# Patient Record
Sex: FEMALE | Race: WHITE | Employment: OTHER | URBAN - METROPOLITAN AREA
[De-identification: names, ages, dates, MRNs, and addresses within clinical notes are randomized per-mention and may not be internally consistent; named-entity substitution may affect disease eponyms.]

---

## 2017-03-06 PROBLEM — Z98.1 S/P LUMBAR FUSION: Status: ACTIVE | Noted: 2017-03-06

## 2018-03-20 ENCOUNTER — OFFICE VISIT (OUTPATIENT)
Dept: FAMILY MEDICINE CLINIC | Age: 73
End: 2018-03-20
Payer: MEDICARE

## 2018-03-20 VITALS
TEMPERATURE: 98.6 F | DIASTOLIC BLOOD PRESSURE: 70 MMHG | RESPIRATION RATE: 16 BRPM | BODY MASS INDEX: 26.58 KG/M2 | WEIGHT: 150 LBS | HEART RATE: 80 BPM | HEIGHT: 63 IN | OXYGEN SATURATION: 95 % | SYSTOLIC BLOOD PRESSURE: 122 MMHG

## 2018-03-20 DIAGNOSIS — E55.9 VITAMIN D DEFICIENCY: ICD-10-CM

## 2018-03-20 DIAGNOSIS — Z76.0 MEDICATION REFILL: ICD-10-CM

## 2018-03-20 DIAGNOSIS — K21.9 GASTROESOPHAGEAL REFLUX DISEASE, ESOPHAGITIS PRESENCE NOT SPECIFIED: ICD-10-CM

## 2018-03-20 DIAGNOSIS — Z85.3 HISTORY OF BILATERAL BREAST CANCER: ICD-10-CM

## 2018-03-20 DIAGNOSIS — E03.9 HYPOTHYROIDISM, UNSPECIFIED TYPE: Primary | ICD-10-CM

## 2018-03-20 DIAGNOSIS — E78.5 HYPERLIPIDEMIA, UNSPECIFIED HYPERLIPIDEMIA TYPE: ICD-10-CM

## 2018-03-20 PROCEDURE — 99214 OFFICE O/P EST MOD 30 MIN: CPT | Performed by: FAMILY MEDICINE

## 2018-03-20 PROCEDURE — 3014F SCREEN MAMMO DOC REV: CPT | Performed by: FAMILY MEDICINE

## 2018-03-20 PROCEDURE — G8400 PT W/DXA NO RESULTS DOC: HCPCS | Performed by: FAMILY MEDICINE

## 2018-03-20 PROCEDURE — G8484 FLU IMMUNIZE NO ADMIN: HCPCS | Performed by: FAMILY MEDICINE

## 2018-03-20 PROCEDURE — 1123F ACP DISCUSS/DSCN MKR DOCD: CPT | Performed by: FAMILY MEDICINE

## 2018-03-20 PROCEDURE — G8417 CALC BMI ABV UP PARAM F/U: HCPCS | Performed by: FAMILY MEDICINE

## 2018-03-20 PROCEDURE — 3017F COLORECTAL CA SCREEN DOC REV: CPT | Performed by: FAMILY MEDICINE

## 2018-03-20 PROCEDURE — 1036F TOBACCO NON-USER: CPT | Performed by: FAMILY MEDICINE

## 2018-03-20 PROCEDURE — 1090F PRES/ABSN URINE INCON ASSESS: CPT | Performed by: FAMILY MEDICINE

## 2018-03-20 PROCEDURE — 4040F PNEUMOC VAC/ADMIN/RCVD: CPT | Performed by: FAMILY MEDICINE

## 2018-03-20 PROCEDURE — G8427 DOCREV CUR MEDS BY ELIG CLIN: HCPCS | Performed by: FAMILY MEDICINE

## 2018-03-20 RX ORDER — LEVOTHYROXINE SODIUM 0.07 MG/1
75 TABLET ORAL DAILY
Qty: 90 TABLET | Refills: 1 | Status: SHIPPED | OUTPATIENT
Start: 2018-03-20 | End: 2018-12-13 | Stop reason: SDUPTHER

## 2018-03-20 RX ORDER — RANITIDINE 150 MG/1
150 TABLET ORAL DAILY
Qty: 90 TABLET | Refills: 1 | Status: SHIPPED | OUTPATIENT
Start: 2018-03-20 | End: 2018-12-13 | Stop reason: SDUPTHER

## 2018-03-20 RX ORDER — PANTOPRAZOLE SODIUM 40 MG/1
40 TABLET, DELAYED RELEASE ORAL DAILY
Qty: 90 TABLET | Refills: 1 | Status: SHIPPED | OUTPATIENT
Start: 2018-03-20 | End: 2018-12-13 | Stop reason: SDUPTHER

## 2018-03-20 RX ORDER — ERGOCALCIFEROL 1.25 MG/1
50000 CAPSULE ORAL WEEKLY
Qty: 12 CAPSULE | Refills: 1 | Status: ON HOLD
Start: 2018-03-20 | End: 2021-01-06 | Stop reason: ALTCHOICE

## 2018-03-20 RX ORDER — ROSUVASTATIN CALCIUM 10 MG/1
10 TABLET, COATED ORAL DAILY
Qty: 90 TABLET | Refills: 1 | Status: SHIPPED | OUTPATIENT
Start: 2018-03-20 | End: 2018-12-13 | Stop reason: SDUPTHER

## 2018-03-20 NOTE — PROGRESS NOTES
ideation  ENT ROS: negative for - epistaxis, headaches, hearing change, nasal congestion, nasal discharge, nasal polyps, sinus pain, tinnitus, vertigo or visual changes  Hematological and Lymphatic ROS: negative for - bleeding problems, blood clots, fatigue or swollen lymph nodes  Respiratory ROS: negative for - cough, orthopnea, shortness of breath, sputum changes, tachypnea or wheezing  Cardiovascular ROS: negative for - chest pain, dyspnea on exertion, irregular heartbeat, loss of consciousness, palpitations, paroxysmal nocturnal dyspnea or rapid heart rate  Gastrointestinal ROS: negative for - abdominal pain, blood in stools, change in bowel habits, constipation, diarrhea, gas/bloating, heartburn or nausea/vomiting  Musculoskeletal ROS: negative for - joint pain, joint stiffness, joint swelling or muscle, back pain, bowel or bladder incontinence  Neurological ROS: negative for - behavioral changes, confusion, dizziness, headaches, memory loss, numbness/tingling, seizures or speech problems, weakness  Dermatological ROS: negative for - dry skin, mole changes, nail changes, pruritus, rash or skin lesion changes    Physical Exam  Wt Readings from Last 3 Encounters:   03/20/18 150 lb (68 kg)   03/08/18 143 lb (64.9 kg)   11/07/17 143 lb (64.9 kg)     Temp Readings from Last 3 Encounters:   03/20/18 98.6 °F (37 °C)   03/08/18 98 °F (36.7 °C)   11/07/17 97 °F (36.1 °C)     BP Readings from Last 3 Encounters:   03/20/18 122/70   03/08/18 129/78   10/24/17 125/76     Pulse Readings from Last 3 Encounters:   03/20/18 80   03/08/18 65   10/24/17 67       General appearance: alert, well appearing, and in no distress, oriented to person, place, and time and normal appearing weight. CVS exam: normal rate, regular rhythm, normal S1, S2, no murmurs, rubs, clicks or gallops. Radial pulses 2+ bilateral.  PT/DP pulse 2+ bilat. No C/C/E    Chest: clear to auscultation, no wheezes, rales or rhonchi, symmetric air entry.

## 2018-04-12 DIAGNOSIS — Z85.3 HISTORY OF BILATERAL BREAST CANCER: ICD-10-CM

## 2018-06-04 ENCOUNTER — HOSPITAL ENCOUNTER (OUTPATIENT)
Age: 73
Discharge: HOME | End: 2018-06-04
Payer: MEDICARE

## 2018-06-04 DIAGNOSIS — E03.9 HYPOTHYROIDISM, UNSPECIFIED TYPE: ICD-10-CM

## 2018-06-04 DIAGNOSIS — E78.5 HYPERLIPIDEMIA, UNSPECIFIED HYPERLIPIDEMIA TYPE: ICD-10-CM

## 2018-06-04 DIAGNOSIS — K21.9 GASTROESOPHAGEAL REFLUX DISEASE, ESOPHAGITIS PRESENCE NOT SPECIFIED: ICD-10-CM

## 2018-06-04 LAB
ALBUMIN SERPL-MCNC: 3.8 G/DL (ref 3.5–5.2)
ALP BLD-CCNC: 67 U/L (ref 35–104)
ALT SERPL-CCNC: 19 U/L (ref 0–32)
ANION GAP SERPL CALCULATED.3IONS-SCNC: 10 MMOL/L (ref 7–16)
AST SERPL-CCNC: 25 U/L (ref 0–31)
BILIRUB SERPL-MCNC: 0.4 MG/DL (ref 0–1.2)
BUN BLDV-MCNC: 15 MG/DL (ref 8–23)
CALCIUM SERPL-MCNC: 9.1 MG/DL (ref 8.6–10.2)
CHLORIDE BLD-SCNC: 108 MMOL/L (ref 98–107)
CHOLESTEROL, TOTAL: 166 MG/DL (ref 0–199)
CO2: 27 MMOL/L (ref 22–29)
CREAT SERPL-MCNC: 0.9 MG/DL (ref 0.5–1)
GFR AFRICAN AMERICAN: >60
GFR NON-AFRICAN AMERICAN: >60 ML/MIN/1.73
GLUCOSE BLD-MCNC: 119 MG/DL (ref 74–109)
HCT VFR BLD CALC: 40.4 % (ref 34–48)
HDLC SERPL-MCNC: 43 MG/DL
HEMOGLOBIN: 13.3 G/DL (ref 11.5–15.5)
LDL CHOLESTEROL CALCULATED: 100 MG/DL (ref 0–99)
MCH RBC QN AUTO: 31.1 PG (ref 26–35)
MCHC RBC AUTO-ENTMCNC: 32.9 % (ref 32–34.5)
MCV RBC AUTO: 94.4 FL (ref 80–99.9)
PDW BLD-RTO: 13 FL (ref 11.5–15)
PLATELET # BLD: 187 E9/L (ref 130–450)
PMV BLD AUTO: 9.7 FL (ref 7–12)
POTASSIUM SERPL-SCNC: 5.1 MMOL/L (ref 3.5–5)
RBC # BLD: 4.28 E12/L (ref 3.5–5.5)
SODIUM BLD-SCNC: 145 MMOL/L (ref 132–146)
TOTAL PROTEIN: 6.5 G/DL (ref 6.4–8.3)
TRIGL SERPL-MCNC: 114 MG/DL (ref 0–149)
TSH SERPL DL<=0.05 MIU/L-ACNC: 4.22 UIU/ML (ref 0.27–4.2)
VLDLC SERPL CALC-MCNC: 23 MG/DL
WBC # BLD: 5.5 E9/L (ref 4.5–11.5)

## 2018-06-04 PROCEDURE — 80053 COMPREHEN METABOLIC PANEL: CPT

## 2018-06-04 PROCEDURE — 84443 ASSAY THYROID STIM HORMONE: CPT

## 2018-06-04 PROCEDURE — 80061 LIPID PANEL: CPT

## 2018-06-04 PROCEDURE — 85027 COMPLETE CBC AUTOMATED: CPT

## 2018-06-04 PROCEDURE — 36415 COLL VENOUS BLD VENIPUNCTURE: CPT

## 2018-06-21 ENCOUNTER — OFFICE VISIT (OUTPATIENT)
Dept: FAMILY MEDICINE CLINIC | Age: 73
End: 2018-06-21
Payer: MEDICARE

## 2018-06-21 VITALS
SYSTOLIC BLOOD PRESSURE: 110 MMHG | OXYGEN SATURATION: 96 % | HEART RATE: 80 BPM | HEIGHT: 63 IN | WEIGHT: 149.8 LBS | RESPIRATION RATE: 16 BRPM | TEMPERATURE: 97.7 F | BODY MASS INDEX: 26.54 KG/M2 | DIASTOLIC BLOOD PRESSURE: 60 MMHG

## 2018-06-21 DIAGNOSIS — E78.5 HYPERLIPIDEMIA, UNSPECIFIED HYPERLIPIDEMIA TYPE: ICD-10-CM

## 2018-06-21 DIAGNOSIS — I10 ESSENTIAL HYPERTENSION: ICD-10-CM

## 2018-06-21 DIAGNOSIS — R73.09 ELEVATED GLUCOSE: ICD-10-CM

## 2018-06-21 DIAGNOSIS — E03.9 HYPOTHYROIDISM, UNSPECIFIED TYPE: Primary | ICD-10-CM

## 2018-06-21 DIAGNOSIS — K21.9 GASTROESOPHAGEAL REFLUX DISEASE, ESOPHAGITIS PRESENCE NOT SPECIFIED: ICD-10-CM

## 2018-06-21 LAB — HBA1C MFR BLD: 6.1 %

## 2018-06-21 PROCEDURE — 1090F PRES/ABSN URINE INCON ASSESS: CPT | Performed by: FAMILY MEDICINE

## 2018-06-21 PROCEDURE — 83036 HEMOGLOBIN GLYCOSYLATED A1C: CPT | Performed by: FAMILY MEDICINE

## 2018-06-21 PROCEDURE — 1036F TOBACCO NON-USER: CPT | Performed by: FAMILY MEDICINE

## 2018-06-21 PROCEDURE — 3017F COLORECTAL CA SCREEN DOC REV: CPT | Performed by: FAMILY MEDICINE

## 2018-06-21 PROCEDURE — G8427 DOCREV CUR MEDS BY ELIG CLIN: HCPCS | Performed by: FAMILY MEDICINE

## 2018-06-21 PROCEDURE — G8417 CALC BMI ABV UP PARAM F/U: HCPCS | Performed by: FAMILY MEDICINE

## 2018-06-21 PROCEDURE — 1123F ACP DISCUSS/DSCN MKR DOCD: CPT | Performed by: FAMILY MEDICINE

## 2018-06-21 PROCEDURE — G8400 PT W/DXA NO RESULTS DOC: HCPCS | Performed by: FAMILY MEDICINE

## 2018-06-21 PROCEDURE — 4040F PNEUMOC VAC/ADMIN/RCVD: CPT | Performed by: FAMILY MEDICINE

## 2018-06-21 PROCEDURE — 99214 OFFICE O/P EST MOD 30 MIN: CPT | Performed by: FAMILY MEDICINE

## 2018-06-21 NOTE — PROGRESS NOTES
difficulties, mood swings, sleep disturbances or suicidal ideation  ENT ROS: negative for - epistaxis, headaches, hearing change, nasal congestion, nasal discharge, nasal polyps, sinus pain, tinnitus, vertigo or visual changes  Hematological and Lymphatic ROS: negative for - bleeding problems, blood clots, fatigue or swollen lymph nodes  Respiratory ROS: negative for - cough, orthopnea, shortness of breath, sputum changes, tachypnea or wheezing  Cardiovascular ROS: negative for - chest pain, dyspnea on exertion, irregular heartbeat, loss of consciousness, palpitations, paroxysmal nocturnal dyspnea or rapid heart rate  Gastrointestinal ROS: negative for - abdominal pain, blood in stools, change in bowel habits, constipation, diarrhea, gas/bloating, heartburn or nausea/vomiting  Musculoskeletal ROS: negative for - joint pain, joint stiffness, joint swelling or muscle, back pain, bowel or bladder incontinence  Neurological ROS: negative for - behavioral changes, confusion, dizziness, headaches, memory loss, numbness/tingling, seizures or speech problems, weakness  Dermatological ROS: negative for - dry skin, mole changes, nail changes, pruritus, rash or skin lesion changes    Physical Exam  Temp Readings from Last 3 Encounters:   06/21/18 97.7 °F (36.5 °C)   05/10/18 97 °F (36.1 °C)   03/20/18 98.6 °F (37 °C)     Wt Readings from Last 3 Encounters:   06/21/18 149 lb 12.8 oz (67.9 kg)   06/07/18 142 lb 8 oz (64.6 kg)   05/10/18 155 lb (70.3 kg)     BP Readings from Last 3 Encounters:   06/21/18 110/60   06/07/18 133/84   05/10/18 133/86     Pulse Readings from Last 3 Encounters:   06/21/18 80   06/07/18 72   05/10/18 70       General appearance: alert, well appearing, and in no distress, oriented to person, place, and time and normal appearing weight. CVS exam: normal rate, regular rhythm, normal S1, S2, no murmurs, rubs, clicks or gallops. Radial pulses 2+ bilateral.  PT/DP pulse 2+ bilat.   No C/C/E    Chest: clear to auscultation, no wheezes, rales or rhonchi, symmetric air entry. Abdomen: Soft, non-tender, non-distended, positive BS in all 4 quadrants    Extremities:Dorsalis pedis pulses palpated bilaterally, no clubbing, cyanosis, edema or erythema     SKIN: no lesions, jaundice, petechiae, pallor, cyanosis, ecchymosis    NEURO: gross motor exam normal by observation, gait normal    Mental status - alert, oriented to person, place, and time, normal mood, behavior, speech, dress, motor activity, and thought processes      Assessment/Plan  Eli Conner was seen today for thyroid problem, discuss labs, other and memory loss. Diagnoses and all orders for this visit:    Hypothyroidism, unspecified type  - Slight elevation. Does not wish to increase meds,     Hyperlipidemia, unspecified hyperlipidemia type  - . Continue on Crestor    Gastroesophageal reflux disease, esophagitis presence not specified  -  Stable, continue medications as prescribed. Essential hypertension  -     POCT glycosylated hemoglobin (Hb A1C)    Elevated glucose  -     POCT glycosylated hemoglobin (Hb A1C)    Other orders  -     Handicap Placard MISC; by Does not apply route Duration: Lifetime          Return in about 6 months (around 12/21/2018), or if symptoms worsen or fail to improve. Gertrudis Foster, DO    Call or go to ED immediately if symptoms worsen or persist.    Educational materials and/or home exercises printed for patient's review and were included in patient instructions on his/her After Visit Summary and given to patient at the end of visit. Counseled regarding above diagnosis, including possible risks and complications,  especially if left uncontrolled. Counseled regarding the possible side effects, risks, benefits and alternatives to treatment; patient and/or guardian verbalizes understanding, agrees, feels comfortable with and wishes to proceed with above treatment plan.     Advised patient to call with any new

## 2018-07-26 ENCOUNTER — TELEPHONE (OUTPATIENT)
Dept: FAMILY MEDICINE CLINIC | Age: 73
End: 2018-07-26

## 2018-07-26 DIAGNOSIS — G89.29 CHRONIC LOW BACK PAIN, UNSPECIFIED BACK PAIN LATERALITY, WITH SCIATICA PRESENCE UNSPECIFIED: Primary | ICD-10-CM

## 2018-07-26 DIAGNOSIS — M54.5 CHRONIC LOW BACK PAIN, UNSPECIFIED BACK PAIN LATERALITY, WITH SCIATICA PRESENCE UNSPECIFIED: Primary | ICD-10-CM

## 2018-07-26 NOTE — TELEPHONE ENCOUNTER
Pt called and Dr Анна Salinas referred pt for Phy Therapy. The Therapist , Gail Beverly wanted to talk to you re Dawna Simms. She wanted her to see Dr Dougie Chandler. Is this ok ? If you need to talk to [facility] her ph # [phone]. Mackenzie's ph# [phone].

## 2018-12-13 ENCOUNTER — HOSPITAL ENCOUNTER (OUTPATIENT)
Age: 73
Discharge: HOME | End: 2018-12-15
Payer: MEDICARE

## 2018-12-13 ENCOUNTER — OFFICE VISIT (OUTPATIENT)
Dept: FAMILY MEDICINE CLINIC | Age: 73
End: 2018-12-13
Payer: MEDICARE

## 2018-12-13 VITALS
BODY MASS INDEX: 28.71 KG/M2 | DIASTOLIC BLOOD PRESSURE: 68 MMHG | OXYGEN SATURATION: 97 % | WEIGHT: 156 LBS | RESPIRATION RATE: 20 BRPM | TEMPERATURE: 98.1 F | SYSTOLIC BLOOD PRESSURE: 112 MMHG | HEIGHT: 62 IN | HEART RATE: 74 BPM

## 2018-12-13 DIAGNOSIS — E78.5 HYPERLIPIDEMIA, UNSPECIFIED HYPERLIPIDEMIA TYPE: ICD-10-CM

## 2018-12-13 DIAGNOSIS — E03.9 HYPOTHYROIDISM, UNSPECIFIED TYPE: ICD-10-CM

## 2018-12-13 DIAGNOSIS — R73.09 ELEVATED GLUCOSE: ICD-10-CM

## 2018-12-13 DIAGNOSIS — Z00.00 ROUTINE GENERAL MEDICAL EXAMINATION AT A HEALTH CARE FACILITY: Primary | ICD-10-CM

## 2018-12-13 DIAGNOSIS — Z76.0 MEDICATION REFILL: ICD-10-CM

## 2018-12-13 DIAGNOSIS — K21.9 GASTROESOPHAGEAL REFLUX DISEASE, ESOPHAGITIS PRESENCE NOT SPECIFIED: ICD-10-CM

## 2018-12-13 DIAGNOSIS — F41.9 ANXIETY: ICD-10-CM

## 2018-12-13 LAB
ALBUMIN SERPL-MCNC: 3.9 G/DL (ref 3.5–5.2)
ALP BLD-CCNC: 63 U/L (ref 35–104)
ALT SERPL-CCNC: 12 U/L (ref 0–32)
ANION GAP SERPL CALCULATED.3IONS-SCNC: 12 MMOL/L (ref 7–16)
AST SERPL-CCNC: 22 U/L (ref 0–31)
BILIRUB SERPL-MCNC: 0.5 MG/DL (ref 0–1.2)
BUN BLDV-MCNC: 19 MG/DL (ref 8–23)
CALCIUM SERPL-MCNC: 9.3 MG/DL (ref 8.6–10.2)
CHLORIDE BLD-SCNC: 105 MMOL/L (ref 98–107)
CHOLESTEROL, TOTAL: 160 MG/DL (ref 0–199)
CO2: 25 MMOL/L (ref 22–29)
CREAT SERPL-MCNC: 1 MG/DL (ref 0.5–1)
GFR AFRICAN AMERICAN: >60
GFR NON-AFRICAN AMERICAN: 54 ML/MIN/1.73
GLUCOSE BLD-MCNC: 108 MG/DL (ref 74–99)
HBA1C MFR BLD: 6 %
HCT VFR BLD CALC: 41.3 % (ref 34–48)
HDLC SERPL-MCNC: 37 MG/DL
HEMOGLOBIN: 13.4 G/DL (ref 11.5–15.5)
LDL CHOLESTEROL CALCULATED: 93 MG/DL (ref 0–99)
MCH RBC QN AUTO: 29.5 PG (ref 26–35)
MCHC RBC AUTO-ENTMCNC: 32.4 % (ref 32–34.5)
MCV RBC AUTO: 91 FL (ref 80–99.9)
PDW BLD-RTO: 12.5 FL (ref 11.5–15)
PLATELET # BLD: 233 E9/L (ref 130–450)
PMV BLD AUTO: 10.2 FL (ref 7–12)
POTASSIUM SERPL-SCNC: 4.6 MMOL/L (ref 3.5–5)
RBC # BLD: 4.54 E12/L (ref 3.5–5.5)
SODIUM BLD-SCNC: 142 MMOL/L (ref 132–146)
TOTAL PROTEIN: 6.9 G/DL (ref 6.4–8.3)
TRIGL SERPL-MCNC: 150 MG/DL (ref 0–149)
TSH SERPL DL<=0.05 MIU/L-ACNC: 4.22 UIU/ML (ref 0.27–4.2)
VLDLC SERPL CALC-MCNC: 30 MG/DL
WBC # BLD: 6 E9/L (ref 4.5–11.5)

## 2018-12-13 PROCEDURE — 84443 ASSAY THYROID STIM HORMONE: CPT

## 2018-12-13 PROCEDURE — 80053 COMPREHEN METABOLIC PANEL: CPT

## 2018-12-13 PROCEDURE — 80061 LIPID PANEL: CPT

## 2018-12-13 PROCEDURE — 83036 HEMOGLOBIN GLYCOSYLATED A1C: CPT | Performed by: FAMILY MEDICINE

## 2018-12-13 PROCEDURE — G0438 PPPS, INITIAL VISIT: HCPCS | Performed by: FAMILY MEDICINE

## 2018-12-13 PROCEDURE — 85027 COMPLETE CBC AUTOMATED: CPT

## 2018-12-13 PROCEDURE — 36415 COLL VENOUS BLD VENIPUNCTURE: CPT | Performed by: FAMILY MEDICINE

## 2018-12-13 PROCEDURE — G8484 FLU IMMUNIZE NO ADMIN: HCPCS | Performed by: FAMILY MEDICINE

## 2018-12-13 PROCEDURE — 4040F PNEUMOC VAC/ADMIN/RCVD: CPT | Performed by: FAMILY MEDICINE

## 2018-12-13 RX ORDER — PANTOPRAZOLE SODIUM 40 MG/1
40 TABLET, DELAYED RELEASE ORAL DAILY
Qty: 90 TABLET | Refills: 1 | Status: ON HOLD
Start: 2018-12-13 | End: 2021-01-06 | Stop reason: ALTCHOICE

## 2018-12-13 RX ORDER — RANITIDINE 150 MG/1
150 TABLET ORAL DAILY
Qty: 90 TABLET | Refills: 1 | Status: ON HOLD
Start: 2018-12-13 | End: 2021-01-06 | Stop reason: ALTCHOICE

## 2018-12-13 RX ORDER — TIZANIDINE 4 MG/1
4 TABLET ORAL EVERY 8 HOURS PRN
Qty: 90 TABLET | Refills: 11 | Status: SHIPPED | OUTPATIENT
Start: 2018-12-13 | End: 2019-12-13

## 2018-12-13 RX ORDER — ASPIRIN 81 MG/1
81 TABLET ORAL 2 TIMES DAILY
Qty: 60 TABLET | Refills: 5 | Status: CANCELLED | OUTPATIENT
Start: 2018-12-13

## 2018-12-13 RX ORDER — LEVOTHYROXINE SODIUM 0.07 MG/1
75 TABLET ORAL DAILY
Qty: 90 TABLET | Refills: 1 | Status: ON HOLD
Start: 2018-12-13 | End: 2021-01-06 | Stop reason: ALTCHOICE

## 2018-12-13 RX ORDER — ROSUVASTATIN CALCIUM 10 MG/1
10 TABLET, COATED ORAL DAILY
Qty: 90 TABLET | Refills: 1 | Status: ON HOLD
Start: 2018-12-13 | End: 2021-01-07 | Stop reason: HOSPADM

## 2018-12-13 ASSESSMENT — LIFESTYLE VARIABLES: HOW OFTEN DO YOU HAVE A DRINK CONTAINING ALCOHOL: 0

## 2018-12-13 ASSESSMENT — PATIENT HEALTH QUESTIONNAIRE - PHQ9
SUM OF ALL RESPONSES TO PHQ QUESTIONS 1-9: 2
SUM OF ALL RESPONSES TO PHQ QUESTIONS 1-9: 2

## 2018-12-13 ASSESSMENT — ANXIETY QUESTIONNAIRES: GAD7 TOTAL SCORE: 6

## 2018-12-13 NOTE — PROGRESS NOTES
Medicare Annual Wellness Visit  Name: Loyde Goldberg Date: 2018   MRN: [id number] Sex: Female   Age: 75 y.o. Ethnicity: Non-/Non    : 1945 Race: Epi Guevara is here for [employer]; 6 Month Follow-Up; and Eye Strain (sometimes sight gets so bad can barely see - comes and goes - gets blurry )    Screenings for behavioral, psychosocial and functional/safety risks, and cognitive dysfunction are all negative except as indicated below. These results, as well as other patient data from the [address] form, are documented in Flowsheets linked to this Encounter. No Known Allergies    Prior to Visit Medications    Medication Sig Taking?  Authorizing Provider   tiZANidine (ZANAFLEX) 4 MG tablet Take 1 tablet by mouth every 8 hours as needed (neck tightness) Yes Gertrudis Borges DO   ranitidine (ZANTAC) 150 MG tablet Take 1 tablet by mouth daily Yes Gertrudis Borges DO   pantoprazole (PROTONIX) 40 MG tablet Take 1 tablet by mouth daily Yes Gertrudis Borges DO   levothyroxine (SYNTHROID) 75 MCG tablet Take 1 tablet by mouth Daily Yes Gertrudis Borges DO   rosuvastatin (CRESTOR) 10 MG tablet Take 1 tablet by mouth daily Yes Gertrudis Borges DO   Handicap Placard MISC by Does not apply route Duration: Lifetime Yes Gertrudis Borges DO   aspirin (ECOTRIN LOW STRENGTH) 81 MG EC tablet Take 1 tablet by mouth 2 times daily Yes Gertrudis Borges DO   Elastic Bandages & Supports (LUMBAR BACK BRACE/SUPPORT PAD) MISC 1 each by Does not apply route daily as needed (as needed) Yes Kely Valerio MD   terconazole (TERAZOL 3) 0.8 % vaginal cream Place 1 applicator vaginally nightly  Yes Historical Provider, MD   vitamin D (ERGOCALCIFEROL) 29218 units CAPS capsule Take 1 capsule by mouth once a week for 12 doses  Alcon Borges DO       Past Medical History:   Diagnosis Date    Compression fracture of L1 lumbar vertebra [facility]) 1994

## 2019-06-13 ENCOUNTER — HOSPITAL ENCOUNTER (OUTPATIENT)
Age: 74
Discharge: HOME | End: 2019-06-13
Payer: MEDICARE

## 2019-06-13 LAB
ALBUMIN SERPL-MCNC: 4.4 G/DL (ref 3.5–5.2)
ALP BLD-CCNC: 89 U/L (ref 35–104)
ALT SERPL-CCNC: 11 U/L (ref 0–32)
ANION GAP SERPL CALCULATED.3IONS-SCNC: 9 MMOL/L (ref 7–16)
AST SERPL-CCNC: 21 U/L (ref 0–31)
BASOPHILS ABSOLUTE: 0.03 E9/L (ref 0–0.2)
BASOPHILS RELATIVE PERCENT: 0.4 % (ref 0–2)
BILIRUB SERPL-MCNC: 0.5 MG/DL (ref 0–1.2)
BUN BLDV-MCNC: 19 MG/DL (ref 8–23)
C-REACTIVE PROTEIN: 0.3 MG/DL (ref 0–0.4)
CALCIUM SERPL-MCNC: 9.5 MG/DL (ref 8.6–10.2)
CHLORIDE BLD-SCNC: 105 MMOL/L (ref 98–107)
CHOLESTEROL, TOTAL: 158 MG/DL (ref 0–199)
CO2: 28 MMOL/L (ref 22–29)
CREAT SERPL-MCNC: 1 MG/DL (ref 0.5–1)
EOSINOPHILS ABSOLUTE: 0.27 E9/L (ref 0.05–0.5)
EOSINOPHILS RELATIVE PERCENT: 4 % (ref 0–6)
GFR AFRICAN AMERICAN: >60
GFR NON-AFRICAN AMERICAN: 54 ML/MIN/1.73
GLUCOSE BLD-MCNC: 101 MG/DL (ref 74–99)
HBA1C MFR BLD: 6 % (ref 4–5.6)
HCT VFR BLD CALC: 42.7 % (ref 34–48)
HDLC SERPL-MCNC: 39 MG/DL
HEMOGLOBIN: 13.9 G/DL (ref 11.5–15.5)
IMMATURE GRANULOCYTES #: 0.02 E9/L
IMMATURE GRANULOCYTES %: 0.3 % (ref 0–5)
LDL CHOLESTEROL CALCULATED: 90 MG/DL (ref 0–99)
LYMPHOCYTES ABSOLUTE: 2.73 E9/L (ref 1.5–4)
LYMPHOCYTES RELATIVE PERCENT: 40.4 % (ref 20–42)
MCH RBC QN AUTO: 30 PG (ref 26–35)
MCHC RBC AUTO-ENTMCNC: 32.6 % (ref 32–34.5)
MCV RBC AUTO: 92.2 FL (ref 80–99.9)
MONOCYTES ABSOLUTE: 0.65 E9/L (ref 0.1–0.95)
MONOCYTES RELATIVE PERCENT: 9.6 % (ref 2–12)
NEUTROPHILS ABSOLUTE: 3.05 E9/L (ref 1.8–7.3)
NEUTROPHILS RELATIVE PERCENT: 45.3 % (ref 43–80)
PDW BLD-RTO: 12.1 FL (ref 11.5–15)
PLATELET # BLD: 243 E9/L (ref 130–450)
PMV BLD AUTO: 9.8 FL (ref 7–12)
POTASSIUM SERPL-SCNC: 4.5 MMOL/L (ref 3.5–5)
RBC # BLD: 4.63 E12/L (ref 3.5–5.5)
SEDIMENTATION RATE, ERYTHROCYTE: 22 MM/HR (ref 0–20)
SODIUM BLD-SCNC: 142 MMOL/L (ref 132–146)
T4 FREE: 1.18 NG/DL (ref 0.93–1.7)
TOTAL PROTEIN: 7.2 G/DL (ref 6.4–8.3)
TRIGL SERPL-MCNC: 143 MG/DL (ref 0–149)
TSH SERPL DL<=0.05 MIU/L-ACNC: 4.84 UIU/ML (ref 0.27–4.2)
VITAMIN D 25-HYDROXY: 23 NG/ML (ref 30–100)
VLDLC SERPL CALC-MCNC: 29 MG/DL
WBC # BLD: 6.8 E9/L (ref 4.5–11.5)

## 2019-06-13 PROCEDURE — 84443 ASSAY THYROID STIM HORMONE: CPT

## 2019-06-13 PROCEDURE — 85651 RBC SED RATE NONAUTOMATED: CPT

## 2019-06-13 PROCEDURE — 80061 LIPID PANEL: CPT

## 2019-06-13 PROCEDURE — 85025 COMPLETE CBC W/AUTO DIFF WBC: CPT

## 2019-06-13 PROCEDURE — 86140 C-REACTIVE PROTEIN: CPT

## 2019-06-13 PROCEDURE — 36415 COLL VENOUS BLD VENIPUNCTURE: CPT

## 2019-06-13 PROCEDURE — 80053 COMPREHEN METABOLIC PANEL: CPT

## 2019-06-13 PROCEDURE — 83036 HEMOGLOBIN GLYCOSYLATED A1C: CPT

## 2019-06-13 PROCEDURE — 84439 ASSAY OF FREE THYROXINE: CPT

## 2019-06-13 PROCEDURE — 82306 VITAMIN D 25 HYDROXY: CPT

## 2019-07-25 ENCOUNTER — INITIAL CONSULT (OUTPATIENT)
Dept: NEUROSURGERY | Age: 74
End: 2019-07-25
Payer: MEDICARE

## 2019-07-25 VITALS
DIASTOLIC BLOOD PRESSURE: 73 MMHG | BODY MASS INDEX: 27.23 KG/M2 | SYSTOLIC BLOOD PRESSURE: 133 MMHG | HEART RATE: 68 BPM | HEIGHT: 62 IN | WEIGHT: 148 LBS

## 2019-07-25 DIAGNOSIS — M54.40 ACUTE RIGHT-SIDED LOW BACK PAIN WITH SCIATICA, SCIATICA LATERALITY UNSPECIFIED: Primary | ICD-10-CM

## 2019-07-25 PROCEDURE — 99203 OFFICE O/P NEW LOW 30 MIN: CPT | Performed by: PHYSICIAN ASSISTANT

## 2019-07-25 PROCEDURE — 3017F COLORECTAL CA SCREEN DOC REV: CPT | Performed by: PHYSICIAN ASSISTANT

## 2019-07-25 PROCEDURE — G8427 DOCREV CUR MEDS BY ELIG CLIN: HCPCS | Performed by: PHYSICIAN ASSISTANT

## 2019-07-25 PROCEDURE — 1090F PRES/ABSN URINE INCON ASSESS: CPT | Performed by: PHYSICIAN ASSISTANT

## 2019-07-25 PROCEDURE — 4040F PNEUMOC VAC/ADMIN/RCVD: CPT | Performed by: PHYSICIAN ASSISTANT

## 2019-07-25 PROCEDURE — 1036F TOBACCO NON-USER: CPT | Performed by: PHYSICIAN ASSISTANT

## 2019-07-25 PROCEDURE — 1123F ACP DISCUSS/DSCN MKR DOCD: CPT | Performed by: PHYSICIAN ASSISTANT

## 2019-07-25 PROCEDURE — G8400 PT W/DXA NO RESULTS DOC: HCPCS | Performed by: PHYSICIAN ASSISTANT

## 2019-07-25 PROCEDURE — G8417 CALC BMI ABV UP PARAM F/U: HCPCS | Performed by: PHYSICIAN ASSISTANT

## 2019-07-25 RX ORDER — OXYCODONE HYDROCHLORIDE AND ACETAMINOPHEN 5; 325 MG/1; MG/1
1 TABLET ORAL EVERY 4 HOURS PRN
COMMUNITY
End: 2021-01-22 | Stop reason: ALTCHOICE

## 2019-07-25 ASSESSMENT — ENCOUNTER SYMPTOMS
EYES NEGATIVE: 1
BACK PAIN: 1
GASTROINTESTINAL NEGATIVE: 1
RESPIRATORY NEGATIVE: 1
ALLERGIC/IMMUNOLOGIC NEGATIVE: 1

## 2019-07-25 NOTE — COMMUNICATION BODY
Assessment:  75year old female with chronic low back and bilateral leg pain. She has a history of a T12-L2 fusion for a L1 fracture over 20 years ago and a recent L4 kyphoplasty. Plan: We will order LE EMG, lumbar and thoracic CT/myelogram, and lumbar flex/ext x-rays.

## 2019-08-06 ENCOUNTER — TELEPHONE (OUTPATIENT)
Dept: NEUROSURGERY | Age: 74
End: 2019-08-06

## 2019-08-06 NOTE — TELEPHONE ENCOUNTER
Called patient to inform her that her EMG was scheduled and the patient stated she did not want to have any of the testing done that SEN Dang had ordered. Patient stated she did not know that she was not seeing the doctor and does not wish to be seen in our office. Informed patient that we tell patients when they are seeing a PA and that she needed testing before seeing the doctor.

## 2019-09-17 ENCOUNTER — HOSPITAL ENCOUNTER (OUTPATIENT)
Age: 74
Discharge: HOME | End: 2019-09-19

## 2019-09-17 PROCEDURE — 88311 DECALCIFY TISSUE: CPT

## 2019-09-17 PROCEDURE — 88307 TISSUE EXAM BY PATHOLOGIST: CPT

## 2020-01-13 ENCOUNTER — HOSPITAL ENCOUNTER (OUTPATIENT)
Age: 75
Discharge: HOME | End: 2020-01-13
Payer: MEDICARE

## 2020-01-13 LAB
ALBUMIN SERPL-MCNC: 4.4 G/DL (ref 3.5–5.2)
ALP BLD-CCNC: 82 U/L (ref 35–104)
ALT SERPL-CCNC: 12 U/L (ref 0–32)
ANION GAP SERPL CALCULATED.3IONS-SCNC: 13 MMOL/L (ref 7–16)
AST SERPL-CCNC: 22 U/L (ref 0–31)
BASOPHILS ABSOLUTE: 0.04 E9/L (ref 0–0.2)
BASOPHILS RELATIVE PERCENT: 0.6 % (ref 0–2)
BILIRUB SERPL-MCNC: 0.3 MG/DL (ref 0–1.2)
BUN BLDV-MCNC: 16 MG/DL (ref 8–23)
C-REACTIVE PROTEIN: 0.2 MG/DL (ref 0–0.4)
CALCIUM SERPL-MCNC: 9.5 MG/DL (ref 8.6–10.2)
CHLORIDE BLD-SCNC: 105 MMOL/L (ref 98–107)
CHOLESTEROL, FASTING: 175 MG/DL (ref 0–199)
CO2: 25 MMOL/L (ref 22–29)
CREAT SERPL-MCNC: 0.8 MG/DL (ref 0.5–1)
EOSINOPHILS ABSOLUTE: 0.22 E9/L (ref 0.05–0.5)
EOSINOPHILS RELATIVE PERCENT: 3.2 % (ref 0–6)
GFR AFRICAN AMERICAN: >60
GFR NON-AFRICAN AMERICAN: >60 ML/MIN/1.73
GLUCOSE BLD-MCNC: 112 MG/DL (ref 74–99)
HCT VFR BLD CALC: 42.5 % (ref 34–48)
HDLC SERPL-MCNC: 45 MG/DL
HEMOGLOBIN: 13.5 G/DL (ref 11.5–15.5)
IMMATURE GRANULOCYTES #: 0.02 E9/L
IMMATURE GRANULOCYTES %: 0.3 % (ref 0–5)
LDL CHOLESTEROL CALCULATED: 94 MG/DL (ref 0–99)
LYMPHOCYTES ABSOLUTE: 2.06 E9/L (ref 1.5–4)
LYMPHOCYTES RELATIVE PERCENT: 30 % (ref 20–42)
MCH RBC QN AUTO: 29.2 PG (ref 26–35)
MCHC RBC AUTO-ENTMCNC: 31.8 % (ref 32–34.5)
MCV RBC AUTO: 91.8 FL (ref 80–99.9)
MONOCYTES ABSOLUTE: 0.63 E9/L (ref 0.1–0.95)
MONOCYTES RELATIVE PERCENT: 9.2 % (ref 2–12)
NEUTROPHILS ABSOLUTE: 3.9 E9/L (ref 1.8–7.3)
NEUTROPHILS RELATIVE PERCENT: 56.7 % (ref 43–80)
PDW BLD-RTO: 12.8 FL (ref 11.5–15)
PLATELET # BLD: 229 E9/L (ref 130–450)
PMV BLD AUTO: 9.9 FL (ref 7–12)
POTASSIUM SERPL-SCNC: 3.9 MMOL/L (ref 3.5–5)
RBC # BLD: 4.63 E12/L (ref 3.5–5.5)
SEDIMENTATION RATE, ERYTHROCYTE: 33 MM/HR (ref 0–20)
SODIUM BLD-SCNC: 143 MMOL/L (ref 132–146)
T4 FREE: 1.2 NG/DL (ref 0.93–1.7)
TOTAL CK: 59 U/L (ref 20–180)
TOTAL PROTEIN: 7.6 G/DL (ref 6.4–8.3)
TRIGLYCERIDE, FASTING: 178 MG/DL (ref 0–149)
TROPONIN: <0.01 NG/ML (ref 0–0.03)
TSH SERPL DL<=0.05 MIU/L-ACNC: 4.21 UIU/ML (ref 0.27–4.2)
VLDLC SERPL CALC-MCNC: 36 MG/DL
WBC # BLD: 6.9 E9/L (ref 4.5–11.5)

## 2020-01-13 PROCEDURE — 36415 COLL VENOUS BLD VENIPUNCTURE: CPT

## 2020-01-13 PROCEDURE — 80053 COMPREHEN METABOLIC PANEL: CPT

## 2020-01-13 PROCEDURE — 86140 C-REACTIVE PROTEIN: CPT

## 2020-01-13 PROCEDURE — 84443 ASSAY THYROID STIM HORMONE: CPT

## 2020-01-13 PROCEDURE — 85025 COMPLETE CBC W/AUTO DIFF WBC: CPT

## 2020-01-13 PROCEDURE — 80061 LIPID PANEL: CPT

## 2020-01-13 PROCEDURE — 82550 ASSAY OF CK (CPK): CPT

## 2020-01-13 PROCEDURE — 84484 ASSAY OF TROPONIN QUANT: CPT

## 2020-01-13 PROCEDURE — 85651 RBC SED RATE NONAUTOMATED: CPT

## 2020-01-13 PROCEDURE — 84439 ASSAY OF FREE THYROXINE: CPT

## 2020-04-10 RX ORDER — LEVOTHYROXINE SODIUM 0.07 MG/1
TABLET ORAL
Qty: 90 TABLET | Refills: 1 | OUTPATIENT
Start: 2020-04-10

## 2021-01-06 ENCOUNTER — APPOINTMENT (OUTPATIENT)
Dept: MRI IMAGING | Age: 76
End: 2021-01-06
Payer: MEDICARE

## 2021-01-06 ENCOUNTER — APPOINTMENT (OUTPATIENT)
Dept: CT IMAGING | Age: 76
End: 2021-01-06
Payer: MEDICARE

## 2021-01-06 ENCOUNTER — APPOINTMENT (OUTPATIENT)
Dept: GENERAL RADIOLOGY | Age: 76
End: 2021-01-06
Payer: MEDICARE

## 2021-01-06 ENCOUNTER — HOSPITAL ENCOUNTER (OUTPATIENT)
Age: 76
Setting detail: OBSERVATION
Discharge: HOME | End: 2021-01-07
Attending: EMERGENCY MEDICINE | Admitting: INTERNAL MEDICINE
Payer: MEDICARE

## 2021-01-06 DIAGNOSIS — G45.9 TIA (TRANSIENT ISCHEMIC ATTACK): ICD-10-CM

## 2021-01-06 DIAGNOSIS — I63.9 ACUTE ISCHEMIC STROKE (HCC): Primary | ICD-10-CM

## 2021-01-06 LAB
ALBUMIN SERPL-MCNC: 4.1 G/DL (ref 3.5–5.2)
ALP BLD-CCNC: 71 U/L (ref 35–104)
ALT SERPL-CCNC: 15 U/L (ref 0–32)
ANION GAP SERPL CALCULATED.3IONS-SCNC: 10 MMOL/L (ref 7–16)
APTT: 27.5 SEC (ref 24.5–35.1)
AST SERPL-CCNC: 34 U/L (ref 0–31)
BASOPHILS ABSOLUTE: 0.02 E9/L (ref 0–0.2)
BASOPHILS RELATIVE PERCENT: 0.3 % (ref 0–2)
BILIRUB SERPL-MCNC: 0.4 MG/DL (ref 0–1.2)
BILIRUBIN URINE: NEGATIVE
BLOOD, URINE: NEGATIVE
BUN BLDV-MCNC: 14 MG/DL (ref 8–23)
CALCIUM SERPL-MCNC: 9.2 MG/DL (ref 8.6–10.2)
CHLORIDE BLD-SCNC: 106 MMOL/L (ref 98–107)
CHP ED QC CHECK: YES
CLARITY: CLEAR
CO2: 24 MMOL/L (ref 22–29)
COLOR: YELLOW
CREAT SERPL-MCNC: 0.9 MG/DL (ref 0.5–1)
EKG ATRIAL RATE: 75 BPM
EKG P AXIS: 68 DEGREES
EKG P-R INTERVAL: 196 MS
EKG Q-T INTERVAL: 364 MS
EKG QRS DURATION: 76 MS
EKG QTC CALCULATION (BAZETT): 406 MS
EKG R AXIS: 61 DEGREES
EKG T AXIS: 40 DEGREES
EKG VENTRICULAR RATE: 75 BPM
EOSINOPHILS ABSOLUTE: 0.16 E9/L (ref 0.05–0.5)
EOSINOPHILS RELATIVE PERCENT: 2.2 % (ref 0–6)
GFR AFRICAN AMERICAN: >60
GFR NON-AFRICAN AMERICAN: >60 ML/MIN/1.73
GLUCOSE BLD-MCNC: 109 MG/DL
GLUCOSE BLD-MCNC: 116 MG/DL (ref 74–99)
GLUCOSE URINE: NEGATIVE MG/DL
HCT VFR BLD CALC: 42.1 % (ref 34–48)
HEMOGLOBIN: 13.6 G/DL (ref 11.5–15.5)
IMMATURE GRANULOCYTES #: 0.03 E9/L
IMMATURE GRANULOCYTES %: 0.4 % (ref 0–5)
INR BLD: 1
KETONES, URINE: NEGATIVE MG/DL
LEUKOCYTE ESTERASE, URINE: NEGATIVE
LYMPHOCYTES ABSOLUTE: 1.57 E9/L (ref 1.5–4)
LYMPHOCYTES RELATIVE PERCENT: 21.5 % (ref 20–42)
MCH RBC QN AUTO: 29.4 PG (ref 26–35)
MCHC RBC AUTO-ENTMCNC: 32.3 % (ref 32–34.5)
MCV RBC AUTO: 90.9 FL (ref 80–99.9)
METER GLUCOSE: 109 MG/DL (ref 74–99)
MONOCYTES ABSOLUTE: 0.56 E9/L (ref 0.1–0.95)
MONOCYTES RELATIVE PERCENT: 7.7 % (ref 2–12)
NEUTROPHILS ABSOLUTE: 4.97 E9/L (ref 1.8–7.3)
NEUTROPHILS RELATIVE PERCENT: 67.9 % (ref 43–80)
NITRITE, URINE: NEGATIVE
PDW BLD-RTO: 12.6 FL (ref 11.5–15)
PH UA: 5.5 (ref 5–9)
PLATELET # BLD: 194 E9/L (ref 130–450)
PMV BLD AUTO: 9.8 FL (ref 7–12)
POTASSIUM SERPL-SCNC: 4.9 MMOL/L (ref 3.5–5)
PROTEIN UA: NEGATIVE MG/DL
PROTHROMBIN TIME: 11.3 SEC (ref 9.3–12.4)
RBC # BLD: 4.63 E12/L (ref 3.5–5.5)
SODIUM BLD-SCNC: 140 MMOL/L (ref 132–146)
SPECIFIC GRAVITY UA: <=1.005 (ref 1–1.03)
TOTAL PROTEIN: 6.9 G/DL (ref 6.4–8.3)
UROBILINOGEN, URINE: 0.2 E.U./DL
WBC # BLD: 7.3 E9/L (ref 4.5–11.5)

## 2021-01-06 PROCEDURE — 6370000000 HC RX 637 (ALT 250 FOR IP): Performed by: INTERNAL MEDICINE

## 2021-01-06 PROCEDURE — 2580000003 HC RX 258: Performed by: RADIOLOGY

## 2021-01-06 PROCEDURE — 82962 GLUCOSE BLOOD TEST: CPT

## 2021-01-06 PROCEDURE — 99285 EMERGENCY DEPT VISIT HI MDM: CPT

## 2021-01-06 PROCEDURE — G0378 HOSPITAL OBSERVATION PER HR: HCPCS

## 2021-01-06 PROCEDURE — 70551 MRI BRAIN STEM W/O DYE: CPT

## 2021-01-06 PROCEDURE — 71045 X-RAY EXAM CHEST 1 VIEW: CPT

## 2021-01-06 PROCEDURE — 36415 COLL VENOUS BLD VENIPUNCTURE: CPT

## 2021-01-06 PROCEDURE — 93005 ELECTROCARDIOGRAM TRACING: CPT | Performed by: EMERGENCY MEDICINE

## 2021-01-06 PROCEDURE — 80053 COMPREHEN METABOLIC PANEL: CPT

## 2021-01-06 PROCEDURE — 70496 CT ANGIOGRAPHY HEAD: CPT

## 2021-01-06 PROCEDURE — 70450 CT HEAD/BRAIN W/O DYE: CPT

## 2021-01-06 PROCEDURE — 85730 THROMBOPLASTIN TIME PARTIAL: CPT

## 2021-01-06 PROCEDURE — 70498 CT ANGIOGRAPHY NECK: CPT

## 2021-01-06 PROCEDURE — 81003 URINALYSIS AUTO W/O SCOPE: CPT

## 2021-01-06 PROCEDURE — 0042T CT BRAIN PERFUSION: CPT

## 2021-01-06 PROCEDURE — 85610 PROTHROMBIN TIME: CPT

## 2021-01-06 PROCEDURE — 2580000003 HC RX 258: Performed by: INTERNAL MEDICINE

## 2021-01-06 PROCEDURE — 85025 COMPLETE CBC W/AUTO DIFF WBC: CPT

## 2021-01-06 PROCEDURE — 6360000004 HC RX CONTRAST MEDICATION: Performed by: RADIOLOGY

## 2021-01-06 RX ORDER — PROMETHAZINE HYDROCHLORIDE 25 MG/1
12.5 TABLET ORAL EVERY 6 HOURS PRN
Status: DISCONTINUED | OUTPATIENT
Start: 2021-01-06 | End: 2021-01-07 | Stop reason: HOSPADM

## 2021-01-06 RX ORDER — FAMOTIDINE 20 MG/1
20 TABLET, FILM COATED ORAL DAILY
Status: DISCONTINUED | OUTPATIENT
Start: 2021-01-06 | End: 2021-01-07 | Stop reason: HOSPADM

## 2021-01-06 RX ORDER — ONDANSETRON 2 MG/ML
4 INJECTION INTRAMUSCULAR; INTRAVENOUS EVERY 6 HOURS PRN
Status: DISCONTINUED | OUTPATIENT
Start: 2021-01-06 | End: 2021-01-07 | Stop reason: HOSPADM

## 2021-01-06 RX ORDER — SODIUM CHLORIDE 0.9 % (FLUSH) 0.9 %
10 SYRINGE (ML) INJECTION EVERY 12 HOURS SCHEDULED
Status: DISCONTINUED | OUTPATIENT
Start: 2021-01-06 | End: 2021-01-07 | Stop reason: HOSPADM

## 2021-01-06 RX ORDER — SODIUM CHLORIDE 0.9 % (FLUSH) 0.9 %
10 SYRINGE (ML) INJECTION PRN
Status: DISCONTINUED | OUTPATIENT
Start: 2021-01-06 | End: 2021-01-07 | Stop reason: HOSPADM

## 2021-01-06 RX ORDER — ROSUVASTATIN CALCIUM 10 MG/1
10 TABLET, COATED ORAL NIGHTLY
Status: DISCONTINUED | OUTPATIENT
Start: 2021-01-06 | End: 2021-01-07

## 2021-01-06 RX ORDER — POLYETHYLENE GLYCOL 3350 17 G/17G
17 POWDER, FOR SOLUTION ORAL DAILY PRN
Status: DISCONTINUED | OUTPATIENT
Start: 2021-01-06 | End: 2021-01-07 | Stop reason: HOSPADM

## 2021-01-06 RX ORDER — ASPIRIN 81 MG/1
81 TABLET, CHEWABLE ORAL DAILY
Status: DISCONTINUED | OUTPATIENT
Start: 2021-01-06 | End: 2021-01-07

## 2021-01-06 RX ORDER — ASPIRIN 81 MG/1
TABLET, CHEWABLE ORAL
Status: DISPENSED
Start: 2021-01-06 | End: 2021-01-07

## 2021-01-06 RX ORDER — OXYCODONE HYDROCHLORIDE AND ACETAMINOPHEN 5; 325 MG/1; MG/1
1 TABLET ORAL EVERY 4 HOURS PRN
Status: DISCONTINUED | OUTPATIENT
Start: 2021-01-06 | End: 2021-01-07 | Stop reason: HOSPADM

## 2021-01-06 RX ORDER — PANTOPRAZOLE SODIUM 40 MG/1
40 TABLET, DELAYED RELEASE ORAL DAILY
Status: DISCONTINUED | OUTPATIENT
Start: 2021-01-06 | End: 2021-01-07 | Stop reason: HOSPADM

## 2021-01-06 RX ORDER — LEVOTHYROXINE SODIUM 0.07 MG/1
75 TABLET ORAL DAILY
Status: DISCONTINUED | OUTPATIENT
Start: 2021-01-06 | End: 2021-01-07 | Stop reason: HOSPADM

## 2021-01-06 RX ORDER — ASPIRIN 325 MG
325 TABLET ORAL ONCE
Status: DISCONTINUED | OUTPATIENT
Start: 2021-01-06 | End: 2021-01-07

## 2021-01-06 RX ADMIN — LEVOTHYROXINE SODIUM 75 MCG: 0.07 TABLET ORAL at 19:40

## 2021-01-06 RX ADMIN — IOPAMIDOL 100 ML: 755 INJECTION, SOLUTION INTRAVENOUS at 11:25

## 2021-01-06 RX ADMIN — ASPIRIN 81 MG: 81 TABLET, CHEWABLE ORAL at 14:17

## 2021-01-06 RX ADMIN — ROSUVASTATIN 10 MG: 10 TABLET, FILM COATED ORAL at 20:46

## 2021-01-06 RX ADMIN — SODIUM CHLORIDE, PRESERVATIVE FREE 10 ML: 5 INJECTION INTRAVENOUS at 20:48

## 2021-01-06 RX ADMIN — OXYCODONE AND ACETAMINOPHEN 1 TABLET: 5; 325 TABLET ORAL at 23:36

## 2021-01-06 RX ADMIN — PROMETHAZINE HYDROCHLORIDE 12.5 MG: 25 TABLET ORAL at 23:36

## 2021-01-06 RX ADMIN — SODIUM CHLORIDE, PRESERVATIVE FREE 10 ML: 5 INJECTION INTRAVENOUS at 11:25

## 2021-01-06 ASSESSMENT — PAIN SCALES - GENERAL
PAINLEVEL_OUTOF10: 0
PAINLEVEL_OUTOF10: 0

## 2021-01-06 ASSESSMENT — ENCOUNTER SYMPTOMS
CONSTIPATION: 0
EYE PAIN: 0
CHEST TIGHTNESS: 0
WHEEZING: 0
SORE THROAT: 0
DIARRHEA: 0
PHOTOPHOBIA: 0
BACK PAIN: 0
NAUSEA: 0
APNEA: 0
ABDOMINAL PAIN: 0
COUGH: 0
SHORTNESS OF BREATH: 0
TROUBLE SWALLOWING: 0
RHINORRHEA: 0
VOMITING: 0

## 2021-01-06 ASSESSMENT — PAIN DESCRIPTION - PAIN TYPE: TYPE: CHRONIC PAIN

## 2021-01-06 ASSESSMENT — PAIN DESCRIPTION - ORIENTATION: ORIENTATION: MID

## 2021-01-06 NOTE — ED NOTES
Report received for [facility]. Pt woke up today with lightheadness and confusion. Pt went to her appointment when a MD noticed she had left sided facial droop. Pt has symmetrical face at this time. Pt states her lighthadness has improved. Pt bam complaints at this time. Pt ambulated with steady gait to restoom. WCTM. NSR on the moniter. Pt repors being on bood thinners. MD and pharmacy aware.       Brenna Jerry RN  01/06/21 [id number]

## 2021-01-06 NOTE — ED PROVIDER NOTES
[address]      Pt Name: Maude Valdovinos  MRN: [id number]  [place] 1945  Date of evaluation: 1/6/2021      CHIEF COMPLAINT       Chief Complaint   Patient presents with    Cerebrovascular Accident     LKW 1am. Left sided facial droop, slurred speech, and C/O dizziness and weakness this morning that has gotten better. HPI  Maude Valdovinos is a 67 y.o. female with history of GERD, history of TIA, who presents to the emergency department from home with complaints of left-sided facial droop and slurred speech. The patient states that around 1 AM last night she went to bed and was \"normal\". She woke up this morning and felt like she was more dizzy than normal.  She describes lightheadedness as well as feeling \"unsteady on my feet\". She went to her PCPs office where they noticed that she had mild left-sided facial droop and also some difficulty speaking, concerning for slurred speech. They then called 911 and had her brought to the ER for further evaluation. The patient describes her symptoms as constant, gradually improving since this morning, mild to moderate. Nothing makes her symptoms worse. She has never had anything like this before, per the patient. She denies any headache, vision changes, weakness, numbness or tingling. Except as noted above the remainder of the review of systems was reviewed and negative. Review of Systems   Constitutional: Negative for chills, diaphoresis, fatigue and fever. HENT: Negative for rhinorrhea, sore throat and trouble swallowing. Eyes: Negative for photophobia and pain. Respiratory: Negative for apnea, cough, chest tightness, shortness of breath and wheezing. Cardiovascular: Negative for chest pain, palpitations and leg swelling. Gastrointestinal: Negative for abdominal pain, constipation, diarrhea, nausea and vomiting. Endocrine: Negative for polyuria. Genitourinary: Negative for difficulty urinating and dysuria. Musculoskeletal: Negative for back pain, neck pain and neck stiffness. Neurological: Positive for dizziness, facial asymmetry and speech difficulty. Negative for weakness, light-headedness and headaches. Psychiatric/Behavioral: Negative for confusion. The patient is not nervous/anxious. Physical Exam  Vitals signs and nursing note reviewed. Constitutional:       General: She is not in acute distress. Appearance: She is well-developed. Comments: Awake and alert. Sitting in the gurney in no obvious distress. HENT:      Head: Normocephalic and atraumatic. Right Ear: External ear normal.      Left Ear: External ear normal.      Mouth/Throat:      Mouth: Mucous membranes are moist.   Eyes:      General: No scleral icterus. Pupils: Pupils are equal, round, and reactive to light. Neck:      Musculoskeletal: Normal range of motion and neck supple. Cardiovascular:      Rate and Rhythm: Normal rate and regular rhythm. Heart sounds: No murmur. Comments: 2+ radial and dorsal pedis pulses bilaterally  Pulmonary:      Effort: Pulmonary effort is normal. No respiratory distress. Breath sounds: Normal breath sounds. No wheezing. Abdominal:      Palpations: Abdomen is soft. Tenderness: There is no abdominal tenderness. There is no guarding or rebound. Musculoskeletal: Normal range of motion. General: No tenderness or deformity. Right lower leg: No edema. Left lower leg: No edema. Skin:     General: Skin is warm and dry. Capillary Refill: Capillary refill takes less than 2 seconds. Neurological:      Mental Status: She is alert and oriented to person, place, and time. Sensory: No sensory deficit. Motor: No weakness or abnormal muscle tone. Comments: Very mild left-sided facial droop. Mild dysarthria. Normal strength and sensation to upper and lower extremities bilaterally. Normal finger-to-nose testing bilaterally. No pronator drift. No dysarthria or dysphagia. Cranial nerves otherwise intact. Normal gait. Psychiatric:         Mood and Affect: Mood normal.         Behavior: Behavior normal.          Procedures     MDM   This is a 24-year-old female with a history of previous TIA, hyperlipidemia, who presents with signs concerning for possible CVA, initial NIH of 2 with mild facial droop and slurring speech. Patient awake and alert, hemodynamically stable. CTA head and neck, brain perfusion scan and CT without contrast of the head showed no acute intercranial normality. She is outside the window for TPA. No perfusion mismatch on brain perfusion study, no signs of occlusion or interventional radiology intervention. Aspirin administered. Metabolic panel showed normal electrolytes, normal creatinine 0.9. CBC showed no leukocytosis, patient not anemic. Patient had no progression of symptoms while in the ED. Concerning for possible stroke, patient admitted to hospitalist service under Dr. Adelfo Coffman for further evaluation.              --------------------------------------------- PAST HISTORY ---------------------------------------------  Past Medical History:  has a past medical history of Compression fracture of L1 lumbar vertebra ([address] Compression fracture of L4 lumbar vertebra, Fall, History of bilateral breast cancer, Osteoporosis, Thyroid disease, and Type II or unspecified type diabetes mellitus without mention of complication, not stated as uncontrolled. Past Surgical History:  has a past surgical history that includes back surgery (1989); Tubal ligation; Mastectomy (Bilateral); Breast surgery (Bilateral); Tonsillectomy and adenoidectomy; and Fixation Kyphoplasty (03/10/2014). Social History:  reports that she has never smoked. She has never used smokeless tobacco. She reports current alcohol use. She reports that she does not use drugs. Family History: family history includes Cancer in her father; Diabetes in her mother; Heart Disease in her father and mother; Other in her mother. The patients home medications have been reviewed. Allergies: Patient has no known allergies.     -------------------------------------------------- RESULTS -------------------------------------------------    LABS:  Results for orders placed or performed during the hospital encounter of 01/06/21   CBC Auto Differential   Result Value Ref Range    WBC 7.3 4.5 - 11.5 E9/L    RBC 4.63 3.50 - 5.50 E12/L    Hemoglobin 13.6 11.5 - 15.5 g/dL    Hematocrit 42.1 34.0 - 48.0 %    MCV 90.9 80.0 - 99.9 fL    MCH 29.4 26.0 - 35.0 pg    MCHC 32.3 32.0 - 34.5 %    RDW 12.6 11.5 - 15.0 fL    Platelets 220 506 - 260 E9/L    MPV 9.8 7.0 - 12.0 fL    Neutrophils % 67.9 43.0 - 80.0 %    Immature Granulocytes % 0.4 0.0 - 5.0 %    Lymphocytes % 21.5 20.0 - 42.0 %    Monocytes % 7.7 2.0 - 12.0 %    Eosinophils % 2.2 0.0 - 6.0 %    Basophils % 0.3 0.0 - 2.0 %    Neutrophils Absolute 4.97 1.80 - 7.30 E9/L    Immature Granulocytes # 0.03 E9/L    Lymphocytes Absolute 1.57 1.50 - 4.00 E9/L    Monocytes Absolute 0.56 0.10 - 0.95 E9/L    Eosinophils Absolute 0.16 0.05 - 0.50 E9/L    Basophils Absolute 0.02 0.00 - 0.20 E9/L   Comprehensive Metabolic Panel   Result Value Ref Range    Sodium 140 132 - 146 mmol/L    Potassium 4.9 3.5 - 5.0 mmol/L    Chloride 106 98 - 107 mmol/L    CO2 24 22 - 29 mmol/L    Anion Gap 10 7 - 16 mmol/L    Glucose 116 (H) 74 - 99 mg/dL    BUN 14 8 - 23 mg/dL    CREATININE 0.9 0.5 - 1.0 mg/dL    GFR Non-African American >60 >=60 mL/min/1.73    GFR African American >60     Calcium 9.2 8.6 - 10.2 mg/dL    Total Protein 6.9 6.4 - 8.3 g/dL    Alb 4.1 3.5 - 5.2 g/dL    Total Bilirubin 0.4 0.0 - 1.2 mg/dL 5. Hiatal hernia. CT BRAIN PERFUSION   Final Result   1. No perfusion mismatch. 2. Unremarkable CTA of the head. 3. Fibromuscular dysplasia in the vertebral arteries bilaterally. No   evidence of a flow-limiting stenosis or dissection. 4. Asymmetric right-sided paranasal sinus disease, correlate with signs of   acute sinusitis. 5. Hiatal hernia. CT HEAD WO CONTRAST   Final Result   Addendum 1 of 1   ADDENDUM:   Critical findings conveyed by Dr. Karlene Gusman to Dr. Baltazar via telephone at    12:08   p.m. Final      MRI BRAIN WO CONTRAST    (Results Pending)       EKG: This EKG is signed and interpreted by me. Rate: 75bpm  Rhythm: sinus  Interpretation: Baseline artifact, no ST segment elevation or depression  Comparison: No significant changes compared to previous 5 years ago      ------------------------- NURSING NOTES AND VITALS REVIEWED ---------------------------  Date / Time Roomed:  1/6/2021 11:13 AM  ED Bed Assignment:  06/06    The nursing notes within the ED encounter and vital signs as below have been reviewed. Patient Vitals for the past 24 hrs:   BP Temp Temp src Pulse Resp SpO2 Height Weight   01/06/21 1530 (!) 157/96 98.1 °F (36.7 °C) Oral 68 20 96 %     01/06/21 1430 (!) 168/87   86 21 95 %     01/06/21 1300 (!) 151/77   73 23 96 %     01/06/21 1117       5' 2\" (1.575 m) 155 lb (70.3 kg)   01/06/21 1115 (!) 167/70 98 °F (36.7 °C) Oral 77 18 98 %         Oxygen Saturation Interpretation: Normal    ------------------------------------------ PROGRESS NOTES ------------------------------------------    Counseling:  I have spoken with the patient and discussed todays results, in addition to providing specific details for the plan of care and counseling regarding the diagnosis and prognosis. Their questions are answered at this time and they are agreeable with the plan of admission. --------------------------------- ADDITIONAL PROVIDER NOTES ---------------------------------  Consultations:  . Spoke with Dr. Zaria Hogue. Discussed case. They will admit the patient. This patient's ED course included: a personal history and physicial examination, re-evaluation prior to disposition, multiple bedside re-evaluations, IV medications, cardiac monitoring and continuous pulse oximetry    This patient has remained hemodynamically stable during their ED course. Diagnosis:  1. Acute ischemic stroke [facility])        Disposition:  Patient's disposition: Admit to telemetry  Patient's condition is stable.          Amelie Simpson DO  Resident  01/06/21 [id number]

## 2021-01-06 NOTE — ED PROVIDER NOTES
ATTENDING PROVIDER ATTESTATION:     Seymour Barba presented to the emergency department for evaluation of Cerebrovascular Accident (LKW 1am. Left sided facial droop, slurred speech, and C/O dizziness and weakness this morning that has gotten better. )   and was initially evaluated by the Medical Resident. See Original ED Note for H&P and ED course above. I have reviewed and discussed the case, including pertinent history (medical, surgical, family and social) and exam findings with the Medical Resident assigned to Seymour Barba. I have personally performed and/or participated in the history, exam, medical decision making, and procedures and agree with all pertinent clinical information and any additional changes or corrections are noted below in my assessment and plan. I have discussed this patient in detail with the resident, and provided the instruction and education,       I have reviewed my findings and recommendations with the assigned Medical Resident, Seymour Barba and members of family present at the time of disposition. I have performed a history and physical examination of this patient and directly supervised the resident caring for this patient      History of Present Illness:    Presents to the ED for stoke like symptoms, beginning 1am today. The complaint has been constant, moderate in severity, and worsened by nothing. Last known normal 1 AM today. Thus when she went to bed. She woke with left facial droop and slurred speech as well as ataxia. She had some slight improvement prior to arrival.  She denies any prior stroke history. She denies any other complaints.   Leeanne alert initiated on arrival.        Review of Systems:   A complete review of systems was performed and pertinent positives and negatives are stated within HPI, all other systems reviewed and are negative.    --------------------------------------------- PAST HISTORY --------------------------------------------- Past Medical History:  has a past medical history of Compression fracture of L1 lumbar vertebra (HCC), Compression fracture of L4 lumbar vertebra, Fall, History of bilateral breast cancer, Osteoporosis, Thyroid disease, and Type II or unspecified type diabetes mellitus without mention of complication, not stated as uncontrolled. Past Surgical History:  has a past surgical history that includes back surgery (1989); Tubal ligation; Mastectomy (Bilateral); Breast surgery (Bilateral); Tonsillectomy and adenoidectomy; and Fixation Kyphoplasty (03/10/2014). Social History:  reports that she has never smoked. She has never used smokeless tobacco. She reports current alcohol use. She reports that she does not use drugs. Family History: family history includes Cancer in her father; Diabetes in her mother; Heart Disease in her father and mother; Other in her mother. Unless otherwise noted, family history is non contributory    The patients home medications have been reviewed. Allergies: Patient has no known allergies. EKG Interpretation  Interpreted by emergency department physician, Dr. Baltazar     1/6/21  Time: 3758    Rhythm: normal sinus   Rate: normal  Axis: normal  Conduction: normal  ST Segments: no acute change  T Waves: no acute change    Clinical Impression: Sinus rhythm, no acute ischemic changes    Comparison to Old EKG  No old EKG        Physical Exam:  Constitutional/General: Alert and oriented x3  Head: Normocephalic and atraumatic  Eyes: PERRL, EOMI, sclera non icteric  Mouth: Oropharynx clear, handling secretions  Neck: Supple, full ROM, no stridor, no meningeal signs  Respiratory: Lungs clear to auscultation bilaterally, no wheezes, rales, or rhonchi. Not in respiratory distress  Cardiovascular:  Regular rate. Regular rhythm. No murmurs, no aortic murmurs, no gallops, no rubs. 2+ distal pulses. Equal extremity pulses. GI:  Abdomen Soft, Non tender, Non distended. No rebound, guarding, or rigidity. No pulsatile masses. Musculoskeletal: Moves all extremities x 4. Warm and well perfused,  no clubbing, no cyanosis, no edema. Palpable peripheral pulses  Integument: skin warm and dry. No rashes. Neurologic: GCS 15, left lower facial droop, mild dysarthria. NIH 2  Psychiatric: Normal Affect      I directly supervised any procedures performed by the resident and was present for the procedure including all critical portions of the procedure      The cardiac monitor revealed sinus rhythm with a heart rate in the 60s as interpreted by me. The cardiac monitor was ordered secondary to the patient's stroke like symptoms and to monitor the patient for dysrhythmia. CPT [id number]      I, Dr. Joelle Bonds, am the primary provider of record    My Medical Decision Making:         Acute ischemic stroke  Not a TPA candidate  No LVO  Medicine consulted for admission    CRITICAL CARE:  Please note that the withdrawal or failure to initiate urgent interventions for this patient would likely result in a life threatening deterioration or permanent disability. Accordingly this patient received 30 minutes of critical care time, including coordination of care, and direct bedside care and excluding separately billable procedures.         1. Acute ischemic stroke [facility])           Rafael Soni MD  01/06/21 8413

## 2021-01-06 NOTE — ED NOTES
Pt has no acute changes at this time. VSS on the moniter. WCTM. Pt in NAD at this time.       Evelyn Moore RN  01/06/21 0742

## 2021-01-07 VITALS
SYSTOLIC BLOOD PRESSURE: 121 MMHG | TEMPERATURE: 98.9 F | DIASTOLIC BLOOD PRESSURE: 58 MMHG | HEIGHT: 62 IN | HEART RATE: 86 BPM | BODY MASS INDEX: 28.52 KG/M2 | OXYGEN SATURATION: 91 % | WEIGHT: 155 LBS | RESPIRATION RATE: 16 BRPM

## 2021-01-07 PROBLEM — R29.90 STROKE-LIKE EPISODE: Status: ACTIVE | Noted: 2021-01-06

## 2021-01-07 PROBLEM — R42 DIZZINESS: Status: ACTIVE | Noted: 2021-01-06

## 2021-01-07 LAB
ANION GAP SERPL CALCULATED.3IONS-SCNC: 7 MMOL/L (ref 7–16)
BUN BLDV-MCNC: 17 MG/DL (ref 8–23)
CALCIUM SERPL-MCNC: 9.1 MG/DL (ref 8.6–10.2)
CHLORIDE BLD-SCNC: 107 MMOL/L (ref 98–107)
CHOLESTEROL, TOTAL: 140 MG/DL (ref 0–199)
CO2: 28 MMOL/L (ref 22–29)
CREAT SERPL-MCNC: 1.1 MG/DL (ref 0.5–1)
GFR AFRICAN AMERICAN: 58
GFR NON-AFRICAN AMERICAN: 48 ML/MIN/1.73
GLUCOSE BLD-MCNC: 104 MG/DL (ref 74–99)
HBA1C MFR BLD: 6.1 % (ref 4–5.6)
HCT VFR BLD CALC: 37.7 % (ref 34–48)
HDLC SERPL-MCNC: 45 MG/DL
HEMOGLOBIN: 12.4 G/DL (ref 11.5–15.5)
LDL CHOLESTEROL CALCULATED: 70 MG/DL (ref 0–99)
MCH RBC QN AUTO: 30.1 PG (ref 26–35)
MCHC RBC AUTO-ENTMCNC: 32.9 % (ref 32–34.5)
MCV RBC AUTO: 91.5 FL (ref 80–99.9)
PDW BLD-RTO: 12.7 FL (ref 11.5–15)
PLATELET # BLD: 187 E9/L (ref 130–450)
PMV BLD AUTO: 9.7 FL (ref 7–12)
POTASSIUM REFLEX MAGNESIUM: 4.2 MMOL/L (ref 3.5–5)
RBC # BLD: 4.12 E12/L (ref 3.5–5.5)
SODIUM BLD-SCNC: 142 MMOL/L (ref 132–146)
TRIGL SERPL-MCNC: 125 MG/DL (ref 0–149)
VLDLC SERPL CALC-MCNC: 25 MG/DL
WBC # BLD: 6.5 E9/L (ref 4.5–11.5)

## 2021-01-07 PROCEDURE — 6370000000 HC RX 637 (ALT 250 FOR IP): Performed by: INTERNAL MEDICINE

## 2021-01-07 PROCEDURE — 97530 THERAPEUTIC ACTIVITIES: CPT

## 2021-01-07 PROCEDURE — 2580000003 HC RX 258: Performed by: INTERNAL MEDICINE

## 2021-01-07 PROCEDURE — 85027 COMPLETE CBC AUTOMATED: CPT

## 2021-01-07 PROCEDURE — 36415 COLL VENOUS BLD VENIPUNCTURE: CPT

## 2021-01-07 PROCEDURE — 80048 BASIC METABOLIC PNL TOTAL CA: CPT

## 2021-01-07 PROCEDURE — 97535 SELF CARE MNGMENT TRAINING: CPT

## 2021-01-07 PROCEDURE — 92523 SPEECH SOUND LANG COMPREHEN: CPT

## 2021-01-07 PROCEDURE — 99219 PR INITIAL OBSERVATION CARE/DAY 50 MINUTES: CPT | Performed by: NURSE PRACTITIONER

## 2021-01-07 PROCEDURE — 97165 OT EVAL LOW COMPLEX 30 MIN: CPT

## 2021-01-07 PROCEDURE — 80061 LIPID PANEL: CPT

## 2021-01-07 PROCEDURE — 97161 PT EVAL LOW COMPLEX 20 MIN: CPT

## 2021-01-07 PROCEDURE — 83036 HEMOGLOBIN GLYCOSYLATED A1C: CPT

## 2021-01-07 PROCEDURE — G0378 HOSPITAL OBSERVATION PER HR: HCPCS

## 2021-01-07 RX ORDER — ROSUVASTATIN CALCIUM 20 MG/1
20 TABLET, COATED ORAL NIGHTLY
Qty: 30 TABLET | Refills: 3 | Status: SHIPPED | OUTPATIENT
Start: 2021-01-07

## 2021-01-07 RX ORDER — ASPIRIN 81 MG/1
324 TABLET, CHEWABLE ORAL DAILY
Status: DISCONTINUED | OUTPATIENT
Start: 2021-01-08 | End: 2021-01-07 | Stop reason: HOSPADM

## 2021-01-07 RX ORDER — ASPIRIN 325 MG
325 TABLET, DELAYED RELEASE (ENTERIC COATED) ORAL DAILY
Qty: 30 TABLET | Refills: 3 | Status: SHIPPED | OUTPATIENT
Start: 2021-01-07 | End: 2022-01-07

## 2021-01-07 RX ORDER — CLOPIDOGREL BISULFATE 75 MG/1
75 TABLET ORAL DAILY
Status: DISCONTINUED | OUTPATIENT
Start: 2021-01-07 | End: 2021-01-07

## 2021-01-07 RX ORDER — ROSUVASTATIN CALCIUM 10 MG/1
20 TABLET, COATED ORAL NIGHTLY
Status: DISCONTINUED | OUTPATIENT
Start: 2021-01-07 | End: 2021-01-07 | Stop reason: HOSPADM

## 2021-01-07 RX ADMIN — SODIUM CHLORIDE, PRESERVATIVE FREE 10 ML: 5 INJECTION INTRAVENOUS at 09:01

## 2021-01-07 RX ADMIN — ASPIRIN 81 MG: 81 TABLET, CHEWABLE ORAL at 08:59

## 2021-01-07 RX ADMIN — CLOPIDOGREL 75 MG: 75 TABLET, FILM COATED ORAL at 08:59

## 2021-01-07 ASSESSMENT — PAIN DESCRIPTION - ORIENTATION: ORIENTATION: MID

## 2021-01-07 ASSESSMENT — PAIN DESCRIPTION - PROGRESSION: CLINICAL_PROGRESSION: NOT CHANGED

## 2021-01-07 ASSESSMENT — PAIN SCALES - GENERAL: PAINLEVEL_OUTOF10: 0

## 2021-01-07 ASSESSMENT — PAIN - FUNCTIONAL ASSESSMENT: PAIN_FUNCTIONAL_ASSESSMENT: PREVENTS OR INTERFERES SOME ACTIVE ACTIVITIES AND ADLS

## 2021-01-07 ASSESSMENT — PAIN DESCRIPTION - FREQUENCY: FREQUENCY: INTERMITTENT

## 2021-01-07 NOTE — CONSULTS
Rafael Qureshi is a 67 y.o. right handed woman    Neurology was consulted for stroke    Vital signs are stable     Past Medical History:     Past Medical History:   Diagnosis Date    Compression fracture of L1 lumbar vertebra [facility]) november 1994    Compression fracture of L4 lumbar vertebra     secondary to fall in December 2013    Fall 1989 & 12/2013    History of bilateral breast cancer 1980's    breast    Osteoporosis     Thyroid disease     went off of medication    Type II or unspecified type diabetes mellitus without mention of complication, not stated as uncontrolled     resolved with weight loss of 20 lbs       Past Surgical History:     Past Surgical History:   Procedure Laterality Date    BACK SURGERY  1989    PLIF T12 to L2 with pedicle screws and rods    BREAST SURGERY Bilateral     breast implants (after mastectomy)    FIXATION KYPHOPLASTY  03/10/2014    with bone biopsy    MASTECTOMY Bilateral     TONSILLECTOMY AND ADENOIDECTOMY      TUBAL LIGATION         Allergies:     Patient has no known allergies. Medications:     Prior to Admission medications    Medication Sig Start Date End Date Taking? Authorizing Provider   oxyCODONE-acetaminophen (PERCOCET) 5-325 MG per tablet Take 1 tablet by mouth every 4 hours as needed for Pain.    Yes Historical Provider, MD   rosuvastatin (CRESTOR) 10 MG tablet Take 1 tablet by mouth daily 12/13/18  Yes Gertrudis Borges,    aspirin (ECOTRIN LOW STRENGTH) 81 MG EC tablet Take 1 tablet by mouth 2 times daily  Patient taking differently: Take 81 mg by mouth daily  5/25/17  Yes Gertrudis Borges DO   terconazole (TERAZOL 3) 0.8 % vaginal cream Place 1 applicator vaginally nightly  6/8/16  Yes Historical Provider, MD   Handicap Placard MISC by Does not apply route Duration: Lifetime 6/21/18   Mercedes Borges DO Elastic Bandages & Supports (LUMBAR BACK BRACE/SUPPORT PAD) MISC 1 each by Does not apply route daily as needed (as needed) 7/28/16   Priyanka Vang MD       Social History:     Denies ETOH, tobacco, or illicit drugs. Patient is . Patient has 2 adult children.     Review of Systems:     (+) Lightheaded  (+) Headache  No chest pain or palpitations  No SOB  No vertigo or loss of consciousness  No falls, tripping or stumbling  No incontinence of bowels or bladder  No itching or bruising appreciated  No numbness, tingling or focal arm/leg weakness    ROS is otherwise negative     Family History:     Family History   Problem Relation Age of Onset    Heart Disease Mother     Diabetes Mother     Other Mother         aneurysm    Heart Disease Father     Cancer Father         thyroid         History of Present Illness: Patient presented to ED on 1/6 for left-sided facial droop and slurred speech. Patient went to bed on 1/5 around 1 AM completely normal.  Patient woke up on 1/6 and felt she was more dizzy than normal described as lightheaded and felt unsteady on her feet. She says when standing up she feels her feet are not touching the ground as if she is floating. That morning patient felt as if she was drifting to the left side when walking. She went to her PCPs office where they noticed that she had mild left-sided facial droop and some difficulty speaking. Patient says when she got dressed for that appointment she had to put on lipstick and did not notice a facial droop. They called 911 and she was brought into ED. Since onset symptoms have been constant but gradually improving now mild to moderate. Patient has not had symptoms like this before. On arrival, /70 and blood glucose 116. NIH was 2 for mild facial droop and slurred speech. CTA head and neck, CT brain perfusion and CT head showed no acute intracranial abnormality. Patient was outside of TPA window and there was no perfusion mismatch on brain perfusion study and no LVO on CTAs. Since admission MRI brain was negative for acute pathology. Patient denies speech or swallowing difficulty, focal weakness, and numbness or tingling of her extremities. At present time patient feels better but still feels lightheaded when standing and frontal headache of 5/10. Patient says she takes aspirin 81 mg at home daily. Objective:     /69   Pulse 69   Temp 97.8 °F (36.6 °C) (Temporal)   Resp 18   Ht 5' 2\" (1.575 m)   Wt 155 lb (70.3 kg)   SpO2 96%   BMI 28.35 kg/m²     General appearance: alert, appears stated age, cooperative and no distress  Head: normocephalic, without obvious abnormality, atraumatic  Eyes: conjunctivae/corneas clear.  .  Neck:  supple, symmetrical, trachea midline  Lungs: Unlabored breaths  Heart: regular rate and rhythm Extremities: normal, atraumatic, no cyanosis or edema  Pulses: 2+ and symmetric  Skin: color, texture, turgor normal---no rashes or lesions    Mental Status: alert, oriented x4, thought content appropriate, follows commands well    Appropriate attention/concentration  Intact fundus of knowledge  Memories intact    Speech: no dysarthria  Language: no aphasias    Cranial Nerves:  I: smell    II: visual acuity     II: visual fields Full to confrontation   II: pupils L pupil 4mm, R pupil 5mm both reactive   III,VII: ptosis None   III,IV,VI: extraocular muscles  EOMI without nystagmus    V: mastication Normal   V: facial light touch sensation  Normal   V,VII: corneal reflex     VII: facial muscle function - upper  Normal   VII: facial muscle function - lower Normal   VIII: hearing Normal   IX: soft palate elevation  Normal   IX,X: gag reflex    XI: trapezius strength  5/5   XI: sternocleidomastoid strength 5/5   XI: neck extension strength  5/5   XII: tongue strength  Normal     Motor:  5/5 throughout  Normal bulk and tone   No drift  No abnormal movements    Sensory:  LT and PP normal     Coordination:   FN, FFM and DIEGO normal  HS normal    Gait:  Not tested    DTR:   Right Brachioradialis reflex 1+  Left Brachioradialis reflex 1+  Right Biceps reflex 1+  Left Biceps reflex 1+  Right Triceps reflex 1+  Left Triceps reflex 1+  Right Quadriceps reflex 1+  Left Quadriceps reflex 1+  Right Achilles reflex 1+  Left Achilles reflex 1+    No Babinskis  No Beyer's     No other pathological reflexes    Laboratory/Radiology:     CBC:   Lab Results   Component Value Date    WBC 6.5 01/07/2021    RBC 4.12 01/07/2021    HGB 12.4 01/07/2021    HCT 37.7 01/07/2021    MCV 91.5 01/07/2021    MCH 30.1 01/07/2021    MCHC 32.9 01/07/2021    RDW 12.7 01/07/2021     01/07/2021    MPV 9.7 01/07/2021     CMP:    Lab Results   Component Value Date     01/07/2021    K 4.2 01/07/2021     01/07/2021    CO2 28 01/07/2021 BUN 17 01/07/2021    CREATININE 1.1 01/07/2021    GFRAA 58 01/07/2021    LABGLOM 48 01/07/2021    GLUCOSE 104 01/07/2021    PROT 6.9 01/06/2021    LABALBU 4.1 01/06/2021    LABALBU 4.3 01/24/2011    CALCIUM 9.1 01/07/2021    BILITOT 0.4 01/06/2021    ALKPHOS 71 01/06/2021    AST 34 01/06/2021    ALT 15 01/06/2021     U/A:    Lab Results   Component Value Date    COLORU Yellow 01/06/2021    PROTEINU Negative 01/06/2021    PHUR 5.5 01/06/2021    CLARITYU Clear 01/06/2021    SPECGRAV <=1.005 01/06/2021    LEUKOCYTESUR Negative 01/06/2021    UROBILINOGEN 0.2 01/06/2021    BILIRUBINUR Negative 01/06/2021    BLOODU Negative 01/06/2021    GLUCOSEU Negative 01/06/2021     HgBA1c:    Lab Results   Component Value Date    LABA1C 6.1 01/07/2021     FLP:    Lab Results   Component Value Date    TRIG 125 01/07/2021    HDL 45 01/07/2021    LDLCALC 70 01/07/2021    LABVLDL 25 01/07/2021     MRI BRAIN WO CONTRAST   Final Result   1. No acute intracranial abnormality. 2. No mass effect, edema or hemorrhage. 3. Prominent inflammatory changes in the right paranasal sinuses. XR CHEST PORTABLE   Final Result   1. No definite acute cardiopulmonary disease. 2.  Large hiatal hernia. Previous spinal surgery. CTA HEAD W CONTRAST   Final Result   1. No perfusion mismatch. 2. Unremarkable CTA of the head. 3. Fibromuscular dysplasia in the vertebral arteries bilaterally. No   evidence of a flow-limiting stenosis or dissection. 4. Asymmetric right-sided paranasal sinus disease, correlate with signs of   acute sinusitis. 5. Hiatal hernia. CTA NECK W CONTRAST   Final Result   1. No perfusion mismatch. 2. Unremarkable CTA of the head. 3. Fibromuscular dysplasia in the vertebral arteries bilaterally. No   evidence of a flow-limiting stenosis or dissection. 4. Asymmetric right-sided paranasal sinus disease, correlate with signs of   acute sinusitis. 5. Hiatal hernia.       CT BRAIN PERFUSION   Final Result 1. No perfusion mismatch. 2. Unremarkable CTA of the head. 3. Fibromuscular dysplasia in the vertebral arteries bilaterally. No   evidence of a flow-limiting stenosis or dissection. 4. Asymmetric right-sided paranasal sinus disease, correlate with signs of   acute sinusitis. 5. Hiatal hernia. CT HEAD WO CONTRAST   Final Result   Addendum 1 of 1   ADDENDUM:   Critical findings conveyed by Dr. Janis Peoples to Dr. Lan  via telephone at    12:08   p.m. Final          I independently reviewed the labs and imaging studies today    Assessment:     Patient presented to ED from her PCPs office after she had left facial droop and slurred speech. CT head, CTA head and neck and CT Perfusion unrevealing. MRI brain showed no acute pathology   At present time patient has no facial droop, focal weakness or speech difficulties   Patient complains of being lightheaded in addition to a frontal headache 5/10   TIA remains top of differentials. Patient denies having a headache yesterday on onset of symptoms. Plan:     Simple analgesics for headache  Complete echo not needed from a neuro standpoint at this time  A1c 6.1, LDL 70  Continue aspirin and statin   Will increase statin to 20mg and aspirin to 324mg   Continue telemetry  Continue PT/OT  Stroke education  SCDs    Okay to discharge from neuro standpoint once medically cleared. Patient can follow-up in neuro clinic in 1 to 2 weeks. Neuro will sign off.     KAR Singh   12:09 PM  1/7/2021

## 2021-01-07 NOTE — H&P
[id number] [facility]  History and Physical      CHIEF COMPLAINT:    Chief Complaint   Patient presents with    Cerebrovascular Accident     LKW 1am. Left sided facial droop, slurred speech, and C/O dizziness and weakness this morning that has gotten better. Patient of Paragcassandra DO Ryne presents with:  Dizziness    History of Present Illness:   Patient states that yesterday she went to a doctor's appointment for an unrelated issue due to her back, but somebody in the doctor's office thought she had a left-sided facial droop and sent her to the hospital. She herself did not notice any focal neurologic symptoms at all. She denies any weakness, numbness, speech deficits, or facial droop. She did recall feeling slightly dizzy, \"like I was walking on air\". She did not feel like she was going to fall. There are no alleviating or exacerbating factors. Ultimately it resolved within a few hours without intervention. Currently she feels 100% fine. She denies any recent reasons for dehydration such as medication changes, diarrhea, or vomiting. No changes in her pain medication regimen.          Past Medical History:   Diagnosis Date    Compression fracture of L1 lumbar vertebra [facility]) november 1994    Compression fracture of L4 lumbar vertebra     secondary to fall in December 2013    Fall 1989 & 12/2013    History of bilateral breast cancer 1980's    breast    Osteoporosis     Thyroid disease     went off of medication    Type II or unspecified type diabetes mellitus without mention of complication, not stated as uncontrolled     resolved with weight loss of 20 lbs         Past Surgical History:   Procedure Laterality Date    BACK SURGERY  1989    PLIF T12 to L2 with pedicle screws and rods    BREAST SURGERY Bilateral     breast implants (after mastectomy)    FIXATION KYPHOPLASTY  03/10/2014    with bone biopsy    MASTECTOMY Bilateral     TONSILLECTOMY AND ADENOIDECTOMY  TUBAL LIGATION         Medications Prior to Admission:    Medications Prior to Admission: oxyCODONE-acetaminophen (PERCOCET) 5-325 MG per tablet, Take 1 tablet by mouth every 4 hours as needed for Pain. rosuvastatin (CRESTOR) 10 MG tablet, Take 1 tablet by mouth daily  aspirin (ECOTRIN LOW STRENGTH) 81 MG EC tablet, Take 1 tablet by mouth 2 times daily (Patient taking differently: Take 81 mg by mouth daily )  terconazole (TERAZOL 3) 0.8 % vaginal cream, Place 1 applicator vaginally nightly   Handicap Placard MISC, by Does not apply route Duration: Lifetime  Elastic Bandages & Supports (LUMBAR BACK BRACE/SUPPORT PAD) MISC, 1 each by Does not apply route daily as needed (as needed)    Note that the patient's home medications were reviewed and the above list is accurate to the best of my knowledge at the time of the exam.    Allergies:    Patient has no known allergies. Social History:    reports that she has never smoked. She has never used smokeless tobacco. She reports current alcohol use. She reports that she does not use drugs. Family History:   family history includes Cancer in her father; Diabetes in her mother; Heart Disease in her father and mother; Other in her mother. REVIEW OF SYSTEMS:  As above in the HPI, otherwise negative    PHYSICAL EXAM:    Vitals:  /62   Pulse 81   Temp 97.8 °F (36.6 °C) (Temporal)   Resp 16   Ht 5' 2\" (1.575 m)   Wt 155 lb (70.3 kg)   SpO2 95%   BMI 28.35 kg/m²       General appearance: NAD, conversant  Eyes: Sclerae anicteric, PERRLA  HEENT: AT/NC, MMM  Neck: FROM, supple, no thyromegaly  Lymph: No cervical / supraclavicular lymphadenopathy  Lungs: Clear to auscultation, WOB normal  CV: RRR, no MRGs, no lower extremity edema  Abdomen: Soft, non-tender; no masses or HSM, +BS  Extremities: FROM without synovitis. No clubbing or cyanosis of the hands.   Skin: no rash, induration, lesions, or ulcers Psych: Calm and cooperative. Normal judgement and insight. Normal mood and affect. Neuro: Alert and interactive, face symmetric, speech fluent. CN 2-12 intact. Gait narrow based and steady. Strength/sensation intact in UE and LE's    LABS:  All labs reviewed. Of note:  CBC with Differential:    Lab Results   Component Value Date    WBC 6.5 01/07/2021    RBC 4.12 01/07/2021    HGB 12.4 01/07/2021    HCT 37.7 01/07/2021     01/07/2021    MCV 91.5 01/07/2021    MCH 30.1 01/07/2021    MCHC 32.9 01/07/2021    RDW 12.7 01/07/2021    LYMPHOPCT 21.5 01/06/2021    MONOPCT 7.7 01/06/2021    BASOPCT 0.3 01/06/2021    MONOSABS 0.56 01/06/2021    LYMPHSABS 1.57 01/06/2021    EOSABS 0.16 01/06/2021    BASOSABS 0.02 01/06/2021     CMP:    Lab Results   Component Value Date     01/07/2021    K 4.2 01/07/2021     01/07/2021    CO2 28 01/07/2021    BUN 17 01/07/2021    CREATININE 1.1 01/07/2021    GFRAA 58 01/07/2021    LABGLOM 48 01/07/2021    GLUCOSE 104 01/07/2021    PROT 6.9 01/06/2021    LABALBU 4.1 01/06/2021    LABALBU 4.3 01/24/2011    CALCIUM 9.1 01/07/2021    BILITOT 0.4 01/06/2021    ALKPHOS 71 01/06/2021    AST 34 01/06/2021    ALT 15 01/06/2021       Imaging:  I've personally reviewed the patient's MRI brain  1. No acute intracranial abnormality. 2. No mass effect, edema or hemorrhage. 3. Prominent inflammatory changes in the right paranasal sinuses. EKG:  I've personally reviewed the patient's EKG:  NSR no acute ischemic changes     Telemetry:  I've personally reviewed the patient's telemetry:  NSR no arrhythmias     ASSESSMENT/PLAN:  Principal Problem:    Dizziness  Active Problems:    Essential hypertension  Resolved Problems:    * No resolved hospital problems.  * The sign-out I got from ED was stroke-like symptoms, but the patient does not complain of any. Her only complaint is nonspecific dizziness, \"like my feet are floating\". Apparently at a physicians office she was noted to have left sided facial droop. Questionable TIA. Neuro consult pending. Continue aspirin and statin. Do not feel strongly about DAPT given vague story. May benefit from outpatient rhythm monitoring. Check orthostatics    MRI is fine.   Neuro exam totally normal.    Continue aspirin, statin    Code status: Full  Requires obs level of care  Courtney Espinosa    6:54 AM  1/7/2021  Cell: [phone]

## 2021-01-07 NOTE — DISCHARGE SUMMARY
Psych: Calm and cooperative. Normal judgement and insight. Normal mood and affect. Neuro: Alert and interactive, face symmetric, speech fluent. CN 2-12 intact. Gait narrow based and steady. Strength/sensation intact in UE and LE's    Condition:  Stable    Disposition: home    Patient Instructions:   Current Discharge Medication List      CONTINUE these medications which have CHANGED    Details   rosuvastatin (CRESTOR) 20 MG tablet Take 1 tablet by mouth nightly  Qty: 30 tablet, Refills: 3      aspirin 325 MG EC tablet Take 1 tablet by mouth daily  Qty: 30 tablet, Refills: 3         CONTINUE these medications which have NOT CHANGED    Details   oxyCODONE-acetaminophen (PERCOCET) 5-325 MG per tablet Take 1 tablet by mouth every 4 hours as needed for Pain. terconazole (TERAZOL 3) 0.8 % vaginal cream Place 1 applicator vaginally nightly       Handicap Placard MISC by Does not apply route Duration: Lifetime  Qty: 1 each, Refills: 0      Elastic Bandages & Supports (LUMBAR BACK BRACE/SUPPORT PAD) MISC 1 each by Does not apply route daily as needed (as needed)  Qty: 1 each, Refills: 0    Comments: 1 Lumbar back brace         STOP taking these medications       ranitidine (ZANTAC) 150 MG tablet Comments:   Reason for Stopping:         pantoprazole (PROTONIX) 40 MG tablet Comments:   Reason for Stopping:         levothyroxine (SYNTHROID) 75 MCG tablet Comments:   Reason for Stopping:             Activity: activity as tolerated  Diet: regular diet    Follow-up with PCP in 1 week.     Note that over 30 minutes was spent in preparing discharge papers, discussing discharge with patient, medication review, etc.    Signed:  Gagandeep Minaya    1/7/2021  4:39 PM

## 2021-01-07 NOTE — PROGRESS NOTES
OCCUPATIONAL THERAPY INITIAL EVALUATION      Date:2021  Patient Name: Amparo Jackson  MRN: [id number]  : 1945  Room: [address] [place], [place] [address]    Evaluating OT: MICHELLE Hutchins, OTR/L   License #[id number]    Referring physician: Marisela May MD    AM-[facility] Daily Activity Raw Score: 1924    Modified Gissell Scale   Score     Description  0             No symptoms  1             No significant disability despite symptoms  2             Slight disability; able to look after own affairs  3             Moderate disability; able to ambulate without assist/ requires assist with ADLs  4             Moderate/Severe disability;requires assist to ambulate/assist with ADLs  5             Severe disability;bedridden/incontinent   6               Score:   2    Recommended Adaptive Equipment: none     Diagnosis: Acute CVA     Surgery: None     Pertinent Medical History: History of bilateral breast cancer, DM, COmoression Fx of L1 and L4 vertebra, falls, Osteoporosis    Precautions:  Falls, soft LSO for comfort, pt. States that she has intermitten blurry vision (pt. Stated that her vision was not blurry during the therapy session)    Note: Patient has chronic back pain and has been wearing a soft LSO for comfort for several when sitting up and when performing functional mobility tasks. Pt. Declined wanting to wear the soft LSO brace for the therapy session this date. Home Living: Pt lives alone in a 1 story home with 1 RAYRAY and 0 handrails. Bedroom and bathroom are on the main level. Full flight of stairs leading to laundry in basement. Bathroom setup: Tub shower combo with st. commode   Equipment owned: none     Prior Level of Function:   Independent with ADLs ,  Independent with IADLs; using no AD for mobility. Driving: Yes                            Occupation: Pt. Did not state     Pain Level: 0/10 pain pre and post treatment     Cognition: A&O: 4/4; Follows 2- step directions.     Memory:  Alisha Katz Sequencing: Good-   Problem solving:  Good    Judgement/safety: Good- (stated that she felt unsteady and held onto items in environment when walking to bathroom)           Functional Assessment:    Initial Eval Status  Date: 1/7/21 Treatment Status  Date: STGs = LTGs  Time frame: 5- 7 days    Feeding Independent     Grooming SBA   To wash hands and comb hair at sink   Independent   When standing at sink    UB Dressing SBA   With simulated tasks seated at EOB  Independent   LB Dressing SBA   To lorri/doff bilateral socks seated at EOB   Independent      Bathing SBA   With simulated tasks seated at EOB   Independent   Toileting SBA  Pt. Completed perineal hygiene and clothing management   Min verbal cues to slow down due to feeling unsteady  Independent    Bed Mobility  Supine to sit: Independent    Sit to supine: NT     Rolling: NT  Supine to sit:   Sit to supine: Independent    Rolling: Independent   Functional Transfers Sit > Stand from EOB and commode: SBA    Stand > Sit from EOB and  commode: SBA    SPT: SBA  Independent for all functional transfers. Functional Mobility  SBA functional distance in room and bathroom. Independent  for all functional distances. Balance Sitting:       Static: Supervision     Dynamic: SBA    Standing: SBA     Pt. Hartman unsteady and at times held onto items in environment when walking. Min Verbal cues for proper posture and to slow down   Sitting:       Static: Independent    Dynamic: Independent    Standing: Independent   Activity Tolerance Good with light activity     Good with moderate activity when completing all ADL tasks. Visual/  Perceptual Glasses? Yes     Vision: Pt. Stated that she has intermediently blurry vision at times. Did not c/o of blurry vision during session. No c/o of double vision. Smooth eye movements noted throughout session. Safety Good-   Min Verbal cues for safety when walking to bathroom   Good when completing all ADL tasks. Hand Dominance: Right      Strength ROM Additional Info:    RUE  4+/5  WFL        : Good     FM Coordination: Good when grossly tested    GM Coordination: Good when competing finger to nose test   LUE 4+/5  WFL    : Good     FM Coordination: Good when grossly tested    GM Coordination: Good when competing finger to nose test           Hearing: WFL  Sensation:  No c/o numbness or tingling  Tone:  WFL  Edema: None noted                               Comments:  RN cleared the patient to work with occupational therapy. Upon arrival, patient was supine in bed with HOB slightly elevated and agreeable to evaluation. . At end of session, patient was seated in bedside chair, call light and phone within reach, all lines and tubes intact. Pt required cues and education as noted above for safe facilitation and completion of tasks. Prior to and at the end of session, environmental modifications/line management completed for patients safety and efficiency of treatment session. OT treatment: Skilled occupational therapy services provided included:     · Bed Mobility: Facilitated bed mobility with cues for proper body mechanics and sequencing to prepare for ADL completion. Verbal cues for safety when transitioning from supine in sit in bed. Patient demonstrated good understanding of this technique after education. · Functional Transfers: Facilitated sit to stand and stand to sit transfers from EOB and commode with cues for body alignment, hand placement, and safety. Pt. Demonstrated good understanding of these techniques after education. · Functional Mobility: Facilitated functional distances in room and bathroom with verbal cues to slow down due to pt. Feeling unsteady when completing functional mobility task. Pt. Demonstrated fair understanding of this technique after education.  Educated on needing to ensure that she felt stable when standing up prior to completing functional mobility tasks · ADL Completion: Self-care toileting, grooming and LB dressing tasks using verbal cues for safety techniques. Educated on sitting down if feeling unsteady when completing ADL tasks when in her home. Patient demonstrated good understanding of this technique after education   · Postural Balance: Standing dynmaic balance training to place sheet on bedside chair prior to sitting down In order to improve postural changes during ADLs. Verbal cues for safety. Patient demonstrated fair understanding of this technique. · Skilled monitoring of O2 sats, HR, and pt response throughout treatment. Patient would benefit from continued skilled OT to increase functional independence and quality of life.      Eval Complexity: Low     Assessment of current deficits  Functional mobility []  ADLs [] Strength [x]  Cognition []  Functional transfers  [] IADLs [x] Safety Awareness [x]  Endurance []  Fine Motor Coordination [] Balance [x] Vision/perception [x] Sensation []   Gross Motor Coordination [] ROM [] Delirium []                  Motor Control []    Plan of Care: 1-3 days/week for 1-2 weeks PRN skilled OT services to include:    Visual-perceptual training to improve environmental scanning, visual attention/focus, and oculomotor skills for increased safety/independence with functional transfers/mobility and ADLs    Training on energy conservation strategies/techniques to improve independence/tolerance for self-care routine    Functional transfer/mobility training/DME recommendations for increased independence, safety, and fall prevention    Patient/Family education to increase follow through with safety techniques and functional independence    Recommendation of environmental modifications for increased safety with functional transfers/mobility and ADLs    Therapeutic exercise to improve motor endurance, ROM, and functional strength for ADLs/functional transfers Therapeutic activities to facilitate/challenge dynamic balance, stand tolerance, fine motor dexterity/in-hand manipulation for increased independence with ADLs    Rehab Potential: Good for established goals    Patient / Family Goal: Return home      Patient and/or family were instructed diagnosis, prognosis/goals and plan of care. yes . Demonstrated good understanding of the need to participate in occupational therapy sessions. Time In: 7:50             Time Out: 8:15        Total Treatment Time: 13 minutes       Min Units   OT Eval Low 23972 X    OT Eval Medium 39932     OT Eval High 07138     OT Re-Eval [id number]     Therapeutic Ex 54046     Therapeutic Activities 81332 5 0   ADL/Self Care 75986 8 1   Orthotic Management 20592     Neuro Re-Ed 75579     Non-Billable Time            Evaluation time includes thorough review of current medical information, gathering information on past medical & social history & PLOF, completion of standardized testing, informal observation of tasks, consultation with other medical professions/disciplines, assessment of data & development of POC/goals.      Alina Galindo, [facility], OTR/L #[id number]

## 2021-01-07 NOTE — PROGRESS NOTES
SPEECH/LANGUAGE PATHOLOGY  SPEECH/LANGUAGE/COGNITIVE EVALUATION      PATIENT NAME:  Olivia Mart      :  1945          TODAY'S DATE:  2021 ROOM:  [address] [place] [address]      SPEECH PATHOLOGY DIAGNOSIS:   Within Functional Limits    THERAPY RECOMMENDATIONS:   Speech Pathology intervention is not warranted at this time. MOTOR SPEECH          Oral Peripheral Examination   Adequate lingual/labial strength       Parameters of Speech Production  Respiration:  Within Function Limits  Articulation:  Within Function Limits  Resonance:  Within Function Limits  Quality:   Within Function Limits  Pitch: Within Function Limits  Intensity: Within Function Limits  Fluency:  Intact  Prosody Intact      RECEPTIVE LANGUAGE       Comprehension of Yes/No Questions: Within Function Limits    Process  Simple Verbal Commands: Within Function Limits  Process Intermediate Verbal Commands: Within Function Limits  Process Complex Verbal Commands: Within Function Limits    Comprehension of Conversation: Within Function Limits      EXPRESSIVE LANGUAGE       Serials: Functional    Imitation:  Words   Functional  Sentences Functional    Naming:  (Modality used: Verbal )   Confrontation Naming Functional  Functional Description Functional  Response Naming: Functional    Conversation:     Grammatical form:  Intact       COGNITION     Attention/Orientation  Attention: Sustained attention  Oriented to:  Person, Place, Date, Reason  for hospitalization    Memory   Biographical: information.   Recalled  Address,  Birthdate, Age,   Family   Delayed recall: 3/3 words    Organization/Problem Solving/Reasoning   Verbal Sequencing:  Functional      Verbal Problem solving:  Functional        CLINICAL OBSERVATIONS NOTED DURING THE EVALUATION    Within Functional Limits                    CPT code:    50561  eval speech sound lang comprehension [x]The admitting diagnosis and active problem list, as listed below have been reviewed prior to initiation of this evaluation.      ADMITTING DIAGNOSIS: Acute CVA (cerebrovascular accident) ([facility] [address] [I63.9]     ACTIVE PROBLEM LIST:   Patient Active Problem List   Diagnosis    Compression fracture of L4 lumbar vertebra    Other postprocedural status(V45.89)  Kyphoplasty L4    Low back pain    Acne rosacea    Drug reaction    Hypothyroidism    Neck muscle spasm    Back pain    Headache    Vertebrobasilar TIAs    Hypercholesterolemia    Lumbar spinal stenosis L4-5    Orthostatic hypotension    H/o compression fracture of lumbar vertebra, mulitple    Compression fracture of L5 lumbar vertebra    S/P kyphoplasty L4    Fibromyalgia    Chronic back pain    Mass of breast    Contact dermatitis    Degeneration of intervertebral disc of lumbar region    Dermoid cyst of mouth    Vision disorder    Essential hypertension    Gastroesophageal reflux disease    Hyperlipidemia    Neuropathy    Breast pain    Sprain of knee    Temporary cerebral vascular dysfunction    Pain of upper abdomen    TIA (transient ischemic attack)    Limb weakness    S/P lumbar fusion (history of PLIF)    Cancer (HCC)    History of bilateral breast cancer    Dizziness

## 2021-01-07 NOTE — CARE COORDINATION
SW had brief conversation with patient in room at bedside. She lives home alone in a 1 story home. Her laundry is in basement and she does go downstairs frequently. She is independent at home and uses no DME. PCP is Dr Shiela Carter. No history of HHC or ANAMIKA. We talked about transition of care, plan is home no needs.

## 2021-01-07 NOTE — PLAN OF CARE
Problem: HEMODYNAMIC STATUS  Goal: Patient has stable vital signs and fluid balance  1/7/2021 0032 by Rayford Nageotte, RN  Outcome: Met This Shift    Problem: ACTIVITY INTOLERANCE/IMPAIRED MOBILITY  Goal: Mobility/activity is maintained at optimum level for patient  1/7/2021 0032 by Rayford Nageotte, RN  Outcome: Met This Shift

## 2021-01-07 NOTE — PROGRESS NOTES
Physical Therapy    Physical Therapy Initial Assessment     Name: Abby Fu  : 1945  MRN: [id number]    Referring Provider:  Cirilo Donovan MD    Date of Service: 2021    Evaluating PT:  Caron Cullen, PT, DPT    Room #:  0036/6284-B  Diagnosis:  Acute CVA  PMHx/PSHx:  Thyroid disease, L1 compression fracture, OP, DM, L4 compression fracture, Bilateral breast cancer with mastectomy , Kyphoplasty   Procedure/Surgery:  NA  Precautions:  Falls, Soft LSO for comfort  Equipment Needs: May benefit from [address]:    Pt lives alone in a 1 story home with 1 stairs to enter and no rail(s). Bed is on 1st floor and bath is on 1st floor. Basement laundry with flight and 1 rail(s). Pt ambulated with no AD PTA, but reported using furniture for balance. Pt reported independent with ADLs. Pt is actively driving. OBJECTIVE:   Initial Evaluation  Date: 2021 Treatment  NA   AM-PAC 6 Clicks 64/81    Was pt agreeable to Eval/treatment? Yes     Does pt have pain? No c/o pain. Bed Mobility  Rolling: Independent  Supine to sit: Independent  Sit to supine: Independent  Scooting: Independent    Transfers Sit to stand: Independent  Stand to sit:  Independent  Stand pivot: Independent    Ambulation    200 feet x 2 with no AD with Supervision    Stair negotiation: ascended and descended  4 steps with 1 rail with Supervision    ROM BUE:  WFL  BLE:  WFL    Strength BUE:  4/5  BLE:  4/5    Balance Sitting EOB:  Independent  Dynamic Standing:  Independent      Pt is A & O x 4  Sensation:  Pt denied numbness and tingling  Edema:  None noted    Therapeutic Exercises:  NT    Patient education  Pt educated on purpose of PT assessment, importance of mobility, safety with mobility, transfers, gait, stairs    Patient response to education:   Pt verbalized understanding Pt demonstrated skill Pt requires further education in this area   Yes  Yes  No      ASSESSMENT: Comments:  Patient cleared by RN and agreeable to treatment. Patient found seated in the bedside chair. Patient reported no N/T and speech has returned to prior level. No overt facial droop noted. Patient demonstrated ability to stand and lorri soft LSO prior to mobility. Patient reported wearing brace for comfort. Patient denied dizziness with positional changes. Patient demonstrated slower gait speed with equal stride length and reached for to environment to steady self occasionally. Patient able to ascend/descend stairs in reciprocal manner slowly, but steady. Documented supervision with gait and stairs for assessment purposes only. Patient assisted self back to supine with call light and tray table in reach. HOB elevated to comfort. Discussed use of AD for safety/balance during gait with patient. Treatment:  Patient practiced and was instructed in the following treatment:    ? Bed mobility: Verbal/tactile cues for sequencing BUEs/BLEs for safe technique with rolling/supine<>sit task. Independent  ? Transfer training: Verbal/tactile cues to facilitate proper hand placement, technique and safety during sit to stand task. Independent  ? Gait training: Verbal and tactile cues to facilitate upright posture and safety as well as provided with physical assistance to complete task. Discussed use of AD. ? Therapeutic exercises: As noted above. Pt's/ family goals   1. To go home. Patient and or family understand(s) diagnosis, prognosis, and plan of care. Yes     PLAN OF CARE:    Patient demonstrated ability to perform all functional tasks independently. Patient with no acute PT needs. Thank you for the opportunity to assist in the care of this patient.       Time in  0848  Time out  0905    Total Treatment Time  10 minutes Evaluation Time includes thorough review of current medical information, gathering information on past medical history/social history and prior level of function, completion of standardized testing/informal observation of tasks, assessment of data and education on plan of care and goals.     CPT codes:  [x] Low Complexity PT evaluation 37696  [] Moderate Complexity PT evaluation 15159  [] High Complexity PT evaluation 34383  [] PT Re-evaluation 22005  [] Gait training 49101 - minutes  [] Manual therapy 17497 - minutes  [x] Therapeutic activities 34929 10 minutes  [] Therapeutic exercises 96546 - minutes  [] Neuromuscular reeducation 15071 - minutes     Yolande Nevarez, PT, DPT  License [id number]

## 2021-01-14 ENCOUNTER — TELEPHONE (OUTPATIENT)
Dept: NEUROLOGY | Age: 76
End: 2021-01-14

## 2021-01-22 ENCOUNTER — TELEPHONE (OUTPATIENT)
Dept: NEUROLOGY | Age: 76
End: 2021-01-22

## 2021-01-22 ENCOUNTER — VIRTUAL VISIT (OUTPATIENT)
Dept: NEUROLOGY | Age: 76
End: 2021-01-22
Payer: MEDICARE

## 2021-01-22 DIAGNOSIS — E78.5 DYSLIPIDEMIA, GOAL LDL BELOW 70: ICD-10-CM

## 2021-01-22 DIAGNOSIS — G45.9 TIA (TRANSIENT ISCHEMIC ATTACK): Primary | ICD-10-CM

## 2021-01-22 PROCEDURE — 99442 PR PHYS/QHP TELEPHONE EVALUATION 11-20 MIN: CPT | Performed by: NURSE PRACTITIONER

## 2021-01-22 RX ORDER — PANTOPRAZOLE SODIUM 40 MG/1
40 TABLET, DELAYED RELEASE ORAL DAILY
COMMUNITY

## 2021-01-22 RX ORDER — TIZANIDINE 2 MG/1
2 TABLET ORAL EVERY 6 HOURS PRN
COMMUNITY

## 2021-01-22 NOTE — TELEPHONE ENCOUNTER
Spoke with the @[facility] department regarding order that was ordered on discharge from the hospital. Scheduled @[facility] for Feb 1 at [facility]. At 1pm. No powder or lotion on chest. Spoke with patient who a has a conflicting appointment. @d echo department phone number given to patients\"    Also spoke with sleep center regarding Arna Ship monitor that was ordered on discharge. They will call patient to schedule an appointment. Pt notified.

## 2021-01-22 NOTE — PROGRESS NOTES
Alicia Abraham is a 67 y.o. female evaluated via telephone on 1/22/2021. Consent:  She and/or health care decision maker is aware that that she may receive a bill for this telephone service, depending on her insurance coverage, and has provided verbal consent to proceed: Yes    Documentation:  I communicated with the patient and/or health care decision maker about recent hospitalization for suspected TIA. Details of this discussion including any medical advice provided: Review of their recent hospitalization including presenting symptoms, diagnostic tests and lab results, and stroke risk factor modification. She has significant past medical history of GERD, breast cancer, diabetes mellitus, and thyroid disease who presents following recent hospitalization for TIA. She presented to the hospital on 1/6/2021 with sudden onset of dizziness when she woke up to use the restroom. She noted she felt dizzy when sitting on the side of the bed which she reports a chronic history of however as the dizziness persisted she felt she needed to be evaluated. She was scheduled for a visit with her PCP that day who noted facial droop. Upon arrival to the ED, /70, NIHSS 2 for mild facial droop and slurred speech. She was on a daily baby aspirin prior to arrival. Initial neuroimaging was unremarkable. She was admitted for further evaluation where MRI brain showed no acute pathology. She denies any new or recurring focal neurologic deficits. She has since followed with her primary care provider. She denies previous stroke or cardiac history. She reports a history of borderline diabetes a couple years ago.      Assessment:  Transient ischemic attack     Plan:  Continue full strength aspirin    Risk factor modification  Dyslipidemia, goal LDL less than 70: continue statin therapy    Follow up with PCP as planned  Follow up with neurology as needed    I affirm this is a Patient Initiated Episode with an Established Patient who has not had a related appointment within my department in the past 7 days or scheduled within the next 24 hours.     Total Time: minutes: 11-20 minutes    Note: not billable if this call serves to triage the patient into an appointment for the relevant concern      Lucy Gary

## 2021-01-22 NOTE — PROGRESS NOTES
Barb Sadler was read the following message We want to confirm that, for purposes of billing, this is a virtual visit with your provider for which we will submit a claim for reimbursement with your insurance company. You will be responsible for any copays, coinsurance amounts or other amounts not covered by your insurance company. If you do not accept this, unfortunately we will not be able to schedule or proceed with a virtual visit with the provider. Do you accept? Mackenzie responded Yes .

## 2021-02-04 ENCOUNTER — HOSPITAL ENCOUNTER (OUTPATIENT)
Dept: NON INVASIVE DIAGNOSTICS | Age: 76
Discharge: HOME | End: 2021-02-04
Payer: MEDICARE

## 2021-02-04 ENCOUNTER — HOSPITAL ENCOUNTER (OUTPATIENT)
Age: 76
Discharge: HOME | End: 2021-02-04
Payer: MEDICARE

## 2021-02-04 DIAGNOSIS — G45.9 TIA (TRANSIENT ISCHEMIC ATTACK): ICD-10-CM

## 2021-02-04 LAB
ALBUMIN SERPL-MCNC: 4 G/DL (ref 3.5–5.2)
ALP BLD-CCNC: 80 U/L (ref 35–104)
ALT SERPL-CCNC: 14 U/L (ref 0–32)
ANION GAP SERPL CALCULATED.3IONS-SCNC: 11 MMOL/L (ref 7–16)
AST SERPL-CCNC: 23 U/L (ref 0–31)
BASOPHILS ABSOLUTE: 0.02 E9/L (ref 0–0.2)
BASOPHILS RELATIVE PERCENT: 0.3 % (ref 0–2)
BILIRUB SERPL-MCNC: 0.4 MG/DL (ref 0–1.2)
BUN BLDV-MCNC: 16 MG/DL (ref 8–23)
C-REACTIVE PROTEIN: 0.3 MG/DL (ref 0–0.4)
CALCIUM SERPL-MCNC: 9.1 MG/DL (ref 8.6–10.2)
CHLORIDE BLD-SCNC: 107 MMOL/L (ref 98–107)
CHOLESTEROL, TOTAL: 144 MG/DL (ref 0–199)
CO2: 24 MMOL/L (ref 22–29)
CREAT SERPL-MCNC: 0.9 MG/DL (ref 0.5–1)
EOSINOPHILS ABSOLUTE: 0.2 E9/L (ref 0.05–0.5)
EOSINOPHILS RELATIVE PERCENT: 3.1 % (ref 0–6)
GFR AFRICAN AMERICAN: >60
GFR NON-AFRICAN AMERICAN: >60 ML/MIN/1.73
GLUCOSE BLD-MCNC: 107 MG/DL (ref 74–99)
HCT VFR BLD CALC: 41.1 % (ref 34–48)
HDLC SERPL-MCNC: 43 MG/DL
HEMOGLOBIN: 13.6 G/DL (ref 11.5–15.5)
IMMATURE GRANULOCYTES #: 0.01 E9/L
IMMATURE GRANULOCYTES %: 0.2 % (ref 0–5)
LDL CHOLESTEROL CALCULATED: 76 MG/DL (ref 0–99)
LV EF: 63 %
LVEF MODALITY: NORMAL
LYMPHOCYTES ABSOLUTE: 2.12 E9/L (ref 1.5–4)
LYMPHOCYTES RELATIVE PERCENT: 33.2 % (ref 20–42)
MCH RBC QN AUTO: 30 PG (ref 26–35)
MCHC RBC AUTO-ENTMCNC: 33.1 % (ref 32–34.5)
MCV RBC AUTO: 90.5 FL (ref 80–99.9)
MONOCYTES ABSOLUTE: 0.65 E9/L (ref 0.1–0.95)
MONOCYTES RELATIVE PERCENT: 10.2 % (ref 2–12)
NEUTROPHILS ABSOLUTE: 3.39 E9/L (ref 1.8–7.3)
NEUTROPHILS RELATIVE PERCENT: 53 % (ref 43–80)
PDW BLD-RTO: 12.4 FL (ref 11.5–15)
PLATELET # BLD: 214 E9/L (ref 130–450)
PMV BLD AUTO: 9.7 FL (ref 7–12)
POTASSIUM SERPL-SCNC: 4.4 MMOL/L (ref 3.5–5)
RBC # BLD: 4.54 E12/L (ref 3.5–5.5)
SEDIMENTATION RATE, ERYTHROCYTE: 25 MM/HR (ref 0–20)
SODIUM BLD-SCNC: 142 MMOL/L (ref 132–146)
T4 FREE: 0.97 NG/DL (ref 0.93–1.7)
TOTAL PROTEIN: 7.1 G/DL (ref 6.4–8.3)
TRIGL SERPL-MCNC: 124 MG/DL (ref 0–149)
TSH SERPL DL<=0.05 MIU/L-ACNC: 7.51 UIU/ML (ref 0.27–4.2)
VITAMIN D 25-HYDROXY: 22 NG/ML (ref 30–100)
VLDLC SERPL CALC-MCNC: 25 MG/DL
WBC # BLD: 6.4 E9/L (ref 4.5–11.5)

## 2021-02-04 PROCEDURE — 85651 RBC SED RATE NONAUTOMATED: CPT

## 2021-02-04 PROCEDURE — 80053 COMPREHEN METABOLIC PANEL: CPT

## 2021-02-04 PROCEDURE — 84439 ASSAY OF FREE THYROXINE: CPT

## 2021-02-04 PROCEDURE — 80061 LIPID PANEL: CPT

## 2021-02-04 PROCEDURE — 86140 C-REACTIVE PROTEIN: CPT

## 2021-02-04 PROCEDURE — 84443 ASSAY THYROID STIM HORMONE: CPT

## 2021-02-04 PROCEDURE — 36415 COLL VENOUS BLD VENIPUNCTURE: CPT

## 2021-02-04 PROCEDURE — 93306 TTE W/DOPPLER COMPLETE: CPT

## 2021-02-04 PROCEDURE — 82306 VITAMIN D 25 HYDROXY: CPT

## 2021-02-04 PROCEDURE — 85025 COMPLETE CBC W/AUTO DIFF WBC: CPT

## 2021-02-08 ENCOUNTER — HOSPITAL ENCOUNTER (OUTPATIENT)
Dept: SLEEP CENTER | Age: 76
Discharge: HOME | End: 2021-02-08
Payer: MEDICARE

## 2021-02-08 DIAGNOSIS — G45.9 TIA (TRANSIENT ISCHEMIC ATTACK): ICD-10-CM

## 2021-02-08 PROCEDURE — 93226 XTRNL ECG REC<48 HR SCAN A/R: CPT

## 2021-02-08 PROCEDURE — 93225 XTRNL ECG REC<48 HRS REC: CPT

## 2021-03-24 ENCOUNTER — HOSPITAL ENCOUNTER (OUTPATIENT)
Age: 76
Discharge: HOME | End: 2021-03-24
Payer: MEDICARE

## 2021-03-24 LAB
T4 FREE: 1.7 NG/DL (ref 0.93–1.7)
TSH SERPL DL<=0.05 MIU/L-ACNC: 0.44 UIU/ML (ref 0.27–4.2)

## 2021-03-24 PROCEDURE — 36415 COLL VENOUS BLD VENIPUNCTURE: CPT

## 2021-03-24 PROCEDURE — 84439 ASSAY OF FREE THYROXINE: CPT

## 2021-03-24 PROCEDURE — 84443 ASSAY THYROID STIM HORMONE: CPT

## 2021-03-30 ENCOUNTER — OFFICE VISIT (OUTPATIENT)
Dept: NEUROLOGY | Age: 76
End: 2021-03-30
Payer: MEDICARE

## 2021-03-30 VITALS
BODY MASS INDEX: 28.52 KG/M2 | SYSTOLIC BLOOD PRESSURE: 150 MMHG | DIASTOLIC BLOOD PRESSURE: 83 MMHG | WEIGHT: 155 LBS | HEIGHT: 62 IN | TEMPERATURE: 98.4 F

## 2021-03-30 DIAGNOSIS — G89.4 PAIN SYNDROME, CHRONIC: Chronic | ICD-10-CM

## 2021-03-30 DIAGNOSIS — Z86.73 HX-TIA (TRANSIENT ISCHEMIC ATTACK): Primary | ICD-10-CM

## 2021-03-30 DIAGNOSIS — F45.0 ANXIETY WITH SOMATIZATION: Chronic | ICD-10-CM

## 2021-03-30 DIAGNOSIS — F41.9 ANXIETY WITH SOMATIZATION: Chronic | ICD-10-CM

## 2021-03-30 PROCEDURE — G8417 CALC BMI ABV UP PARAM F/U: HCPCS | Performed by: PSYCHIATRY & NEUROLOGY

## 2021-03-30 PROCEDURE — 1090F PRES/ABSN URINE INCON ASSESS: CPT | Performed by: PSYCHIATRY & NEUROLOGY

## 2021-03-30 PROCEDURE — G8400 PT W/DXA NO RESULTS DOC: HCPCS | Performed by: PSYCHIATRY & NEUROLOGY

## 2021-03-30 PROCEDURE — 99203 OFFICE O/P NEW LOW 30 MIN: CPT | Performed by: PSYCHIATRY & NEUROLOGY

## 2021-03-30 PROCEDURE — G8427 DOCREV CUR MEDS BY ELIG CLIN: HCPCS | Performed by: PSYCHIATRY & NEUROLOGY

## 2021-03-30 PROCEDURE — 1123F ACP DISCUSS/DSCN MKR DOCD: CPT | Performed by: PSYCHIATRY & NEUROLOGY

## 2021-03-30 PROCEDURE — G8484 FLU IMMUNIZE NO ADMIN: HCPCS | Performed by: PSYCHIATRY & NEUROLOGY

## 2021-03-30 PROCEDURE — 4040F PNEUMOC VAC/ADMIN/RCVD: CPT | Performed by: PSYCHIATRY & NEUROLOGY

## 2021-03-30 PROCEDURE — 1036F TOBACCO NON-USER: CPT | Performed by: PSYCHIATRY & NEUROLOGY

## 2021-03-30 SDOH — HEALTH STABILITY: MENTAL HEALTH: HOW OFTEN DO YOU HAVE A DRINK CONTAINING ALCOHOL?: NOT ASKED

## 2021-03-30 ASSESSMENT — ENCOUNTER SYMPTOMS
ALLERGIC/IMMUNOLOGIC NEGATIVE: 1
GASTROINTESTINAL NEGATIVE: 1
RESPIRATORY NEGATIVE: 1
EYES NEGATIVE: 1
BACK PAIN: 1

## 2021-03-30 NOTE — PROGRESS NOTES
Neurology Consult Note:    Patient: Dixie Oliva  : 1945  Date: 21  Referring provider: Alexei Zapien DO    Referral to Neurology: hx TIA's, hx recent left facial droop, slurred speech. Dear Alexei Zapien DO:    Thank you for your referral of Dixie Oliva the Neurology clinic, an anxious 65-year-old woman with history of TIAs. Most recently in January of this year, she presented apparently with a left facial droop, slurred speech and dizziness and lightheadedness, diagnosed with a TIA. She was recommended to take higher dose aspirin 325 mg daily, higher dose statin and medical management of her chronic medical conditions and vascular risk factors for secondary stroke risk reduction. Diagnostic evaluation with head CT, CTA of the head/neck and CT brain perfusion showed no evidence of acute focal ischemic stroke and neither did an MR brain scan completed on . She believes she still has left facial weakness though none is evident today and she has a symmetric smile. She has multiple chronic pain on somatoform type complaints. She has chronic degenerative low back pain syndrome and lumbar degenerative disc disease. She has followed with Dr. Melissa Kaplan, Neurosurgery, in the past.    Lab Data: reviewed from 2021. Imaging Data: MR brain scan without contrast, 2021, reviewed: no acute focal intracranial abnormality, mass, mass-effect or hemorrhage. 2021, CT brain perfusion, CTA of head/neck, head CT scan without contrast:  1. No perfusion mismatch. 2. Unremarkable CTA of the head. 3. Fibromuscular dysplasia in the vertebral arteries bilaterally.  No   evidence of a flow-limiting stenosis or dissection. 4. Asymmetric right-sided paranasal sinus disease, correlate with signs of   acute sinusitis. 5. Hiatal hernia     Progress note of CLAY Stewart, Stroke clinic; Reviewed, 2021: Medical/vascular risk factors of borderline diabetes, dyslipidemia, hypertension, continue antiplatelet therapy. Hospital discharge summary reviewed of Dr. Pam Dawn, 1/7/2021: History of left-sided facial droop, slurring of speech, dizziness, clinical improvement consistent with TIA. Aspirin 325 mg daily recommended an increased dose of statin. 2/12,  report: This ambulatory electrocardiographic monitoring session without the continuous variety, initiated on February 8, 2021, and continuing for approximately 48 hours.  The atrial rhythm was sinus, with physiologic rate profile.  There were episodes of type I second-degree AV block which occurred during typical sleep hours and did not incur significant ventricular bradycardia.  Intraventricular conduction was physiologic. Travis Jennifer was atrial ectopy, comprised of premature beats, some of which were blocked (low burden).  There was ventricular ectopy, comprised of premature beats (low burden).  There were no important tachycardia events.      2D Echo report, 2/4/2021: Summary   Normal left ventricular chamber size. Normal left ventricular systolic function. Visually estimated LVEF is 60-65 %. No wall motion abnormalities. Normal right ventricle structure and function. Mitral annular calcification is present. Aortic valve sclerosis.        Current Outpatient Medications   Medication Sig Dispense Refill    Cholecalciferol (VITAMIN D3) 125 MCG (5000 UT) TABS Take by mouth      LEVOTHYROXINE SODIUM PO Take 75 mcg by mouth      tiZANidine (ZANAFLEX) 2 MG tablet Take 2 mg by mouth every 6 hours as needed      rosuvastatin (CRESTOR) 20 MG tablet Take 1 tablet by mouth nightly 30 tablet 3    aspirin 325 MG EC tablet Take 1 tablet by mouth daily 30 tablet 3    Handicap Placard MISC by Does not apply route Duration: Lifetime 1 each 0    pantoprazole (PROTONIX) 40 MG tablet Take 40 mg by mouth daily      terconazole (TERAZOL 3) 0.8 % vaginal cream Place 1 applicator vaginally nightly        No current Negative. Respiratory: Negative. Cardiovascular: Negative. Gastrointestinal: Negative. Endocrine:        History glucose impairment   Genitourinary: Negative. Musculoskeletal: Positive for arthralgias, back pain and neck pain. Skin: Negative. Allergic/Immunologic: Negative. Neurological: Positive for dizziness and weakness. History left facial droop, slurring speech improved, consistent with TIA   Hematological: Negative. Psychiatric/Behavioral: The patient is nervous/anxious. All other systems reviewed and are negative. Neurologic Exam:  BP (!) 150/83 (Site: Right Upper Arm, Position: Sitting, Cuff Size: Medium Adult)   Temp 98.4 °F (36.9 °C)   Ht 5' 2\" (1.575 m)   Wt 155 lb (70.3 kg)   BMI 28.35 kg/m²   General appearance: Alert, cooperative, anxious, dysthymic, well-nourished, well-groomed, seated in the exam room, no acute distress. HEENT: Normocephalic/atraumatic. Neck: Supple, no bruits or adventitious sounds heard.   Cardiac: RRR  Respiratory: grossly clear  Extremities: No edema, erythema or cyanosis  Skin: No apparent lesions or rashes  Musculoskeletal: No fasciculations or tremors, no foot drop, negative bilateral straight leg raising test.  Mental Status: Alert, oriented x3  Speech/Language: Clear, grossly fluent  Attention span/Concentration: Mildly reduced with easy distractibility, tangential.  Affect/Mood: Anxious, dysthymic  Insight/Judgement: Delilah Mendieta of Knowledge/Current events: Unable to accurately assess, poor historian, tangential.  CN II-XII:     Pupils: Equal, reactive to light, 2.0 mm     EOM's: Full without nystagmus  Visual Fields: Full to confrontation  Fundi: Miosis to light  CN V: normal V1-V3  CN VII: No facial droop, symmetric smile  CN VIII: Hearing grossly intact  CN IX-XII: no palatal asymmetry, tongue midline  SCM/Trapezii: 5/5 power  Motor: 5/5 power in the upper and lower extremities without tremor or drift, effort related, and normal motor tone without cogwheeling or spasticity. Intact fine motor function of both hands, symmetric. DTR's: 1+ and symmetric in the upper extremities, 1+ patellar, 1+ right ankle jerk, trace to 1+ left ankle jerk, no ankle clonus, plantar responses are flexor. Sensory: Intact subjectively to light touch and sharp stick testing, no right/left confusion. Coordination/Gait: No gross limb dysmetria on finger-to-nose testing, no truncal or cerebellar gait ataxia, two-step turns. Assessment/Plan:  1. History of recent TIA symptoms described as left facial droop, dizziness and lightheadedness, slurring of speech with negative acute diagnostic stroke work-up. She was previously advised higher dose aspirin 325 mg daily and high-dose statin therapy and medical management of her chronic medical conditions and significant vascular risk factors. 2.  Patient information was provided on TIA and stroke prevention. 3.  For concerns about her chronic spine condition and back and neck pain, I advised her to follow back up with her spinal Neurosurgery specialist as indicated. Sincerely,      Kylah Tyson MD    This note was created using speech recognition transcription software. Despite proofreading, there may be several typographical errors present that may affect the meaning of the content. Please call with any questions. Note: A total time of 40 mins. was spent on the date of service in preparation for this visit, which included face-to-face patient care and completing clinical documentation, and including counseling and coordination of care based on clinical impression, neurologic diagnosis, review of pertinent imaging studies, test results, implementation and discussion of treatment plan, risk factor reduction and patient and/or family education.

## 2021-07-05 ENCOUNTER — HOSPITAL ENCOUNTER (OUTPATIENT)
Age: 76
Discharge: HOME | End: 2021-07-05
Payer: MEDICARE

## 2021-07-05 LAB
ALBUMIN SERPL-MCNC: 3.8 G/DL (ref 3.5–5.2)
ALP BLD-CCNC: 72 U/L (ref 35–104)
ALT SERPL-CCNC: 13 U/L (ref 0–32)
ANION GAP SERPL CALCULATED.3IONS-SCNC: 8 MMOL/L (ref 7–16)
AST SERPL-CCNC: 26 U/L (ref 0–31)
BILIRUB SERPL-MCNC: 0.4 MG/DL (ref 0–1.2)
BUN BLDV-MCNC: 17 MG/DL (ref 6–23)
CALCIUM SERPL-MCNC: 9.1 MG/DL (ref 8.6–10.2)
CHLORIDE BLD-SCNC: 104 MMOL/L (ref 98–107)
CO2: 27 MMOL/L (ref 22–29)
CREAT SERPL-MCNC: 1 MG/DL (ref 0.5–1)
GFR AFRICAN AMERICAN: >60
GFR NON-AFRICAN AMERICAN: 54 ML/MIN/1.73
GLUCOSE BLD-MCNC: 113 MG/DL (ref 74–99)
POTASSIUM SERPL-SCNC: 4.4 MMOL/L (ref 3.5–5)
SODIUM BLD-SCNC: 139 MMOL/L (ref 132–146)
T4 FREE: 1.67 NG/DL (ref 0.93–1.7)
TOTAL PROTEIN: 6.8 G/DL (ref 6.4–8.3)
TSH SERPL DL<=0.05 MIU/L-ACNC: 0.08 UIU/ML (ref 0.27–4.2)

## 2021-07-05 PROCEDURE — 80053 COMPREHEN METABOLIC PANEL: CPT

## 2021-07-05 PROCEDURE — 84439 ASSAY OF FREE THYROXINE: CPT

## 2021-07-05 PROCEDURE — 84443 ASSAY THYROID STIM HORMONE: CPT

## 2021-07-05 PROCEDURE — 36415 COLL VENOUS BLD VENIPUNCTURE: CPT

## 2021-10-12 DIAGNOSIS — C50.919 MALIGNANT NEOPLASM OF FEMALE BREAST, UNSPECIFIED ESTROGEN RECEPTOR STATUS, UNSPECIFIED LATERALITY, UNSPECIFIED SITE OF BREAST (HCC): Primary | ICD-10-CM

## 2021-10-12 DIAGNOSIS — T85.43XA BREAST IMPLANT RUPTURE, INITIAL ENCOUNTER: ICD-10-CM

## 2021-10-15 ENCOUNTER — HOSPITAL ENCOUNTER (OUTPATIENT)
Age: 76
Discharge: HOME | End: 2021-10-15
Payer: MEDICARE

## 2021-10-15 DIAGNOSIS — T85.43XA BREAST IMPLANT RUPTURE, INITIAL ENCOUNTER: ICD-10-CM

## 2021-10-15 DIAGNOSIS — C50.919 MALIGNANT NEOPLASM OF FEMALE BREAST, UNSPECIFIED ESTROGEN RECEPTOR STATUS, UNSPECIFIED LATERALITY, UNSPECIFIED SITE OF BREAST (HCC): ICD-10-CM

## 2021-10-15 LAB
BUN BLDV-MCNC: 18 MG/DL (ref 6–23)
CREAT SERPL-MCNC: 1 MG/DL (ref 0.5–1)
GFR AFRICAN AMERICAN: >60
GFR NON-AFRICAN AMERICAN: 54 ML/MIN/1.73

## 2021-10-15 PROCEDURE — 36415 COLL VENOUS BLD VENIPUNCTURE: CPT

## 2021-10-15 PROCEDURE — 82565 ASSAY OF CREATININE: CPT

## 2021-10-15 PROCEDURE — 84520 ASSAY OF UREA NITROGEN: CPT

## 2021-10-26 ENCOUNTER — HOSPITAL ENCOUNTER (OUTPATIENT)
Dept: MRI IMAGING | Age: 76
Discharge: HOME | End: 2021-10-28
Payer: MEDICARE

## 2021-10-26 DIAGNOSIS — C50.912 BILATERAL MALIGNANT NEOPLASM OF BREAST IN FEMALE, UNSPECIFIED ESTROGEN RECEPTOR STATUS, UNSPECIFIED SITE OF BREAST (HCC): ICD-10-CM

## 2021-10-26 DIAGNOSIS — C50.911 BILATERAL MALIGNANT NEOPLASM OF BREAST IN FEMALE, UNSPECIFIED ESTROGEN RECEPTOR STATUS, UNSPECIFIED SITE OF BREAST (HCC): ICD-10-CM

## 2021-10-26 PROCEDURE — A9585 GADOBUTROL INJECTION: HCPCS | Performed by: RADIOLOGY

## 2021-10-26 PROCEDURE — 6360000004 HC RX CONTRAST MEDICATION: Performed by: RADIOLOGY

## 2021-10-26 PROCEDURE — 77049 MRI BREAST C-+ W/CAD BI: CPT

## 2021-10-26 RX ADMIN — GADOBUTROL 7 ML: 604.72 INJECTION INTRAVENOUS at 13:42

## 2022-01-01 ENCOUNTER — APPOINTMENT (OUTPATIENT)
Dept: CT IMAGING | Age: 77
DRG: 853 | End: 2022-01-01
Payer: MEDICARE

## 2022-01-01 ENCOUNTER — APPOINTMENT (OUTPATIENT)
Dept: GENERAL RADIOLOGY | Age: 77
DRG: 853 | End: 2022-01-01
Payer: MEDICARE

## 2022-01-01 ENCOUNTER — APPOINTMENT (OUTPATIENT)
Dept: INTERVENTIONAL RADIOLOGY/VASCULAR | Age: 77
DRG: 853 | End: 2022-01-01
Payer: MEDICARE

## 2022-01-01 ENCOUNTER — APPOINTMENT (OUTPATIENT)
Dept: ULTRASOUND IMAGING | Age: 77
DRG: 853 | End: 2022-01-01
Payer: MEDICARE

## 2022-01-01 ENCOUNTER — HOSPITAL ENCOUNTER (INPATIENT)
Age: 77
LOS: 20 days | DRG: 853 | End: 2023-01-03
Attending: EMERGENCY MEDICINE | Admitting: INTERNAL MEDICINE
Payer: MEDICARE

## 2022-01-01 DIAGNOSIS — J90 PLEURAL EFFUSION: ICD-10-CM

## 2022-01-01 DIAGNOSIS — R09.02 HYPOXIA: ICD-10-CM

## 2022-01-01 DIAGNOSIS — J18.9 PNEUMONIA OF LEFT LOWER LOBE DUE TO INFECTIOUS ORGANISM: Primary | ICD-10-CM

## 2022-01-01 DIAGNOSIS — J95.863: ICD-10-CM

## 2022-01-01 LAB
(1,3)-BETA-D-GLUCAN (FUNGITELL) INTERPRETATION: POSITIVE
(1,3)-BETA-D-GLUCAN (FUNGITELL) INTERPRETATION: POSITIVE
(1,3)-BETA-D-GLUCAN (FUNGITELL): 164 PG/ML
(1,3)-BETA-D-GLUCAN (FUNGITELL): 81 PG/ML
AADO2: 100.1 MMHG
AADO2: 108.8 MMHG
AADO2: 136.6 MMHG
AADO2: 193.4 MMHG
AADO2: 198.5 MMHG
AADO2: 210.8 MMHG
AADO2: 227.1 MMHG
AADO2: 297 MMHG
AADO2: 408.3 MMHG
AADO2: 443.4 MMHG
AADO2: 459.5 MMHG
AADO2: 459.6 MMHG
AADO2: 465.7 MMHG
AADO2: 468.6 MMHG
AADO2: 469.9 MMHG
AADO2: 477.8 MMHG
AADO2: 487.8 MMHG
AADO2: 523.4 MMHG
AADO2: 553.1 MMHG
AADO2: 557 MMHG
ABO/RH: NORMAL
ABO/RH: NORMAL
ACANTHOCYTES: ABNORMAL
ADENOVIRUS BY PCR: NOT DETECTED
ALBUMIN SERPL-MCNC: 1.9 G/DL (ref 3.5–5.2)
ALBUMIN SERPL-MCNC: 2 G/DL (ref 3.5–5.2)
ALBUMIN SERPL-MCNC: 2.1 G/DL (ref 3.5–5.2)
ALBUMIN SERPL-MCNC: 2.2 G/DL (ref 3.5–5.2)
ALBUMIN SERPL-MCNC: 2.3 G/DL (ref 3.5–5.2)
ALBUMIN SERPL-MCNC: 2.4 G/DL (ref 3.5–5.2)
ALBUMIN SERPL-MCNC: 2.5 G/DL (ref 3.5–5.2)
ALBUMIN SERPL-MCNC: 2.6 G/DL (ref 3.5–5.2)
ALBUMIN SERPL-MCNC: 2.7 G/DL (ref 3.5–5.2)
ALBUMIN SERPL-MCNC: 2.8 G/DL (ref 3.5–5.2)
ALBUMIN SERPL-MCNC: 2.8 G/DL (ref 3.5–5.2)
ALBUMIN SERPL-MCNC: 3.1 G/DL (ref 3.5–5.2)
ALBUMIN SERPL-MCNC: 3.1 G/DL (ref 3.5–5.2)
ALBUMIN SERPL-MCNC: 3.2 G/DL (ref 3.5–5.2)
ALBUMIN SERPL-MCNC: 3.3 G/DL (ref 3.5–5.2)
ALP BLD-CCNC: 101 U/L (ref 35–104)
ALP BLD-CCNC: 128 U/L (ref 35–104)
ALP BLD-CCNC: 131 U/L (ref 35–104)
ALP BLD-CCNC: 131 U/L (ref 35–104)
ALP BLD-CCNC: 132 U/L (ref 35–104)
ALP BLD-CCNC: 133 U/L (ref 35–104)
ALP BLD-CCNC: 135 U/L (ref 35–104)
ALP BLD-CCNC: 136 U/L (ref 35–104)
ALP BLD-CCNC: 137 U/L (ref 35–104)
ALP BLD-CCNC: 139 U/L (ref 35–104)
ALP BLD-CCNC: 139 U/L (ref 35–104)
ALP BLD-CCNC: 140 U/L (ref 35–104)
ALP BLD-CCNC: 140 U/L (ref 35–104)
ALP BLD-CCNC: 141 U/L (ref 35–104)
ALP BLD-CCNC: 144 U/L (ref 35–104)
ALP BLD-CCNC: 144 U/L (ref 35–104)
ALP BLD-CCNC: 147 U/L (ref 35–104)
ALP BLD-CCNC: 148 U/L (ref 35–104)
ALP BLD-CCNC: 150 U/L (ref 35–104)
ALP BLD-CCNC: 151 U/L (ref 35–104)
ALP BLD-CCNC: 154 U/L (ref 35–104)
ALP BLD-CCNC: 155 U/L (ref 35–104)
ALP BLD-CCNC: 155 U/L (ref 35–104)
ALP BLD-CCNC: 157 U/L (ref 35–104)
ALP BLD-CCNC: 157 U/L (ref 35–104)
ALP BLD-CCNC: 158 U/L (ref 35–104)
ALP BLD-CCNC: 158 U/L (ref 35–104)
ALP BLD-CCNC: 159 U/L (ref 35–104)
ALP BLD-CCNC: 171 U/L (ref 35–104)
ALP BLD-CCNC: 171 U/L (ref 35–104)
ALP BLD-CCNC: 179 U/L (ref 35–104)
ALP BLD-CCNC: 184 U/L (ref 35–104)
ALP BLD-CCNC: 186 U/L (ref 35–104)
ALP BLD-CCNC: 199 U/L (ref 35–104)
ALP BLD-CCNC: 209 U/L (ref 35–104)
ALP BLD-CCNC: 231 U/L (ref 35–104)
ALP BLD-CCNC: 236 U/L (ref 35–104)
ALP BLD-CCNC: 243 U/L (ref 35–104)
ALP BLD-CCNC: 245 U/L (ref 35–104)
ALP BLD-CCNC: 55 U/L (ref 35–104)
ALP BLD-CCNC: 58 U/L (ref 35–104)
ALP BLD-CCNC: 59 U/L (ref 35–104)
ALP BLD-CCNC: 87 U/L (ref 35–104)
ALT SERPL-CCNC: 110 U/L (ref 0–32)
ALT SERPL-CCNC: 115 U/L (ref 0–32)
ALT SERPL-CCNC: 126 U/L (ref 0–32)
ALT SERPL-CCNC: 13 U/L (ref 0–32)
ALT SERPL-CCNC: 14 U/L (ref 0–32)
ALT SERPL-CCNC: 147 U/L (ref 0–32)
ALT SERPL-CCNC: 15 U/L (ref 0–32)
ALT SERPL-CCNC: 15 U/L (ref 0–32)
ALT SERPL-CCNC: 16 U/L (ref 0–32)
ALT SERPL-CCNC: 17 U/L (ref 0–32)
ALT SERPL-CCNC: 18 U/L (ref 0–32)
ALT SERPL-CCNC: 20 U/L (ref 0–32)
ALT SERPL-CCNC: 32 U/L (ref 0–32)
ALT SERPL-CCNC: 35 U/L (ref 0–32)
ALT SERPL-CCNC: 35 U/L (ref 0–32)
ALT SERPL-CCNC: 38 U/L (ref 0–32)
ALT SERPL-CCNC: 40 U/L (ref 0–32)
ALT SERPL-CCNC: 44 U/L (ref 0–32)
ALT SERPL-CCNC: 44 U/L (ref 0–32)
ALT SERPL-CCNC: 46 U/L (ref 0–32)
ALT SERPL-CCNC: 52 U/L (ref 0–32)
ALT SERPL-CCNC: 53 U/L (ref 0–32)
ALT SERPL-CCNC: 60 U/L (ref 0–32)
ALT SERPL-CCNC: 60 U/L (ref 0–32)
ALT SERPL-CCNC: 71 U/L (ref 0–32)
ALT SERPL-CCNC: 71 U/L (ref 0–32)
ALT SERPL-CCNC: 82 U/L (ref 0–32)
ALT SERPL-CCNC: 84 U/L (ref 0–32)
ALT SERPL-CCNC: 85 U/L (ref 0–32)
ALT SERPL-CCNC: 87 U/L (ref 0–32)
AMMONIA: 21 UMOL/L (ref 11–51)
ANAEROBIC CULTURE: NORMAL
ANION GAP SERPL CALCULATED.3IONS-SCNC: 10 MMOL/L (ref 7–16)
ANION GAP SERPL CALCULATED.3IONS-SCNC: 11 MMOL/L (ref 7–16)
ANION GAP SERPL CALCULATED.3IONS-SCNC: 12 MMOL/L (ref 7–16)
ANION GAP SERPL CALCULATED.3IONS-SCNC: 12 MMOL/L (ref 7–16)
ANION GAP SERPL CALCULATED.3IONS-SCNC: 13 MMOL/L (ref 7–16)
ANION GAP SERPL CALCULATED.3IONS-SCNC: 14 MMOL/L (ref 7–16)
ANION GAP SERPL CALCULATED.3IONS-SCNC: 14 MMOL/L (ref 7–16)
ANION GAP SERPL CALCULATED.3IONS-SCNC: 15 MMOL/L (ref 7–16)
ANION GAP SERPL CALCULATED.3IONS-SCNC: 16 MMOL/L (ref 7–16)
ANION GAP SERPL CALCULATED.3IONS-SCNC: 17 MMOL/L (ref 7–16)
ANION GAP SERPL CALCULATED.3IONS-SCNC: 17 MMOL/L (ref 7–16)
ANION GAP SERPL CALCULATED.3IONS-SCNC: 18 MMOL/L (ref 7–16)
ANION GAP SERPL CALCULATED.3IONS-SCNC: 21 MMOL/L (ref 7–16)
ANION GAP SERPL CALCULATED.3IONS-SCNC: 21 MMOL/L (ref 7–16)
ANION GAP SERPL CALCULATED.3IONS-SCNC: 24 MMOL/L (ref 7–16)
ANION GAP SERPL CALCULATED.3IONS-SCNC: 6 MMOL/L (ref 7–16)
ANION GAP SERPL CALCULATED.3IONS-SCNC: 7 MMOL/L (ref 7–16)
ANION GAP SERPL CALCULATED.3IONS-SCNC: 7 MMOL/L (ref 7–16)
ANION GAP SERPL CALCULATED.3IONS-SCNC: 8 MMOL/L (ref 7–16)
ANION GAP SERPL CALCULATED.3IONS-SCNC: 9 MMOL/L (ref 7–16)
ANION GAP: 10 MMOL/L (ref 7–16)
ANION GAP: 9 MMOL/L (ref 7–16)
ANISOCYTOSIS: ABNORMAL
ANTIBODY SCREEN: NORMAL
ANTIBODY SCREEN: NORMAL
APPEARANCE FLUID: NORMAL
APTT: 34.1 SEC (ref 24.5–35.1)
APTT: 38.7 SEC (ref 24.5–35.1)
APTT: 43.5 SEC (ref 24.5–35.1)
AST SERPL-CCNC: 102 U/L (ref 0–31)
AST SERPL-CCNC: 104 U/L (ref 0–31)
AST SERPL-CCNC: 112 U/L (ref 0–31)
AST SERPL-CCNC: 129 U/L (ref 0–31)
AST SERPL-CCNC: 135 U/L (ref 0–31)
AST SERPL-CCNC: 212 U/L (ref 0–31)
AST SERPL-CCNC: 224 U/L (ref 0–31)
AST SERPL-CCNC: 27 U/L (ref 0–31)
AST SERPL-CCNC: 29 U/L (ref 0–31)
AST SERPL-CCNC: 31 U/L (ref 0–31)
AST SERPL-CCNC: 32 U/L (ref 0–31)
AST SERPL-CCNC: 34 U/L (ref 0–31)
AST SERPL-CCNC: 34 U/L (ref 0–31)
AST SERPL-CCNC: 35 U/L (ref 0–31)
AST SERPL-CCNC: 35 U/L (ref 0–31)
AST SERPL-CCNC: 40 U/L (ref 0–31)
AST SERPL-CCNC: 41 U/L (ref 0–31)
AST SERPL-CCNC: 43 U/L (ref 0–31)
AST SERPL-CCNC: 45 U/L (ref 0–31)
AST SERPL-CCNC: 45 U/L (ref 0–31)
AST SERPL-CCNC: 48 U/L (ref 0–31)
AST SERPL-CCNC: 49 U/L (ref 0–31)
AST SERPL-CCNC: 51 U/L (ref 0–31)
AST SERPL-CCNC: 52 U/L (ref 0–31)
AST SERPL-CCNC: 52 U/L (ref 0–31)
AST SERPL-CCNC: 54 U/L (ref 0–31)
AST SERPL-CCNC: 55 U/L (ref 0–31)
AST SERPL-CCNC: 56 U/L (ref 0–31)
AST SERPL-CCNC: 56 U/L (ref 0–31)
AST SERPL-CCNC: 57 U/L (ref 0–31)
AST SERPL-CCNC: 57 U/L (ref 0–31)
AST SERPL-CCNC: 58 U/L (ref 0–31)
AST SERPL-CCNC: 58 U/L (ref 0–31)
AST SERPL-CCNC: 61 U/L (ref 0–31)
AST SERPL-CCNC: 62 U/L (ref 0–31)
AST SERPL-CCNC: 74 U/L (ref 0–31)
AST SERPL-CCNC: 75 U/L (ref 0–31)
AST SERPL-CCNC: 78 U/L (ref 0–31)
AST SERPL-CCNC: 81 U/L (ref 0–31)
AST SERPL-CCNC: 92 U/L (ref 0–31)
B.E.: -0.1 MMOL/L (ref -3–3)
B.E.: -0.3 MMOL/L (ref -3–3)
B.E.: -0.3 MMOL/L (ref -3–3)
B.E.: -0.4 MMOL/L (ref -3–3)
B.E.: -1.1 MMOL/L (ref -3–3)
B.E.: -2.1 MMOL/L (ref -3–3)
B.E.: -2.3 MMOL/L (ref -3–3)
B.E.: -2.6 MMOL/L (ref -3–3)
B.E.: -3 MMOL/L (ref -3–3)
B.E.: -4.1 MMOL/L (ref -3–3)
B.E.: -5 MMOL/L (ref -3–3)
B.E.: -5.6 MMOL/L (ref -3–3)
B.E.: -5.8 MMOL/L (ref -3–3)
B.E.: -5.8 MMOL/L (ref -3–3)
B.E.: -6.3 MMOL/L (ref -3–3)
B.E.: -6.3 MMOL/L (ref -3–3)
B.E.: -6.7 MMOL/L (ref -3–3)
B.E.: -7.2 MMOL/L (ref -3–3)
B.E.: -7.3 MMOL/L (ref -3–3)
B.E.: -7.5 MMOL/L (ref -3–3)
B.E.: -7.6 MMOL/L (ref -3–3)
B.E.: 0.7 MMOL/L (ref -3–3)
B.E.: 1 MMOL/L (ref -3–3)
B.E.: 2.6 MMOL/L (ref -3–3)
BACTERIA: ABNORMAL /HPF
BASOPHILIC STIPPLING: ABNORMAL
BASOPHILIC STIPPLING: ABNORMAL
BASOPHILS ABSOLUTE: 0 E9/L (ref 0–0.2)
BASOPHILS ABSOLUTE: 0.02 E9/L (ref 0–0.2)
BASOPHILS ABSOLUTE: 0.03 E9/L (ref 0–0.2)
BASOPHILS ABSOLUTE: 0.04 E9/L (ref 0–0.2)
BASOPHILS ABSOLUTE: 0.04 E9/L (ref 0–0.2)
BASOPHILS ABSOLUTE: 0.06 E9/L (ref 0–0.2)
BASOPHILS ABSOLUTE: 0.09 E9/L (ref 0–0.2)
BASOPHILS ABSOLUTE: 0.18 E9/L (ref 0–0.2)
BASOPHILS RELATIVE PERCENT: 0.1 % (ref 0–2)
BASOPHILS RELATIVE PERCENT: 0.2 % (ref 0–2)
BASOPHILS RELATIVE PERCENT: 0.3 % (ref 0–2)
BASOPHILS RELATIVE PERCENT: 0.4 % (ref 0–2)
BASOPHILS RELATIVE PERCENT: 0.6 % (ref 0–2)
BASOPHILS RELATIVE PERCENT: 0.9 % (ref 0–2)
BETA-HYDROXYBUTYRATE: 3.35 MMOL/L (ref 0.02–0.27)
BILIRUB SERPL-MCNC: 0.4 MG/DL (ref 0–1.2)
BILIRUB SERPL-MCNC: 0.5 MG/DL (ref 0–1.2)
BILIRUB SERPL-MCNC: 0.6 MG/DL (ref 0–1.2)
BILIRUB SERPL-MCNC: 1 MG/DL (ref 0–1.2)
BILIRUB SERPL-MCNC: 1.3 MG/DL (ref 0–1.2)
BILIRUB SERPL-MCNC: 1.3 MG/DL (ref 0–1.2)
BILIRUB SERPL-MCNC: 1.5 MG/DL (ref 0–1.2)
BILIRUB SERPL-MCNC: 1.6 MG/DL (ref 0–1.2)
BILIRUB SERPL-MCNC: 1.7 MG/DL (ref 0–1.2)
BILIRUB SERPL-MCNC: 1.7 MG/DL (ref 0–1.2)
BILIRUB SERPL-MCNC: 1.8 MG/DL (ref 0–1.2)
BILIRUB SERPL-MCNC: 1.9 MG/DL (ref 0–1.2)
BILIRUB SERPL-MCNC: 2 MG/DL (ref 0–1.2)
BILIRUB SERPL-MCNC: 2.1 MG/DL (ref 0–1.2)
BILIRUB SERPL-MCNC: 2.2 MG/DL (ref 0–1.2)
BILIRUB SERPL-MCNC: 2.2 MG/DL (ref 0–1.2)
BILIRUB SERPL-MCNC: 2.3 MG/DL (ref 0–1.2)
BILIRUB SERPL-MCNC: 2.3 MG/DL (ref 0–1.2)
BILIRUB SERPL-MCNC: 2.4 MG/DL (ref 0–1.2)
BILIRUB SERPL-MCNC: 2.5 MG/DL (ref 0–1.2)
BILIRUB SERPL-MCNC: 2.6 MG/DL (ref 0–1.2)
BILIRUB SERPL-MCNC: 2.8 MG/DL (ref 0–1.2)
BILIRUB SERPL-MCNC: 3.1 MG/DL (ref 0–1.2)
BILIRUB SERPL-MCNC: 3.8 MG/DL (ref 0–1.2)
BILIRUB SERPL-MCNC: 4.2 MG/DL (ref 0–1.2)
BILIRUB SERPL-MCNC: 4.5 MG/DL (ref 0–1.2)
BILIRUB SERPL-MCNC: 4.6 MG/DL (ref 0–1.2)
BILIRUB SERPL-MCNC: 4.9 MG/DL (ref 0–1.2)
BILIRUB SERPL-MCNC: 5 MG/DL (ref 0–1.2)
BILIRUBIN URINE: ABNORMAL
BLOOD BANK DISPENSE STATUS: NORMAL
BLOOD BANK PRODUCT CODE: NORMAL
BLOOD CULTURE, ROUTINE: NORMAL
BLOOD, URINE: ABNORMAL
BODY FLUID CULTURE, STERILE: ABNORMAL
BODY FLUID CULTURE, STERILE: ABNORMAL
BODY FLUID CULTURE, STERILE: NORMAL
BORDETELLA PARAPERTUSSIS BY PCR: NOT DETECTED
BORDETELLA PERTUSSIS BY PCR: NOT DETECTED
BPU ID: NORMAL
BUN BLDV-MCNC: 22 MG/DL (ref 6–23)
BUN BLDV-MCNC: 23 MG/DL (ref 6–23)
BUN BLDV-MCNC: 23 MG/DL (ref 6–23)
BUN BLDV-MCNC: 24 MG/DL (ref 6–23)
BUN BLDV-MCNC: 26 MG/DL (ref 6–23)
BUN BLDV-MCNC: 27 MG/DL (ref 6–23)
BUN BLDV-MCNC: 28 MG/DL (ref 6–23)
BUN BLDV-MCNC: 30 MG/DL (ref 6–23)
BUN BLDV-MCNC: 31 MG/DL (ref 6–23)
BUN BLDV-MCNC: 31 MG/DL (ref 6–23)
BUN BLDV-MCNC: 32 MG/DL (ref 6–23)
BUN BLDV-MCNC: 33 MG/DL (ref 6–23)
BUN BLDV-MCNC: 34 MG/DL (ref 6–23)
BUN BLDV-MCNC: 34 MG/DL (ref 6–23)
BUN BLDV-MCNC: 35 MG/DL (ref 6–23)
BUN BLDV-MCNC: 36 MG/DL (ref 6–23)
BUN BLDV-MCNC: 37 MG/DL (ref 6–23)
BUN BLDV-MCNC: 38 MG/DL (ref 6–23)
BUN BLDV-MCNC: 39 MG/DL (ref 6–23)
BUN BLDV-MCNC: 41 MG/DL (ref 6–23)
BUN BLDV-MCNC: 42 MG/DL (ref 6–23)
BUN BLDV-MCNC: 42 MG/DL (ref 6–23)
BUN BLDV-MCNC: 43 MG/DL (ref 6–23)
BUN BLDV-MCNC: 44 MG/DL (ref 6–23)
BUN BLDV-MCNC: 45 MG/DL (ref 6–23)
BUN BLDV-MCNC: 45 MG/DL (ref 6–23)
BUN BLDV-MCNC: 46 MG/DL (ref 6–23)
BUN BLDV-MCNC: 46 MG/DL (ref 6–23)
BUN BLDV-MCNC: 48 MG/DL (ref 6–23)
BUN BLDV-MCNC: 48 MG/DL (ref 6–23)
BUN BLDV-MCNC: 55 MG/DL (ref 6–23)
BUN BLDV-MCNC: 56 MG/DL (ref 6–23)
BUN BLDV-MCNC: 59 MG/DL (ref 6–23)
BUN BLDV-MCNC: 62 MG/DL (ref 6–23)
BUN BLDV-MCNC: 64 MG/DL (ref 6–23)
BURR CELLS: ABNORMAL
CALCIUM IONIZED: 1.08 MMOL/L (ref 1.15–1.33)
CALCIUM IONIZED: 1.09 MMOL/L (ref 1.15–1.33)
CALCIUM IONIZED: 1.11 MMOL/L (ref 1.15–1.33)
CALCIUM IONIZED: 1.12 MMOL/L (ref 1.15–1.33)
CALCIUM IONIZED: 1.13 MMOL/L (ref 1.15–1.33)
CALCIUM IONIZED: 1.14 MMOL/L (ref 1.15–1.33)
CALCIUM IONIZED: 1.16 MMOL/L (ref 1.15–1.33)
CALCIUM IONIZED: 1.17 MMOL/L (ref 1.15–1.33)
CALCIUM IONIZED: 1.18 MMOL/L (ref 1.15–1.33)
CALCIUM IONIZED: 1.18 MMOL/L (ref 1.15–1.33)
CALCIUM IONIZED: 1.19 MMOL/L (ref 1.15–1.33)
CALCIUM IONIZED: 1.2 MMOL/L (ref 1.15–1.33)
CALCIUM IONIZED: 1.21 MMOL/L (ref 1.15–1.33)
CALCIUM IONIZED: 1.22 MMOL/L (ref 1.15–1.33)
CALCIUM IONIZED: 1.23 MMOL/L (ref 1.15–1.33)
CALCIUM IONIZED: 1.23 MMOL/L (ref 1.15–1.33)
CALCIUM IONIZED: 1.24 MMOL/L (ref 1.15–1.33)
CALCIUM IONIZED: 1.24 MMOL/L (ref 1.15–1.33)
CALCIUM IONIZED: 1.25 MMOL/L (ref 1.15–1.33)
CALCIUM IONIZED: 1.25 MMOL/L (ref 1.15–1.33)
CALCIUM IONIZED: 1.27 MMOL/L (ref 1.15–1.33)
CALCIUM IONIZED: 1.28 MMOL/L (ref 1.15–1.33)
CALCIUM IONIZED: 1.29 MMOL/L (ref 1.15–1.33)
CALCIUM IONIZED: 1.3 MMOL/L (ref 1.15–1.33)
CALCIUM IONIZED: 1.32 MMOL/L (ref 1.15–1.33)
CALCIUM IONIZED: 1.32 MMOL/L (ref 1.15–1.33)
CALCIUM IONIZED: 1.33 MMOL/L (ref 1.15–1.33)
CALCIUM SERPL-MCNC: 7.4 MG/DL (ref 8.6–10.2)
CALCIUM SERPL-MCNC: 7.6 MG/DL (ref 8.6–10.2)
CALCIUM SERPL-MCNC: 7.7 MG/DL (ref 8.6–10.2)
CALCIUM SERPL-MCNC: 7.8 MG/DL (ref 8.6–10.2)
CALCIUM SERPL-MCNC: 7.9 MG/DL (ref 8.6–10.2)
CALCIUM SERPL-MCNC: 8 MG/DL (ref 8.6–10.2)
CALCIUM SERPL-MCNC: 8.1 MG/DL (ref 8.6–10.2)
CALCIUM SERPL-MCNC: 8.2 MG/DL (ref 8.6–10.2)
CALCIUM SERPL-MCNC: 8.3 MG/DL (ref 8.6–10.2)
CALCIUM SERPL-MCNC: 8.4 MG/DL (ref 8.6–10.2)
CALCIUM SERPL-MCNC: 8.5 MG/DL (ref 8.6–10.2)
CALCIUM SERPL-MCNC: 8.6 MG/DL (ref 8.6–10.2)
CALCIUM SERPL-MCNC: 8.7 MG/DL (ref 8.6–10.2)
CALCIUM SERPL-MCNC: 8.8 MG/DL (ref 8.6–10.2)
CALCIUM SERPL-MCNC: 8.8 MG/DL (ref 8.6–10.2)
CALCIUM SERPL-MCNC: 8.9 MG/DL (ref 8.6–10.2)
CALCIUM SERPL-MCNC: 8.9 MG/DL (ref 8.6–10.2)
CALCIUM SERPL-MCNC: 9.1 MG/DL (ref 8.6–10.2)
CARDIOPULMONARY BYPASS: NO
CARDIOPULMONARY BYPASS: NO
CELL COUNT FLUID TYPE: NORMAL
CHLAMYDOPHILIA PNEUMONIAE BY PCR: NOT DETECTED
CHLORIDE BLD-SCNC: 101 MMOL/L (ref 98–107)
CHLORIDE BLD-SCNC: 102 MMOL/L (ref 98–107)
CHLORIDE BLD-SCNC: 103 MMOL/L (ref 98–107)
CHLORIDE BLD-SCNC: 104 MMOL/L (ref 98–107)
CHLORIDE BLD-SCNC: 105 MMOL/L (ref 98–107)
CHLORIDE BLD-SCNC: 106 MMOL/L (ref 98–107)
CHLORIDE BLD-SCNC: 107 MMOL/L (ref 98–107)
CHLORIDE BLD-SCNC: 108 MMOL/L (ref 98–107)
CHLORIDE BLD-SCNC: 97 MMOL/L (ref 98–107)
CHLORIDE BLD-SCNC: 97 MMOL/L (ref 98–107)
CHLORIDE BLD-SCNC: 99 MMOL/L (ref 98–107)
CHLORIDE URINE RANDOM: 23 MMOL/L
CHOLESTEROL, TOTAL: 45 MG/DL (ref 0–199)
CLARITY: ABNORMAL
CO2: 18 MMOL/L (ref 22–29)
CO2: 18 MMOL/L (ref 22–29)
CO2: 19 MMOL/L (ref 22–29)
CO2: 20 MMOL/L (ref 22–29)
CO2: 21 MMOL/L (ref 22–29)
CO2: 22 MMOL/L (ref 22–29)
CO2: 23 MMOL/L (ref 22–29)
CO2: 24 MMOL/L (ref 22–29)
CO2: 25 MMOL/L (ref 22–29)
CO2: 26 MMOL/L (ref 22–29)
CO2: 26 MMOL/L (ref 22–29)
CO2: 27 MMOL/L (ref 22–29)
COARSE CASTS, UA: ABNORMAL /LPF (ref 0–2)
COHB: 0 % (ref 0–1.5)
COHB: 0.1 % (ref 0–1.5)
COHB: 0.2 % (ref 0–1.5)
COHB: 0.3 % (ref 0–1.5)
COHB: 0.4 % (ref 0–1.5)
COHB: 0.5 % (ref 0–1.5)
COHB: 0.6 % (ref 0–1.5)
COHB: 0.7 % (ref 0–1.5)
COHB: 1.1 % (ref 0–1.5)
COHB: 1.4 % (ref 0–1.5)
COLOR FLUID: YELLOW
COLOR: YELLOW
CORONAVIRUS 229E BY PCR: NOT DETECTED
CORONAVIRUS HKU1 BY PCR: NOT DETECTED
CORONAVIRUS NL63 BY PCR: NOT DETECTED
CORONAVIRUS OC43 BY PCR: NOT DETECTED
CORTISOL TOTAL: 11.32 MCG/DL (ref 2.68–18.4)
CREAT SERPL-MCNC: 0.9 MG/DL (ref 0.5–1)
CREAT SERPL-MCNC: 1 MG/DL (ref 0.5–1)
CREAT SERPL-MCNC: 1.1 MG/DL (ref 0.5–1)
CREAT SERPL-MCNC: 1.2 MG/DL (ref 0.5–1)
CREAT SERPL-MCNC: 1.3 MG/DL (ref 0.5–1)
CREAT SERPL-MCNC: 1.4 MG/DL (ref 0.5–1)
CREAT SERPL-MCNC: 1.5 MG/DL (ref 0.5–1)
CREAT SERPL-MCNC: 1.6 MG/DL (ref 0.5–1)
CREAT SERPL-MCNC: 1.8 MG/DL (ref 0.5–1)
CREAT SERPL-MCNC: 1.9 MG/DL (ref 0.5–1)
CREAT SERPL-MCNC: 1.9 MG/DL (ref 0.5–1)
CREAT SERPL-MCNC: 2.1 MG/DL (ref 0.5–1)
CREAT SERPL-MCNC: 2.2 MG/DL (ref 0.5–1)
CREAT SERPL-MCNC: 2.3 MG/DL (ref 0.5–1)
CREAT SERPL-MCNC: 2.3 MG/DL (ref 0.5–1)
CREAT SERPL-MCNC: 2.5 MG/DL (ref 0.5–1)
CREAT SERPL-MCNC: 2.7 MG/DL (ref 0.5–1)
CREAT SERPL-MCNC: 2.9 MG/DL (ref 0.5–1)
CREAT SERPL-MCNC: 3 MG/DL (ref 0.5–1)
CREAT SERPL-MCNC: 3.1 MG/DL (ref 0.5–1)
CREAT SERPL-MCNC: 3.1 MG/DL (ref 0.5–1)
CREAT SERPL-MCNC: 3.2 MG/DL (ref 0.5–1)
CREAT SERPL-MCNC: 3.2 MG/DL (ref 0.5–1)
CREAT SERPL-MCNC: 3.5 MG/DL (ref 0.5–1)
CREAT SERPL-MCNC: 3.6 MG/DL (ref 0.5–1)
CREATININE URINE: 68 MG/DL (ref 29–226)
CREATININE URINE: 68 MG/DL (ref 29–226)
CRITICAL: ABNORMAL
CULTURE, BLOOD 2: NORMAL
CULTURE, RESPIRATORY: ABNORMAL
DATE ANALYZED: ABNORMAL
DATE OF COLLECTION: ABNORMAL
DELIVERY SYSTEMS: ABNORMAL
DELIVERY SYSTEMS: ABNORMAL
DESCRIPTION BLOOD BANK: NORMAL
DEVICE: ABNORMAL
EKG ATRIAL RATE: 116 BPM
EKG ATRIAL RATE: 119 BPM
EKG ATRIAL RATE: 141 BPM
EKG P AXIS: 52 DEGREES
EKG P-R INTERVAL: 130 MS
EKG Q-T INTERVAL: 324 MS
EKG Q-T INTERVAL: 378 MS
EKG Q-T INTERVAL: 408 MS
EKG QRS DURATION: 150 MS
EKG QRS DURATION: 66 MS
EKG QRS DURATION: 70 MS
EKG QTC CALCULATION (BAZETT): 450 MS
EKG QTC CALCULATION (BAZETT): 564 MS
EKG QTC CALCULATION (BAZETT): 584 MS
EKG R AXIS: 43 DEGREES
EKG R AXIS: 45 DEGREES
EKG R AXIS: 73 DEGREES
EKG T AXIS: 32 DEGREES
EKG T AXIS: 64 DEGREES
EKG T AXIS: 67 DEGREES
EKG VENTRICULAR RATE: 116 BPM
EKG VENTRICULAR RATE: 123 BPM
EKG VENTRICULAR RATE: 134 BPM
EOSINOPHILS ABSOLUTE: 0 E9/L (ref 0.05–0.5)
EOSINOPHILS ABSOLUTE: 0.01 E9/L (ref 0.05–0.5)
EOSINOPHILS ABSOLUTE: 0.02 E9/L (ref 0.05–0.5)
EOSINOPHILS ABSOLUTE: 0.05 E9/L (ref 0.05–0.5)
EOSINOPHILS ABSOLUTE: 0.2 E9/L (ref 0.05–0.5)
EOSINOPHILS ABSOLUTE: 0.32 E9/L (ref 0.05–0.5)
EOSINOPHILS ABSOLUTE: 0.32 E9/L (ref 0.05–0.5)
EOSINOPHILS ABSOLUTE: 0.41 E9/L (ref 0.05–0.5)
EOSINOPHILS RELATIVE PERCENT: 0 % (ref 0–6)
EOSINOPHILS RELATIVE PERCENT: 0.1 % (ref 0–6)
EOSINOPHILS RELATIVE PERCENT: 0.2 % (ref 0–6)
EOSINOPHILS RELATIVE PERCENT: 0.3 % (ref 0–6)
EOSINOPHILS RELATIVE PERCENT: 0.5 % (ref 0–6)
EOSINOPHILS RELATIVE PERCENT: 0.7 % (ref 0–6)
EOSINOPHILS RELATIVE PERCENT: 0.8 % (ref 0–6)
EOSINOPHILS RELATIVE PERCENT: 1.7 % (ref 0–6)
EOSINOPHILS RELATIVE PERCENT: 1.8 % (ref 0–6)
EOSINOPHILS RELATIVE PERCENT: 2 % (ref 0–6)
EOSINOPHILS RELATIVE PERCENT: 2.6 % (ref 0–6)
EPITHELIAL CELLS, UA: ABNORMAL /HPF
FIBRINOGEN: 328 MG/DL (ref 200–400)
FIO2: 100 %
FIO2: 40
FIO2: 40 %
FIO2: 50 %
FIO2: 60 %
FIO2: 70 %
FIO2: 85 %
FIO2: 90 %
FLUID TYPE: NORMAL
GFR SERPL CREATININE-BSD FRML MDRD: 12 ML/MIN/1.73
GFR SERPL CREATININE-BSD FRML MDRD: 13 ML/MIN/1.73
GFR SERPL CREATININE-BSD FRML MDRD: 14 ML/MIN/1.73
GFR SERPL CREATININE-BSD FRML MDRD: 14 ML/MIN/1.73
GFR SERPL CREATININE-BSD FRML MDRD: 15 ML/MIN/1.73
GFR SERPL CREATININE-BSD FRML MDRD: 16 ML/MIN/1.73
GFR SERPL CREATININE-BSD FRML MDRD: 17 ML/MIN/1.73
GFR SERPL CREATININE-BSD FRML MDRD: 19 ML/MIN/1.73
GFR SERPL CREATININE-BSD FRML MDRD: 21 ML/MIN/1.73
GFR SERPL CREATININE-BSD FRML MDRD: 21 ML/MIN/1.73
GFR SERPL CREATININE-BSD FRML MDRD: 22 ML/MIN/1.73
GFR SERPL CREATININE-BSD FRML MDRD: 24 ML/MIN/1.73
GFR SERPL CREATININE-BSD FRML MDRD: 27 ML/MIN/1.73
GFR SERPL CREATININE-BSD FRML MDRD: 27 ML/MIN/1.73
GFR SERPL CREATININE-BSD FRML MDRD: 28 ML/MIN/1.73
GFR SERPL CREATININE-BSD FRML MDRD: 33 ML/MIN/1.73
GFR SERPL CREATININE-BSD FRML MDRD: 35 ML/MIN/1.73
GFR SERPL CREATININE-BSD FRML MDRD: 38 ML/MIN/1.73
GFR SERPL CREATININE-BSD FRML MDRD: 42 ML/MIN/1.73
GFR SERPL CREATININE-BSD FRML MDRD: 46 ML/MIN/1.73
GFR SERPL CREATININE-BSD FRML MDRD: 51 ML/MIN/1.73
GFR SERPL CREATININE-BSD FRML MDRD: 58 ML/MIN/1.73
GFR SERPL CREATININE-BSD FRML MDRD: >60 ML/MIN/1.73
GFR, ESTIMATED: 15 ML/MIN/1.73
GFR, ESTIMATED: 15 ML/MIN/1.73
GLUCOSE BLD-MCNC: 102 MG/DL (ref 74–99)
GLUCOSE BLD-MCNC: 106 MG/DL (ref 74–99)
GLUCOSE BLD-MCNC: 116 MG/DL (ref 74–99)
GLUCOSE BLD-MCNC: 116 MG/DL (ref 74–99)
GLUCOSE BLD-MCNC: 133 MG/DL (ref 74–99)
GLUCOSE BLD-MCNC: 139 MG/DL (ref 74–99)
GLUCOSE BLD-MCNC: 141 MG/DL (ref 74–99)
GLUCOSE BLD-MCNC: 155 MG/DL (ref 74–99)
GLUCOSE BLD-MCNC: 158 MG/DL (ref 74–99)
GLUCOSE BLD-MCNC: 159 MG/DL (ref 74–99)
GLUCOSE BLD-MCNC: 168 MG/DL (ref 74–99)
GLUCOSE BLD-MCNC: 169 MG/DL (ref 74–99)
GLUCOSE BLD-MCNC: 174 MG/DL (ref 74–99)
GLUCOSE BLD-MCNC: 176 MG/DL (ref 74–99)
GLUCOSE BLD-MCNC: 182 MG/DL (ref 74–99)
GLUCOSE BLD-MCNC: 188 MG/DL (ref 74–99)
GLUCOSE BLD-MCNC: 188 MG/DL (ref 74–99)
GLUCOSE BLD-MCNC: 189 MG/DL (ref 74–99)
GLUCOSE BLD-MCNC: 189 MG/DL (ref 74–99)
GLUCOSE BLD-MCNC: 194 MG/DL (ref 74–99)
GLUCOSE BLD-MCNC: 195 MG/DL (ref 74–99)
GLUCOSE BLD-MCNC: 196 MG/DL (ref 74–99)
GLUCOSE BLD-MCNC: 199 MG/DL (ref 74–99)
GLUCOSE BLD-MCNC: 201 MG/DL (ref 74–99)
GLUCOSE BLD-MCNC: 203 MG/DL (ref 74–99)
GLUCOSE BLD-MCNC: 206 MG/DL (ref 74–99)
GLUCOSE BLD-MCNC: 206 MG/DL (ref 74–99)
GLUCOSE BLD-MCNC: 209 MG/DL (ref 74–99)
GLUCOSE BLD-MCNC: 209 MG/DL (ref 74–99)
GLUCOSE BLD-MCNC: 210 MG/DL (ref 74–99)
GLUCOSE BLD-MCNC: 212 MG/DL (ref 74–99)
GLUCOSE BLD-MCNC: 215 MG/DL (ref 74–99)
GLUCOSE BLD-MCNC: 216 MG/DL (ref 74–99)
GLUCOSE BLD-MCNC: 218 MG/DL (ref 74–99)
GLUCOSE BLD-MCNC: 220 MG/DL (ref 74–99)
GLUCOSE BLD-MCNC: 221 MG/DL (ref 74–99)
GLUCOSE BLD-MCNC: 221 MG/DL (ref 74–99)
GLUCOSE BLD-MCNC: 224 MG/DL (ref 74–99)
GLUCOSE BLD-MCNC: 226 MG/DL (ref 74–99)
GLUCOSE BLD-MCNC: 227 MG/DL (ref 74–99)
GLUCOSE BLD-MCNC: 232 MG/DL (ref 74–99)
GLUCOSE BLD-MCNC: 234 MG/DL (ref 74–99)
GLUCOSE BLD-MCNC: 234 MG/DL (ref 74–99)
GLUCOSE BLD-MCNC: 236 MG/DL (ref 74–99)
GLUCOSE BLD-MCNC: 237 MG/DL (ref 74–99)
GLUCOSE BLD-MCNC: 237 MG/DL (ref 74–99)
GLUCOSE BLD-MCNC: 238 MG/DL (ref 74–99)
GLUCOSE BLD-MCNC: 239 MG/DL (ref 74–99)
GLUCOSE BLD-MCNC: 241 MG/DL (ref 74–99)
GLUCOSE BLD-MCNC: 243 MG/DL (ref 74–99)
GLUCOSE BLD-MCNC: 250 MG/DL (ref 74–99)
GLUCOSE BLD-MCNC: 258 MG/DL (ref 74–99)
GLUCOSE BLD-MCNC: 264 MG/DL (ref 74–99)
GLUCOSE BLD-MCNC: 266 MG/DL (ref 74–99)
GLUCOSE BLD-MCNC: 268 MG/DL (ref 74–99)
GLUCOSE BLD-MCNC: 299 MG/DL (ref 74–99)
GLUCOSE URINE: NEGATIVE MG/DL
GLUCOSE, FLUID: 129 MG/DL
GRAM STAIN ORDERABLE: NORMAL
GRAM STAIN ORDERABLE: NORMAL
GRAM STAIN RESULT: ABNORMAL
GRAM STAIN RESULT: NORMAL
HAPTOGLOBIN: 240 MG/DL (ref 30–200)
HCO3: 18.8 MMOL/L (ref 22–26)
HCO3: 19 MMOL/L (ref 22–26)
HCO3: 19.3 MMOL/L (ref 22–26)
HCO3: 19.6 MMOL/L (ref 22–26)
HCO3: 19.7 MMOL/L (ref 22–26)
HCO3: 19.9 MMOL/L (ref 22–26)
HCO3: 20 MMOL/L (ref 22–26)
HCO3: 20.4 MMOL/L (ref 22–26)
HCO3: 20.6 MMOL/L (ref 22–26)
HCO3: 21.2 MMOL/L (ref 22–26)
HCO3: 21.4 MMOL/L (ref 22–26)
HCO3: 21.5 MMOL/L (ref 22–26)
HCO3: 21.7 MMOL/L (ref 22–26)
HCO3: 21.8 MMOL/L (ref 22–26)
HCO3: 21.9 MMOL/L (ref 22–26)
HCO3: 22 MMOL/L (ref 22–26)
HCO3: 22.1 MMOL/L (ref 22–26)
HCO3: 22.3 MMOL/L (ref 22–26)
HCO3: 23 MMOL/L (ref 22–26)
HCO3: 23.3 MMOL/L (ref 22–26)
HCO3: 24.1 MMOL/L (ref 22–26)
HCO3: 24.3 MMOL/L (ref 22–26)
HCO3: 25.4 MMOL/L (ref 22–26)
HCO3: 26.1 MMOL/L (ref 22–26)
HCO3: 26.3 MMOL/L (ref 22–26)
HCO3: 27.7 MMOL/L (ref 22–26)
HCT VFR BLD CALC: 18.7 % (ref 34–48)
HCT VFR BLD CALC: 20.8 % (ref 34–48)
HCT VFR BLD CALC: 21 % (ref 34–48)
HCT VFR BLD CALC: 21.4 % (ref 34–48)
HCT VFR BLD CALC: 21.5 % (ref 34–48)
HCT VFR BLD CALC: 22.5 % (ref 34–48)
HCT VFR BLD CALC: 23.7 % (ref 34–48)
HCT VFR BLD CALC: 24.1 % (ref 34–48)
HCT VFR BLD CALC: 24.4 % (ref 34–48)
HCT VFR BLD CALC: 24.9 % (ref 34–48)
HCT VFR BLD CALC: 25.2 % (ref 34–48)
HCT VFR BLD CALC: 25.2 % (ref 34–48)
HCT VFR BLD CALC: 25.3 % (ref 34–48)
HCT VFR BLD CALC: 25.4 % (ref 34–48)
HCT VFR BLD CALC: 25.8 % (ref 34–48)
HCT VFR BLD CALC: 27.1 % (ref 34–48)
HCT VFR BLD CALC: 27.9 % (ref 34–48)
HCT VFR BLD CALC: 28 % (ref 34–48)
HCT VFR BLD CALC: 28.8 % (ref 34–48)
HCT VFR BLD CALC: 29.3 % (ref 34–48)
HCT VFR BLD CALC: 29.3 % (ref 34–48)
HCT VFR BLD CALC: 29.6 % (ref 34–48)
HCT VFR BLD CALC: 29.7 % (ref 34–48)
HCT VFR BLD CALC: 29.8 % (ref 34–48)
HCT VFR BLD CALC: 30.5 % (ref 34–48)
HCT VFR BLD CALC: 30.7 % (ref 34–48)
HCT VFR BLD CALC: 30.7 % (ref 34–48)
HCT VFR BLD CALC: 30.8 % (ref 34–48)
HCT VFR BLD CALC: 30.8 % (ref 34–48)
HCT VFR BLD CALC: 31 % (ref 34–48)
HCT VFR BLD CALC: 31.5 % (ref 34–48)
HCT VFR BLD CALC: 34.1 % (ref 34–48)
HDLC SERPL-MCNC: 12 MG/DL
HEMATOCRIT: 29 % (ref 34–48)
HEMATOCRIT: 30 % (ref 34–48)
HEMOGLOBIN: 10 G/DL (ref 11.5–15.5)
HEMOGLOBIN: 10.1 G/DL (ref 11.5–15.5)
HEMOGLOBIN: 10.4 G/DL (ref 11.5–15.5)
HEMOGLOBIN: 10.6 G/DL (ref 11.5–15.5)
HEMOGLOBIN: 10.9 G/DL (ref 11.5–15.5)
HEMOGLOBIN: 6.1 G/DL (ref 11.5–15.5)
HEMOGLOBIN: 7 G/DL (ref 11.5–15.5)
HEMOGLOBIN: 7 G/DL (ref 11.5–15.5)
HEMOGLOBIN: 7.1 G/DL (ref 11.5–15.5)
HEMOGLOBIN: 7.3 G/DL (ref 11.5–15.5)
HEMOGLOBIN: 7.4 G/DL (ref 11.5–15.5)
HEMOGLOBIN: 7.8 G/DL (ref 11.5–15.5)
HEMOGLOBIN: 8 G/DL (ref 11.5–15.5)
HEMOGLOBIN: 8.2 G/DL (ref 11.5–15.5)
HEMOGLOBIN: 8.3 G/DL (ref 11.5–15.5)
HEMOGLOBIN: 8.3 G/DL (ref 11.5–15.5)
HEMOGLOBIN: 8.5 G/DL (ref 11.5–15.5)
HEMOGLOBIN: 8.6 G/DL (ref 11.5–15.5)
HEMOGLOBIN: 8.7 G/DL (ref 11.5–15.5)
HEMOGLOBIN: 8.9 G/DL (ref 11.5–15.5)
HEMOGLOBIN: 9.3 G/DL (ref 11.5–15.5)
HEMOGLOBIN: 9.4 G/DL (ref 11.5–15.5)
HEMOGLOBIN: 9.4 G/DL (ref 11.5–15.5)
HEMOGLOBIN: 9.6 G/DL (ref 11.5–15.5)
HEMOGLOBIN: 9.8 G/DL (ref 11.5–15.5)
HEMOGLOBIN: 9.9 G/DL (ref 11.5–15.5)
HHB: 0.9 % (ref 0–5)
HHB: 1.2 % (ref 0–5)
HHB: 1.2 % (ref 0–5)
HHB: 1.3 % (ref 0–5)
HHB: 1.4 % (ref 0–5)
HHB: 1.6 % (ref 0–5)
HHB: 1.8 % (ref 0–5)
HHB: 1.8 % (ref 0–5)
HHB: 2 % (ref 0–5)
HHB: 2.1 % (ref 0–5)
HHB: 2.4 % (ref 0–5)
HHB: 3.7 % (ref 0–5)
HHB: 3.9 % (ref 0–5)
HHB: 4.5 % (ref 0–5)
HHB: 4.7 % (ref 0–5)
HHB: 4.9 % (ref 0–5)
HHB: 5.2 % (ref 0–5)
HHB: 5.9 % (ref 0–5)
HHB: 6.6 % (ref 0–5)
HHB: 7.2 % (ref 0–5)
HHB: 7.4 % (ref 0–5)
HHB: 8.3 % (ref 0–5)
HUMAN METAPNEUMOVIRUS BY PCR: NOT DETECTED
HUMAN RHINOVIRUS/ENTEROVIRUS BY PCR: NOT DETECTED
HYPOCHROMIA: ABNORMAL
IMMATURE GRANULOCYTES #: 0.02 E9/L
IMMATURE GRANULOCYTES #: 0.05 E9/L
IMMATURE GRANULOCYTES #: 0.1 E9/L
IMMATURE GRANULOCYTES #: 0.14 E9/L
IMMATURE GRANULOCYTES #: 0.19 E9/L
IMMATURE GRANULOCYTES #: 0.19 E9/L
IMMATURE GRANULOCYTES #: 0.37 E9/L
IMMATURE GRANULOCYTES #: 0.39 E9/L
IMMATURE GRANULOCYTES #: 0.45 E9/L
IMMATURE GRANULOCYTES #: 0.96 E9/L
IMMATURE GRANULOCYTES %: 0.3 % (ref 0–5)
IMMATURE GRANULOCYTES %: 0.5 % (ref 0–5)
IMMATURE GRANULOCYTES %: 0.7 % (ref 0–5)
IMMATURE GRANULOCYTES %: 0.9 % (ref 0–5)
IMMATURE GRANULOCYTES %: 1.2 % (ref 0–5)
IMMATURE GRANULOCYTES %: 1.6 % (ref 0–5)
IMMATURE GRANULOCYTES %: 2.3 % (ref 0–5)
IMMATURE GRANULOCYTES %: 2.5 % (ref 0–5)
IMMATURE GRANULOCYTES %: 2.6 % (ref 0–5)
IMMATURE GRANULOCYTES %: 3.8 % (ref 0–5)
INFLUENZA A BY PCR: NOT DETECTED
INFLUENZA A: NOT DETECTED
INFLUENZA B BY PCR: NOT DETECTED
INFLUENZA B: NOT DETECTED
INR BLD: 1
INR BLD: 1
INR BLD: 1.2
INR BLD: 1.3
INR BLD: 1.3
KETONES, URINE: 15 MG/DL
L. PNEUMOPHILA SEROGP 1 UR AG: NORMAL
LAB: ABNORMAL
LACTATE DEHYDROGENASE: 249 U/L (ref 135–214)
LACTIC ACID: 1.1 MMOL/L (ref 0.5–2.2)
LACTIC ACID: 1.1 MMOL/L (ref 0.5–2.2)
LACTIC ACID: 1.5 MMOL/L (ref 0.5–2.2)
LACTIC ACID: 1.6 MMOL/L (ref 0.5–2.2)
LACTIC ACID: 1.6 MMOL/L (ref 0.5–2.2)
LACTIC ACID: 1.7 MMOL/L (ref 0.5–2.2)
LACTIC ACID: 1.8 MMOL/L (ref 0.5–2.2)
LACTIC ACID: 1.8 MMOL/L (ref 0.5–2.2)
LACTIC ACID: 1.9 MMOL/L (ref 0.5–2.2)
LACTIC ACID: 2.1 MMOL/L (ref 0.5–2.2)
LACTIC ACID: 2.3 MMOL/L (ref 0.5–2.2)
LACTIC ACID: 2.4 MMOL/L (ref 0.5–2.2)
LACTIC ACID: 2.7 MMOL/L (ref 0.5–2.2)
LACTIC ACID: 2.7 MMOL/L (ref 0.5–2.2)
LD, FLUID: 404 U/L
LDL CHOLESTEROL CALCULATED: 6 MG/DL (ref 0–99)
LEUKOCYTE ESTERASE, URINE: NEGATIVE
LYMPHOCYTES ABSOLUTE: 0 E9/L (ref 1.5–4)
LYMPHOCYTES ABSOLUTE: 0.15 E9/L (ref 1.5–4)
LYMPHOCYTES ABSOLUTE: 0.18 E9/L (ref 1.5–4)
LYMPHOCYTES ABSOLUTE: 0.31 E9/L (ref 1.5–4)
LYMPHOCYTES ABSOLUTE: 0.45 E9/L (ref 1.5–4)
LYMPHOCYTES ABSOLUTE: 0.48 E9/L (ref 1.5–4)
LYMPHOCYTES ABSOLUTE: 0.51 E9/L (ref 1.5–4)
LYMPHOCYTES ABSOLUTE: 0.54 E9/L (ref 1.5–4)
LYMPHOCYTES ABSOLUTE: 0.54 E9/L (ref 1.5–4)
LYMPHOCYTES ABSOLUTE: 0.64 E9/L (ref 1.5–4)
LYMPHOCYTES ABSOLUTE: 0.69 E9/L (ref 1.5–4)
LYMPHOCYTES ABSOLUTE: 0.71 E9/L (ref 1.5–4)
LYMPHOCYTES ABSOLUTE: 0.72 E9/L (ref 1.5–4)
LYMPHOCYTES ABSOLUTE: 0.8 E9/L (ref 1.5–4)
LYMPHOCYTES ABSOLUTE: 0.82 E9/L (ref 1.5–4)
LYMPHOCYTES ABSOLUTE: 0.84 E9/L (ref 1.5–4)
LYMPHOCYTES ABSOLUTE: 0.94 E9/L (ref 1.5–4)
LYMPHOCYTES ABSOLUTE: 1.03 E9/L (ref 1.5–4)
LYMPHOCYTES ABSOLUTE: 1.71 E9/L (ref 1.5–4)
LYMPHOCYTES RELATIVE PERCENT: 0.9 % (ref 20–42)
LYMPHOCYTES RELATIVE PERCENT: 0.9 % (ref 20–42)
LYMPHOCYTES RELATIVE PERCENT: 2 % (ref 20–42)
LYMPHOCYTES RELATIVE PERCENT: 2.6 % (ref 20–42)
LYMPHOCYTES RELATIVE PERCENT: 2.7 % (ref 20–42)
LYMPHOCYTES RELATIVE PERCENT: 2.8 % (ref 20–42)
LYMPHOCYTES RELATIVE PERCENT: 3.3 % (ref 20–42)
LYMPHOCYTES RELATIVE PERCENT: 3.5 % (ref 20–42)
LYMPHOCYTES RELATIVE PERCENT: 3.5 % (ref 20–42)
LYMPHOCYTES RELATIVE PERCENT: 4.7 % (ref 20–42)
LYMPHOCYTES RELATIVE PERCENT: 4.9 % (ref 20–42)
LYMPHOCYTES RELATIVE PERCENT: 5.2 % (ref 20–42)
LYMPHOCYTES RELATIVE PERCENT: 5.2 % (ref 20–42)
LYMPHOCYTES RELATIVE PERCENT: 5.8 % (ref 20–42)
LYMPHOCYTES RELATIVE PERCENT: 6.7 % (ref 20–42)
LYMPHOCYTES RELATIVE PERCENT: 6.9 % (ref 20–42)
LYMPHOCYTES RELATIVE PERCENT: 7.8 % (ref 20–42)
LYMPHOCYTES RELATIVE PERCENT: 8.6 % (ref 20–42)
LYMPHOCYTES RELATIVE PERCENT: 8.7 % (ref 20–42)
Lab: ABNORMAL
MAGNESIUM: 1.8 MG/DL (ref 1.6–2.6)
MAGNESIUM: 1.9 MG/DL (ref 1.6–2.6)
MAGNESIUM: 2 MG/DL (ref 1.6–2.6)
MAGNESIUM: 2.1 MG/DL (ref 1.6–2.6)
MAGNESIUM: 2.2 MG/DL (ref 1.6–2.6)
MAGNESIUM: 2.3 MG/DL (ref 1.6–2.6)
MAGNESIUM: 2.4 MG/DL (ref 1.6–2.6)
MCH RBC QN AUTO: 28.7 PG (ref 26–35)
MCH RBC QN AUTO: 28.8 PG (ref 26–35)
MCH RBC QN AUTO: 29.2 PG (ref 26–35)
MCH RBC QN AUTO: 29.4 PG (ref 26–35)
MCH RBC QN AUTO: 29.5 PG (ref 26–35)
MCH RBC QN AUTO: 29.6 PG (ref 26–35)
MCH RBC QN AUTO: 29.6 PG (ref 26–35)
MCH RBC QN AUTO: 29.7 PG (ref 26–35)
MCH RBC QN AUTO: 29.9 PG (ref 26–35)
MCH RBC QN AUTO: 30 PG (ref 26–35)
MCH RBC QN AUTO: 30 PG (ref 26–35)
MCH RBC QN AUTO: 30.1 PG (ref 26–35)
MCH RBC QN AUTO: 30.5 PG (ref 26–35)
MCH RBC QN AUTO: 30.7 PG (ref 26–35)
MCHC RBC AUTO-ENTMCNC: 32 % (ref 32–34.5)
MCHC RBC AUTO-ENTMCNC: 32.3 % (ref 32–34.5)
MCHC RBC AUTO-ENTMCNC: 32.3 % (ref 32–34.5)
MCHC RBC AUTO-ENTMCNC: 32.4 % (ref 32–34.5)
MCHC RBC AUTO-ENTMCNC: 32.7 % (ref 32–34.5)
MCHC RBC AUTO-ENTMCNC: 32.9 % (ref 32–34.5)
MCHC RBC AUTO-ENTMCNC: 32.9 % (ref 32–34.5)
MCHC RBC AUTO-ENTMCNC: 33.3 % (ref 32–34.5)
MCHC RBC AUTO-ENTMCNC: 33.3 % (ref 32–34.5)
MCHC RBC AUTO-ENTMCNC: 33.6 % (ref 32–34.5)
MCHC RBC AUTO-ENTMCNC: 33.7 % (ref 32–34.5)
MCHC RBC AUTO-ENTMCNC: 34.3 % (ref 32–34.5)
MCHC RBC AUTO-ENTMCNC: 34.4 % (ref 32–34.5)
MCHC RBC AUTO-ENTMCNC: 35.1 % (ref 32–34.5)
MCHC RBC AUTO-ENTMCNC: 35.3 % (ref 32–34.5)
MCV RBC AUTO: 84.8 FL (ref 80–99.9)
MCV RBC AUTO: 85.2 FL (ref 80–99.9)
MCV RBC AUTO: 86 FL (ref 80–99.9)
MCV RBC AUTO: 87.2 FL (ref 80–99.9)
MCV RBC AUTO: 87.7 FL (ref 80–99.9)
MCV RBC AUTO: 87.7 FL (ref 80–99.9)
MCV RBC AUTO: 87.8 FL (ref 80–99.9)
MCV RBC AUTO: 87.9 FL (ref 80–99.9)
MCV RBC AUTO: 88.1 FL (ref 80–99.9)
MCV RBC AUTO: 88.2 FL (ref 80–99.9)
MCV RBC AUTO: 88.3 FL (ref 80–99.9)
MCV RBC AUTO: 89 FL (ref 80–99.9)
MCV RBC AUTO: 89.1 FL (ref 80–99.9)
MCV RBC AUTO: 89.2 FL (ref 80–99.9)
MCV RBC AUTO: 89.5 FL (ref 80–99.9)
MCV RBC AUTO: 91.1 FL (ref 80–99.9)
MCV RBC AUTO: 91.4 FL (ref 80–99.9)
MCV RBC AUTO: 91.5 FL (ref 80–99.9)
MCV RBC AUTO: 92.6 FL (ref 80–99.9)
MCV RBC AUTO: 92.9 FL (ref 80–99.9)
METER GLUCOSE: 100 MG/DL (ref 74–99)
METER GLUCOSE: 110 MG/DL (ref 74–99)
METER GLUCOSE: 123 MG/DL (ref 74–99)
METER GLUCOSE: 125 MG/DL (ref 74–99)
METER GLUCOSE: 130 MG/DL (ref 74–99)
METER GLUCOSE: 137 MG/DL (ref 74–99)
METER GLUCOSE: 138 MG/DL (ref 74–99)
METER GLUCOSE: 145 MG/DL (ref 74–99)
METER GLUCOSE: 146 MG/DL (ref 74–99)
METER GLUCOSE: 146 MG/DL (ref 74–99)
METER GLUCOSE: 149 MG/DL (ref 74–99)
METER GLUCOSE: 156 MG/DL (ref 74–99)
METER GLUCOSE: 159 MG/DL (ref 74–99)
METER GLUCOSE: 161 MG/DL (ref 74–99)
METER GLUCOSE: 163 MG/DL (ref 74–99)
METER GLUCOSE: 166 MG/DL (ref 74–99)
METER GLUCOSE: 168 MG/DL (ref 74–99)
METER GLUCOSE: 170 MG/DL (ref 74–99)
METER GLUCOSE: 173 MG/DL (ref 74–99)
METER GLUCOSE: 177 MG/DL (ref 74–99)
METER GLUCOSE: 179 MG/DL (ref 74–99)
METER GLUCOSE: 180 MG/DL (ref 74–99)
METER GLUCOSE: 180 MG/DL (ref 74–99)
METER GLUCOSE: 181 MG/DL (ref 74–99)
METER GLUCOSE: 182 MG/DL (ref 74–99)
METER GLUCOSE: 183 MG/DL (ref 74–99)
METER GLUCOSE: 184 MG/DL (ref 74–99)
METER GLUCOSE: 185 MG/DL (ref 74–99)
METER GLUCOSE: 187 MG/DL (ref 74–99)
METER GLUCOSE: 187 MG/DL (ref 74–99)
METER GLUCOSE: 188 MG/DL (ref 74–99)
METER GLUCOSE: 189 MG/DL (ref 74–99)
METER GLUCOSE: 193 MG/DL (ref 74–99)
METER GLUCOSE: 195 MG/DL (ref 74–99)
METER GLUCOSE: 197 MG/DL (ref 74–99)
METER GLUCOSE: 197 MG/DL (ref 74–99)
METER GLUCOSE: 198 MG/DL (ref 74–99)
METER GLUCOSE: 200 MG/DL (ref 74–99)
METER GLUCOSE: 200 MG/DL (ref 74–99)
METER GLUCOSE: 203 MG/DL (ref 74–99)
METER GLUCOSE: 204 MG/DL (ref 74–99)
METER GLUCOSE: 206 MG/DL (ref 74–99)
METER GLUCOSE: 207 MG/DL (ref 74–99)
METER GLUCOSE: 207 MG/DL (ref 74–99)
METER GLUCOSE: 211 MG/DL (ref 74–99)
METER GLUCOSE: 216 MG/DL (ref 74–99)
METER GLUCOSE: 221 MG/DL (ref 74–99)
METER GLUCOSE: 226 MG/DL (ref 74–99)
METER GLUCOSE: 228 MG/DL (ref 74–99)
METER GLUCOSE: 228 MG/DL (ref 74–99)
METER GLUCOSE: 230 MG/DL (ref 74–99)
METER GLUCOSE: 232 MG/DL (ref 74–99)
METER GLUCOSE: 233 MG/DL (ref 74–99)
METER GLUCOSE: 233 MG/DL (ref 74–99)
METER GLUCOSE: 234 MG/DL (ref 74–99)
METER GLUCOSE: 238 MG/DL (ref 74–99)
METER GLUCOSE: 239 MG/DL (ref 74–99)
METER GLUCOSE: 242 MG/DL (ref 74–99)
METER GLUCOSE: 244 MG/DL (ref 74–99)
METER GLUCOSE: 244 MG/DL (ref 74–99)
METER GLUCOSE: 245 MG/DL (ref 74–99)
METER GLUCOSE: 248 MG/DL (ref 74–99)
METER GLUCOSE: 254 MG/DL (ref 74–99)
METER GLUCOSE: 258 MG/DL (ref 74–99)
METER GLUCOSE: 263 MG/DL (ref 74–99)
METER GLUCOSE: 264 MG/DL (ref 74–99)
METER GLUCOSE: 93 MG/DL (ref 74–99)
METHB: 0.1 % (ref 0–1.5)
METHB: 0.1 % (ref 0–1.5)
METHB: 0.2 % (ref 0–1.5)
METHB: 0.2 % (ref 0–1.5)
METHB: 0.3 % (ref 0–1.5)
METHB: 0.4 % (ref 0–1.5)
METHB: 0.5 % (ref 0–1.5)
METHB: 0.7 % (ref 0–1.5)
MICROALBUMIN UR-MCNC: 127.3 MG/L
MICROALBUMIN/CREAT UR-RTO: 187.2 (ref 0–30)
MODE: ABNORMAL
MODE: AC
MONOCYTE, FLUID: 56 %
MONOCYTES ABSOLUTE: 0.14 E9/L (ref 0.1–0.95)
MONOCYTES ABSOLUTE: 0.3 E9/L (ref 0.1–0.95)
MONOCYTES ABSOLUTE: 0.36 E9/L (ref 0.1–0.95)
MONOCYTES ABSOLUTE: 0.36 E9/L (ref 0.1–0.95)
MONOCYTES ABSOLUTE: 0.43 E9/L (ref 0.1–0.95)
MONOCYTES ABSOLUTE: 0.48 E9/L (ref 0.1–0.95)
MONOCYTES ABSOLUTE: 0.6 E9/L (ref 0.1–0.95)
MONOCYTES ABSOLUTE: 0.63 E9/L (ref 0.1–0.95)
MONOCYTES ABSOLUTE: 0.65 E9/L (ref 0.1–0.95)
MONOCYTES ABSOLUTE: 0.68 E9/L (ref 0.1–0.95)
MONOCYTES ABSOLUTE: 0.72 E9/L (ref 0.1–0.95)
MONOCYTES ABSOLUTE: 0.72 E9/L (ref 0.1–0.95)
MONOCYTES ABSOLUTE: 0.77 E9/L (ref 0.1–0.95)
MONOCYTES ABSOLUTE: 0.8 E9/L (ref 0.1–0.95)
MONOCYTES ABSOLUTE: 0.85 E9/L (ref 0.1–0.95)
MONOCYTES ABSOLUTE: 0.87 E9/L (ref 0.1–0.95)
MONOCYTES ABSOLUTE: 1.04 E9/L (ref 0.1–0.95)
MONOCYTES ABSOLUTE: 1.16 E9/L (ref 0.1–0.95)
MONOCYTES ABSOLUTE: 1.49 E9/L (ref 0.1–0.95)
MONOCYTES RELATIVE PERCENT: 0.9 % (ref 2–12)
MONOCYTES RELATIVE PERCENT: 1.7 % (ref 2–12)
MONOCYTES RELATIVE PERCENT: 14.8 % (ref 2–12)
MONOCYTES RELATIVE PERCENT: 2.2 % (ref 2–12)
MONOCYTES RELATIVE PERCENT: 2.6 % (ref 2–12)
MONOCYTES RELATIVE PERCENT: 2.6 % (ref 2–12)
MONOCYTES RELATIVE PERCENT: 21.4 % (ref 2–12)
MONOCYTES RELATIVE PERCENT: 3 % (ref 2–12)
MONOCYTES RELATIVE PERCENT: 3.5 % (ref 2–12)
MONOCYTES RELATIVE PERCENT: 4 % (ref 2–12)
MONOCYTES RELATIVE PERCENT: 4.4 % (ref 2–12)
MONOCYTES RELATIVE PERCENT: 4.5 % (ref 2–12)
MONOCYTES RELATIVE PERCENT: 4.5 % (ref 2–12)
MONOCYTES RELATIVE PERCENT: 5.5 % (ref 2–12)
MONOCYTES RELATIVE PERCENT: 6.6 % (ref 2–12)
MONOCYTES RELATIVE PERCENT: 7 % (ref 2–12)
MONOCYTES RELATIVE PERCENT: 9.1 % (ref 2–12)
MRSA CULTURE ONLY: NORMAL
MYCOPLASMA PNEUMONIAE BY PCR: NOT DETECTED
MYELOCYTE PERCENT: 0.9 % (ref 0–0)
MYELOCYTE PERCENT: 0.9 % (ref 0–0)
NEUTROPHIL, FLUID: 44 %
NEUTROPHILS ABSOLUTE: 10.04 E9/L (ref 1.8–7.3)
NEUTROPHILS ABSOLUTE: 12.88 E9/L (ref 1.8–7.3)
NEUTROPHILS ABSOLUTE: 12.9 E9/L (ref 1.8–7.3)
NEUTROPHILS ABSOLUTE: 13.35 E9/L (ref 1.8–7.3)
NEUTROPHILS ABSOLUTE: 13.91 E9/L (ref 1.8–7.3)
NEUTROPHILS ABSOLUTE: 14.09 E9/L (ref 1.8–7.3)
NEUTROPHILS ABSOLUTE: 14.3 E9/L (ref 1.8–7.3)
NEUTROPHILS ABSOLUTE: 14.47 E9/L (ref 1.8–7.3)
NEUTROPHILS ABSOLUTE: 14.55 E9/L (ref 1.8–7.3)
NEUTROPHILS ABSOLUTE: 16.07 E9/L (ref 1.8–7.3)
NEUTROPHILS ABSOLUTE: 16.83 E9/L (ref 1.8–7.3)
NEUTROPHILS ABSOLUTE: 17.36 E9/L (ref 1.8–7.3)
NEUTROPHILS ABSOLUTE: 17.65 E9/L (ref 1.8–7.3)
NEUTROPHILS ABSOLUTE: 18 E9/L (ref 1.8–7.3)
NEUTROPHILS ABSOLUTE: 18.51 E9/L (ref 1.8–7.3)
NEUTROPHILS ABSOLUTE: 21.86 E9/L (ref 1.8–7.3)
NEUTROPHILS ABSOLUTE: 4.85 E9/L (ref 1.8–7.3)
NEUTROPHILS ABSOLUTE: 5.93 E9/L (ref 1.8–7.3)
NEUTROPHILS ABSOLUTE: 7.78 E9/L (ref 1.8–7.3)
NEUTROPHILS RELATIVE PERCENT: 69.8 % (ref 43–80)
NEUTROPHILS RELATIVE PERCENT: 76.5 % (ref 43–80)
NEUTROPHILS RELATIVE PERCENT: 81.1 % (ref 43–80)
NEUTROPHILS RELATIVE PERCENT: 83 % (ref 43–80)
NEUTROPHILS RELATIVE PERCENT: 86.1 % (ref 43–80)
NEUTROPHILS RELATIVE PERCENT: 86.3 % (ref 43–80)
NEUTROPHILS RELATIVE PERCENT: 86.9 % (ref 43–80)
NEUTROPHILS RELATIVE PERCENT: 89.4 % (ref 43–80)
NEUTROPHILS RELATIVE PERCENT: 90 % (ref 43–80)
NEUTROPHILS RELATIVE PERCENT: 90.3 % (ref 43–80)
NEUTROPHILS RELATIVE PERCENT: 91.3 % (ref 43–80)
NEUTROPHILS RELATIVE PERCENT: 91.5 % (ref 43–80)
NEUTROPHILS RELATIVE PERCENT: 92 % (ref 43–80)
NEUTROPHILS RELATIVE PERCENT: 93 % (ref 43–80)
NEUTROPHILS RELATIVE PERCENT: 93.9 % (ref 43–80)
NEUTROPHILS RELATIVE PERCENT: 93.9 % (ref 43–80)
NEUTROPHILS RELATIVE PERCENT: 96.5 % (ref 43–80)
NEUTROPHILS RELATIVE PERCENT: 96.5 % (ref 43–80)
NEUTROPHILS RELATIVE PERCENT: 97.4 % (ref 43–80)
NITRITE, URINE: NEGATIVE
NUCLEATED CELLS FLUID: 1158 /UL
NUCLEATED RED BLOOD CELLS: 0.9 /100 WBC
NUCLEATED RED BLOOD CELLS: 0.9 /100 WBC
O2 CONTENT: 15.3 ML/DL
O2 SATURATION: 84.8 % (ref 92–98.5)
O2 SATURATION: 91.6 % (ref 92–98.5)
O2 SATURATION: 92.2 % (ref 92–98.5)
O2 SATURATION: 92.5 % (ref 92–98.5)
O2 SATURATION: 92.7 % (ref 92–98.5)
O2 SATURATION: 93.4 % (ref 92–98.5)
O2 SATURATION: 94 % (ref 92–98.5)
O2 SATURATION: 94.7 % (ref 92–98.5)
O2 SATURATION: 95.1 % (ref 92–98.5)
O2 SATURATION: 95.3 % (ref 92–98.5)
O2 SATURATION: 95.5 % (ref 92–98.5)
O2 SATURATION: 96.1 % (ref 92–98.5)
O2 SATURATION: 96.3 % (ref 92–98.5)
O2 SATURATION: 97.6 % (ref 92–98.5)
O2 SATURATION: 97.9 % (ref 92–98.5)
O2 SATURATION: 98 % (ref 92–98.5)
O2 SATURATION: 98.2 % (ref 92–98.5)
O2 SATURATION: 98.2 % (ref 92–98.5)
O2 SATURATION: 98.4 % (ref 92–98.5)
O2 SATURATION: 98.6 % (ref 92–98.5)
O2 SATURATION: 98.6 % (ref 92–98.5)
O2 SATURATION: 98.7 % (ref 92–98.5)
O2 SATURATION: 98.7 % (ref 92–98.5)
O2 SATURATION: 98.8 % (ref 92–98.5)
O2 SATURATION: 98.8 % (ref 92–98.5)
O2 SATURATION: 99.1 % (ref 92–98.5)
O2HB: 91.1 % (ref 94–97)
O2HB: 91.6 % (ref 94–97)
O2HB: 92 % (ref 94–97)
O2HB: 92.8 % (ref 94–97)
O2HB: 93.2 % (ref 94–97)
O2HB: 93.7 % (ref 94–97)
O2HB: 94.5 % (ref 94–97)
O2HB: 94.5 % (ref 94–97)
O2HB: 94.9 % (ref 94–97)
O2HB: 95.4 % (ref 94–97)
O2HB: 95.8 % (ref 94–97)
O2HB: 95.8 % (ref 94–97)
O2HB: 97 % (ref 94–97)
O2HB: 97.4 % (ref 94–97)
O2HB: 97.5 % (ref 94–97)
O2HB: 97.6 % (ref 94–97)
O2HB: 97.6 % (ref 94–97)
O2HB: 97.7 % (ref 94–97)
O2HB: 97.8 % (ref 94–97)
O2HB: 97.9 % (ref 94–97)
O2HB: 97.9 % (ref 94–97)
O2HB: 98.5 % (ref 94–97)
OPERATOR ID: 1214
OPERATOR ID: 1632
OPERATOR ID: 1632
OPERATOR ID: 1768
OPERATOR ID: 1926
OPERATOR ID: 2067
OPERATOR ID: 2245
OPERATOR ID: 2593
OPERATOR ID: 2593
OPERATOR ID: 359
OPERATOR ID: 366
OPERATOR ID: 7221
OPERATOR ID: 7490
OPERATOR ID: 8217
OPERATOR ID: 914
OPERATOR ID: ABNORMAL
ORGANISM: ABNORMAL
OSMOLALITY URINE: 342 MOSM/KG (ref 300–900)
OVALOCYTES: ABNORMAL
PARAINFLUENZA VIRUS 1 BY PCR: NOT DETECTED
PARAINFLUENZA VIRUS 2 BY PCR: NOT DETECTED
PARAINFLUENZA VIRUS 3 BY PCR: NOT DETECTED
PARAINFLUENZA VIRUS 4 BY PCR: NOT DETECTED
PATIENT TEMP: 37
PATIENT TEMP: 37 C
PCO2 (TEMP CORRECTED): 44.6 MMHG (ref 35–45)
PCO2 37: 32.9 MMHG (ref 35–45)
PCO2 37: 51 MMHG (ref 35–45)
PCO2 37: 52.3 MMHG (ref 35–45)
PCO2: 30.9 MMHG (ref 35–45)
PCO2: 32.8 MMHG (ref 35–45)
PCO2: 33 MMHG (ref 35–45)
PCO2: 34.1 MMHG (ref 35–45)
PCO2: 34.8 MMHG (ref 35–45)
PCO2: 35 MMHG (ref 35–45)
PCO2: 36.3 MMHG (ref 35–45)
PCO2: 39 MMHG (ref 35–45)
PCO2: 39.6 MMHG (ref 35–45)
PCO2: 39.7 MMHG (ref 35–45)
PCO2: 39.7 MMHG (ref 35–45)
PCO2: 41.1 MMHG (ref 35–45)
PCO2: 42 MMHG (ref 35–45)
PCO2: 43.9 MMHG (ref 35–45)
PCO2: 45 MMHG (ref 35–45)
PCO2: 45.1 MMHG (ref 35–45)
PCO2: 45.7 MMHG (ref 35–45)
PCO2: 45.8 MMHG (ref 35–45)
PCO2: 46.4 MMHG (ref 35–45)
PCO2: 46.8 MMHG (ref 35–45)
PCO2: 47.3 MMHG (ref 35–45)
PCO2: 48.9 MMHG (ref 35–45)
PDW BLD-RTO: 13.2 FL (ref 11.5–15)
PDW BLD-RTO: 13.9 FL (ref 11.5–15)
PDW BLD-RTO: 13.9 FL (ref 11.5–15)
PDW BLD-RTO: 14 FL (ref 11.5–15)
PDW BLD-RTO: 14 FL (ref 11.5–15)
PDW BLD-RTO: 14.4 FL (ref 11.5–15)
PDW BLD-RTO: 14.8 FL (ref 11.5–15)
PDW BLD-RTO: 14.9 FL (ref 11.5–15)
PDW BLD-RTO: 15.5 FL (ref 11.5–15)
PDW BLD-RTO: 15.6 FL (ref 11.5–15)
PDW BLD-RTO: 15.8 FL (ref 11.5–15)
PDW BLD-RTO: 15.8 FL (ref 11.5–15)
PDW BLD-RTO: 15.9 FL (ref 11.5–15)
PDW BLD-RTO: 15.9 FL (ref 11.5–15)
PDW BLD-RTO: 16.1 FL (ref 11.5–15)
PDW BLD-RTO: 16.3 FL (ref 11.5–15)
PDW BLD-RTO: 16.6 FL (ref 11.5–15)
PDW BLD-RTO: 18.1 FL (ref 11.5–15)
PEEP/CPAP: 10 CMH2O
PEEP/CPAP: 10 CMH2O
PEEP/CPAP: 16 CMH2O
PEEP/CPAP: 5 CMH2O
PEEP/CPAP: 8 CMH2O
PFO2: 0.8 MMHG/%
PFO2: 0.81 MMHG/%
PFO2: 0.82 MMHG/%
PFO2: 0.83 MMHG/%
PFO2: 0.89 MMHG/%
PFO2: 0.9 MMHG/%
PFO2: 0.92 MMHG/%
PFO2: 0.97 MMHG/%
PFO2: 1.18 MMHG/%
PFO2: 1.53 MMHG/%
PFO2: 1.72 MMHG/%
PFO2: 1.9 MMHG/%
PFO2: 2 MMHG/%
PFO2: 2.03 MMHG/%
PFO2: 2.17 MMHG/%
PFO2: 2.2 MMHG/%
PFO2: 2.37 MMHG/%
PFO2: 2.58 MMHG/%
PFO2: 3.18 MMHG/%
PFO2: 3.43 MMHG/%
PH (TEMPERATURE CORRECTED): 7.4 (ref 7.35–7.45)
PH 37: 7.23 (ref 7.35–7.45)
PH 37: 7.24 (ref 7.35–7.45)
PH 37: 7.42 (ref 7.35–7.45)
PH BLOOD GAS: 7.24 (ref 7.35–7.45)
PH BLOOD GAS: 7.25 (ref 7.35–7.45)
PH BLOOD GAS: 7.27 (ref 7.35–7.45)
PH BLOOD GAS: 7.29 (ref 7.35–7.45)
PH BLOOD GAS: 7.29 (ref 7.35–7.45)
PH BLOOD GAS: 7.3 (ref 7.35–7.45)
PH BLOOD GAS: 7.35 (ref 7.35–7.45)
PH BLOOD GAS: 7.37 (ref 7.35–7.45)
PH BLOOD GAS: 7.38 (ref 7.35–7.45)
PH BLOOD GAS: 7.38 (ref 7.35–7.45)
PH BLOOD GAS: 7.39 (ref 7.35–7.45)
PH BLOOD GAS: 7.4 (ref 7.35–7.45)
PH BLOOD GAS: 7.4 (ref 7.35–7.45)
PH BLOOD GAS: 7.41 (ref 7.35–7.45)
PH BLOOD GAS: 7.42 (ref 7.35–7.45)
PH BLOOD GAS: 7.45 (ref 7.35–7.45)
PH BLOOD GAS: 7.45 (ref 7.35–7.45)
PH BLOOD GAS: 7.46 (ref 7.35–7.45)
PH UA: 6 (ref 5–9)
PHOSPHORUS: 1.4 MG/DL (ref 2.5–4.5)
PHOSPHORUS: 1.7 MG/DL (ref 2.5–4.5)
PHOSPHORUS: 1.7 MG/DL (ref 2.5–4.5)
PHOSPHORUS: 1.8 MG/DL (ref 2.5–4.5)
PHOSPHORUS: 1.9 MG/DL (ref 2.5–4.5)
PHOSPHORUS: 2 MG/DL (ref 2.5–4.5)
PHOSPHORUS: 2.1 MG/DL (ref 2.5–4.5)
PHOSPHORUS: 2.1 MG/DL (ref 2.5–4.5)
PHOSPHORUS: 2.2 MG/DL (ref 2.5–4.5)
PHOSPHORUS: 2.2 MG/DL (ref 2.5–4.5)
PHOSPHORUS: 2.3 MG/DL (ref 2.5–4.5)
PHOSPHORUS: 2.4 MG/DL (ref 2.5–4.5)
PHOSPHORUS: 2.5 MG/DL (ref 2.5–4.5)
PHOSPHORUS: 2.6 MG/DL (ref 2.5–4.5)
PHOSPHORUS: 2.7 MG/DL (ref 2.5–4.5)
PHOSPHORUS: 2.7 MG/DL (ref 2.5–4.5)
PHOSPHORUS: 2.8 MG/DL (ref 2.5–4.5)
PHOSPHORUS: 2.9 MG/DL (ref 2.5–4.5)
PHOSPHORUS: 2.9 MG/DL (ref 2.5–4.5)
PHOSPHORUS: 3 MG/DL (ref 2.5–4.5)
PHOSPHORUS: 3.1 MG/DL (ref 2.5–4.5)
PHOSPHORUS: 3.2 MG/DL (ref 2.5–4.5)
PHOSPHORUS: 3.2 MG/DL (ref 2.5–4.5)
PHOSPHORUS: 3.5 MG/DL (ref 2.5–4.5)
PHOSPHORUS: 3.5 MG/DL (ref 2.5–4.5)
PHOSPHORUS: 3.6 MG/DL (ref 2.5–4.5)
PHOSPHORUS: 3.7 MG/DL (ref 2.5–4.5)
PHOSPHORUS: 4.1 MG/DL (ref 2.5–4.5)
PHOSPHORUS: 4.9 MG/DL (ref 2.5–4.5)
PHOSPHORUS: 4.9 MG/DL (ref 2.5–4.5)
PLATELET # BLD: 108 E9/L (ref 130–450)
PLATELET # BLD: 108 E9/L (ref 130–450)
PLATELET # BLD: 111 E9/L (ref 130–450)
PLATELET # BLD: 131 E9/L (ref 130–450)
PLATELET # BLD: 152 E9/L (ref 130–450)
PLATELET # BLD: 154 E9/L (ref 130–450)
PLATELET # BLD: 191 E9/L (ref 130–450)
PLATELET # BLD: 217 E9/L (ref 130–450)
PLATELET # BLD: 234 E9/L (ref 130–450)
PLATELET # BLD: 239 E9/L (ref 130–450)
PLATELET # BLD: 243 E9/L (ref 130–450)
PLATELET # BLD: 247 E9/L (ref 130–450)
PLATELET # BLD: 285 E9/L (ref 130–450)
PLATELET # BLD: 305 E9/L (ref 130–450)
PLATELET # BLD: 316 E9/L (ref 130–450)
PLATELET # BLD: 325 E9/L (ref 130–450)
PLATELET # BLD: 66 E9/L (ref 130–450)
PLATELET # BLD: 67 E9/L (ref 130–450)
PLATELET # BLD: 81 E9/L (ref 130–450)
PLATELET # BLD: 89 E9/L (ref 130–450)
PLATELET CONFIRMATION: NORMAL
PMV BLD AUTO: 10.1 FL (ref 7–12)
PMV BLD AUTO: 10.2 FL (ref 7–12)
PMV BLD AUTO: 10.8 FL (ref 7–12)
PMV BLD AUTO: 11.2 FL (ref 7–12)
PMV BLD AUTO: 11.3 FL (ref 7–12)
PMV BLD AUTO: 11.6 FL (ref 7–12)
PMV BLD AUTO: 11.9 FL (ref 7–12)
PMV BLD AUTO: 12.9 FL (ref 7–12)
PMV BLD AUTO: 13.4 FL (ref 7–12)
PMV BLD AUTO: 14 FL (ref 7–12)
PMV BLD AUTO: 14 FL (ref 7–12)
PMV BLD AUTO: 14.2 FL (ref 7–12)
PMV BLD AUTO: 9.7 FL (ref 7–12)
PMV BLD AUTO: 9.8 FL (ref 7–12)
PMV BLD AUTO: 9.8 FL (ref 7–12)
PMV BLD AUTO: 9.9 FL (ref 7–12)
PMV BLD AUTO: 9.9 FL (ref 7–12)
PMV BLD AUTO: ABNORMAL FL (ref 7–12)
PO2 (TEMP CORRECTED): 118.7 MMHG (ref 60–80)
PO2 37: 117.3 MMHG (ref 60–80)
PO2 37: 59.4 MMHG (ref 60–80)
PO2 37: 76.4 MMHG (ref 60–80)
PO2: 103.1 MMHG (ref 75–100)
PO2: 108.4 MMHG (ref 75–100)
PO2: 109.8 MMHG (ref 75–100)
PO2: 118.2 MMHG (ref 75–100)
PO2: 127.2 MMHG (ref 75–100)
PO2: 137.4 MMHG (ref 75–100)
PO2: 141.9 MMHG (ref 75–100)
PO2: 142.1 MMHG (ref 75–100)
PO2: 153.4 MMHG (ref 75–100)
PO2: 170.9 MMHG (ref 75–100)
PO2: 199.5 MMHG (ref 75–100)
PO2: 68.1 MMHG (ref 75–100)
PO2: 68.2 MMHG (ref 75–100)
PO2: 68.8 MMHG (ref 75–100)
PO2: 70.7 MMHG (ref 75–100)
PO2: 76.2 MMHG (ref 75–100)
PO2: 77.9 MMHG (ref 75–100)
PO2: 81.7 MMHG (ref 75–100)
PO2: 82.1 MMHG (ref 75–100)
PO2: 82.6 MMHG (ref 75–100)
PO2: 86 MMHG (ref 75–100)
PO2: 89.1 MMHG (ref 75–100)
POC BUN: 44 MG/DL (ref 8–23)
POC BUN: 44 MG/DL (ref 8–23)
POC CHLORIDE: 109 MMOL/L (ref 100–108)
POC CHLORIDE: 109 MMOL/L (ref 100–108)
POC CO2: 21.3 MMOL/L (ref 22–29)
POC CO2: 21.6 MMOL/L (ref 22–29)
POC CREATININE: 3 MG/DL (ref 0.5–1)
POC CREATININE: 3.1 MG/DL (ref 0.5–1)
POC IONIZED CALCIUM: 1.1 (ref 1.1–1.3)
POC IONIZED CALCIUM: 1.2 (ref 1.1–1.3)
POC LACTIC ACID: 1.5 (ref 0.5–2.2)
POC LACTIC ACID: 1.5 (ref 0.5–2.2)
POC SODIUM: 140 MMOL/L (ref 132–146)
POC SODIUM: 140 MMOL/L (ref 132–146)
POC SOURCE: ABNORMAL
POIKILOCYTES: ABNORMAL
POLYCHROMASIA: ABNORMAL
POSITIVE END EXP PRESS: 8 CMH2O
POTASSIUM REFLEX MAGNESIUM: 3.5 MMOL/L (ref 3.5–5)
POTASSIUM REFLEX MAGNESIUM: 3.5 MMOL/L (ref 3.5–5)
POTASSIUM REFLEX MAGNESIUM: 3.8 MMOL/L (ref 3.5–5)
POTASSIUM REFLEX MAGNESIUM: 4.3 MMOL/L (ref 3.5–5)
POTASSIUM REFLEX MAGNESIUM: 4.7 MMOL/L (ref 3.5–5)
POTASSIUM SERPL-SCNC: 3.6 MMOL/L (ref 3.5–5)
POTASSIUM SERPL-SCNC: 3.6 MMOL/L (ref 3.5–5)
POTASSIUM SERPL-SCNC: 3.7 MMOL/L (ref 3.5–5)
POTASSIUM SERPL-SCNC: 3.8 MMOL/L (ref 3.5–5)
POTASSIUM SERPL-SCNC: 3.9 MMOL/L (ref 3.5–5)
POTASSIUM SERPL-SCNC: 4 MMOL/L (ref 3.5–5)
POTASSIUM SERPL-SCNC: 4.1 MMOL/L (ref 3.5–5)
POTASSIUM SERPL-SCNC: 4.2 MMOL/L (ref 3.5–5)
POTASSIUM SERPL-SCNC: 4.3 MMOL/L (ref 3.5–5)
POTASSIUM SERPL-SCNC: 4.4 MMOL/L (ref 3.5–5)
POTASSIUM SERPL-SCNC: 4.4 MMOL/L (ref 3.5–5.5)
POTASSIUM SERPL-SCNC: 4.6 MMOL/L (ref 3.5–5)
POTASSIUM SERPL-SCNC: 4.7 MMOL/L (ref 3.5–5)
POTASSIUM SERPL-SCNC: 4.7 MMOL/L (ref 3.5–5)
POTASSIUM SERPL-SCNC: 4.7 MMOL/L (ref 3.5–5.5)
POTASSIUM SERPL-SCNC: 4.9 MMOL/L (ref 3.5–5)
POTASSIUM SERPL-SCNC: 4.9 MMOL/L (ref 3.5–5)
POTASSIUM SERPL-SCNC: 5.1 MMOL/L (ref 3.5–5)
POTASSIUM, UR: 34.3 MMOL/L
PRO-BNP: 1373 PG/ML (ref 0–450)
PRO-BNP: 815 PG/ML (ref 0–450)
PRO-BNP: 879 PG/ML (ref 0–450)
PROCALCITONIN: 1.57 NG/ML (ref 0–0.08)
PROCALCITONIN: 5.49 NG/ML (ref 0–0.08)
PROCALCITONIN: 5.7 NG/ML (ref 0–0.08)
PROTEIN FLUID: 2.6 G/DL
PROTEIN PROTEIN: 74 MG/DL (ref 0–12)
PROTEIN UA: 100 MG/DL
PROTEIN/CREAT RATIO: 1.1
PROTEIN/CREAT RATIO: 1.1 (ref 0–0.2)
PROTHROMBIN TIME: 11.1 SEC (ref 9.3–12.4)
PROTHROMBIN TIME: 11.2 SEC (ref 9.3–12.4)
PROTHROMBIN TIME: 12.9 SEC (ref 9.3–12.4)
PROTHROMBIN TIME: 13.6 SEC (ref 9.3–12.4)
PROTHROMBIN TIME: 14.7 SEC (ref 9.3–12.4)
RBC # BLD: 2.36 E12/L (ref 3.5–5.5)
RBC # BLD: 2.36 E12/L (ref 3.5–5.5)
RBC # BLD: 2.46 E12/L (ref 3.5–5.5)
RBC # BLD: 2.5 E12/L (ref 3.5–5.5)
RBC # BLD: 2.56 E12/L (ref 3.5–5.5)
RBC # BLD: 2.67 E12/L (ref 3.5–5.5)
RBC # BLD: 2.84 E12/L (ref 3.5–5.5)
RBC # BLD: 2.88 E12/L (ref 3.5–5.5)
RBC # BLD: 2.89 E12/L (ref 3.5–5.5)
RBC # BLD: 2.94 E12/L (ref 3.5–5.5)
RBC # BLD: 2.97 E12/L (ref 3.5–5.5)
RBC # BLD: 3.09 E12/L (ref 3.5–5.5)
RBC # BLD: 3.17 E12/L (ref 3.5–5.5)
RBC # BLD: 3.26 E12/L (ref 3.5–5.5)
RBC # BLD: 3.38 E12/L (ref 3.5–5.5)
RBC # BLD: 3.39 E12/L (ref 3.5–5.5)
RBC # BLD: 3.43 E12/L (ref 3.5–5.5)
RBC # BLD: 3.48 E12/L (ref 3.5–5.5)
RBC # BLD: 3.53 E12/L (ref 3.5–5.5)
RBC # BLD: 3.67 E12/L (ref 3.5–5.5)
RBC FLUID: NORMAL /UL
RBC UA: ABNORMAL /HPF (ref 0–2)
RESPIRATORY RATE: 16 B/MIN
RESPIRATORY SYNCYTIAL VIRUS BY PCR: NOT DETECTED
RI(T): 0.73
RI(T): 0.86
RI(T): 1.32
RI(T): 1.6
RI(T): 1.76
RI(T): 1.83
RI(T): 2.09
RI(T): 2.22
RI(T): 2.39
RI(T): 2.45
RI(T): 3.18
RI(T): 4.43
RI(T): 5.56
RI(T): 5.98
RI(T): 6.03
RI(T): 6.21
RI(T): 6.76
RI(T): 6.78
RI(T): 6.81
RI(T): 6.89
RR MECHANICAL: 16 B/MIN
RR MECHANICAL: 18 B/MIN
RR MECHANICAL: 20 B/MIN
RR MECHANICAL: 24 B/MIN
RR MECHANICAL: 30 B/MIN
SARS-COV-2 RNA, RT PCR: NOT DETECTED
SARS-COV-2, PCR: NOT DETECTED
SCHISTOCYTES: ABNORMAL
SCHISTOCYTES: ABNORMAL
SMEAR, RESPIRATORY: ABNORMAL
SMEAR, RESPIRATORY: ABNORMAL
SMUDGE CELLS: ABNORMAL
SMUDGE CELLS: ABNORMAL
SODIUM BLD-SCNC: 132 MMOL/L (ref 132–146)
SODIUM BLD-SCNC: 133 MMOL/L (ref 132–146)
SODIUM BLD-SCNC: 134 MMOL/L (ref 132–146)
SODIUM BLD-SCNC: 134 MMOL/L (ref 132–146)
SODIUM BLD-SCNC: 135 MMOL/L (ref 132–146)
SODIUM BLD-SCNC: 136 MMOL/L (ref 132–146)
SODIUM BLD-SCNC: 137 MMOL/L (ref 132–146)
SODIUM BLD-SCNC: 138 MMOL/L (ref 132–146)
SODIUM BLD-SCNC: 139 MMOL/L (ref 132–146)
SODIUM BLD-SCNC: 140 MMOL/L (ref 132–146)
SODIUM BLD-SCNC: 141 MMOL/L (ref 132–146)
SODIUM BLD-SCNC: 143 MMOL/L (ref 132–146)
SODIUM BLD-SCNC: 143 MMOL/L (ref 132–146)
SODIUM BLD-SCNC: 146 MMOL/L (ref 132–146)
SODIUM URINE: 42 MMOL/L
SOURCE, BLOOD GAS: ABNORMAL
SPECIFIC GRAVITY UA: 1.02 (ref 1–1.03)
STREP PNEUMONIAE ANTIGEN, URINE: NORMAL
TARGET CELLS: ABNORMAL
THB: 10 G/DL (ref 11.5–16.5)
THB: 10.4 G/DL (ref 11.5–16.5)
THB: 10.8 G/DL (ref 11.5–16.5)
THB: 10.9 G/DL (ref 11.5–16.5)
THB: 11 G/DL (ref 11.5–16.5)
THB: 11 G/DL (ref 11.5–16.5)
THB: 11.1 G/DL (ref 11.5–16.5)
THB: 11.2 G/DL (ref 11.5–16.5)
THB: 11.2 G/DL (ref 11.5–16.5)
THB: 11.4 G/DL (ref 11.5–16.5)
THB: 11.4 G/DL (ref 11.5–16.5)
THB: 11.8 G/DL (ref 11.5–16.5)
THB: 8.2 G/DL (ref 11.5–16.5)
THB: 8.3 G/DL (ref 11.5–16.5)
THB: 9.3 G/DL (ref 11.5–16.5)
THB: 9.4 G/DL (ref 11.5–16.5)
THB: 9.4 G/DL (ref 11.5–16.5)
THB: 9.5 G/DL (ref 11.5–16.5)
THB: 9.6 G/DL (ref 11.5–16.5)
THB: 9.8 G/DL (ref 11.5–16.5)
THB: 9.8 G/DL (ref 11.5–16.5)
THB: 9.9 G/DL (ref 11.5–16.5)
TIDAL VOLUME: 300 ML
TIME ANALYZED: 1051
TIME ANALYZED: 1121
TIME ANALYZED: 1127
TIME ANALYZED: 1306
TIME ANALYZED: 1528
TIME ANALYZED: 1900
TIME ANALYZED: 2054
TIME ANALYZED: 2304
TIME ANALYZED: 422
TIME ANALYZED: 431
TIME ANALYZED: 436
TIME ANALYZED: 446
TIME ANALYZED: 500
TIME ANALYZED: 500
TIME ANALYZED: 550
TIME ANALYZED: 557
TIME ANALYZED: 609
TIME ANALYZED: 616
TIME ANALYZED: 624
TIME ANALYZED: 813
TIME ANALYZED: 928
TIME ANALYZED: 939
TOTAL PROTEIN: 4.4 G/DL (ref 6.4–8.3)
TOTAL PROTEIN: 4.8 G/DL (ref 6.4–8.3)
TOTAL PROTEIN: 4.9 G/DL (ref 6.4–8.3)
TOTAL PROTEIN: 5 G/DL (ref 6.4–8.3)
TOTAL PROTEIN: 5.1 G/DL (ref 6.4–8.3)
TOTAL PROTEIN: 5.2 G/DL (ref 6.4–8.3)
TOTAL PROTEIN: 5.3 G/DL (ref 6.4–8.3)
TOTAL PROTEIN: 5.4 G/DL (ref 6.4–8.3)
TOTAL PROTEIN: 5.5 G/DL (ref 6.4–8.3)
TOTAL PROTEIN: 5.6 G/DL (ref 6.4–8.3)
TOTAL PROTEIN: 5.6 G/DL (ref 6.4–8.3)
TOTAL PROTEIN: 5.7 G/DL (ref 6.4–8.3)
TOTAL PROTEIN: 5.8 G/DL (ref 6.4–8.3)
TOTAL PROTEIN: 5.9 G/DL (ref 6.4–8.3)
TOTAL PROTEIN: 5.9 G/DL (ref 6.4–8.3)
TOTAL PROTEIN: 6.2 G/DL (ref 6.4–8.3)
TOTAL PROTEIN: 6.3 G/DL (ref 6.4–8.3)
TOTAL PROTEIN: 6.4 G/DL (ref 6.4–8.3)
TOXIC GRANULATION: ABNORMAL
TRIGL SERPL-MCNC: 110 MG/DL (ref 0–149)
TRIGL SERPL-MCNC: 136 MG/DL (ref 0–149)
TRIGL SERPL-MCNC: 143 MG/DL (ref 0–149)
TRIGLYCERIDES FLUID: 36 MG/DL
TROPONIN, HIGH SENSITIVITY: 50 NG/L (ref 0–9)
TROPONIN, HIGH SENSITIVITY: 51 NG/L (ref 0–9)
TROPONIN, HIGH SENSITIVITY: 54 NG/L (ref 0–9)
UREA NITROGEN, UR: 352 MG/DL (ref 800–1666)
UROBILINOGEN, URINE: 0.2 E.U./DL
VANCOMYCIN RANDOM: 12.2 MCG/ML (ref 5–40)
VANCOMYCIN RANDOM: 20.1 MCG/ML (ref 5–40)
VANCOMYCIN RANDOM: 9.3 MCG/ML (ref 5–40)
VITAMIN D 25-HYDROXY: 11 NG/ML (ref 30–100)
VLDLC SERPL CALC-MCNC: 27 MG/DL
VT MECHANICAL: 250 ML
VT MECHANICAL: 300 ML
VT MECHANICAL: 350 ML
VT MECHANICAL: 400 ML
WBC # BLD: 12.1 E9/L (ref 4.5–11.5)
WBC # BLD: 14.2 E9/L (ref 4.5–11.5)
WBC # BLD: 14.4 E9/L (ref 4.5–11.5)
WBC # BLD: 14.8 E9/L (ref 4.5–11.5)
WBC # BLD: 15 E9/L (ref 4.5–11.5)
WBC # BLD: 15.4 E9/L (ref 4.5–11.5)
WBC # BLD: 15.8 E9/L (ref 4.5–11.5)
WBC # BLD: 15.9 E9/L (ref 4.5–11.5)
WBC # BLD: 16.1 E9/L (ref 4.5–11.5)
WBC # BLD: 17 E9/L (ref 4.5–11.5)
WBC # BLD: 17.1 E9/L (ref 4.5–11.5)
WBC # BLD: 17.9 E9/L (ref 4.5–11.5)
WBC # BLD: 18.1 E9/L (ref 4.5–11.5)
WBC # BLD: 18.2 E9/L (ref 4.5–11.5)
WBC # BLD: 19.9 E9/L (ref 4.5–11.5)
WBC # BLD: 20 E9/L (ref 4.5–11.5)
WBC # BLD: 25.4 E9/L (ref 4.5–11.5)
WBC # BLD: 7 E9/L (ref 4.5–11.5)
WBC # BLD: 7.7 E9/L (ref 4.5–11.5)
WBC # BLD: 9.6 E9/L (ref 4.5–11.5)
WBC UA: ABNORMAL /HPF (ref 0–5)

## 2022-01-01 PROCEDURE — 6370000000 HC RX 637 (ALT 250 FOR IP): Performed by: NURSE PRACTITIONER

## 2022-01-01 PROCEDURE — 2580000003 HC RX 258: Performed by: NURSE PRACTITIONER

## 2022-01-01 PROCEDURE — 90945 DIALYSIS ONE EVALUATION: CPT

## 2022-01-01 PROCEDURE — 2580000003 HC RX 258: Performed by: INTERNAL MEDICINE

## 2022-01-01 PROCEDURE — 84100 ASSAY OF PHOSPHORUS: CPT

## 2022-01-01 PROCEDURE — 2500000003 HC RX 250 WO HCPCS: Performed by: INTERNAL MEDICINE

## 2022-01-01 PROCEDURE — 85025 COMPLETE CBC W/AUTO DIFF WBC: CPT

## 2022-01-01 PROCEDURE — 82962 GLUCOSE BLOOD TEST: CPT

## 2022-01-01 PROCEDURE — 05HM33Z INSERTION OF INFUSION DEVICE INTO RIGHT INTERNAL JUGULAR VEIN, PERCUTANEOUS APPROACH: ICD-10-PCS | Performed by: INTERNAL MEDICINE

## 2022-01-01 PROCEDURE — 2500000003 HC RX 250 WO HCPCS: Performed by: STUDENT IN AN ORGANIZED HEALTH CARE EDUCATION/TRAINING PROGRAM

## 2022-01-01 PROCEDURE — 93005 ELECTROCARDIOGRAM TRACING: CPT

## 2022-01-01 PROCEDURE — C9113 INJ PANTOPRAZOLE SODIUM, VIA: HCPCS | Performed by: SURGERY

## 2022-01-01 PROCEDURE — 36415 COLL VENOUS BLD VENIPUNCTURE: CPT

## 2022-01-01 PROCEDURE — 83880 ASSAY OF NATRIURETIC PEPTIDE: CPT

## 2022-01-01 PROCEDURE — 74220 X-RAY XM ESOPHAGUS 1CNTRST: CPT

## 2022-01-01 PROCEDURE — 83986 ASSAY PH BODY FLUID NOS: CPT

## 2022-01-01 PROCEDURE — 2500000003 HC RX 250 WO HCPCS: Performed by: NURSE PRACTITIONER

## 2022-01-01 PROCEDURE — 94640 AIRWAY INHALATION TREATMENT: CPT

## 2022-01-01 PROCEDURE — 2000000000 HC ICU R&B

## 2022-01-01 PROCEDURE — 3E0G76Z INTRODUCTION OF NUTRITIONAL SUBSTANCE INTO UPPER GI, VIA NATURAL OR ARTIFICIAL OPENING: ICD-10-PCS | Performed by: SURGERY

## 2022-01-01 PROCEDURE — 87449 NOS EACH ORGANISM AG IA: CPT

## 2022-01-01 PROCEDURE — 6360000002 HC RX W HCPCS

## 2022-01-01 PROCEDURE — 31500 INSERT EMERGENCY AIRWAY: CPT

## 2022-01-01 PROCEDURE — 36592 COLLECT BLOOD FROM PICC: CPT

## 2022-01-01 PROCEDURE — 83735 ASSAY OF MAGNESIUM: CPT

## 2022-01-01 PROCEDURE — 71250 CT THORAX DX C-: CPT

## 2022-01-01 PROCEDURE — 6370000000 HC RX 637 (ALT 250 FOR IP): Performed by: INTERNAL MEDICINE

## 2022-01-01 PROCEDURE — 6360000002 HC RX W HCPCS: Performed by: NURSE PRACTITIONER

## 2022-01-01 PROCEDURE — 83605 ASSAY OF LACTIC ACID: CPT

## 2022-01-01 PROCEDURE — 71045 X-RAY EXAM CHEST 1 VIEW: CPT

## 2022-01-01 PROCEDURE — 6360000002 HC RX W HCPCS: Performed by: STUDENT IN AN ORGANIZED HEALTH CARE EDUCATION/TRAINING PROGRAM

## 2022-01-01 PROCEDURE — 94003 VENT MGMT INPAT SUBQ DAY: CPT

## 2022-01-01 PROCEDURE — 6360000002 HC RX W HCPCS: Performed by: INTERNAL MEDICINE

## 2022-01-01 PROCEDURE — 84145 PROCALCITONIN (PCT): CPT

## 2022-01-01 PROCEDURE — 36556 INSERT NON-TUNNEL CV CATH: CPT | Performed by: INTERNAL MEDICINE

## 2022-01-01 PROCEDURE — 2580000003 HC RX 258: Performed by: SURGERY

## 2022-01-01 PROCEDURE — 71275 CT ANGIOGRAPHY CHEST: CPT

## 2022-01-01 PROCEDURE — A4216 STERILE WATER/SALINE, 10 ML: HCPCS | Performed by: SURGERY

## 2022-01-01 PROCEDURE — 99291 CRITICAL CARE FIRST HOUR: CPT | Performed by: INTERNAL MEDICINE

## 2022-01-01 PROCEDURE — 82805 BLOOD GASES W/O2 SATURATION: CPT

## 2022-01-01 PROCEDURE — 86850 RBC ANTIBODY SCREEN: CPT

## 2022-01-01 PROCEDURE — 80053 COMPREHEN METABOLIC PANEL: CPT

## 2022-01-01 PROCEDURE — 6360000002 HC RX W HCPCS: Performed by: CHIROPRACTOR

## 2022-01-01 PROCEDURE — 87186 SC STD MICRODIL/AGAR DIL: CPT

## 2022-01-01 PROCEDURE — 82330 ASSAY OF CALCIUM: CPT

## 2022-01-01 PROCEDURE — 2580000003 HC RX 258

## 2022-01-01 PROCEDURE — 2500000003 HC RX 250 WO HCPCS: Performed by: SURGERY

## 2022-01-01 PROCEDURE — 74176 CT ABD & PELVIS W/O CONTRAST: CPT

## 2022-01-01 PROCEDURE — 6360000002 HC RX W HCPCS: Performed by: SURGERY

## 2022-01-01 PROCEDURE — 2580000003 HC RX 258: Performed by: LICENSED PRACTICAL NURSE

## 2022-01-01 PROCEDURE — 74018 RADEX ABDOMEN 1 VIEW: CPT

## 2022-01-01 PROCEDURE — 85014 HEMATOCRIT: CPT

## 2022-01-01 PROCEDURE — 6360000004 HC RX CONTRAST MEDICATION

## 2022-01-01 PROCEDURE — P9016 RBC LEUKOCYTES REDUCED: HCPCS

## 2022-01-01 PROCEDURE — 86901 BLOOD TYPING SEROLOGIC RH(D): CPT

## 2022-01-01 PROCEDURE — 3600000004 HC SURGERY LEVEL 4 BASE: Performed by: SURGERY

## 2022-01-01 PROCEDURE — 0DH63UZ INSERTION OF FEEDING DEVICE INTO STOMACH, PERCUTANEOUS APPROACH: ICD-10-PCS | Performed by: SURGERY

## 2022-01-01 PROCEDURE — 6370000000 HC RX 637 (ALT 250 FOR IP)

## 2022-01-01 PROCEDURE — 03HY32Z INSERTION OF MONITORING DEVICE INTO UPPER ARTERY, PERCUTANEOUS APPROACH: ICD-10-PCS | Performed by: INTERNAL MEDICINE

## 2022-01-01 PROCEDURE — 81001 URINALYSIS AUTO W/SCOPE: CPT

## 2022-01-01 PROCEDURE — 2500000003 HC RX 250 WO HCPCS: Performed by: RADIOLOGY

## 2022-01-01 PROCEDURE — 80048 BASIC METABOLIC PNL TOTAL CA: CPT

## 2022-01-01 PROCEDURE — 85730 THROMBOPLASTIN TIME PARTIAL: CPT

## 2022-01-01 PROCEDURE — 2720000010 HC SURG SUPPLY STERILE: Performed by: SURGERY

## 2022-01-01 PROCEDURE — 70450 CT HEAD/BRAIN W/O DYE: CPT

## 2022-01-01 PROCEDURE — 0W9C0ZZ DRAINAGE OF MEDIASTINUM, OPEN APPROACH: ICD-10-PCS | Performed by: SURGERY

## 2022-01-01 PROCEDURE — 43762 RPLC GTUBE NO REVJ TRC: CPT

## 2022-01-01 PROCEDURE — 93005 ELECTROCARDIOGRAM TRACING: CPT | Performed by: INTERNAL MEDICINE

## 2022-01-01 PROCEDURE — 37799 UNLISTED PX VASCULAR SURGERY: CPT

## 2022-01-01 PROCEDURE — 89051 BODY FLUID CELL COUNT: CPT

## 2022-01-01 PROCEDURE — 82010 KETONE BODYS QUAN: CPT

## 2022-01-01 PROCEDURE — 85018 HEMOGLOBIN: CPT

## 2022-01-01 PROCEDURE — 36625 INSERTION CATHETER ARTERY: CPT | Performed by: NURSE PRACTITIONER

## 2022-01-01 PROCEDURE — 87075 CULTR BACTERIA EXCEPT BLOOD: CPT

## 2022-01-01 PROCEDURE — 99285 EMERGENCY DEPT VISIT HI MDM: CPT

## 2022-01-01 PROCEDURE — 6370000000 HC RX 637 (ALT 250 FOR IP): Performed by: CHIROPRACTOR

## 2022-01-01 PROCEDURE — 3600007503: Performed by: SURGERY

## 2022-01-01 PROCEDURE — 85610 PROTHROMBIN TIME: CPT

## 2022-01-01 PROCEDURE — 0W9900Z DRAINAGE OF RIGHT PLEURAL CAVITY WITH DRAINAGE DEVICE, OPEN APPROACH: ICD-10-PCS | Performed by: SURGERY

## 2022-01-01 PROCEDURE — 76604 US EXAM CHEST: CPT

## 2022-01-01 PROCEDURE — 36556 INSERT NON-TUNNEL CV CATH: CPT

## 2022-01-01 PROCEDURE — 87070 CULTURE OTHR SPECIMN AEROBIC: CPT

## 2022-01-01 PROCEDURE — 82570 ASSAY OF URINE CREATININE: CPT

## 2022-01-01 PROCEDURE — 2580000003 HC RX 258: Performed by: STUDENT IN AN ORGANIZED HEALTH CARE EDUCATION/TRAINING PROGRAM

## 2022-01-01 PROCEDURE — 2060000000 HC ICU INTERMEDIATE R&B

## 2022-01-01 PROCEDURE — 99222 1ST HOSP IP/OBS MODERATE 55: CPT | Performed by: SURGERY

## 2022-01-01 PROCEDURE — 87206 SMEAR FLUORESCENT/ACID STAI: CPT

## 2022-01-01 PROCEDURE — C1729 CATH, DRAINAGE: HCPCS | Performed by: SURGERY

## 2022-01-01 PROCEDURE — 86923 COMPATIBILITY TEST ELECTRIC: CPT

## 2022-01-01 PROCEDURE — 85027 COMPLETE CBC AUTOMATED: CPT

## 2022-01-01 PROCEDURE — 2700000000 HC OXYGEN THERAPY PER DAY

## 2022-01-01 PROCEDURE — 32140 REMOVAL OF LUNG LESION(S): CPT | Performed by: SURGERY

## 2022-01-01 PROCEDURE — 43246 EGD PLACE GASTROSTOMY TUBE: CPT | Performed by: SURGERY

## 2022-01-01 PROCEDURE — 82533 TOTAL CORTISOL: CPT

## 2022-01-01 PROCEDURE — 74177 CT ABD & PELVIS W/CONTRAST: CPT

## 2022-01-01 PROCEDURE — 84540 ASSAY OF URINE/UREA-N: CPT

## 2022-01-01 PROCEDURE — P9046 ALBUMIN (HUMAN), 25%, 20 ML: HCPCS | Performed by: INTERNAL MEDICINE

## 2022-01-01 PROCEDURE — 44372 SMALL BOWEL ENDOSCOPY: CPT | Performed by: SURGERY

## 2022-01-01 PROCEDURE — 80202 ASSAY OF VANCOMYCIN: CPT

## 2022-01-01 PROCEDURE — 0W9B30Z DRAINAGE OF LEFT PLEURAL CAVITY WITH DRAINAGE DEVICE, PERCUTANEOUS APPROACH: ICD-10-PCS | Performed by: SURGERY

## 2022-01-01 PROCEDURE — 0BPQX0Z REMOVAL OF DRAINAGE DEVICE FROM PLEURA, EXTERNAL APPROACH: ICD-10-PCS | Performed by: SURGERY

## 2022-01-01 PROCEDURE — 88112 CYTOPATH CELL ENHANCE TECH: CPT

## 2022-01-01 PROCEDURE — P9046 ALBUMIN (HUMAN), 25%, 20 ML: HCPCS | Performed by: NURSE PRACTITIONER

## 2022-01-01 PROCEDURE — 3600007513: Performed by: SURGERY

## 2022-01-01 PROCEDURE — 36620 INSERTION CATHETER ARTERY: CPT | Performed by: INTERNAL MEDICINE

## 2022-01-01 PROCEDURE — 3700000000 HC ANESTHESIA ATTENDED CARE: Performed by: SURGERY

## 2022-01-01 PROCEDURE — 83935 ASSAY OF URINE OSMOLALITY: CPT

## 2022-01-01 PROCEDURE — 05HN33Z INSERTION OF INFUSION DEVICE INTO LEFT INTERNAL JUGULAR VEIN, PERCUTANEOUS APPROACH: ICD-10-PCS | Performed by: INTERNAL MEDICINE

## 2022-01-01 PROCEDURE — 84478 ASSAY OF TRIGLYCERIDES: CPT

## 2022-01-01 PROCEDURE — 84300 ASSAY OF URINE SODIUM: CPT

## 2022-01-01 PROCEDURE — 2580000003 HC RX 258: Performed by: CHIROPRACTOR

## 2022-01-01 PROCEDURE — 6360000004 HC RX CONTRAST MEDICATION: Performed by: NURSE PRACTITIONER

## 2022-01-01 PROCEDURE — 87205 SMEAR GRAM STAIN: CPT

## 2022-01-01 PROCEDURE — 96365 THER/PROPH/DIAG IV INF INIT: CPT

## 2022-01-01 PROCEDURE — 0BJ08ZZ INSPECTION OF TRACHEOBRONCHIAL TREE, VIA NATURAL OR ARTIFICIAL OPENING ENDOSCOPIC: ICD-10-PCS | Performed by: SURGERY

## 2022-01-01 PROCEDURE — 2500000003 HC RX 250 WO HCPCS

## 2022-01-01 PROCEDURE — 0B9N0ZZ DRAINAGE OF RIGHT PLEURA, OPEN APPROACH: ICD-10-PCS | Performed by: SURGERY

## 2022-01-01 PROCEDURE — 2709999900 HC NON-CHARGEABLE SUPPLY: Performed by: SURGERY

## 2022-01-01 PROCEDURE — 93005 ELECTROCARDIOGRAM TRACING: CPT | Performed by: CHIROPRACTOR

## 2022-01-01 PROCEDURE — 3E0436Z INTRODUCTION OF NUTRITIONAL SUBSTANCE INTO CENTRAL VEIN, PERCUTANEOUS APPROACH: ICD-10-PCS | Performed by: INTERNAL MEDICINE

## 2022-01-01 PROCEDURE — 82044 UR ALBUMIN SEMIQUANTITATIVE: CPT

## 2022-01-01 PROCEDURE — 88305 TISSUE EXAM BY PATHOLOGIST: CPT

## 2022-01-01 PROCEDURE — 82140 ASSAY OF AMMONIA: CPT

## 2022-01-01 PROCEDURE — 83010 ASSAY OF HAPTOGLOBIN QUANT: CPT

## 2022-01-01 PROCEDURE — 0W9930Z DRAINAGE OF RIGHT PLEURAL CAVITY WITH DRAINAGE DEVICE, PERCUTANEOUS APPROACH: ICD-10-PCS | Performed by: RADIOLOGY

## 2022-01-01 PROCEDURE — 3209999900 FLUORO FOR SURGICAL PROCEDURES

## 2022-01-01 PROCEDURE — 83615 LACTATE (LD) (LDH) ENZYME: CPT

## 2022-01-01 PROCEDURE — 2709999900 IR GUIDED PERC PLEURAL DRAIN W CATH INSERT

## 2022-01-01 PROCEDURE — 0D738DZ DILATION OF LOWER ESOPHAGUS WITH INTRALUMINAL DEVICE, VIA NATURAL OR ARTIFICIAL OPENING ENDOSCOPIC: ICD-10-PCS | Performed by: SURGERY

## 2022-01-01 PROCEDURE — 3600000014 HC SURGERY LEVEL 4 ADDTL 15MIN: Performed by: SURGERY

## 2022-01-01 PROCEDURE — 94645 CONT INHLJ TX EACH ADDL HOUR: CPT

## 2022-01-01 PROCEDURE — 02HV33Z INSERTION OF INFUSION DEVICE INTO SUPERIOR VENA CAVA, PERCUTANEOUS APPROACH: ICD-10-PCS | Performed by: INTERNAL MEDICINE

## 2022-01-01 PROCEDURE — 86900 BLOOD TYPING SEROLOGIC ABO: CPT

## 2022-01-01 PROCEDURE — 87102 FUNGUS ISOLATION CULTURE: CPT

## 2022-01-01 PROCEDURE — 82306 VITAMIN D 25 HYDROXY: CPT

## 2022-01-01 PROCEDURE — 80061 LIPID PANEL: CPT

## 2022-01-01 PROCEDURE — 93306 TTE W/DOPPLER COMPLETE: CPT

## 2022-01-01 PROCEDURE — 06HY33Z INSERTION OF INFUSION DEVICE INTO LOWER VEIN, PERCUTANEOUS APPROACH: ICD-10-PCS | Performed by: INTERNAL MEDICINE

## 2022-01-01 PROCEDURE — 94644 CONT INHLJ TX 1ST HOUR: CPT

## 2022-01-01 PROCEDURE — 99024 POSTOP FOLLOW-UP VISIT: CPT | Performed by: SURGERY

## 2022-01-01 PROCEDURE — 84484 ASSAY OF TROPONIN QUANT: CPT

## 2022-01-01 PROCEDURE — 36620 INSERTION CATHETER ARTERY: CPT

## 2022-01-01 PROCEDURE — 6360000004 HC RX CONTRAST MEDICATION: Performed by: STUDENT IN AN ORGANIZED HEALTH CARE EDUCATION/TRAINING PROGRAM

## 2022-01-01 PROCEDURE — 87081 CULTURE SCREEN ONLY: CPT

## 2022-01-01 PROCEDURE — 87040 BLOOD CULTURE FOR BACTERIA: CPT

## 2022-01-01 PROCEDURE — 87636 SARSCOV2 & INF A&B AMP PRB: CPT

## 2022-01-01 PROCEDURE — 82803 BLOOD GASES ANY COMBINATION: CPT

## 2022-01-01 PROCEDURE — 82436 ASSAY OF URINE CHLORIDE: CPT

## 2022-01-01 PROCEDURE — C1874 STENT, COATED/COV W/DEL SYS: HCPCS | Performed by: SURGERY

## 2022-01-01 PROCEDURE — 84157 ASSAY OF PROTEIN OTHER: CPT

## 2022-01-01 PROCEDURE — 99232 SBSQ HOSP IP/OBS MODERATE 35: CPT | Performed by: SURGERY

## 2022-01-01 PROCEDURE — 84156 ASSAY OF PROTEIN URINE: CPT

## 2022-01-01 PROCEDURE — 6360000004 HC RX CONTRAST MEDICATION: Performed by: RADIOLOGY

## 2022-01-01 PROCEDURE — 96375 TX/PRO/DX INJ NEW DRUG ADDON: CPT

## 2022-01-01 PROCEDURE — 94669 MECHANICAL CHEST WALL OSCILL: CPT

## 2022-01-01 PROCEDURE — 36430 TRANSFUSION BLD/BLD COMPNT: CPT

## 2022-01-01 PROCEDURE — C9113 INJ PANTOPRAZOLE SODIUM, VIA: HCPCS | Performed by: NURSE PRACTITIONER

## 2022-01-01 PROCEDURE — 87015 SPECIMEN INFECT AGNT CONCNTJ: CPT

## 2022-01-01 PROCEDURE — 94002 VENT MGMT INPAT INIT DAY: CPT

## 2022-01-01 PROCEDURE — 0BH18EZ INSERTION OF ENDOTRACHEAL AIRWAY INTO TRACHEA, VIA NATURAL OR ARTIFICIAL OPENING ENDOSCOPIC: ICD-10-PCS | Performed by: SURGERY

## 2022-01-01 PROCEDURE — 87116 MYCOBACTERIA CULTURE: CPT

## 2022-01-01 PROCEDURE — 02H633Z INSERTION OF INFUSION DEVICE INTO RIGHT ATRIUM, PERCUTANEOUS APPROACH: ICD-10-PCS | Performed by: INTERNAL MEDICINE

## 2022-01-01 PROCEDURE — 7100000001 HC PACU RECOVERY - ADDTL 15 MIN

## 2022-01-01 PROCEDURE — 93010 ELECTROCARDIOGRAM REPORT: CPT | Performed by: INTERNAL MEDICINE

## 2022-01-01 PROCEDURE — 7100000000 HC PACU RECOVERY - FIRST 15 MIN

## 2022-01-01 PROCEDURE — 32551 INSERTION OF CHEST TUBE: CPT | Performed by: SURGERY

## 2022-01-01 PROCEDURE — 87077 CULTURE AEROBIC IDENTIFY: CPT

## 2022-01-01 PROCEDURE — 5A1955Z RESPIRATORY VENTILATION, GREATER THAN 96 CONSECUTIVE HOURS: ICD-10-PCS | Performed by: SURGERY

## 2022-01-01 PROCEDURE — 5A1D90Z PERFORMANCE OF URINARY FILTRATION, CONTINUOUS, GREATER THAN 18 HOURS PER DAY: ICD-10-PCS | Performed by: INTERNAL MEDICINE

## 2022-01-01 PROCEDURE — 0B9N40Z DRAINAGE OF RIGHT PLEURA WITH DRAINAGE DEVICE, PERCUTANEOUS ENDOSCOPIC APPROACH: ICD-10-PCS | Performed by: SURGERY

## 2022-01-01 PROCEDURE — 3700000001 HC ADD 15 MINUTES (ANESTHESIA): Performed by: SURGERY

## 2022-01-01 PROCEDURE — S5553 INSULIN LONG ACTING 5 U: HCPCS | Performed by: NURSE PRACTITIONER

## 2022-01-01 PROCEDURE — 87106 FUNGI IDENTIFICATION YEAST: CPT

## 2022-01-01 PROCEDURE — 2500000003 HC RX 250 WO HCPCS: Performed by: CHIROPRACTOR

## 2022-01-01 PROCEDURE — 43266 EGD ENDOSCOPIC STENT PLACE: CPT | Performed by: SURGERY

## 2022-01-01 PROCEDURE — 85384 FIBRINOGEN ACTIVITY: CPT

## 2022-01-01 PROCEDURE — 84133 ASSAY OF URINE POTASSIUM: CPT

## 2022-01-01 PROCEDURE — 0202U NFCT DS 22 TRGT SARS-COV-2: CPT

## 2022-01-01 PROCEDURE — 32557 INSERT CATH PLEURA W/ IMAGE: CPT

## 2022-01-01 PROCEDURE — 82947 ASSAY GLUCOSE BLOOD QUANT: CPT

## 2022-01-01 DEVICE — 3-PORT THROUGH-THE-PEG J-TUBE KIT
Type: IMPLANTABLE DEVICE | Site: ABDOMEN | Status: FUNCTIONAL
Brand: ENDOVIVE JEJUNAL FEEDING TUBE

## 2022-01-01 DEVICE — STENT SYSTEM
Type: IMPLANTABLE DEVICE | Site: ESOPHAGUS | Status: FUNCTIONAL
Brand: WALLFLEX™ ESOPHAGEAL

## 2022-01-01 RX ORDER — WATER 1000 ML/1000ML
INJECTION, SOLUTION INTRAVENOUS
Status: COMPLETED
Start: 2022-01-01 | End: 2022-01-01

## 2022-01-01 RX ORDER — SODIUM CHLORIDE 0.9 % (FLUSH) 0.9 %
10 SYRINGE (ML) INJECTION PRN
Status: DISCONTINUED | OUTPATIENT
Start: 2022-01-01 | End: 2022-01-01

## 2022-01-01 RX ORDER — HEPARIN SODIUM (PORCINE) LOCK FLUSH IV SOLN 100 UNIT/ML 100 UNIT/ML
3 SOLUTION INTRAVENOUS EVERY 12 HOURS SCHEDULED
Status: DISCONTINUED | OUTPATIENT
Start: 2022-01-01 | End: 2022-01-01

## 2022-01-01 RX ORDER — MIDAZOLAM HYDROCHLORIDE 2 MG/2ML
2 INJECTION, SOLUTION INTRAMUSCULAR; INTRAVENOUS ONCE
Status: COMPLETED | OUTPATIENT
Start: 2022-01-01 | End: 2022-01-01

## 2022-01-01 RX ORDER — MIDAZOLAM HYDROCHLORIDE 1 MG/ML
1-10 INJECTION, SOLUTION INTRAVENOUS CONTINUOUS
Status: DISCONTINUED | OUTPATIENT
Start: 2022-01-01 | End: 2022-01-01

## 2022-01-01 RX ORDER — OXYCODONE HYDROCHLORIDE 10 MG/1
10 TABLET ORAL EVERY 4 HOURS PRN
Status: DISCONTINUED | OUTPATIENT
Start: 2022-01-01 | End: 2022-01-01

## 2022-01-01 RX ORDER — WATER 1000 ML/1000ML
INJECTION, SOLUTION INTRAVENOUS
Status: DISPENSED
Start: 2022-01-01 | End: 2022-01-01

## 2022-01-01 RX ORDER — ETOMIDATE 2 MG/ML
INJECTION INTRAVENOUS
Status: COMPLETED
Start: 2022-01-01 | End: 2022-01-01

## 2022-01-01 RX ORDER — MAGNESIUM SULFATE 1 G/100ML
1000 INJECTION INTRAVENOUS PRN
Status: DISCONTINUED | OUTPATIENT
Start: 2022-01-01 | End: 2023-01-01

## 2022-01-01 RX ORDER — HEPARIN SODIUM 10000 [USP'U]/ML
5000 INJECTION, SOLUTION INTRAVENOUS; SUBCUTANEOUS EVERY 8 HOURS
Status: DISCONTINUED | OUTPATIENT
Start: 2022-01-01 | End: 2023-01-01

## 2022-01-01 RX ORDER — 0.9 % SODIUM CHLORIDE 0.9 %
1000 INTRAVENOUS SOLUTION INTRAVENOUS
Status: ACTIVE | OUTPATIENT
Start: 2022-01-01 | End: 2022-01-01

## 2022-01-01 RX ORDER — ONDANSETRON 4 MG/1
4 TABLET, ORALLY DISINTEGRATING ORAL EVERY 8 HOURS PRN
Status: DISCONTINUED | OUTPATIENT
Start: 2022-01-01 | End: 2023-01-01

## 2022-01-01 RX ORDER — ACETYLCYSTEINE 100 MG/ML
4 SOLUTION ORAL; RESPIRATORY (INHALATION)
Status: DISCONTINUED | OUTPATIENT
Start: 2022-01-01 | End: 2022-01-01

## 2022-01-01 RX ORDER — DEXTROSE, SODIUM CHLORIDE, SODIUM LACTATE, POTASSIUM CHLORIDE, AND CALCIUM CHLORIDE 5; .6; .31; .03; .02 G/100ML; G/100ML; G/100ML; G/100ML; G/100ML
INJECTION, SOLUTION INTRAVENOUS CONTINUOUS
Status: DISCONTINUED | OUTPATIENT
Start: 2022-01-01 | End: 2022-01-01

## 2022-01-01 RX ORDER — ACETYLCYSTEINE 100 MG/ML
4 SOLUTION ORAL; RESPIRATORY (INHALATION) EVERY 4 HOURS
Status: DISCONTINUED | OUTPATIENT
Start: 2022-01-01 | End: 2022-01-01

## 2022-01-01 RX ORDER — POTASSIUM CHLORIDE 7.45 MG/ML
10 INJECTION INTRAVENOUS
Status: COMPLETED | OUTPATIENT
Start: 2022-01-01 | End: 2022-01-01

## 2022-01-01 RX ORDER — SODIUM CHLORIDE, SODIUM LACTATE, POTASSIUM CHLORIDE, CALCIUM CHLORIDE 600; 310; 30; 20 MG/100ML; MG/100ML; MG/100ML; MG/100ML
INJECTION, SOLUTION INTRAVENOUS CONTINUOUS
Status: DISCONTINUED | OUTPATIENT
Start: 2022-01-01 | End: 2022-01-01

## 2022-01-01 RX ORDER — MAGNESIUM SULFATE IN WATER 40 MG/ML
2000 INJECTION, SOLUTION INTRAVENOUS ONCE
Status: COMPLETED | OUTPATIENT
Start: 2022-01-01 | End: 2022-01-01

## 2022-01-01 RX ORDER — WATER 1000 ML/1000ML
INJECTION, SOLUTION INTRAVENOUS
Status: DISCONTINUED
Start: 2022-01-01 | End: 2022-01-01

## 2022-01-01 RX ORDER — SODIUM CHLORIDE 0.9 % (FLUSH) 0.9 %
5-40 SYRINGE (ML) INJECTION EVERY 12 HOURS SCHEDULED
Status: DISCONTINUED | OUTPATIENT
Start: 2022-01-01 | End: 2023-01-01

## 2022-01-01 RX ORDER — LIDOCAINE HYDROCHLORIDE AND EPINEPHRINE 10; 10 MG/ML; UG/ML
INJECTION, SOLUTION INFILTRATION; PERINEURAL
Status: COMPLETED | OUTPATIENT
Start: 2022-01-01 | End: 2022-01-01

## 2022-01-01 RX ORDER — SODIUM CHLORIDE 9 MG/ML
INJECTION, SOLUTION INTRAVENOUS PRN
Status: DISCONTINUED | OUTPATIENT
Start: 2022-01-01 | End: 2022-01-01

## 2022-01-01 RX ORDER — LIDOCAINE HYDROCHLORIDE 10 MG/ML
5 INJECTION, SOLUTION EPIDURAL; INFILTRATION; INTRACAUDAL; PERINEURAL ONCE
Status: DISCONTINUED | OUTPATIENT
Start: 2022-01-01 | End: 2022-01-01

## 2022-01-01 RX ORDER — METOPROLOL TARTRATE 5 MG/5ML
5 INJECTION INTRAVENOUS EVERY 6 HOURS
Status: DISCONTINUED | OUTPATIENT
Start: 2022-01-01 | End: 2023-01-01

## 2022-01-01 RX ORDER — POTASSIUM CHLORIDE 29.8 MG/ML
20 INJECTION INTRAVENOUS ONCE
Status: COMPLETED | OUTPATIENT
Start: 2022-01-01 | End: 2022-01-01

## 2022-01-01 RX ORDER — MAGNESIUM SULFATE 1 G/100ML
1000 INJECTION INTRAVENOUS ONCE
Status: COMPLETED | OUTPATIENT
Start: 2022-01-01 | End: 2022-01-01

## 2022-01-01 RX ORDER — LEVOTHYROXINE SODIUM 0.03 MG/1
50 TABLET ORAL DAILY
Status: DISCONTINUED | OUTPATIENT
Start: 2022-01-01 | End: 2023-01-01

## 2022-01-01 RX ORDER — HEPARIN SODIUM (PORCINE) LOCK FLUSH IV SOLN 100 UNIT/ML 100 UNIT/ML
3 SOLUTION INTRAVENOUS PRN
Status: DISCONTINUED | OUTPATIENT
Start: 2022-01-01 | End: 2022-01-01

## 2022-01-01 RX ORDER — DOCUSATE SODIUM 50 MG/5ML
100 LIQUID ORAL 2 TIMES DAILY
Status: DISCONTINUED | OUTPATIENT
Start: 2022-01-01 | End: 2022-01-01

## 2022-01-01 RX ORDER — FENTANYL CITRATE 50 UG/ML
25 INJECTION, SOLUTION INTRAMUSCULAR; INTRAVENOUS ONCE
Status: COMPLETED | OUTPATIENT
Start: 2022-01-01 | End: 2022-01-01

## 2022-01-01 RX ORDER — MINERAL OIL AND WHITE PETROLATUM 150; 830 MG/G; MG/G
OINTMENT OPHTHALMIC EVERY 4 HOURS
Status: DISCONTINUED | OUTPATIENT
Start: 2022-01-01 | End: 2023-01-01

## 2022-01-01 RX ORDER — BUDESONIDE 0.5 MG/2ML
0.5 INHALANT ORAL 2 TIMES DAILY
Status: DISCONTINUED | OUTPATIENT
Start: 2022-01-01 | End: 2023-01-01

## 2022-01-01 RX ORDER — POTASSIUM CHLORIDE 29.8 MG/ML
20 INJECTION INTRAVENOUS PRN
Status: DISCONTINUED | OUTPATIENT
Start: 2022-01-01 | End: 2023-01-01

## 2022-01-01 RX ORDER — INSULIN GLARGINE-YFGN 100 [IU]/ML
5 INJECTION, SOLUTION SUBCUTANEOUS DAILY
Status: DISCONTINUED | OUTPATIENT
Start: 2022-01-01 | End: 2023-01-01

## 2022-01-01 RX ORDER — METHYLENE BLUE 10 MG/ML
1 INJECTION INTRAVENOUS ONCE
Status: COMPLETED | OUTPATIENT
Start: 2022-01-01 | End: 2022-01-01

## 2022-01-01 RX ORDER — ALBUTEROL SULFATE 2.5 MG/3ML
2.5 SOLUTION RESPIRATORY (INHALATION)
Status: DISCONTINUED | OUTPATIENT
Start: 2022-01-01 | End: 2023-01-01

## 2022-01-01 RX ORDER — ARFORMOTEROL TARTRATE 15 UG/2ML
15 SOLUTION RESPIRATORY (INHALATION) 2 TIMES DAILY
Status: DISCONTINUED | OUTPATIENT
Start: 2022-01-01 | End: 2023-01-01

## 2022-01-01 RX ORDER — PROPOFOL 10 MG/ML
INJECTION, EMULSION INTRAVENOUS
Status: DISPENSED
Start: 2022-01-01 | End: 2022-01-01

## 2022-01-01 RX ORDER — ROSUVASTATIN CALCIUM 20 MG/1
20 TABLET, COATED ORAL NIGHTLY
Status: DISCONTINUED | OUTPATIENT
Start: 2022-01-01 | End: 2022-01-01

## 2022-01-01 RX ORDER — SODIUM CHLORIDE 9 MG/ML
INJECTION, SOLUTION INTRAVENOUS PRN
Status: DISCONTINUED | OUTPATIENT
Start: 2022-01-01 | End: 2023-01-01

## 2022-01-01 RX ORDER — SODIUM CHLORIDE, SODIUM LACTATE, POTASSIUM CHLORIDE, AND CALCIUM CHLORIDE .6; .31; .03; .02 G/100ML; G/100ML; G/100ML; G/100ML
1000 INJECTION, SOLUTION INTRAVENOUS ONCE
Status: COMPLETED | OUTPATIENT
Start: 2022-01-01 | End: 2022-01-01

## 2022-01-01 RX ORDER — POTASSIUM CHLORIDE 20 MEQ/1
40 TABLET, EXTENDED RELEASE ORAL ONCE
Status: DISCONTINUED | OUTPATIENT
Start: 2022-01-01 | End: 2022-01-01

## 2022-01-01 RX ORDER — FENTANYL CITRATE 50 UG/ML
25 INJECTION, SOLUTION INTRAMUSCULAR; INTRAVENOUS ONCE
Status: DISCONTINUED | OUTPATIENT
Start: 2022-01-01 | End: 2022-01-01

## 2022-01-01 RX ORDER — METOPROLOL TARTRATE 5 MG/5ML
5 INJECTION INTRAVENOUS ONCE
Status: COMPLETED | OUTPATIENT
Start: 2022-01-01 | End: 2022-01-01

## 2022-01-01 RX ORDER — 0.9 % SODIUM CHLORIDE 0.9 %
1000 INTRAVENOUS SOLUTION INTRAVENOUS ONCE
Status: COMPLETED | OUTPATIENT
Start: 2022-01-01 | End: 2022-01-01

## 2022-01-01 RX ORDER — DEXTROSE MONOHYDRATE 100 MG/ML
INJECTION, SOLUTION INTRAVENOUS CONTINUOUS PRN
Status: DISCONTINUED | OUTPATIENT
Start: 2022-01-01 | End: 2023-01-01

## 2022-01-01 RX ORDER — SODIUM CHLORIDE 0.9 % (FLUSH) 0.9 %
5-40 SYRINGE (ML) INJECTION PRN
Status: DISCONTINUED | OUTPATIENT
Start: 2022-01-01 | End: 2023-01-01

## 2022-01-01 RX ORDER — INSULIN LISPRO 100 [IU]/ML
0-8 INJECTION, SOLUTION INTRAVENOUS; SUBCUTANEOUS EVERY 4 HOURS
Status: DISCONTINUED | OUTPATIENT
Start: 2022-01-01 | End: 2022-01-01

## 2022-01-01 RX ORDER — OXYCODONE HYDROCHLORIDE 5 MG/1
5 TABLET ORAL EVERY 4 HOURS PRN
Status: DISCONTINUED | OUTPATIENT
Start: 2022-01-01 | End: 2022-01-01

## 2022-01-01 RX ORDER — ENOXAPARIN SODIUM 100 MG/ML
30 INJECTION SUBCUTANEOUS DAILY
Status: DISCONTINUED | OUTPATIENT
Start: 2022-01-01 | End: 2022-01-01

## 2022-01-01 RX ORDER — ONDANSETRON 2 MG/ML
4 INJECTION INTRAMUSCULAR; INTRAVENOUS EVERY 6 HOURS PRN
Status: DISCONTINUED | OUTPATIENT
Start: 2022-01-01 | End: 2023-01-01

## 2022-01-01 RX ORDER — ACETAMINOPHEN 325 MG/1
650 TABLET ORAL EVERY 6 HOURS PRN
Status: DISCONTINUED | OUTPATIENT
Start: 2022-01-01 | End: 2022-01-01

## 2022-01-01 RX ORDER — FENTANYL CITRATE 50 UG/ML
25 INJECTION, SOLUTION INTRAMUSCULAR; INTRAVENOUS EVERY 4 HOURS PRN
Status: DISCONTINUED | OUTPATIENT
Start: 2022-01-01 | End: 2022-01-01

## 2022-01-01 RX ORDER — ROSUVASTATIN CALCIUM 10 MG/1
10 TABLET, COATED ORAL NIGHTLY
Status: DISCONTINUED | OUTPATIENT
Start: 2022-01-01 | End: 2023-01-01

## 2022-01-01 RX ORDER — ACETAMINOPHEN 650 MG/1
650 SUPPOSITORY RECTAL EVERY 6 HOURS PRN
Status: DISCONTINUED | OUTPATIENT
Start: 2022-01-01 | End: 2023-01-01

## 2022-01-01 RX ORDER — SODIUM CHLORIDE, SODIUM LACTATE, POTASSIUM CHLORIDE, AND CALCIUM CHLORIDE .6; .31; .03; .02 G/100ML; G/100ML; G/100ML; G/100ML
250 INJECTION, SOLUTION INTRAVENOUS ONCE
Status: COMPLETED | OUTPATIENT
Start: 2022-01-01 | End: 2022-01-01

## 2022-01-01 RX ORDER — VITAMIN B COMPLEX
1000 TABLET ORAL DAILY
Status: DISCONTINUED | OUTPATIENT
Start: 2022-01-01 | End: 2023-01-01

## 2022-01-01 RX ORDER — ENOXAPARIN SODIUM 100 MG/ML
40 INJECTION SUBCUTANEOUS DAILY
Status: DISCONTINUED | OUTPATIENT
Start: 2022-01-01 | End: 2022-01-01 | Stop reason: DRUGHIGH

## 2022-01-01 RX ORDER — SUCCINYLCHOLINE CHLORIDE 20 MG/ML
INJECTION INTRAMUSCULAR; INTRAVENOUS
Status: COMPLETED
Start: 2022-01-01 | End: 2022-01-01

## 2022-01-01 RX ORDER — SODIUM CHLORIDE 0.9 % (FLUSH) 0.9 %
5-40 SYRINGE (ML) INJECTION EVERY 12 HOURS SCHEDULED
Status: DISCONTINUED | OUTPATIENT
Start: 2022-01-01 | End: 2022-01-01

## 2022-01-01 RX ORDER — PANTOPRAZOLE SODIUM 40 MG/1
40 TABLET, DELAYED RELEASE ORAL DAILY
Status: DISCONTINUED | OUTPATIENT
Start: 2022-01-01 | End: 2022-01-01

## 2022-01-01 RX ORDER — INSULIN LISPRO 100 [IU]/ML
0-16 INJECTION, SOLUTION INTRAVENOUS; SUBCUTANEOUS EVERY 4 HOURS
Status: DISCONTINUED | OUTPATIENT
Start: 2022-01-01 | End: 2023-01-01

## 2022-01-01 RX ORDER — SODIUM CHLORIDE FOR INHALATION 3 %
4 VIAL, NEBULIZER (ML) INHALATION EVERY 6 HOURS
Status: DISCONTINUED | OUTPATIENT
Start: 2022-01-01 | End: 2022-01-01

## 2022-01-01 RX ORDER — IPRATROPIUM BROMIDE AND ALBUTEROL SULFATE 2.5; .5 MG/3ML; MG/3ML
1 SOLUTION RESPIRATORY (INHALATION) EVERY 4 HOURS PRN
Status: DISCONTINUED | OUTPATIENT
Start: 2022-01-01 | End: 2023-01-01

## 2022-01-01 RX ORDER — PROPOFOL 10 MG/ML
5-50 INJECTION, EMULSION INTRAVENOUS CONTINUOUS
Status: DISCONTINUED | OUTPATIENT
Start: 2022-01-01 | End: 2022-01-01

## 2022-01-01 RX ORDER — SODIUM CHLORIDE, SODIUM LACTATE, POTASSIUM CHLORIDE, AND CALCIUM CHLORIDE .6; .31; .03; .02 G/100ML; G/100ML; G/100ML; G/100ML
1000 INJECTION, SOLUTION INTRAVENOUS ONCE
Status: DISCONTINUED | OUTPATIENT
Start: 2022-01-01 | End: 2022-01-01

## 2022-01-01 RX ORDER — MIDAZOLAM HYDROCHLORIDE 1 MG/ML
INJECTION INTRAMUSCULAR; INTRAVENOUS
Status: COMPLETED
Start: 2022-01-01 | End: 2022-01-01

## 2022-01-01 RX ORDER — ALBUTEROL SULFATE 2.5 MG/3ML
2.5 SOLUTION RESPIRATORY (INHALATION) 4 TIMES DAILY
Status: DISCONTINUED | OUTPATIENT
Start: 2022-01-01 | End: 2022-01-01

## 2022-01-01 RX ORDER — SODIUM CHLORIDE 9 MG/ML
INJECTION, SOLUTION INTRAVENOUS CONTINUOUS
Status: DISCONTINUED | OUTPATIENT
Start: 2022-01-01 | End: 2023-01-01

## 2022-01-01 RX ORDER — MIDODRINE HYDROCHLORIDE 10 MG/1
10 TABLET ORAL
Status: DISCONTINUED | OUTPATIENT
Start: 2022-01-01 | End: 2022-01-01

## 2022-01-01 RX ORDER — IPRATROPIUM BROMIDE AND ALBUTEROL SULFATE 2.5; .5 MG/3ML; MG/3ML
1 SOLUTION RESPIRATORY (INHALATION) EVERY 6 HOURS
Status: DISCONTINUED | OUTPATIENT
Start: 2022-01-01 | End: 2022-01-01

## 2022-01-01 RX ORDER — VECURONIUM BROMIDE 1 MG/ML
INJECTION, POWDER, LYOPHILIZED, FOR SOLUTION INTRAVENOUS
Status: COMPLETED
Start: 2022-01-01 | End: 2022-01-01

## 2022-01-01 RX ORDER — ACETYLCYSTEINE 100 MG/ML
4 SOLUTION ORAL; RESPIRATORY (INHALATION) 2 TIMES DAILY
Status: DISCONTINUED | OUTPATIENT
Start: 2022-01-01 | End: 2022-01-01

## 2022-01-01 RX ORDER — INSULIN LISPRO 100 [IU]/ML
0-4 INJECTION, SOLUTION INTRAVENOUS; SUBCUTANEOUS EVERY 4 HOURS
Status: DISCONTINUED | OUTPATIENT
Start: 2022-01-01 | End: 2022-01-01

## 2022-01-01 RX ORDER — FENTANYL CITRATE 50 UG/ML
25 INJECTION, SOLUTION INTRAMUSCULAR; INTRAVENOUS
Status: DISCONTINUED | OUTPATIENT
Start: 2022-01-01 | End: 2022-01-01

## 2022-01-01 RX ORDER — SODIUM CHLORIDE 9 MG/ML
INJECTION, SOLUTION INTRAVENOUS CONTINUOUS
Status: DISCONTINUED | OUTPATIENT
Start: 2022-01-01 | End: 2022-01-01

## 2022-01-01 RX ORDER — METOCLOPRAMIDE HYDROCHLORIDE 5 MG/ML
10 INJECTION INTRAMUSCULAR; INTRAVENOUS EVERY 6 HOURS
Status: DISCONTINUED | OUTPATIENT
Start: 2022-01-01 | End: 2022-01-01

## 2022-01-01 RX ORDER — AZITHROMYCIN 250 MG/1
500 TABLET, FILM COATED ORAL EVERY 24 HOURS
Status: DISCONTINUED | OUTPATIENT
Start: 2022-01-01 | End: 2022-01-01

## 2022-01-01 RX ORDER — POTASSIUM CHLORIDE 7.45 MG/ML
10 INJECTION INTRAVENOUS ONCE
Status: COMPLETED | OUTPATIENT
Start: 2022-01-01 | End: 2022-01-01

## 2022-01-01 RX ADMIN — INSULIN LISPRO 4 UNITS: 100 INJECTION, SOLUTION INTRAVENOUS; SUBCUTANEOUS at 04:37

## 2022-01-01 RX ADMIN — ALBUTEROL SULFATE 2.5 MG: 2.5 SOLUTION RESPIRATORY (INHALATION) at 19:41

## 2022-01-01 RX ADMIN — ALBUTEROL SULFATE 2.5 MG: 2.5 SOLUTION RESPIRATORY (INHALATION) at 09:03

## 2022-01-01 RX ADMIN — ROSUVASTATIN 10 MG: 10 TABLET, FILM COATED ORAL at 21:34

## 2022-01-01 RX ADMIN — ROSUVASTATIN 10 MG: 10 TABLET, FILM COATED ORAL at 19:53

## 2022-01-01 RX ADMIN — MINERAL OIL, WHITE PETROLATUM: .03; .94 OINTMENT OPHTHALMIC at 10:07

## 2022-01-01 RX ADMIN — ALBUTEROL SULFATE 2.5 MG: 2.5 SOLUTION RESPIRATORY (INHALATION) at 08:49

## 2022-01-01 RX ADMIN — MIDAZOLAM HYDROCHLORIDE 2 MG: 2 INJECTION, SOLUTION INTRAMUSCULAR; INTRAVENOUS at 02:26

## 2022-01-01 RX ADMIN — BUDESONIDE 500 MCG: 0.5 SUSPENSION RESPIRATORY (INHALATION) at 08:34

## 2022-01-01 RX ADMIN — Medication: at 01:20

## 2022-01-01 RX ADMIN — INSULIN LISPRO 4 UNITS: 100 INJECTION, SOLUTION INTRAVENOUS; SUBCUTANEOUS at 18:20

## 2022-01-01 RX ADMIN — SODIUM CHLORIDE, PRESERVATIVE FREE 10 ML: 5 INJECTION INTRAVENOUS at 19:54

## 2022-01-01 RX ADMIN — SODIUM CHLORIDE, PRESERVATIVE FREE 40 MG: 5 INJECTION INTRAVENOUS at 20:48

## 2022-01-01 RX ADMIN — FENTANYL CITRATE 25 MCG: 50 INJECTION, SOLUTION INTRAMUSCULAR; INTRAVENOUS at 02:39

## 2022-01-01 RX ADMIN — INSULIN LISPRO 4 UNITS: 100 INJECTION, SOLUTION INTRAVENOUS; SUBCUTANEOUS at 18:02

## 2022-01-01 RX ADMIN — VITAMIN D, TAB 1000IU (100/BT) 1000 UNITS: 25 TAB at 08:32

## 2022-01-01 RX ADMIN — HEPARIN SODIUM 5000 UNITS: 10000 INJECTION INTRAVENOUS; SUBCUTANEOUS at 00:23

## 2022-01-01 RX ADMIN — MINERAL OIL, WHITE PETROLATUM: .03; .94 OINTMENT OPHTHALMIC at 10:12

## 2022-01-01 RX ADMIN — MINERAL OIL, WHITE PETROLATUM: .03; .94 OINTMENT OPHTHALMIC at 18:54

## 2022-01-01 RX ADMIN — ACETYLCYSTEINE 400 MG: 100 INHALANT RESPIRATORY (INHALATION) at 15:14

## 2022-01-01 RX ADMIN — Medication: at 04:15

## 2022-01-01 RX ADMIN — INSULIN LISPRO 4 UNITS: 100 INJECTION, SOLUTION INTRAVENOUS; SUBCUTANEOUS at 14:41

## 2022-01-01 RX ADMIN — ALBUTEROL SULFATE 2.5 MG: 2.5 SOLUTION RESPIRATORY (INHALATION) at 20:32

## 2022-01-01 RX ADMIN — METOCLOPRAMIDE HYDROCHLORIDE 10 MG: 5 INJECTION INTRAMUSCULAR; INTRAVENOUS at 21:22

## 2022-01-01 RX ADMIN — SODIUM CHLORIDE 1000 ML: 9 INJECTION, SOLUTION INTRAVENOUS at 13:07

## 2022-01-01 RX ADMIN — ACETYLCYSTEINE 400 MG: 100 INHALANT RESPIRATORY (INHALATION) at 08:55

## 2022-01-01 RX ADMIN — DEXTROSE MONOHYDRATE 200 MG: 50 INJECTION, SOLUTION INTRAVENOUS at 16:12

## 2022-01-01 RX ADMIN — Medication 50 MCG/HR: at 20:51

## 2022-01-01 RX ADMIN — MINERAL OIL, WHITE PETROLATUM: .03; .94 OINTMENT OPHTHALMIC at 01:58

## 2022-01-01 RX ADMIN — SODIUM CHLORIDE, PRESERVATIVE FREE 40 MG: 5 INJECTION INTRAVENOUS at 19:20

## 2022-01-01 RX ADMIN — ALBUTEROL SULFATE 2.5 MG: 2.5 SOLUTION RESPIRATORY (INHALATION) at 19:42

## 2022-01-01 RX ADMIN — ACETYLCYSTEINE 400 MG: 100 INHALANT RESPIRATORY (INHALATION) at 19:46

## 2022-01-01 RX ADMIN — ALBUTEROL SULFATE 2.5 MG: 2.5 SOLUTION RESPIRATORY (INHALATION) at 20:12

## 2022-01-01 RX ADMIN — SODIUM CHLORIDE: 9 INJECTION, SOLUTION INTRAVENOUS at 17:37

## 2022-01-01 RX ADMIN — ARFORMOTEROL TARTRATE 15 MCG: 15 SOLUTION RESPIRATORY (INHALATION) at 21:45

## 2022-01-01 RX ADMIN — INSULIN LISPRO 2 UNITS: 100 INJECTION, SOLUTION INTRAVENOUS; SUBCUTANEOUS at 00:29

## 2022-01-01 RX ADMIN — FENTANYL CITRATE 25 MCG: 50 INJECTION, SOLUTION INTRAMUSCULAR; INTRAVENOUS at 05:06

## 2022-01-01 RX ADMIN — CALCIUM GLUCONATE: 98 INJECTION, SOLUTION INTRAVENOUS at 20:47

## 2022-01-01 RX ADMIN — ALBUTEROL SULFATE 2.5 MG: 2.5 SOLUTION RESPIRATORY (INHALATION) at 14:35

## 2022-01-01 RX ADMIN — SODIUM CHLORIDE, PRESERVATIVE FREE 10 ML: 5 INJECTION INTRAVENOUS at 08:59

## 2022-01-01 RX ADMIN — ARFORMOTEROL TARTRATE 15 MCG: 15 SOLUTION RESPIRATORY (INHALATION) at 19:26

## 2022-01-01 RX ADMIN — POTASSIUM CHLORIDE 20 MEQ: 29.8 INJECTION, SOLUTION INTRAVENOUS at 09:59

## 2022-01-01 RX ADMIN — ERTAPENEM SODIUM 1000 MG: 1 INJECTION, POWDER, LYOPHILIZED, FOR SOLUTION INTRAMUSCULAR; INTRAVENOUS at 15:07

## 2022-01-01 RX ADMIN — ARFORMOTEROL TARTRATE 15 MCG: 15 SOLUTION RESPIRATORY (INHALATION) at 20:46

## 2022-01-01 RX ADMIN — Medication: at 00:48

## 2022-01-01 RX ADMIN — MINERAL OIL, WHITE PETROLATUM: .03; .94 OINTMENT OPHTHALMIC at 01:34

## 2022-01-01 RX ADMIN — SODIUM CHLORIDE, PRESERVATIVE FREE 40 MG: 5 INJECTION INTRAVENOUS at 06:37

## 2022-01-01 RX ADMIN — MINERAL OIL, WHITE PETROLATUM: .03; .94 OINTMENT OPHTHALMIC at 18:03

## 2022-01-01 RX ADMIN — METOPROLOL TARTRATE 5 MG: 5 INJECTION, SOLUTION INTRAVENOUS at 08:46

## 2022-01-01 RX ADMIN — SODIUM CHLORIDE: 9 INJECTION, SOLUTION INTRAVENOUS at 23:00

## 2022-01-01 RX ADMIN — ROSUVASTATIN 10 MG: 10 TABLET, FILM COATED ORAL at 20:52

## 2022-01-01 RX ADMIN — MINERAL OIL, WHITE PETROLATUM: .03; .94 OINTMENT OPHTHALMIC at 14:07

## 2022-01-01 RX ADMIN — SODIUM CHLORIDE, PRESERVATIVE FREE 40 MG: 5 INJECTION INTRAVENOUS at 06:15

## 2022-01-01 RX ADMIN — Medication 2 MG/HR: at 08:07

## 2022-01-01 RX ADMIN — SODIUM PHOSPHATE, MONOBASIC, MONOHYDRATE AND SODIUM PHOSPHATE, DIBASIC, ANHYDROUS 18 MMOL: 142; 276 INJECTION, SOLUTION INTRAVENOUS at 08:24

## 2022-01-01 RX ADMIN — ALBUTEROL SULFATE 2.5 MG: 2.5 SOLUTION RESPIRATORY (INHALATION) at 16:10

## 2022-01-01 RX ADMIN — MINERAL OIL, WHITE PETROLATUM: .03; .94 OINTMENT OPHTHALMIC at 22:14

## 2022-01-01 RX ADMIN — SODIUM CHLORIDE, PRESERVATIVE FREE 10 ML: 5 INJECTION INTRAVENOUS at 08:24

## 2022-01-01 RX ADMIN — SODIUM CHLORIDE, PRESERVATIVE FREE 40 MG: 5 INJECTION INTRAVENOUS at 20:07

## 2022-01-01 RX ADMIN — ALBUTEROL SULFATE 2.5 MG: 2.5 SOLUTION RESPIRATORY (INHALATION) at 08:34

## 2022-01-01 RX ADMIN — HEPARIN SODIUM 5000 UNITS: 10000 INJECTION INTRAVENOUS; SUBCUTANEOUS at 18:15

## 2022-01-01 RX ADMIN — ERTAPENEM SODIUM 1000 MG: 1 INJECTION, POWDER, LYOPHILIZED, FOR SOLUTION INTRAMUSCULAR; INTRAVENOUS at 15:32

## 2022-01-01 RX ADMIN — SODIUM CHLORIDE, PRESERVATIVE FREE 40 MG: 5 INJECTION INTRAVENOUS at 05:55

## 2022-01-01 RX ADMIN — MINERAL OIL, WHITE PETROLATUM: .03; .94 OINTMENT OPHTHALMIC at 08:59

## 2022-01-01 RX ADMIN — LEVOTHYROXINE SODIUM 50 MCG: 0.03 TABLET ORAL at 06:24

## 2022-01-01 RX ADMIN — ERTAPENEM SODIUM 1000 MG: 1 INJECTION, POWDER, LYOPHILIZED, FOR SOLUTION INTRAMUSCULAR; INTRAVENOUS at 15:26

## 2022-01-01 RX ADMIN — EPOPROSTENOL 50 NG/KG/MIN: 1.5 INJECTION, POWDER, LYOPHILIZED, FOR SOLUTION INTRAVENOUS at 15:06

## 2022-01-01 RX ADMIN — SODIUM CHLORIDE, PRESERVATIVE FREE 10 ML: 5 INJECTION INTRAVENOUS at 10:10

## 2022-01-01 RX ADMIN — ALBUTEROL SULFATE 2.5 MG: 2.5 SOLUTION RESPIRATORY (INHALATION) at 00:00

## 2022-01-01 RX ADMIN — Medication 100 MCG/HR: at 09:46

## 2022-01-01 RX ADMIN — ARFORMOTEROL TARTRATE 15 MCG: 15 SOLUTION RESPIRATORY (INHALATION) at 20:11

## 2022-01-01 RX ADMIN — SODIUM PHOSPHATE, MONOBASIC, MONOHYDRATE AND SODIUM PHOSPHATE, DIBASIC, ANHYDROUS 6 MMOL: 142; 276 INJECTION, SOLUTION INTRAVENOUS at 20:56

## 2022-01-01 RX ADMIN — MINERAL OIL, WHITE PETROLATUM: .03; .94 OINTMENT OPHTHALMIC at 01:14

## 2022-01-01 RX ADMIN — METOCLOPRAMIDE HYDROCHLORIDE 10 MG: 5 INJECTION INTRAMUSCULAR; INTRAVENOUS at 09:47

## 2022-01-01 RX ADMIN — CALCIUM CHLORIDE INJECTION 8000 MG: 100 INJECTION, SOLUTION INTRAVENOUS at 18:16

## 2022-01-01 RX ADMIN — METOPROLOL TARTRATE 5 MG: 5 INJECTION, SOLUTION INTRAVENOUS at 02:35

## 2022-01-01 RX ADMIN — BUDESONIDE 500 MCG: 0.5 SUSPENSION RESPIRATORY (INHALATION) at 19:46

## 2022-01-01 RX ADMIN — SODIUM CHLORIDE: 9 INJECTION, SOLUTION INTRAVENOUS at 02:27

## 2022-01-01 RX ADMIN — EPOPROSTENOL 50 NG/KG/MIN: 1.5 INJECTION, POWDER, LYOPHILIZED, FOR SOLUTION INTRAVENOUS at 02:03

## 2022-01-01 RX ADMIN — INSULIN GLARGINE-YFGN 5 UNITS: 100 INJECTION, SOLUTION SUBCUTANEOUS at 09:48

## 2022-01-01 RX ADMIN — INSULIN LISPRO 1 UNITS: 100 INJECTION, SOLUTION INTRAVENOUS; SUBCUTANEOUS at 04:25

## 2022-01-01 RX ADMIN — DEXTROSE MONOHYDRATE 100 MG: 50 INJECTION, SOLUTION INTRAVENOUS at 17:02

## 2022-01-01 RX ADMIN — METOCLOPRAMIDE HYDROCHLORIDE 10 MG: 5 INJECTION INTRAMUSCULAR; INTRAVENOUS at 02:10

## 2022-01-01 RX ADMIN — CALCIUM CHLORIDE INJECTION 8000 MG: 100 INJECTION, SOLUTION INTRAVENOUS at 12:59

## 2022-01-01 RX ADMIN — ALBUTEROL SULFATE 2.5 MG: 2.5 SOLUTION RESPIRATORY (INHALATION) at 08:12

## 2022-01-01 RX ADMIN — CALCIUM CHLORIDE INJECTION 8000 MG: 100 INJECTION, SOLUTION INTRAVENOUS at 08:55

## 2022-01-01 RX ADMIN — CALCIUM CHLORIDE INJECTION 8000 MG: 100 INJECTION, SOLUTION INTRAVENOUS at 15:43

## 2022-01-01 RX ADMIN — ALBUTEROL SULFATE 2.5 MG: 2.5 SOLUTION RESPIRATORY (INHALATION) at 16:20

## 2022-01-01 RX ADMIN — BUDESONIDE 500 MCG: 0.5 SUSPENSION RESPIRATORY (INHALATION) at 08:21

## 2022-01-01 RX ADMIN — ACETYLCYSTEINE 400 MG: 100 INHALANT RESPIRATORY (INHALATION) at 19:42

## 2022-01-01 RX ADMIN — DOXYCYCLINE 100 MG: 100 INJECTION, POWDER, LYOPHILIZED, FOR SOLUTION INTRAVENOUS at 11:18

## 2022-01-01 RX ADMIN — SODIUM CHLORIDE, PRESERVATIVE FREE 40 MG: 5 INJECTION INTRAVENOUS at 18:09

## 2022-01-01 RX ADMIN — BUDESONIDE 500 MCG: 0.5 SUSPENSION RESPIRATORY (INHALATION) at 09:03

## 2022-01-01 RX ADMIN — ERTAPENEM SODIUM 500 MG: 1 INJECTION, POWDER, LYOPHILIZED, FOR SOLUTION INTRAMUSCULAR; INTRAVENOUS at 19:48

## 2022-01-01 RX ADMIN — Medication 2 MG/HR: at 17:31

## 2022-01-01 RX ADMIN — ROSUVASTATIN 10 MG: 10 TABLET, FILM COATED ORAL at 20:59

## 2022-01-01 RX ADMIN — METOCLOPRAMIDE HYDROCHLORIDE 10 MG: 5 INJECTION INTRAMUSCULAR; INTRAVENOUS at 09:03

## 2022-01-01 RX ADMIN — DEXTROSE MONOHYDRATE 100 MG: 50 INJECTION, SOLUTION INTRAVENOUS at 16:29

## 2022-01-01 RX ADMIN — SODIUM CHLORIDE, PRESERVATIVE FREE 40 MG: 5 INJECTION INTRAVENOUS at 09:57

## 2022-01-01 RX ADMIN — MINERAL OIL, WHITE PETROLATUM: .03; .94 OINTMENT OPHTHALMIC at 21:09

## 2022-01-01 RX ADMIN — METOCLOPRAMIDE HYDROCHLORIDE 10 MG: 5 INJECTION INTRAMUSCULAR; INTRAVENOUS at 07:39

## 2022-01-01 RX ADMIN — CEFTRIAXONE 1000 MG: 1 INJECTION, POWDER, FOR SOLUTION INTRAMUSCULAR; INTRAVENOUS at 11:17

## 2022-01-01 RX ADMIN — SODIUM CHLORIDE, PRESERVATIVE FREE 40 MG: 5 INJECTION INTRAVENOUS at 05:51

## 2022-01-01 RX ADMIN — INSULIN LISPRO 4 UNITS: 100 INJECTION, SOLUTION INTRAVENOUS; SUBCUTANEOUS at 23:49

## 2022-01-01 RX ADMIN — INSULIN LISPRO 2 UNITS: 100 INJECTION, SOLUTION INTRAVENOUS; SUBCUTANEOUS at 22:13

## 2022-01-01 RX ADMIN — MINERAL OIL, WHITE PETROLATUM: .03; .94 OINTMENT OPHTHALMIC at 05:38

## 2022-01-01 RX ADMIN — BUDESONIDE 500 MCG: 0.5 SUSPENSION RESPIRATORY (INHALATION) at 20:05

## 2022-01-01 RX ADMIN — SENNOSIDES 5 ML: 8.8 SYRUP ORAL at 21:59

## 2022-01-01 RX ADMIN — HYDROMORPHONE HYDROCHLORIDE 0.5 MG: 1 INJECTION, SOLUTION INTRAMUSCULAR; INTRAVENOUS; SUBCUTANEOUS at 22:13

## 2022-01-01 RX ADMIN — VITAMIN D, TAB 1000IU (100/BT) 1000 UNITS: 25 TAB at 18:26

## 2022-01-01 RX ADMIN — ARFORMOTEROL TARTRATE 15 MCG: 15 SOLUTION RESPIRATORY (INHALATION) at 08:55

## 2022-01-01 RX ADMIN — ERTAPENEM SODIUM 1000 MG: 1 INJECTION, POWDER, LYOPHILIZED, FOR SOLUTION INTRAMUSCULAR; INTRAVENOUS at 16:09

## 2022-01-01 RX ADMIN — MINERAL OIL, WHITE PETROLATUM: .03; .94 OINTMENT OPHTHALMIC at 09:14

## 2022-01-01 RX ADMIN — METOPROLOL TARTRATE 5 MG: 5 INJECTION, SOLUTION INTRAVENOUS at 20:14

## 2022-01-01 RX ADMIN — LEVOTHYROXINE SODIUM 50 MCG: 0.03 TABLET ORAL at 08:03

## 2022-01-01 RX ADMIN — MINERAL OIL, WHITE PETROLATUM: .03; .94 OINTMENT OPHTHALMIC at 21:10

## 2022-01-01 RX ADMIN — Medication 2 MCG/MIN: at 18:31

## 2022-01-01 RX ADMIN — MINERAL OIL, WHITE PETROLATUM: .03; .94 OINTMENT OPHTHALMIC at 06:03

## 2022-01-01 RX ADMIN — ACETYLCYSTEINE 400 MG: 100 INHALANT RESPIRATORY (INHALATION) at 03:06

## 2022-01-01 RX ADMIN — ARFORMOTEROL TARTRATE 15 MCG: 15 SOLUTION RESPIRATORY (INHALATION) at 19:42

## 2022-01-01 RX ADMIN — HEPARIN SODIUM 5000 UNITS: 10000 INJECTION INTRAVENOUS; SUBCUTANEOUS at 08:03

## 2022-01-01 RX ADMIN — SODIUM CHLORIDE, PRESERVATIVE FREE 40 MG: 5 INJECTION INTRAVENOUS at 18:12

## 2022-01-01 RX ADMIN — INSULIN LISPRO 2 UNITS: 100 INJECTION, SOLUTION INTRAVENOUS; SUBCUTANEOUS at 17:28

## 2022-01-01 RX ADMIN — SODIUM CHLORIDE, PRESERVATIVE FREE 10 ML: 5 INJECTION INTRAVENOUS at 09:00

## 2022-01-01 RX ADMIN — METOCLOPRAMIDE HYDROCHLORIDE 10 MG: 5 INJECTION INTRAMUSCULAR; INTRAVENOUS at 08:54

## 2022-01-01 RX ADMIN — SODIUM CHLORIDE, PRESERVATIVE FREE 10 ML: 5 INJECTION INTRAVENOUS at 20:47

## 2022-01-01 RX ADMIN — INSULIN LISPRO 2 UNITS: 100 INJECTION, SOLUTION INTRAVENOUS; SUBCUTANEOUS at 06:12

## 2022-01-01 RX ADMIN — ONDANSETRON 4 MG: 2 INJECTION INTRAMUSCULAR; INTRAVENOUS at 06:34

## 2022-01-01 RX ADMIN — ACETYLCYSTEINE 400 MG: 100 INHALANT RESPIRATORY (INHALATION) at 16:16

## 2022-01-01 RX ADMIN — CALCIUM GLUCONATE: 98 INJECTION, SOLUTION INTRAVENOUS at 18:07

## 2022-01-01 RX ADMIN — Medication: at 02:01

## 2022-01-01 RX ADMIN — ALBUTEROL SULFATE 2.5 MG: 2.5 SOLUTION RESPIRATORY (INHALATION) at 20:29

## 2022-01-01 RX ADMIN — Medication: at 06:59

## 2022-01-01 RX ADMIN — HEPARIN SODIUM 5000 UNITS: 10000 INJECTION INTRAVENOUS; SUBCUTANEOUS at 16:06

## 2022-01-01 RX ADMIN — OXYCODONE 10 MG: 5 TABLET ORAL at 16:26

## 2022-01-01 RX ADMIN — ALBUTEROL SULFATE 2.5 MG: 2.5 SOLUTION RESPIRATORY (INHALATION) at 05:10

## 2022-01-01 RX ADMIN — ARFORMOTEROL TARTRATE 15 MCG: 15 SOLUTION RESPIRATORY (INHALATION) at 20:37

## 2022-01-01 RX ADMIN — SODIUM CHLORIDE, PRESERVATIVE FREE 40 MG: 5 INJECTION INTRAVENOUS at 05:53

## 2022-01-01 RX ADMIN — INSULIN LISPRO 2 UNITS: 100 INJECTION, SOLUTION INTRAVENOUS; SUBCUTANEOUS at 18:17

## 2022-01-01 RX ADMIN — ALBUTEROL SULFATE 2.5 MG: 2.5 SOLUTION RESPIRATORY (INHALATION) at 20:11

## 2022-01-01 RX ADMIN — CALCIUM CHLORIDE INJECTION 8000 MG: 100 INJECTION, SOLUTION INTRAVENOUS at 08:20

## 2022-01-01 RX ADMIN — HEPARIN SODIUM 5000 UNITS: 10000 INJECTION INTRAVENOUS; SUBCUTANEOUS at 09:04

## 2022-01-01 RX ADMIN — ALBUTEROL SULFATE 2.5 MG: 2.5 SOLUTION RESPIRATORY (INHALATION) at 16:02

## 2022-01-01 RX ADMIN — ALBUTEROL SULFATE 2.5 MG: 2.5 SOLUTION RESPIRATORY (INHALATION) at 16:16

## 2022-01-01 RX ADMIN — ARFORMOTEROL TARTRATE 15 MCG: 15 SOLUTION RESPIRATORY (INHALATION) at 20:54

## 2022-01-01 RX ADMIN — ENOXAPARIN SODIUM 30 MG: 100 INJECTION SUBCUTANEOUS at 09:03

## 2022-01-01 RX ADMIN — SODIUM CHLORIDE, PRESERVATIVE FREE 10 ML: 5 INJECTION INTRAVENOUS at 08:32

## 2022-01-01 RX ADMIN — HYDROCORTISONE SODIUM SUCCINATE 100 MG: 100 INJECTION, POWDER, FOR SOLUTION INTRAMUSCULAR; INTRAVENOUS at 14:07

## 2022-01-01 RX ADMIN — ROSUVASTATIN 10 MG: 10 TABLET, FILM COATED ORAL at 20:33

## 2022-01-01 RX ADMIN — MINERAL OIL, WHITE PETROLATUM: .03; .94 OINTMENT OPHTHALMIC at 02:15

## 2022-01-01 RX ADMIN — ARFORMOTEROL TARTRATE 15 MCG: 15 SOLUTION RESPIRATORY (INHALATION) at 20:32

## 2022-01-01 RX ADMIN — SODIUM CHLORIDE, PRESERVATIVE FREE 10 ML: 5 INJECTION INTRAVENOUS at 07:52

## 2022-01-01 RX ADMIN — LEVOTHYROXINE SODIUM 50 MCG: 0.03 TABLET ORAL at 05:26

## 2022-01-01 RX ADMIN — ALBUTEROL SULFATE 2.5 MG: 2.5 SOLUTION RESPIRATORY (INHALATION) at 15:49

## 2022-01-01 RX ADMIN — SODIUM PHOSPHATE, MONOBASIC, MONOHYDRATE AND SODIUM PHOSPHATE, DIBASIC, ANHYDROUS 6 MMOL: 142; 276 INJECTION, SOLUTION INTRAVENOUS at 12:29

## 2022-01-01 RX ADMIN — METOCLOPRAMIDE HYDROCHLORIDE 10 MG: 5 INJECTION INTRAMUSCULAR; INTRAVENOUS at 08:03

## 2022-01-01 RX ADMIN — Medication 100 MCG/HR: at 00:50

## 2022-01-01 RX ADMIN — INSULIN LISPRO 4 UNITS: 100 INJECTION, SOLUTION INTRAVENOUS; SUBCUTANEOUS at 20:23

## 2022-01-01 RX ADMIN — HYDROCORTISONE SODIUM SUCCINATE 100 MG: 100 INJECTION, POWDER, FOR SOLUTION INTRAMUSCULAR; INTRAVENOUS at 20:52

## 2022-01-01 RX ADMIN — BUDESONIDE 500 MCG: 0.5 SUSPENSION RESPIRATORY (INHALATION) at 08:27

## 2022-01-01 RX ADMIN — DEXTROSE MONOHYDRATE 100 MG: 50 INJECTION, SOLUTION INTRAVENOUS at 16:03

## 2022-01-01 RX ADMIN — ARFORMOTEROL TARTRATE 15 MCG: 15 SOLUTION RESPIRATORY (INHALATION) at 20:21

## 2022-01-01 RX ADMIN — VANCOMYCIN HYDROCHLORIDE 500 MG: 500 INJECTION, POWDER, LYOPHILIZED, FOR SOLUTION INTRAVENOUS at 13:08

## 2022-01-01 RX ADMIN — ACETYLCYSTEINE 400 MG: 100 INHALANT RESPIRATORY (INHALATION) at 12:58

## 2022-01-01 RX ADMIN — ALBUTEROL SULFATE 2.5 MG: 2.5 SOLUTION RESPIRATORY (INHALATION) at 16:37

## 2022-01-01 RX ADMIN — CALCIUM GLUCONATE 1000 MG: 98 INJECTION, SOLUTION INTRAVENOUS at 07:54

## 2022-01-01 RX ADMIN — INSULIN LISPRO 2 UNITS: 100 INJECTION, SOLUTION INTRAVENOUS; SUBCUTANEOUS at 09:04

## 2022-01-01 RX ADMIN — ALBUTEROL SULFATE 2.5 MG: 2.5 SOLUTION RESPIRATORY (INHALATION) at 09:13

## 2022-01-01 RX ADMIN — INSULIN LISPRO 2 UNITS: 100 INJECTION, SOLUTION INTRAVENOUS; SUBCUTANEOUS at 14:59

## 2022-01-01 RX ADMIN — HYDROCORTISONE SODIUM SUCCINATE 100 MG: 100 INJECTION, POWDER, FOR SOLUTION INTRAMUSCULAR; INTRAVENOUS at 15:16

## 2022-01-01 RX ADMIN — HYDROCORTISONE SODIUM SUCCINATE 100 MG: 100 INJECTION, POWDER, FOR SOLUTION INTRAMUSCULAR; INTRAVENOUS at 21:34

## 2022-01-01 RX ADMIN — METOCLOPRAMIDE HYDROCHLORIDE 10 MG: 5 INJECTION INTRAMUSCULAR; INTRAVENOUS at 16:16

## 2022-01-01 RX ADMIN — HEPARIN SODIUM 5000 UNITS: 10000 INJECTION INTRAVENOUS; SUBCUTANEOUS at 07:53

## 2022-01-01 RX ADMIN — MINERAL OIL, WHITE PETROLATUM: .03; .94 OINTMENT OPHTHALMIC at 06:01

## 2022-01-01 RX ADMIN — CALCIUM GLUCONATE 1000 MG: 94 INJECTION, SOLUTION INTRAVENOUS at 10:28

## 2022-01-01 RX ADMIN — INSULIN LISPRO 4 UNITS: 100 INJECTION, SOLUTION INTRAVENOUS; SUBCUTANEOUS at 00:40

## 2022-01-01 RX ADMIN — BUDESONIDE 500 MCG: 0.5 SUSPENSION RESPIRATORY (INHALATION) at 20:13

## 2022-01-01 RX ADMIN — METOCLOPRAMIDE HYDROCHLORIDE 10 MG: 5 INJECTION INTRAMUSCULAR; INTRAVENOUS at 02:17

## 2022-01-01 RX ADMIN — Medication 100 MCG/HR: at 11:56

## 2022-01-01 RX ADMIN — ARFORMOTEROL TARTRATE 15 MCG: 15 SOLUTION RESPIRATORY (INHALATION) at 09:13

## 2022-01-01 RX ADMIN — ARFORMOTEROL TARTRATE 15 MCG: 15 SOLUTION RESPIRATORY (INHALATION) at 08:30

## 2022-01-01 RX ADMIN — ROSUVASTATIN 10 MG: 10 TABLET, FILM COATED ORAL at 20:38

## 2022-01-01 RX ADMIN — ARFORMOTEROL TARTRATE 15 MCG: 15 SOLUTION RESPIRATORY (INHALATION) at 20:05

## 2022-01-01 RX ADMIN — FENTANYL CITRATE 25 MCG: 50 INJECTION, SOLUTION INTRAMUSCULAR; INTRAVENOUS at 18:25

## 2022-01-01 RX ADMIN — ACETYLCYSTEINE 400 MG: 100 INHALANT RESPIRATORY (INHALATION) at 12:14

## 2022-01-01 RX ADMIN — ACETYLCYSTEINE 400 MG: 100 INHALANT RESPIRATORY (INHALATION) at 08:28

## 2022-01-01 RX ADMIN — SODIUM CHLORIDE, PRESERVATIVE FREE 10 ML: 5 INJECTION INTRAVENOUS at 09:53

## 2022-01-01 RX ADMIN — CALCIUM GLUCONATE: 98 INJECTION, SOLUTION INTRAVENOUS at 18:08

## 2022-01-01 RX ADMIN — HEPARIN SODIUM 5000 UNITS: 10000 INJECTION INTRAVENOUS; SUBCUTANEOUS at 00:29

## 2022-01-01 RX ADMIN — SODIUM CHLORIDE: 9 INJECTION, SOLUTION INTRAVENOUS at 05:02

## 2022-01-01 RX ADMIN — HEPARIN SODIUM 5000 UNITS: 10000 INJECTION INTRAVENOUS; SUBCUTANEOUS at 23:37

## 2022-01-01 RX ADMIN — MINERAL OIL, WHITE PETROLATUM: .03; .94 OINTMENT OPHTHALMIC at 14:06

## 2022-01-01 RX ADMIN — PROPOFOL 25 MCG/KG/MIN: 10 INJECTION, EMULSION INTRAVENOUS at 00:09

## 2022-01-01 RX ADMIN — SODIUM CHLORIDE, POTASSIUM CHLORIDE, SODIUM LACTATE AND CALCIUM CHLORIDE 1000 ML: 600; 310; 30; 20 INJECTION, SOLUTION INTRAVENOUS at 06:29

## 2022-01-01 RX ADMIN — Medication: at 06:55

## 2022-01-01 RX ADMIN — METOCLOPRAMIDE HYDROCHLORIDE 10 MG: 5 INJECTION INTRAMUSCULAR; INTRAVENOUS at 14:07

## 2022-01-01 RX ADMIN — Medication 100 MCG/HR: at 00:20

## 2022-01-01 RX ADMIN — PROPOFOL 25 MCG/KG/MIN: 10 INJECTION, EMULSION INTRAVENOUS at 21:24

## 2022-01-01 RX ADMIN — IOPAMIDOL 50 ML: 612 INJECTION, SOLUTION INTRAVENOUS at 12:20

## 2022-01-01 RX ADMIN — ERTAPENEM SODIUM 500 MG: 1 INJECTION, POWDER, LYOPHILIZED, FOR SOLUTION INTRAMUSCULAR; INTRAVENOUS at 16:13

## 2022-01-01 RX ADMIN — HEPARIN SODIUM 5000 UNITS: 10000 INJECTION INTRAVENOUS; SUBCUTANEOUS at 18:53

## 2022-01-01 RX ADMIN — FENTANYL CITRATE 25 MCG: 50 INJECTION, SOLUTION INTRAMUSCULAR; INTRAVENOUS at 01:48

## 2022-01-01 RX ADMIN — ALBUTEROL SULFATE 2.5 MG: 2.5 SOLUTION RESPIRATORY (INHALATION) at 03:12

## 2022-01-01 RX ADMIN — MINERAL OIL, WHITE PETROLATUM: .03; .94 OINTMENT OPHTHALMIC at 21:45

## 2022-01-01 RX ADMIN — MINERAL OIL, WHITE PETROLATUM: .03; .94 OINTMENT OPHTHALMIC at 22:00

## 2022-01-01 RX ADMIN — PROPOFOL 25 MCG/KG/MIN: 10 INJECTION, EMULSION INTRAVENOUS at 06:27

## 2022-01-01 RX ADMIN — EPOPROSTENOL 50 NG/KG/MIN: 1.5 INJECTION, POWDER, LYOPHILIZED, FOR SOLUTION INTRAVENOUS at 15:54

## 2022-01-01 RX ADMIN — ACETYLCYSTEINE 400 MG: 100 INHALANT RESPIRATORY (INHALATION) at 05:10

## 2022-01-01 RX ADMIN — SUCCINYLCHOLINE CHLORIDE 100 MG: 20 INJECTION, SOLUTION INTRAMUSCULAR; INTRAVENOUS at 08:51

## 2022-01-01 RX ADMIN — SODIUM CHLORIDE: 9 INJECTION, SOLUTION INTRAVENOUS at 00:30

## 2022-01-01 RX ADMIN — FENTANYL CITRATE 25 MCG: 50 INJECTION, SOLUTION INTRAMUSCULAR; INTRAVENOUS at 18:42

## 2022-01-01 RX ADMIN — ALBUTEROL SULFATE 2.5 MG: 2.5 SOLUTION RESPIRATORY (INHALATION) at 11:11

## 2022-01-01 RX ADMIN — SODIUM CHLORIDE, PRESERVATIVE FREE 40 MG: 5 INJECTION INTRAVENOUS at 19:40

## 2022-01-01 RX ADMIN — Medication 2 MG/HR: at 15:08

## 2022-01-01 RX ADMIN — MINERAL OIL, WHITE PETROLATUM: .03; .94 OINTMENT OPHTHALMIC at 18:09

## 2022-01-01 RX ADMIN — HEPARIN SODIUM 5000 UNITS: 10000 INJECTION INTRAVENOUS; SUBCUTANEOUS at 00:50

## 2022-01-01 RX ADMIN — MINERAL OIL, WHITE PETROLATUM: .03; .94 OINTMENT OPHTHALMIC at 06:32

## 2022-01-01 RX ADMIN — INSULIN LISPRO 4 UNITS: 100 INJECTION, SOLUTION INTRAVENOUS; SUBCUTANEOUS at 04:39

## 2022-01-01 RX ADMIN — METOCLOPRAMIDE HYDROCHLORIDE 10 MG: 5 INJECTION INTRAMUSCULAR; INTRAVENOUS at 02:30

## 2022-01-01 RX ADMIN — LEVOTHYROXINE SODIUM 50 MCG: 0.03 TABLET ORAL at 06:30

## 2022-01-01 RX ADMIN — SODIUM CHLORIDE, PRESERVATIVE FREE 40 MG: 5 INJECTION INTRAVENOUS at 20:38

## 2022-01-01 RX ADMIN — INSULIN LISPRO 4 UNITS: 100 INJECTION, SOLUTION INTRAVENOUS; SUBCUTANEOUS at 12:52

## 2022-01-01 RX ADMIN — BUDESONIDE 500 MCG: 0.5 SUSPENSION RESPIRATORY (INHALATION) at 20:37

## 2022-01-01 RX ADMIN — ACETYLCYSTEINE 400 MG: 100 INHALANT RESPIRATORY (INHALATION) at 20:05

## 2022-01-01 RX ADMIN — SODIUM CHLORIDE, PRESERVATIVE FREE 10 ML: 5 INJECTION INTRAVENOUS at 09:05

## 2022-01-01 RX ADMIN — SODIUM PHOSPHATE, MONOBASIC, MONOHYDRATE AND SODIUM PHOSPHATE, DIBASIC, ANHYDROUS 12 MMOL: 142; 276 INJECTION, SOLUTION INTRAVENOUS at 14:47

## 2022-01-01 RX ADMIN — ALBUTEROL SULFATE 2.5 MG: 2.5 SOLUTION RESPIRATORY (INHALATION) at 23:20

## 2022-01-01 RX ADMIN — ARFORMOTEROL TARTRATE 15 MCG: 15 SOLUTION RESPIRATORY (INHALATION) at 08:50

## 2022-01-01 RX ADMIN — MINERAL OIL, WHITE PETROLATUM: .03; .94 OINTMENT OPHTHALMIC at 04:40

## 2022-01-01 RX ADMIN — ERTAPENEM SODIUM 1000 MG: 1 INJECTION, POWDER, LYOPHILIZED, FOR SOLUTION INTRAMUSCULAR; INTRAVENOUS at 15:05

## 2022-01-01 RX ADMIN — MINERAL OIL, WHITE PETROLATUM: .03; .94 OINTMENT OPHTHALMIC at 01:24

## 2022-01-01 RX ADMIN — ALBUMIN HUMAN 25 G: 0.25 SOLUTION INTRAVENOUS at 11:51

## 2022-01-01 RX ADMIN — INSULIN LISPRO 2 UNITS: 100 INJECTION, SOLUTION INTRAVENOUS; SUBCUTANEOUS at 14:07

## 2022-01-01 RX ADMIN — ALBUTEROL SULFATE 2.5 MG: 2.5 SOLUTION RESPIRATORY (INHALATION) at 12:14

## 2022-01-01 RX ADMIN — ALBUTEROL SULFATE 2.5 MG: 2.5 SOLUTION RESPIRATORY (INHALATION) at 08:27

## 2022-01-01 RX ADMIN — INSULIN LISPRO 4 UNITS: 100 INJECTION, SOLUTION INTRAVENOUS; SUBCUTANEOUS at 02:17

## 2022-01-01 RX ADMIN — CALCIUM GLUCONATE: 98 INJECTION, SOLUTION INTRAVENOUS at 17:56

## 2022-01-01 RX ADMIN — MINERAL OIL, WHITE PETROLATUM: .03; .94 OINTMENT OPHTHALMIC at 09:51

## 2022-01-01 RX ADMIN — WATER 10 ML: 1 INJECTION INTRAMUSCULAR; INTRAVENOUS; SUBCUTANEOUS at 10:23

## 2022-01-01 RX ADMIN — SODIUM CHLORIDE, PRESERVATIVE FREE 40 MG: 5 INJECTION INTRAVENOUS at 06:30

## 2022-01-01 RX ADMIN — INSULIN LISPRO 2 UNITS: 100 INJECTION, SOLUTION INTRAVENOUS; SUBCUTANEOUS at 05:57

## 2022-01-01 RX ADMIN — ALBUTEROL SULFATE 2.5 MG: 2.5 SOLUTION RESPIRATORY (INHALATION) at 19:48

## 2022-01-01 RX ADMIN — MINERAL OIL, WHITE PETROLATUM: .03; .94 OINTMENT OPHTHALMIC at 10:44

## 2022-01-01 RX ADMIN — MINERAL OIL, WHITE PETROLATUM: .03; .94 OINTMENT OPHTHALMIC at 14:47

## 2022-01-01 RX ADMIN — Medication 50 MCG/HR: at 08:46

## 2022-01-01 RX ADMIN — LEVOTHYROXINE SODIUM 50 MCG: 0.03 TABLET ORAL at 05:57

## 2022-01-01 RX ADMIN — INSULIN LISPRO 4 UNITS: 100 INJECTION, SOLUTION INTRAVENOUS; SUBCUTANEOUS at 20:07

## 2022-01-01 RX ADMIN — SODIUM PHOSPHATE, MONOBASIC, MONOHYDRATE AND SODIUM PHOSPHATE, DIBASIC, ANHYDROUS 6 MMOL: 142; 276 INJECTION, SOLUTION INTRAVENOUS at 20:42

## 2022-01-01 RX ADMIN — SODIUM CHLORIDE, PRESERVATIVE FREE 10 ML: 5 INJECTION INTRAVENOUS at 20:13

## 2022-01-01 RX ADMIN — LEVOTHYROXINE SODIUM 50 MCG: 0.03 TABLET ORAL at 05:53

## 2022-01-01 RX ADMIN — MIDODRINE HYDROCHLORIDE 10 MG: 10 TABLET ORAL at 14:55

## 2022-01-01 RX ADMIN — BUDESONIDE 500 MCG: 0.5 SUSPENSION RESPIRATORY (INHALATION) at 20:54

## 2022-01-01 RX ADMIN — METOCLOPRAMIDE HYDROCHLORIDE 10 MG: 5 INJECTION INTRAMUSCULAR; INTRAVENOUS at 09:57

## 2022-01-01 RX ADMIN — METOCLOPRAMIDE HYDROCHLORIDE 10 MG: 5 INJECTION INTRAMUSCULAR; INTRAVENOUS at 15:11

## 2022-01-01 RX ADMIN — Medication: at 14:00

## 2022-01-01 RX ADMIN — ARFORMOTEROL TARTRATE 15 MCG: 15 SOLUTION RESPIRATORY (INHALATION) at 19:46

## 2022-01-01 RX ADMIN — ACETYLCYSTEINE 400 MG: 100 INHALANT RESPIRATORY (INHALATION) at 11:46

## 2022-01-01 RX ADMIN — SODIUM CHLORIDE, PRESERVATIVE FREE 10 ML: 5 INJECTION INTRAVENOUS at 20:24

## 2022-01-01 RX ADMIN — SODIUM CHLORIDE, PRESERVATIVE FREE 40 MG: 5 INJECTION INTRAVENOUS at 18:41

## 2022-01-01 RX ADMIN — SODIUM CHLORIDE, PRESERVATIVE FREE 40 MG: 5 INJECTION INTRAVENOUS at 18:46

## 2022-01-01 RX ADMIN — SODIUM ACETATE: 164 INJECTION, SOLUTION, CONCENTRATE INTRAVENOUS at 17:21

## 2022-01-01 RX ADMIN — INSULIN LISPRO 4 UNITS: 100 INJECTION, SOLUTION INTRAVENOUS; SUBCUTANEOUS at 13:16

## 2022-01-01 RX ADMIN — AMPICILLIN SODIUM AND SULBACTAM SODIUM 3000 MG: 2; 1 INJECTION, POWDER, FOR SOLUTION INTRAMUSCULAR; INTRAVENOUS at 12:07

## 2022-01-01 RX ADMIN — INSULIN LISPRO 2 UNITS: 100 INJECTION, SOLUTION INTRAVENOUS; SUBCUTANEOUS at 08:54

## 2022-01-01 RX ADMIN — Medication: at 08:13

## 2022-01-01 RX ADMIN — MAGNESIUM SULFATE HEPTAHYDRATE 1000 MG: 1 INJECTION, SOLUTION INTRAVENOUS at 09:47

## 2022-01-01 RX ADMIN — POTASSIUM PHOSPHATE, MONOBASIC AND POTASSIUM PHOSPHATE, DIBASIC 20 MMOL: 224; 236 INJECTION, SOLUTION, CONCENTRATE INTRAVENOUS at 11:20

## 2022-01-01 RX ADMIN — BUDESONIDE 500 MCG: 0.5 SUSPENSION RESPIRATORY (INHALATION) at 20:14

## 2022-01-01 RX ADMIN — METOCLOPRAMIDE HYDROCHLORIDE 10 MG: 5 INJECTION INTRAMUSCULAR; INTRAVENOUS at 19:52

## 2022-01-01 RX ADMIN — INSULIN LISPRO 2 UNITS: 100 INJECTION, SOLUTION INTRAVENOUS; SUBCUTANEOUS at 02:14

## 2022-01-01 RX ADMIN — NOREPINEPHRINE BITARTRATE 30 MCG/MIN: 1 INJECTION, SOLUTION, CONCENTRATE INTRAVENOUS at 15:00

## 2022-01-01 RX ADMIN — MINERAL OIL, WHITE PETROLATUM: .03; .94 OINTMENT OPHTHALMIC at 21:12

## 2022-01-01 RX ADMIN — SODIUM CHLORIDE, PRESERVATIVE FREE 40 MG: 5 INJECTION INTRAVENOUS at 08:20

## 2022-01-01 RX ADMIN — METOCLOPRAMIDE HYDROCHLORIDE 10 MG: 5 INJECTION INTRAMUSCULAR; INTRAVENOUS at 14:59

## 2022-01-01 RX ADMIN — INSULIN LISPRO 2 UNITS: 100 INJECTION, SOLUTION INTRAVENOUS; SUBCUTANEOUS at 03:22

## 2022-01-01 RX ADMIN — HEPARIN SODIUM 5000 UNITS: 10000 INJECTION INTRAVENOUS; SUBCUTANEOUS at 00:00

## 2022-01-01 RX ADMIN — LIDOCAINE HYDROCHLORIDE AND EPINEPHRINE 12 ML: 10; 10 INJECTION, SOLUTION INFILTRATION; PERINEURAL at 14:20

## 2022-01-01 RX ADMIN — NOREPINEPHRINE BITARTRATE 2 MCG/MIN: 1 INJECTION, SOLUTION, CONCENTRATE INTRAVENOUS at 09:47

## 2022-01-01 RX ADMIN — MINERAL OIL, WHITE PETROLATUM: .03; .94 OINTMENT OPHTHALMIC at 15:00

## 2022-01-01 RX ADMIN — CISATRACURIUM BESYLATE 2 MCG/KG/MIN: 10 INJECTION, SOLUTION INTRAVENOUS at 10:08

## 2022-01-01 RX ADMIN — PIPERACILLIN AND TAZOBACTAM 4500 MG: 4; .5 INJECTION, POWDER, FOR SOLUTION INTRAVENOUS at 09:49

## 2022-01-01 RX ADMIN — VITAMIN D, TAB 1000IU (100/BT) 1000 UNITS: 25 TAB at 09:00

## 2022-01-01 RX ADMIN — Medication: at 10:30

## 2022-01-01 RX ADMIN — METOCLOPRAMIDE HYDROCHLORIDE 10 MG: 5 INJECTION INTRAMUSCULAR; INTRAVENOUS at 19:40

## 2022-01-01 RX ADMIN — Medication: at 01:59

## 2022-01-01 RX ADMIN — MINERAL OIL, WHITE PETROLATUM: .03; .94 OINTMENT OPHTHALMIC at 17:29

## 2022-01-01 RX ADMIN — MIDAZOLAM 2 MG: 1 INJECTION, SOLUTION INTRAMUSCULAR; INTRAVENOUS at 02:26

## 2022-01-01 RX ADMIN — INSULIN LISPRO 1 UNITS: 100 INJECTION, SOLUTION INTRAVENOUS; SUBCUTANEOUS at 08:26

## 2022-01-01 RX ADMIN — SODIUM CHLORIDE, PRESERVATIVE FREE 10 ML: 5 INJECTION INTRAVENOUS at 21:00

## 2022-01-01 RX ADMIN — BUDESONIDE 500 MCG: 0.5 SUSPENSION RESPIRATORY (INHALATION) at 20:20

## 2022-01-01 RX ADMIN — PANTOPRAZOLE SODIUM 40 MG: 40 TABLET, DELAYED RELEASE ORAL at 09:03

## 2022-01-01 RX ADMIN — Medication: at 14:35

## 2022-01-01 RX ADMIN — INSULIN LISPRO 2 UNITS: 100 INJECTION, SOLUTION INTRAVENOUS; SUBCUTANEOUS at 21:12

## 2022-01-01 RX ADMIN — Medication 100 MCG/HR: at 02:59

## 2022-01-01 RX ADMIN — BUDESONIDE 500 MCG: 0.5 SUSPENSION RESPIRATORY (INHALATION) at 20:29

## 2022-01-01 RX ADMIN — Medication: at 10:00

## 2022-01-01 RX ADMIN — Medication 100 MCG/HR: at 22:33

## 2022-01-01 RX ADMIN — ARFORMOTEROL TARTRATE 15 MCG: 15 SOLUTION RESPIRATORY (INHALATION) at 13:13

## 2022-01-01 RX ADMIN — Medication 28 MCG/MIN: at 08:21

## 2022-01-01 RX ADMIN — METOCLOPRAMIDE HYDROCHLORIDE 10 MG: 5 INJECTION INTRAMUSCULAR; INTRAVENOUS at 01:58

## 2022-01-01 RX ADMIN — INSULIN LISPRO 2 UNITS: 100 INJECTION, SOLUTION INTRAVENOUS; SUBCUTANEOUS at 14:05

## 2022-01-01 RX ADMIN — ALBUMIN HUMAN 25 G: 0.25 SOLUTION INTRAVENOUS at 20:15

## 2022-01-01 RX ADMIN — METOCLOPRAMIDE HYDROCHLORIDE 10 MG: 5 INJECTION INTRAMUSCULAR; INTRAVENOUS at 14:26

## 2022-01-01 RX ADMIN — METOCLOPRAMIDE HYDROCHLORIDE 10 MG: 5 INJECTION INTRAMUSCULAR; INTRAVENOUS at 14:17

## 2022-01-01 RX ADMIN — ARFORMOTEROL TARTRATE 15 MCG: 15 SOLUTION RESPIRATORY (INHALATION) at 08:34

## 2022-01-01 RX ADMIN — Medication: at 04:00

## 2022-01-01 RX ADMIN — ACETYLCYSTEINE 400 MG: 100 INHALANT RESPIRATORY (INHALATION) at 13:15

## 2022-01-01 RX ADMIN — INSULIN LISPRO 2 UNITS: 100 INJECTION, SOLUTION INTRAVENOUS; SUBCUTANEOUS at 21:23

## 2022-01-01 RX ADMIN — INSULIN LISPRO 4 UNITS: 100 INJECTION, SOLUTION INTRAVENOUS; SUBCUTANEOUS at 07:53

## 2022-01-01 RX ADMIN — INSULIN LISPRO 4 UNITS: 100 INJECTION, SOLUTION INTRAVENOUS; SUBCUTANEOUS at 05:48

## 2022-01-01 RX ADMIN — AMPICILLIN SODIUM AND SULBACTAM SODIUM 3000 MG: 2; 1 INJECTION, POWDER, FOR SOLUTION INTRAMUSCULAR; INTRAVENOUS at 10:00

## 2022-01-01 RX ADMIN — PERFLUTREN 2 ML: 6.52 INJECTION, SUSPENSION INTRAVENOUS at 10:46

## 2022-01-01 RX ADMIN — Medication: at 16:30

## 2022-01-01 RX ADMIN — METOCLOPRAMIDE HYDROCHLORIDE 10 MG: 5 INJECTION INTRAMUSCULAR; INTRAVENOUS at 09:05

## 2022-01-01 RX ADMIN — EPOPROSTENOL 50 NG/KG/MIN: 1.5 INJECTION, POWDER, LYOPHILIZED, FOR SOLUTION INTRAVENOUS at 07:50

## 2022-01-01 RX ADMIN — SODIUM CHLORIDE, PRESERVATIVE FREE 10 ML: 5 INJECTION INTRAVENOUS at 21:21

## 2022-01-01 RX ADMIN — MINERAL OIL, WHITE PETROLATUM: .03; .94 OINTMENT OPHTHALMIC at 05:49

## 2022-01-01 RX ADMIN — MINERAL OIL, WHITE PETROLATUM: .03; .94 OINTMENT OPHTHALMIC at 14:55

## 2022-01-01 RX ADMIN — SODIUM PHOSPHATE, MONOBASIC, MONOHYDRATE AND SODIUM PHOSPHATE, DIBASIC, ANHYDROUS 6 MMOL: 142; 276 INJECTION, SOLUTION INTRAVENOUS at 17:58

## 2022-01-01 RX ADMIN — VITAMIN D, TAB 1000IU (100/BT) 1000 UNITS: 25 TAB at 09:06

## 2022-01-01 RX ADMIN — SODIUM CHLORIDE, SODIUM LACTATE, POTASSIUM CHLORIDE, CALCIUM CHLORIDE AND DEXTROSE MONOHYDRATE: 5; 600; 310; 30; 20 INJECTION, SOLUTION INTRAVENOUS at 16:19

## 2022-01-01 RX ADMIN — SODIUM ACETATE: 164 INJECTION, SOLUTION, CONCENTRATE INTRAVENOUS at 17:34

## 2022-01-01 RX ADMIN — INSULIN LISPRO 2 UNITS: 100 INJECTION, SOLUTION INTRAVENOUS; SUBCUTANEOUS at 09:12

## 2022-01-01 RX ADMIN — VASOPRESSIN 0.03 UNITS/MIN: 20 INJECTION INTRAVENOUS at 15:09

## 2022-01-01 RX ADMIN — AMPICILLIN SODIUM AND SULBACTAM SODIUM 3000 MG: 2; 1 INJECTION, POWDER, FOR SOLUTION INTRAMUSCULAR; INTRAVENOUS at 23:09

## 2022-01-01 RX ADMIN — MINERAL OIL, WHITE PETROLATUM: .03; .94 OINTMENT OPHTHALMIC at 11:12

## 2022-01-01 RX ADMIN — Medication: at 17:49

## 2022-01-01 RX ADMIN — SODIUM CHLORIDE, PRESERVATIVE FREE 10 ML: 5 INJECTION INTRAVENOUS at 20:39

## 2022-01-01 RX ADMIN — MIDODRINE HYDROCHLORIDE 10 MG: 10 TABLET ORAL at 18:15

## 2022-01-01 RX ADMIN — Medication 20 MCG/MIN: at 19:54

## 2022-01-01 RX ADMIN — BUDESONIDE 500 MCG: 0.5 SUSPENSION RESPIRATORY (INHALATION) at 20:45

## 2022-01-01 RX ADMIN — SODIUM CHLORIDE, PRESERVATIVE FREE 40 MG: 5 INJECTION INTRAVENOUS at 05:54

## 2022-01-01 RX ADMIN — ALBUTEROL SULFATE 2.5 MG: 2.5 SOLUTION RESPIRATORY (INHALATION) at 08:55

## 2022-01-01 RX ADMIN — ARFORMOTEROL TARTRATE 15 MCG: 15 SOLUTION RESPIRATORY (INHALATION) at 08:28

## 2022-01-01 RX ADMIN — VANCOMYCIN HYDROCHLORIDE 1750 MG: 10 INJECTION, POWDER, LYOPHILIZED, FOR SOLUTION INTRAVENOUS at 16:13

## 2022-01-01 RX ADMIN — BUDESONIDE 500 MCG: 0.5 SUSPENSION RESPIRATORY (INHALATION) at 20:32

## 2022-01-01 RX ADMIN — HEPARIN SODIUM 5000 UNITS: 10000 INJECTION INTRAVENOUS; SUBCUTANEOUS at 18:12

## 2022-01-01 RX ADMIN — IOPAMIDOL 75 ML: 755 INJECTION, SOLUTION INTRAVENOUS at 12:20

## 2022-01-01 RX ADMIN — Medication 100 MCG/HR: at 01:16

## 2022-01-01 RX ADMIN — Medication: at 20:30

## 2022-01-01 RX ADMIN — IPRATROPIUM BROMIDE AND ALBUTEROL SULFATE 1 AMPULE: .5; 2.5 SOLUTION RESPIRATORY (INHALATION) at 08:29

## 2022-01-01 RX ADMIN — ROSUVASTATIN 10 MG: 10 TABLET, FILM COATED ORAL at 20:24

## 2022-01-01 RX ADMIN — ARFORMOTEROL TARTRATE 15 MCG: 15 SOLUTION RESPIRATORY (INHALATION) at 08:26

## 2022-01-01 RX ADMIN — MINERAL OIL, WHITE PETROLATUM: .03; .94 OINTMENT OPHTHALMIC at 04:55

## 2022-01-01 RX ADMIN — ONDANSETRON 4 MG: 4 TABLET, ORALLY DISINTEGRATING ORAL at 21:55

## 2022-01-01 RX ADMIN — ALBUTEROL SULFATE 2.5 MG: 2.5 SOLUTION RESPIRATORY (INHALATION) at 13:52

## 2022-01-01 RX ADMIN — MINERAL OIL, WHITE PETROLATUM: .03; .94 OINTMENT OPHTHALMIC at 21:24

## 2022-01-01 RX ADMIN — CALCIUM CHLORIDE INJECTION 8000 MG: 100 INJECTION, SOLUTION INTRAVENOUS at 00:02

## 2022-01-01 RX ADMIN — OXYCODONE 10 MG: 5 TABLET ORAL at 20:33

## 2022-01-01 RX ADMIN — ACETYLCYSTEINE 400 MG: 100 INHALANT RESPIRATORY (INHALATION) at 12:10

## 2022-01-01 RX ADMIN — HYDROCORTISONE SODIUM SUCCINATE 100 MG: 100 INJECTION, POWDER, FOR SOLUTION INTRAMUSCULAR; INTRAVENOUS at 05:57

## 2022-01-01 RX ADMIN — Medication: at 02:06

## 2022-01-01 RX ADMIN — Medication: at 21:05

## 2022-01-01 RX ADMIN — Medication 10 MEQ: at 16:13

## 2022-01-01 RX ADMIN — MINERAL OIL, WHITE PETROLATUM: .03; .94 OINTMENT OPHTHALMIC at 18:00

## 2022-01-01 RX ADMIN — ALBUTEROL SULFATE 2.5 MG: 2.5 SOLUTION RESPIRATORY (INHALATION) at 03:06

## 2022-01-01 RX ADMIN — BUDESONIDE 500 MCG: 0.5 SUSPENSION RESPIRATORY (INHALATION) at 19:41

## 2022-01-01 RX ADMIN — Medication: at 00:07

## 2022-01-01 RX ADMIN — EPOPROSTENOL 50 NG/KG/MIN: 1.5 INJECTION, POWDER, LYOPHILIZED, FOR SOLUTION INTRAVENOUS at 06:40

## 2022-01-01 RX ADMIN — HEPARIN SODIUM 5000 UNITS: 10000 INJECTION INTRAVENOUS; SUBCUTANEOUS at 08:57

## 2022-01-01 RX ADMIN — INSULIN LISPRO 2 UNITS: 100 INJECTION, SOLUTION INTRAVENOUS; SUBCUTANEOUS at 06:18

## 2022-01-01 RX ADMIN — ALBUTEROL SULFATE 2.5 MG: 2.5 SOLUTION RESPIRATORY (INHALATION) at 15:45

## 2022-01-01 RX ADMIN — ALBUTEROL SULFATE 2.5 MG: 2.5 SOLUTION RESPIRATORY (INHALATION) at 08:14

## 2022-01-01 RX ADMIN — ALBUTEROL SULFATE 2.5 MG: 2.5 SOLUTION RESPIRATORY (INHALATION) at 12:10

## 2022-01-01 RX ADMIN — Medication: at 13:08

## 2022-01-01 RX ADMIN — NOREPINEPHRINE BITARTRATE 5.01 MCG/MIN: 1 INJECTION, SOLUTION, CONCENTRATE INTRAVENOUS at 15:06

## 2022-01-01 RX ADMIN — SODIUM PHOSPHATE, MONOBASIC, MONOHYDRATE AND SODIUM PHOSPHATE, DIBASIC, ANHYDROUS 12 MMOL: 142; 276 INJECTION, SOLUTION INTRAVENOUS at 02:53

## 2022-01-01 RX ADMIN — CALCIUM GLUCONATE 1000 MG: 98 INJECTION, SOLUTION INTRAVENOUS at 01:25

## 2022-01-01 RX ADMIN — ALBUTEROL SULFATE 2.5 MG: 2.5 SOLUTION RESPIRATORY (INHALATION) at 12:48

## 2022-01-01 RX ADMIN — BUDESONIDE 500 MCG: 0.5 SUSPENSION RESPIRATORY (INHALATION) at 08:12

## 2022-01-01 RX ADMIN — ACETYLCYSTEINE 400 MG: 100 INHALANT RESPIRATORY (INHALATION) at 23:20

## 2022-01-01 RX ADMIN — HEPARIN SODIUM 5000 UNITS: 10000 INJECTION INTRAVENOUS; SUBCUTANEOUS at 16:35

## 2022-01-01 RX ADMIN — INSULIN LISPRO 1 UNITS: 100 INJECTION, SOLUTION INTRAVENOUS; SUBCUTANEOUS at 10:06

## 2022-01-01 RX ADMIN — CISATRACURIUM BESYLATE 2 MCG/KG/MIN: 10 INJECTION, SOLUTION INTRAVENOUS at 07:21

## 2022-01-01 RX ADMIN — PROPOFOL 25 MCG/KG/MIN: 10 INJECTION, EMULSION INTRAVENOUS at 17:42

## 2022-01-01 RX ADMIN — BUDESONIDE 500 MCG: 0.5 SUSPENSION RESPIRATORY (INHALATION) at 08:30

## 2022-01-01 RX ADMIN — ALBUTEROL SULFATE 2.5 MG: 2.5 SOLUTION RESPIRATORY (INHALATION) at 20:36

## 2022-01-01 RX ADMIN — VITAMIN D, TAB 1000IU (100/BT) 1000 UNITS: 25 TAB at 08:59

## 2022-01-01 RX ADMIN — Medication: at 15:40

## 2022-01-01 RX ADMIN — SODIUM CHLORIDE, PRESERVATIVE FREE 10 ML: 5 INJECTION INTRAVENOUS at 08:09

## 2022-01-01 RX ADMIN — MINERAL OIL, WHITE PETROLATUM: .03; .94 OINTMENT OPHTHALMIC at 02:17

## 2022-01-01 RX ADMIN — HEPARIN SODIUM 5000 UNITS: 10000 INJECTION INTRAVENOUS; SUBCUTANEOUS at 23:09

## 2022-01-01 RX ADMIN — LEVOTHYROXINE SODIUM 50 MCG: 0.03 TABLET ORAL at 06:34

## 2022-01-01 RX ADMIN — SODIUM CHLORIDE, PRESERVATIVE FREE 40 MG: 5 INJECTION INTRAVENOUS at 18:14

## 2022-01-01 RX ADMIN — Medication 100 MCG/HR: at 14:26

## 2022-01-01 RX ADMIN — CALCIUM CHLORIDE INJECTION 8000 MG: 100 INJECTION, SOLUTION INTRAVENOUS at 04:42

## 2022-01-01 RX ADMIN — DOCUSATE SODIUM 100 MG: 50 LIQUID ORAL at 21:21

## 2022-01-01 RX ADMIN — Medication: at 13:55

## 2022-01-01 RX ADMIN — DOCUSATE SODIUM 100 MG: 50 LIQUID ORAL at 08:58

## 2022-01-01 RX ADMIN — LEVOTHYROXINE SODIUM 50 MCG: 0.03 TABLET ORAL at 06:15

## 2022-01-01 RX ADMIN — ERTAPENEM SODIUM 1000 MG: 1 INJECTION, POWDER, LYOPHILIZED, FOR SOLUTION INTRAMUSCULAR; INTRAVENOUS at 15:10

## 2022-01-01 RX ADMIN — CALCIUM GLUCONATE: 98 INJECTION, SOLUTION INTRAVENOUS at 18:56

## 2022-01-01 RX ADMIN — BUDESONIDE 500 MCG: 0.5 SUSPENSION RESPIRATORY (INHALATION) at 19:26

## 2022-01-01 RX ADMIN — ACETAMINOPHEN 650 MG: 325 TABLET ORAL at 18:36

## 2022-01-01 RX ADMIN — METOPROLOL TARTRATE 5 MG: 5 INJECTION, SOLUTION INTRAVENOUS at 08:20

## 2022-01-01 RX ADMIN — ETOMIDATE 20 MG: 2 INJECTION, SOLUTION INTRAVENOUS at 08:51

## 2022-01-01 RX ADMIN — SODIUM CHLORIDE, POTASSIUM CHLORIDE, SODIUM LACTATE AND CALCIUM CHLORIDE 1000 ML: 600; 310; 30; 20 INJECTION, SOLUTION INTRAVENOUS at 08:58

## 2022-01-01 RX ADMIN — Medication: at 00:03

## 2022-01-01 RX ADMIN — HYDROCORTISONE SODIUM SUCCINATE 100 MG: 100 INJECTION, POWDER, FOR SOLUTION INTRAMUSCULAR; INTRAVENOUS at 14:41

## 2022-01-01 RX ADMIN — BUDESONIDE 500 MCG: 0.5 SUSPENSION RESPIRATORY (INHALATION) at 08:14

## 2022-01-01 RX ADMIN — FENTANYL CITRATE 25 MCG: 50 INJECTION, SOLUTION INTRAMUSCULAR; INTRAVENOUS at 00:52

## 2022-01-01 RX ADMIN — AMPICILLIN SODIUM AND SULBACTAM SODIUM 3000 MG: 2; 1 INJECTION, POWDER, FOR SOLUTION INTRAMUSCULAR; INTRAVENOUS at 22:16

## 2022-01-01 RX ADMIN — ARFORMOTEROL TARTRATE 15 MCG: 15 SOLUTION RESPIRATORY (INHALATION) at 08:12

## 2022-01-01 RX ADMIN — HYDROCORTISONE SODIUM SUCCINATE 100 MG: 100 INJECTION, POWDER, FOR SOLUTION INTRAMUSCULAR; INTRAVENOUS at 21:23

## 2022-01-01 RX ADMIN — HYDROCORTISONE SODIUM SUCCINATE 100 MG: 100 INJECTION, POWDER, FOR SOLUTION INTRAMUSCULAR; INTRAVENOUS at 04:56

## 2022-01-01 RX ADMIN — ARFORMOTEROL TARTRATE 15 MCG: 15 SOLUTION RESPIRATORY (INHALATION) at 08:14

## 2022-01-01 RX ADMIN — METOCLOPRAMIDE HYDROCHLORIDE 10 MG: 5 INJECTION INTRAMUSCULAR; INTRAVENOUS at 19:42

## 2022-01-01 RX ADMIN — MINERAL OIL, WHITE PETROLATUM: .03; .94 OINTMENT OPHTHALMIC at 02:03

## 2022-01-01 RX ADMIN — LEVOTHYROXINE SODIUM 50 MCG: 0.03 TABLET ORAL at 05:55

## 2022-01-01 RX ADMIN — HEPARIN SODIUM 5000 UNITS: 10000 INJECTION INTRAVENOUS; SUBCUTANEOUS at 08:02

## 2022-01-01 RX ADMIN — SODIUM CHLORIDE: 9 INJECTION, SOLUTION INTRAVENOUS at 20:09

## 2022-01-01 RX ADMIN — ALBUMIN HUMAN 25 G: 0.25 SOLUTION INTRAVENOUS at 04:06

## 2022-01-01 RX ADMIN — HYDROCORTISONE SODIUM SUCCINATE 100 MG: 100 INJECTION, POWDER, FOR SOLUTION INTRAMUSCULAR; INTRAVENOUS at 06:24

## 2022-01-01 RX ADMIN — ALBUTEROL SULFATE 2.5 MG: 2.5 SOLUTION RESPIRATORY (INHALATION) at 16:08

## 2022-01-01 RX ADMIN — METOCLOPRAMIDE HYDROCHLORIDE 10 MG: 5 INJECTION INTRAMUSCULAR; INTRAVENOUS at 20:24

## 2022-01-01 RX ADMIN — BUDESONIDE 500 MCG: 0.5 SUSPENSION RESPIRATORY (INHALATION) at 20:10

## 2022-01-01 RX ADMIN — METOCLOPRAMIDE HYDROCHLORIDE 10 MG: 5 INJECTION INTRAMUSCULAR; INTRAVENOUS at 18:06

## 2022-01-01 RX ADMIN — ACETYLCYSTEINE 400 MG: 100 INHALANT RESPIRATORY (INHALATION) at 00:01

## 2022-01-01 RX ADMIN — ALBUTEROL SULFATE 2.5 MG: 2.5 SOLUTION RESPIRATORY (INHALATION) at 19:26

## 2022-01-01 RX ADMIN — MINERAL OIL, WHITE PETROLATUM: .03; .94 OINTMENT OPHTHALMIC at 18:17

## 2022-01-01 RX ADMIN — SODIUM CHLORIDE: 9 INJECTION, SOLUTION INTRAVENOUS at 15:40

## 2022-01-01 RX ADMIN — MINERAL OIL, WHITE PETROLATUM: .03; .94 OINTMENT OPHTHALMIC at 10:55

## 2022-01-01 RX ADMIN — HEPARIN SODIUM 5000 UNITS: 10000 INJECTION INTRAVENOUS; SUBCUTANEOUS at 07:39

## 2022-01-01 RX ADMIN — NOREPINEPHRINE BITARTRATE 18 MCG/MIN: 1 INJECTION, SOLUTION, CONCENTRATE INTRAVENOUS at 06:28

## 2022-01-01 RX ADMIN — PROPOFOL 20 MCG/KG/MIN: 10 INJECTION, EMULSION INTRAVENOUS at 08:47

## 2022-01-01 RX ADMIN — INSULIN LISPRO 4 UNITS: 100 INJECTION, SOLUTION INTRAVENOUS; SUBCUTANEOUS at 13:23

## 2022-01-01 RX ADMIN — Medication: at 23:45

## 2022-01-01 RX ADMIN — PIPERACILLIN AND TAZOBACTAM 4500 MG: 4; .5 INJECTION, POWDER, FOR SOLUTION INTRAVENOUS at 19:52

## 2022-01-01 RX ADMIN — HEPARIN SODIUM 5000 UNITS: 10000 INJECTION INTRAVENOUS; SUBCUTANEOUS at 16:10

## 2022-01-01 RX ADMIN — METOCLOPRAMIDE HYDROCHLORIDE 10 MG: 5 INJECTION INTRAMUSCULAR; INTRAVENOUS at 21:23

## 2022-01-01 RX ADMIN — MIDODRINE HYDROCHLORIDE 10 MG: 10 TABLET ORAL at 12:13

## 2022-01-01 RX ADMIN — SODIUM ACETATE: 164 INJECTION, SOLUTION, CONCENTRATE INTRAVENOUS at 18:15

## 2022-01-01 RX ADMIN — ARFORMOTEROL TARTRATE 15 MCG: 15 SOLUTION RESPIRATORY (INHALATION) at 08:21

## 2022-01-01 RX ADMIN — ALBUTEROL SULFATE 2.5 MG: 2.5 SOLUTION RESPIRATORY (INHALATION) at 20:05

## 2022-01-01 RX ADMIN — ALBUMIN HUMAN 25 G: 0.25 SOLUTION INTRAVENOUS at 08:16

## 2022-01-01 RX ADMIN — ERTAPENEM SODIUM 500 MG: 1 INJECTION, POWDER, LYOPHILIZED, FOR SOLUTION INTRAMUSCULAR; INTRAVENOUS at 15:04

## 2022-01-01 RX ADMIN — DEXTROSE MONOHYDRATE 100 MG: 50 INJECTION, SOLUTION INTRAVENOUS at 16:06

## 2022-01-01 RX ADMIN — MINERAL OIL, WHITE PETROLATUM: .03; .94 OINTMENT OPHTHALMIC at 14:05

## 2022-01-01 RX ADMIN — ONDANSETRON 4 MG: 2 INJECTION INTRAMUSCULAR; INTRAVENOUS at 12:45

## 2022-01-01 RX ADMIN — DEXTROSE MONOHYDRATE 100 MG: 50 INJECTION, SOLUTION INTRAVENOUS at 16:08

## 2022-01-01 RX ADMIN — HYDROMORPHONE HYDROCHLORIDE 0.5 MG: 1 INJECTION, SOLUTION INTRAMUSCULAR; INTRAVENOUS; SUBCUTANEOUS at 21:59

## 2022-01-01 RX ADMIN — INSULIN LISPRO 8 UNITS: 100 INJECTION, SOLUTION INTRAVENOUS; SUBCUTANEOUS at 20:16

## 2022-01-01 RX ADMIN — SODIUM PHOSPHATE, MONOBASIC, MONOHYDRATE AND SODIUM PHOSPHATE, DIBASIC, ANHYDROUS 12 MMOL: 142; 276 INJECTION, SOLUTION INTRAVENOUS at 10:05

## 2022-01-01 RX ADMIN — INSULIN LISPRO 2 UNITS: 100 INJECTION, SOLUTION INTRAVENOUS; SUBCUTANEOUS at 22:21

## 2022-01-01 RX ADMIN — INSULIN LISPRO 4 UNITS: 100 INJECTION, SOLUTION INTRAVENOUS; SUBCUTANEOUS at 00:02

## 2022-01-01 RX ADMIN — SODIUM CHLORIDE, PRESERVATIVE FREE 40 MG: 5 INJECTION INTRAVENOUS at 06:24

## 2022-01-01 RX ADMIN — SODIUM CHLORIDE, PRESERVATIVE FREE 40 MG: 5 INJECTION INTRAVENOUS at 09:47

## 2022-01-01 RX ADMIN — OXYCODONE 10 MG: 5 TABLET ORAL at 02:31

## 2022-01-01 RX ADMIN — CALCIUM CHLORIDE INJECTION 8000 MG: 100 INJECTION, SOLUTION INTRAVENOUS at 09:49

## 2022-01-01 RX ADMIN — METOCLOPRAMIDE HYDROCHLORIDE 10 MG: 5 INJECTION INTRAMUSCULAR; INTRAVENOUS at 08:21

## 2022-01-01 RX ADMIN — DOCUSATE SODIUM 100 MG: 50 LIQUID ORAL at 21:34

## 2022-01-01 RX ADMIN — ALBUTEROL SULFATE 2.5 MG: 2.5 SOLUTION RESPIRATORY (INHALATION) at 20:21

## 2022-01-01 RX ADMIN — METHYLENE BLUE 10 MG: 10 INJECTION INTRAVENOUS at 10:24

## 2022-01-01 RX ADMIN — HEPARIN SODIUM 5000 UNITS: 10000 INJECTION INTRAVENOUS; SUBCUTANEOUS at 15:53

## 2022-01-01 RX ADMIN — Medication: at 12:00

## 2022-01-01 RX ADMIN — SODIUM CHLORIDE, POTASSIUM CHLORIDE, SODIUM LACTATE AND CALCIUM CHLORIDE: 600; 310; 30; 20 INJECTION, SOLUTION INTRAVENOUS at 16:32

## 2022-01-01 RX ADMIN — MAGNESIUM SULFATE HEPTAHYDRATE 1000 MG: 1 INJECTION, SOLUTION INTRAVENOUS at 06:32

## 2022-01-01 RX ADMIN — MINERAL OIL, WHITE PETROLATUM: .03; .94 OINTMENT OPHTHALMIC at 18:15

## 2022-01-01 RX ADMIN — MIDAZOLAM 2 MG: 1 INJECTION INTRAMUSCULAR; INTRAVENOUS at 17:16

## 2022-01-01 RX ADMIN — Medication: at 04:36

## 2022-01-01 RX ADMIN — SODIUM PHOSPHATE, MONOBASIC, MONOHYDRATE AND SODIUM PHOSPHATE, DIBASIC, ANHYDROUS 6 MMOL: 142; 276 INJECTION, SOLUTION INTRAVENOUS at 01:14

## 2022-01-01 RX ADMIN — METOCLOPRAMIDE HYDROCHLORIDE 10 MG: 5 INJECTION INTRAMUSCULAR; INTRAVENOUS at 02:59

## 2022-01-01 RX ADMIN — SODIUM CHLORIDE: 9 INJECTION, SOLUTION INTRAVENOUS at 08:11

## 2022-01-01 RX ADMIN — METOCLOPRAMIDE HYDROCHLORIDE 10 MG: 5 INJECTION INTRAMUSCULAR; INTRAVENOUS at 16:12

## 2022-01-01 RX ADMIN — Medication 4 ML: at 08:29

## 2022-01-01 RX ADMIN — METOPROLOL TARTRATE 5 MG: 5 INJECTION, SOLUTION INTRAVENOUS at 16:16

## 2022-01-01 RX ADMIN — PIPERACILLIN AND TAZOBACTAM 4500 MG: 4; .5 INJECTION, POWDER, FOR SOLUTION INTRAVENOUS at 08:26

## 2022-01-01 RX ADMIN — DEXTROSE MONOHYDRATE 100 MG: 50 INJECTION, SOLUTION INTRAVENOUS at 16:07

## 2022-01-01 RX ADMIN — SODIUM CHLORIDE, POTASSIUM CHLORIDE, SODIUM LACTATE AND CALCIUM CHLORIDE 250 ML: 600; 310; 30; 20 INJECTION, SOLUTION INTRAVENOUS at 23:35

## 2022-01-01 RX ADMIN — EPOPROSTENOL 50 NG/KG/MIN: 1.5 INJECTION, POWDER, LYOPHILIZED, FOR SOLUTION INTRAVENOUS at 21:07

## 2022-01-01 RX ADMIN — SODIUM CHLORIDE, PRESERVATIVE FREE 40 MG: 5 INJECTION INTRAVENOUS at 19:42

## 2022-01-01 RX ADMIN — FENTANYL CITRATE 25 MCG: 50 INJECTION, SOLUTION INTRAMUSCULAR; INTRAVENOUS at 18:07

## 2022-01-01 RX ADMIN — MINERAL OIL, WHITE PETROLATUM: .03; .94 OINTMENT OPHTHALMIC at 18:14

## 2022-01-01 RX ADMIN — SODIUM PHOSPHATE, MONOBASIC, MONOHYDRATE AND SODIUM PHOSPHATE, DIBASIC, ANHYDROUS 6 MMOL: 142; 276 INJECTION, SOLUTION INTRAVENOUS at 07:54

## 2022-01-01 RX ADMIN — ALBUTEROL SULFATE 2.5 MG: 2.5 SOLUTION RESPIRATORY (INHALATION) at 13:30

## 2022-01-01 RX ADMIN — SODIUM CHLORIDE, PRESERVATIVE FREE 40 MG: 5 INJECTION INTRAVENOUS at 12:43

## 2022-01-01 RX ADMIN — ALBUTEROL SULFATE 2.5 MG: 2.5 SOLUTION RESPIRATORY (INHALATION) at 12:57

## 2022-01-01 RX ADMIN — SODIUM CHLORIDE, PRESERVATIVE FREE 10 ML: 5 INJECTION INTRAVENOUS at 20:12

## 2022-01-01 RX ADMIN — METOCLOPRAMIDE HYDROCHLORIDE 10 MG: 5 INJECTION INTRAMUSCULAR; INTRAVENOUS at 02:15

## 2022-01-01 RX ADMIN — Medication 50 MCG/HR: at 13:48

## 2022-01-01 RX ADMIN — MINERAL OIL, WHITE PETROLATUM: .03; .94 OINTMENT OPHTHALMIC at 05:54

## 2022-01-01 RX ADMIN — Medication: at 00:30

## 2022-01-01 RX ADMIN — INSULIN LISPRO 8 UNITS: 100 INJECTION, SOLUTION INTRAVENOUS; SUBCUTANEOUS at 20:30

## 2022-01-01 RX ADMIN — INSULIN LISPRO 4 UNITS: 100 INJECTION, SOLUTION INTRAVENOUS; SUBCUTANEOUS at 17:35

## 2022-01-01 RX ADMIN — SODIUM CHLORIDE, SODIUM LACTATE, POTASSIUM CHLORIDE, CALCIUM CHLORIDE AND DEXTROSE MONOHYDRATE: 5; 600; 310; 30; 20 INJECTION, SOLUTION INTRAVENOUS at 08:17

## 2022-01-01 RX ADMIN — MINERAL OIL, WHITE PETROLATUM: .03; .94 OINTMENT OPHTHALMIC at 00:49

## 2022-01-01 RX ADMIN — ALBUTEROL SULFATE 2.5 MG: 2.5 SOLUTION RESPIRATORY (INHALATION) at 12:58

## 2022-01-01 RX ADMIN — CALCIUM GLUCONATE: 98 INJECTION, SOLUTION INTRAVENOUS at 18:41

## 2022-01-01 RX ADMIN — BUDESONIDE 500 MCG: 0.5 SUSPENSION RESPIRATORY (INHALATION) at 08:25

## 2022-01-01 RX ADMIN — PIPERACILLIN AND TAZOBACTAM 4500 MG: 4; .5 INJECTION, POWDER, FOR SOLUTION INTRAVENOUS at 16:15

## 2022-01-01 RX ADMIN — INSULIN LISPRO 4 UNITS: 100 INJECTION, SOLUTION INTRAVENOUS; SUBCUTANEOUS at 08:57

## 2022-01-01 RX ADMIN — BUDESONIDE 500 MCG: 0.5 SUSPENSION RESPIRATORY (INHALATION) at 13:12

## 2022-01-01 RX ADMIN — ALBUTEROL SULFATE 2.5 MG: 2.5 SOLUTION RESPIRATORY (INHALATION) at 20:54

## 2022-01-01 RX ADMIN — FENTANYL CITRATE 25 MCG: 50 INJECTION, SOLUTION INTRAMUSCULAR; INTRAVENOUS at 03:55

## 2022-01-01 RX ADMIN — METOCLOPRAMIDE HYDROCHLORIDE 10 MG: 5 INJECTION INTRAMUSCULAR; INTRAVENOUS at 20:52

## 2022-01-01 RX ADMIN — HYDROCORTISONE SODIUM SUCCINATE 100 MG: 100 INJECTION, POWDER, FOR SOLUTION INTRAMUSCULAR; INTRAVENOUS at 06:31

## 2022-01-01 RX ADMIN — ACETYLCYSTEINE 400 MG: 100 INHALANT RESPIRATORY (INHALATION) at 03:12

## 2022-01-01 RX ADMIN — CALCIUM CHLORIDE INJECTION 8000 MG: 100 INJECTION, SOLUTION INTRAVENOUS at 10:11

## 2022-01-01 RX ADMIN — SODIUM PHOSPHATE, MONOBASIC, MONOHYDRATE AND SODIUM PHOSPHATE, DIBASIC, ANHYDROUS 6 MMOL: 142; 276 INJECTION, SOLUTION INTRAVENOUS at 02:26

## 2022-01-01 RX ADMIN — BUDESONIDE 500 MCG: 0.5 SUSPENSION RESPIRATORY (INHALATION) at 08:28

## 2022-01-01 RX ADMIN — ALBUMIN HUMAN 25 G: 0.25 SOLUTION INTRAVENOUS at 19:47

## 2022-01-01 RX ADMIN — MINERAL OIL, WHITE PETROLATUM: .03; .94 OINTMENT OPHTHALMIC at 22:16

## 2022-01-01 RX ADMIN — SODIUM PHOSPHATE, MONOBASIC, MONOHYDRATE AND SODIUM PHOSPHATE, DIBASIC, ANHYDROUS 12 MMOL: 142; 276 INJECTION, SOLUTION INTRAVENOUS at 20:09

## 2022-01-01 RX ADMIN — ARFORMOTEROL TARTRATE 15 MCG: 15 SOLUTION RESPIRATORY (INHALATION) at 08:25

## 2022-01-01 RX ADMIN — ONDANSETRON 4 MG: 2 INJECTION INTRAMUSCULAR; INTRAVENOUS at 01:38

## 2022-01-01 RX ADMIN — Medication: at 09:30

## 2022-01-01 RX ADMIN — METOCLOPRAMIDE HYDROCHLORIDE 10 MG: 5 INJECTION INTRAMUSCULAR; INTRAVENOUS at 12:43

## 2022-01-01 RX ADMIN — HYDROCORTISONE SODIUM SUCCINATE 100 MG: 100 INJECTION, POWDER, FOR SOLUTION INTRAMUSCULAR; INTRAVENOUS at 14:24

## 2022-01-01 RX ADMIN — ALBUTEROL SULFATE 2.5 MG: 2.5 SOLUTION RESPIRATORY (INHALATION) at 12:28

## 2022-01-01 RX ADMIN — ALBUTEROL SULFATE 2.5 MG: 2.5 SOLUTION RESPIRATORY (INHALATION) at 20:14

## 2022-01-01 RX ADMIN — HYDROCORTISONE SODIUM SUCCINATE 100 MG: 100 INJECTION, POWDER, FOR SOLUTION INTRAMUSCULAR; INTRAVENOUS at 20:24

## 2022-01-01 RX ADMIN — SODIUM CHLORIDE, PRESERVATIVE FREE 10 ML: 5 INJECTION INTRAVENOUS at 21:07

## 2022-01-01 RX ADMIN — Medication 10 MEQ: at 18:15

## 2022-01-01 RX ADMIN — MINERAL OIL, WHITE PETROLATUM: .03; .94 OINTMENT OPHTHALMIC at 06:16

## 2022-01-01 RX ADMIN — VECURONIUM BROMIDE 10 MG: 1 INJECTION, POWDER, LYOPHILIZED, FOR SOLUTION INTRAVENOUS at 10:23

## 2022-01-01 RX ADMIN — CALCIUM GLUCONATE: 98 INJECTION, SOLUTION INTRAVENOUS at 18:25

## 2022-01-01 RX ADMIN — Medication: at 23:14

## 2022-01-01 RX ADMIN — EPOPROSTENOL 50 NG/KG/MIN: 1.5 INJECTION, POWDER, LYOPHILIZED, FOR SOLUTION INTRAVENOUS at 00:03

## 2022-01-01 RX ADMIN — DEXTROSE MONOHYDRATE 100 MG: 50 INJECTION, SOLUTION INTRAVENOUS at 16:15

## 2022-01-01 RX ADMIN — CALCIUM GLUCONATE 1000 MG: 98 INJECTION, SOLUTION INTRAVENOUS at 21:18

## 2022-01-01 RX ADMIN — INSULIN LISPRO 8 UNITS: 100 INJECTION, SOLUTION INTRAVENOUS; SUBCUTANEOUS at 13:30

## 2022-01-01 RX ADMIN — POTASSIUM CHLORIDE 10 MEQ: 7.46 INJECTION, SOLUTION INTRAVENOUS at 20:15

## 2022-01-01 RX ADMIN — METOCLOPRAMIDE HYDROCHLORIDE 10 MG: 5 INJECTION INTRAMUSCULAR; INTRAVENOUS at 14:41

## 2022-01-01 RX ADMIN — ACETYLCYSTEINE 400 MG: 100 INHALANT RESPIRATORY (INHALATION) at 14:05

## 2022-01-01 RX ADMIN — DEXTROSE MONOHYDRATE 100 MG: 50 INJECTION, SOLUTION INTRAVENOUS at 17:03

## 2022-01-01 RX ADMIN — ALBUTEROL SULFATE 2.5 MG: 2.5 SOLUTION RESPIRATORY (INHALATION) at 13:14

## 2022-01-01 RX ADMIN — Medication 5 MCG/MIN: at 01:07

## 2022-01-01 RX ADMIN — BUDESONIDE 500 MCG: 0.5 SUSPENSION RESPIRATORY (INHALATION) at 21:45

## 2022-01-01 RX ADMIN — ALBUTEROL SULFATE 2.5 MG: 2.5 SOLUTION RESPIRATORY (INHALATION) at 08:30

## 2022-01-01 RX ADMIN — ALBUTEROL SULFATE 2.5 MG: 2.5 SOLUTION RESPIRATORY (INHALATION) at 23:35

## 2022-01-01 RX ADMIN — VASOPRESSIN 0.03 UNITS/MIN: 20 INJECTION INTRAVENOUS at 15:01

## 2022-01-01 RX ADMIN — Medication: at 06:05

## 2022-01-01 RX ADMIN — ALBUMIN HUMAN 25 G: 0.25 SOLUTION INTRAVENOUS at 04:05

## 2022-01-01 RX ADMIN — SODIUM CHLORIDE, PRESERVATIVE FREE 10 ML: 5 INJECTION INTRAVENOUS at 21:22

## 2022-01-01 RX ADMIN — ENOXAPARIN SODIUM 30 MG: 100 INJECTION SUBCUTANEOUS at 12:42

## 2022-01-01 RX ADMIN — SODIUM CHLORIDE, PRESERVATIVE FREE 10 ML: 5 INJECTION INTRAVENOUS at 20:33

## 2022-01-01 RX ADMIN — BUDESONIDE 500 MCG: 0.5 SUSPENSION RESPIRATORY (INHALATION) at 08:55

## 2022-01-01 RX ADMIN — METOCLOPRAMIDE HYDROCHLORIDE 10 MG: 5 INJECTION INTRAMUSCULAR; INTRAVENOUS at 14:06

## 2022-01-01 RX ADMIN — SODIUM CHLORIDE, PRESERVATIVE FREE 10 ML: 5 INJECTION INTRAVENOUS at 21:35

## 2022-01-01 RX ADMIN — MINERAL OIL, WHITE PETROLATUM: .03; .94 OINTMENT OPHTHALMIC at 06:50

## 2022-01-01 RX ADMIN — ALBUTEROL SULFATE 2.5 MG: 2.5 SOLUTION RESPIRATORY (INHALATION) at 16:40

## 2022-01-01 RX ADMIN — MINERAL OIL, WHITE PETROLATUM: .03; .94 OINTMENT OPHTHALMIC at 14:59

## 2022-01-01 RX ADMIN — PROPOFOL 35 MCG/KG/MIN: 10 INJECTION, EMULSION INTRAVENOUS at 14:47

## 2022-01-01 RX ADMIN — Medication: at 20:50

## 2022-01-01 RX ADMIN — ALBUTEROL SULFATE 2.5 MG: 2.5 SOLUTION RESPIRATORY (INHALATION) at 08:25

## 2022-01-01 RX ADMIN — SODIUM CHLORIDE, PRESERVATIVE FREE 10 ML: 5 INJECTION INTRAVENOUS at 21:23

## 2022-01-01 RX ADMIN — SODIUM PHOSPHATE, MONOBASIC, MONOHYDRATE AND SODIUM PHOSPHATE, DIBASIC, ANHYDROUS 6 MMOL: 142; 276 INJECTION, SOLUTION INTRAVENOUS at 21:42

## 2022-01-01 RX ADMIN — IPRATROPIUM BROMIDE AND ALBUTEROL SULFATE 1 AMPULE: 2.5; .5 SOLUTION RESPIRATORY (INHALATION) at 02:16

## 2022-01-01 RX ADMIN — DIATRIZOATE MEGLUMINE AND DIATRIZOATE SODIUM 30 ML: 660; 100 LIQUID ORAL; RECTAL at 10:44

## 2022-01-01 RX ADMIN — ACETYLCYSTEINE 400 MG: 100 INHALANT RESPIRATORY (INHALATION) at 23:36

## 2022-01-01 RX ADMIN — Medication 5 MCG/MIN: at 08:44

## 2022-01-01 RX ADMIN — SODIUM CHLORIDE, PRESERVATIVE FREE 10 ML: 5 INJECTION INTRAVENOUS at 19:55

## 2022-01-01 RX ADMIN — Medication 5 MCG/MIN: at 19:00

## 2022-01-01 RX ADMIN — SODIUM ACETATE: 164 INJECTION, SOLUTION, CONCENTRATE INTRAVENOUS at 18:34

## 2022-01-01 RX ADMIN — Medication: at 10:52

## 2022-01-01 RX ADMIN — Medication: at 08:00

## 2022-01-01 RX ADMIN — EPOPROSTENOL 50 NG/KG/MIN: 1.5 INJECTION, POWDER, LYOPHILIZED, FOR SOLUTION INTRAVENOUS at 11:44

## 2022-01-01 RX ADMIN — ACETYLCYSTEINE 400 MG: 100 INHALANT RESPIRATORY (INHALATION) at 08:34

## 2022-01-01 RX ADMIN — SODIUM CHLORIDE, PRESERVATIVE FREE 40 MG: 5 INJECTION INTRAVENOUS at 05:57

## 2022-01-01 RX ADMIN — METOCLOPRAMIDE HYDROCHLORIDE 10 MG: 5 INJECTION INTRAMUSCULAR; INTRAVENOUS at 08:20

## 2022-01-01 RX ADMIN — SODIUM CHLORIDE, POTASSIUM CHLORIDE, SODIUM LACTATE AND CALCIUM CHLORIDE 1000 ML: 600; 310; 30; 20 INJECTION, SOLUTION INTRAVENOUS at 00:23

## 2022-01-01 RX ADMIN — CALCIUM CHLORIDE INJECTION 8000 MG: 100 INJECTION, SOLUTION INTRAVENOUS at 05:57

## 2022-01-01 RX ADMIN — MIDODRINE HYDROCHLORIDE 10 MG: 10 TABLET ORAL at 18:12

## 2022-01-01 RX ADMIN — CALCIUM CHLORIDE INJECTION 8000 MG: 100 INJECTION, SOLUTION INTRAVENOUS at 14:49

## 2022-01-01 RX ADMIN — ACETYLCYSTEINE 400 MG: 100 INHALANT RESPIRATORY (INHALATION) at 20:32

## 2022-01-01 RX ADMIN — ALBUTEROL SULFATE 2.5 MG: 2.5 SOLUTION RESPIRATORY (INHALATION) at 12:46

## 2022-01-01 RX ADMIN — MINERAL OIL, WHITE PETROLATUM: .03; .94 OINTMENT OPHTHALMIC at 08:33

## 2022-01-01 RX ADMIN — DOCUSATE SODIUM 100 MG: 50 LIQUID ORAL at 08:21

## 2022-01-01 RX ADMIN — HYDROCORTISONE SODIUM SUCCINATE 100 MG: 100 INJECTION, POWDER, FOR SOLUTION INTRAMUSCULAR; INTRAVENOUS at 11:51

## 2022-01-01 RX ADMIN — METOCLOPRAMIDE HYDROCHLORIDE 10 MG: 5 INJECTION INTRAMUSCULAR; INTRAVENOUS at 08:58

## 2022-01-01 RX ADMIN — ERTAPENEM SODIUM 1000 MG: 1 INJECTION, POWDER, LYOPHILIZED, FOR SOLUTION INTRAMUSCULAR; INTRAVENOUS at 16:10

## 2022-01-01 RX ADMIN — METOCLOPRAMIDE HYDROCHLORIDE 10 MG: 5 INJECTION INTRAMUSCULAR; INTRAVENOUS at 20:38

## 2022-01-01 RX ADMIN — MIDODRINE HYDROCHLORIDE 10 MG: 10 TABLET ORAL at 08:58

## 2022-01-01 RX ADMIN — ONDANSETRON 4 MG: 2 INJECTION INTRAMUSCULAR; INTRAVENOUS at 20:59

## 2022-01-01 RX ADMIN — ACETYLCYSTEINE 400 MG: 100 INHALANT RESPIRATORY (INHALATION) at 15:49

## 2022-01-01 RX ADMIN — HEPARIN SODIUM 5000 UNITS: 10000 INJECTION INTRAVENOUS; SUBCUTANEOUS at 08:21

## 2022-01-01 RX ADMIN — SODIUM CHLORIDE, PRESERVATIVE FREE 40 MG: 5 INJECTION INTRAVENOUS at 19:41

## 2022-01-01 RX ADMIN — ARFORMOTEROL TARTRATE 15 MCG: 15 SOLUTION RESPIRATORY (INHALATION) at 20:29

## 2022-01-01 RX ADMIN — SODIUM PHOSPHATE, MONOBASIC, MONOHYDRATE AND SODIUM PHOSPHATE, DIBASIC, ANHYDROUS 12 MMOL: 142; 276 INJECTION, SOLUTION INTRAVENOUS at 02:06

## 2022-01-01 RX ADMIN — MINERAL OIL, WHITE PETROLATUM: .03; .94 OINTMENT OPHTHALMIC at 14:25

## 2022-01-01 RX ADMIN — Medication: at 23:00

## 2022-01-01 RX ADMIN — ALBUTEROL SULFATE 2.5 MG: 2.5 SOLUTION RESPIRATORY (INHALATION) at 08:21

## 2022-01-01 RX ADMIN — SODIUM CHLORIDE, PRESERVATIVE FREE 40 MG: 5 INJECTION INTRAVENOUS at 06:03

## 2022-01-01 RX ADMIN — BUDESONIDE 500 MCG: 0.5 SUSPENSION RESPIRATORY (INHALATION) at 19:43

## 2022-01-01 RX ADMIN — POTASSIUM CHLORIDE 20 MEQ: 29.8 INJECTION, SOLUTION INTRAVENOUS at 21:16

## 2022-01-01 RX ADMIN — ALBUTEROL SULFATE 2.5 MG: 2.5 SOLUTION RESPIRATORY (INHALATION) at 21:45

## 2022-01-01 RX ADMIN — SODIUM CHLORIDE, PRESERVATIVE FREE 40 MG: 5 INJECTION INTRAVENOUS at 08:03

## 2022-01-01 RX ADMIN — ACETYLCYSTEINE 400 MG: 100 INHALANT RESPIRATORY (INHALATION) at 20:10

## 2022-01-01 RX ADMIN — METOCLOPRAMIDE HYDROCHLORIDE 10 MG: 5 INJECTION INTRAMUSCULAR; INTRAVENOUS at 02:48

## 2022-01-01 RX ADMIN — SODIUM PHOSPHATE, MONOBASIC, MONOHYDRATE AND SODIUM PHOSPHATE, DIBASIC, ANHYDROUS 6 MMOL: 142; 276 INJECTION, SOLUTION INTRAVENOUS at 08:59

## 2022-01-01 RX ADMIN — POTASSIUM CHLORIDE: 2 INJECTION, SOLUTION, CONCENTRATE INTRAVENOUS at 18:18

## 2022-01-01 RX ADMIN — ARFORMOTEROL TARTRATE 15 MCG: 15 SOLUTION RESPIRATORY (INHALATION) at 09:03

## 2022-01-01 RX ADMIN — SODIUM PHOSPHATE, MONOBASIC, MONOHYDRATE AND SODIUM PHOSPHATE, DIBASIC, ANHYDROUS 6 MMOL: 142; 276 INJECTION, SOLUTION INTRAVENOUS at 02:18

## 2022-01-01 RX ADMIN — FENTANYL CITRATE 25 MCG: 50 INJECTION, SOLUTION INTRAMUSCULAR; INTRAVENOUS at 22:27

## 2022-01-01 RX ADMIN — SODIUM CHLORIDE, POTASSIUM CHLORIDE, SODIUM LACTATE AND CALCIUM CHLORIDE 1000 ML: 600; 310; 30; 20 INJECTION, SOLUTION INTRAVENOUS at 10:06

## 2022-01-01 RX ADMIN — PIPERACILLIN AND TAZOBACTAM 4500 MG: 4; .5 INJECTION, POWDER, FOR SOLUTION INTRAVENOUS at 20:16

## 2022-01-01 RX ADMIN — SODIUM CHLORIDE, PRESERVATIVE FREE 40 MG: 5 INJECTION INTRAVENOUS at 19:07

## 2022-01-01 RX ADMIN — SODIUM CHLORIDE 1000 ML: 9 INJECTION, SOLUTION INTRAVENOUS at 08:25

## 2022-01-01 RX ADMIN — MINERAL OIL, WHITE PETROLATUM: .03; .94 OINTMENT OPHTHALMIC at 18:02

## 2022-01-01 RX ADMIN — MINERAL OIL, WHITE PETROLATUM: .03; .94 OINTMENT OPHTHALMIC at 02:25

## 2022-01-01 RX ADMIN — HEPARIN SODIUM 5000 UNITS: 10000 INJECTION INTRAVENOUS; SUBCUTANEOUS at 23:59

## 2022-01-01 RX ADMIN — HEPARIN SODIUM 5000 UNITS: 10000 INJECTION INTRAVENOUS; SUBCUTANEOUS at 00:07

## 2022-01-01 RX ADMIN — CALCIUM GLUCONATE: 98 INJECTION, SOLUTION INTRAVENOUS at 18:00

## 2022-01-01 RX ADMIN — BUDESONIDE 500 MCG: 0.5 SUSPENSION RESPIRATORY (INHALATION) at 08:50

## 2022-01-01 RX ADMIN — BUDESONIDE 500 MCG: 0.5 SUSPENSION RESPIRATORY (INHALATION) at 09:14

## 2022-01-01 RX ADMIN — POTASSIUM PHOSPHATE, MONOBASIC AND POTASSIUM PHOSPHATE, DIBASIC 30 MMOL: 224; 236 INJECTION, SOLUTION, CONCENTRATE INTRAVENOUS at 09:53

## 2022-01-01 RX ADMIN — METOCLOPRAMIDE HYDROCHLORIDE 10 MG: 5 INJECTION INTRAMUSCULAR; INTRAVENOUS at 00:53

## 2022-01-01 RX ADMIN — METOCLOPRAMIDE HYDROCHLORIDE 10 MG: 5 INJECTION INTRAMUSCULAR; INTRAVENOUS at 03:05

## 2022-01-01 RX ADMIN — ALBUTEROL SULFATE 2.5 MG: 2.5 SOLUTION RESPIRATORY (INHALATION) at 15:14

## 2022-01-01 RX ADMIN — ALBUTEROL SULFATE 2.5 MG: 2.5 SOLUTION RESPIRATORY (INHALATION) at 20:46

## 2022-01-01 RX ADMIN — MAGNESIUM SULFATE HEPTAHYDRATE 2000 MG: 40 INJECTION, SOLUTION INTRAVENOUS at 13:11

## 2022-01-01 RX ADMIN — LEVOTHYROXINE SODIUM 50 MCG: 0.03 TABLET ORAL at 09:57

## 2022-01-01 RX ADMIN — METOCLOPRAMIDE HYDROCHLORIDE 10 MG: 5 INJECTION INTRAMUSCULAR; INTRAVENOUS at 01:26

## 2022-01-01 RX ADMIN — ARFORMOTEROL TARTRATE 15 MCG: 15 SOLUTION RESPIRATORY (INHALATION) at 20:12

## 2022-01-01 RX ADMIN — METOCLOPRAMIDE HYDROCHLORIDE 10 MG: 5 INJECTION INTRAMUSCULAR; INTRAVENOUS at 21:34

## 2022-01-01 RX ADMIN — HEPARIN SODIUM 5000 UNITS: 10000 INJECTION INTRAVENOUS; SUBCUTANEOUS at 08:32

## 2022-01-01 RX ADMIN — INSULIN LISPRO 2 UNITS: 100 INJECTION, SOLUTION INTRAVENOUS; SUBCUTANEOUS at 18:12

## 2022-01-01 RX ADMIN — ALBUTEROL SULFATE 2.5 MG: 2.5 SOLUTION RESPIRATORY (INHALATION) at 11:47

## 2022-01-01 RX ADMIN — MINERAL OIL, WHITE PETROLATUM: .03; .94 OINTMENT OPHTHALMIC at 09:48

## 2022-01-01 RX ADMIN — ENOXAPARIN SODIUM 30 MG: 100 INJECTION SUBCUTANEOUS at 08:21

## 2022-01-01 RX ADMIN — ARFORMOTEROL TARTRATE 15 MCG: 15 SOLUTION RESPIRATORY (INHALATION) at 20:14

## 2022-01-01 RX ADMIN — HEPARIN SODIUM 5000 UNITS: 10000 INJECTION INTRAVENOUS; SUBCUTANEOUS at 14:59

## 2022-01-01 RX ADMIN — PANTOPRAZOLE SODIUM 40 MG: 40 TABLET, DELAYED RELEASE ORAL at 09:00

## 2022-01-01 RX ADMIN — ACETYLCYSTEINE 400 MG: 100 INHALANT RESPIRATORY (INHALATION) at 08:21

## 2022-01-01 RX ADMIN — SODIUM BICARBONATE 50 MEQ: 84 INJECTION, SOLUTION INTRAVENOUS at 10:20

## 2022-01-01 RX ADMIN — Medication: at 22:15

## 2022-01-01 RX ADMIN — HEPARIN SODIUM 5000 UNITS: 10000 INJECTION INTRAVENOUS; SUBCUTANEOUS at 08:54

## 2022-01-01 RX ADMIN — DEXTROSE MONOHYDRATE 100 MG: 50 INJECTION, SOLUTION INTRAVENOUS at 15:54

## 2022-01-01 RX ADMIN — ONDANSETRON 4 MG: 2 INJECTION INTRAMUSCULAR; INTRAVENOUS at 04:05

## 2022-01-01 RX ADMIN — ALBUMIN HUMAN 25 G: 0.25 SOLUTION INTRAVENOUS at 10:40

## 2022-01-01 RX ADMIN — INSULIN LISPRO 2 UNITS: 100 INJECTION, SOLUTION INTRAVENOUS; SUBCUTANEOUS at 02:10

## 2022-01-01 RX ADMIN — HEPARIN SODIUM 5000 UNITS: 10000 INJECTION INTRAVENOUS; SUBCUTANEOUS at 09:03

## 2022-01-01 RX ADMIN — SODIUM CHLORIDE, PRESERVATIVE FREE 40 MG: 5 INJECTION INTRAVENOUS at 18:25

## 2022-01-01 RX ADMIN — ERTAPENEM SODIUM 1000 MG: 1 INJECTION, POWDER, LYOPHILIZED, FOR SOLUTION INTRAMUSCULAR; INTRAVENOUS at 16:02

## 2022-01-01 RX ADMIN — MINERAL OIL, WHITE PETROLATUM: .03; .94 OINTMENT OPHTHALMIC at 01:06

## 2022-01-01 RX ADMIN — ERTAPENEM SODIUM 500 MG: 1 INJECTION, POWDER, LYOPHILIZED, FOR SOLUTION INTRAMUSCULAR; INTRAVENOUS at 16:58

## 2022-01-01 RX ADMIN — Medication: at 04:54

## 2022-01-01 RX ADMIN — Medication: at 19:44

## 2022-01-01 RX ADMIN — ACETYLCYSTEINE 400 MG: 100 INHALANT RESPIRATORY (INHALATION) at 20:12

## 2022-01-01 RX ADMIN — NOREPINEPHRINE BITARTRATE 10 MCG/MIN: 1 INJECTION, SOLUTION, CONCENTRATE INTRAVENOUS at 22:31

## 2022-01-01 RX ADMIN — Medication 100 MCG/HR: at 10:58

## 2022-01-01 RX ADMIN — SODIUM CHLORIDE, POTASSIUM CHLORIDE, SODIUM LACTATE AND CALCIUM CHLORIDE: 600; 310; 30; 20 INJECTION, SOLUTION INTRAVENOUS at 23:22

## 2022-01-01 RX ADMIN — SODIUM CHLORIDE, PRESERVATIVE FREE 10 ML: 5 INJECTION INTRAVENOUS at 20:53

## 2022-01-01 RX ADMIN — SODIUM PHOSPHATE, MONOBASIC, MONOHYDRATE AND SODIUM PHOSPHATE, DIBASIC, ANHYDROUS 6 MMOL: 142; 276 INJECTION, SOLUTION INTRAVENOUS at 16:07

## 2022-01-01 RX ADMIN — ALBUTEROL SULFATE 2.5 MG: 2.5 SOLUTION RESPIRATORY (INHALATION) at 12:16

## 2022-01-01 RX ADMIN — INSULIN LISPRO 4 UNITS: 100 INJECTION, SOLUTION INTRAVENOUS; SUBCUTANEOUS at 08:03

## 2022-01-01 RX ADMIN — SODIUM CHLORIDE, PRESERVATIVE FREE 10 ML: 5 INJECTION INTRAVENOUS at 07:58

## 2022-01-01 RX ADMIN — HEPARIN SODIUM 5000 UNITS: 10000 INJECTION INTRAVENOUS; SUBCUTANEOUS at 18:21

## 2022-01-01 RX ADMIN — MINERAL OIL, WHITE PETROLATUM: .03; .94 OINTMENT OPHTHALMIC at 09:05

## 2022-01-01 RX ADMIN — HEPARIN SODIUM 5000 UNITS: 10000 INJECTION INTRAVENOUS; SUBCUTANEOUS at 23:56

## 2022-01-01 RX ADMIN — Medication 100 MCG/HR: at 19:33

## 2022-01-01 RX ADMIN — ALBUTEROL SULFATE 2.5 MG: 2.5 SOLUTION RESPIRATORY (INHALATION) at 17:28

## 2022-01-01 RX ADMIN — CALCIUM GLUCONATE: 98 INJECTION, SOLUTION INTRAVENOUS at 17:46

## 2022-01-01 RX ADMIN — INSULIN LISPRO 2 UNITS: 100 INJECTION, SOLUTION INTRAVENOUS; SUBCUTANEOUS at 18:09

## 2022-01-01 ASSESSMENT — ENCOUNTER SYMPTOMS
SHORTNESS OF BREATH: 1
SHORTNESS OF BREATH: 1
EYES NEGATIVE: 1
CONSTIPATION: 0
ABDOMINAL PAIN: 1
VOMITING: 1
CHEST TIGHTNESS: 0
DIARRHEA: 0
VOMITING: 0
COUGH: 1
WHEEZING: 0
COUGH: 1
NAUSEA: 0
SINUS PAIN: 0
ABDOMINAL PAIN: 0
COLOR CHANGE: 0
EYE PAIN: 0
BLOOD IN STOOL: 0
NAUSEA: 0

## 2022-01-01 ASSESSMENT — PAIN SCALES - GENERAL
PAINLEVEL_OUTOF10: 0
PAINLEVEL_OUTOF10: 9
PAINLEVEL_OUTOF10: 0
PAINLEVEL_OUTOF10: 10
PAINLEVEL_OUTOF10: 10
PAINLEVEL_OUTOF10: 0
PAINLEVEL_OUTOF10: 9
PAINLEVEL_OUTOF10: 0
PAINLEVEL_OUTOF10: 10
PAINLEVEL_OUTOF10: 0
PAINLEVEL_OUTOF10: 9
PAINLEVEL_OUTOF10: 0
PAINLEVEL_OUTOF10: 10
PAINLEVEL_OUTOF10: 0
PAINLEVEL_OUTOF10: 5
PAINLEVEL_OUTOF10: 0
PAINLEVEL_OUTOF10: 8
PAINLEVEL_OUTOF10: 0
PAINLEVEL_OUTOF10: 8
PAINLEVEL_OUTOF10: 0
PAINLEVEL_OUTOF10: 3
PAINLEVEL_OUTOF10: 0
PAINLEVEL_OUTOF10: 8
PAINLEVEL_OUTOF10: 0
PAINLEVEL_OUTOF10: 4
PAINLEVEL_OUTOF10: 0
PAINLEVEL_OUTOF10: 0
PAINLEVEL_OUTOF10: 10
PAINLEVEL_OUTOF10: 0
PAINLEVEL_OUTOF10: 4
PAINLEVEL_OUTOF10: 0
PAINLEVEL_OUTOF10: 9
PAINLEVEL_OUTOF10: 0
PAINLEVEL_OUTOF10: 9
PAINLEVEL_OUTOF10: 0

## 2022-01-01 ASSESSMENT — PULMONARY FUNCTION TESTS
PIF_VALUE: 38
PIF_VALUE: 34
PIF_VALUE: 25
PIF_VALUE: 29
PIF_VALUE: 28
PIF_VALUE: 42
PIF_VALUE: 39
PIF_VALUE: 36
PIF_VALUE: 37
PIF_VALUE: 17
PIF_VALUE: 31
PIF_VALUE: 37
PIF_VALUE: 29
PIF_VALUE: 29
PIF_VALUE: 37
PIF_VALUE: 28
PIF_VALUE: 36
PIF_VALUE: 20
PIF_VALUE: 24
PIF_VALUE: 28
PIF_VALUE: 33
PIF_VALUE: 25
PIF_VALUE: 24
PIF_VALUE: 35
PIF_VALUE: 27
PIF_VALUE: 33
PIF_VALUE: 37
PIF_VALUE: 26
PIF_VALUE: 31
PIF_VALUE: 29
PIF_VALUE: 27
PIF_VALUE: 28
PIF_VALUE: 24
PIF_VALUE: 38
PIF_VALUE: 31
PIF_VALUE: 29
PIF_VALUE: 35
PIF_VALUE: 35
PIF_VALUE: 31
PIF_VALUE: 18
PIF_VALUE: 28
PIF_VALUE: 45
PIF_VALUE: 19
PIF_VALUE: 23
PIF_VALUE: 34
PIF_VALUE: 26
PIF_VALUE: 33
PIF_VALUE: 32
PIF_VALUE: 33
PIF_VALUE: 27
PIF_VALUE: 28
PIF_VALUE: 42
PIF_VALUE: 39
PIF_VALUE: 33
PIF_VALUE: 28
PIF_VALUE: 41
PIF_VALUE: 52
PIF_VALUE: 30
PIF_VALUE: 35
PIF_VALUE: 36
PIF_VALUE: 28
PIF_VALUE: 37
PIF_VALUE: 36
PIF_VALUE: 23
PIF_VALUE: 37
PIF_VALUE: 23
PIF_VALUE: 22
PIF_VALUE: 27
PIF_VALUE: 27
PIF_VALUE: 34
PIF_VALUE: 32
PIF_VALUE: 26
PIF_VALUE: 37
PIF_VALUE: 22
PIF_VALUE: 22
PIF_VALUE: 43
PIF_VALUE: 26
PIF_VALUE: 34
PIF_VALUE: 25
PIF_VALUE: 38
PIF_VALUE: 19
PIF_VALUE: 17
PIF_VALUE: 27
PIF_VALUE: 28
PIF_VALUE: 30
PIF_VALUE: 29
PIF_VALUE: 40
PIF_VALUE: 26
PIF_VALUE: 27
PIF_VALUE: 31
PIF_VALUE: 27
PIF_VALUE: 24
PIF_VALUE: 40
PIF_VALUE: 26
PIF_VALUE: 32
PIF_VALUE: 28
PIF_VALUE: 29
PIF_VALUE: 25
PIF_VALUE: 24
PIF_VALUE: 28
PIF_VALUE: 27
PIF_VALUE: 28
PIF_VALUE: 31
PIF_VALUE: 41
PIF_VALUE: 35
PIF_VALUE: 35
PIF_VALUE: 25
PIF_VALUE: 19
PIF_VALUE: 38
PIF_VALUE: 26
PIF_VALUE: 27
PIF_VALUE: 25
PIF_VALUE: 30
PIF_VALUE: 40
PIF_VALUE: 26
PIF_VALUE: 18
PIF_VALUE: 28
PIF_VALUE: 38
PIF_VALUE: 21
PIF_VALUE: 29
PIF_VALUE: 31
PIF_VALUE: 28
PIF_VALUE: 26
PIF_VALUE: 34
PIF_VALUE: 32
PIF_VALUE: 19
PIF_VALUE: 36
PIF_VALUE: 34
PIF_VALUE: 37
PIF_VALUE: 25
PIF_VALUE: 35
PIF_VALUE: 35
PIF_VALUE: 36
PIF_VALUE: 33
PIF_VALUE: 39
PIF_VALUE: 29
PIF_VALUE: 29
PIF_VALUE: 44
PIF_VALUE: 34
PIF_VALUE: 35
PIF_VALUE: 35
PIF_VALUE: 29
PIF_VALUE: 27
PIF_VALUE: 27
PIF_VALUE: 26
PIF_VALUE: 26
PIF_VALUE: 28
PIF_VALUE: 29
PIF_VALUE: 37
PIF_VALUE: 34
PIF_VALUE: 19
PIF_VALUE: 38
PIF_VALUE: 34
PIF_VALUE: 25
PIF_VALUE: 32
PIF_VALUE: 27
PIF_VALUE: 26
PIF_VALUE: 31
PIF_VALUE: 37
PIF_VALUE: 40
PIF_VALUE: 37
PIF_VALUE: 27
PIF_VALUE: 53
PIF_VALUE: 30
PIF_VALUE: 24
PIF_VALUE: 27
PIF_VALUE: 22
PIF_VALUE: 32
PIF_VALUE: 25
PIF_VALUE: 35
PIF_VALUE: 42
PIF_VALUE: 21
PIF_VALUE: 37
PIF_VALUE: 41
PIF_VALUE: 27
PIF_VALUE: 28
PIF_VALUE: 29
PIF_VALUE: 32
PIF_VALUE: 28
PIF_VALUE: 27
PIF_VALUE: 36
PIF_VALUE: 30
PIF_VALUE: 27
PIF_VALUE: 36
PIF_VALUE: 38
PIF_VALUE: 23
PIF_VALUE: 31
PIF_VALUE: 24
PIF_VALUE: 40
PIF_VALUE: 28
PIF_VALUE: 28
PIF_VALUE: 35
PIF_VALUE: 50
PIF_VALUE: 39
PIF_VALUE: 28
PIF_VALUE: 25
PIF_VALUE: 28
PIF_VALUE: 43
PIF_VALUE: 22
PIF_VALUE: 31
PIF_VALUE: 35
PIF_VALUE: 32
PIF_VALUE: 28
PIF_VALUE: 27
PIF_VALUE: 35
PIF_VALUE: 36
PIF_VALUE: 28
PIF_VALUE: 25
PIF_VALUE: 32
PIF_VALUE: 35
PIF_VALUE: 33
PIF_VALUE: 29
PIF_VALUE: 25
PIF_VALUE: 28
PIF_VALUE: 25
PIF_VALUE: 24
PIF_VALUE: 29
PIF_VALUE: 25
PIF_VALUE: 28
PIF_VALUE: 38
PIF_VALUE: 18
PIF_VALUE: 27
PIF_VALUE: 24
PIF_VALUE: 31
PIF_VALUE: 38
PIF_VALUE: 25
PIF_VALUE: 25
PIF_VALUE: 27
PIF_VALUE: 34
PIF_VALUE: 41
PIF_VALUE: 23
PIF_VALUE: 40
PIF_VALUE: 30
PIF_VALUE: 20
PIF_VALUE: 26
PIF_VALUE: 24
PIF_VALUE: 39
PIF_VALUE: 30
PIF_VALUE: 32
PIF_VALUE: 40
PIF_VALUE: 25
PIF_VALUE: 30
PIF_VALUE: 26
PIF_VALUE: 21
PIF_VALUE: 35
PIF_VALUE: 23
PIF_VALUE: 27
PIF_VALUE: 41
PIF_VALUE: 30
PIF_VALUE: 26
PIF_VALUE: 27
PIF_VALUE: 25
PIF_VALUE: 27
PIF_VALUE: 22
PIF_VALUE: 26
PIF_VALUE: 36
PIF_VALUE: 27
PIF_VALUE: 32
PIF_VALUE: 28
PIF_VALUE: 34
PIF_VALUE: 31
PIF_VALUE: 22
PIF_VALUE: 26
PIF_VALUE: 29
PIF_VALUE: 32
PIF_VALUE: 27
PIF_VALUE: 36
PIF_VALUE: 23
PIF_VALUE: 36
PIF_VALUE: 28
PIF_VALUE: 27
PIF_VALUE: 23
PIF_VALUE: 37
PIF_VALUE: 27
PIF_VALUE: 29
PIF_VALUE: 29
PIF_VALUE: 35
PIF_VALUE: 25
PIF_VALUE: 35
PIF_VALUE: 29
PIF_VALUE: 35
PIF_VALUE: 26
PIF_VALUE: 30
PIF_VALUE: 32
PIF_VALUE: 25
PIF_VALUE: 31
PIF_VALUE: 36
PIF_VALUE: 27
PIF_VALUE: 40
PIF_VALUE: 27
PIF_VALUE: 27
PIF_VALUE: 31
PIF_VALUE: 44
PIF_VALUE: 34
PIF_VALUE: 29
PIF_VALUE: 27
PIF_VALUE: 35
PIF_VALUE: 23
PIF_VALUE: 35
PIF_VALUE: 31
PIF_VALUE: 29
PIF_VALUE: 36
PIF_VALUE: 42
PIF_VALUE: 39
PIF_VALUE: 29
PIF_VALUE: 40
PIF_VALUE: 27
PIF_VALUE: 30
PIF_VALUE: 36
PIF_VALUE: 39
PIF_VALUE: 34
PIF_VALUE: 47
PIF_VALUE: 35
PIF_VALUE: 27
PIF_VALUE: 41
PIF_VALUE: 25
PIF_VALUE: 37
PIF_VALUE: 28
PIF_VALUE: 21
PIF_VALUE: 36
PIF_VALUE: 27
PIF_VALUE: 13
PIF_VALUE: 20
PIF_VALUE: 36
PIF_VALUE: 25
PIF_VALUE: 38
PIF_VALUE: 23
PIF_VALUE: 41
PIF_VALUE: 35
PIF_VALUE: 46
PIF_VALUE: 40
PIF_VALUE: 25
PIF_VALUE: 37
PIF_VALUE: 36
PIF_VALUE: 48
PIF_VALUE: 37
PIF_VALUE: 29
PIF_VALUE: 24
PIF_VALUE: 24
PIF_VALUE: 26
PIF_VALUE: 40
PIF_VALUE: 38
PIF_VALUE: 29
PIF_VALUE: 38
PIF_VALUE: 33
PIF_VALUE: 24
PIF_VALUE: 27
PIF_VALUE: 21
PIF_VALUE: 25
PIF_VALUE: 25
PIF_VALUE: 35
PIF_VALUE: 22
PIF_VALUE: 35
PIF_VALUE: 27
PIF_VALUE: 36
PIF_VALUE: 34
PIF_VALUE: 36
PIF_VALUE: 36
PIF_VALUE: 28
PIF_VALUE: 26
PIF_VALUE: 29

## 2022-01-01 ASSESSMENT — PAIN DESCRIPTION - DESCRIPTORS
DESCRIPTORS: ACHING;JABBING;STABBING
DESCRIPTORS: SHARP;DISCOMFORT;STABBING
DESCRIPTORS: ACHING;SHOOTING;SHARP
DESCRIPTORS: ACHING;SHARP;SHOOTING;STABBING

## 2022-01-01 ASSESSMENT — PAIN DESCRIPTION - ORIENTATION
ORIENTATION: RIGHT
ORIENTATION: RIGHT;POSTERIOR

## 2022-01-01 ASSESSMENT — PAIN DESCRIPTION - LOCATION
LOCATION: BACK
LOCATION: CHEST;BACK
LOCATION: CHEST
LOCATION: GENERALIZED
LOCATION: CHEST
LOCATION: CHEST
LOCATION: GENERALIZED
LOCATION: NECK
LOCATION: GENERALIZED
LOCATION: HEAD
LOCATION: BACK

## 2022-01-01 ASSESSMENT — PAIN - FUNCTIONAL ASSESSMENT: PAIN_FUNCTIONAL_ASSESSMENT: NONE - DENIES PAIN

## 2022-03-14 ENCOUNTER — HOSPITAL ENCOUNTER (EMERGENCY)
Age: 77
Discharge: HOME | End: 2022-03-14
Payer: MEDICARE

## 2022-03-14 VITALS
BODY MASS INDEX: 28.52 KG/M2 | RESPIRATION RATE: 16 BRPM | OXYGEN SATURATION: 95 % | HEART RATE: 100 BPM | WEIGHT: 155 LBS | DIASTOLIC BLOOD PRESSURE: 85 MMHG | SYSTOLIC BLOOD PRESSURE: 160 MMHG | TEMPERATURE: 97 F | HEIGHT: 62 IN

## 2022-03-14 DIAGNOSIS — T78.40XA ALLERGIC REACTION TO DRUG, INITIAL ENCOUNTER: Primary | ICD-10-CM

## 2022-03-14 PROCEDURE — 99283 EMERGENCY DEPT VISIT LOW MDM: CPT

## 2022-03-14 PROCEDURE — 6370000000 HC RX 637 (ALT 250 FOR IP): Performed by: PHYSICIAN ASSISTANT

## 2022-03-14 RX ORDER — PREDNISONE 20 MG/1
60 TABLET ORAL ONCE
Status: COMPLETED | OUTPATIENT
Start: 2022-03-14 | End: 2022-03-14

## 2022-03-14 RX ORDER — PREDNISONE 10 MG/1
40 TABLET ORAL DAILY
Qty: 20 TABLET | Refills: 0 | Status: SHIPPED | OUTPATIENT
Start: 2022-03-14 | End: 2022-03-19

## 2022-03-14 RX ORDER — TRAMADOL HYDROCHLORIDE 50 MG/1
50 TABLET ORAL EVERY 6 HOURS PRN
Qty: 20 TABLET | Refills: 0 | Status: SHIPPED | OUTPATIENT
Start: 2022-03-14 | End: 2022-03-19

## 2022-03-14 RX ADMIN — PREDNISONE 60 MG: 20 TABLET ORAL at 15:28

## 2022-03-14 NOTE — ED PROVIDER NOTES
[address] [place] [address]  Department of Emergency Medicine   ED  Encounter Note  Admit Date/RoomTime: 3/14/2022  3:09 PM  ED Room: 34/34    NAME: Jairo Avilez  : 1945  MRN: [id number]     Chief Complaint:  Allergic Reaction (started meds clindamycin and ketorolac for dental infection and had a rash on friday.)    History of Present Illness        Jairo Avilez is a 67 y.o. old female who presents to the emergency department by private vehicle, for sudden onset of itchy sherry hto torso and scalp after starting clindamycin and oral toradol for an oral infection prescribed by her dentist 3 day(s) prior to arrival.  Her symptoms started the following day of beginning meds. She reportedly has an allergy to ibuprofen which was not listed on her medical record however she states that she informed her dentist of all her drug sensitivities before getting prescriptions filled. Since onset the symptoms have been persistent and moderate in severity. Her symptoms are associated with nothing additional and relieved by nothing. She denies any difficulty breathing, difficulty swallowing, wheezing, throat tightness, hoarseness, lightheadedness, dizziness, facial swelling, lip swelling, tongue swelling, fever, chills, chest pain or abd pain. ROS   Pertinent positives and negatives are stated within HPI, all other systems reviewed and are negative. Past Medical History:  has a past medical history of Anxiety with somatization, Compression fracture of L1 lumbar vertebra (HCC), Compression fracture of L4 lumbar vertebra, Fall, History of bilateral breast cancer, Hx-TIA (transient ischemic attack), Osteoporosis, Pain syndrome, chronic, Thyroid disease, and Type II or unspecified type diabetes mellitus without mention of complication, not stated as uncontrolled. Surgical History:  has a past surgical history that includes back surgery (); Tubal ligation; Mastectomy (Bilateral);  Breast surgery (Bilateral); Tonsillectomy and adenoidectomy; and Fixation Kyphoplasty (03/10/2014). Social History:  reports that she has never smoked. She has never used smokeless tobacco. She reports previous alcohol use. She reports that she does not use drugs. Family History: family history includes Cancer in her father; Diabetes in her mother; Heart Disease in her father and mother; Other in her mother. Allergies: Benadryl [diphenhydramine] and Ibuprofen    Physical Exam   Oxygen Saturation Interpretation: Normal.        ED Triage Vitals [03/14/22 1427]   BP Temp Temp src Pulse Resp SpO2 Height Weight   (!) 160/85 97 °F (36.1 °C) -- 100 16 95 % 5' 2\" (1.575 m) 155 lb (70.3 kg)         General Appearance/Constitutional:  Alert, development consistent with age. HEENT:  NC/NT. PERRLA. Airway patent. Swelling. Tongue normal and midline. Neck:  Normal ROM. Supple. Respiratory:  Clear to auscultation and breath sounds equal.  CV:  Regular rate and rhythm, normal heart sounds, without pathological murmurs, ectopy, gallops, or rubs. GI:  Abdomen Soft, nontender, good bowel sounds. No firm or pulsatile mass. Back:  No costovertebral tenderness. Extremities: No tenderness or edema noted. Integument:  Normal turgor. Warm, dry. Rash throughout the upper torso and upper arms most consistent with a drug allergy. Lymphatics: No lymphangitis or adenopathy noted. Neurological:  Oriented. Motor functions intact. Lab / Imaging Results     (All laboratory and radiology results have been personally reviewed by myself)  Labs:  No results found for this visit on 03/14/22. Imaging: All Radiology results interpreted by Radiologist unless otherwise noted.   No orders to display       ED Course / Medical Decision Making     Medications   predniSONE (DELTASONE) tablet 60 mg (60 mg Oral Given 3/14/22 1528)        MDM:   She was prescribed Clindamycin and Toradol tablets on Friday by her dentist for dental infection. She began having symptoms on Saturday. Reportedly patient told her dentist of her drug allergies which does include Benadryl and ibuprofen. In the absence of any documented or allergies discussed with the patient, I am more inclined to believe this is a drug reaction associated with toradol rather than clindamycin. I Will recommend that she continue her use of clindamycin and I will change her pain medication and have her discontinue the use of ketorolac. Her allergies list was updated prior to discharge. Plan of Care/Counseling:  Elyse Adler reviewed today's visit with the patient in addition to providing specific details for the plan of care and counseling regarding the diagnosis and prognosis. Questions are answered at this time and are agreeable with the plan. Assessment      1. Allergic reaction to drug, initial encounter      Plan   Discharged home  Patient condition is good    New Medications     Discharge Medication List as of 3/14/2022  3:30 PM      START taking these medications    Details   predniSONE (DELTASONE) 10 MG tablet Take 4 tablets by mouth daily for 5 days, Disp-20 tablet, R-0Normal      traMADol (ULTRAM) 50 MG tablet Take 1 tablet by mouth every 6 hours as needed for Pain for up to 5 days. , Disp-20 tablet, R-0Normal           Electronically signed by SEN Adler   DD: 3/14/22  **This report was transcribed using voice recognition software. Every effort was made to ensure accuracy; however, inadvertent computerized transcription errors may be present.   END OF ED PROVIDER NOTE       Elyse Salinas  03/15/22 [id number]

## 2022-03-30 ENCOUNTER — HOSPITAL ENCOUNTER (OUTPATIENT)
Age: 77
Discharge: HOME | End: 2022-03-30
Payer: MEDICARE

## 2022-03-30 LAB
ALBUMIN SERPL-MCNC: 3.9 G/DL (ref 3.5–5.2)
ALP BLD-CCNC: 69 U/L (ref 35–104)
ALT SERPL-CCNC: 28 U/L (ref 0–32)
ANION GAP SERPL CALCULATED.3IONS-SCNC: 11 MMOL/L (ref 7–16)
AST SERPL-CCNC: 31 U/L (ref 0–31)
BASOPHILS ABSOLUTE: 0.04 E9/L (ref 0–0.2)
BASOPHILS RELATIVE PERCENT: 0.5 % (ref 0–2)
BILIRUB SERPL-MCNC: 0.4 MG/DL (ref 0–1.2)
BUN BLDV-MCNC: 23 MG/DL (ref 6–23)
C-REACTIVE PROTEIN: 0.3 MG/DL (ref 0–0.4)
CALCIUM SERPL-MCNC: 9.3 MG/DL (ref 8.6–10.2)
CHLORIDE BLD-SCNC: 105 MMOL/L (ref 98–107)
CHOLESTEROL, FASTING: 137 MG/DL (ref 0–199)
CO2: 24 MMOL/L (ref 22–29)
CREAT SERPL-MCNC: 1 MG/DL (ref 0.5–1)
EOSINOPHILS ABSOLUTE: 0.46 E9/L (ref 0.05–0.5)
EOSINOPHILS RELATIVE PERCENT: 5.5 % (ref 0–6)
GFR AFRICAN AMERICAN: >60
GFR NON-AFRICAN AMERICAN: 54 ML/MIN/1.73
GLUCOSE BLD-MCNC: 123 MG/DL (ref 74–99)
HCT VFR BLD CALC: 42.6 % (ref 34–48)
HDLC SERPL-MCNC: 39 MG/DL
HEMOGLOBIN: 13.9 G/DL (ref 11.5–15.5)
IMMATURE GRANULOCYTES #: 0.03 E9/L
IMMATURE GRANULOCYTES %: 0.4 % (ref 0–5)
LDL CHOLESTEROL CALCULATED: 56 MG/DL (ref 0–99)
LYMPHOCYTES ABSOLUTE: 1.92 E9/L (ref 1.5–4)
LYMPHOCYTES RELATIVE PERCENT: 23 % (ref 20–42)
MCH RBC QN AUTO: 30 PG (ref 26–35)
MCHC RBC AUTO-ENTMCNC: 32.6 % (ref 32–34.5)
MCV RBC AUTO: 92 FL (ref 80–99.9)
MONOCYTES ABSOLUTE: 0.81 E9/L (ref 0.1–0.95)
MONOCYTES RELATIVE PERCENT: 9.7 % (ref 2–12)
NEUTROPHILS ABSOLUTE: 5.1 E9/L (ref 1.8–7.3)
NEUTROPHILS RELATIVE PERCENT: 60.9 % (ref 43–80)
PDW BLD-RTO: 12.8 FL (ref 11.5–15)
PLATELET # BLD: 184 E9/L (ref 130–450)
PMV BLD AUTO: 10.1 FL (ref 7–12)
POTASSIUM SERPL-SCNC: 4.1 MMOL/L (ref 3.5–5)
RBC # BLD: 4.63 E12/L (ref 3.5–5.5)
SEDIMENTATION RATE, ERYTHROCYTE: 16 MM/HR (ref 0–20)
SODIUM BLD-SCNC: 140 MMOL/L (ref 132–146)
T4 FREE: 1.95 NG/DL (ref 0.93–1.7)
TOTAL PROTEIN: 6.7 G/DL (ref 6.4–8.3)
TRIGLYCERIDE, FASTING: 212 MG/DL (ref 0–149)
TSH SERPL DL<=0.05 MIU/L-ACNC: 0.1 UIU/ML (ref 0.27–4.2)
VLDLC SERPL CALC-MCNC: 42 MG/DL
WBC # BLD: 8.4 E9/L (ref 4.5–11.5)

## 2022-03-30 PROCEDURE — 86140 C-REACTIVE PROTEIN: CPT

## 2022-03-30 PROCEDURE — 85025 COMPLETE CBC W/AUTO DIFF WBC: CPT

## 2022-03-30 PROCEDURE — 36415 COLL VENOUS BLD VENIPUNCTURE: CPT

## 2022-03-30 PROCEDURE — 84443 ASSAY THYROID STIM HORMONE: CPT

## 2022-03-30 PROCEDURE — 84439 ASSAY OF FREE THYROXINE: CPT

## 2022-03-30 PROCEDURE — 80053 COMPREHEN METABOLIC PANEL: CPT

## 2022-03-30 PROCEDURE — 80061 LIPID PANEL: CPT

## 2022-03-30 PROCEDURE — 85651 RBC SED RATE NONAUTOMATED: CPT

## 2022-05-11 ENCOUNTER — HOSPITAL ENCOUNTER (OUTPATIENT)
Age: 77
Discharge: HOME | End: 2022-05-11
Payer: MEDICARE

## 2022-05-11 LAB
T4 FREE: 1.07 NG/DL (ref 0.93–1.7)
TSH SERPL DL<=0.05 MIU/L-ACNC: 10.03 UIU/ML (ref 0.27–4.2)

## 2022-05-11 PROCEDURE — 36415 COLL VENOUS BLD VENIPUNCTURE: CPT

## 2022-05-11 PROCEDURE — 84443 ASSAY THYROID STIM HORMONE: CPT

## 2022-05-11 PROCEDURE — 84439 ASSAY OF FREE THYROXINE: CPT

## 2022-06-22 ENCOUNTER — HOSPITAL ENCOUNTER (OUTPATIENT)
Age: 77
Discharge: HOME | End: 2022-06-22
Payer: MEDICARE

## 2022-06-22 LAB
ANION GAP SERPL CALCULATED.3IONS-SCNC: 11 MMOL/L (ref 7–16)
BUN BLDV-MCNC: 17 MG/DL (ref 6–23)
CALCIUM SERPL-MCNC: 9.4 MG/DL (ref 8.6–10.2)
CHLORIDE BLD-SCNC: 105 MMOL/L (ref 98–107)
CO2: 25 MMOL/L (ref 22–29)
CREAT SERPL-MCNC: 1 MG/DL (ref 0.5–1)
GFR AFRICAN AMERICAN: >60
GFR NON-AFRICAN AMERICAN: 54 ML/MIN/1.73
GLUCOSE BLD-MCNC: 111 MG/DL (ref 74–99)
POTASSIUM SERPL-SCNC: 4.3 MMOL/L (ref 3.5–5)
SODIUM BLD-SCNC: 141 MMOL/L (ref 132–146)
T4 FREE: 1.17 NG/DL (ref 0.93–1.7)
TSH SERPL DL<=0.05 MIU/L-ACNC: 6.19 UIU/ML (ref 0.27–4.2)

## 2022-06-22 PROCEDURE — 80048 BASIC METABOLIC PNL TOTAL CA: CPT

## 2022-06-22 PROCEDURE — 36415 COLL VENOUS BLD VENIPUNCTURE: CPT

## 2022-06-22 PROCEDURE — 84443 ASSAY THYROID STIM HORMONE: CPT

## 2022-06-22 PROCEDURE — 84439 ASSAY OF FREE THYROXINE: CPT

## 2022-07-22 ENCOUNTER — HOSPITAL ENCOUNTER (EMERGENCY)
Age: 77
Discharge: HOME | End: 2022-07-22
Attending: STUDENT IN AN ORGANIZED HEALTH CARE EDUCATION/TRAINING PROGRAM
Payer: MEDICARE

## 2022-07-22 ENCOUNTER — APPOINTMENT (OUTPATIENT)
Dept: GENERAL RADIOLOGY | Age: 77
End: 2022-07-22
Payer: MEDICARE

## 2022-07-22 ENCOUNTER — APPOINTMENT (OUTPATIENT)
Dept: CT IMAGING | Age: 77
End: 2022-07-22
Payer: MEDICARE

## 2022-07-22 VITALS
HEIGHT: 63 IN | RESPIRATION RATE: 16 BRPM | TEMPERATURE: 97.7 F | HEART RATE: 97 BPM | SYSTOLIC BLOOD PRESSURE: 161 MMHG | WEIGHT: 155 LBS | DIASTOLIC BLOOD PRESSURE: 97 MMHG | OXYGEN SATURATION: 96 % | BODY MASS INDEX: 27.46 KG/M2

## 2022-07-22 DIAGNOSIS — R10.84 GENERALIZED ABDOMINAL PAIN: Primary | ICD-10-CM

## 2022-07-22 DIAGNOSIS — M54.50 BILATERAL LOW BACK PAIN WITHOUT SCIATICA, UNSPECIFIED CHRONICITY: ICD-10-CM

## 2022-07-22 LAB
ALBUMIN SERPL-MCNC: 4 G/DL (ref 3.5–5.2)
ALP BLD-CCNC: 78 U/L (ref 35–104)
ALT SERPL-CCNC: 14 U/L (ref 0–32)
ANION GAP SERPL CALCULATED.3IONS-SCNC: 11 MMOL/L (ref 7–16)
AST SERPL-CCNC: 26 U/L (ref 0–31)
BACTERIA: NORMAL /HPF
BASOPHILS ABSOLUTE: 0.02 E9/L (ref 0–0.2)
BASOPHILS RELATIVE PERCENT: 0.3 % (ref 0–2)
BILIRUB SERPL-MCNC: 0.5 MG/DL (ref 0–1.2)
BILIRUBIN URINE: NEGATIVE
BLOOD, URINE: NEGATIVE
BUN BLDV-MCNC: 18 MG/DL (ref 6–23)
CALCIUM SERPL-MCNC: 9.5 MG/DL (ref 8.6–10.2)
CHLORIDE BLD-SCNC: 105 MMOL/L (ref 98–107)
CLARITY: CLEAR
CO2: 24 MMOL/L (ref 22–29)
COLOR: YELLOW
CREAT SERPL-MCNC: 0.9 MG/DL (ref 0.5–1)
EOSINOPHILS ABSOLUTE: 0.2 E9/L (ref 0.05–0.5)
EOSINOPHILS RELATIVE PERCENT: 2.8 % (ref 0–6)
EPITHELIAL CELLS, UA: NORMAL /HPF
GFR AFRICAN AMERICAN: >60
GFR NON-AFRICAN AMERICAN: >60 ML/MIN/1.73
GLUCOSE BLD-MCNC: 103 MG/DL (ref 74–99)
GLUCOSE URINE: NEGATIVE MG/DL
HCT VFR BLD CALC: 42 % (ref 34–48)
HEMOGLOBIN: 13.6 G/DL (ref 11.5–15.5)
IMMATURE GRANULOCYTES #: 0.03 E9/L
IMMATURE GRANULOCYTES %: 0.4 % (ref 0–5)
KETONES, URINE: NEGATIVE MG/DL
LACTIC ACID: 1.1 MMOL/L (ref 0.5–2.2)
LEUKOCYTE ESTERASE, URINE: NEGATIVE
LIPASE: 18 U/L (ref 13–60)
LYMPHOCYTES ABSOLUTE: 1.74 E9/L (ref 1.5–4)
LYMPHOCYTES RELATIVE PERCENT: 24 % (ref 20–42)
MCH RBC QN AUTO: 29.8 PG (ref 26–35)
MCHC RBC AUTO-ENTMCNC: 32.4 % (ref 32–34.5)
MCV RBC AUTO: 92.1 FL (ref 80–99.9)
MONOCYTES ABSOLUTE: 0.68 E9/L (ref 0.1–0.95)
MONOCYTES RELATIVE PERCENT: 9.4 % (ref 2–12)
NEUTROPHILS ABSOLUTE: 4.59 E9/L (ref 1.8–7.3)
NEUTROPHILS RELATIVE PERCENT: 63.1 % (ref 43–80)
NITRITE, URINE: NEGATIVE
PDW BLD-RTO: 12.8 FL (ref 11.5–15)
PH UA: 6 (ref 5–9)
PLATELET # BLD: 220 E9/L (ref 130–450)
PMV BLD AUTO: 10.4 FL (ref 7–12)
POTASSIUM SERPL-SCNC: 4.4 MMOL/L (ref 3.5–5)
PRO-BNP: 258 PG/ML (ref 0–450)
PROTEIN UA: NEGATIVE MG/DL
RBC # BLD: 4.56 E12/L (ref 3.5–5.5)
RBC UA: NORMAL /HPF (ref 0–2)
SODIUM BLD-SCNC: 140 MMOL/L (ref 132–146)
SPECIFIC GRAVITY UA: 1.02 (ref 1–1.03)
TOTAL PROTEIN: 6.9 G/DL (ref 6.4–8.3)
TROPONIN, HIGH SENSITIVITY: 10 NG/L (ref 0–9)
TROPONIN, HIGH SENSITIVITY: 11 NG/L (ref 0–9)
UROBILINOGEN, URINE: 0.2 E.U./DL
WBC # BLD: 7.3 E9/L (ref 4.5–11.5)
WBC UA: NORMAL /HPF (ref 0–5)

## 2022-07-22 PROCEDURE — 93005 ELECTROCARDIOGRAM TRACING: CPT | Performed by: STUDENT IN AN ORGANIZED HEALTH CARE EDUCATION/TRAINING PROGRAM

## 2022-07-22 PROCEDURE — 71045 X-RAY EXAM CHEST 1 VIEW: CPT

## 2022-07-22 PROCEDURE — 85025 COMPLETE CBC W/AUTO DIFF WBC: CPT

## 2022-07-22 PROCEDURE — 6360000004 HC RX CONTRAST MEDICATION: Performed by: RADIOLOGY

## 2022-07-22 PROCEDURE — 83605 ASSAY OF LACTIC ACID: CPT

## 2022-07-22 PROCEDURE — 80053 COMPREHEN METABOLIC PANEL: CPT

## 2022-07-22 PROCEDURE — 83880 ASSAY OF NATRIURETIC PEPTIDE: CPT

## 2022-07-22 PROCEDURE — 84484 ASSAY OF TROPONIN QUANT: CPT

## 2022-07-22 PROCEDURE — 81001 URINALYSIS AUTO W/SCOPE: CPT

## 2022-07-22 PROCEDURE — 71275 CT ANGIOGRAPHY CHEST: CPT

## 2022-07-22 PROCEDURE — 74177 CT ABD & PELVIS W/CONTRAST: CPT

## 2022-07-22 PROCEDURE — 99285 EMERGENCY DEPT VISIT HI MDM: CPT

## 2022-07-22 PROCEDURE — 87088 URINE BACTERIA CULTURE: CPT

## 2022-07-22 PROCEDURE — 83690 ASSAY OF LIPASE: CPT

## 2022-07-22 RX ADMIN — IOPAMIDOL 75 ML: 755 INJECTION, SOLUTION INTRAVENOUS at 15:55

## 2022-07-22 ASSESSMENT — ENCOUNTER SYMPTOMS
BACK PAIN: 0
EYE PAIN: 0
SINUS PRESSURE: 0
VOMITING: 0
COUGH: 0
WHEEZING: 0
NAUSEA: 1
ABDOMINAL DISTENTION: 0
EYE DISCHARGE: 0
EYE REDNESS: 0
SORE THROAT: 0
DIARRHEA: 0
ABDOMINAL PAIN: 1
SHORTNESS OF BREATH: 1

## 2022-07-22 ASSESSMENT — PAIN - FUNCTIONAL ASSESSMENT: PAIN_FUNCTIONAL_ASSESSMENT: 0-10

## 2022-07-22 ASSESSMENT — PAIN SCALES - GENERAL: PAINLEVEL_OUTOF10: 10

## 2022-07-22 ASSESSMENT — PAIN DESCRIPTION - LOCATION: LOCATION: BACK

## 2022-07-22 ASSESSMENT — PAIN DESCRIPTION - ORIENTATION: ORIENTATION: LOWER

## 2022-07-22 NOTE — ED NOTES
Department of Emergency Medicine  FIRST PROVIDER TRIAGE NOTE             Independent MLP           7/22/22  11:58 AM EDT    Date of Encounter: 7/22/22   MRN: [id number]      HPI: Cris Valles is a 67 y.o. female who presents to the ED for Abdominal Pain and Flank Pain (States 3 weeks she possibly passed kidney stones. Now she is having Flank pain, unable to urinate and abdominal pain.)     Patient is a 50-year-old that states that 3 weeks ago she passed some kidney stones with some blood. Patient states she is now having pain across her entire lower back. Patient says she is having an inability to urinate when she does urinate is only [place] small dribble. \"    ROS: Negative for cp, sob, cough, vomiting, diarrhea, or urinary complaints. PE: Gen Appearance/Constitutional: alert  HEENT: NC/NT. PERRLA,  Airway patent. Neck: supple     Initial Plan of Care: All treatment areas with department are currently occupied. Plan to order/Initiate the following while awaiting opening in ED: labs, imaging studies, and ultrasound.   Initiate Treatment-Testing, Proceed toTreatment Area When Bed Available for ED Attending/MLP to Continue Care    Electronically signed by Luca Riggins PA-C   DD: 7/22/22       Luca Riggins PA-C  07/22/22 [id number]

## 2022-07-22 NOTE — ED PROVIDER NOTES
Department of Emergency Medicine   ED  Provider Note  Admit Date/RoomTime: 7/22/2022  1:31 PM  ED Room: 19/19          History of Present Illness:  7/22/22, Time: 2:01 PM EDT  Chief Complaint   Patient presents with    Abdominal Pain    Flank Pain     States 3 weeks she possibly passed kidney stones. Now she is having Flank pain, unable to urinate and abdominal pain. Ivan Robbins is a 67 y.o. female presenting to the ED for abdominal pain, beginning 3 weeks ago. The complaint has been persistent, severe in severity, and worsened by nothing. Patient states that she was cleaning her self last week when she noticed 3 BBs and blood. She states that she thought this was from her hemorrhoids. She states that she did have kidney stones 3 weeks ago as well. In addition to her abdominal pain she states that she has having back pain that goes across her entire back. It does not radiate anywhere. Patient states that she has not taken any medication to make it better or worse. She does endorse some chest pain, shortness of breath, nausea, and difficulty urinating. Review of Systems   Constitutional:  Negative for chills and fever. HENT:  Negative for ear pain, sinus pressure and sore throat. Eyes:  Negative for pain, discharge and redness. Respiratory:  Positive for shortness of breath. Negative for cough and wheezing. Cardiovascular:  Positive for chest pain. Gastrointestinal:  Positive for abdominal pain and nausea. Negative for abdominal distention, diarrhea and vomiting. Genitourinary:  Negative for dysuria and frequency. Musculoskeletal:  Negative for arthralgias and back pain. Skin:  Negative for rash and wound. Neurological:  Negative for weakness and headaches. Hematological:  Negative for adenopathy.    All other systems reviewed and are negative.       --------------------------------------------- PAST HISTORY ---------------------------------------------  Past Medical History:  has a past medical history of Anxiety with somatization, Compression fracture of L1 lumbar vertebra (HCC), Compression fracture of L4 lumbar vertebra, Fall, History of bilateral breast cancer, Hx-TIA (transient ischemic attack), Osteoporosis, Pain syndrome, chronic, Thyroid disease, and Type II or unspecified type diabetes mellitus without mention of complication, not stated as uncontrolled. Past Surgical History:  has a past surgical history that includes back surgery (1989); Tubal ligation; Mastectomy (Bilateral); Breast surgery (Bilateral); Tonsillectomy and adenoidectomy; and Fixation Kyphoplasty (03/10/2014). Social History:  reports that she has never smoked. She has never used smokeless tobacco. She reports that she does not currently use alcohol. She reports that she does not use drugs. Family History: family history includes Cancer in her father; Diabetes in her mother; Heart Disease in her father and mother; Other in her mother. . Unless otherwise noted, family history is non contributory    The patients home medications have been reviewed. Allergies: Benadryl [diphenhydramine] and Ibuprofen        ---------------------------------------------------PHYSICAL EXAM--------------------------------------    Physical Exam  Vitals and nursing note reviewed. Constitutional:       Appearance: She is well-developed. HENT:      Head: Normocephalic and atraumatic. Eyes:      Conjunctiva/sclera: Conjunctivae normal.   Cardiovascular:      Rate and Rhythm: Normal rate and regular rhythm. Heart sounds: Normal heart sounds. No murmur heard. Pulmonary:      Effort: Pulmonary effort is normal. No respiratory distress. Breath sounds: Normal breath sounds. No wheezing or rales. Abdominal:      General: Abdomen is flat. Bowel sounds are normal. There is no distension. Palpations: Abdomen is soft. Tenderness: There is generalized abdominal tenderness.  There is no right CVA tenderness, left CVA tenderness, guarding or rebound. Genitourinary:     Rectum: Normal. Guaiac result negative. Comments: Normal rectal tone, no obvious hemorrhoids, no ally blood present, sensation intact  Musculoskeletal:      Cervical back: Normal range of motion and neck supple. Skin:     General: Skin is warm and dry. Neurological:      Mental Status: She is alert and oriented to person, place, and time. Cranial Nerves: No cranial nerve deficit. Coordination: Coordination normal.          -------------------------------------------------- RESULTS -------------------------------------------------  I have personally reviewed all laboratory and imaging results for this patient. Results are listed below.      LABS: (Lab results interpreted by me)  Results for orders placed or performed during the hospital encounter of 07/22/22   CBC with Auto Differential   Result Value Ref Range    WBC 7.3 4.5 - 11.5 E9/L    RBC 4.56 3.50 - 5.50 E12/L    Hemoglobin 13.6 11.5 - 15.5 g/dL    Hematocrit 42.0 34.0 - 48.0 %    MCV 92.1 80.0 - 99.9 fL    MCH 29.8 26.0 - 35.0 pg    MCHC 32.4 32.0 - 34.5 %    RDW 12.8 11.5 - 15.0 fL    Platelets 530 034 - 338 E9/L    MPV 10.4 7.0 - 12.0 fL    Neutrophils % 63.1 43.0 - 80.0 %    Immature Granulocytes % 0.4 0.0 - 5.0 %    Lymphocytes % 24.0 20.0 - 42.0 %    Monocytes % 9.4 2.0 - 12.0 %    Eosinophils % 2.8 0.0 - 6.0 %    Basophils % 0.3 0.0 - 2.0 %    Neutrophils Absolute 4.59 1.80 - 7.30 E9/L    Immature Granulocytes # 0.03 E9/L    Lymphocytes Absolute 1.74 1.50 - 4.00 E9/L    Monocytes Absolute 0.68 0.10 - 0.95 E9/L    Eosinophils Absolute 0.20 0.05 - 0.50 E9/L    Basophils Absolute 0.02 0.00 - 0.20 E9/L   Comprehensive Metabolic Panel   Result Value Ref Range    Sodium 140 132 - 146 mmol/L    Potassium 4.4 3.5 - 5.0 mmol/L    Chloride 105 98 - 107 mmol/L    CO2 24 22 - 29 mmol/L    Anion Gap 11 7 - 16 mmol/L    Glucose 103 (H) 74 - 99 mg/dL    BUN 18 6 - 23 mg/dL Creatinine 0.9 0.5 - 1.0 mg/dL    GFR Non-African American >60 >=60 mL/min/1.73    GFR African American >60     Calcium 9.5 8.6 - 10.2 mg/dL    Total Protein 6.9 6.4 - 8.3 g/dL    Albumin 4.0 3.5 - 5.2 g/dL    Total Bilirubin 0.5 0.0 - 1.2 mg/dL    Alkaline Phosphatase 78 35 - 104 U/L    ALT 14 0 - 32 U/L    AST 26 0 - 31 U/L   Urinalysis with Microscopic   Result Value Ref Range    Color, UA Yellow Straw/Yellow    Clarity, UA Clear Clear    Glucose, Ur Negative Negative mg/dL    Bilirubin Urine Negative Negative    Ketones, Urine Negative Negative mg/dL    Specific Gravity, UA 1.025 1.005 - 1.030    Blood, Urine Negative Negative    pH, UA 6.0 5.0 - 9.0    Protein, UA Negative Negative mg/dL    Urobilinogen, Urine 0.2 <2.0 E.U./dL    Nitrite, Urine Negative Negative    Leukocyte Esterase, Urine Negative Negative    WBC, UA NONE 0 - 5 /HPF    RBC, UA NONE 0 - 2 /HPF    Epithelial Cells, UA RARE /HPF    Bacteria, UA NONE SEEN None Seen /HPF   Lactic Acid   Result Value Ref Range    Lactic Acid 1.1 0.5 - 2.2 mmol/L   Lipase   Result Value Ref Range    Lipase 18 13 - 60 U/L   Brain Natriuretic Peptide   Result Value Ref Range    Pro- 0 - 450 pg/mL   Troponin   Result Value Ref Range    Troponin, High Sensitivity 10 (H) 0 - 9 ng/L   Troponin   Result Value Ref Range    Troponin, High Sensitivity 11 (H) 0 - 9 ng/L   EKG 12 Lead   Result Value Ref Range    Ventricular Rate 71 BPM    Atrial Rate 71 BPM    P-R Interval 170 ms    QRS Duration 72 ms    Q-T Interval 390 ms    QTc Calculation (Bazett) 423 ms    P Axis 61 degrees    R Axis 42 degrees    T Axis 47 degrees   ,       RADIOLOGY:  Interpreted by Radiologist unless otherwise specified  CT ABDOMEN PELVIS W IV CONTRAST Additional Contrast? None   Final Result   1. No acute abnormality is seen in the abdomen or the pelvis. 2. Massive hiatal hernia which contains the entire stomach and a significant   portion of the mid transverse colon.    3. Severe distal colonic diverticulosis without diverticulitis. 4. Cholelithiasis. CTA PULMONARY W CONTRAST   Final Result   1. No pulmonary embolism. 2. Massive hiatal hernia which contains the entire stomach and a significant   portion of the mid transverse colon. 3. Mild compressive atelectasis in the right lower lobe. The lungs otherwise   clear. 4. Demineralized bones, with multiple age indeterminate and chronic   compression fracture deformities in the visualized spine. RECOMMENDATIONS:   Unavailable         XR CHEST PORTABLE   Final Result   No active process. Suspected prior esophagectomy. See above.                          ------------------------- NURSING NOTES AND VITALS REVIEWED ---------------------------   The nursing notes within the ED encounter and vital signs as below have been reviewed by myself  BP (!) 161/97   Pulse 97   Temp 97.7 °F (36.5 °C) (Oral)   Resp 16   Ht 5' 3\" (1.6 m)   Wt 155 lb (70.3 kg)   SpO2 96%   BMI 27.46 kg/m²     Oxygen Saturation Interpretation: Normal    The patients available past medical records and past encounters were reviewed. ------------------------------ ED COURSE/MEDICAL DECISION MAKING----------------------  Medications   iopamidol (ISOVUE-370) 76 % injection 75 mL (75 mLs IntraVENous Given 7/22/22 1555)            Medical Decision Making:     Patient presents with abdominal pain. Lab work was obtained which was unremarkable. Troponins without significant delta. Imaging was obtained of the abdomen and chest which did not show any PE however incidentally showed cholelithiasis and a hiatal hernia. Patient's vitals are stable. During her stay patient then began to complain of inability to ambulate. She also states that she is unable to urinate. Perineal sensation was intact on exam.  Rectal tone was appropriate. Patient was observed ambulating independently. Strength and sensation otherwise does appear to be intact. No fevers.   No step-off deformities or direct midline tenderness. Low concern for cauda equina. Informed patient of the results and plan she was agreeable. Patient did request a new neurosurgeon. Patient will be provided referral.  Patient be discharged home with instructions to follow-up with her PCP for further evaluation care. ED Course as of 07/22/22 2051 Fri Jul 22, 2022   [id number] Patient now states that she forgot to tell the physicians she is unable to ambulate. Nursing staff however reports that patient is ambulating independently in room. [BB]      ED Course User Index  [BB] Eric Miguel DO        Re-Evaluations:  Patient was reevaluted and continued to be stable. This patient's ED course included: a personal history and physicial examination, multiple bedside re-evaluations, and cardiac monitoring    This patient has remained hemodynamically stable during their ED course. Consultations:  None      Critical Care: None       Counseling: The emergency provider has spoken with the patient and discussed todays results, in addition to providing specific details for the plan of care and counseling regarding the diagnosis and prognosis. Questions are answered at this time and they are agreeable with the plan.       --------------------------------- IMPRESSION AND DISPOSITION ---------------------------------    IMPRESSION  1. Generalized abdominal pain    2. Bilateral low back pain without sciatica, unspecified chronicity        DISPOSITION  Disposition: Discharge to home  Patient condition is stable    Patient was seen and evaluated by both myself and Coral Arora DO. NOTE: This report was transcribed using voice recognition software.  Every effort was made to ensure accuracy; however, inadvertent computerized transcription errors may be present           Eric Miguel DO  Resident  07/22/22 2051

## 2022-07-23 LAB
EKG ATRIAL RATE: 71 BPM
EKG P AXIS: 61 DEGREES
EKG P-R INTERVAL: 170 MS
EKG Q-T INTERVAL: 390 MS
EKG QRS DURATION: 72 MS
EKG QTC CALCULATION (BAZETT): 423 MS
EKG R AXIS: 42 DEGREES
EKG T AXIS: 47 DEGREES
EKG VENTRICULAR RATE: 71 BPM

## 2022-07-25 LAB — URINE CULTURE, ROUTINE: NORMAL

## 2022-10-11 ENCOUNTER — HOSPITAL ENCOUNTER (OUTPATIENT)
Age: 77
Discharge: HOME | End: 2022-10-13

## 2022-10-11 PROCEDURE — 88305 TISSUE EXAM BY PATHOLOGIST: CPT

## 2022-12-10 ENCOUNTER — HOSPITAL ENCOUNTER (OUTPATIENT)
Age: 77
Discharge: HOME | End: 2022-12-12

## 2022-12-10 LAB
ANION GAP SERPL CALCULATED.3IONS-SCNC: 10 MMOL/L (ref 7–16)
BASOPHILS ABSOLUTE: 0.02 E9/L (ref 0–0.2)
BASOPHILS RELATIVE PERCENT: 0.2 % (ref 0–2)
BUN BLDV-MCNC: 21 MG/DL (ref 6–23)
CALCIUM SERPL-MCNC: 8.6 MG/DL (ref 8.6–10.2)
CHLORIDE BLD-SCNC: 103 MMOL/L (ref 98–107)
CO2: 24 MMOL/L (ref 22–29)
CREAT SERPL-MCNC: 0.9 MG/DL (ref 0.5–1)
EOSINOPHILS ABSOLUTE: 0 E9/L (ref 0.05–0.5)
EOSINOPHILS RELATIVE PERCENT: 0 % (ref 0–6)
GFR SERPL CREATININE-BSD FRML MDRD: >60 ML/MIN/1.73
GLUCOSE BLD-MCNC: 175 MG/DL (ref 74–99)
HCT VFR BLD CALC: 37.5 % (ref 34–48)
HEMOGLOBIN: 12 G/DL (ref 11.5–15.5)
IMMATURE GRANULOCYTES #: 0.03 E9/L
IMMATURE GRANULOCYTES %: 0.3 % (ref 0–5)
LYMPHOCYTES ABSOLUTE: 0.69 E9/L (ref 1.5–4)
LYMPHOCYTES RELATIVE PERCENT: 6.4 % (ref 20–42)
MCH RBC QN AUTO: 29.3 PG (ref 26–35)
MCHC RBC AUTO-ENTMCNC: 32 % (ref 32–34.5)
MCV RBC AUTO: 91.5 FL (ref 80–99.9)
MONOCYTES ABSOLUTE: 0.79 E9/L (ref 0.1–0.95)
MONOCYTES RELATIVE PERCENT: 7.3 % (ref 2–12)
NEUTROPHILS ABSOLUTE: 9.27 E9/L (ref 1.8–7.3)
NEUTROPHILS RELATIVE PERCENT: 85.8 % (ref 43–80)
PDW BLD-RTO: 13 FL (ref 11.5–15)
PLATELET # BLD: 193 E9/L (ref 130–450)
PMV BLD AUTO: 10.3 FL (ref 7–12)
POTASSIUM SERPL-SCNC: 4.8 MMOL/L (ref 3.5–5)
RBC # BLD: 4.1 E12/L (ref 3.5–5.5)
SODIUM BLD-SCNC: 137 MMOL/L (ref 132–146)
WBC # BLD: 10.8 E9/L (ref 4.5–11.5)

## 2022-12-10 PROCEDURE — 80048 BASIC METABOLIC PNL TOTAL CA: CPT

## 2022-12-10 PROCEDURE — 85025 COMPLETE CBC W/AUTO DIFF WBC: CPT

## 2022-12-11 ENCOUNTER — HOSPITAL ENCOUNTER (OUTPATIENT)
Age: 77
Discharge: HOME | End: 2022-12-13

## 2022-12-11 LAB
ANION GAP SERPL CALCULATED.3IONS-SCNC: 9 MMOL/L (ref 7–16)
BASOPHILS ABSOLUTE: 0.02 E9/L (ref 0–0.2)
BASOPHILS RELATIVE PERCENT: 0.2 % (ref 0–2)
BUN BLDV-MCNC: 22 MG/DL (ref 6–23)
CALCIUM SERPL-MCNC: 8.8 MG/DL (ref 8.6–10.2)
CHLORIDE BLD-SCNC: 106 MMOL/L (ref 98–107)
CO2: 26 MMOL/L (ref 22–29)
CREAT SERPL-MCNC: 1.1 MG/DL (ref 0.5–1)
EOSINOPHILS ABSOLUTE: 0.13 E9/L (ref 0.05–0.5)
EOSINOPHILS RELATIVE PERCENT: 1.4 % (ref 0–6)
GFR SERPL CREATININE-BSD FRML MDRD: 51 ML/MIN/1.73
GLUCOSE BLD-MCNC: 88 MG/DL (ref 74–99)
HCT VFR BLD CALC: 33.2 % (ref 34–48)
HEMOGLOBIN: 10.7 G/DL (ref 11.5–15.5)
IMMATURE GRANULOCYTES #: 0.02 E9/L
IMMATURE GRANULOCYTES %: 0.2 % (ref 0–5)
LYMPHOCYTES ABSOLUTE: 1.44 E9/L (ref 1.5–4)
LYMPHOCYTES RELATIVE PERCENT: 15.8 % (ref 20–42)
MCH RBC QN AUTO: 29.8 PG (ref 26–35)
MCHC RBC AUTO-ENTMCNC: 32.2 % (ref 32–34.5)
MCV RBC AUTO: 92.5 FL (ref 80–99.9)
MONOCYTES ABSOLUTE: 0.73 E9/L (ref 0.1–0.95)
MONOCYTES RELATIVE PERCENT: 8 % (ref 2–12)
NEUTROPHILS ABSOLUTE: 6.76 E9/L (ref 1.8–7.3)
NEUTROPHILS RELATIVE PERCENT: 74.4 % (ref 43–80)
PDW BLD-RTO: 13.5 FL (ref 11.5–15)
PLATELET # BLD: 170 E9/L (ref 130–450)
PMV BLD AUTO: 10.4 FL (ref 7–12)
POTASSIUM SERPL-SCNC: 4.8 MMOL/L (ref 3.5–5)
RBC # BLD: 3.59 E12/L (ref 3.5–5.5)
SODIUM BLD-SCNC: 141 MMOL/L (ref 132–146)
WBC # BLD: 9.1 E9/L (ref 4.5–11.5)

## 2022-12-11 PROCEDURE — 80048 BASIC METABOLIC PNL TOTAL CA: CPT

## 2022-12-11 PROCEDURE — 85025 COMPLETE CBC W/AUTO DIFF WBC: CPT

## 2022-12-12 ENCOUNTER — HOSPITAL ENCOUNTER (OUTPATIENT)
Age: 77
Discharge: HOME | End: 2022-12-14

## 2022-12-12 LAB
ANION GAP SERPL CALCULATED.3IONS-SCNC: 12 MMOL/L (ref 7–16)
BUN BLDV-MCNC: 21 MG/DL (ref 6–23)
CALCIUM SERPL-MCNC: 9.1 MG/DL (ref 8.6–10.2)
CHLORIDE BLD-SCNC: 102 MMOL/L (ref 98–107)
CO2: 26 MMOL/L (ref 22–29)
CREAT SERPL-MCNC: 1 MG/DL (ref 0.5–1)
GFR SERPL CREATININE-BSD FRML MDRD: 58 ML/MIN/1.73
GLUCOSE BLD-MCNC: 130 MG/DL (ref 74–99)
HCT VFR BLD CALC: 38.3 % (ref 34–48)
HEMOGLOBIN: 12.1 G/DL (ref 11.5–15.5)
MCH RBC QN AUTO: 29.4 PG (ref 26–35)
MCHC RBC AUTO-ENTMCNC: 31.6 % (ref 32–34.5)
MCV RBC AUTO: 93.2 FL (ref 80–99.9)
PDW BLD-RTO: 13.2 FL (ref 11.5–15)
PLATELET # BLD: 206 E9/L (ref 130–450)
PMV BLD AUTO: 9.9 FL (ref 7–12)
POTASSIUM SERPL-SCNC: 4.2 MMOL/L (ref 3.5–5)
RBC # BLD: 4.11 E12/L (ref 3.5–5.5)
SODIUM BLD-SCNC: 140 MMOL/L (ref 132–146)
WBC # BLD: 9.4 E9/L (ref 4.5–11.5)

## 2022-12-12 PROCEDURE — 80048 BASIC METABOLIC PNL TOTAL CA: CPT

## 2022-12-12 PROCEDURE — 85027 COMPLETE CBC AUTOMATED: CPT

## 2022-12-13 ENCOUNTER — HOSPITAL ENCOUNTER (OUTPATIENT)
Age: 77
Discharge: HOME | End: 2022-12-15

## 2022-12-13 LAB
ANION GAP SERPL CALCULATED.3IONS-SCNC: 11 MMOL/L (ref 7–16)
BUN BLDV-MCNC: 21 MG/DL (ref 6–23)
CALCIUM SERPL-MCNC: 8.6 MG/DL (ref 8.6–10.2)
CHLORIDE BLD-SCNC: 101 MMOL/L (ref 98–107)
CO2: 25 MMOL/L (ref 22–29)
CREAT SERPL-MCNC: 1 MG/DL (ref 0.5–1)
GFR SERPL CREATININE-BSD FRML MDRD: 58 ML/MIN/1.73
GLUCOSE BLD-MCNC: 99 MG/DL (ref 74–99)
HCT VFR BLD CALC: 34.6 % (ref 34–48)
HEMOGLOBIN: 11.1 G/DL (ref 11.5–15.5)
MCH RBC QN AUTO: 29.6 PG (ref 26–35)
MCHC RBC AUTO-ENTMCNC: 32.1 % (ref 32–34.5)
MCV RBC AUTO: 92.3 FL (ref 80–99.9)
PDW BLD-RTO: 13.2 FL (ref 11.5–15)
PLATELET # BLD: 206 E9/L (ref 130–450)
PMV BLD AUTO: 10 FL (ref 7–12)
POTASSIUM SERPL-SCNC: 4.7 MMOL/L (ref 3.5–5)
PRO-BNP: 1379 PG/ML (ref 0–450)
RBC # BLD: 3.75 E12/L (ref 3.5–5.5)
SODIUM BLD-SCNC: 137 MMOL/L (ref 132–146)
WBC # BLD: 7.5 E9/L (ref 4.5–11.5)

## 2022-12-13 PROCEDURE — 85027 COMPLETE CBC AUTOMATED: CPT

## 2022-12-13 PROCEDURE — 80048 BASIC METABOLIC PNL TOTAL CA: CPT

## 2022-12-13 PROCEDURE — 83880 ASSAY OF NATRIURETIC PEPTIDE: CPT

## 2022-12-14 ENCOUNTER — HOSPITAL ENCOUNTER (OUTPATIENT)
Age: 77
Discharge: HOME | End: 2022-12-16

## 2022-12-14 PROBLEM — J18.9 POSTOPERATIVE PNEUMONIA: Status: ACTIVE | Noted: 2022-01-01

## 2022-12-14 PROBLEM — J95.89 POSTOPERATIVE PNEUMONIA: Status: ACTIVE | Noted: 2022-12-14

## 2022-12-14 LAB
ABO/RH: NORMAL
ANION GAP SERPL CALCULATED.3IONS-SCNC: 21 MMOL/L (ref 7–16)
ANTIBODY SCREEN: NORMAL
BUN BLDV-MCNC: 35 MG/DL (ref 6–23)
C-REACTIVE PROTEIN: 45.9 MG/DL (ref 0–0.4)
CALCIUM SERPL-MCNC: 8.8 MG/DL (ref 8.6–10.2)
CHLORIDE BLD-SCNC: 95 MMOL/L (ref 98–107)
CO2: 20 MMOL/L (ref 22–29)
CREAT SERPL-MCNC: 1.7 MG/DL (ref 0.5–1)
GFR SERPL CREATININE-BSD FRML MDRD: 30 ML/MIN/1.73
GLUCOSE BLD-MCNC: 121 MG/DL (ref 74–99)
HCT VFR BLD CALC: 35.6 % (ref 34–48)
HEMOGLOBIN: 11.6 G/DL (ref 11.5–15.5)
MCH RBC QN AUTO: 29.8 PG (ref 26–35)
MCHC RBC AUTO-ENTMCNC: 32.6 % (ref 32–34.5)
MCV RBC AUTO: 91.5 FL (ref 80–99.9)
PDW BLD-RTO: 13.2 FL (ref 11.5–15)
PLATELET # BLD: 279 E9/L (ref 130–450)
PMV BLD AUTO: 10 FL (ref 7–12)
POTASSIUM SERPL-SCNC: 4.1 MMOL/L (ref 3.5–5)
PRO-BNP: 715 PG/ML (ref 0–450)
RBC # BLD: 3.89 E12/L (ref 3.5–5.5)
SODIUM BLD-SCNC: 136 MMOL/L (ref 132–146)
WBC # BLD: 6.2 E9/L (ref 4.5–11.5)

## 2022-12-14 NOTE — ED PROVIDER NOTES
Patient is a 53-year-old female presented to the ED for hypoxia, history obtained from EMS patient. She has a past medical history of hypothyroidism, hiatal hernia, and TIA. She is coming from [facility] surgical [place] after she underwent hiatal hernia repair on Friday. She reports that she was feeling a little sick days prior to that and describes it as having cough and phlegm. Since Sunday she has been hypoxic requiring oxygen. She is also complaining of cough and sputum. She states that she had chills as well but denies hemoptysis, chest pain, palpitation, diarrhea, constipation, dysuria, leg pain and swelling, headache, weakness, tingling, numbness. Reports that the surgery has been uneventful as she has minor abdominal pain at the surgery site. He does not wear oxygen at home and denies any previous hypoxic episodes. Review of Systems   Constitutional:  Positive for fatigue. Negative for appetite change, fever and unexpected weight change. HENT:  Negative for hearing loss and sinus pain. Eyes:  Negative for pain and visual disturbance. Respiratory:  Positive for cough and shortness of breath. Negative for chest tightness and wheezing. Cardiovascular:  Negative for chest pain, palpitations and leg swelling. Gastrointestinal:  Positive for abdominal pain and vomiting. Negative for blood in stool, constipation, diarrhea and nausea. Genitourinary:  Negative for dysuria, flank pain, frequency and hematuria. Skin:  Negative for rash. Allergic/Immunologic: Negative for environmental allergies and food allergies. Neurological:  Negative for dizziness, weakness, numbness and headaches. Physical Exam  Constitutional:       General: She is not in acute distress. HENT:      Head: Normocephalic. Right Ear: External ear normal.      Left Ear: External ear normal.      Nose: No congestion or rhinorrhea.       Mouth/Throat:      Mouth: Mucous membranes are moist.   Eyes: General:         Right eye: No discharge. Left eye: No discharge. Conjunctiva/sclera: Conjunctivae normal.   Cardiovascular:      Rate and Rhythm: Normal rate and regular rhythm. Heart sounds: No murmur heard. Pulmonary:      Effort: Pulmonary effort is normal. Tachypnea present. No respiratory distress. Breath sounds: Examination of the right-upper field reveals rhonchi. Examination of the left-upper field reveals rhonchi. Examination of the right-middle field reveals rhonchi. Examination of the left-middle field reveals rhonchi. Examination of the right-lower field reveals rhonchi. Examination of the left-lower field reveals rhonchi. Rhonchi present. No wheezing or rales. Chest:      Chest wall: No deformity, tenderness or crepitus. Abdominal:      General: Bowel sounds are normal. There is no distension. Palpations: Abdomen is soft. There is no mass. Tenderness: There is no abdominal tenderness. There is no guarding. Musculoskeletal:      Cervical back: Neck supple. No tenderness. Right lower leg: No edema. Left lower leg: No edema. Lymphadenopathy:      Cervical: No cervical adenopathy. Skin:     General: Skin is warm. Coloration: Skin is not pale. Findings: No erythema or rash. Neurological:      General: No focal deficit present. Mental Status: She is alert and oriented to person, place, and time. Psychiatric:         Mood and Affect: Mood normal.         Behavior: Behavior normal.        Procedures     MDM  Number of Diagnoses or Management Options  Hypoxia  Pneumonia of left lower lobe due to infectious organism  Diagnosis management comments: Patient is a 69-year-old female coming from [facility] to the ED because of hypoxia. She was on 6 L oxygen on arrival satting around 94%. She does not have a white count or fever but procalcitonin is elevated 5.7. CTA  was negative for PE but shows consolidation bilaterally.   She received 1 dose Rocephin and Doxy while in the ED. She was also found to be in PAWAN creatinine and GFR 19.  1 L bolus given to the pt. Troponin and proBNP were elevated (50s and 800s respectively). Pt admitted to the telemetry floor. --------------------------------------------- PAST HISTORY ---------------------------------------------  Past Medical History:  has a past medical history of Anxiety with somatization, Compression fracture of L1 lumbar vertebra (HCC), Compression fracture of L4 lumbar vertebra, Fall, History of bilateral breast cancer, Hx-TIA (transient ischemic attack), Osteoporosis, Pain syndrome, chronic, Thyroid disease, and Type II or unspecified type diabetes mellitus without mention of complication, not stated as uncontrolled. Past Surgical History:  has a past surgical history that includes back surgery (1989); Tubal ligation; Mastectomy (Bilateral); Breast surgery (Bilateral); Tonsillectomy and adenoidectomy; and Fixation Kyphoplasty (03/10/2014). Social History:  reports that she has never smoked. She has never used smokeless tobacco. She reports that she does not currently use alcohol. She reports that she does not use drugs. Family History: family history includes Cancer in her father; Diabetes in her mother; Heart Disease in her father and mother; Other in her mother. The patients home medications have been reviewed.     Allergies: Benadryl [diphenhydramine] and Ibuprofen    -------------------------------------------------- RESULTS -------------------------------------------------    Lab  Results for orders placed or performed during the hospital encounter of 12/14/22   COVID-19 & Influenza Combo    Specimen: Nasopharyngeal Swab   Result Value Ref Range    SARS-CoV-2 RNA, RT PCR NOT DETECTED NOT DETECTED    INFLUENZA A NOT DETECTED NOT DETECTED    INFLUENZA B NOT DETECTED NOT DETECTED   CBC with Auto Differential   Result Value Ref Range    WBC 7.0 4.5 - 11.5 E9/L RBC 3.67 3.50 - 5.50 E12/L    Hemoglobin 10.9 (L) 11.5 - 15.5 g/dL    Hematocrit 34.1 34.0 - 48.0 %    MCV 92.9 80.0 - 99.9 fL    MCH 29.7 26.0 - 35.0 pg    MCHC 32.0 32.0 - 34.5 %    RDW 13.2 11.5 - 15.0 fL    Platelets 114 597 - 105 E9/L    MPV 9.8 7.0 - 12.0 fL    Neutrophils % 69.8 43.0 - 80.0 %    Immature Granulocytes % 0.3 0.0 - 5.0 %    Lymphocytes % 7.8 (L) 20.0 - 42.0 %    Monocytes % 21.4 (H) 2.0 - 12.0 %    Eosinophils % 0.1 0.0 - 6.0 %    Basophils % 0.6 0.0 - 2.0 %    Neutrophils Absolute 4.85 1.80 - 7.30 E9/L    Immature Granulocytes # 0.02 E9/L    Lymphocytes Absolute 0.54 (L) 1.50 - 4.00 E9/L    Monocytes Absolute 1.49 (H) 0.10 - 0.95 E9/L    Eosinophils Absolute 0.01 (L) 0.05 - 0.50 E9/L    Basophils Absolute 0.04 0.00 - 0.20 E9/L    Polychromasia 1+     Poikilocytes 2+     [place] Cells 2+     Ovalocytes 1+    CMP   Result Value Ref Range    Sodium 139 132 - 146 mmol/L    Potassium 4.4 3.5 - 5.0 mmol/L    Chloride 99 98 - 107 mmol/L    CO2 19 (L) 22 - 29 mmol/L    Anion Gap 21 (H) 7 - 16 mmol/L    Glucose 116 (H) 74 - 99 mg/dL    BUN 43 (H) 6 - 23 mg/dL    Creatinine 2.5 (H) 0.5 - 1.0 mg/dL    Est, Glom Filt Rate 19 >=60 mL/min/1.73    Calcium 8.8 8.6 - 10.2 mg/dL    Total Protein 5.9 (L) 6.4 - 8.3 g/dL    Albumin 2.7 (L) 3.5 - 5.2 g/dL    Total Bilirubin 0.5 0.0 - 1.2 mg/dL    Alkaline Phosphatase 58 35 - 104 U/L    ALT 13 0 - 32 U/L    AST 34 (H) 0 - 31 U/L   Lactic Acid   Result Value Ref Range    Lactic Acid 1.1 0.5 - 2.2 mmol/L   Troponin   Result Value Ref Range    Troponin, High Sensitivity 50 (H) 0 - 9 ng/L   Brain Natriuretic Peptide   Result Value Ref Range    Pro- (H) 0 - 450 pg/mL   Procalcitonin   Result Value Ref Range    Procalcitonin 5.70 (H) 0.00 - 0.08 ng/mL   Magnesium   Result Value Ref Range    Magnesium 2.0 1.6 - 2.6 mg/dL   Blood Gas, Arterial   Result Value Ref Range    Date Analyzed [id number]     Time Analyzed 1051     Source: Blood Arterial     pH, Blood Gas 7.354 7.350 - 7.450    PCO2 34.8 (L) 35.0 - 45.0 mmHg    PO2 68.1 (L) 75.0 - 100.0 mmHg    HCO3 19.0 (L) 22.0 - 26.0 mmol/L    B.E. -5.8 (L) -3.0 - 3.0 mmol/L    O2 Sat 92.7 92.0 - 98.5 %    O2Hb 92.0 (L) 94.0 - 97.0 %    COHb 0.5 0.0 - 1.5 %    MetHb 0.3 0.0 - 1.5 %    O2 Content 15.3 mL/dL    HHb 7.2 (H) 0.0 - 5.0 %    tHb (est) 11.8 11.5 - 16.5 g/dL    Mode NC-  6  L     Date Of Collection      Time Collected      Pt Temp 37.0 C     ID [id number]     Lab [id number]     Critical(s) Notified . No Critical Values    Troponin   Result Value Ref Range    Troponin, High Sensitivity 54 (H) 0 - 9 ng/L   EKG 12 Lead   Result Value Ref Range    Ventricular Rate 116 BPM    Atrial Rate 116 BPM    P-R Interval 130 ms    QRS Duration 66 ms    Q-T Interval 324 ms    QTc Calculation (Bazett) 450 ms    P Axis 52 degrees    R Axis 73 degrees    T Axis 64 degrees       Radiology  CT ABDOMEN PELVIS W IV CONTRAST Additional Contrast? Oral    Result Date: 12/14/2022  EXAMINATION: CTA OF THE CHEST; CT OF THE ABDOMEN AND PELVIS WITH CONTRAST 12/14/2022 12:21 pm TECHNIQUE: CTA of the chest was performed after the administration of intravenous contrast.  Multiplanar reformatted images are provided for review. MIP images are provided for review. Automated exposure control, iterative reconstruction, and/or weight based adjustment of the mA/kV was utilized to reduce the radiation dose to as low as reasonably achievable.; CT of the abdomen and pelvis was performed with the administration of intravenous contrast. Multiplanar reformatted images are provided for review. Automated exposure control, iterative reconstruction, and/or weight based adjustment of the mA/kV was utilized to reduce the radiation dose to as low as reasonably achievable.  COMPARISON: 07/22/2022 HISTORY: ORDERING SYSTEM PROVIDED HISTORY: SOB, hypoxia, cough TECHNOLOGIST PROVIDED HISTORY: Reason for exam:->SOB, hypoxia, cough Decision Support Exception - unselect if not a suspected or confirmed emergency medical condition->Emergency Medical Condition (MA) What reading provider will be dictating this exam?->CRC; FINDINGS: CHEST CT SCAN: Unremarkable opacification of the pulmonary vessels, no evidence of pulmonary embolism is seen. Scattered calcifications visualized in the aortic arch, no evidence for aneurysmal dilatation or arterial dissection is seen. The heart is prominent in size, no evidence of pericardial effusion is seen. Atherosclerotic calcifications visualized in the coronary vessels. Subtle hilar and mediastinal lymphadenopathy is seen. Extensive thickening visualized in the wall of the esophagus, dilated esophagus is seen axial series 302, image 68 with wall measuring approximately 0.7 cm. Narrowing visualized at the gastroesophageal junction visualized on axial series 302, image 130 Soft tissue density visualized in the posterior medial aspect of the right lower lung field with deviation of the mediastinum to the left, elevation of the right hemidiaphragm is seen, suboptimal opacification is visualized cannot ascertain if this represents pleural fluid, bowel or a soft tissue density however on the prior study obtained on 07/22/2022 there was no soft tissue mass at this location. Mild bronchial wall thickening is visualized. Bilateral lower lobe consolidation and pleural effusions visualized. No evidence of endobronchial or endoluminal lesions are seen. Bilateral breast implants. No evidence of chest wall mass Degenerative bone changes are seen. ABDOMEN AND PELVIS CT SCAN: The liver demonstrates increased attenuation with mild nodularity of its surface. , a 1.2 cm simple cyst is visualized in the right lobe of the liver, no evidence of masses no evidence of intrahepatic biliary dilatation is seen.  The gallbladder is distended, some small layering stones visualized within the neck of the gallbladder measuring approximately 0.6 cm,, no evidence of gallbladder wall thickening or pericholecystic  stranding. The common bile duct is unremarkable. The pancreas and spleen demonstrate no evidence of masses. The pancreas is atrophic, the spleen is small in size. The adrenal glands demonstrate unremarkable contours with no evidence of masses. The kidneys demonstrate no evidence of stones no evidence of renal masses. No evidence of hydronephrosis or hydroureter is seen. GI/Bowel: Narrowing of the gastroesophageal junction visualized on coronal series 605, image 64. The stomach is slightly prominent, thickening of the wall of the distal stomach/pyloric outlet is seen on coronal series 605, image 52, no evidence of masses. No significant distention of the small and large bowel loops is visualized. The appendix is visualized and is unremarkable. Abundance of stool is visualized in the large bowel. Scattered diverticular disease visualized but no evidence of acute diverticulitis. Pelvis: The urinary bladder is not optimally distended. The prostate gland is unremarkable. No evidence of free fluid in the pelvis. No evidence of pelvic mass is seen. Peritoneum/Retroperitoneum: No evidence of free fluid or air within the peritoneal cavity. Bones/Soft Tissues Abdominal wall soft tissues are unremarkable. Internal fixation of the thoracolumbar junction is seen. Vertebral augmentation of the T10, T11, L3 and L4 vertebral bodies is seen. Degenerative changes of the lumbar spine visualized. No evidence of pulmonary embolism. Bilateral lower lobe pulmonary consolidations and pleural effusions seen. Deviation of the mediastinum to the left is visualized. Mild soft tissue prominence visualized surrounding the esophagus with narrowing at the gastroesophageal junction but appears to be due to external compression. Cannot rule left soft tissue mass at this location.  Wall thickening visualized in the gastric outlet with mild prominence of the stomach seen, this could be artifactual. Distended gallbladder is seen. Degenerative bone changes, postoperative changes and multilevel vertebral augmentation seen. No evidence of acute abdominal/pelvic pathology. XR CHEST PORTABLE    Result Date: 12/14/2022  EXAMINATION: ONE XRAY VIEW OF THE CHEST 12/14/2022 10:39 am COMPARISON: CT of the chest of 07/22/2022 HISTORY: ORDERING SYSTEM PROVIDED HISTORY: sob TECHNOLOGIST PROVIDED HISTORY: Reason for exam:->sob What reading provider will be dictating this exam?->CRC FINDINGS: The heart is enlarged. There is a chronic diaphragmatic hernia/large hiatal hernia. The hernia accounts for blunting of the right costophrenic angle. There is consolidation seen in the left lung base and there is blunting the left costophrenic angle. The upper lobes are clear. 1. Consolidation seen within the left lung base with blunting the left costophrenic angle which could represent pneumonia or atelectasis 2. A trace left pleural effusion cannot be excluded 3. Large right diaphragmatic hernia/hiatal hernia which was noted on the patient's previous CT of the chest of 07/22/2022. CTA PULMONARY W CONTRAST    Result Date: 12/14/2022  EXAMINATION: CTA OF THE CHEST; CT OF THE ABDOMEN AND PELVIS WITH CONTRAST 12/14/2022 12:21 pm TECHNIQUE: CTA of the chest was performed after the administration of intravenous contrast.  Multiplanar reformatted images are provided for review. MIP images are provided for review. Automated exposure control, iterative reconstruction, and/or weight based adjustment of the mA/kV was utilized to reduce the radiation dose to as low as reasonably achievable.; CT of the abdomen and pelvis was performed with the administration of intravenous contrast. Multiplanar reformatted images are provided for review. Automated exposure control, iterative reconstruction, and/or weight based adjustment of the mA/kV was utilized to reduce the radiation dose to as low as reasonably achievable.  COMPARISON: 07/22/2022 HISTORY: [address]olan PROVIDED HISTORY: SOB, hypoxia, cough TECHNOLOGIST PROVIDED HISTORY: Reason for exam:->SOB, hypoxia, cough Decision Support Exception - unselect if not a suspected or confirmed emergency medical condition->Emergency Medical Condition (MA) What reading provider will be dictating this exam?->CRC; FINDINGS: CHEST CT SCAN: Unremarkable opacification of the pulmonary vessels, no evidence of pulmonary embolism is seen. Scattered calcifications visualized in the aortic arch, no evidence for aneurysmal dilatation or arterial dissection is seen. The heart is prominent in size, no evidence of pericardial effusion is seen. Atherosclerotic calcifications visualized in the coronary vessels. Subtle hilar and mediastinal lymphadenopathy is seen. Extensive thickening visualized in the wall of the esophagus, dilated esophagus is seen axial series 302, image 68 with wall measuring approximately 0.7 cm. Narrowing visualized at the gastroesophageal junction visualized on axial series 302, image 130 Soft tissue density visualized in the posterior medial aspect of the right lower lung field with deviation of the mediastinum to the left, elevation of the right hemidiaphragm is seen, suboptimal opacification is visualized cannot ascertain if this represents pleural fluid, bowel or a soft tissue density however on the prior study obtained on 07/22/2022 there was no soft tissue mass at this location. Mild bronchial wall thickening is visualized. Bilateral lower lobe consolidation and pleural effusions visualized. No evidence of endobronchial or endoluminal lesions are seen. Bilateral breast implants. No evidence of chest wall mass Degenerative bone changes are seen. ABDOMEN AND PELVIS CT SCAN: The liver demonstrates increased attenuation with mild nodularity of its surface. , a 1.2 cm simple cyst is visualized in the right lobe of the liver, no evidence of masses no evidence of intrahepatic biliary dilatation is seen.  The gallbladder is distended, some small layering stones visualized within the neck of the gallbladder measuring approximately 0.6 cm,, no evidence of gallbladder wall thickening or pericholecystic  stranding. The common bile duct is unremarkable. The pancreas and spleen demonstrate no evidence of masses. The pancreas is atrophic, the spleen is small in size. The adrenal glands demonstrate unremarkable contours with no evidence of masses. The kidneys demonstrate no evidence of stones no evidence of renal masses. No evidence of hydronephrosis or hydroureter is seen. GI/Bowel: Narrowing of the gastroesophageal junction visualized on coronal series 605, image 64. The stomach is slightly prominent, thickening of the wall of the distal stomach/pyloric outlet is seen on coronal series 605, image 52, no evidence of masses. No significant distention of the small and large bowel loops is visualized. The appendix is visualized and is unremarkable. Abundance of stool is visualized in the large bowel. Scattered diverticular disease visualized but no evidence of acute diverticulitis. Pelvis: The urinary bladder is not optimally distended. The prostate gland is unremarkable. No evidence of free fluid in the pelvis. No evidence of pelvic mass is seen. Peritoneum/Retroperitoneum: No evidence of free fluid or air within the peritoneal cavity. Bones/Soft Tissues Abdominal wall soft tissues are unremarkable. Internal fixation of the thoracolumbar junction is seen. Vertebral augmentation of the T10, T11, L3 and L4 vertebral bodies is seen. Degenerative changes of the lumbar spine visualized. No evidence of pulmonary embolism. Bilateral lower lobe pulmonary consolidations and pleural effusions seen. Deviation of the mediastinum to the left is visualized. Mild soft tissue prominence visualized surrounding the esophagus with narrowing at the gastroesophageal junction but appears to be due to external compression.   Cannot rule left soft tissue mass at this location. Wall thickening visualized in the gastric outlet with mild prominence of the stomach seen, this could be artifactual. Distended gallbladder is seen. Degenerative bone changes, postoperative changes and multilevel vertebral augmentation seen. No evidence of acute abdominal/pelvic pathology. EKG: This EKG is signed and interpreted by me. Rate: 116  Rhythm: Sinus  Interpretation: no acute changes        ------------------------- NURSING NOTES AND VITALS REVIEWED ---------------------------  Date / Time Roomed:  12/14/2022  9:35 AM  ED Bed Assignment:  06/06    The nursing notes within the ED encounter and vital signs as below have been reviewed. Patient Vitals for the past 24 hrs:   BP Temp Pulse Resp SpO2   12/14/22 1415 109/65 -- (!) 123 24 95 %   12/14/22 1330 (!) 92/58 -- (!) 125 23 95 %   12/14/22 1245 125/69 -- (!) 111 23 93 %   12/14/22 1200 122/85 -- (!) 109 (!) 37 93 %   12/14/22 1130 99/68 -- (!) 125 26 93 %   12/14/22 1100 134/73 -- (!) 121 28 92 %   12/14/22 1030 110/68 -- (!) 125 (!) 36 93 %   12/14/22 0945 -- -- (!) 120 22 --   12/14/22 0939 111/65 98.6 °F (37 °C) (!) 120 18 94 %       ------------------------------------------ PROGRESS NOTES ------------------------------------------  Re-evaluation(s):  Time: 1300. Patients symptoms show no change  Repeat physical examination is not changed        Clinical Impression  1. Pneumonia of left lower lobe due to infectious organism    2. Hypoxia          Disposition  Patient's disposition: Admit to telemetry  Patient's condition is stable.       ED Course as of 12/14/22 1330   Wed Dec 14, 2022   1210 Troponin, High Sensitivity(!): 50 []   1220 Anion Gap(!): 21 [AH]   1325 Re eval, stable on nc [AT]      ED Course User Index  [AH] Sarai Flynn MD  [AT] MD Sarai Sutherland MD  Resident  12/14/22 0186

## 2022-12-14 NOTE — CONSULTS
GENERAL SURGERY  CONSULT NOTE  12/14/2022    IVELISSE  Nasreen Young is a 65 y.o. female who presents for evaluation of acute respiratory failure. Patient has a past medical history of TIA, breast cancer and hiatal hernia status post repair postop day 5. Patient presents to the hospital with approximately 2 days of worsening shortness of breath and rapid heart rate. Patient underwent hiatal repair 12/9. Prior to her surgery she had multiple days of malaise and cough. Patient denies any recent changes in stool, constipation or diarrhea. She denies any abdominal pain. Patient arrived to the hospital sinus tachycardia and was normotensive. Labs are pertinent for PAWAN creatinine 2.5 baseline normal 0.9. BUN 43. Lactic acid was within normal limits 1.1. CTA negative for for pulmonary embolism  CT chest/abdomen with p.o. and IV contrast negative for extraluminal contrast    Patient was treated with 1 L bolus, Rocephin and doxycycline.     Past Medical History:   Diagnosis Date    Anxiety with somatization 3/30/2021    Compression fracture of L1 lumbar vertebra [facility]) november 1994    Compression fracture of L4 lumbar vertebra     secondary to fall in December 2013    Fall 1989 & 12/2013    History of bilateral breast cancer 1980's    breast    Hx-TIA (transient ischemic attack) 3/30/2021    Osteoporosis     Pain syndrome, chronic 3/30/2021    Thyroid disease     went off of medication    Type II or unspecified type diabetes mellitus without mention of complication, not stated as uncontrolled     resolved with weight loss of 20 lbs       Past Surgical History:   Procedure Laterality Date    BACK SURGERY  1989    PLIF T12 to L2 with pedicle screws and rods    BREAST SURGERY Bilateral     breast implants (after mastectomy)    FIXATION KYPHOPLASTY  03/10/2014    with bone biopsy    MASTECTOMY Bilateral     TONSILLECTOMY AND ADENOIDECTOMY      TUBAL LIGATION         Medications Prior to Admission    Prior to Admission medications    Medication Sig Start Date End Date Taking?  Authorizing Provider   Cholecalciferol (VITAMIN D3) 125 MCG (5000 UT) TABS Take by mouth    Historical Provider, MD   LEVOTHYROXINE SODIUM PO Take 75 mcg by mouth    Historical Provider, MD   pantoprazole (PROTONIX) 40 MG tablet Take 40 mg by mouth daily    Historical Provider, MD   tiZANidine (ZANAFLEX) 2 MG tablet Take 2 mg by mouth every 6 hours as needed    Historical Provider, MD   rosuvastatin (CRESTOR) 20 MG tablet Take 1 tablet by mouth nightly 1/7/21   Lia Thompson MD   aspirin 325 MG EC tablet Take 1 tablet by mouth daily 1/7/21 1/7/22  Lia Thompson MD   Handicap Placard MISC by Does not apply route Duration: Lifetime 6/21/18   Roselyn Ruffing Jony, DO   terconazole (TERAZOL 3) 0.8 % vaginal cream Place 1 applicator vaginally nightly  6/8/16   Historical Provider, MD       Allergies   Allergen Reactions    Benadryl [Diphenhydramine] Itching    Ibuprofen Other (See Comments)     Doesn't know       Family History   Problem Relation Age of Onset    Heart Disease Mother     Diabetes Mother     Other Mother         aneurysm    Heart Disease Father     Cancer Father         thyroid        Social History     Tobacco Use    Smoking status: Never    Smokeless tobacco: Never   Substance Use Topics    Alcohol use: Not Currently     Comment: 1 glass a month    Drug use: No         Review of Systems: Review of Systems - History obtained from chart review and the patient  General ROS: positive for  - fatigue and malaise  negative for - weight loss  Allergy and Immunology ROS: negative for - hives  Hematological and Lymphatic ROS: negative for - jaundice or pallor  Endocrine ROS: negative for - polydipsia/polyuria  Respiratory ROS: positive for - cough and shortness of breath  negative for - wheezing  Gastrointestinal ROS: positive for - nausea  negative for - hematemesis or melena  Genito-Urinary ROS: no dysuria, trouble voiding, or hematuria  Musculoskeletal ROS: negative    PHYSICAL EXAM:    Vitals:    12/14/22 1330   BP: (!) 92/58   Pulse: (!) 125   Resp: 23   Temp:    SpO2: 95%       GENERAL: Acutely ill, lethargic  HEAD:  Normocephalic. Atraumatic. EYES:   No scleral icterus. LUNGS: Use of accessory muscles on 7 L  CARDIOVASCULAR: Sinus tachycardia, normotensive. ABDOMEN:  Soft, non-distended, minimally-tender near incisions. No guarding, rigidity, rebound. Scopic incisions clean dry and intact, skin glue in place, no significant erythema. EXTREMITIES:   MAEx4. Atraumatic. No LE edema. SKIN:  Warm and dry    ASSESSMENT/PLAN:  65 y.o. female with recent history of malaise and hiatal hernia repair presents with respiratory failure  - no esophageal disruption present  - no evidence of PE    Plan will be  - nephrology consult for PAWAN  - no indication for surgical interventions   - will continue to monitor abdominal exam  - will defer medical management of ARF to primary     discussed with Dr. Andie Huffman.     Jeb Weber DO  Surgery Resident PGY-1  12/14/2022  2:27 PM

## 2022-12-14 NOTE — ED NOTES
Radiology Procedure Waiver   Name: Cris Valles  : 1945  MRN: [id number]    Date:  22    Time: 10:28 AM EST    Benefits of immediately proceeding with Radiology exam(s) without pre-testing outweigh the risks or are not indicated as specified below and therefore the following is/are being waived:    [] Pregnancy test   [] Patients LMP on-time and regular.   [] Patient had Tubal Ligation or has other Contraception Device. [] Patient  is Menopausal or Premenarcheal.    [] Patient had Full or Partial Hysterectomy. [] Protocol for Iodine allergy    [] MRI Questionnaire     [] BUN/Creatinine   [x] Patient age w/no hx of renal dysfunction. [] Patient on Dialysis. [x] Recent Normal Labs.   Electronically signed by Angelica Khanna MD on 22 at 10:28 AM ROGERS Khanna MD  [phone]

## 2022-12-15 NOTE — ED NOTES
Nurse to nurse report called to [id number]. All questions answered. Pt prepared for transport upstairs at this time with all belongings cart side.       Diogenes Rasmussen RN  12/15/22 [id number]

## 2022-12-15 NOTE — H&P
Hospital Medicine History & Physical      PCP: Marcus Rolle DO    Date of Admission: 12/14/2022    Date of Service: . DEC 15, 2022    Chief Complaint:  PNEUMONIA      History Of Present Illness:     65 y.o. female presented with PNEUMONIA. STATES SHE HAD A HH REPAIR AT [facility] ON THE 8TH. SHE STATES SHE WAS NOT FEELING BAD, BUT WAS TOLD SHE NEEDED TO COME TO THE HOSPITAL BC SHE WAS SICK. SHE WAS FOUND TO BE SEPTIC, WITH A HAG, SHE WAS TACHY AND TACHYPNEIC . Past Medical History:          Diagnosis Date    Anxiety with somatization 3/30/2021    Compression fracture of L1 lumbar vertebra [facility]) november 1994    Compression fracture of L4 lumbar vertebra     secondary to fall in December 2013    Fall 1989 & 12/2013    History of bilateral breast cancer 1980's    breast    Hx-TIA (transient ischemic attack) 3/30/2021    Osteoporosis     Pain syndrome, chronic 3/30/2021    Thyroid disease     went off of medication    Type II or unspecified type diabetes mellitus without mention of complication, not stated as uncontrolled     resolved with weight loss of 20 lbs       Past Surgical History:          Procedure Laterality Date    BACK SURGERY  1989    PLIF T12 to L2 with pedicle screws and rods    BREAST SURGERY Bilateral     breast implants (after mastectomy)    FIXATION KYPHOPLASTY  03/10/2014    with bone biopsy    MASTECTOMY Bilateral     TONSILLECTOMY AND ADENOIDECTOMY      TUBAL LIGATION         Medications Prior to Admission:      Prior to Admission medications    Medication Sig Start Date End Date Taking?  Authorizing Provider   Cholecalciferol (VITAMIN D3) 125 MCG (5000 UT) TABS Take by mouth    Historical Provider, MD   levothyroxine (SYNTHROID) 50 MCG tablet Take 50 mcg by mouth daily    Historical Provider, MD   rosuvastatin (CRESTOR) 20 MG tablet Take 1 tablet by mouth nightly 1/7/21   Rosalina Back MD   aspirin 325 MG EC tablet Take 1 tablet by mouth daily 1/7/21 1/7/22  Rosalina Back MD   Handicap Placard MISC by Does not apply route Duration: Lifetime 6/21/18   Lavell Borges,        Allergies:  Benadryl [diphenhydramine] and Ibuprofen    Social History:     TOBACCO:   reports that she has never smoked. She has never used smokeless tobacco.  ETOH:   reports that she does not currently use alcohol. Family History:       Reviewed in detail and negative for DM, CAD, Cancer, CVA. Positive as follows:        Problem Relation Age of Onset    Heart Disease Mother     Diabetes Mother     Other Mother         aneurysm    Heart Disease Father     Cancer Father         thyroid        REVIEW OF SYSTEMS:   Pertinent positives as noted in the HPI. All other systems reviewed and negative. PHYSICAL EXAM:    /65   Pulse (!) 120   Temp 97.4 °F (36.3 °C) (Temporal)   Resp 28   Ht 5' 2\" (1.575 m)   Wt 150 lb (68 kg)   SpO2 95%   BMI 27.44 kg/m²     General appearance:  No apparent distress, appears stated age. HEENT:  Normal cephalic, atraumatic without obvious deformity. Pupils equal, round, and reactive to light. Extra ocular muscles intact. Conjunctivae/corneas clear. Neck: Supple, with full range of motion. No jugular venous distention. Trachea midline. Respiratory:  Normal respiratory effort. DIMINISHED BILATERALLY  Cardiovascular:  RRR  Abdomen: Soft, non-tender, non-distended with normal bowel sounds. Musculoskeletal:  No clubbing, cyanosis or edema bilaterally.     Skin: smooth and dry   Neurologic:   Cranial nerves: II-XII intact,   Psychiatric:  Alert and oriented x 3        Labs:     Recent Labs     12/14/22  0300 12/14/22  1050 12/15/22  0412   WBC 6.2 7.0 7.7   HGB 11.6 10.9* 10.6*   HCT 35.6 34.1 31.5*    243 305     Recent Labs     12/14/22  1050 12/15/22  0412 12/15/22  1741    139 138   K 4.4 4.3 3.8   CL 99 97* 97*   CO2 19* 18* 20*   BUN 43* 56* 64*   CREATININE 2.5* 3.5* 3.6*   CALCIUM 8.8 8.3* 8.4*     Recent Labs     12/14/22  1050   AST 34*   ALT 13   BILITOT 0.5   ALKPHOS 58     No results for input(s): INR in the last 72 hours. No results for input(s): Guadalupe Parisian in the last 72 hours. Urinalysis:      Lab Results   Component Value Date/Time    NITRU Negative 12/15/2022 04:55 PM    WBCUA NONE 07/22/2022 04:16 PM    BACTERIA NONE SEEN 07/22/2022 04:16 PM    RBCUA NONE 07/22/2022 04:16 PM    BLOODU MODERATE 12/15/2022 04:55 PM    SPECGRAV 1.020 12/15/2022 04:55 PM    GLUCOSEU Negative 12/15/2022 04:55 PM       Radiology:           FL ESOPHAGRAM   Final Result   1. No evidence of esophageal perforation or leak. 2. Decreased peristalsis. No evidence of esophageal mass or obstructing   lesion. 3. Small sliding hiatus hernia. No evidence of GE reflux. The gastric   foreign disc does appear constricted as it passes through the esophageal   hiatus. US CHEST INCLUDING MEDIASTINUM   Final Result   Probable complex loculated right pleural effusion. Simple left pleural effusion. CTA PULMONARY W CONTRAST   Final Result   No evidence of pulmonary embolism. Bilateral lower lobe pulmonary consolidations and pleural effusions seen. Deviation of the mediastinum to the left is visualized. Mild soft tissue prominence visualized surrounding the esophagus with   narrowing at the gastroesophageal junction but appears to be due to external   compression. Cannot rule left soft tissue mass at this location. Wall thickening visualized in the gastric outlet with mild prominence of the   stomach seen, this could be artifactual.      Distended gallbladder is seen. Degenerative bone changes, postoperative changes and multilevel vertebral   augmentation seen. No evidence of acute abdominal/pelvic pathology.          CT ABDOMEN PELVIS W IV CONTRAST Additional Contrast? Oral   Final Result   No evidence of pulmonary embolism. Bilateral lower lobe pulmonary consolidations and pleural effusions seen. Deviation of the mediastinum to the left is visualized. Mild soft tissue prominence visualized surrounding the esophagus with   narrowing at the gastroesophageal junction but appears to be due to external   compression. Cannot rule left soft tissue mass at this location. Wall thickening visualized in the gastric outlet with mild prominence of the   stomach seen, this could be artifactual.      Distended gallbladder is seen. Degenerative bone changes, postoperative changes and multilevel vertebral   augmentation seen. No evidence of acute abdominal/pelvic pathology. XR CHEST PORTABLE   Final Result   1. Consolidation seen within the left lung base with blunting the left   costophrenic angle which could represent pneumonia or atelectasis   2. A trace left pleural effusion cannot be excluded   3. Large right diaphragmatic hernia/hiatal hernia which was noted on the   patient's previous CT of the chest of 07/22/2022. IR INTERVENTIONAL RADIOLOGY PROCEDURE REQUEST    (Results Pending)       ASSESSMENT:    Active Hospital Problems    Diagnosis Date Noted    Postoperative pneumonia [J95.89, J18.9] 12/14/2022     Priority: Medium   S/P HH  PAWAN BASELINE CREATINE 1.0)  H.O BREAST CANCER   HLD   PLEURAL EFFUSIONS BILATERALLY    PLAN:  ROCEPHIN   ZITHROMAX   FLUIDS  MONITOR BMP      DVT Prophylaxis: LOVENOX  Diet: ADULT DIET; Full Liquid  Code Status: Full Code    PT/OT Eval Status: ORDERED    Dispo -  HOME    Electronically signed by Rosalinda Ponce DO on 12/15/2022 at 6:52 PM FACOI       Thank you iVck Rodriguez DO for the opportunity to be involved in this patient's care.  If you have any questions or concerns please feel free to contact me at [phone]

## 2022-12-15 NOTE — PROGRESS NOTES
Occupational Therapy    Date:12/15/2022  Patient Name: Martha Hunter  MRN: [id number]  : 1945  Room: [address] [place], [place] [address]     OT orders received and chart reviewed. OT eval on hold at this time per RN, plan for IR tomorrow (22). OT will follow and re-attempt eval as appropriate, pending POC, at a later time/date.     Vesta Macedo, [address] OTR/L #[id number]

## 2022-12-15 NOTE — PROGRESS NOTES
RN contacted Dr. Cristiano Butts concerning Lovenox given this morning. RN contacted Ultrasound to arrange for 1030 thoro at bedside. RN ordered [employer] from central supply.

## 2022-12-15 NOTE — PROGRESS NOTES
Dr. Kenneth George MD,    Your patient is on a medication that requires a renal dose adjustment. Renal Function Assessment:    Date Body Weight IBW  Adjusted BW SCr  CrCl Dialysis status   12/14/2022 147 lb (66.7 kg)  Ideal body weight: 52.4 kg (115 lb 8.3 oz)  Adjusted ideal body weight: 58.1 kg (128 lb 1.8 oz) Serum creatinine: 2.5 mg/dL (H) 12/14/22 1050  Estimated creatinine clearance: 17 mL/min (A) N/a       Pharmacy has renally dose-adjusted the following medication(s):    Date Original Order Renally Adjusted Order   12/14/2022 Lovenox 40mg daily Lovenox 30mg daily       These changes were made per protocol according to the [facility] Renal Function-Based Dose Adjustments Policy    *Please note this dose may need readjusted if your patient's renal function significantly improves. Please contact pharmacy with any questions regarding these changes.     Chapis Joshi, [address] [place] [address]  12/14/2022  9:11 PM

## 2022-12-15 NOTE — CONSULTS
Department of Internal Medicine  Nephrology Nurse Practitioner Consult Note      Reason for Consult: PAWAN  Requesting Physician:  Dr. Blackman Hard: Sent from [address] for acute respiratory failure    History Obtained From:  patient, electronic medical record    HISTORY OF PRESENT ILLNESS: Briefly, Garrison Villanueva is a 69-year-old female, past medical history significant for hypothyroidism, hiatal hernia and TIA, who presented to the ED on 12/14/2022 from [facility], patient recently underwent a hiatal hernia repair on 12/9/22, developed acute respiratory distress and was transferred to [address] [place] [address] on 12/14/2022 for further management and treatment. Initial ED work-up revealed creatinine level of 1.7, patient then underwent two seperate IV contrast studies CTA and creatinine subsequently increased to 2.5, on 12/15/2022 creatinine level increased to 3.5 mg/dL, reason for this consultation. [facility] reveals that baseline creatinine appears to be between 0.9 to 1.0 mg/dL, creatinine was 1.0 on 12/13/2022, patient was started on lisinopril during hospitalization, was given multiple doses IV furosemide with poor oral intake and vomiting.     Past Medical History:        Diagnosis Date    Anxiety with somatization 3/30/2021    Compression fracture of L1 lumbar vertebra [facility]) november 1994    Compression fracture of L4 lumbar vertebra     secondary to fall in December 2013    Fall 1989 & 12/2013    History of bilateral breast cancer 1980's    breast    Hx-TIA (transient ischemic attack) 3/30/2021    Osteoporosis     Pain syndrome, chronic 3/30/2021    Thyroid disease     went off of medication    Type II or unspecified type diabetes mellitus without mention of complication, not stated as uncontrolled     resolved with weight loss of 20 lbs     Past Surgical History:        Procedure Laterality Date    BACK SURGERY  1989    PLIF T12 to L2 with pedicle screws and rods    BREAST SURGERY Bilateral     breast implants (after mastectomy)    FIXATION KYPHOPLASTY  03/10/2014    with bone biopsy    MASTECTOMY Bilateral     TONSILLECTOMY AND ADENOIDECTOMY      TUBAL LIGATION       Current Medications:    Current Facility-Administered Medications: rosuvastatin (CRESTOR) tablet 10 mg, 10 mg, Oral, Nightly  albuterol (PROVENTIL) nebulizer solution 2.5 mg, 2.5 mg, Nebulization, 4x daily  lidocaine 1 % injection 10 mL, 10 mL, Other, Once  0.9 % sodium chloride infusion, , IntraVENous, Continuous  pantoprazole (PROTONIX) tablet 40 mg, 40 mg, Oral, Daily  sodium chloride flush 0.9 % injection 5-40 mL, 5-40 mL, IntraVENous, 2 times per day  sodium chloride flush 0.9 % injection 10 mL, 10 mL, IntraVENous, PRN  0.9 % sodium chloride infusion, , IntraVENous, PRN  ondansetron (ZOFRAN-ODT) disintegrating tablet 4 mg, 4 mg, Oral, Q8H PRN **OR** ondansetron (ZOFRAN) injection 4 mg, 4 mg, IntraVENous, Q6H PRN  magnesium hydroxide (MILK OF MAGNESIA) 400 MG/5ML suspension 30 mL, 30 mL, Oral, Daily PRN  acetaminophen (TYLENOL) tablet 650 mg, 650 mg, Oral, Q6H PRN **OR** acetaminophen (TYLENOL) suppository 650 mg, 650 mg, Rectal, Q6H PRN  ipratropium-albuterol (DUONEB) nebulizer solution 1 ampule, 1 ampule, Inhalation, Q4H PRN  cefTRIAXone (ROCEPHIN) 1,000 mg in sterile water 10 mL IV syringe, 1,000 mg, IntraVENous, Q24H **AND** azithromycin (ZITHROMAX) tablet 500 mg, 500 mg, Oral, Q24H  levothyroxine (SYNTHROID) tablet 50 mcg, 50 mcg, Oral, Daily  enoxaparin Sodium (LOVENOX) injection 30 mg, 30 mg, SubCUTAneous, Daily  Allergies:  Benadryl [diphenhydramine] and Ibuprofen    Social History:    TOBACCO:   reports that she has never smoked. She has never used smokeless tobacco.  ETOH:   reports that she does not currently use alcohol. DRUGS:   reports no history of drug use.     Family History:       Problem Relation Age of Onset    Heart Disease Mother     Diabetes Mother     Other Mother         aneurysm    Heart Disease Father     Cancer Father         thyroid      REVIEW OF SYSTEMS:    CONSTITUTIONAL:  negative  EYES:  negative  HEENT:  negative  RESPIRATORY:  negative  CARDIOVASCULAR:  negative  GASTROINTESTINAL:  positive for vomiting  GENITOURINARY:  negative  INTEGUMENT/BREAST:  negative  MUSCULOSKELETAL:  negative  NEUROLOGICAL:  negative  PHYSICAL EXAM:      Vitals:    VITALS:  /80   Pulse (!) 115   Temp 97 °F (36.1 °C) (Temporal)   Resp 28   Ht 5' 2\" (1.575 m)   Wt 150 lb (68 kg)   SpO2 95%   BMI 27.44 kg/m²   24HR INTAKE/OUTPUT:  No intake or output data in the 24 hours ending 12/15/22 0942    Constitutional: Pale, chronically ill-appearing, in NAD  HEENT: Normocephalic, atraumatic, PERRLA  Respiratory: Diminished right lung base  Cardiovascular/Edema: RRR, normal S1, normal S2, no edema  Gastrointestinal: Soft, not distended, nontender  Neurologic: Nonfocal  Skin: Pale, dry, no lesions, no rash      DATA:    CBC with Differential:    Lab Results   Component Value Date/Time    WBC 7.7 12/15/2022 04:12 AM    RBC 3.53 12/15/2022 04:12 AM    HGB 10.6 12/15/2022 04:12 AM    HCT 31.5 12/15/2022 04:12 AM     12/15/2022 04:12 AM    MCV 89.2 12/15/2022 04:12 AM    MCH 30.0 12/15/2022 04:12 AM    MCHC 33.7 12/15/2022 04:12 AM    RDW 13.9 12/15/2022 04:12 AM    LYMPHOPCT 8.7 12/15/2022 04:12 AM    MONOPCT 14.8 12/15/2022 04:12 AM    BASOPCT 0.3 12/15/2022 04:12 AM    MONOSABS 1.16 12/15/2022 04:12 AM    LYMPHSABS 0.69 12/15/2022 04:12 AM    EOSABS 0.00 12/15/2022 04:12 AM    BASOSABS 0.00 12/15/2022 04:12 AM     CMP:    Lab Results   Component Value Date/Time     12/15/2022 04:12 AM    K 4.3 12/15/2022 04:12 AM    CL 97 12/15/2022 04:12 AM    CO2 18 12/15/2022 04:12 AM    BUN 56 12/15/2022 04:12 AM    CREATININE 3.5 12/15/2022 04:12 AM    GFRAA >60 07/22/2022 01:42 PM    LABGLOM 13 12/15/2022 04:12 AM    GLUCOSE 102 12/15/2022 04:12 AM    PROT 5.9 12/14/2022 10:50 AM    LABALBU 2.7 12/14/2022 10:50 AM    LABALBU 4.3 01/24/2011 01:15 PM    CALCIUM 8.3 12/15/2022 04:12 AM    BILITOT 0.5 12/14/2022 10:50 AM    ALKPHOS 58 12/14/2022 10:50 AM    AST 34 12/14/2022 10:50 AM    ALT 13 12/14/2022 10:50 AM     Magnesium:    Lab Results   Component Value Date/Time    MG 2.0 12/14/2022 10:50 AM     Phosphorus:  No results found for: PHOS  Radiology Review:      CT Chest and Abd/Pelvis 12/14/22      No evidence of pulmonary embolism. Bilateral lower lobe pulmonary consolidations and pleural effusions seen. Deviation of the mediastinum to the left is visualized. Mild soft tissue prominence visualized surrounding the esophagus with   narrowing at the gastroesophageal junction but appears to be due to external   compression. Cannot rule left soft tissue mass at this location. Wall thickening visualized in the gastric outlet with mild prominence of the   stomach seen, this could be artifactual.     Distended gallbladder is seen. Degenerative bone changes, postoperative changes and multilevel vertebral   augmentation seen. No evidence of acute abdominal/pelvic pathology. US Chest 12/15/22       Probable complex loculated right pleural effusion. Simple left pleural effusion. IMPRESSION/RECOMMENDATIONS:      Briefly, Fei Wilkes is a 69-year-old female, past medical history significant for hypothyroidism, hiatal hernia and TIA, who presented to the ED on 12/14/2022 from [facility], patient recently underwent a hiatal hernia repair on 12/9/22, developed acute respiratory distress and was transferred to [facility] on 12/14/2022 for further management and treatment. Initial ED work-up revealed creatinine level of 1.7, patient then underwent two seperate IV contrast studies CTA and creatinine subsequently increased to 2.5, on 12/15/2022 creatinine level increased to 3.5 mg/dL, reason for this consultation.   [facility] of [facility] reveals that baseline creatinine appears to be between 0.9 to 1.0 mg/dL, creatinine was 1.0 on 12/13/2022, patient was started on lisinopril during hospitalization, was given multiple doses IV furosemide with poor oral intake and vomiting.     PAWAN, stage II, hemodynamically mediated prerenal PAWAN 2/2 intravascular volume depletion due to poor oral intake, loop diuretics and vomiting in the setting of ACE inhibition versus ATN (ischemic +/- contrast induced PAWAN)    HAGMA, anion gap 24, probably 2/2 uremia and starvation ketoacidosis, obtain UA, beta hydroxybutyrate level     Loculated right pleural effusion, for IR thoracentesis tomorrow  S/p hiatal hernia repair 12/9/2022  Hypothyroidism, on levothyroxine      Plan:    Begin lactated Ringer's at 125 cc/h  Peterson catheter  Strict intake and output  Obtain urine indices  Obtain UACR, UPCR  Obtain lactic acid  Obtain venous pH  Obtain proBNP  Obtain beta hydroxybutyrate level    Electronically signed by KAR Riddle CNP on 12/15/2022 at 2:33 PM

## 2022-12-15 NOTE — PROGRESS NOTES
Physical Therapy    Date: 12/15/2022       Patient Name: Norma Lee  : 1945      MRN: [id number]    Physical therapy order received and chart reviewed. PT evaluation held per RN, plan for IR tomorrow (22). Will follow up as appropriate.      Gregorio Campbell PT, DPT  [id number]

## 2022-12-15 NOTE — PROGRESS NOTES
GENERAL SURGERY  DAILY PROGRESS NOTE  12/15/2022  Chief Complaint   Patient presents with    Shortness of Breath     Sent from [facility] [place] patient went for hernia repair patient on 6L NC on arrival       Subjective:  States her shortness of breath is improving. Little bit of nausea. Objective:  /80   Pulse (!) 115   Temp 97 °F (36.1 °C) (Temporal)   Resp 28   Ht 5' 2\" (1.575 m)   Wt 150 lb (68 kg)   SpO2 95%   BMI 27.44 kg/m²     GENERAL:  Laying in bed, awake, alert, cooperative, no apparent distress  HEAD: Normocephalic, atraumatic  EYES: No sclera icterus, pupils equal  LUNGS:  No increased work of breathing on 6 L NC  CARDIOVASCULAR:  tachycardic  ABDOMEN:  Soft, non-tender, non-distended. Incisions C/D/I  EXTREMITIES: No edema or swelling  SKIN: Warm and dry    Assessment/Plan:  65 y.o. female with respiratory insufficiency s/p hiatal hernia repair 12/9    - Patient was on regular diet, changed to FLD. She is to stay on FLD for 2 weeks post operatively.    - Pain/nausea control prn  - No evidence of esophageal leak or bowel injury.  - No plan for surgical intervention    Plan discussed with Dr. Maruice Vale      Electronically signed by Darnell Silva MD on 12/15/2022 at 10:44 AM

## 2022-12-15 NOTE — PROCEDURES
Called the floor in regards to an IR procedure for a right pigtail insertion. Patient had a dose of lovenox 30 mg 12/15/2022 @ [address]. Advised Mone PRASAD about 24 hour hold on lovenox, I advised her to let IR know if patient has a change in status. We will place patient on the schedule for 12/16/2022.

## 2022-12-15 NOTE — ED NOTES
Pt had episode of emesis. Zofran will be administered and night meds after nausea has passed.       Yasmin Santoyo RN  12/14/22 [id number]

## 2022-12-15 NOTE — CONSULTS
Al Ren M.D. Cristina Moreno M.D. Jack Tobias M.D. Matilda Watts D.O. Patient:  Talat Clifford 65 y.o. female MRN: [id number]     Date of Service: 12/15/2022      PULMONARY CONSULTATION    Reason for Consultation: pneumonia  Referring Physician: Anup Pennington DO    Chief Complaint   Patient presents with    Shortness of Breath     [place] [facility] [place] patient went for hernia repair patient on 6L NC on arrival         Code Status: Full Code        SUBJECTIVE:    HPI:  This is a 76-year-old female with history of recent hiatal hernia repair at [facility] 12/9, breast CA s/p mastectomy,  hypothyroidism, TIA, anxiety, lumbar fusion T12 - L2 that presents with worsening dyspnea. Patient with history of the very large hiatal hernia. She has been having some cough and congestion and then bothered some from this. She underwent a hiatal hernia surgery repair 12/9. Since then she she reports not doing well. Having increasing dyspnea. Was sent in from [facility]. Had been sent on O2 and is currently requiring 6 L nasal cannula. Having productive cough, chest congestion, fevers and feeling unwell. Patient also tachycardic and hypoxic on arrival.  Patient with emesis. Decreased oral intake. Also found to be in acute kidney injury. Denies any previous lung problems. CTA was done showing bilateral pleural effusions and atelectasis/consolidation.       Past Medical History:   Diagnosis Date    Anxiety with somatization 3/30/2021    Compression fracture of L1 lumbar vertebra [facility]) november 1994    Compression fracture of L4 lumbar vertebra     secondary to fall in December 2013    Fall 1989 & 12/2013    History of bilateral breast cancer 1980's    breast    Hx-TIA (transient ischemic attack) 3/30/2021    Osteoporosis     Pain syndrome, chronic 3/30/2021    Thyroid disease     went off of medication    Type II or unspecified type diabetes mellitus without mention of complication, not stated as uncontrolled     resolved with weight loss of 20 lbs     Past Surgical History:   Procedure Laterality Date    BACK SURGERY  1989    PLIF T12 to L2 with pedicle screws and rods    BREAST SURGERY Bilateral     breast implants (after mastectomy)    FIXATION KYPHOPLASTY  03/10/2014    with bone biopsy    MASTECTOMY Bilateral     TONSILLECTOMY AND ADENOIDECTOMY      TUBAL LIGATION       Family History   Problem Relation Age of Onset    Heart Disease Mother     Diabetes Mother     Other Mother         aneurysm    Heart Disease Father     Cancer Father         thyroid          Social History:   Social History     Socioeconomic History    Marital status: Single     Spouse name: Not on file    Number of children: Not on file    Years of education: Not on file    Highest education level: Not on file   Occupational History    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    Tobacco Use    Smoking status: Never    Smokeless tobacco: Never   Substance and Sexual Activity    Alcohol use: Not Currently     Comment: 1 glass a month    Drug use: No    Sexual activity: Not Currently   Other Topics Concern    Not on file   Social History Narrative    Not on file     Social Determinants of Health     Financial Resource Strain: Not on file   Food Insecurity: Not on file   Transportation Needs: Not on file   Physical Activity: Not on file   Stress: Not on file   Social Connections: Not on file   Intimate Partner Violence: Not on file   Housing Stability: Not on file       Vaccines:    Immunization History   Administered Date(s) Administered    Pneumococcal Conjugate 13-valent (Lorenda Hang) 06/01/2016        Home Meds: Medications Prior to Admission: Cholecalciferol (VITAMIN D3) 125 MCG (5000 UT) TABS, Take by mouth  levothyroxine (SYNTHROID) 50 MCG tablet, Take 50 mcg by mouth daily  rosuvastatin (CRESTOR) 20 MG tablet, Take 1 tablet by mouth nightly  aspirin 325 MG EC tablet, Take 1 tablet by mouth daily  Handicap Placard MISC, by Does not apply route Duration: Lifetime    CURRENT MEDS :  Scheduled Meds:   rosuvastatin  10 mg Oral Nightly    albuterol  2.5 mg Nebulization 4x daily    pantoprazole  40 mg Oral Daily    sodium chloride flush  5-40 mL IntraVENous 2 times per day    cefTRIAXone (ROCEPHIN) IV  1,000 mg IntraVENous Q24H    And    azithromycin  500 mg Oral Q24H    levothyroxine  50 mcg Oral Daily    enoxaparin  30 mg SubCUTAneous Daily       Continuous Infusions:   sodium chloride      sodium chloride         Allergies   Allergen Reactions    Benadryl [Diphenhydramine] Itching    Ibuprofen Other (See Comments)     Doesn't know       REVIEW OF SYSTEMS:  REVIEW OF SYMPTOMS:  Review of Systems   Constitutional:  Positive for fatigue and fever. Negative for chills. HENT: Negative. Eyes: Negative. Respiratory:  Positive for cough and shortness of breath. Cardiovascular:  Negative for chest pain and leg swelling. Gastrointestinal:  Negative for abdominal pain, nausea and vomiting. Endocrine: Negative for cold intolerance and heat intolerance. Genitourinary:  Negative for difficulty urinating and dysuria. Musculoskeletal: Negative. Skin:  Negative for color change and rash. Allergic/Immunologic: Negative for environmental allergies and immunocompromised state. Neurological:  Negative for dizziness and weakness. Psychiatric/Behavioral:  Negative for agitation and confusion. OBJECTIVE:   /80   Pulse (!) 115   Temp 97 °F (36.1 °C) (Temporal)   Resp 28   Ht 5' 2\" (1.575 m)   Wt 150 lb (68 kg)   SpO2 95%   BMI 27.44 kg/m²   SpO2 Readings from Last 1 Encounters:   12/15/22 95%        I/O:  No intake or output data in the 24 hours ending 12/15/22 0906                   PHYSICAL EXAM:  Physical Exam  Constitutional:       Appearance: She is ill-appearing and toxic-appearing. HENT:      Head: Normocephalic and atraumatic.    Eyes:      Conjunctiva/sclera: Conjunctivae normal.   Neck:      Trachea: No tracheal deviation. Cardiovascular:      Rate and Rhythm: Normal rate and regular rhythm. Heart sounds: Normal heart sounds. Pulmonary:      Breath sounds: Rhonchi present. Abdominal:      General: Bowel sounds are normal.      Palpations: Abdomen is soft. Tenderness: There is no abdominal tenderness. Musculoskeletal:         General: No swelling. Cervical back: Neck supple. Lymphadenopathy:      Cervical: No cervical adenopathy. Skin:     General: Skin is warm and dry. Findings: No rash. Neurological:      General: No focal deficit present. Mental Status: She is alert. Psychiatric:         Behavior: Behavior normal.       Pulmonary Function Testing personally reviewed and interpreted. PERTINENT LAB RESULTS: Labs reviewed. DIAGNOSTICS: Pertinent imaging reviewed. CTA 12/14:    No evidence of pulmonary embolism. Bilateral lower lobe pulmonary consolidations and pleural effusions seen. Deviation of the mediastinum to the left is visualized. Mild soft tissue prominence visualized surrounding the esophagus with   narrowing at the gastroesophageal junction but appears to be due to external   compression. Cannot rule left soft tissue mass at this location. Wall thickening visualized in the gastric outlet with mild prominence of the   stomach seen, this could be artifactual.       Distended gallbladder is seen. Degenerative bone changes, postoperative changes and multilevel vertebral   augmentation seen. No evidence of acute abdominal/pelvic pathology. ASSESSMENT:     Acute hypoxic respiratory failure with bilateral pleural effusions, consolidation/atelectasis s/p hiatal hernia repair 12/9  Basilar consolidations. Suspect pneumonia, possible aspiration pneumonia or esophageal perforation. need to rule out parapneumonic process.   Bilateral pleural effusions right greater than left  Massive hiatal hernia s/p repair 12/9 at [facility]  PAWAN  Hx of breast s/p mastectomy  Hx lumbar compression fracture with history of PLIF T12-L2  Anxiety      PLAN:     Continue supplemental oxygen to maintain SPO2 greater than 92%  Will need to evaluate pleural effusion with thoracentesis sending fluid for analysis. Plan for thora today  Change antibiotic coverage to Unasyn, check respiratory cultures  Pending fluid analysis may need esophagram to eval for perforation  Pulmonary recruitment maneuvers  Aspiration precautions  Nephro recommendations for PAWAN  GI/DVT - PPI/Lovenox 30 mg        Thank you for allowing me to participate in the care of Jereitibrea 6. Please feel free to call with questions. Electronically signed by Allie Newman DO on 12/15/2022 at 9:06 AM    ADDENDUM:  [employer] to do bedside US thoracentesis. Right pleural fluid appears complex and loculated. Recommend chest tube/pigtail drain to be placed, IR consulted.     Allie Newman DO

## 2022-12-15 NOTE — CARE COORDINATION
SOCIAL WORK/CASEMANAGEMENT [facility] [address]):  pt was transferred from [facility] where she had hernia repair. PT and OT to [facility]. Pt lives alone and was independent pta and drives. She has 2 grown children that are local and work. She has a ranch home with 1 garage door step to enter the home. No [facility] pta. Pt has a cane that she uses at times, shower chair, and higher toilet. Plan per pt is home. Her daughter has been helping out prior to her surgery.  SANJUANA Ellison  12/15/2022

## 2022-12-16 NOTE — PROGRESS NOTES
Physical Therapy    Date: 2022       Patient Name: Nikole Centeno  : 1945      MRN: [id number]    Physical therapy order received and chart reviewed. PT evaluation held at this time pending IR thoracentesis (22). Will follow up as appropriate.      Cory Stanford PT, DPT  [id number]

## 2022-12-16 NOTE — PROGRESS NOTES
Department of Internal Medicine  Nephrology Nurse Practitioner Progress Note      Events reviewed. SUBJECTIVE: We are following Dwight Jay for PAWAN. Patient reports no complaints.     PHYSICAL EXAM:      Vitals:    VITALS:  /69   Pulse (!) 121   Temp 96.9 °F (36.1 °C) (Temporal)   Resp 22   Ht 5' 2\" (1.575 m)   Wt 150 lb (68 kg)   SpO2 98%   BMI 27.44 kg/m²   24HR INTAKE/OUTPUT:    Intake/Output Summary (Last 24 hours) at 12/16/2022 0902  Last data filed at 12/16/2022 0654  Gross per 24 hour   Intake --   Output 1250 ml   Net -1250 ml       Constitutional: Pale, chronically ill-appearing, in NAD  HEENT: Normocephalic, atraumatic, PERRLA  Respiratory: Diminished right lung base  Cardiovascular/Edema: RRR, normal S1, normal S2, no edema  Gastrointestinal: Soft, not distended, nontender  Neurologic: Nonfocal  Skin: Pale, dry, no lesions, no rash    Scheduled Meds:   rosuvastatin  10 mg Oral Nightly    lidocaine  10 mL Other Once    ampicillin-sulbactam  3,000 mg IntraVENous Q12H    pantoprazole  40 mg Oral Daily    sodium chloride flush  5-40 mL IntraVENous 2 times per day    levothyroxine  50 mcg Oral Daily    [Held by provider] enoxaparin  30 mg SubCUTAneous Daily     Continuous Infusions:   lactated ringers 125 mL/hr at 12/15/22 1632    sodium chloride       PRN Meds:.sodium chloride flush, sodium chloride, ondansetron **OR** ondansetron, magnesium hydroxide, acetaminophen **OR** acetaminophen, ipratropium-albuterol    DATA:    CBC with Differential:    Lab Results   Component Value Date/Time    WBC 9.6 12/16/2022 06:06 AM    RBC 3.09 12/16/2022 06:06 AM    HGB 9.3 12/16/2022 06:06 AM    HCT 27.1 12/16/2022 06:06 AM     12/16/2022 06:06 AM    MCV 87.7 12/16/2022 06:06 AM    MCH 30.1 12/16/2022 06:06 AM    MCHC 34.3 12/16/2022 06:06 AM    RDW 14.0 12/16/2022 06:06 AM    LYMPHOPCT 8.6 12/16/2022 06:06 AM    MONOPCT 9.1 12/16/2022 06:06 AM    BASOPCT 0.2 12/16/2022 06:06 AM    MONOSABS 0.87 12/16/2022 06:06 AM    LYMPHSABS 0.82 12/16/2022 06:06 AM    EOSABS 0.05 12/16/2022 06:06 AM    BASOSABS 0.02 12/16/2022 06:06 AM     CMP:    Lab Results   Component Value Date/Time     12/16/2022 06:06 AM    K 3.5 12/16/2022 06:06 AM     12/16/2022 06:06 AM    CO2 22 12/16/2022 06:06 AM    BUN 62 12/16/2022 06:06 AM    CREATININE 3.2 12/16/2022 06:06 AM    GFRAA >60 07/22/2022 01:42 PM    LABGLOM 14 12/16/2022 06:06 AM    GLUCOSE 106 12/16/2022 06:06 AM    PROT 5.9 12/14/2022 10:50 AM    LABALBU 2.7 12/14/2022 10:50 AM    LABALBU 4.3 01/24/2011 01:15 PM    CALCIUM 7.9 12/16/2022 06:06 AM    BILITOT 0.5 12/14/2022 10:50 AM    ALKPHOS 58 12/14/2022 10:50 AM    AST 34 12/14/2022 10:50 AM    ALT 13 12/14/2022 10:50 AM     Magnesium:    Lab Results   Component Value Date/Time    MG 2.2 12/16/2022 06:06 AM     Phosphorus:    Lab Results   Component Value Date/Time    PHOS 4.1 12/16/2022 06:06 AM     Radiology Review:      CT Chest and Abd/Pelvis 12/14/22      No evidence of pulmonary embolism. Bilateral lower lobe pulmonary consolidations and pleural effusions seen. Deviation of the mediastinum to the left is visualized. Mild soft tissue prominence visualized surrounding the esophagus with   narrowing at the gastroesophageal junction but appears to be due to external   compression. Cannot rule left soft tissue mass at this location. Wall thickening visualized in the gastric outlet with mild prominence of the   stomach seen, this could be artifactual.     Distended gallbladder is seen. Degenerative bone changes, postoperative changes and multilevel vertebral   augmentation seen. No evidence of acute abdominal/pelvic pathology. US Chest 12/15/22       Probable complex loculated right pleural effusion. Simple left pleural effusion.       BRIEF SUMMARY OF INITIAL CONSULT:    Briefly, Lizeth Tello is a 59-year-old female, past medical history significant for hypothyroidism, hiatal hernia and TIA, who presented to the ED on 12/14/2022 from [facility], patient recently underwent a hiatal hernia repair on 12/9/22, developed acute respiratory distress and was transferred to [facility] on 12/14/2022 for further management and treatment. Initial ED work-up revealed creatinine level of 1.7, patient then underwent two seperate IV contrast studies CTA and creatinine subsequently increased to 2.5, on 12/15/2022 creatinine level increased to 3.5 mg/dL, reason for this consultation. Review of records from [facility] reveals that baseline creatinine appears to be between 0.9 to 1.0 mg/dL, creatinine was 1.0 on 12/13/2022, patient was started on lisinopril during hospitalization, was given multiple doses IV furosemide with poor oral intake and vomiting. IMPRESSION/RECOMMENDATIONS:     PAWAN, stage II, nonoliguric, ATN (ischemic +/- contrast induced , intravascular volume depletion due to poor oral intake, loop diuretics and vomiting in the setting of ACE inhibition versus,) creatinine level slowly improving with IVF administration, 3.2 mg/dL today. 1250 UO documented in the last 24 hours. FEUrea 28.3%, FENa 1.1%, both of limited utility in the setting of IV contrast administration and loop diuretics.     HAGMA, probably 2/2 uremia and starvation ketoacidosis, improving, beta hydroxybutyrate 3.35 mmol/L, anion gap 17 today, continue IVF  Loculated right pleural effusion, for IR thoracentesis today  S/p hiatal hernia repair 12/9/2022  Hypothyroidism, on levothyroxine  Persistent vomiting, PRN ondansetron      Plan:    Change IVF to D5 + Lactated Ringer's at 125 cc/h  Continue Peterson catheter  Strict intake and output      Electronically signed by KAR Campbell - CNP on 12/16/2022 at 9:02 AM

## 2022-12-16 NOTE — PROGRESS NOTES
Gastric Occult positive, Dr. Duong Valencia notified. Orders to contact General surgery with results.

## 2022-12-16 NOTE — PROGRESS NOTES
GENERAL SURGERY  DAILY PROGRESS NOTE  12/16/2022    CHIEF COMPLAINT:  Chief Complaint   Patient presents with    Shortness of Breath     Sent from [facility] for SOB patient went for hernia repair patient on 6L NC on arrival       SUBJECTIVE:  Resting comfortably in bed. Head of bed and in her lap with thick white sputum. Patient underwent right-sided thoracentesis today, were 100 cc of serous fluid was drained. The treatment bedside with 300 cc of serosanguineous fluid. Patient denies any abdominal pain. Chief complaint nevus back pain at the chest tube site. She states her breathing seems easier after procedure. OBJECTIVE:  /65   Pulse (!) 118   Temp 97.8 °F (36.6 °C) (Temporal)   Resp 24   Ht 5' 2\" (1.575 m)   Wt 150 lb (68 kg)   SpO2 97%   BMI 27.44 kg/m²     GENERAL: Ill-appearing, alert, answers questions appropriately  LUNGS: Equal chest rise on 6 L nasal cannula  CARDIOVASCULAR: Sinus tachycardia, normotensive  ABDOMEN:  Soft, non-distended, non-tender. No guarding, rigidity, rebound. Incisions appear clean dry and intact.     ASSESSMENT/PLAN:  65 y.o. female with respiratory insufficiency status post hiatal hernia repair 12/9    Plan:  -Patient is to remain on full liquid diet until 12/23  -Esophagram negative for esophageal perforation  -Recommend as needed antiemetics and multimodal pain control  -no surgical intervention indicated      Tung Mendoza DO  Surgery Resident PGY-1  12/16/2022  4:32 PM

## 2022-12-16 NOTE — CARE COORDINATION
SOCIAL WORK/CASEMANAGEMENT TRANSITION OF CARE PLANNINGLiliya Mcfarlane, [email or web address]):  INR pending for a right chest tube insertion today. PT and OT to eval at some point and pt is on 6l o2 without home o2. Will be  here over weekend.  SANJUANA Borges  12/16/2022

## 2022-12-16 NOTE — PROGRESS NOTES
Hospitalist Progress Note      PCP: Demetri Vasquez DO    Date of Admission: 12/14/2022        Hospital Course:  65 y.o. female presented with PNEUMONIA. STATES SHE HAD A HH REPAIR AT [facility] ON THE 8TH. SHE STATES SHE WAS NOT FEELING BAD, BUT WAS TOLD SHE NEEDED TO COME TO THE HOSPITAL BC SHE WAS SICK. SHE WAS FOUND TO BE SEPTIC, WITH A HAG, SHE WAS TACHY AND TACHYPNEIC . COFFEE GROUND EMESIS THIS MORNING, GASTROCULT  POS . SURGERY NOTIFIED         Subjective:  HAVING NV AND ABD PAIN           Medications:  Reviewed    Infusion Medications    dextrose 5% in lactated ringers      lactated ringers 125 mL/hr at 12/15/22 1632    sodium chloride       Scheduled Medications    potassium chloride  40 mEq Oral Once    ampicillin-sulbactam  3,000 mg IntraVENous Q24H    rosuvastatin  10 mg Oral Nightly    lidocaine  10 mL Other Once    pantoprazole  40 mg Oral Daily    sodium chloride flush  5-40 mL IntraVENous 2 times per day    levothyroxine  50 mcg Oral Daily    [Held by provider] enoxaparin  30 mg SubCUTAneous Daily     PRN Meds: sodium chloride flush, sodium chloride, ondansetron **OR** ondansetron, magnesium hydroxide, acetaminophen **OR** acetaminophen, ipratropium-albuterol      Intake/Output Summary (Last 24 hours) at 12/16/2022 1438  Last data filed at 12/16/2022 1201  Gross per 24 hour   Intake --   Output 1650 ml   Net -1650 ml       Exam:    /67   Pulse (!) 118   Temp 97.8 °F (36.6 °C) (Temporal)   Resp 20   Ht 5' 2\" (1.575 m)   Wt 150 lb (68 kg)   SpO2 96%   BMI 27.44 kg/m²       General appearance:  No apparent distress, appears stated age. HEENT:  Normal cephalic, atraumatic without obvious deformity. Pupils equal, round, and reactive to light. Extra ocular muscles intact. Conjunctivae/corneas clear. Neck: Supple, with full range of motion. No jugular venous distention. Trachea midline. Respiratory:  Normal respiratory effort.  DIMINISHED BILATERALLY  Cardiovascular: RRR  Abdomen: Soft, non-tender, non-distended with normal bowel sounds. Musculoskeletal:  No clubbing, cyanosis or edema bilaterally. Skin: smooth and dry   Neurologic:   Cranial nerves: II-XII intact,   Psychiatric:  Alert and oriented x 3                 Labs:   Recent Labs     12/14/22  1050 12/15/22  0412 12/16/22  0606 12/16/22  1148   WBC 7.0 7.7 9.6  --    HGB 10.9* 10.6* 9.3* 9.4*   HCT 34.1 31.5* 27.1* 27.9*    305 285  --      Recent Labs     12/15/22  0412 12/15/22  1741 12/16/22  0606    138 140   K 4.3 3.8 3.5   CL 97* 97* 101   CO2 18* 20* 22   BUN 56* 64* 62*   CREATININE 3.5* 3.6* 3.2*   CALCIUM 8.3* 8.4* 7.9*   PHOS  --   --  4.1     Recent Labs     12/14/22  1050   AST 34*   ALT 13   BILITOT 0.5   ALKPHOS 58     Recent Labs     12/16/22  1101   INR 1.2     No results for input(s): CKTOTAL, TROPONINI in the last 72 hours. Recent Labs     12/14/22  1050   AST 34*   ALT 13   BILITOT 0.5   ALKPHOS 58     Recent Labs     12/14/22  1050 12/15/22  1105   LACTA 1.1 1.1     No results found for: Nahomi Form  No results found for: AMMONIA    Assessment:    Active Hospital Problems    Diagnosis Date Noted    Postoperative pneumonia [J95.89, J18.9] 12/14/2022     Priority: Medium   GASTROCULT POS  S/P HH REPAIR  PAWAN BASELINE CREATINE 1.0)  H.O BREAST CANCER   HLD   PLEURAL EFFUSIONS BILATERALLY     PLAN:  ROCEPHIN   ZITHROMAX   FLUIDS  MONITOR BMP   FOR THORACENTESIS      DVT Prophylaxis:SCD  Diet: ADULT DIET;  Full Liquid  Code Status: Full Code     PT/OT Eval Status: ORDERED     Dispo -  HOME      Electronically signed by Ady Mazariegos DO on 12/16/2022 at 2:38 PM Vanessa Chatterjee

## 2022-12-16 NOTE — PROGRESS NOTES
This patient is on medication that requires renal, weight, and/or indication dose adjustment. Date Body Weight IBW  Adjusted BW SCr  CrCl Dialysis status   12/16/2022 150 lb (68 kg) Ideal body weight: 50.1 kg (110 lb 7.2 oz)  Adjusted ideal body weight: 57.3 kg (126 lb 4.3 oz) Serum creatinine: 3.2 mg/dL (H) 12/16/22 0606  Estimated creatinine clearance: 13 mL/min (A) N/a       Pharmacy has dose-adjusted the following medication(s):    Date Previous Order Adjusted Order   12/16/2022 Unasyn 3000 mg IV q12h Unasyn 3000 mg IV q24h       These changes were made per protocol according to the [facility] Clinical Guidance for Pharmacists. *Please note this dose may need readjusted if patient's condition changes. Please contact pharmacy with any questions regarding these changes.     [address], BREWER NATACHA [facility]  12/16/2022  10:24 AM

## 2022-12-16 NOTE — BRIEF OP NOTE
Brief Postoperative Note      Patient: Benjamin Kerr  YOB: 1945  MRN: [id number]    Date of Procedure: 12/16/2022    Pre-Op Diagnosis: *right sided thoracentesis*    Post-Op Diagnosis: Same       Pleural effusion    CHRISTINA Richard    Assistant:  * No surgical staff found *    Anesthesia: * No anesthesia type entered *    Estimated Blood Loss (mL): Minimal    Complications: None    Specimens:   * No specimens in log *    Implants:  * No implants in log *      Drains:   Urinary Catheter 12/15/22 Peterson (Active)   Catheter Indications Urinary retention (acute or chronic), continuous bladder irrigation or bladder outlet obstruction 12/15/22 2000   Site Assessment No urethral drainage 12/15/22 2000   Urine Color Yellow 12/15/22 2234   Urine Appearance Clear 12/15/22 2234   Output (mL) 350 mL 12/16/22 1201       Findings: 105 ccs of serous fluid was removed    Electronically signed by Stuart Ledezma MD on 12/16/2022 at 2:48 PM

## 2022-12-16 NOTE — PROGRESS NOTES
Occupational Therapy    Date:2022  Patient Name: Marian Dawn  MRN: [id number]  : 1945  Room: [address] [place] [address]     OT orders received and chart reviewed. OT eval on hold at this time pending IR thoracentesis today 22. OT will follow and re-attempt eval as appropriate, pending POC, at a later time/date.     [facility], [address] [facility] Mohamed Ali Annabi OTR/L #[id number]

## 2022-12-17 PROBLEM — J69.0 ASPIRATION PNEUMONIA OF BOTH LOWER LOBES DUE TO GASTRIC SECRETIONS (HCC): Status: ACTIVE | Noted: 2022-12-17

## 2022-12-17 PROBLEM — R09.02 HYPOXIA: Status: ACTIVE | Noted: 2022-12-17

## 2022-12-17 PROBLEM — J18.9 PNEUMONIA OF LEFT LOWER LOBE DUE TO INFECTIOUS ORGANISM: Status: ACTIVE | Noted: 2022-01-01

## 2022-12-17 NOTE — PROGRESS NOTES
GENERAL SURGERY  DAILY PROGRESS NOTE  12/17/2022    CHIEF COMPLAINT:  Chief Complaint   Patient presents with    Shortness of Breath     Sent from [facility] for SOB patient went for hernia repair patient on 6L NC on arrival       SUBJECTIVE:  RRT this morning for hypoxia, transferred to SICU. Had emesis overnight. OBJECTIVE:  BP 99/72   Pulse (!) 123   Temp 99.5 °F (37.5 °C) (Axillary)   Resp 28   Ht 5' 2\" (1.575 m)   Wt 150 lb (68 kg)   SpO2 93%   BMI 27.44 kg/m²     GENERAL: Ill-appearing, alert, answers questions appropriately  LUNGS: Equal chest rise on 15 L NRB  CARDIOVASCULAR: Sinus tachycardia, normotensive  ABDOMEN:  Soft, non-distended, non-tender. No guarding, rigidity, rebound. Incisions appear clean dry and intact. ASSESSMENT/PLAN:  65 y.o. female with respiratory insufficiency status post hiatal hernia repair 12/9    Plan:  -NPO, OK for ice chips  -Esophagram negative for esophageal perforation  -Recommend as needed antiemetics and multimodal pain control  -no surgical intervention indicated    - Start TPN, will get PICC. Discussed this with ICU team  - No need for NG or OG at this time. If this changes please page [facility] for placement of NG or OG as it may be difficult due to recent hiatal hernia repair.      Joycelyn Tomas MD  Surgery Resident PGY-4  12/17/2022  8:22 AM

## 2022-12-17 NOTE — PROGRESS NOTES
Department of Internal Medicine  Nephrology Progress Note      Events reviewed. Patient developed hypotension with AF RVR and was transferred to MICU earlier today. SUBJECTIVE: We are following Mehnaz Lee for PAWAN. Patient reports no complaints.       PHYSICAL EXAM:      Vitals:    VITALS:  BP 99/72   Pulse (!) 123   Temp 99.5 °F (37.5 °C) (Axillary)   Resp 28   Ht 5' 2\" (1.575 m)   Wt 150 lb (68 kg)   SpO2 93%   BMI 27.44 kg/m²   24HR INTAKE/OUTPUT:    Intake/Output Summary (Last 24 hours) at 12/17/2022 0851  Last data filed at 12/17/2022 0532  Gross per 24 hour   Intake --   Output 1500 ml   Net -1500 ml         Constitutional: Pale, chronically ill-appearing, in NAD  HEENT: Normocephalic, atraumatic, PERRLA  Respiratory: Diminished right lung base  Cardiovascular/Edema: RRR, normal S1, normal S2, no edema  Gastrointestinal: Soft, not distended, nontender  Neurologic: Nonfocal  Skin: Pale, dry, no lesions, no rash    Scheduled Meds:   lidocaine PF  5 mL IntraDERmal Once    sodium chloride flush  5-40 mL IntraVENous 2 times per day    heparin flush  3 mL IntraVENous 2 times per day    metoclopramide  10 mg IntraVENous Q6H    piperacillin-tazobactam  4,500 mg IntraVENous Q12H    ipratropium-albuterol  1 ampule Inhalation Q6H    sodium chloride (Inhalant)  4 mL Nebulization Q6H    pantoprazole (PROTONIX) 40 mg injection  40 mg IntraVENous Daily    vancomycin  1,750 mg IntraVENous Once    vancomycin (VANCOCIN) intermittent dosing (placeholder)   Other RX Placeholder    potassium chloride  40 mEq Oral Once    rosuvastatin  10 mg Oral Nightly    lidocaine  10 mL Other Once    sodium chloride flush  5-40 mL IntraVENous 2 times per day    levothyroxine  50 mcg Oral Daily    enoxaparin  30 mg SubCUTAneous Daily     Continuous Infusions:   sodium chloride      PN-Adult  3 IN 1 Central Line (Standard)      lactated ringers Stopped (12/16/22 1300)    sodium chloride       PRN Meds:.sodium chloride flush, sodium chloride, heparin flush, oxyCODONE **OR** oxyCODONE, sodium chloride flush, sodium chloride, ondansetron **OR** ondansetron, magnesium hydroxide, acetaminophen **OR** acetaminophen, ipratropium-albuterol    DATA:    CBC with Differential:    Lab Results   Component Value Date/Time    WBC 15.4 12/17/2022 07:41 AM    RBC 3.17 12/17/2022 07:41 AM    HGB 9.4 12/17/2022 07:41 AM    HCT 28.0 12/17/2022 07:41 AM     12/17/2022 07:41 AM    MCV 88.3 12/17/2022 07:41 AM    MCH 29.7 12/17/2022 07:41 AM    MCHC 33.6 12/17/2022 07:41 AM    RDW 14.0 12/17/2022 07:41 AM    LYMPHOPCT 2.0 12/17/2022 07:41 AM    MONOPCT 5.5 12/17/2022 07:41 AM    BASOPCT 0.1 12/17/2022 07:41 AM    MONOSABS 0.85 12/17/2022 07:41 AM    LYMPHSABS 0.31 12/17/2022 07:41 AM    EOSABS 0.00 12/17/2022 07:41 AM    BASOSABS 0.02 12/17/2022 07:41 AM     CMP:    Lab Results   Component Value Date/Time     12/17/2022 05:53 AM    K 3.5 12/17/2022 05:53 AM     12/17/2022 05:53 AM    CO2 27 12/17/2022 05:53 AM    BUN 48 12/17/2022 05:53 AM    CREATININE 2.5 12/17/2022 05:53 AM    GFRAA >60 07/22/2022 01:42 PM    LABGLOM 19 12/17/2022 05:53 AM    GLUCOSE 232 12/17/2022 05:53 AM    PROT 5.9 12/14/2022 10:50 AM    LABALBU 2.7 12/14/2022 10:50 AM    LABALBU 4.3 01/24/2011 01:15 PM    CALCIUM 8.4 12/17/2022 05:53 AM    BILITOT 0.5 12/14/2022 10:50 AM    ALKPHOS 58 12/14/2022 10:50 AM    AST 34 12/14/2022 10:50 AM    ALT 13 12/14/2022 10:50 AM     Magnesium:    Lab Results   Component Value Date/Time    MG 2.1 12/17/2022 05:53 AM     Phosphorus:    Lab Results   Component Value Date/Time    PHOS 4.1 12/16/2022 06:06 AM     Radiology Review:      CT Chest and Abd/Pelvis 12/14/22      No evidence of pulmonary embolism.     Bilateral lower lobe pulmonary consolidations and pleural effusions seen.   Deviation of the mediastinum to the left is visualized.     Mild soft tissue prominence visualized surrounding the esophagus with   narrowing at the  gastroesophageal junction but appears to be due to external   compression. Cannot rule left soft tissue mass at this location. Wall thickening visualized in the gastric outlet with mild prominence of the   stomach seen, this could be artifactual.     Distended gallbladder is seen. Degenerative bone changes, postoperative changes and multilevel vertebral   augmentation seen. No evidence of acute abdominal/pelvic pathology. US Chest 12/15/22       Probable complex loculated right pleural effusion. Simple left pleural effusion. BRIEF SUMMARY OF INITIAL CONSULT:    Briefly, Jacob Delarosa is a 57-year-old female, past medical history significant for hypothyroidism, hiatal hernia and TIA, who presented to the ED on 12/14/2022 from [facility], patient recently underwent a hiatal hernia repair on 12/9/22, developed acute respiratory distress and was transferred to [facility] on 12/14/2022 for further management and treatment. Initial ED work-up revealed creatinine level of 1.7, patient then underwent two seperate IV contrast studies CTA and creatinine subsequently increased to 2.5, on 12/15/2022 creatinine level increased to 3.5 mg/dL, reason for this consultation. Review of records from [facility] reveals that baseline creatinine appears to be between 0.9 to 1.0 mg/dL, creatinine was 1.0 on 12/13/2022, patient was started on lisinopril during hospitalization, was given multiple doses IV furosemide with poor oral intake and vomiting. Problems resolved:  HAGMA, 2/2 uremia and starvation ketoacidosis    IMPRESSION/RECOMMENDATIONS:     PAWAN, stage II, nonoliguric, ATN (ischemic +/- contrast induced , intravascular volume depletion due to poor oral intake, loop diuretics and vomiting in the setting of ACE inhibition versus,) FEUrea 28.3%, FENa 1.1% . Creatinine level slowly improving with IVF administration, 2.5 mg/dL today.      Elevated proBNP, 1373, to monitor    ------------------------------------------------------  New onset AF with RVR  Loculated right pleural effusion, s/p IR thoracentesis   Possibly sepsis, elevated procalcitonin, on piperacillin-tazobactam and vancomycin  S/p hiatal hernia repair 12/9/2022  Hypothyroidism, on levothyroxine  Persistent vomiting, PRN ondansetron, esophagram negative for perforation  Normocytic anemia, multifactorial  Nutrition, to start TPN      Plan:    Continue IVF to D5 Lactated Ringer's at 125 cc/h  Continue to monitor renal function      Electronically signed by Mirna Patricio MD on 12/17/2022 at 8:51 AM

## 2022-12-17 NOTE — PROGRESS NOTES
Hospitalist Progress Note      PCP: Demetri Vasquez DO    Date of Admission: 12/14/2022        Hospital Course:  65 y.o. female presented with PNEUMONIA. STATES SHE HAD A HH REPAIR AT [facility] ON THE 8TH. SHE STATES SHE WAS NOT FEELING BAD, BUT WAS TOLD SHE NEEDED TO COME TO THE HOSPITAL BC SHE WAS SICK. SHE WAS FOUND TO BE SEPTIC, WITH A HAG, SHE WAS TACHY AND TACHYPNEIC . COFFEE GROUND EMESIS THIS MORNING, GASTROCULT  POS . SURGERY NOTIFIED ** HAD A THROACENTESIS ON YESTERDAY, 105 CC REMOVED.     ** RRT THIS MORNING DUE TO RESPIRATORY DISTRESS      Subjective:  GETTING STERILE PROCEDURE           Medications:  Reviewed    Infusion Medications    sodium chloride      PN-Adult  3 IN 1 Central Line (Custom)      lactated ringers Stopped (12/16/22 1300)    sodium chloride       Scheduled Medications    lidocaine PF  5 mL IntraDERmal Once    sodium chloride flush  5-40 mL IntraVENous 2 times per day    heparin flush  3 mL IntraVENous 2 times per day    metoclopramide  10 mg IntraVENous Q6H    piperacillin-tazobactam  4,500 mg IntraVENous Q12H    pantoprazole (PROTONIX) 40 mg injection  40 mg IntraVENous Daily    vancomycin  1,750 mg IntraVENous Once    vancomycin (VANCOCIN) intermittent dosing (placeholder)   Other RX Placeholder    Arformoterol Tartrate  15 mcg Nebulization BID    budesonide  0.5 mg Nebulization BID    acetylcysteine  4 mL Inhalation BID    albuterol  2.5 mg Nebulization Q4H WA    potassium chloride  10 mEq IntraVENous Q1H    rosuvastatin  10 mg Oral Nightly    lidocaine  10 mL Other Once    sodium chloride flush  5-40 mL IntraVENous 2 times per day    levothyroxine  50 mcg Oral Daily    enoxaparin  30 mg SubCUTAneous Daily     PRN Meds: sodium chloride flush, sodium chloride, heparin flush, oxyCODONE **OR** oxyCODONE, sodium chloride flush, sodium chloride, ondansetron **OR** ondansetron, magnesium hydroxide, acetaminophen **OR** acetaminophen, ipratropium-albuterol      Intake/Output Summary (Last 24 hours) at 12/17/2022 1523  Last data filed at 12/17/2022 1200  Gross per 24 hour   Intake --   Output 1150 ml   Net -1150 ml       Exam:    /62   Pulse (!) 108   Temp 99.5 °F (37.5 °C) (Axillary)   Resp 27   Ht 5' 2\" (1.575 m)   Wt 150 lb (68 kg)   SpO2 97%   BMI 27.44 kg/m²         GETTING AN IJ IV ACCESS      General appearance:  No apparent distress, appears stated age. HEENT:  Normal cephalic,   Neck: Supple, with full range of motion. No jugular venous distention. Trachea midline. Neurologic:   Cranial nerves: II-XII intact,   Psychiatric:  Alert and oriented x 3             Labs:   Recent Labs     12/16/22  0606 12/16/22  1148 12/17/22  0553 12/17/22  0741   WBC 9.6  --  14.8* 15.4*   HGB 9.3* 9.4* 9.6* 9.4*   HCT 27.1* 27.9* 29.7* 28.0*     --  325 316     Recent Labs     12/16/22  0606 12/16/22  1636 12/17/22  0553    143 146   K 3.5 3.8 3.5    103 106   CO2 22 22 27   BUN 62* 59* 48*   CREATININE 3.2* 2.9* 2.5*   CALCIUM 7.9* 8.5* 8.4*   PHOS 4.1  --   --      No results for input(s): AST, ALT, BILIDIR, BILITOT, ALKPHOS in the last 72 hours. Recent Labs     12/16/22  1101   INR 1.2     No results for input(s): [place] in the last 72 hours. No results for input(s): AST, ALT, ALB, BILIDIR, BILITOT, ALKPHOS in the last 72 hours.   Recent Labs     12/15/22  1105 12/17/22  0741   LACTA 1.1 1.8     No results found for: Karl Lime  No results found for: AMMONIA    Assessment:    Active Hospital Problems    Diagnosis Date Noted    Postoperative pneumonia [J95.89, J18.9] 12/14/2022     Priority: Medium   S/P PIG TAIL CATHETER   S/P HH  PAWAN BASELINE CREATINE 1.0)  H.O BREAST CANCER   HLD   PLEURAL EFFUSIONS BILATERALLY   ACUTE RESP FAILURE   Plan:  CRESTOR   SYNTHROID   ZOSYN   VANC    DVT Prophylaxis: SCD  Diet: Diet NPO Exceptions are: Ice Chips  PN-Adult  3 IN 1 Central Line (Custom)  Code Status: Full Code    PT/OT Eval Status:   WHEN STABLE    Dispo -  [facility] VS HOME      Electronically signed by Alley Cullen DO on 12/17/2022 at 3:23 PM [facility]

## 2022-12-17 NOTE — PROGRESS NOTES
This patient is on medication that requires renal, weight, and/or indication dose adjustment. Date Body Weight IBW  Adjusted BW SCr  CrCl Dialysis status   12/17/2022 150 lb (68 kg) Ideal body weight: 50.1 kg (110 lb 7.2 oz)  Adjusted ideal body weight: 57.3 kg (126 lb 4.3 oz) Serum creatinine: 2.5 mg/dL (H) 12/17/22 0553  Estimated creatinine clearance: 17 mL/min (A) N/a       Pharmacy has dose-adjusted the following medication(s):    Date Previous Order Adjusted Order   12/17/2022 Zosyn 3375 mg Q8h Zosyn 4500 mg Q12h       These changes were made per protocol according to the [facility] Clinical Guidance for Pharmacists. *Please note this dose may need readjusted if patient's condition changes. Please contact pharmacy with any questions regarding these changes. Vira Mcdowell, PharmD, [facility], BCPS 12/17/2022 8:37 AM

## 2022-12-17 NOTE — PROCEDURES
Central Line Insertion     Procedure: right internal jugular vein Triple Lumen Catheter placement. Indications: vascular access    Consent: The patient was counseled regarding the procedure, its indications, risks, potential complications and alternatives, and any questions were answered. Consent was obtained to proceed. Number of sticks: 1    Number of Kits used: 1    Procedure: Time Out: Immediately prior to the procedure a \"timeout\" was called to verify the correct patient and procedure. The patient was place in the trendelenburg position and the skin over the right internal jugular vein was prepped with betadine and draped in a sterile fashion and draped in a sterile fashion. Local anesthesia was obtained by infiltration using 2% Lidocaine without epinephrine. With Ultrasound guidance a large bore needle was used to identify the vein, dark non pulsatile blood returned. The guide wire was then inserted through the needle with minimal resistance. 2 mm nick was made in the skin beside the guidewire. Then a dilator was inserted and removed. A triple lumen catheter was then inserted into the vessel over the guide wire using the Seldinger technique to the 15 cm jose. All ports showed good, free flowing blood return and were flushed with saline solution. The catheter was then securely fastened to the skin with sutures and with an adhesive dressing and covered with a bio patch and sterile dressing. A post procedure X-ray was ordered and is still pending at this time. Complications: None   The patient tolerated the procedure well. Estimated blood loss: 3 ml.     KAR Tavarez CNP   12/17/2022  2:55 PM

## 2022-12-17 NOTE — PROGRESS NOTES
Comprehensive Nutrition Assessment    Type and Reason for Visit:  Initial, Consult (Poor intake/TPN)    Nutrition Recommendations/Plan:   Continue NPO. Recommend to modify TPN to better meet estimated needs. TPN recs provided. Will continue to monitor. Recommend:     TPN standard 3 in 1 (1400ml TV @ 58.3ml/hr)   Will provide: 70g AA,  210g dex, 45.5g lipid. 1450 total kcals. Note:regimen will meet 100% of estimated calorie and protein needs; monitor electrolytes and replace PRN. Malnutrition Assessment:  Malnutrition Status: At risk for malnutrition (Comment) (12/17/22 1041)    Context:  Acute Illness     Findings of the 6 clinical characteristics of malnutrition:  Energy Intake:  75% or less of estimated energy requirements for 7 or more days  Weight Loss:  Unable to assess (2/2 lack of measured wt hx on file to assess)     Body Fat Loss:  Unable to assess     Muscle Mass Loss:  Unable to assess    Fluid Accumulation:  Unable to assess     Strength:  Not Performed    Nutrition Assessment:    Pt. admit w/ Acute hypoxic respiratory failure with b/l pleural effusions-s/p thoracentesis (12/17). Noted possible sepsis and PAWAN. Noted new onset AF with RVR and s/p RRT (12/17/22) for respiratory distress- transfered to MICU. Hx of TIA, Breast CA, hiatal hernia-s/p recent repair on (12/9/22). Noted poor intake pta and persistent emesis. Pt. is NPO w/ TPN to be initiated 2/2 EN not medically feasible d/t recent hiatal hernia repair. Will provide TPN recs and monitor.     Nutrition Related Findings:    -I/O(2.8L), A&Ox4, elevated BUN/Cr, +BS, +nausea, RUCHI edema, CT x1 Wound Type: None       Current Nutrition Intake & Therapies:    Average Meal Intake: NPO  Average Supplements Intake: NPO  PN-Adult  3-in-1 Central Line (Standard)  Diet NPO Exceptions are: Ice Chips  Current Parenteral Nutrition Orders:  Type and Formula: 3-in-1 Standard   Lipids: None  Duration: Continuous  Rate/Volume: @41.3 ml/hr  Current PN Order Provides: ordered not started  Goal PN Orders Provides: 1000ml TV, 1035 kcal, 50g AA, 150g dex, 32.5g lipid    Anthropometric Measures:  Height: 5' 2\" (157.5 cm)  Ideal Body Weight (IBW): 110 lbs (50 kg)    Admission Body Weight: 150 lb (68 kg) (12/15 no method)  Current Body Weight: 150 lb (68 kg) (12/15 no method), 136.4 % IBW. Current BMI (kg/m2): 27.4  Usual Body Weight:  (RUCHI d/t lack of measured wt hx on file to assess)     Weight Adjustment For: No Adjustment                 BMI Categories: Overweight (BMI 25.0-29. 9)    Estimated Daily Nutrient Needs:  Energy Requirements Based On: Formula  Weight Used for Energy Requirements: Current  Energy (kcal/day): 1400-1500kcal (MSJ x1. 3SF)  Weight Used for Protein Requirements: Ideal  Protein (g/day): 65-75g (1.3-1.5g/kgIBW)  Method Used for Fluid Requirements: Other (Comment)  Fluid (ml/day): per critical care    Nutrition Diagnosis:   Inadequate oral intake related to impaired respiratory function as evidenced by poor intake prior to admission, NPO or clear liquid status due to medical condition, nutrition support - enteral nutrition    Nutrition Interventions:   Food and/or Nutrient Delivery: Continue NPO, Modify Parenteral Nutrition (Recommend: TPN standard 3 in 1 (1400ml TV @ 58.3ml/hr) will provide: 70g AA,  210g dex, 45.5g lipid. 1450 total kcals.)  Nutrition Education/Counseling: No recommendation at this time  Coordination of Nutrition Care: Continue to monitor while inpatient       Goals:     Goals: Initiate nutrition support, by next RD assessment       Nutrition Monitoring and Evaluation:   Behavioral-Environmental Outcomes: None Identified  Food/Nutrient Intake Outcomes: Parenteral Nutrition Intake/Tolerance  Physical Signs/Symptoms Outcomes: Biochemical Data, Nausea or Vomiting, GI Status, Fluid Status or Edema, Hemodynamic Status, Nutrition Focused Physical Findings, Skin, Weight    Discharge Planning:     Too soon to determine     Princess Cruz RD  Contact: ext [phone]

## 2022-12-17 NOTE — PROGRESS NOTES
Pharmacy Consultation Note  (Antibiotic Dosing and Monitoring)    Initial consult date: 12/17/2022  Consulting physician/provider: Dr. Buster Farfan   Drug: Vancomycin  Indication: Sepsis of unknown etiology     Age/  Gender Height Weight IBW  Allergy Information   77 y.o./female 5' 2\" (157.5 cm) 147 lb (66.7 kg)     Ideal body weight: 50.1 kg (110 lb 7.2 oz)  Adjusted ideal body weight: 57.3 kg (126 lb 4.3 oz)   Benadryl [diphenhydramine] and Ibuprofen      Renal Function:  Recent Labs     12/16/22  0606 12/16/22  1636 12/17/22  0553   BUN 62* 59* 48*   CREATININE 3.2* 2.9* 2.5*       Intake/Output Summary (Last 24 hours) at 12/17/2022 0827  Last data filed at 12/17/2022 0532  Gross per 24 hour   Intake --   Output 1550 ml   Net -1550 ml       Vancomycin Monitoring:  Trough:  No results for input(s): VANCOTROUGH in the last 72 hours. Random:  No results for input(s): VANCORANDOM in the last 72 hours. No results for input(s): Fide Ivanoff in the last 72 hours. Historical Cultures:  No results found for: ORG  No results for input(s): BC in the last 72 hours. Vancomycin Administration Times:    Assessment:  Patient is a 65 y.o. female who has been initiated on vancomycin and Zosyn for sepsis of unknown etiology. Estimated Creatinine Clearance: 17 mL/min (A) (based on SCr of 2.5 mg/dL (H)). To dose vancomycin, pharmacy will be utilizing dosing based off of levels because of patient's renal impairment/insufficiency. 12/17: Scr: 2.5 mg/dL (baseline: 0.9-1 mg/dL). Leukocytosis = 15.4. Respiratory panel, respiratory culture, and 2/2 blood cultures sent to lab (not processed yet). Plan: Will give a loading dose of vancomycin 1,750 mg IV X 1. Will order random vancomycin level with 12/18 AM labs. Will continue to monitor renal function. Pharmacy to follow.      Thank you for the consult,     Cosme Rodríguez, PharmD, [address]  PGY1 Pharmacy Resident     12/17/2022 8:29 AM

## 2022-12-17 NOTE — PROGRESS NOTES
Omi Lawson M.D.,[facility]  Checo Nevarez D.O., F.A.C.O.I., Aparna Andersen M.D. Jaja Cobb M.D. Queta Glynn D.O. Daily Pulmonary Progress Note    Patient:  Mendel Lees 65 y.o. female MRN: [id number]     Date of Service: 12/17/2022      Synopsis     We are following patient for pneumonia    \"CC\" shortness of breath    Code status: Full      Subjective      Patient was seen and examined in the intensive care unit after being an RRT this morning for hypoxia. The patient had a right thoracentesis performed in interventional radiology yesterday where 105 mL of serous fluid was reported to be removed. A pigtail catheter was inserted at that time. Patient returned to the [place] [address] floor in stable condition. Early this morning she was having respiratory distress where the staff called an RRT and the patient was then transferred to the ICU. She is alert sitting up in bed holding the Pep flutter valve. She appears to be too weak to perform this maneuver. Bedside nurses present attempting an IV site. Patient is currently 96% on 15 L high flow nasal cannula. She has the right sided pigtail chest tube currently to [facility].  There is serosanguineous fluid in the collection device however it is unknown exactly how much output there is, it seems as though the collection device was tipped over being that there is fluid in all of the chambers. The nurse reports TPN is to start tonight. She was extremely tachycardic in the 130s earlier today, IV Lopressor was given. Review of Systems:  Constitutional: Denies fever, weight loss, night sweats. Positive for weakness and fatigue  Skin: Denies pigmentation, dark lesions, and rashes   HEENT: Denies hearing loss, tinnitus, ear drainage, epistaxis, sore throat, and hoarseness. Cardiovascular: Denies palpitations, chest pain, and chest pressure.   Respiratory: Dyspnea at rest  Gastrointestinal: Denies nausea, vomiting, poor appetite, diarrhea, heartburn or reflux  Genitourinary: Denies dysuria, frequency, urgency or hematuria  Musculoskeletal: Denies myalgias, muscle weakness, and bone pain  Neurological: Denies dizziness, vertigo, headache, and focal weakness  Psychological: Denies anxiety and depression  Endocrine: Denies heat intolerance and cold intolerance  Hematopoietic/Lymphatic: Denies bleeding problems and blood transfusions    24-hour events:  Right-sided thoracentesis in IR yesterday with insertion of pigtail chest tube  RRT this morning for respiratory distress-transfer to ICU    Objective   Vitals: BP 99/72   Pulse (!) 123   Temp 99.5 °F (37.5 °C) (Axillary)   Resp 28   Ht 5' 2\" (1.575 m)   Wt 150 lb (68 kg)   SpO2 93%   BMI 27.44 kg/m²     I/O:    Intake/Output Summary (Last 24 hours) at 12/17/2022 0957  Last data filed at 12/17/2022 0532  Gross per 24 hour   Intake --   Output 1500 ml   Net -1500 ml                        CURRENT MEDS :  Scheduled Meds:   lidocaine PF  5 mL IntraDERmal Once    sodium chloride flush  5-40 mL IntraVENous 2 times per day    heparin flush  3 mL IntraVENous 2 times per day    metoclopramide  10 mg IntraVENous Q6H    piperacillin-tazobactam  4,500 mg IntraVENous Q12H    ipratropium-albuterol  1 ampule Inhalation Q6H    sodium chloride (Inhalant)  4 mL Nebulization Q6H    pantoprazole (PROTONIX) 40 mg injection  40 mg IntraVENous Daily    vancomycin  1,750 mg IntraVENous Once    vancomycin (VANCOCIN) intermittent dosing (placeholder)   Other RX Placeholder    potassium chloride  40 mEq Oral Once    rosuvastatin  10 mg Oral Nightly    lidocaine  10 mL Other Once    sodium chloride flush  5-40 mL IntraVENous 2 times per day    levothyroxine  50 mcg Oral Daily    enoxaparin  30 mg SubCUTAneous Daily       Physical Exam:  General Appearance: The patient is awake and alert. Currently on 15 L high flow nasal cannula. She appears very weak, frail and ill looking.   HEENT: Normocephalic atraumatic without obvious abnormality   Neck: Lips, mucosa, and tongue normal.  Supple, symmetrical, trachea midline, no adenopathy;thyroid:  no enlargement/tenderness/nodules or JVD. Lung: Coarse rhonchi bilaterally, increased work of breathing. Heart: RRR, normal S1, S2. No MRG  Abdomen: Soft, NT, ND. BS present x 4 quadrants. No bruit or organomegaly. Extremities: Pedal pulses 2+ symmetric b/l. Extremities normal, no cyanosis, clubbing, or edema. Musculokeletal: No joint swelling, no muscle tenderness. ROM normal in all joints of extremities. Neurologic: Mental status: Alert and Oriented X3 . Pertinent/ New Labs and Imaging Studies     Imaging Personally Reviewed:  Chest x-ray 12/17/2022:    FINDINGS:   There is a large size Bochdalek type of diaphragmatic hernia across the   midline which causes compression atelectasis in the medial inferior aspect of   both lungs. Right-sided pigtail chest catheter has tip in the right base. If pleural effusion is present in the right base will be discrete or minimal.   No right-sided pneumothorax seen. There are increased density in mid lower aspect of the left lung in addition   to the diaphragmatic hernia which relates most likely with a moderate   left-sided pleural effusion which is has increase since recent examinations. There is no left-sided pneumothorax. Patient had previous fusion of the thoracolumbar spine with levels of   vertebroplasty in the lower thoracic spine. Impression   1. No changes position of the right-sided chest tube. No right-sided   pneumothorax. The most of the right-sided pleural effusion has been drained,   if present to be discrete or small. 2.  Moderate left-sided pleural effusion. If quantification of left-sided   pleural effusion will be needed for clinical management bedside ultrasound or   left lateral decubitus views of the chest can be helpful.        3.  No pneumothorax on the right or on the left.       4.  Large size diaphragmatic across the midline.         CTA 12/14:     No evidence of pulmonary embolism.       Bilateral lower lobe pulmonary consolidations and pleural effusions seen.   Deviation of the mediastinum to the left is visualized.       Mild soft tissue prominence visualized surrounding the esophagus with   narrowing at the gastroesophageal junction but appears to be due to external   compression.  Cannot rule left soft tissue mass at this location.       Wall thickening visualized in the gastric outlet with mild prominence of the   stomach seen, this could be artifactual.       Distended gallbladder is seen.       Degenerative bone changes, postoperative changes and multilevel vertebral   augmentation seen.       No evidence of acute abdominal/pelvic pathology.            ECHO 2/4/2021:  Summary   Normal left ventricular chamber size.   Normal left ventricular systolic function.   Visually estimated LVEF is 60-65 %.   No wall motion abnormalities.   Normal right ventricle structure and function.   Mitral annular calcification is present.   Aortic valve sclerosis.      Labs:  Lab Results   Component Value Date/Time    WBC 15.4 12/17/2022 07:41 AM    HGB 9.4 12/17/2022 07:41 AM    HCT 28.0 12/17/2022 07:41 AM    MCV 88.3 12/17/2022 07:41 AM    MCH 29.7 12/17/2022 07:41 AM    MCHC 33.6 12/17/2022 07:41 AM    RDW 14.0 12/17/2022 07:41 AM     12/17/2022 07:41 AM    MPV 9.7 12/17/2022 07:41 AM     Lab Results   Component Value Date/Time     12/17/2022 05:53 AM    K 3.5 12/17/2022 05:53 AM     12/17/2022 05:53 AM    CO2 27 12/17/2022 05:53 AM    BUN 48 12/17/2022 05:53 AM    CREATININE 2.5 12/17/2022 05:53 AM    LABALBU 2.7 12/14/2022 10:50 AM    LABALBU 4.3 01/24/2011 01:15 PM    CALCIUM 8.4 12/17/2022 05:53 AM    GFRAA >60 07/22/2022 01:42 PM    LABGLOM 19 12/17/2022 05:53 AM     Lab Results   Component Value Date/Time    PROTIME 12.9 12/16/2022 11:01 AM    INR 1.2 12/16/2022  11:01 AM     Recent Labs     12/17/22  0741   PROBNP 1,373*     Recent Labs     12/17/22  0741   PROCAL 1.57*     This SmartLink has not been configured with any valid records. Micro:  No results for input(s): CULTRESP in the last 72 hours. Recent Labs     12/16/22  1430   LABGRAM Refer to ordered Gram stain for results     No results for input(s): LEGUR in the last 72 hours. No results for input(s): STREPNEUMAGU in the last 72 hours. No results for input(s): LP1UAG in the last 72 hours. Assessment:    Acute hypoxic respiratory failure with bilateral pleural effusions, consolidation/atelectasis s/p hiatal hernia repair 12/9  Basilar consolidations. Suspect pneumonia, possible aspiration pneumonia. need to rule out parapneumonic process. Bilateral pleural effusions right greater than left-s/p right thoracentesis  with pigtail chest tube insertion 12/17/22  S/p RRT 12/17/22 for respiratory distress and increasing oxygen demands  Massive hiatal hernia s/p repair 12/9 at [facility]  PAWAN  Hx of breast s/p mastectomy  Hx lumbar compression fracture with history of PLIF T12-L2  Anxiety      Plan:   Continue supplemental oxygen. Wean to maintain SPO2 greater than 92%  Follow labs of pleural fluid-appears to be exudative, cytology pending  Esophagram negative for perforation  Add scheduled bronchodilators Brovana and Pulmicort in addition to the DuoNebs  Add mucomyst with Metanebs  TPN to be initiated this evening-management per surgery  Chest tube to waterseal-the amount being drained seems to not reflect what imaging portrays-further intervention may be required  IV fluids-LR at 125-management per nephrology  Rest per ICU team      This plan of care was reviewed in collaboration with Dr. Herbert Campos  Electronically signed by KAR Jacques - CNP on 12/17/2022 at 9:57 AM    I personally saw, examined, and cared for the patient. Labs, medications, radiographs reviewed.  I agree with history exam and plans detailed in NP note with the following additions:    Overnight worsening hypoxia requiring transfer to the ICU  She has rhonchi on exam  Unable to expectorate  L effusion and hiatal hernia seen on chest XR  Agree with repeat chest CT  R pigtail is in place  Aggressive BPH - metaneb, mucomyst, albuterol  Discussed with respiratory  Broad antibiotics    Mickey Balbuena MD

## 2022-12-17 NOTE — CONSULTS
Critical Care Consult Note    Patient - Jazmin Montemayor   MRN -  [id number]   Acct # - [de-identified]   - 1945      Date of Admission -  2022  9:35 AM  Date of evaluation -  2022  4404/4404-A   Hospital Day - 3          ADMIT/CONSULT DETAILS     Reason for Admit/Consult   Hypoxia- RRT       Consulting [facility]  Primary Care Physician - DO IVELISSE Del Toro   The patient is a 65 y.o. female with significant past medical history of hypothyroidism, breast cancer, TIA, hiatal hernia underwent hiatal hernia repair on 22 and developed acute respiratory distress and was transfer to [facility] on 22 for further management. Patient was complain of SOB and rapid heart rate. She had multiple days of malaise and cough. Found to be in PAWAN , nephrology was following. CT Chest / A/P was negative for PE. Bilateral lower lobe pulmonary consolidation pleural effusion seen, mild soft tissue prominence visualized during the esophagus with narrowing at the esophageal junction but appears to be due to external compression. Wall thickening visualized in the gastric outlet with mild prominence of the stomach seen. Distended gallbladder is seen. Patient was followed by pulmonology and general surgery. Patient went to IR for pigtail chest tube placement on 2022 with removal of 350 cc fluid with chest tube placement, now on waterseal.  Patient was having coffee-ground emesis which were positive for occult. Patient was in afib rvr and hypoxic, therefore RRT was called. Patient was admit to MICU and critical care was consulted. Patient was seen and examined in MICU 4, awake, alert and oriented x3, following commands. Patient was on high flow nasal cannula at 15 L. Chest x-ray showed worsening congestion, aggressive pulmonary toileting, and will obtain CT chest without contrast.  Patient may need a bronchoscopy.   Patient was pancultured and started on empirical antibiotics. Goals of care were discussed, patient is a full code. She agreed to be intubated. Past Medical History         Diagnosis Date    Anxiety with somatization 3/30/2021    Compression fracture of L1 lumbar vertebra [facility]) november 1994    Compression fracture of L4 lumbar vertebra     secondary to fall in December 2013    Fall 1989 & 12/2013    History of bilateral breast cancer 1980's    breast    Hx-TIA (transient ischemic attack) 3/30/2021    Osteoporosis     Pain syndrome, chronic 3/30/2021    Thyroid disease     went off of medication    Type II or unspecified type diabetes mellitus without mention of complication, not stated as uncontrolled     resolved with weight loss of 20 lbs        Past Surgical History           Procedure Laterality Date    BACK SURGERY  1989    PLIF T12 to L2 with pedicle screws and rods    BREAST SURGERY Bilateral     breast implants (after mastectomy)    FIXATION KYPHOPLASTY  03/10/2014    with bone biopsy    IR PERC CATH PLEURAL DRAIN W/IMAG  12/16/2022    IR PERC CATH PLEURAL DRAIN W/IMAG 12/16/2022 L Keyona Lua MD [facility]    MASTECTOMY Bilateral     TONSILLECTOMY AND ADENOIDECTOMY      TUBAL LIGATION           Influenza:   Indicated for current flu vaccination season Oct. to Feb.  Pneumococcal Polysaccharide:  Indicated for current flu vaccination season Oct. to Feb.    Current Medications   Current Medications    lidocaine PF  5 mL IntraDERmal Once    sodium chloride flush  5-40 mL IntraVENous 2 times per day    heparin flush  3 mL IntraVENous 2 times per day    metoclopramide  10 mg IntraVENous Q6H    piperacillin-tazobactam  4,500 mg IntraVENous Q12H    ipratropium-albuterol  1 ampule Inhalation Q6H    sodium chloride (Inhalant)  4 mL Nebulization Q6H    pantoprazole (PROTONIX) 40 mg injection  40 mg IntraVENous Daily    vancomycin  1,750 mg IntraVENous Once    vancomycin (VANCOCIN) intermittent dosing (placeholder)   Other RX Placeholder    potassium chloride  40 mEq Oral Once    rosuvastatin  10 mg Oral Nightly    lidocaine  10 mL Other Once    sodium chloride flush  5-40 mL IntraVENous 2 times per day    levothyroxine  50 mcg Oral Daily    enoxaparin  30 mg SubCUTAneous Daily     sodium chloride flush, sodium chloride, heparin flush, oxyCODONE **OR** oxyCODONE, sodium chloride flush, sodium chloride, ondansetron **OR** ondansetron, magnesium hydroxide, acetaminophen **OR** acetaminophen, ipratropium-albuterol  IV Drips/Infusions   sodium chloride      PN-Adult  3 IN 1 Central Line (Standard)      lactated ringers Stopped (12/16/22 1300)    sodium chloride       Home Medications  Medications Prior to Admission: Cholecalciferol (VITAMIN D3) 125 MCG (5000 UT) TABS, Take by mouth  levothyroxine (SYNTHROID) 50 MCG tablet, Take 50 mcg by mouth daily  rosuvastatin (CRESTOR) 20 MG tablet, Take 1 tablet by mouth nightly  aspirin 325 MG EC tablet, Take 1 tablet by mouth daily  Handicap Placard MISC, by Does not apply route Duration: Lifetime    Diet/Nutrition   PN-Adult  3-in-1 Central Line (Standard)  Diet NPO Exceptions are: Ice Chips    Allergies   Benadryl [diphenhydramine] and Ibuprofen    Social History   Tobacco   reports that she has never smoked. She has never used smokeless tobacco.    Alcohol     reports that she does not currently use alcohol. SOCIAL AND OCCUPATIONAL HEALTH: Patient denies of alcohol, tobacco, recreational drug abuse.   Occupational history :    Family History         Problem Relation Age of Onset    Heart Disease Mother     Diabetes Mother     Other Mother         aneurysm    Heart Disease Father     Cancer Father         thyroid        Sleep History   n/a    ROS   REVIEW OF SYSTEMS:  CONSTITUTIONAL:  negative  EYES:  negative  HEENT:  negative  RESPIRATORY:  positive for  cough with sputum, dyspnea, and pleuritic pain  negative for  wheezing, hemoptysis, and cyanosis  CARDIOVASCULAR:  positive for  dyspnea, palpitations  negative for  exertional chest pressure/discomfort, early saiety, edema, syncope  GASTROINTESTINAL:  positive for nausea, vomiting, and abdominal pain  negative for change in bowel habits, abdominal mass, abdominal distention, jaundice, dysphagia, reflux, regurgitation, odynophagia, hematemesis, and hemtochezia  MUSCULOSKELETAL:  negative  NEUROLOGICAL:  negative  BEHAVIOR/PSYCH:  negative    Lines and Devices   Peripheral IVs    Mechanical Ventilation Data   VENT SETTINGS (Comprehensive)     Additional Respiratory Assessments  Heart Rate: (!) 123  Resp: 28  SpO2: 93 %    ABG  Lab Results   Component Value Date/Time    PH 7.354 2022 10:51 AM    PCO2 34.8 2022 10:51 AM    PO2 68.1 2022 10:51 AM    HCO3 19.0 2022 10:51 AM    O2SAT 92.7 2022 10:51 AM     Lab Results   Component Value Date/Time    MODE NC-  6  L 2022 10:51 AM           Vitals    height is 5' 2\" (1.575 m) and weight is 150 lb (68 kg). Her axillary temperature is 99.5 °F (37.5 °C). Her blood pressure is 99/72 and her pulse is 123 (abnormal). Her respiration is 28 and oxygen saturation is 93%.        Temperature Range: Temp: 99.5 °F (37.5 °C) Temp  Av °F (36.7 °C)  Min: 96.6 °F (35.9 °C)  Max: 99.5 °F (37.5 °C)  BP Range:  Systolic (84PQW), FNA:986 , Min:99 , WVV:872     Diastolic (40WRH), HYU:55, Min:65, Max:85    Pulse Range: Pulse  Av.8  Min: 104  Max: 131  Respiration Range: Resp  Av.8  Min: 20  Max: 35  Current Pulse Ox[de-identified]  SpO2: 93 %  24HR Pulse Ox Range:  SpO2  Av.1 %  Min: 88 %  Max: 97 %  Oxygen Amount and Delivery: O2 Flow Rate (L/min): 15 L/min      I/O (24 Hours)    Patient Vitals for the past 8 hrs:   BP Temp Temp src Pulse Resp SpO2   22 0700 99/72 99.5 °F (37.5 °C) Axillary (!) 123 28 93 %   22 -- -- -- (!) 118 -- --   22 (!) 152/78 -- -- -- -- --   22 0608 (!) 167/85 -- -- (!) 128 24 (!) 88 %   22 0200 -- -- -- -- -- 92 % Intake/Output Summary (Last 24 hours) at 12/17/2022 0848  Last data filed at 12/17/2022 0532  Gross per 24 hour   Intake --   Output 1500 ml   Net -1500 ml     I/O last 3 completed shifts:  In: -   Out: 2250 [Urine:1850; Emesis/NG output:50; Chest Tube:350]   Date 12/17/22 0000 - 12/17/22 2359   Shift 8692-9059 6841-5095 0677-8451 24 Hour Total   INTAKE   Shift Total(mL/kg)       OUTPUT   Urine(mL/kg/hr) 800(1.5)   800   Chest Tube 350   350   Shift Total(mL/kg) 1150(16.9)   1150(16.9)   Weight (kg) 68 68 68 68     Patient Vitals for the past 96 hrs (Last 3 readings):   Weight   12/15/22 0115 150 lb (68 kg)   12/14/22 2042 147 lb (66.7 kg)         Drains/Tubes Outputs      Exam   PHYSICAL EXAM:  CONSTITUTIONAL:  awake, alert, cooperative, no apparent distress, and appears stated age  EYES:  Lids and lashes normal, pupils equal, round and reactive to light, extra ocular muscles intact, sclera clear, conjunctiva normal  ENT:  Normocephalic, without obvious abnormality, atraumatic, sinuses nontender on palpation, external ears without lesions, oral pharynx with moist mucus membranes, tonsils without erythema or exudates, gums normal and good dentition. NECK:  Supple, symmetrical, trachea midline, no adenopathy, thyroid symmetric, not enlarged and no tenderness, skin normal  LUNGS: Mildly labored, rhonchi throughout lung fields, poor air exchange, no wheezing noted, on high flow nasal cannula. Productive cough with thick mucus. CARDIOVASCULAR:  AFIB  and normal S1 and S2  ABDOMEN:  No scars, normal bowel sounds, soft, non-distended, non-tender, no masses palpated, no hepatosplenomegally  MUSCULOSKELETAL:  There is no redness, warmth, or swelling of the joints. Full range of motion noted. Motor strength is 5 out of 5 all extremities bilaterally. Tone is normal.  NEUROLOGIC:  Awake, alert, oriented to name, place and time. Cranial nerves II-XII are grossly intact.     SKIN:  no bruising or bleeding, no rashes, no lesions, and no abnormal moles    Data   Old records and images have been reviewed    Lab Results   CBC     Lab Results   Component Value Date/Time    WBC 15.4 12/17/2022 07:41 AM    RBC 3.17 12/17/2022 07:41 AM    HGB 9.4 12/17/2022 07:41 AM    HCT 28.0 12/17/2022 07:41 AM     12/17/2022 07:41 AM    MCV 88.3 12/17/2022 07:41 AM    MCH 29.7 12/17/2022 07:41 AM    MCHC 33.6 12/17/2022 07:41 AM    RDW 14.0 12/17/2022 07:41 AM    LYMPHOPCT 2.0 12/17/2022 07:41 AM    MONOPCT 5.5 12/17/2022 07:41 AM    BASOPCT 0.1 12/17/2022 07:41 AM    MONOSABS 0.85 12/17/2022 07:41 AM    LYMPHSABS 0.31 12/17/2022 07:41 AM    EOSABS 0.00 12/17/2022 07:41 AM    BASOSABS 0.02 12/17/2022 07:41 AM       BMP   Lab Results   Component Value Date/Time     12/17/2022 05:53 AM    K 3.5 12/17/2022 05:53 AM     12/17/2022 05:53 AM    CO2 27 12/17/2022 05:53 AM    BUN 48 12/17/2022 05:53 AM    CREATININE 2.5 12/17/2022 05:53 AM    GLUCOSE 232 12/17/2022 05:53 AM    CALCIUM 8.4 12/17/2022 05:53 AM       LFTS  Lab Results   Component Value Date/Time    ALKPHOS 58 12/14/2022 10:50 AM    ALT 13 12/14/2022 10:50 AM    AST 34 12/14/2022 10:50 AM    PROT 5.9 12/14/2022 10:50 AM    BILITOT 0.5 12/14/2022 10:50 AM    BILIDIR 0.1 01/24/2011 01:15 PM    LABALBU 2.7 12/14/2022 10:50 AM    LABALBU 4.3 01/24/2011 01:15 PM       INR  Recent Labs     12/16/22  1101   PROTIME 12.9*   INR 1.2       APTT  No results for input(s): APTT in the last 72 hours. Lactic Acid  Lab Results   Component Value Date/Time    LACTA 1.8 12/17/2022 07:41 AM    LACTA 1.1 12/15/2022 11:05 AM    LACTA 1.1 12/14/2022 10:50 AM        BNP   No results for input(s): BNP in the last 72 hours. Cultures   No results for input(s): BC in the last 72 hours. No results for input(s): Gino Grates in the last 72 hours. No results for input(s): LABURIN in the last 72 hours. Imaging Study      XR CHEST PORTABLE   Final Result   1.   No changes position of the right-sided chest tube. No right-sided   pneumothorax. The most of the right-sided pleural effusion has been drained,   if present to be discrete or small. 2.  Moderate left-sided pleural effusion. If quantification of left-sided   pleural effusion will be needed for clinical management bedside ultrasound or   left lateral decubitus views of the chest can be helpful. 3.  No pneumothorax on the right or on the left. 4.  Large size diaphragmatic across the midline. XR CHEST PORTABLE   Final Result   1. Increased parenchymal density projecting over the left lower lung   concerning for atelectasis and/or consolidation      2. No signs of right effusion      3. Parenchymal density projecting over the right lower lung concerning for   atelectasis         IR GUIDED PERC PLEURAL DRAIN W CATH INSERT   Final Result   1. Successful ultrasound-guided right thoracentesis. 2.  Successful placement of an 8 Yi pleural drain         XR CHEST PORTABLE   Final Result   1. Right-sided pigtail catheter in the right base. 2.  No conspicuous right-sided pleural effusions identified. No right-sided   pneumothorax. 3.  Cannot exclude a small left-sided pleural effusion. 4.  Large size diaphragmatic hernia across the midline causing compression   atelectasis in the medial aspect of both lungs. FL ESOPHAGRAM   Final Result   1. No evidence of esophageal perforation or leak. 2. Decreased peristalsis. No evidence of esophageal mass or obstructing   lesion. 3. Small sliding hiatus hernia. No evidence of GE reflux. The gastric   foreign disc does appear constricted as it passes through the esophageal   hiatus. US CHEST INCLUDING MEDIASTINUM   Final Result   Probable complex loculated right pleural effusion. Simple left pleural effusion. CTA PULMONARY W CONTRAST   Final Result   No evidence of pulmonary embolism.       Bilateral lower lobe pulmonary consolidations and pleural effusions seen. Deviation of the mediastinum to the left is visualized. Mild soft tissue prominence visualized surrounding the esophagus with   narrowing at the gastroesophageal junction but appears to be due to external   compression. Cannot rule left soft tissue mass at this location. Wall thickening visualized in the gastric outlet with mild prominence of the   stomach seen, this could be artifactual.      Distended gallbladder is seen. Degenerative bone changes, postoperative changes and multilevel vertebral   augmentation seen. No evidence of acute abdominal/pelvic pathology. CT ABDOMEN PELVIS W IV CONTRAST Additional Contrast? Oral   Final Result   No evidence of pulmonary embolism. Bilateral lower lobe pulmonary consolidations and pleural effusions seen. Deviation of the mediastinum to the left is visualized. Mild soft tissue prominence visualized surrounding the esophagus with   narrowing at the gastroesophageal junction but appears to be due to external   compression. Cannot rule left soft tissue mass at this location. Wall thickening visualized in the gastric outlet with mild prominence of the   stomach seen, this could be artifactual.      Distended gallbladder is seen. Degenerative bone changes, postoperative changes and multilevel vertebral   augmentation seen. No evidence of acute abdominal/pelvic pathology. XR CHEST PORTABLE   Final Result   1. Consolidation seen within the left lung base with blunting the left   costophrenic angle which could represent pneumonia or atelectasis   2. A trace left pleural effusion cannot be excluded   3. Large right diaphragmatic hernia/hiatal hernia which was noted on the   patient's previous CT of the chest of 07/22/2022.                APRN- CNP Assessment and PLan   In summary 65 y.o. female with significant past medical history of hypothyroidism, breast cancer, TIA, hiatal hernia underwent hiatal hernia repair on 12/9/22 and developed acute respiratory distress and was transfer to [facility] [place] for further management. Patient was seen by nephrology, pulmonology, general surgery. Patient noted to have worsening hypoxia requiring 15 L of oxygen, A. fib RVR therefore transferred to MICU and critical care was consulted.     Assessment:  Acute hypoxemic respiratory failure secondary to aspiration pneumonia/pleural effusion/bibasilar consolidation  Bilateral pleural effusion right greater than left status post right thoracentesis with pigtail chest tube placement on (12/16/22 IR)  Sepsis 2/2 PNA  AFIB RVR  PAWAN secondary to dehydration  [place] ptosis leukocytosis with left shift  Acute anemia  History of massive hiatal hernia  status post repair on 12/9/2022  History of TIA  History of breast cancer  History of hypothyroidism  History of lumbar compression fracture with history of PLIF T12-L2  History of anxiety    Plan:  -Currently on high flow nasal cannula 15 L, will transition to OptiFlow if worsening, titrate FiO2 keep SPO2 goal 92%  -ABG obtained, if patient becomes hypoxic and worsen, may need to be intubated and placed on mechanical ventilator  -We will get CT chest without contrast, possible bronchoscopy  -Aggressive pulmonary toileting  -Bilateral pleural effusion right greater than left, status post right thoracentesis pigtail chest tube placement with minimal output.  -Echo dilators scheduled and as needed, Brovana, Pulmicort,Mucomyst  -A. fib and RVR, was given 1 dose of metoprolol 5 mg IV , placed metoprolol 5 mg every 6 hours parameters  -Check troponin, proBNP elevated, will obtain echocardiogram  -Echo in January 2021 showed EF of 60 to 65%,   -Will not anticoagulate secondary to recent surgery and coffee-ground emesis  -Procalcitonin 5.70==> 1.57 today; RVP/COVID-19 PCR negative, MRSA nares pending, blood culture pending, respiratory culture pending, urine antigen pending  -Empirically started on vancomycin and Zosyn. -General surgery consulted, input appreciated. -Esophagram negative for esophageal perforation, recommended antiemetic and mild type of oral pain control, no surgical intervention indicates  -Start TPN, triple-lumen catheter was placed to right IJ.  -PAWAN, nephrology following, input appreciated  -Hydration  -Strict intake and output  -Labs and chest x-ray in a.m.  -F/E/N [email or web address] lytes/ Npo except Ice chip  - DVT/GI scds/lovenox/protonix  - Code status: full code.        Kelly Cline, APRN-CNP   Critical Care     Discussed case with Attending Physician: Dr. Kat Hernandez

## 2022-12-18 NOTE — PROGRESS NOTES
Ying Alva M.D.,[facility]  Tyrone Camacho D.O., F.A.C.O.I., Georgie Kwon M.D. Gabriella Peter M.D. Mihai Wills D.O. Daily Pulmonary Progress Note    Patient:  Margaret Coley 65 y.o. female MRN: [id number]     Date of Service: 12/18/2022      Synopsis     We are following patient for pneumonia    \"CC\" shortness of breath    Code status: Full      Subjective      Patient was seen and examined in the intensive care unit this morning. Respiratory therapy administering a MetaNeb treatment. Bedside nurse present in room. The RN reports that she thought the patient was going to have to be intubated yesterday. Chest x-ray shows almost complete opacification in the left lung. The patient was placed on heated high flow nasal cannula and respiratory treatments with Mucomyst and MetaNebs were started. Chest x-ray this morning does show some clearing of the left upper lung. The patient's lungs are not as tight today. Respiratory reports the patient coughing up a lot of mucus now. The RN states that she did send a sputum culture. The patient is currently on 45 L, 65% with SPO2 in the mid 90s. The RN reports 85 mL of fluid from the chest tube yesterday. Review of Systems:  Constitutional: Denies fever, weight loss, night sweats. Positive for weakness and fatigue  Skin: Denies pigmentation, dark lesions, and rashes   HEENT: Denies hearing loss, tinnitus, ear drainage, epistaxis, sore throat, and hoarseness. Cardiovascular: Denies palpitations, chest pain, and chest pressure.   Respiratory: Dyspnea at rest  Gastrointestinal: Denies nausea, vomiting, poor appetite, diarrhea, heartburn or reflux  Genitourinary: Denies dysuria, frequency, urgency or hematuria  Musculoskeletal: Denies myalgias, muscle weakness, and bone pain  Neurological: Denies dizziness, vertigo, headache, and focal weakness  Psychological: Denies anxiety and depression  Endocrine: Denies heat intolerance and cold intolerance  Hematopoietic/Lymphatic: Denies bleeding problems and blood transfusions    24-hour events:  Placed on heated high flow nasal cannula    Objective   Vitals: /60   Pulse 98   Temp 97.8 °F (36.6 °C) (Axillary)   Resp 30   Ht 5' 2\" (1.575 m)   Wt 150 lb (68 kg)   SpO2 97%   BMI 27.44 kg/m²     I/O:    Intake/Output Summary (Last 24 hours) at 12/18/2022 0913  Last data filed at 12/18/2022 0800  Gross per 24 hour   Intake 4609.24 ml   Output 486 ml   Net 4123.24 ml                          CURRENT MEDS :  Scheduled Meds:   pantoprazole (PROTONIX) 40 mg injection  40 mg IntraVENous Q12H    vancomycin  500 mg IntraVENous Once    potassium phosphate IVPB  20 mmol IntraVENous Once    lidocaine PF  5 mL IntraDERmal Once    sodium chloride flush  5-40 mL IntraVENous 2 times per day    heparin flush  3 mL IntraVENous 2 times per day    metoclopramide  10 mg IntraVENous Q6H    piperacillin-tazobactam  4,500 mg IntraVENous Q12H    vancomycin (VANCOCIN) intermittent dosing (placeholder)   Other RX Placeholder    Arformoterol Tartrate  15 mcg Nebulization BID    budesonide  0.5 mg Nebulization BID    acetylcysteine  4 mL Inhalation BID    albuterol  2.5 mg Nebulization Q4H WA    metoprolol  5 mg IntraVENous Q6H    rosuvastatin  10 mg Oral Nightly    lidocaine  10 mL Other Once    sodium chloride flush  5-40 mL IntraVENous 2 times per day    levothyroxine  50 mcg Oral Daily    enoxaparin  30 mg SubCUTAneous Daily       Physical Exam:  General Appearance: The patient is awake and alert. Currently on heated high flow nasal cannula, 45 L, 65%. She appears very weak, frail and ill looking. HEENT: Normocephalic atraumatic without obvious abnormality   Neck: Lips, mucosa, and tongue normal.  Supple, symmetrical, trachea midline, no adenopathy;thyroid:  no enlargement/tenderness/nodules or JVD. Lung: Coarse rhonchi bilaterally, increased work of breathing-slight improvement from yesterday.   Heart: RRR, normal S1, S2. No MRG  Abdomen: Soft, NT, ND. BS present x 4 quadrants. No bruit or organomegaly. Extremities: Pedal pulses 2+ symmetric b/l. Extremities normal, no cyanosis, clubbing, or edema. Musculokeletal: No joint swelling, no muscle tenderness. ROM normal in all joints of extremities. Neurologic: Mental status: Alert and Oriented X3 . Pertinent/ New Labs and Imaging Studies     Imaging Personally Reviewed:  Chest x-ray 12/17/2022-scan done at 7 AM:    FINDINGS:   There is a large size Bochdalek type of diaphragmatic hernia across the   midline which causes compression atelectasis in the medial inferior aspect of   both lungs. Right-sided pigtail chest catheter has tip in the right base. If pleural effusion is present in the right base will be discrete or minimal.   No right-sided pneumothorax seen. There are increased density in mid lower aspect of the left lung in addition   to the diaphragmatic hernia which relates most likely with a moderate   left-sided pleural effusion which is has increase since recent examinations. There is no left-sided pneumothorax. Patient had previous fusion of the thoracolumbar spine with levels of   vertebroplasty in the lower thoracic spine. Impression   1. No changes position of the right-sided chest tube. No right-sided   pneumothorax. The most of the right-sided pleural effusion has been drained,   if present to be discrete or small. 2.  Moderate left-sided pleural effusion. If quantification of left-sided   pleural effusion will be needed for clinical management bedside ultrasound or   left lateral decubitus views of the chest can be helpful. 3.  No pneumothorax on the right or on the left. 4.  Large size diaphragmatic across the midline. Chest x-ray 12/17/2022-scan done at 3 PM:      Impression   New right internal jugular central venous catheter terminating near the   SVC-right atrium junction.   No post-procedure pneumothorax. Worsening   opacification of the left hemithorax and stable findings on the right. Chest x-ray 12/18/2022:  Report pending      CTA 12/14:     No evidence of pulmonary embolism. Bilateral lower lobe pulmonary consolidations and pleural effusions seen. Deviation of the mediastinum to the left is visualized. Mild soft tissue prominence visualized surrounding the esophagus with   narrowing at the gastroesophageal junction but appears to be due to external   compression. Cannot rule left soft tissue mass at this location. Wall thickening visualized in the gastric outlet with mild prominence of the   stomach seen, this could be artifactual.       Distended gallbladder is seen. Degenerative bone changes, postoperative changes and multilevel vertebral   augmentation seen. No evidence of acute abdominal/pelvic pathology. Noncontrast chest CT 12/17/2022:  Impression   Large right pleural effusion of mixed density to suggest complex effusion. There is a pigtail catheter seen in the pleural space posteriorly and   inferiorly. Small to moderate size left pleural effusion with consolidative   infiltrate throughout the left upper and lower lung field and aerated   portions revealing nodular ground-glass opacity to suggest an infectious   process. Nodular ground-glass opacity inferiorly within the aerated right   upper and lower lung field concerning for an infectious process as well. There is less consolidative infiltrate identified within the right lower lobe. ECHO 2/4/2021:  Summary   Normal left ventricular chamber size. Normal left ventricular systolic function. Visually estimated LVEF is 60-65 %. No wall motion abnormalities. Normal right ventricle structure and function. Mitral annular calcification is present. Aortic valve sclerosis.       Labs:  Lab Results   Component Value Date/Time    WBC 12.1 12/18/2022 04:01 AM HGB 7.0 12/18/2022 04:01 AM    HCT 21.0 12/18/2022 04:01 AM    MCV 89.0 12/18/2022 04:01 AM    MCH 29.7 12/18/2022 04:01 AM    MCHC 33.3 12/18/2022 04:01 AM    RDW 14.4 12/18/2022 04:01 AM     12/18/2022 04:01 AM    MPV 9.9 12/18/2022 04:01 AM     Lab Results   Component Value Date/Time     12/18/2022 04:01 AM    K 3.9 12/18/2022 04:01 AM    K 3.5 12/17/2022 05:53 AM     12/18/2022 04:01 AM    CO2 27 12/18/2022 04:01 AM    BUN 39 12/18/2022 04:01 AM    CREATININE 2.3 12/18/2022 04:01 AM    LABALBU 1.9 12/18/2022 04:01 AM    LABALBU 4.3 01/24/2011 01:15 PM    CALCIUM 7.9 12/18/2022 04:01 AM    GFRAA >60 07/22/2022 01:42 PM    LABGLOM 21 12/18/2022 04:01 AM     Lab Results   Component Value Date/Time    PROTIME 12.9 12/16/2022 11:01 AM    INR 1.2 12/16/2022 11:01 AM     Recent Labs     12/17/22  0741   PROBNP 1,373*       Recent Labs     12/17/22  0741   PROCAL 1.57*       This SmartLink has not been configured with any valid records.       Micro:  No results for input(s): CULTRESP in the last 72 hours.  Recent Labs     12/16/22  1430   LABGRAM Refer to ordered Gram stain for results       No results for input(s): LEGUR in the last 72 hours.  No results for input(s): STREPNEUMAGU in the last 72 hours.  No results for input(s): LP1UAG in the last 72 hours.       Assessment:    Acute hypoxic respiratory failure with bilateral pleural effusions, consolidation/atelectasis s/p hiatal hernia repair 12/9  Basilar consolidations.  Suspect pneumonia, possible aspiration pneumonia. need to rule out parapneumonic process.  Bilateral pleural effusions right greater than left-s/p right thoracentesis  with pigtail chest tube insertion 12/17/22  S/p RRT 12/17/22 for respiratory distress and increasing oxygen demands  Respiratory culture growing moderate gram-positive diphtheroid like rods and growing moderate yeast  Massive hiatal hernia s/p repair 12/9 at [facility]  PAWAN  Hx of breast s/p mastectomy  Hx lumbar  compression fracture with history of PLIF T12-L2  Anxiety      Plan:   Continue heated high flow nasal cannula-titrate to keep SPO2 greater than 92%  Follow labs of pleural fluid-appears to be exudative, cytology pending  Esophagram negative for perforation  Continue scheduled bronchodilators Brovana and Pulmicort in addition to the DuoNebs  Continue mucomyst with Metanebs  TPN initiated yesterday-management per surgery  Chest tube to waterseal-the amount being drained seems to not reflect what imaging portrays-transfer to tertiary facility for further intervention may be required  Chest imaging reviewed  Continue antibiotics-Zosyn, vancomycin  Rest per ICU team      This plan of care was reviewed in collaboration with Dr. Destinee Vo  Electronically signed by KAR Woods - CNP on 12/18/2022 at 9:13 AM    I personally saw, examined, and cared for the patient. Labs, medications, radiographs reviewed. I agree with history exam and plans detailed in NP note with the following additions:    Remains critically ill on airvo 65% 45LPM  Appears weak and fatigued  Chest CT reviewed - persistent loculated R effusion with adjacent atelectasis, L effusion, L pneumonia and atelectasis  Discussed with ICU staff Dr. Angy Carpenter - will place larger bore R chest tube  I feel she needs seen by thoracic surgery which would require transfer  Continue broad antibiotics and aggressive BPH    Addendum:  I spoke to Dr. Melia Herrera personally. No intra op complications, however, her hernia was massive. She will need seen by thoracic surgery and transfer will be arranged. In the meantime, general surgery to place R chest tube.     Ragini García MD

## 2022-12-18 NOTE — PROGRESS NOTES
Pharmacy Consultation Note  (Antibiotic Dosing and Monitoring)    Initial consult date: 12/17/2022  Consulting physician/provider: Suzy Marks   Drug: Vancomycin  Indication: Sepsis of unknown etiology     Age/  Gender Height Weight IBW  Allergy Information   77 y.o./female 5' 2\" (157.5 cm) 147 lb (66.7 kg)     Ideal body weight: 50.1 kg (110 lb 7.2 oz)  Adjusted ideal body weight: 57.3 kg (126 lb 4.3 oz)   Benadryl [diphenhydramine] and Ibuprofen      Renal Function:  Recent Labs     12/17/22  0553 12/17/22  1629 12/18/22  0401   BUN 48* 44* 39*   CREATININE 2.5* 2.3* 2.3*         Intake/Output Summary (Last 24 hours) at 12/18/2022 0746  Last data filed at 12/18/2022 0622  Gross per 24 hour   Intake 4609.24 ml   Output 426 ml   Net 4183.24 ml         Vancomycin Monitoring:  Trough:  No results for input(s): VANCOTROUGH in the last 72 hours. Random:    Recent Labs     12/18/22  0401   VANCORANDOM 20.1       No results for input(s): Chalmer Stacks in the last 72 hours. Historical Cultures:  No results found for: ORG  No results for input(s): BC in the last 72 hours. Vancomycin Administration Times:    Assessment:  Patient is a 65 y.o. female who has been initiated on vancomycin and Zosyn for sepsis of unknown etiology. Estimated Creatinine Clearance: 18 mL/min (A) (based on SCr of 2.3 mg/dL (H)). To dose vancomycin, pharmacy will be utilizing dosing based off of levels because of patient's renal impairment/insufficiency. Plan:  Vancomycin 500 mg IV x1 at noon - random tomorrow AM   Will continue to monitor renal function. Pharmacy to follow.      Thank you for the consult,   Fabrice Frederick, HadleyD, BCPS, BCCCP 12/18/2022 7:46 AM

## 2022-12-18 NOTE — PROGRESS NOTES
Notified NP Leslie Monaco of hypotension. Fariba Lutz stated it was drug induced from metoprolol given around 0230. No new orders. Instructed to monitor patient and wait for lopressor effects to wear off.

## 2022-12-18 NOTE — PROGRESS NOTES
Hospitalist Progress Note      PCP: Marino Holstein, DO    Date of Admission: 12/14/2022        Hospital Course:  65 y.o. female presented with PNEUMONIA. STATES SHE HAD A HH REPAIR AT [facility] ON THE 8TH. SHE STATES SHE WAS NOT FEELING BAD, BUT WAS TOLD SHE NEEDED TO COME TO THE HOSPITAL BC SHE WAS SICK. SHE WAS FOUND TO BE SEPTIC, WITH A HAG, SHE WAS TACHY AND TACHYPNEIC . COFFEE GROUND EMESIS THIS MORNING, GASTROCULT  POS . SURGERY NOTIFIED ** HAD A THROACENTESIS ON YESTERDAY, 105 CC REMOVED.     ** RRT  DUE TO RESPIRATORY DISTRESS ** TO TRANSFER TO  OR [facility]** ON BIPAP        Subjective:  WANTS SOMETHING FOR PAIN           Medications:  Reviewed    Infusion Medications    PN-Adult  3 IN 1 Central Line (Custom)      sodium chloride      PN-Adult  3 IN 1 Central Line (Custom) 29.2 mL/hr at 12/18/22 0622    sodium chloride       Scheduled Medications    pantoprazole (PROTONIX) 40 mg injection  40 mg IntraVENous Q12H    potassium phosphate IVPB  20 mmol IntraVENous Once    albumin human-kjda  25 g IntraVENous Q8H    acetylcysteine  4 mL Inhalation Q4H WA    lidocaine PF  5 mL IntraDERmal Once    sodium chloride flush  5-40 mL IntraVENous 2 times per day    heparin flush  3 mL IntraVENous 2 times per day    metoclopramide  10 mg IntraVENous Q6H    piperacillin-tazobactam  4,500 mg IntraVENous Q12H    vancomycin (VANCOCIN) intermittent dosing (placeholder)   Other RX Placeholder    Arformoterol Tartrate  15 mcg Nebulization BID    budesonide  0.5 mg Nebulization BID    albuterol  2.5 mg Nebulization Q4H WA    metoprolol  5 mg IntraVENous Q6H    rosuvastatin  10 mg Oral Nightly    lidocaine  10 mL Other Once    sodium chloride flush  5-40 mL IntraVENous 2 times per day    levothyroxine  50 mcg Oral Daily    enoxaparin  30 mg SubCUTAneous Daily     PRN Meds: sodium chloride flush, sodium chloride, heparin flush, perflutren lipid microspheres, fentanNYL, oxyCODONE **OR** oxyCODONE, sodium chloride flush, sodium chloride, ondansetron **OR** ondansetron, magnesium hydroxide, acetaminophen **OR** acetaminophen, ipratropium-albuterol      Intake/Output Summary (Last 24 hours) at 12/18/2022 1518  Last data filed at 12/18/2022 0800  Gross per 24 hour   Intake 4609.24 ml   Output 486 ml   Net 4123.24 ml       Exam:    /88   Pulse (!) 108   Temp 98 °F (36.7 °C) (Axillary)   Resp 17   Ht 5' 2\" (1.575 m)   Wt 150 lb (68 kg)   SpO2 96%   BMI 27.44 kg/m²     General appearance:  No apparent distress, appears stated age. HEENT:  Normal cephalic,   Neck: Supple, with full range of motion. Trachea midline. Respiratory:  Normal respiratory effort. DIMINISHED BILATERALLY  Cardiovascular:  RRR  Abdomen: Soft, non-tender, non-distended with normal bowel sounds. Musculoskeletal:  No clubbing, cyanosis or edema bilaterally. Skin: smooth and dry   Neurologic:   Cranial nerves: II-XII intact,   Psychiatric:  Alert and oriented x 3                Labs:   Recent Labs     12/17/22  0553 12/17/22  0741 12/18/22  0401 12/18/22  1045   WBC 14.8* 15.4* 12.1*  --    HGB 9.6* 9.4* 7.0* 8.0*   HCT 29.7* 28.0* 21.0* 24.1*    316 217  --      Recent Labs     12/16/22  0606 12/16/22  1636 12/17/22  0553 12/17/22  1629 12/18/22  0401      < > 146 140 143   K 3.5   < > 3.5 3.6 3.9      < > 106 104 108*   CO2 22   < > 27 27 27   BUN 62*   < > 48* 44* 39*   CREATININE 3.2*   < > 2.5* 2.3* 2.3*   CALCIUM 7.9*   < > 8.4* 7.9* 7.9*   PHOS 4.1  --   --   --  1.4*    < > = values in this interval not displayed. Recent Labs     12/18/22  0401   AST 61*   ALT 52*   BILITOT 0.4   ALKPHOS 59     Recent Labs     12/16/22  1101   INR 1.2     No results for input(s): Odette Cameron in the last 72 hours.   Recent Labs     12/18/22  0401   AST 61*   ALT 52*   BILITOT 0.4   ALKPHOS 59     Recent Labs     12/17/22  0741   LACTA 1.8     No results found for: Isatu Moreno  No results found for: AMMONIA    Assessment:    Active Hospital Problems    Diagnosis Date Noted    Hypoxia [R09.02] 12/17/2022     Priority: Medium    Pneumonia of left lower lobe due to infectious organism [J18.9] 12/17/2022     Priority: Medium    Aspiration pneumonia of both lower lobes due to gastric secretions ([address] [J69.0] 12/17/2022     Priority: Medium    Postoperative pneumonia [J95.89, J18.9] 12/14/2022     Priority: Medium   GASTROCULT POS  S/P HH REPAIR  PAWAN BASELINE CREATINE 1.0)  H.O BREAST CANCER   HLD   PLEURAL EFFUSIONS BILATERALLY     PLAN:  ROCEPHIN   ZITHROMAX   FLUIDS  MONITOR BMP   FOR  TRANSFER TO  OR [facility]     DVT Prophylaxis:SCD  Diet: ADULT DIET;  Full Liquid  Code Status: Full Code     PT/OT Eval Status: ORDERED     Electronically signed by Jess Ash DO on 12/18/2022 at 3:18 PM [facility]

## 2022-12-18 NOTE — PROGRESS NOTES
GENERAL SURGERY  DAILY PROGRESS NOTE  12/18/2022    CHIEF COMPLAINT:  Chief Complaint   Patient presents with    Shortness of Breath     Sent from [facility] for SOB patient went for hernia repair patient on 6L NC on arrival       SUBJECTIVE:  Blood pressure overnight, started on AVAPS  1 bowel movement yesterday      OBJECTIVE:  /60   Pulse 98   Temp 97.8 °F (36.6 °C) (Axillary)   Resp 30   Ht 5' 2\" (1.575 m)   Wt 150 lb (68 kg)   SpO2 97%   BMI 27.44 kg/m²     GENERAL: Calm, no acute distress  LUNGS: Equal chest rise on heated high flow nasal cannula FiO2 65, flow rate 45. Right-sided pigtail catheter with 100cc/24hr  CARDIOVASCULAR: Intermittent tachycardia, /60 this a.m. ABDOMEN:  Soft, non-distended, non-tender. No guarding, rigidity, rebound. Incisions appear clean dry and intact. Peterson catheter in place-350 cc recorded overnight  Pleural fluid cultures remain negative to date    ASSESSMENT/PLAN:  65 y.o. female s/p post hiatal hernia repair 89/7 complicated byrespiratory insufficiency concerning for post op aspiration pneumonia without signs of esophageal leak or perforation    Plan:  N.p.o.-okay for meds. Okay for ice chips and popsicles if respiratory status improves  Continue TPN- increase to 0.75 full dietary recs- Monitor for refeeding syndrome  Correct electrolytes as needed  As needed pain and nausea control  Avoid NG tube, If NG tube is needed due to patient being intubated please page [facility] for placement of NG or OG as it may be difficult due to recent hiatal hernia repair. PPI twice daily  Will start [address]  Nephrology following for PAWAN  Appreciate critical care's assistance with respiratory issues  Discussed with Dr. Dejuan Aleman    Electronically signed by Juan R Thomas MD on 12/18/22 at 9:12 AM EST    Attending Note:    Patient seen/examined 12/18/2022. I discussed case with the resident and reviewed all relevant labs/imaging.  Agree with resident's assessment and plan with the following corrections/additions which summarizes my clinical findings and independent assessment: Respiratory failure per ICU team. PO trial once respiratory status improved.

## 2022-12-18 NOTE — PROGRESS NOTES
[address] [facility]  Department of Critical Care Medicine   MICU Night Call Note        Call initiated by: Nursing staff:  Tanner Palacios, RN  Call addressed around: 12/17/2022 10:28 PM      Reason for call: difficulty breathing      Orders placed: Heated/Humidified High flow NC    Patient seen and examined. She is awake and stated she's feeling ok. She denied short of breath on my examination while on NRB mask with SpO2 of 98% and RR of 19. No respiratory distress noted. No labored breathing or use of accessory muscle noted while breathing and talking. Talked/discussed respiratory status and plan with RRT. Patient placed on airvo with breathing improvement. Patient stated breathing much better. Events reported to supervising physician on call, Dr. Leah Barrientos.     PAULA Root   Critical Care Medicine  12/17/2022 10:28 PM    KAR Root CNP

## 2022-12-18 NOTE — PROGRESS NOTES
[address] [facility]   Department of Internal Medicine   MICU Progress Note    Patient:  Nikole Centeno 65 y.o. female   MRN: [id number]       Date of Service: 2022    Allergy: Benadryl [diphenhydramine] and Ibuprofen  CC hypoxia   Subjective   Awake, alert and oriented x3, following commands  Optiflow HFNC 45L FIO2 65%  Slight somnolent but wake up easily and follow commands  On TPN  CXR improve from yesterday  TPN   C/o sob, weakness, fatigue, and chills, denies of nausea, vomiting, abd pain. Temps of 97.8  No overnight event     Objective     TEMPERATURE:  Current - Temp: 97.8 °F (36.6 °C); Max - Temp  Av °F (37.2 °C)  Min: 97.8 °F (36.6 °C)  Max: 99.7 °F (37.6 °C)    RESPIRATIONS RANGE: Resp  Av.9  Min: 18  Max: 54    PULSE RANGE: Pulse  Av.7  Min: 87  Max: 114    BLOOD PRESSURE RANGE:  Systolic (72ZVA), SFS:058 , Min:74 , HEN:060   ; Diastolic (23NII), ZTI:83, Min:44, Max:122      PULSE OXIMETRY RANGE: SpO2  Av.8 %  Min: 93 %  Max: 99 %    I & O - 24hr:    Intake/Output Summary (Last 24 hours) at 2022 1106  Last data filed at 2022 0800  Gross per 24 hour   Intake 4609.24 ml   Output 486 ml   Net 4123.24 ml     I/O last 3 completed shifts: In: 4609.2 [I.V.:3817.3; IV Piggyback:448.7]  Out: 1109 [Urine:1151; Chest Tube:425] I/O this shift:  In: -   Out: 60 [Chest Tube:60]   Weight change:     Physical Exam:  General Appearance: alert, appears stated age, cooperative, and somnolent , on high flow NC  HEENT:  Head: Normocephalic, no lesions, without obvious abnormality. Eye: Normal external eye, conjunctiva, lids cornea, FILEMON. Nose: Normal external nose, mucus membranes and septum. Neck / Thyroid: Supple, no masses, nodes, nodules or enlargement.   Neck: no adenopathy, no carotid bruit, no JVD, supple, symmetrical, trachea midline, and thyroid not enlarged, symmetric, no tenderness/mass/nodules  Lung: mild labor breathing, rhonchi throughout lung fields, no wheezing noted.   Heart: regular rate and rhythm, S1, S2 normal, no murmur, click, rub or gallop  Abdomen: soft, non-tender; bowel sounds normal; no masses,  no organomegaly incision noted, intact. Extremities:  extremities normal, atraumatic, no cyanosis or edema  Musculokeletal: No joint swelling, no muscle tenderness. ROM normal in all joints of extremities. Neurologic: Mental status: Alert, oriented, thought content appropriate. CN 2-12 intact.        Medications     Continuous Infusions:   PN-Adult  3 IN 1 Central Line (Custom)      sodium chloride      PN-Adult  3 IN 1 Central Line (Custom) 29.2 mL/hr at 12/18/22 0622    lactated ringers 75 mL/hr at 12/18/22 1013    sodium chloride       Scheduled Meds:   pantoprazole (PROTONIX) 40 mg injection  40 mg IntraVENous Q12H    vancomycin  500 mg IntraVENous Once    potassium phosphate IVPB  20 mmol IntraVENous Once    calcium gluconate IVPB  1,000 mg IntraVENous Once    albumin human-kjda  25 g IntraVENous Once    magnesium sulfate  2,000 mg IntraVENous Once    lidocaine PF  5 mL IntraDERmal Once    sodium chloride flush  5-40 mL IntraVENous 2 times per day    heparin flush  3 mL IntraVENous 2 times per day    metoclopramide  10 mg IntraVENous Q6H    piperacillin-tazobactam  4,500 mg IntraVENous Q12H    vancomycin (VANCOCIN) intermittent dosing (placeholder)   Other RX Placeholder    Arformoterol Tartrate  15 mcg Nebulization BID    budesonide  0.5 mg Nebulization BID    acetylcysteine  4 mL Inhalation BID    albuterol  2.5 mg Nebulization Q4H WA    metoprolol  5 mg IntraVENous Q6H    rosuvastatin  10 mg Oral Nightly    lidocaine  10 mL Other Once    sodium chloride flush  5-40 mL IntraVENous 2 times per day    levothyroxine  50 mcg Oral Daily    enoxaparin  30 mg SubCUTAneous Daily     PRN Meds: sodium chloride flush, sodium chloride, heparin flush, perflutren lipid microspheres, fentanNYL, oxyCODONE **OR** oxyCODONE, sodium chloride flush, sodium chloride, ondansetron **OR** ondansetron, magnesium hydroxide, acetaminophen **OR** acetaminophen, ipratropium-albuterol  Nutrition:   TPN     Labs and Imaging Studies     CBC:   Recent Labs     12/17/22  0553 12/17/22  0741 12/18/22  0401 12/18/22  1045   WBC 14.8* 15.4* 12.1*  --    RBC 3.26* 3.17* 2.36*  --    HGB 9.6* 9.4* 7.0* 8.0*   HCT 29.7* 28.0* 21.0* 24.1*   MCV 91.1 88.3 89.0  --    MCH 29.4 29.7 29.7  --    MCHC 32.3 33.6 33.3  --    RDW 13.9 14.0 14.4  --     316 217  --    MPV 9.9 9.7 9.9  --        BMP:    Recent Labs     12/17/22  0553 12/17/22  1629 12/18/22  0401    140 143   K 3.5 3.6 3.9    104 108*   CO2 27 27 27   BUN 48* 44* 39*   CREATININE 2.5* 2.3* 2.3*   GLUCOSE 232* 299* 182*   CALCIUM 8.4* 7.9* 7.9*   PROT  --   --  4.4*   LABALBU  --   --  1.9*   BILITOT  --   --  0.4   ALKPHOS  --   --  59   AST  --   --  61*   ALT  --   --  52*       LIVER PROFILE:   Recent Labs     12/18/22  0401   AST 61*   ALT 52*   BILITOT 0.4   ALKPHOS 59       PT/INR:   Recent Labs     12/16/22  1101   PROTIME 12.9*   INR 1.2       APTT:   No results for input(s): APTT in the last 72 hours. Fasting Lipid Panel:    Lab Results   Component Value Date/Time    CHOL 144 02/04/2021 01:00 PM    TRIG 110 12/18/2022 04:01 AM    HDL 39 03/30/2022 12:50 PM       Cardiac Enzymes:    Lab Results   Component Value Date    CKTOTAL 59 01/13/2020    TROPONINI <0.01 01/13/2020       Notable Cultures:      Blood cultures No results found for: [facility]  Respiratory cultures No results found for: RESPCULTURE   Gram Stain Result   Date Value Ref Range Status   12/16/2022 Refer to ordered Gram stain for results  Final     Urine   Urine Culture, Routine   Date Value Ref Range Status   07/22/2022 <10,000 CFU/mL  Mixed gram positive organisms    Final     Legionella No results found for: LABLEGI  C Diff PCR No results found for: CDIFPCR  Wound culture/abscess: No results for input(s): WNDABS in the last 72 hours.   Tip culture:No results for input(s): CXCATHTIP in the last 72 hours. Antibiotic  Days  Day started   vano     Zosyn                       Oxygen: Additional Respiratory Assessments  Heart Rate: 96  Resp: (!) 40  SpO2: 94 %  Humidification Source: Heated wire  Humidification Temp: 34     Nasal cannula L/min     Face mask %     Reservoirs mask %       ABG     PH  7.40   PCO2  44.6   PO2  118   HCO3  27   Sat%  98   FIO2     DATES 12/18/22        Lines:  Site  Day  Date inserted     TLC   RIJ    12/18/22      PICC              Arterial line              Peripheral line              HD cath            Urinary Catheter 12/15/22 Peterson-Output (mL): 35 mL    Imaging Studies:    XR CHEST PORTABLE   Final Result   Interval improvement in the opacification of the left hemithorax, with   aeration in the left upper lung field when compared to the previous study   performed 1 day earlier. No other significant interval change. XR CHEST PORTABLE   Final Result   New right internal jugular central venous catheter terminating near the   SVC-right atrium junction. No post-procedure pneumothorax. Worsening   opacification of the left hemithorax and stable findings on the right. CT CHEST WO CONTRAST   Final Result   Large right pleural effusion of mixed density to suggest complex effusion. There is a pigtail catheter seen in the pleural space posteriorly and   inferiorly. Small to moderate size left pleural effusion with consolidative   infiltrate throughout the left upper and lower lung field and aerated   portions revealing nodular ground-glass opacity to suggest an infectious   process. Nodular ground-glass opacity inferiorly within the aerated right   upper and lower lung field concerning for an infectious process as well. There is less consolidative infiltrate identified within the right lower lobe. XR CHEST PORTABLE   Final Result   1. No changes position of the right-sided chest tube.   No right-sided pneumothorax. The most of the right-sided pleural effusion has been drained,   if present to be discrete or small. 2.  Moderate left-sided pleural effusion. If quantification of left-sided   pleural effusion will be needed for clinical management bedside ultrasound or   left lateral decubitus views of the chest can be helpful. 3.  No pneumothorax on the right or on the left. 4.  Large size diaphragmatic across the midline. XR CHEST PORTABLE   Final Result   1. Increased parenchymal density projecting over the left lower lung   concerning for atelectasis and/or consolidation      2. No signs of right effusion      3. Parenchymal density projecting over the right lower lung concerning for   atelectasis         IR GUIDED PERC PLEURAL DRAIN W CATH INSERT   Final Result   1. Successful ultrasound-guided right thoracentesis. 2.  Successful placement of an 8 Hebrew pleural drain         XR CHEST PORTABLE   Final Result   1. Right-sided pigtail catheter in the right base. 2.  No conspicuous right-sided pleural effusions identified. No right-sided   pneumothorax. 3.  Cannot exclude a small left-sided pleural effusion. 4.  Large size diaphragmatic hernia across the midline causing compression   atelectasis in the medial aspect of both lungs. FL ESOPHAGRAM   Final Result   1. No evidence of esophageal perforation or leak. 2. Decreased peristalsis. No evidence of esophageal mass or obstructing   lesion. 3. Small sliding hiatus hernia. No evidence of GE reflux. The gastric   foreign disc does appear constricted as it passes through the esophageal   hiatus. US CHEST INCLUDING MEDIASTINUM   Final Result   Probable complex loculated right pleural effusion. Simple left pleural effusion. CTA PULMONARY W CONTRAST   Final Result   No evidence of pulmonary embolism. Bilateral lower lobe pulmonary consolidations and pleural effusions seen. Deviation of the mediastinum to the left is visualized. Mild soft tissue prominence visualized surrounding the esophagus with   narrowing at the gastroesophageal junction but appears to be due to external   compression. Cannot rule left soft tissue mass at this location. Wall thickening visualized in the gastric outlet with mild prominence of the   stomach seen, this could be artifactual.      Distended gallbladder is seen. Degenerative bone changes, postoperative changes and multilevel vertebral   augmentation seen. No evidence of acute abdominal/pelvic pathology. CT ABDOMEN PELVIS W IV CONTRAST Additional Contrast? Oral   Final Result   No evidence of pulmonary embolism. Bilateral lower lobe pulmonary consolidations and pleural effusions seen. Deviation of the mediastinum to the left is visualized. Mild soft tissue prominence visualized surrounding the esophagus with   narrowing at the gastroesophageal junction but appears to be due to external   compression. Cannot rule left soft tissue mass at this location. Wall thickening visualized in the gastric outlet with mild prominence of the   stomach seen, this could be artifactual.      Distended gallbladder is seen. Degenerative bone changes, postoperative changes and multilevel vertebral   augmentation seen. No evidence of acute abdominal/pelvic pathology. XR CHEST PORTABLE   Final Result   1. Consolidation seen within the left lung base with blunting the left   costophrenic angle which could represent pneumonia or atelectasis   2. A trace left pleural effusion cannot be excluded   3. Large right diaphragmatic hernia/hiatal hernia which was noted on the   patient's previous CT of the chest of 07/22/2022.          XR CHEST PORTABLE    (Results Pending)        APRN- CNP Assessment and PLan   In summary 65 y.o. female with significant past medical history of hypothyroidism, breast cancer, TIA, hiatal hernia underwent hiatal hernia repair on 12/9/22 and developed acute respiratory distress and was transfer to [facility] on 12/14/22 for further management. Patient was seen by nephrology, pulmonology, general surgery. Patient noted to have worsening hypoxia requiring 15 L of oxygen, A. fib RVR therefore transferred to MICU and critical care was consulted. Assessment:  Acute hypoxemic respiratory failure secondary to aspiration pneumonia/pleural effusion/bibasilar consolidation  Bilateral pleural effusion right greater than left status post right thoracentesis with pigtail chest tube placement on (12/16/22 IR)  Sepsis 2/2 PNA/loculated pleural effusion   AFIB RVR  PAWAN secondary to dehydration  [place] ptosis leukocytosis with left shift  Acute anemia  History of massive hiatal hernia  status post repair on 12/9/2022  History of TIA  History of breast cancer  History of hypothyroidism  History of lumbar compression fracture with history of PLIF T12-L2  History of anxiety     Plan:  -Currently on OptiFlow HFNC 45L, Fio2 65%,  titrate FiO2 keep SPO2 goal 92%  -ABG obtained, if patient becomes hypoxic and worsen, may need to be intubated and placed on mechanical ventilator  -CT chest repeated on 12/17/22 showed large right pleural effusion of mixed density to suggest complex effusion. Small to moderate size left pleural effusion with consolidative infiltrate throughout the left upper and lower lung fields and aerated portions revealing nodular ground grass opacity to suggest infection process.   -Continue scheduled bronchodilators Brovana and Pulmicort in addition to the DuoNebs  Continue mucomyst with Metanebs  -Bilateral pleural effusion right greater than left, status post right thoracentesis pigtail chest tube placement with minimal output. --water seal.   -A. fib and RVR, was given 1 dose of metoprolol 5 mg IV , placed metoprolol 5 mg every 6 hours parameters  -Check troponin, proBNP elevated, will obtain echocardiogram  -Echo in January 2021 showed EF of 60 to 65%,   -Will not anticoagulate secondary to recent surgery and coffee-ground emesis--hgb 7.0 this am, recheck   -Procalcitonin 5.70==> 1.57 today; RVP/COVID-19 PCR negative, MRSA nares pending, blood culture pending, respiratory culture moderate yeast, moderate gram positive rods , urine antigen pending  - Consult to ID, input appreciated. -Empirically started on vancomycin and Zosyn. -General surgery consulted, input appreciated. -Esophagram negative for esophageal perforation, recommended antiemetic and mild type of oral pain control, no surgical intervention indicates  -Start TPN, triple-lumen catheter was placed to right IJ.  -PAWAN, nephrology following, input appreciated  -Hydration  -Strict intake and output  - SPA per nephrology  -Labs and chest x-ray in a.m.  -F/E/N KVO/ replace lytes/ Npo except Ice chip  - DVT/GI scds/lovenox/protonix  - Code status: full code. Spoke with Dr. Shakira Pizano discussed case with , no intra op complication, how ever her hernia was massive. Plan was to place a large bore chest tube to right side and to transfer for her to be seen by thoracic surgery. I spoke to transfer center and planning and started process for . Waiting for call back.          KAR Carrizales-CNP   Critical Care     Discussed case with Attending Physician: Dr. Mark Beth

## 2022-12-18 NOTE — SIGNIFICANT EVENT
Critical Care - Rapid Response Team Note      Date of event: 12/17/2022   Time of event: 0500    Leslie Leal 65y.o. year old female   YOB: 1945     PCP:  Evelin Beltran DO   Location: [address] [place] [address]   Witnessed? : [x]Yes  [] No  Initial Code status: [x] Full  [] DNR-CCA  []DNR-CC    []Limited  ______________________________________________________________________  Reason for RRT:   Tachypnea  and Hypoxia (SpO2<90%)    Chief Complaint for this admission:   Chief Complaint   Patient presents with    Shortness of Breath     Sent from [facility] for SOB patient went for hernia repair patient on 6L NC on arrival       Admit date:  12/14/2022     Admitting Diagnosis: Hypoxia [R09.02]  Postoperative pneumonia [J95.89, J18.9]  Pneumonia of left lower lobe due to infectious organism [J18.9]      Current Diagnosis: The primary encounter diagnosis was Pneumonia of left lower lobe due to infectious organism. A diagnosis of Hypoxia was also pertinent to this visit. Subjective: Pt is a 66 y/o female with Hx of breast cancer who was admitted on 12/14 for respiratory distress and pleural effusions after having hiatal hernia repair at [facility] on 12/9.  RRT called this AM for worsening respiratory distress and hypoxic respiratory failure    Objective:   Initial Assessment on arrival:  Vital signs: Temp: 99.5F, BP: 152/78, RR: 24, HR: 128 SpO2: 96% on 16L NRB    Airway and Condition: Conscious, Breathing, and Airway Open/Clear noted    Lungs And Circulation: decreased breath sounds on BLL and rhonchi heard in BLL, L>R  noted                  Neurologic: responds to pain and does not follow commands noted    Initial Interventions: Labs ordered: N/A   Imaging ordered: CXR     RRT Assessment and Plan:    Leslie Leal is a 65 y.o. female with  has a past medical history of Anxiety with somatization, Compression fracture of L1 lumbar vertebra (HCC), Compression fracture of L4 lumbar vertebra, Fall, History of bilateral breast cancer, Hx-TIA (transient ischemic attack), Osteoporosis, Pain syndrome, chronic, Thyroid disease, and Type II or unspecified type diabetes mellitus without mention of complication, not stated as uncontrolled. who was admitted on 12/14/2022 with admitting diagnosis Hypoxia [R09.02]  Postoperative pneumonia [J95.89, J18.9]  Pneumonia of left lower lobe due to infectious organism [J18.9]  . RRT was called on 12/18/2022 . Initial assessment and interventions as noted above. Current problems include:   Hypoxic respiratory failure 2/2 post-op pleural effusions. Cannot r/o aspiration pneumonia/pneumonitis overlying     Plan:   Transfer to MICU for closer monitoring   Aggressive pulmonary toilet   Continue breathing treatments   Low threshold for intubation  ?     Code status: [x] Full  [] DNR-CCA  []DNR-CC []Limited    Disposition:  [] No transfer   [] Transfer to monitor floor  [x] Transfer to: [x] MICU [] NICU [] CVICU [] SICU    Patients family updated:     [] Yes  [x] No   Discussed with:  [x] Critical Care Intensivist: Dr. Jessica Sun      [] Primary Care Provider:       [] Other: ?    Radha Pleitez DO PGY-3  12/18/2022 11:32 AM  Attending Physician: Dr Jessica Sun

## 2022-12-18 NOTE — CONSULTS
[address] [id number] [facility], [address] [place] [address]  Phone [phone]   [phone]      Admit Date: 2022  9:35 AM  Pt Name: Navarro Nicole  MRN: [id number]  : 1945  Reason for Consult:    Chief Complaint   Patient presents with    Shortness of Breath     Sent from [facility] for SOB patient went for hernia repair patient on 6L NC on arrival     Requesting Physician:  Abby Foster DO  PCP: Marcellus George DO  History Obtained From:  patient, chart   ID consulted for Hypoxia [R09.02]  Postoperative pneumonia [J95.89, J18.9]  Pneumonia of left lower lobe due to infectious organism [J18.9]  on hospital day 4  CHIEF COMPLAINT       Chief Complaint   Patient presents with    Shortness of Breath     Sent from [facility] for SOB patient went for hernia repair patient on 6L NC on arrival     HISTORYOF Snehal is a 65 y.o. female who presents with   has a past medical history of Anxiety with somatization, Compression fracture of L1 lumbar vertebra (HCC), Compression fracture of L4 lumbar vertebra, Fall, History of bilateral breast cancer, Hx-TIA (transient ischemic attack), Osteoporosis, Pain syndrome, chronic, Thyroid disease, and Type II or unspecified type diabetes mellitus without mention of complication, not stated as uncontrolled. ED TRIAGEVITALS  BP: (!) 142/122, Temp: 97.8 °F (36.6 °C), Heart Rate: 96, Resp: (!) 40, SpO2: 94 %  HPI:  ID was consulted on 22 for infection management  Pt presented to ER on 2022 with diagnosis of  Hypoxia [R09.02]  Postoperative pneumonia [J95.89, J18.9]  Pneumonia of left lower lobe due to infectious organism [J18.9]  Pt is from [facility]  Pt is s/p hernia repair   Transferred due to sob   Per pt she was sick PTA   Pt had cough/sob/abd pain/vomiting on admission  Covid f/u NEG  wbc6.2 cr2. 1.7  Tmax99.7  Bilaeral pleural effusion  has right chest tube cx ngtd   s/p RRT    Resp cx gnr/yeast     Currently on    vancomycin (VANCOCIN) 500 mg in dextrose 5 % 100 mL IVPB, Once    piperacillin-tazobactam (ZOSYN) 4,500 mg in dextrose 5 % 100 mL IVPB ([id number]), Q12H  vancomycin (VANCOCIN) intermittent dosing (placeholder), RX Placeholder     Pt is sob wheeze has abd pain         REVIEW OF SYSTEMS     CONSTITUTIONAL:   As in hpi     Medications Prior to Admission: Cholecalciferol (VITAMIN D3) 125 MCG (5000 UT) TABS, Take by mouth  levothyroxine (SYNTHROID) 50 MCG tablet, Take 50 mcg by mouth daily  rosuvastatin (CRESTOR) 20 MG tablet, Take 1 tablet by mouth nightly  aspirin 325 MG EC tablet, Take 1 tablet by mouth daily  Handicap Placard MISC, by Does not apply route Duration: Lifetime  CURRENT MEDICATIONS     Current Facility-Administered Medications:     pantoprazole (PROTONIX) 40 mg in sodium chloride (PF) 0.9 % 10 mL injection, 40 mg, IntraVENous, Q12H, Konrad Regan MD, 40 mg at 12/18/22 0820    vancomycin (VANCOCIN) 500 mg in dextrose 5 % 100 mL IVPB, 500 mg, IntraVENous, Once, Orysia Croangelica, APRN - CNP    potassium phosphate 20 mmol in dextrose 5 % 250 mL IVPB, 20 mmol, IntraVENous, Once, Konrad Regan MD    PN-Adult  3 IN 1 Central Line (Custom), , IntraVENous, Continuous TPN, Konrad Regan MD    calcium gluconate 1,000 mg in dextrose 5 % 100 mL IVPB, 1,000 mg, IntraVENous, Once, Orysia Crofts, APRN - CNP, Last Rate: 100 mL/hr at 12/18/22 1028, 1,000 mg at 12/18/22 1028    albumin human-kjda 25 % IV solution 25 g, 25 g, IntraVENous, Once, Orysia Crofts, APRN - CNP    magnesium sulfate 2000 mg in 50 mL IVPB premix, 2,000 mg, IntraVENous, Once, Orysia Crofts, APRN - CNP    lidocaine PF 1 % injection 5 mL, 5 mL, IntraDERmal, Once, Orysia Crofts, APRN - CNP    sodium chloride flush 0.9 % injection 5-40 mL, 5-40 mL, IntraVENous, 2 times per day, Orysia Crofts, APRN - CNP, 10 mL at 12/17/22 1955    sodium chloride flush 0.9 % injection 5-40 mL, 5-40 mL, IntraVENous, PRN, Swati Darby, APRN - CNP 0.9 % sodium chloride infusion, , IntraVENous, PRN, KAR Milton CNP    heparin flush 100 UNIT/ML injection 300 Units, 3 mL, IntraVENous, 2 times per day, KAR Milton CNP    heparin flush 100 UNIT/ML injection 300 Units, 3 mL, IntraCATHeter, PRN, KAR Milton CNP    metoclopramide (REGLAN) injection 10 mg, 10 mg, IntraVENous, Q6H, Angela Ribeiro MD, 10 mg at 12/18/22 0820    piperacillin-tazobactam (ZOSYN) 4,500 mg in dextrose 5 % 100 mL IVPB ([id number]), 4,500 mg, IntraVENous, Q12H, KAR Milton CNP, Last Rate: 25 mL/hr at 12/18/22 0826, 4,500 mg at 12/18/22 8766    vancomycin (VANCOCIN) intermittent dosing (placeholder), , Other, RX Placeholder, KAR Milton CNP    Arformoterol Tartrate (BROVANA) nebulizer solution 15 mcg, 15 mcg, Nebulization, BID, KAR Kingston - CNP, 15 mcg at 12/18/22 0826    budesonide (PULMICORT) nebulizer suspension 500 mcg, 0.5 mg, Nebulization, BID, April BACILIO FultonN - CNP, 500 mcg at 12/18/22 0827    acetylcysteine (MUCOMYST) 10 % solution 400 mg, 4 mL, Inhalation, BID, KAR Kingston - CNP, 400 mg at 12/18/22 0094    PN-Adult  3 IN 1 Central Line (Custom), , IntraVENous, Continuous TPN, Angela Ribeiro MD, Last Rate: 29.2 mL/hr at 12/18/22 0622, Rate Verify at 12/18/22 0622    albuterol (PROVENTIL) nebulizer solution 2.5 mg, 2.5 mg, Nebulization, Q4H WA, Quyen Larsen MD, 2.5 mg at 12/18/22 0827    perflutren lipid microspheres (DEFINITY) injection 1.5 mL, 1.5 mL, IntraVENous, ONCE PRN, KAR Milton CNP    metoprolol (LOPRESSOR) injection 5 mg, 5 mg, IntraVENous, Q6H, KAR Milton - CNP, 5 mg at 12/18/22 0820    fentaNYL (SUBLIMAZE) injection 25 mcg, 25 mcg, IntraVENous, Q4H PRN, AKR Milton - CNP, 25 mcg at 12/18/22 0239    oxyCODONE (ROXICODONE) immediate release tablet 5 mg, 5 mg, Oral, Q4H PRN **OR** oxyCODONE HCl (OXY-IR) immediate release tablet 10 mg, 10 mg, Oral, Q4H PRN, Joseph Levi DO, 10 mg at 12/16/22 2033    rosuvastatin (CRESTOR) tablet 10 mg, 10 mg, Oral, Nightly, KAR Goodrich - CNP, 10 mg at 12/16/22 2033    lidocaine 1 % injection 10 mL, 10 mL, Other, Once, Barnana, DO    lactated ringers infusion, , IntraVENous, Continuous, Nildakim Olvera, APRN - CNP, Last Rate: 75 mL/hr at 12/18/22 1013, Rate Change at 12/18/22 1013    sodium chloride flush 0.9 % injection 5-40 mL, 5-40 mL, IntraVENous, 2 times per day, Aarti Faye APRN - CNP, 10 mL at 12/16/22 2033    sodium chloride flush 0.9 % injection 10 mL, 10 mL, IntraVENous, PRN, Aarti Faye APRN - CNP    0.9 % sodium chloride infusion, , IntraVENous, PRN, Aarti Faye, APRN - CNP    ondansetron (ZOFRAN-ODT) disintegrating tablet 4 mg, 4 mg, Oral, Q8H PRN, 4 mg at 12/14/22 2155 **OR** ondansetron (ZOFRAN) injection 4 mg, 4 mg, IntraVENous, Q6H PRN, Aarti Faye, APRN - CNP, 4 mg at 12/16/22 1245    magnesium hydroxide (MILK OF MAGNESIA) 400 MG/5ML suspension 30 mL, 30 mL, Oral, Daily PRN, Aarti Faye APRN - CNP    acetaminophen (TYLENOL) tablet 650 mg, 650 mg, Oral, Q6H PRN **OR** acetaminophen (TYLENOL) suppository 650 mg, 650 mg, Rectal, Q6H PRN, Aarti Faye, APRN - CNP    ipratropium-albuterol (DUONEB) nebulizer solution 1 ampule, 1 ampule, Inhalation, Q4H PRN, KAR Goodrich - CNP, 1 ampule at 12/17/22 0216    levothyroxine (SYNTHROID) tablet 50 mcg, 50 mcg, Oral, Daily, Ortiz Montanez MD, 50 mcg at 12/17/22 0526    enoxaparin Sodium (LOVENOX) injection 30 mg, 30 mg, SubCUTAneous, Daily, Aarti Govern, APRN - CNP, 30 mg at 12/18/22 8620  ALLERGIES     Benadryl [diphenhydramine] and Ibuprofen  Immunization History   Administered Date(s) Administered    Pneumococcal Conjugate 13-valent ([id number]) 06/01/2016      Internal Administration   First Dose      Second Dose           Last COVID Lab SARS-CoV-2, PCR (no units)   Date Value   12/17/2022 Not Detected     SARS-CoV-2 RNA, RT PCR (no units)   Date Value 12/14/2022 NOT DETECTED          PAST MEDICAL HISTORY     Past Medical History:   Diagnosis Date    Anxiety with somatization 3/30/2021    Compression fracture of L1 lumbar vertebra [facility]) november 1994    Compression fracture of L4 lumbar vertebra     secondary to fall in December 2013    Fall 1989 & 12/2013    History of bilateral breast cancer 1980's    breast    Hx-TIA (transient ischemic attack) 3/30/2021    Osteoporosis     Pain syndrome, chronic 3/30/2021    Thyroid disease     went off of medication    Type II or unspecified type diabetes mellitus without mention of complication, not stated as uncontrolled     resolved with weight loss of 20 lbs     SURGICAL HISTORY       Past Surgical History:   Procedure Laterality Date    BACK SURGERY  1989    PLIF T12 to L2 with pedicle screws and rods    BREAST SURGERY Bilateral     breast implants (after mastectomy)    FIXATION KYPHOPLASTY  03/10/2014    with bone biopsy    IR PERC CATH PLEURAL DRAIN W/IMAG  12/16/2022    IR PERC CATH PLEURAL DRAIN W/IMAG 12/16/2022 MAURICIO Soriano MD [facility] SPECIAL PROCEDURES    MASTECTOMY Bilateral     TONSILLECTOMY AND ADENOIDECTOMY      TUBAL LIGATION       FAMILY HISTORY       Family History   Problem Relation Age of Onset    Heart Disease Mother     Diabetes Mother     Other Mother         aneurysm    Heart Disease Father     Cancer Father         thyroid      SOCIAL HISTORY       Social History     Socioeconomic History    Marital status: Single   Occupational History    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    Tobacco Use    Smoking status: Never    Smokeless tobacco: Never   Substance and Sexual Activity    Alcohol use: Not Currently     Comment: 1 glass a month    Drug use: No    Sexual activity: Not Currently     PHYSICAL EXAM        Vitals:    Vitals:    12/18/22 0700 12/18/22 0800 12/18/22 0900 12/18/22 1000   BP: 98/69 122/60 (!) 110/56 (!) 142/122   Pulse: (!) 108 98 (!) 108 96   Resp: (!) 54 30 (!) 41 (!) 40   Temp:  97.8 °F (36.6 °C) TempSrc:  Axillary     SpO2: 97% 97% 95% 94%   Weight:       Height:            CONSTITUTIONAL:  awake, alert, cooperative, no apparent distress, and appears stated age  ENT:  Normocephalic, without obvious abnormality, atraumatic, sinuses nontender on palpation, external ears without lesions, oral pharynx with moist mucus membranes, tonsils without erythema or exudates, gums normal and good dentition. NECK:  Supple, symmetrical, trachea midline, no adenopathy, thyroid symmetric, not enlarged and no tenderness, skin normal  LUNGS:   bipap increased work of breathing,  to auscultation bilaterally, no crackles or +wheezing  CARDIOVASCULAR:  Normal apical impulse, regular rate and rhythm, normal S1 and S2, no S3 or S4, and no murmur noted  ABDOMEN:   incisionnoerythema or d/c  normal bowel sounds, soft, slightly distended,  tender, no masses palpated   CHEST/BREASTS:  Breasts symmetrical   GENITAL/URINARY: steiner    MUSCULOSKELETAL:  There is no redness, warmth, or swelling of the joints. Full range of motion noted. NEUROLOGIC:  Awake, alert,   Cranial nerves II-XII are grossly intact. SKIN:  no bruising or bleeding and normal skin color, texture, turgor     Central Venous Catheter:  CVC Triple Lumen 12/17/22 Right Internal jugular (Active)   $ Central line insertion $ Yes 12/17/22 1600   Central Line Being Utilized Yes 12/18/22 0600   Criteria for Appropriate Use Total parenteral nutrition 12/18/22 0600   Site Assessment Clean, dry & intact 12/18/22 0600   Phlebitis Assessment No symptoms 12/18/22 0600   Infiltration Assessment 0 12/18/22 0600   Color/Movement/Sensation Capillary refill less than 3 sec 12/18/22 0600   Proximal Lumen Color/Status White 12/18/22 0600   Medial Lumen Status Blue 12/18/22 0600   Distal Lumen Color/Status Brown 12/18/22 0600   Line Care Cap changed; Connections checked and tightened; Chlorhexidine wipes; Line pulled back;Ports disinfected; Tubing changed 12/18/22 0600   Alcohol Cap Used Yes 12/18/22 0600   Date of Last Dressing Change 12/17/22 12/18/22 0600   Dressing Type Transparent; Antimicrobial 12/18/22 0600   Dressing Status Clean, dry & intact 12/18/22 0600   Dressing Intervention Other (Comment) 12/18/22 0600     Venous Access Device:     Peripheral Intravenous Line:  Peripheral IV 12/17/22 Distal;Left;Ventral Forearm (Active)   Site Assessment Clean, dry & intact 12/18/22 0600   Line Status Infusing 12/18/22 0600   Phlebitis Assessment No symptoms 12/18/22 0600   Infiltration Assessment 0 12/18/22 0600   Alcohol Cap Used Yes 12/18/22 0600   Dressing Status Clean, dry & intact 12/18/22 0600   Dressing Type Transparent 12/18/22 0600   Dressing Intervention Other (Comment) 12/18/22 0600      Drain(s):  Chest Tube Right Pleural 1 (Active)   Status Gravity 12/17/22 1615   Suction To water seal 12/17/22 1615   Drainage Description Serosanguinous 12/18/22 1000   Dressing Status Clean, dry & intact 12/18/22 1000   Chest Tube Dressing Dry 12/17/22 1615   Site Assessment Clean, dry & intact 12/18/22 1000   Surrounding Skin Clean, dry & intact 12/17/22 1615   Output (ml) 60 ml 12/18/22 0800       Urinary Catheter 12/15/22 Peterson (Active)   Catheter Indications Urinary retention (acute or chronic), continuous bladder irrigation or bladder outlet obstruction 12/18/22 1000   Site Assessment No urethral drainage 12/18/22 1000   Urine Color Yellow 12/18/22 1000   Urine Appearance Clear 12/18/22 1000   Output (mL) 35 mL 12/18/22 6642        DIAGNOSTIC RESULTS   RADIOLOGY:   CT CHEST WO CONTRAST    Result Date: 12/17/2022  EXAMINATION: CT OF THE CHEST WITHOUT CONTRAST 12/17/2022 1:47 pm TECHNIQUE: CT of the chest was performed without the administration of intravenous contrast. Multiplanar reformatted images are provided for review.  Automated exposure control, iterative reconstruction, and/or weight based adjustment of the mA/kV was utilized to reduce the radiation dose to as low as reasonably achievable. COMPARISON: None. HISTORY: ORDERING SYSTEM PROVIDED HISTORY: worsening hypoxia TECHNOLOGIST PROVIDED HISTORY: Reason for exam:->worsening hypoxia What reading provider will be dictating this exam?->CRC FINDINGS: Mediastinum: Thyroid is homogeneous in appearance. There is distension identified of the esophagus with fluid filling the mid and distal esophagus to suggest reflux. Achalasia cannot be excluded. There is mild dilatation of the cardiac chambers. No pericardial effusion. Coronary artery calcification as well as vascular calcifications seen within the thoracic aorta and great vessels. Lungs/pleura: Dense consolidative infiltrate identified within the left upper and lower lung field. There is a moderate size effusion identified on the left. There is consolidative infiltrate and nodular density seen in the aerated portions of the left upper lobe. There is ground-glass nodular density seen in the periphery and inferior aspect of the right upper and lower lung field with can not dense consolidative infiltrate in the right lung base suggesting pneumonia. There is a pigtail catheter identified inferiorly within the right pleural space. Upper Abdomen: Visualized upper abdomen reveal cysts within the liver. Mild gastric distension of the stomach. There is stones in the gallbladder. Soft Tissues/Bones: Degenerative changes seen diffusely throughout the spine with kyphoplasty identified within the spine with hardware fusing the lumbar spine. No acute chest wall abnormality. Large right pleural effusion of mixed density to suggest complex effusion. There is a pigtail catheter seen in the pleural space posteriorly and inferiorly. Small to moderate size left pleural effusion with consolidative infiltrate throughout the left upper and lower lung field and aerated portions revealing nodular ground-glass opacity to suggest an infectious process.  Nodular ground-glass opacity inferiorly within the aerated right upper and lower lung field concerning for an infectious process as well. There is less consolidative infiltrate identified within the right lower lobe. CT ABDOMEN PELVIS W IV CONTRAST Additional Contrast? Oral    Result Date: 12/14/2022  EXAMINATION: CTA OF THE CHEST; CT OF THE ABDOMEN AND PELVIS WITH CONTRAST 12/14/2022 12:21 pm TECHNIQUE: CTA of the chest was performed after the administration of intravenous contrast.  Multiplanar reformatted images are provided for review. MIP images are provided for review. Automated exposure control, iterative reconstruction, and/or weight based adjustment of the mA/kV was utilized to reduce the radiation dose to as low as reasonably achievable.; CT of the abdomen and pelvis was performed with the administration of intravenous contrast. Multiplanar reformatted images are provided for review. Automated exposure control, iterative reconstruction, and/or weight based adjustment of the mA/kV was utilized to reduce the radiation dose to as low as reasonably achievable. COMPARISON: 07/22/2022 HISTORY: ORDERING SYSTEM PROVIDED HISTORY: SOB, hypoxia, cough TECHNOLOGIST PROVIDED HISTORY: Reason for exam:->SOB, hypoxia, cough Decision Support Exception - unselect if not a suspected or confirmed emergency medical condition->Emergency Medical Condition (MA) What reading provider will be dictating this exam?->CRC; FINDINGS: CHEST CT SCAN: Unremarkable opacification of the pulmonary vessels, no evidence of pulmonary embolism is seen. Scattered calcifications visualized in the aortic arch, no evidence for aneurysmal dilatation or arterial dissection is seen. The heart is prominent in size, no evidence of pericardial effusion is seen. Atherosclerotic calcifications visualized in the coronary vessels. Subtle hilar and mediastinal lymphadenopathy is seen.  Extensive thickening visualized in the wall of the esophagus, dilated esophagus is seen axial series 302, image 68 with wall measuring approximately 0.7 cm. Narrowing visualized at the gastroesophageal junction visualized on axial series 302, image 130 Soft tissue density visualized in the posterior medial aspect of the right lower lung field with deviation of the mediastinum to the left, elevation of the right hemidiaphragm is seen, suboptimal opacification is visualized cannot ascertain if this represents pleural fluid, bowel or a soft tissue density however on the prior study obtained on 07/22/2022 there was no soft tissue mass at this location. Mild bronchial wall thickening is visualized. Bilateral lower lobe consolidation and pleural effusions visualized. No evidence of endobronchial or endoluminal lesions are seen. Bilateral breast implants. No evidence of chest wall mass Degenerative bone changes are seen. ABDOMEN AND PELVIS CT SCAN: The liver demonstrates increased attenuation with mild nodularity of its surface. , a 1.2 cm simple cyst is visualized in the right lobe of the liver, no evidence of masses no evidence of intrahepatic biliary dilatation is seen. The gallbladder is distended, some small layering stones visualized within the neck of the gallbladder measuring approximately 0.6 cm,, no evidence of gallbladder wall thickening or pericholecystic  stranding. The common bile duct is unremarkable. The pancreas and spleen demonstrate no evidence of masses. The pancreas is atrophic, the spleen is small in size. The adrenal glands demonstrate unremarkable contours with no evidence of masses. The kidneys demonstrate no evidence of stones no evidence of renal masses. No evidence of hydronephrosis or hydroureter is seen. GI/Bowel: Narrowing of the gastroesophageal junction visualized on coronal series 605, image 64. The stomach is slightly prominent, thickening of the wall of the distal stomach/pyloric outlet is seen on coronal series 605, image 52, no evidence of masses.   No significant distention of the small and large bowel loops is visualized. The appendix is visualized and is unremarkable. Abundance of stool is visualized in the large bowel. Scattered diverticular disease visualized but no evidence of acute diverticulitis. Pelvis: The urinary bladder is not optimally distended. The prostate gland is unremarkable. No evidence of free fluid in the pelvis. No evidence of pelvic mass is seen. Peritoneum/Retroperitoneum: No evidence of free fluid or air within the peritoneal cavity. Bones/Soft Tissues Abdominal wall soft tissues are unremarkable. Internal fixation of the thoracolumbar junction is seen. Vertebral augmentation of the T10, T11, L3 and L4 vertebral bodies is seen. Degenerative changes of the lumbar spine visualized. No evidence of pulmonary embolism. Bilateral lower lobe pulmonary consolidations and pleural effusions seen. Deviation of the mediastinum to the left is visualized. Mild soft tissue prominence visualized surrounding the esophagus with narrowing at the gastroesophageal junction but appears to be due to external compression. Cannot rule left soft tissue mass at this location. Wall thickening visualized in the gastric outlet with mild prominence of the stomach seen, this could be artifactual. Distended gallbladder is seen. Degenerative bone changes, postoperative changes and multilevel vertebral augmentation seen. No evidence of acute abdominal/pelvic pathology. FL ESOPHAGRAM    Result Date: 12/15/2022  EXAMINATION: SINGLE CONTRAST ESOPHAGRAM 12/15/2022 HISTORY: ORDERING SYSTEM PROVIDED HISTORY: evaluate for esophageal perforation TECHNOLOGIST PROVIDED HISTORY: Reason for exam:->evaluate for esophageal perforation Should a barium tablet be used, if available? ->No What reading provider will be dictating this exam?->CRC Cough, chest pain COMPARISON: CTA chest dated 12/14/2022 TECHNIQUE: Multiple single contrast images of the esophagus and gastroesophageal junction were obtained following the oral administration of water soluble contrast and thin barium FLUOROSCOPY DOSE AND TYPE OR TIME AND EXPOSURES: Fluoroscopy time 1.2 minutes. Air kerma 114 mGy FINDINGS: The study was somewhat compromised due to patient's clinical condition and limited mobility. On the single contrast images, no evidence of stricture or obstruction. The esophagus demonstrates decreased peristalsis under fluoroscopy. No tertiary contractions seen. No evidence of leak. There is a small sliding hiatus hernia. The gastric fundus is constricted as it passes through the esophageal hiatus. Otherwise, no abnormality seen at the GE junction. No gastroesophageal reflux was elicited during examination. 1. No evidence of esophageal perforation or leak. 2. Decreased peristalsis. No evidence of esophageal mass or obstructing lesion. 3. Small sliding hiatus hernia. No evidence of GE reflux. The gastric foreign disc does appear constricted as it passes through the esophageal hiatus. XR CHEST PORTABLE    Result Date: 12/18/2022  EXAMINATION: ONE XRAY VIEW OF THE CHEST 12/18/2022 8:42 am COMPARISON: The previous study performed 12/17/2022. HISTORY: ORDERING SYSTEM PROVIDED HISTORY: respiratory failure TECHNOLOGIST PROVIDED HISTORY: Reason for exam:->respiratory failure What reading provider will be dictating this exam?->CRC FINDINGS: There has been interval improvement in the opacification of the left hemithorax. There is now aeration involving the left upper lung field. There has been no significant interval change in the parenchymal and pleural disease involving the right hemithorax. A right-sided pleural catheter is again identified in place. Evaluation of the cardiac silhouette is again difficult due to silhouetting by the bilateral lung opacities. The mediastinum is without significant interval change. A right-sided IJ line is again noted, with the tip by the cavoatrial junction.      Interval improvement in the opacification of the left hemithorax, with aeration in the left upper lung field when compared to the previous study performed 1 day earlier. No other significant interval change. XR CHEST PORTABLE    Result Date: 12/17/2022  EXAMINATION: ONE XRAY VIEW OF THE CHEST 12/17/2022 3:12 pm COMPARISON: 12/17/2022. HISTORY: ORDERING SYSTEM PROVIDED HISTORY: s/p TLC cathete placement TECHNOLOGIST PROVIDED HISTORY: Reason for exam:->s/p TLC cathete placement What reading provider will be dictating this exam?->CRC FINDINGS: There is a new right internal jugular central venous catheter terminating near the SVC-right atrium junction. No pneumothorax is seen. There is worsening opacification of the left hemithorax. The mediastinum is deviated to the left. Pleural and parenchymal disease in the base of the right hemithorax does not appear significantly changed. New right internal jugular central venous catheter terminating near the SVC-right atrium junction. No post-procedure pneumothorax. Worsening opacification of the left hemithorax and stable findings on the right. XR CHEST PORTABLE    Result Date: 12/17/2022  EXAMINATION: ONE XRAY VIEW OF THE CHEST 12/17/2022 7:48 am COMPARISON: Comparison studies of a July 22nd through December 16 HISTORY: ORDERING SYSTEM PROVIDED HISTORY: resp distress TECHNOLOGIST PROVIDED HISTORY: Reason for exam:->resp distress What reading provider will be dictating this exam?->CRC FINDINGS: There is a large size Bochdalek type of diaphragmatic hernia across the midline which causes compression atelectasis in the medial inferior aspect of both lungs. Right-sided pigtail chest catheter has tip in the right base. If pleural effusion is present in the right base will be discrete or minimal. No right-sided pneumothorax seen.  There are increased density in mid lower aspect of the left lung in addition to the diaphragmatic hernia which relates most likely with a moderate left-sided pleural effusion which is has increase since recent examinations. There is no left-sided pneumothorax. Patient had previous fusion of the thoracolumbar spine with levels of vertebroplasty in the lower thoracic spine. 1.  No changes position of the right-sided chest tube. No right-sided pneumothorax. The most of the right-sided pleural effusion has been drained, if present to be discrete or small. 2.  Moderate left-sided pleural effusion. If quantification of left-sided pleural effusion will be needed for clinical management bedside ultrasound or left lateral decubitus views of the chest can be helpful. 3.  No pneumothorax on the right or on the left. 4.  Large size diaphragmatic across the midline. XR CHEST PORTABLE    Result Date: 12/16/2022  EXAMINATION: ONE XRAY VIEW OF THE CHEST 12/16/2022 7:12 pm COMPARISON: 12/16/2022 HISTORY: ORDERING SYSTEM PROVIDED HISTORY: Tachyonic, decreased oxygen saturation TECHNOLOGIST PROVIDED HISTORY: Reason for exam:->tachyonic, decreased oxygen saturation What reading provider will be dictating this exam?->CRC FINDINGS: There is a right chest tube present at the lung bases. No right effusions are observed. There is compressive atelectasis involving the right mid lower lung. On the left, there appears to be slightly increased parenchymal density suggest atelectasis. This process silhouettes the left heart border and diaphragm. There are no signs of associated pneumothorax. There are stable postoperative changes of the lumbar spine there signs of previous cement augmentation of vertebral bodies. 1.  Increased parenchymal density projecting over the left lower lung concerning for atelectasis and/or consolidation 2. No signs of right effusion 3.   Parenchymal density projecting over the right lower lung concerning for atelectasis     XR CHEST PORTABLE    Result Date: 12/16/2022  EXAMINATION: ONE XRAY VIEW OF THE CHEST 12/16/2022 2:39 pm COMPARISON: Comparison studies of January 6, 2021 through December 14 1022. Reviewed the previous CT chest of December 14 and July 22nd 2022. Lilia Amato HISTORY: ORDERING SYSTEM PROVIDED HISTORY: Post R Pigtail placement TECHNOLOGIST PROVIDED HISTORY: Reason for exam:->Post R Pigtail placement What reading provider will be dictating this exam?->CRC FINDINGS: Patient has a large Bochdalek type of a diaphragmatic hernia across the midline which causes compression atelectasis in the medial inferior aspect of both lungs. There is a right-sided chest tube pigtail extremity is in the right base. No conspicuous right-sided pneumothorax seen. There is no left-sided pneumothorax. No conspicuous right-sided pleural effusions seen. There appears to be a small left-sided pleural effusion present. Above the level of the hernia there is no indication for infiltrates lung parenchyma. There is no perihilar vascular congestion. The heart is difficult identified due the distension of the diaphragmatic hernia. 1.  Right-sided pigtail catheter in the right base. 2.  No conspicuous right-sided pleural effusions identified. No right-sided pneumothorax. 3.  Cannot exclude a small left-sided pleural effusion. 4.  Large size diaphragmatic hernia across the midline causing compression atelectasis in the medial aspect of both lungs. XR CHEST PORTABLE    Result Date: 12/14/2022  EXAMINATION: ONE XRAY VIEW OF THE CHEST 12/14/2022 10:39 am COMPARISON: CT of the chest of 07/22/2022 HISTORY: ORDERING SYSTEM PROVIDED HISTORY: sob TECHNOLOGIST PROVIDED HISTORY: Reason for exam:->sob What reading provider will be dictating this exam?->CRC FINDINGS: The heart is enlarged. There is a chronic diaphragmatic hernia/large hiatal hernia. The hernia accounts for blunting of the right costophrenic angle. There is consolidation seen in the left lung base and there is blunting the left costophrenic angle. The upper lobes are clear.      1. Consolidation seen within the left lung base with blunting the left costophrenic angle which could represent pneumonia or atelectasis 2. A trace left pleural effusion cannot be excluded 3. Large right diaphragmatic hernia/hiatal hernia which was noted on the patient's previous CT of the chest of 07/22/2022. CTA PULMONARY W CONTRAST    Result Date: 12/14/2022  EXAMINATION: CTA OF THE CHEST; CT OF THE ABDOMEN AND PELVIS WITH CONTRAST 12/14/2022 12:21 pm TECHNIQUE: CTA of the chest was performed after the administration of intravenous contrast.  Multiplanar reformatted images are provided for review. MIP images are provided for review. Automated exposure control, iterative reconstruction, and/or weight based adjustment of the mA/kV was utilized to reduce the radiation dose to as low as reasonably achievable.; CT of the abdomen and pelvis was performed with the administration of intravenous contrast. Multiplanar reformatted images are provided for review. Automated exposure control, iterative reconstruction, and/or weight based adjustment of the mA/kV was utilized to reduce the radiation dose to as low as reasonably achievable. COMPARISON: 07/22/2022 HISTORY: ORDERING SYSTEM PROVIDED HISTORY: SOB, hypoxia, cough TECHNOLOGIST PROVIDED HISTORY: Reason for exam:->SOB, hypoxia, cough Decision Support Exception - unselect if not a suspected or confirmed emergency medical condition->Emergency Medical Condition (MA) What reading provider will be dictating this exam?->CRC; FINDINGS: CHEST CT SCAN: Unremarkable opacification of the pulmonary vessels, no evidence of pulmonary embolism is seen. Scattered calcifications visualized in the aortic arch, no evidence for aneurysmal dilatation or arterial dissection is seen. The heart is prominent in size, no evidence of pericardial effusion is seen. Atherosclerotic calcifications visualized in the coronary vessels. Subtle hilar and mediastinal lymphadenopathy is seen.  Extensive thickening visualized in the wall of the esophagus, dilated esophagus is seen axial series 302, image 68 with wall measuring approximately 0.7 cm. Narrowing visualized at the gastroesophageal junction visualized on axial series 302, image 130 Soft tissue density visualized in the posterior medial aspect of the right lower lung field with deviation of the mediastinum to the left, elevation of the right hemidiaphragm is seen, suboptimal opacification is visualized cannot ascertain if this represents pleural fluid, bowel or a soft tissue density however on the prior study obtained on 07/22/2022 there was no soft tissue mass at this location. Mild bronchial wall thickening is visualized. Bilateral lower lobe consolidation and pleural effusions visualized. No evidence of endobronchial or endoluminal lesions are seen. Bilateral breast implants. No evidence of chest wall mass Degenerative bone changes are seen. ABDOMEN AND PELVIS CT SCAN: The liver demonstrates increased attenuation with mild nodularity of its surface. , a 1.2 cm simple cyst is visualized in the right lobe of the liver, no evidence of masses no evidence of intrahepatic biliary dilatation is seen. The gallbladder is distended, some small layering stones visualized within the neck of the gallbladder measuring approximately 0.6 cm,, no evidence of gallbladder wall thickening or pericholecystic  stranding. The common bile duct is unremarkable. The pancreas and spleen demonstrate no evidence of masses. The pancreas is atrophic, the spleen is small in size. The adrenal glands demonstrate unremarkable contours with no evidence of masses. The kidneys demonstrate no evidence of stones no evidence of renal masses. No evidence of hydronephrosis or hydroureter is seen. GI/Bowel: Narrowing of the gastroesophageal junction visualized on coronal series 605, image 64. The stomach is slightly prominent, thickening of the wall of the distal stomach/pyloric outlet is seen on coronal series 605, image 52, no evidence of masses.   No significant distention of the small and large bowel loops is visualized. The appendix is visualized and is unremarkable. Abundance of stool is visualized in the large bowel. Scattered diverticular disease visualized but no evidence of acute diverticulitis. Pelvis: The urinary bladder is not optimally distended. The prostate gland is unremarkable. No evidence of free fluid in the pelvis. No evidence of pelvic mass is seen. Peritoneum/Retroperitoneum: No evidence of free fluid or air within the peritoneal cavity. Bones/Soft Tissues Abdominal wall soft tissues are unremarkable. Internal fixation of the thoracolumbar junction is seen. Vertebral augmentation of the T10, T11, L3 and L4 vertebral bodies is seen. Degenerative changes of the lumbar spine visualized. No evidence of pulmonary embolism. Bilateral lower lobe pulmonary consolidations and pleural effusions seen. Deviation of the mediastinum to the left is visualized. Mild soft tissue prominence visualized surrounding the esophagus with narrowing at the gastroesophageal junction but appears to be due to external compression. Cannot rule left soft tissue mass at this location. Wall thickening visualized in the gastric outlet with mild prominence of the stomach seen, this could be artifactual. Distended gallbladder is seen. Degenerative bone changes, postoperative changes and multilevel vertebral augmentation seen. No evidence of acute abdominal/pelvic pathology. US CHEST INCLUDING MEDIASTINUM    Result Date: 12/15/2022  EXAMINATION: ULTRASOUND OF THE CHEST 12/15/2022 10:12 am COMPARISON: None. HISTORY: ORDERING SYSTEM PROVIDED HISTORY: pleural effusion TECHNOLOGIST PROVIDED HISTORY: Reason for exam:->pleural effusion What reading provider will be dictating this exam?->CRC FINDINGS: Probable complex loculated right pleural effusion. Simple left pleural effusion is present. Probable complex loculated right pleural effusion.  Simple left pleural effusion. IR GUIDED PERC PLEURAL DRAIN W CATH INSERT    Result Date: 12/16/2022  PROCEDURE: IR PERC CATH PLEURAL DRAIN W/IMAG MODERATE CONSCIOUS SEDATION 12/16/2022 HISTORY: ORDERING SYSTEM PROVIDED HISTORY: righ chest tube pigtail drain for loculated pleural effusion TECHNOLOGIST PROVIDED HISTORY: Place right chest tube for loculated effusion. Send fluid for: gram stain, culture, fungal culture, LDH, Protein, pH, glucose, cell count with diff, triglycerides, cytology Reason for exam:->righ chest tube pigtail drain for loculated pleural effusion Which side should the procedure be performed?->Right What reading provider will be dictating this exam?->CRC TECHNIQUE: Ultrasound CONTRAST: None SEDATION: Moderate sedation was ordered and supervised by the attending with physician face-to-face monitoring. Medications were provided and recorded by Radiology nurses. Moderate sedation was provided for 23 minutes FLUOROSCOPY DOSE AND TYPE OR TIME AND EXPOSURES: None Number of images: 6 DESCRIPTION OF PROCEDURE: Informed consent was obtained after a detailed explanation of the procedure including risks, benefits, and alternatives. Universal protocol was observed. Sterile gowns, masks, hats and gloves utilized for maximal sterile barrier. The right chest wall was prepped and draped using sterile technique. 1% lidocaine was used for local anesthesia. Using a scalpel an incision was made. Under ultrasound guidance using a one-step needle access into the right pleural fluid collection was achieved and serous fluid was aspirated. An Amplatz guidewire was placed. The tract was dilated to 7 Western Ning and an 8 Western Ning pigtail catheter was guided into the pleural space. A right thoracentesis was performed. The catheter was sutured with 2-0 Ethilon and set to a Pleur-Evac device. FINDINGS: There is a fluid collection projecting over the right lower lung. The images reveal signs of atelectasis.   Approximately 105 cc of fluid was removed. The patient tolerated the procedure well and there were no immediate complications. 1.  Successful ultrasound-guided right thoracentesis.  2.  Successful placement of an 8 Lao pleural drain     LABS  Recent Labs     12/17/22  0553 12/17/22  0741 12/18/22  0401   WBC 14.8* 15.4* 12.1*   HGB 9.6* 9.4* 7.0*   HCT 29.7* 28.0* 21.0*   MCV 91.1 88.3 89.0    316 217     Recent Labs     12/17/22  0553 12/17/22  1629 12/18/22  0401    140 143   K 3.5 3.6 3.9    104 108*   CO2 27 27 27   BUN 48* 44* 39*   CREATININE 2.5* 2.3* 2.3*   LABGLOM 19 21 21   GLUCOSE 232* 299* 182*   PROT  --   --  4.4*   LABALBU  --   --  1.9*   CALCIUM 8.4* 7.9* 7.9*   BILITOT  --   --  0.4   ALKPHOS  --   --  59   AST  --   --  61*   ALT  --   --  52*     Recent Labs     12/17/22  0741   PROCAL 1.57*     Lab Results   Component Value Date    CRP 45.9 (H) 12/14/2022    CRP 0.3 03/30/2022    CRP 0.3 02/04/2021     Lab Results   Component Value Date    SEDRATE 16 03/30/2022    SEDRATE 25 (H) 02/04/2021    SEDRATE 33 (H) 01/13/2020     No results found for: [id number]  Lab Results   Component Value Date/Time    ADVIRPCR Not Detected 12/17/2022 10:32 AM    BPARAPPCR Not Detected 12/17/2022 10:32 AM    BPPTXPPCR Not Detected 12/17/2022 10:32 AM    CHLPNEPCR Not Detected 12/17/2022 10:32 AM    OIF736DHWC Not Detected 12/17/2022 10:32 AM    HSFXTL8OTP Not Detected 12/17/2022 10:32 AM    LAKPK09TSX Not Detected 12/17/2022 10:32 AM    SQGLC74SQX Not Detected 12/17/2022 10:32 AM    COVID19 Not Detected 12/17/2022 10:32 AM    METAPNEPCR Not Detected 12/17/2022 10:32 AM    RHENTPCR Not Detected 12/17/2022 10:32 AM    FLUBPCR Not Detected 12/17/2022 10:32 AM    [facility] Not Detected 12/17/2022 10:32 AM    BVTTBLQ4UCJ Not Detected 12/17/2022 10:32 AM    NJZRYIJ4VNF Not Detected 12/17/2022 10:32 AM    BLDXDYO2YHD Not Detected 12/17/2022 10:32 AM    DQBFNIH3VVS Not Detected 12/17/2022 10:32 AM    RSVPCR Not Detected 12/17/2022 10:32 AM        Lab Results   Component Value Date/Time    COVID19 Not Detected 12/17/2022 10:32 AM     COVID-19/ANAMIKA-COV2 LABS  Recent Labs     12/16/22  1101 12/17/22  0741 12/18/22  0401   PROCAL  --  1.57*  --    INR 1.2  --   --    PROTIME 12.9*  --   --    AST  --   --  61*   ALT  --   --  52*   TRIG  --   --  110     Lab Results   Component Value Date/Time    CHOL 144 02/04/2021 01:00 PM    TRIG 110 12/18/2022 04:01 AM    HDL 39 03/30/2022 12:50 PM    LDLCALC 56 03/30/2022 12:50 PM    LABVLDL 42 03/30/2022 12:50 PM         Lab Results   Component Value Date    HEPCAB NON REACT 02/28/2014        MICROBIOLOGY:          Smear, Respiratory   Date Value Ref Range Status   12/17/2022   Final    Polymorphonuclear leukocytes not seen  Epithelial cells not seen  Moderate yeast  Moderate gram positive rods Diphtheroid like  Few Gram negative rods          Body Fluid Culture, Sterile   Date Value Ref Range Status   12/16/2022   Preliminary    Growth not present, incubation continues  24 Hours growth not present; incubation continues       No results found for: CXSURG  Urine Culture, Routine   Date Value Ref Range Status   07/22/2022 <10,000 CFU/mL  Mixed gram positive organisms    Final   01/21/2015 <10,000 CFU/mL  Mixed gram positive cocci    Final        FINAL IMPRESSION    Patient is a 65 y.o. female who presented with   Chief Complaint   Patient presents with    Shortness of Breath     Sent from [facility] for SOB patient went for hernia repair patient on 6L NC on arrival    and admitted for Hypoxia [R09.02]  Postoperative pneumonia [J95.89, J18.9]  Pneumonia of left lower lobe due to infectious organism [J18.9]  Leukocytosis   B pleural effusion s/p ct right   [place]     For transfer to tertiary center   Resp cx GNR/yeast        vancomycin (VANCOCIN) 500 mg in dextrose 5 % 100 mL IVPB, Once  piperacillin-tazobactam (ZOSYN) 4,500 mg in dextrose 5 % 100 mL IVPB ([id number]), Q12H  vancomycin (VANCOCIN) intermittent dosing (placeholder), RX Placeholder     Check fungitell   May need to antifungal     Cont atbx     Available labs, imaging studies, microbiologic studies have been reviewed. An opportunity to ask questions was given to the patient/FAMILY and questions were answered. Thank you for involving me in the care of [employer]. Please do not hesitate to call [phone]  for any questions or concerns.          Electronically signed by Mayda Rand MD on 12/18/2022 at 10:29 AM

## 2022-12-19 NOTE — CONSULTS
GENERAL SURGERY  CONSULT NOTE  12/18/2022    Physician Consulted: Dr. Otis Mackey  Reason for Consult: VATS  Referring Physician: Dr. Marly OVIEDO  Brandon Michelle is a 65 y.o. female with a past medical history of breast cancer, TIA, hiatal hernia who underwent hiatal hernia repair on 12/9/2022 at [facility]. On 12/14 she presented to [facility] for acute respiratory distress. She is found to have bilateral pleural effusions with consolidation/atelectasis. She was evaluated for esophageal leak which all imaging came back negative. Her thoracentesis fluid cultures remained negative to date. She had a right-sided pigtail placed. She continued to have postoperative respiratory difficulty was transferred to the ICU on 12/17 for hypoxia. She had a CT scan which showed concern for complex right-sided effusion for which CT surgery was consulted. Dr. Otis Mackey is covering select thoracic cases during the absence of Dr. Wendy Hernandez. The case was discussed with Dr. Otis Mackey.      Past surgical history include bilateral breast implants      Past Medical History:   Diagnosis Date    Anxiety with somatization 3/30/2021    Compression fracture of L1 lumbar vertebra [facility]) november 1994    Compression fracture of L4 lumbar vertebra     secondary to fall in December 2013    Fall 1989 & 12/2013    History of bilateral breast cancer 1980's    breast    Hx-TIA (transient ischemic attack) 3/30/2021    Osteoporosis     Pain syndrome, chronic 3/30/2021    Thyroid disease     went off of medication    Type II or unspecified type diabetes mellitus without mention of complication, not stated as uncontrolled     resolved with weight loss of 20 lbs       Past Surgical History:   Procedure Laterality Date    BACK SURGERY  1989    PLIF T12 to L2 with pedicle screws and rods    BREAST SURGERY Bilateral     breast implants (after mastectomy)    FIXATION KYPHOPLASTY  03/10/2014    with bone biopsy    IR PERC CATH PLEURAL DRAIN W/IMAG  12/16/2022    IR PERC CATH PLEURAL DRAIN W/IMAG 12/16/2022 L Helen Caldwell MD [facility]    MASTECTOMY Bilateral     TONSILLECTOMY AND ADENOIDECTOMY      TUBAL LIGATION         Medications Prior to Admission    Prior to Admission medications    Medication Sig Start Date End Date Taking? Authorizing Provider   Cholecalciferol (VITAMIN D3) 125 MCG (5000 UT) TABS Take by mouth    Historical Provider, MD   levothyroxine (SYNTHROID) 50 MCG tablet Take 50 mcg by mouth daily    Historical Provider, MD   rosuvastatin (CRESTOR) 20 MG tablet Take 1 tablet by mouth nightly 1/7/21   Yasmin Cespedes MD   aspirin 325 MG EC tablet Take 1 tablet by mouth daily 1/7/21 1/7/22  Yasmin Cespedes MD   Handicap Placard MISC by Does not apply route Duration: Lifetime 6/21/18   Jerre Creeks Jony, DO       Allergies   Allergen Reactions    Benadryl [Diphenhydramine] Itching    Ibuprofen Other (See Comments)     Doesn't know       Family History   Problem Relation Age of Onset    Heart Disease Mother     Diabetes Mother     Other Mother         aneurysm    Heart Disease Father     Cancer Father         thyroid        Social History     Tobacco Use    Smoking status: Never    Smokeless tobacco: Never   Substance Use Topics    Alcohol use: Not Currently     Comment: 1 glass a month    Drug use: No         Review of Systems: pertinent ROS listed in HPI, all others negative       PHYSICAL EXAM:    Vitals:    12/18/22 2100   BP: (!) 143/87   Pulse: (!) 112   Resp: (!) 46   Temp:    SpO2: 94%          GENERAL: Calm, no acute distress  LUNGS: Equal chest rise on heated high flow nasal cannula FiO2 65, flow rate 45. Right-sided pigtail catheter with 100cc/24hr  CARDIOVASCULAR: Tachycardia,   ABDOMEN:  Soft, non-distended, non-tender. No guarding, rigidity, rebound. Incisions appear clean dry and intact w/ abdominal ecchymosis around incisions.   Peterson catheter in place-660cc  Pleural fluid cultures remain negative to date      ASSESSMENT/PLAN:  65 y.o. female with complex right-sided effusion after robotic hiatal hernia repair 12/9 without signs of esophageal perforation/leak    We will plan for VATS with chest tube placement on 12/20  No plans for second chest tube placement unless worsening respiratory status then we would reevaluate   NPOMN 12/20  Before DVT prophylactic Lovenox  Medical management per Critical care and diet per Dr. Rodney Velasquez service. Plan discussed with Dr. Nadine Urbina.     Electronically signed by Leticia Coffey MD on 12/18/22 at 9:22 PM EST

## 2022-12-19 NOTE — PROGRESS NOTES
Physical Therapy    PT consult to evaluate/treat received and appreciated. Pt chart reviewed and evaluation attempted. Pt is currently on hold following intubation. Pt is also schedule for VATS. Will check back as able. Thank you.         Ranjana Márquez, PT, DPT   [id number]

## 2022-12-19 NOTE — PROGRESS NOTES
Hospitalist Progress Note      PCP: Oscar Howell DO    Date of Admission: 12/14/2022        Hospital Course:  77 y.o. female presented with PNEUMONIA.  STATES SHE HAD A HH REPAIR AT [facility] ON THE 8TH.  SHE STATES SHE WAS NOT FEELING BAD, BUT WAS TOLD SHE NEEDED TO COME TO THE HOSPITAL BC SHE WAS SICK.  SHE WAS FOUND TO BE SEPTIC, WITH A HAG, SHE WAS TACHY AND TACHYPNEIC . COFFEE GROUND EMESIS THIS MORNING, GASTROCULT  POS .   SURGERY NOTIFIED ** HAD A THROACENTESIS ON YESTERDAY, 105 CC REMOVED.    ** RRT  DUE TO RESPIRATORY DISTRESS ** TO TRANSFER TO  OR [facility]** INTUBATED THIS MORNING DUE TO HYPOXIA        Subjective: SEDATED AND INTUBATE D          Medications:  Reviewed    Infusion Medications    PN-Adult  3 IN 1 Central Line (Custom)      propofol 30 mcg/kg/min (12/19/22 0959)    fentaNYL 100 mcg/hr (12/19/22 0955)    norepinephrine 4 mcg/min (12/19/22 0959)    sodium chloride      PN-Adult  3 IN 1 Central Line (Custom) 43.8 mL/hr at 12/19/22 0555    sodium chloride      sodium chloride       Scheduled Medications    propofol        norepinephrine        vancomycin  15 mg/kg IntraVENous Once    meropenem  1,000 mg IntraVENous Q8H    pantoprazole (PROTONIX) 40 mg injection  40 mg IntraVENous Q12H    albumin human-kjda  25 g IntraVENous Q8H    acetylcysteine  4 mL Inhalation Q4H WA    sodium chloride flush  5-40 mL IntraVENous 2 times per day    heparin flush  3 mL IntraVENous 2 times per day    metoclopramide  10 mg IntraVENous Q6H    vancomycin (VANCOCIN) intermittent dosing (placeholder)   Other RX Placeholder    Arformoterol Tartrate  15 mcg Nebulization BID    budesonide  0.5 mg Nebulization BID    albuterol  2.5 mg Nebulization Q4H WA    [Held by provider] metoprolol  5 mg IntraVENous Q6H    rosuvastatin  10 mg Oral Nightly    sodium chloride flush  5-40 mL IntraVENous 2 times per day    levothyroxine  50 mcg Oral Daily    [Held by provider] enoxaparin  30 mg SubCUTAneous Daily     PRN Meds:  sodium chloride, sodium chloride flush, sodium chloride, heparin flush, fentanNYL, oxyCODONE **OR** oxyCODONE, sodium chloride flush, sodium chloride, ondansetron **OR** ondansetron, magnesium hydroxide, acetaminophen **OR** acetaminophen, ipratropium-albuterol      Intake/Output Summary (Last 24 hours) at 12/19/2022 1405  Last data filed at 12/19/2022 1156  Gross per 24 hour   Intake 1307.67 ml   Output 772 ml   Net 535.67 ml       Exam:    BP (!) 63/43   Pulse 78   Temp 97.3 °F (36.3 °C) (Temporal)   Resp 16   Ht 5' 2\" (1.575 m)   Wt 150 lb (68 kg)   SpO2 93%   BMI 27.44 kg/m²       General appearance:  No apparent distress, appears stated age. HEENT:  Normal cephalic,   Neck: Supple, with full range of motion. Trachea midline. Respiratory:  Normal respiratory effort. RHONCHI NOTED BILATERALLY  Cardiovascular:  RRR  Abdomen: Soft, non-tender, non-distended with normal bowel sounds. Musculoskeletal:  No clubbing, cyanosis or edema bilaterally. Skin: smooth and dry               Labs:   Recent Labs     12/17/22  0741 12/18/22  0401 12/18/22  1045 12/18/22  2307 12/19/22  0402 12/19/22  1200   WBC 15.4* 12.1*  --   --  14.2*  --    HGB 9.4* 7.0*   < > 7.1* 7.3* 6.1*   HCT 28.0* 21.0*   < > 21.4* 22.5* 18.7*    217  --   --  234  --     < > = values in this interval not displayed. Recent Labs     12/17/22  1629 12/18/22  0401 12/19/22  0402    143 141   K 3.6 3.9 4.3  4.3    108* 105   CO2 27 27 26   BUN 44* 39* 39*   CREATININE 2.3* 2.3* 2.2*   CALCIUM 7.9* 7.9* 8.6   PHOS  --  1.4* 3.6     Recent Labs     12/18/22  0401 12/19/22  0402   AST 61* 27   ALT 52* 32   BILITOT 0.4 0.6   ALKPHOS 59 55     No results for input(s): INR in the last 72 hours. No results for input(s): Maida Risk in the last 72 hours.   Recent Labs     12/18/22  0401 12/19/22  0402   AST 61* 27   ALT 52* 32   BILITOT 0.4 0.6   ALKPHOS 59 55     Recent Labs     12/17/22  0741   LACTA 1.8     No results found for: Ronnie Gray  No results found for: AMMONIA    Assessment:    Active Hospital Problems    Diagnosis Date Noted    Hypoxia [R09.02] 12/17/2022     Priority: Medium    Pneumonia of left lower lobe due to infectious organism [J18.9] 12/17/2022     Priority: Medium    Aspiration pneumonia of both lower lobes due to gastric secretions ([address] [J69.0] 12/17/2022     Priority: Medium    Postoperative pneumonia [J95.89, J18.9] 12/14/2022     Priority: Medium   ACUTE RESP FAILURE WITH HYPOXIA  GASTROCULT POS  S/P HH REPAIR  PAWAN BASELINE CREATINE 1.0)  H.O BREAST CANCER   HLD   PLEURAL EFFUSIONS BILATERALLY     PLAN:  ROCEPHIN   ZITHROMAX   FLUIDS  MONITOR BMP   FOR  CAMACHO  DVT Prophylaxis: SCD  Diet: Diet NPO Exceptions are: Ice Chips  PN-Adult  3 IN 1 Central Line (Custom)  Diet NPO Exceptions are: Sips of Water with Meds  PN-Adult  3 IN 1 Central Line (Custom)  Code Status: Full Code    PT/OT Eval Status:   WHEN STABLE    Dispo -  TO [facility] OR       Electronically signed by Curry Engle DO on 12/19/2022 at 2:05 PM [facility]

## 2022-12-19 NOTE — PROGRESS NOTES
[facility] PROGRESS NOTE      Chief complaint: Follow-up of pneumonia    This patient is a 78-year-old female with history of bilateral breast cancer, DM, hiatal hernia repair on 12/09, presented on 12/14 with shortness of breath for 2 days accompanied by productive cough and malaise prior to her hiatal hernia repair, thought to have large postoperative seroma after a type IV hiatal hernia repair. On admission, she was afebrile and hemodynamically stable with no leukocytosis. CT chest, abdomen and pelvis showed soft tissue density in the posterior medial aspect of the right lower lung field with deviation of the mediastinum to the left, bilateral lower lobe consolidation and pleural effusions, mild soft tissue prominence surrounding the esophagus with narrowing at the gastroesophageal junction appearing to be due to external compression, wall thickening in the gastric outlet with mild prominence of the stomach, distended gallbladder, no evidence of acute abdominal/pelvic pathology. Esophagram was negative for esophageal perforation. She underwent right-sided thoracentesis on 12/16 during which 105 mL of serous fluid was removed. Pleural fluid gram stain and culture showed rare polymorphonuclear leukocytes, no epithelial cells, no organisms, no growth. She was transferred to MICU on 12/17 for worsening hypoxemia. Repeat CT chest on 12/17 showed large right pleural effusion of mixed density suggestive of complex effusion with pigtail catheter in the pleural space posteriorly and inferiorly, small to moderate sized left pleural effusion with consolidative infiltrate throughout left upper and lower lung field and aerated portions revealing nodular groundglass opacity, nodular groundglass opacity inferiorly within the aerated right upper and lower lung field, less consolidative infiltrate within the right lower lobe.   Respiratory gram stain and culture showed no polymorphonuclear leukocytes, no epithelial cells, moderate yeast, moderate gram-positive diphtheroid-like rods, few gram-negative rods, moderate growth of Enterobacter cloacae, heavy growth of Candida albicans. Urine Streptococcus pneumoniae and Legionella antigens were negative. Respiratory pathogen PCR panel and MRSA nares culture were negative. Blood cultures showed no growth to date. She received a dose of ceftriaxone and doxycycline on admission. Ampicillin-sulbactam was started on admission and then switched to piperacillin-tazobactam on 12/17 then switched to meropenem on 12/19. Vancomycin was started on 12/17. ID service was subsequently consulted for further recommendations. Subjective: Patient was seen and examined. She remains in critical condition, intubated and sedated. Objective:    Vitals:    12/19/22 1437   BP: 128/79   Pulse: 85   Resp: 16   Temp: 97 °F (36.1 °C)   SpO2: 99%     Constitutional: Intubated, no MV dyssynchrony  Respiratory: Clear breath sounds, no crackles, no wheezes  Cardiovascular: Regular rate and rhythm, no murmurs  Gastrointestinal: Bowel sounds present, soft, nontender  Skin: Warm and dry, no active dermatoses  Musculoskeletal: No joint swelling, no joint erythema    Labs, imaging, and medical records/notes were personally reviewed. Assessment:  Acute hypoxic respiratory failure  Pleural effusion, complex, right, suspected to be postoperative seroma in the setting of robotic hiatal hernia repair on 12/09  Enterobacter cloacae HAP. Candida albicans on respiratory culture likely colonization. Recommendations:  Change meropenem to ertapenem 500 mg every 24 hours dosed according to renal function to a target dose of 1 g every 24 hours for CrCl of at last 30 mL/min. Stop vancomycin. Plan for right VATS with direct drainage and chest tube placement is noted. Follow-up intraoperative findings and cultures. Follow-up blood cultures. Continue supportive care.     Thank you for involving me in the care of [facility] MICHELLE Viridiana. I will continue to follow. Please do not hesitate to call for any questions or concerns.       Electronically signed by Cheri Quesada MD on 12/19/2022 at 2:57 PM

## 2022-12-19 NOTE — PROGRESS NOTES
Occupational Therapy    Date:2022  Patient Name: Billy Horn  MRN: [id number]  : 1945  Room: [address] [place] [address]-A     OT orders received and chart reviewed. OT eval on hold at this time as pt intubated & scheduled for VATS. OT will follow and re-attempt eval as appropriate at a later time/date.     Tere Lofton OTR/L; [id number]

## 2022-12-19 NOTE — PROGRESS NOTES
After discussion with Dr. Carol Goetz, Dr. Elda Flores and patients daughter Marcos Aguilar. We will plan for VATS with CT placement on 12/20. No plans for transfer, No plans for second chest tube. Please call [place] if concern for respiratory status and will reevaluate for possible second chest tube. Plan to stay at [facility] and Risks vs benifets of procedure were discussed with the daughter and she agrees to proceed with the procedure.     Electronically signed by Fausto Barba MD on 12/18/22 at 9:42 PM EST

## 2022-12-19 NOTE — PROGRESS NOTES
Pulmonary Subsequent Hospital F/U note    Patient is being followed for: acute respiratory failure with hypoxia, pneumonia, bilateral pleural effusions    Interval HPI:  Ms. Sujit Haque was intubated this AM  She is currently sedated  On norepinephrine 4mcg/min  Current vent settings: ACVC 90%, 16, 400cc, 5  Plans for R VATS tomorrow    ROS:  Unable to obtain      Exam:  BP (!) 63/43   Pulse 78   Temp 97.3 °F (36.3 °C) (Temporal)   Resp 16   Ht 5' 2\" (1.575 m)   Wt 150 lb (68 kg)   SpO2 93%   BMI 27.44 kg/m²    General: Patient is intubated and sedated, no distress  HEENT: PERRL, ETT in place   Neck: supple no adenopathy  CV: RRR without murmur  Lungs: rhonchi bilaterally  Abd: soft, ND, NT, bowel sounds normal  Ext: warm, no edema    Data:    Oximetry:  SpO2 Readings from Last 1 Encounters:   12/19/22 93%       Imaging personally reviewed by myself:  CT chest  Impression   Large right pleural effusion of mixed density to suggest complex effusion. There is a pigtail catheter seen in the pleural space posteriorly and   inferiorly. Small to moderate size left pleural effusion with consolidative   infiltrate throughout the left upper and lower lung field and aerated   portions revealing nodular ground-glass opacity to suggest an infectious   process. Nodular ground-glass opacity inferiorly within the aerated right   upper and lower lung field concerning for an infectious process as well. There is less consolidative infiltrate identified within the right lower lobe.          Respiratory cultures  Enterobacter, zosyn resistant    Pertinent labs reviewed and noted:  Lab Results   Component Value Date/Time    WBC 14.2 12/19/2022 04:02 AM    HGB 6.1 12/19/2022 12:00 PM    HCT 18.7 12/19/2022 12:00 PM    MCV 91.5 12/19/2022 04:02 AM    MCH 29.7 12/19/2022 04:02 AM    MCHC 32.4 12/19/2022 04:02 AM    RDW 14.9 12/19/2022 04:02 AM     12/19/2022 04:02 AM    MPV 10.2 12/19/2022 04:02 AM     Lab Results   Component Value Date/Time     12/19/2022 04:02 AM    K 4.3 12/19/2022 04:02 AM    K 4.3 12/19/2022 04:02 AM     12/19/2022 04:02 AM    CO2 26 12/19/2022 04:02 AM    BUN 39 12/19/2022 04:02 AM    CREATININE 2.2 12/19/2022 04:02 AM    LABALBU 3.1 12/19/2022 04:02 AM    LABALBU 4.3 01/24/2011 01:15 PM    CALCIUM 8.6 12/19/2022 04:02 AM    GFRAA >60 07/22/2022 01:42 PM    LABGLOM 22 12/19/2022 04:02 AM     Lab Results   Component Value Date/Time    PROTIME 12.9 12/16/2022 11:01 AM    INR 1.2 12/16/2022 11:01 AM       Assessment:  1. Acute hypoxic respiratory failure requiring intubation  2. Post operative respiratory insufficiency  3. Pneumonia  4. R loculated pleural effusion s/p IR chest tube with minimal drainage  5. L pleural effusion appears more free flowing  6.  S/p large hiatal hernia repair    Plan:  Continue mechanical ventilation, wean FiO2 as able  Aggressive bronchopulmonary hygiene  Recommend bronchoscopy  R VATS tomorrow  IR R pigtail with minimal output  Antibiotics per ID     Electronically signed by Mickey Balbuena MD on 12/19/2022 at 2:28 PM

## 2022-12-19 NOTE — FLOWSHEET NOTE
Patient tachypnic, using accessory muscles to breathe.  at bedside. Preparing for intubation, Daughter notified.

## 2022-12-19 NOTE — PLAN OF CARE
Problem: Chronic Conditions and Co-morbidities  Goal: Patient's chronic conditions and co-morbidity symptoms are monitored and maintained or improved  Outcome: Progressing  Flowsheets (Taken 12/18/2022 2000)  Care Plan - Patient's Chronic Conditions and Co-Morbidity Symptoms are Monitored and Maintained or Improved: Monitor and assess patient's chronic conditions and comorbid symptoms for stability, deterioration, or improvement     Problem: Discharge Planning  Goal: Discharge to home or other facility with appropriate resources  Outcome: Progressing  Flowsheets (Taken 12/18/2022 2000)  Discharge to home or other facility with appropriate resources: Identify barriers to discharge with patient and caregiver     Problem: Safety - Adult  Goal: Free from fall injury  Outcome: Progressing     Problem: Pain  Goal: Verbalizes/displays adequate comfort level or baseline comfort level  Outcome: Progressing  Flowsheets (Taken 12/18/2022 2000)  Verbalizes/displays adequate comfort level or baseline comfort level: Encourage patient to monitor pain and request assistance     Problem: Nutrition Deficit:  Goal: Optimize nutritional status  Outcome: Progressing

## 2022-12-19 NOTE — PROGRESS NOTES
Dr. Tenzin Garrison called to inform this RN of VATS procedure scheduled for Tuesday and to cancel the transfer to [facility]. NP made aware.

## 2022-12-19 NOTE — PROGRESS NOTES
[address] [facility]             Pulmonary & Critical Care Medicine                MICU Progress Note                 Written by: Jacinto Jensen MD  Name: Iwona Bourgeois : 1945       Age: 65 y.o. MR/Act #    : [id number],  Billing  #    : [id number]   Admit Date: 2022  9:35 AM LOS: 5,   Hospital Day: 6 Room #      : 7058/8940-R   PCP            : Jared Steiner DO,   Referred by: Geo Kearns DO ICU Attending: MD Samuel Crawford date: 2022 1:03 PM   ICU Days:       3 Vent Days:     1 LOS: 5,                          Reason for ICU admission           Chief Complaint   Patient presents with    Shortness of Breath     Sent from [facility] for SOB patient went for hernia repair patient on 6L NC on arrival                          Brief HPI, Presentation & Synopsis                       65 y.o. female with significant past medical history of hypothyroidism, breast cancer, TIA, hiatal hernia underwent hiatal hernia repair on 22 and developed acute respiratory distress and was transfer to [facility] on 22 for further management. Patient was noted into hypoxic respiratory failure and required high flow nasal cannula. CT chest shows bilateral pleural effusion left greater than right. Left-sided pleural effusion is loculated and complex. Patient was intubated on  for acute respiratory failure using accessory muscle. Plan for VATS on the left side on . Continues to be admitted in ICU for respiratory failure associated with bilateral pleural effusion left greater than right.   Bilateral bibasilar pneumonia noted in the CT chest.    Active problem list of yesterday:  Active Hospital Problems    Diagnosis Date Noted    Hypoxia [R09.02] 2022     Priority: Medium    Pneumonia of left lower lobe due to infectious organism [J18.9] 2022     Priority: Medium    Aspiration pneumonia of both lower lobes due to gastric secretions ([address] [J69.0] 2022     Priority: Medium    Postoperative pneumonia [J95.89, J18.9] 2022     Priority: Medium                           Events of last night                      Worsening shortness of breath and using accessory muscles. Intubated . Subjective   2022               Continues to be ventilator dependent                      Objective  2022               Vitals:    22 1259   BP:    Pulse: 82   Resp: 16   Temp:    SpO2: 94%     Temperature range : Temp  Av.7 °F (36.5 °C)  Min: 96.8 °F (36 °C)  Max: 98.8 °F (37.1 °C)  Pulse rate range      : Pulse  Av.9  Min: 82  Max: 116  Respiration range   : Resp  Av.6  Min: 16  Max: 47  BP range                  : Systolic (58LYJ), UYD:175 , Min:88 , AVV:149   ; Diastolic (17QCF), ITP:22, Min:50, Max:110    Pulse Ox range : SpO2  Av.7 %  Min: 93 %  Max: 100 %    Arterial BP       Systolic BP/Diastolic BP & MAP          I/O - 24hr    Intake/Output Summary (Last 24 hours) at 2022 1303  Last data filed at 2022 1156  Gross per 24 hour   Intake 1307.67 ml   Output 822 ml   Net 485.67 ml     BMI: Body mass index is 27.44 kg/m². WEIGHT:  No data found. Wt Readings from Last 2 Encounters:   12/15/22 150 lb (68 kg)   22 155 lb (70.3 kg)       Weight change:     Physical Examination  Neuro: Intubated and sedated. Pulmonary: Bibasilar rales & crackles. Poor air entry in the left side. Cardiovascular: S1& S2 Present, NO S3, No murmur  Abdomen/GI: No distension, BS+, No tenderness   Renal: Decent urine output   MSK: No obvious trauma or injury to extremities   Skin:No rashes, no pressure ulcers.            Medications, Allergies, Nutrition, Immunizations                    Continuous Infusions:   PN-Adult  3 IN 1 Central Line (Custom)      propofol 30 mcg/kg/min (22 0959)    fentaNYL 100 mcg/hr (22 0955)    norepinephrine 4 mcg/min (22 0959)    sodium chloride      PN-Adult  3 IN 1 Central Line (Custom) 43.8 mL/hr at 12/19/22 0555    sodium chloride      sodium chloride       Scheduled Meds:    propofol        norepinephrine        pantoprazole (PROTONIX) 40 mg injection  40 mg IntraVENous Q12H    albumin human-kjda  25 g IntraVENous Q8H    acetylcysteine  4 mL Inhalation Q4H WA    sodium chloride flush  5-40 mL IntraVENous 2 times per day    heparin flush  3 mL IntraVENous 2 times per day    metoclopramide  10 mg IntraVENous Q6H    piperacillin-tazobactam  4,500 mg IntraVENous Q12H    vancomycin (VANCOCIN) intermittent dosing (placeholder)   Other RX Placeholder    Arformoterol Tartrate  15 mcg Nebulization BID    budesonide  0.5 mg Nebulization BID    albuterol  2.5 mg Nebulization Q4H WA    [Held by provider] metoprolol  5 mg IntraVENous Q6H    rosuvastatin  10 mg Oral Nightly    sodium chloride flush  5-40 mL IntraVENous 2 times per day    levothyroxine  50 mcg Oral Daily    [Held by provider] enoxaparin  30 mg SubCUTAneous Daily      Current Facility-Administered Medications   Medication Dose Route Frequency Provider Last Rate Last Admin    PN-Adult  3 IN 1 Central Line (Custom)   IntraVENous Continuous TPN Ranjana Cuba MD        propofol injection  5-50 mcg/kg/min IntraVENous Continuous Radha Head MD 12.2 mL/hr at 12/19/22 0959 30 mcg/kg/min at 12/19/22 0959    fentaNYL 10 mcg/ml in 0.9%  ml infusion   mcg/hr IntraVENous Continuous Radha Head MD 10 mL/hr at 12/19/22 0955 100 mcg/hr at 12/19/22 0955    propofol 1000 MG/100ML injection             norepinephrine (LEVOPHED) 16 mg in dextrose 5% 250 mL infusion  1-100 mcg/min IntraVENous Continuous Radha Head MD 3.8 mL/hr at 12/19/22 0959 4 mcg/min at 12/19/22 0959    norepinephrine (LEVOPHED) 16 mg infusion SOLN             0.9 % sodium chloride infusion   IntraVENous PRN Radha Head MD        pantoprazole (PROTONIX) 40 mg in sodium chloride (PF) 0.9 % 10 mL injection  40 mg IntraVENous Q12H Samir Wilkes MD   40 mg at 12/19/22 9771    PN-Adult  3 IN 1 Central Line (Custom)   IntraVENous Continuous TPN Samir Wilkes MD 43.8 mL/hr at 12/19/22 0555 Rate Verify at 12/19/22 0555    albumin human-kjda 25 % IV solution 25 g  25 g IntraVENous Q8H Francois Miller MD   25 g at 12/19/22 1151    acetylcysteine (MUCOMYST) 10 % solution 400 mg  4 mL Inhalation Q4H WA Arnav Samaniego APRN - CNP   400 mg at 12/19/22 1258    sodium chloride flush 0.9 % injection 5-40 mL  5-40 mL IntraVENous 2 times per day Arnav Samaniego APRN - CNP   10 mL at 12/17/22 1955    sodium chloride flush 0.9 % injection 5-40 mL  5-40 mL IntraVENous PRN Arnav Samaniego APRN - CNP        0.9 % sodium chloride infusion   IntraVENous PRN Arnav Samaniego APRN - CNP        heparin flush 100 UNIT/ML injection 300 Units  3 mL IntraVENous 2 times per day Arnav Samaniego APRN - CNP        heparin flush 100 UNIT/ML injection 300 Units  3 mL IntraCATHeter PRN Arnav Samaniego APRN - CNP        metoclopramide (REGLAN) injection 10 mg  10 mg IntraVENous Q6H Heather Waldron MD   10 mg at 12/19/22 0947    piperacillin-tazobactam (ZOSYN) 4,500 mg in dextrose 5 % 100 mL IVPB (Apde9Jmg)  4,500 mg IntraVENous Q12H Arnav Samaniego APRN - CNP 25 mL/hr at 12/19/22 0949 4,500 mg at 12/19/22 0949    vancomycin (VANCOCIN) intermittent dosing (placeholder)   Other RX Placeholder Arnav Samaniego APRN - CNP        Arformoterol Tartrate (BROVANA) nebulizer solution 15 mcg  15 mcg Nebulization BID Naomia Bonus, APRN - CNP   15 mcg at 12/19/22 0903    budesonide (PULMICORT) nebulizer suspension 500 mcg  0.5 mg Nebulization BID Naomia Bonus, APRN - CNP   500 mcg at 12/19/22 0903    albuterol (PROVENTIL) nebulizer solution 2.5 mg  2.5 mg Nebulization Q4H MEGHANN Messina MD   2.5 mg at 12/19/22 1258    [Held by provider] metoprolol (LOPRESSOR) injection 5 mg  5 mg IntraVENous Q6H KAR Bryan - CNP   5 mg at 12/18/22 0820    fentaNYL (SUBLIMAZE) injection 25 mcg  25 mcg IntraVENous Q4H PRN Nguyen Friend APRN - CNP   25 mcg at 12/19/22 0506    oxyCODONE (ROXICODONE) immediate release tablet 5 mg  5 mg Oral Q4H PRN Yuly Mary Grace, DO        Or    oxyCODONE HCl (OXY-IR) immediate release tablet 10 mg  10 mg Oral Q4H PRN Yuly Mary Grace, DO   10 mg at 12/19/22 0231    rosuvastatin (CRESTOR) tablet 10 mg  10 mg Oral Nightly Karly Coburn APRN - CNP   10 mg at 12/16/22 2033    sodium chloride flush 0.9 % injection 5-40 mL  5-40 mL IntraVENous 2 times per day Karly Coburn, APRN - CNP   10 mL at 12/19/22 0953    sodium chloride flush 0.9 % injection 10 mL  10 mL IntraVENous PRN Karly Coburn, APRN - CNP        0.9 % sodium chloride infusion   IntraVENous PRN Karly Coburn, APRN - CNP        ondansetron (ZOFRAN-ODT) disintegrating tablet 4 mg  4 mg Oral Q8H PRN Karly Coburn, APRN - CNP   4 mg at 12/14/22 2155    Or    ondansetron (ZOFRAN) injection 4 mg  4 mg IntraVENous Q6H PRN Karly Coburn, APRN - CNP   4 mg at 12/19/22 0138    magnesium hydroxide (MILK OF MAGNESIA) 400 MG/5ML suspension 30 mL  30 mL Oral Daily PRN Karly Coburn, APRN - CNP        acetaminophen (TYLENOL) tablet 650 mg  650 mg Oral Q6H PRN Karly Coburn, APRN - CNP        Or    acetaminophen (TYLENOL) suppository 650 mg  650 mg Rectal Q6H PRN Karly Coburn, APRN - CNP        ipratropium-albuterol (DUONEB) nebulizer solution 1 ampule  1 ampule Inhalation Q4H PRN Karly Coburn, APRN - CNP   1 ampule at 12/17/22 0216    levothyroxine (SYNTHROID) tablet 50 mcg  50 mcg Oral Daily William Camargo MD   50 mcg at 12/17/22 0526    [Held by provider] enoxaparin Sodium (LOVENOX) injection 30 mg  30 mg SubCUTAneous Daily Karly Coburn APRN - CNP   30 mg at 12/18/22 0821     PRN Meds      : sodium chloride, sodium chloride flush, sodium chloride, heparin flush, fentanNYL, oxyCODONE **OR** oxyCODONE, sodium chloride flush, sodium chloride, ondansetron **OR** ondansetron, magnesium hydroxide, acetaminophen **OR** acetaminophen, ipratropium-albuterol    Allergy            : Benadryl [diphenhydramine] and Ibuprofen  Immunization      :   Immunization History   Administered Date(s) Administered    Pneumococcal Conjugate 13-valent ([id number]) 06/01/2016             Labs and Imaging Studies                  CBC  :  Recent Labs     12/17/22  0741 12/18/22  0401 12/18/22  1045 12/18/22  2307 12/19/22  0402 12/19/22  1200   WBC 15.4* 12.1*  --   --  14.2*  --    RBC 3.17* 2.36*  --   --  2.46*  --    HGB 9.4* 7.0*   < > 7.1* 7.3* 6.1*   HCT 28.0* 21.0*   < > 21.4* 22.5* 18.7*   MCV 88.3 89.0  --   --  91.5  --    MCH 29.7 29.7  --   --  29.7  --    MCHC 33.6 33.3  --   --  32.4  --    RDW 14.0 14.4  --   --  14.9  --     217  --   --  234  --    MPV 9.7 9.9  --   --  10.2  --     < > = values in this interval not displayed. Comprehensive :   Recent Labs     12/17/22  1629 12/18/22  0401 12/19/22  0402    143 141   K 3.6 3.9 4.3  4.3    108* 105   CO2 27 27 26   BUN 44* 39* 39*   CREATININE 2.3* 2.3* 2.2*   GLUCOSE 299* 182* 268*   CALCIUM 7.9* 7.9* 8.6   PROT  --  4.4* 5.6*   LABALBU  --  1.9* 3.1*   BILITOT  --  0.4 0.6   ALKPHOS  --  59 55   AST  --  61* 27   ALT  --  52* 32     Cardiac :   Lab Results   Component Value Date    CKTOTAL 59 01/13/2020    TROPONINI <0.01 01/13/2020     Lab Results   Component Value Date    PROBNP 1,373 (H) 12/17/2022    PROBNP 879 (H) 12/15/2022     PRO-BNP : No results for input(s): BNP in the last 72 hours. BNP: No results for input(s): BNP in the last 72 hours. Lactic Acid : @BRIEFLAB(LACTA:3)    Lipase  : No results for input(s): LIPASE in the last 72 hours.     Sed rate :   Sed Rate   Date Value Ref Range Status   03/30/2022 16 0 - 20 mm/Hr Final   02/04/2021 25 (H) 0 - 20 mm/Hr Final   01/13/2020 33 (H) 0 - 20 mm/Hr Final   06/13/2019 22 (H) 0 - 20 mm/Hr Final     CReactive Protein:   CRP   Date Value Ref Range Status   12/14/2022 45.9 (H) 0.0 - 0.4 mg/dL Final   03/30/2022 0.3 0.0 - 0.4 mg/dL Final   02/04/2021 0.3 0.0 - 0.4 mg/dL Final   01/13/2020 0.2 0.0 - 0.4 mg/dL Final   06/13/2019 0.3 0.0 - 0.4 mg/dL Final     Globulins :   No results for input(s): IGG in the last 72 hours. No results for input(s): IGM in the last 72 hours. No results for input(s): IGA in the last 72 hours. No results for input(s): IGG1 in the last 72 hours. No results for input(s): IGG2 in the last 72 hours. No results for input(s): IGG3 in the last 72 hours. No results for input(s): IGG4 in the last 72 hours. Magnesium  Lab Results   Component Value Date/Time    MG 2.4 12/19/2022 04:02 AM     Phosphorus  Lab Results   Component Value Date/Time    PHOS 3.6 12/19/2022 04:02 AM     Ionized Calcium  Lab Results   Component Value Date/Time    CAION 1.28 12/19/2022 04:02 AM        COVID-19 Labs:  Lab Results   Component Value Date/Time    COVID19 Not Detected 12/17/2022 10:32 AM       Notable Cultures:    Blood cultures   Blood Culture, Routine   Date Value Ref Range Status   12/17/2022 24 Hours no growth  Preliminary      Recent Labs     12/17/22  1522   BC 24 Hours no growth      Recent Labs     12/17/22  1522   BLOODCULT2 24 Hours no growth    No results for input(s): LABURIN in the last 72 hours. Respiratory cultures No results found for: RESPCULTURE   Gram Stain Result   Date Value Ref Range Status   12/16/2022 Refer to ordered Gram stain for results  Final     Urine   Urine Culture, Routine   Date Value Ref Range Status   07/22/2022 <10,000 CFU/mL  Mixed gram positive organisms    Final     Legionella No results found for: LABLEGI  C Diff PCR No results found for: CDIFPCR  Wound culture/abscess: No results for input(s): WNDABS in the last 72 hours. Tip culture:No results for input(s): CXCATHTIP in the last 72 hours.   ------------------------------------------------------------  Organism   Organism   Date Value Ref Range Status   12/17/2022 Enterobacter cloacae complex (A)  Final   12/17/2022 Candida albicans (A)  Final     Aerobic No components found for: LABEARO  Anaerobic No results found for: LABANAE  ------------------------------------------------------------  Strep pneumo No results found for: SPNEUAGU  HSV No results found for: HSVSRC  FLU No results found for: FLUA No results found for: FLUB  Acid fast No results found for: AFBSMEAR  Stool No results found for: CXST  Fungust No results found for: FUNGUSSMEAR  VRE screen No results found for: VRECX  MRSA scree No components found for: MRSACU  Ecoli O051:H7 No results found for: SUHRW303  Giardia No results found for: GIAAGS  Rotavirus No results found for: ROTA  Fecal leukocytes No results found for: FECLEU  -----------------------------------------------------  Fecal occult blood: . No results for input(s): OCCULTBLDFEC in the last 72 hours. Occult blood screening: No results for input(s): OCCBS in the last 72 hours. Occult blood QC: No results for input(s): OBQC in the last 72 hours. ABGs:   Recent Labs     12/19/22  0813   PH 7.367   PCO2 46.8*   PO2 153.4*   HCO3 26.3*   BE 0.7   O2SAT 98.8*       VENT SETTINGS (Comprehensive) (if applicable):  Vent Information  Ventilator ID: [id number]  Equipment Changed: Humidification  . Additional Respiratory Assessments  Heart Rate: 82  Resp: 16  SpO2: 94 %  Humidification Source: Heated wire  Humidification Temp: 34  Airway Type: ET  Airway Size: 7.5 (25)  Cuff Pressure (cm H2O): 29 cm H2O  . Imaging Studies:        XR CHEST PORTABLE    Narrative  EXAMINATION:  ONE XRAY VIEW OF THE CHEST    12/19/2022 10:22 am    COMPARISON:  12/19/2022    HISTORY:  ORDERING SYSTEM PROVIDED HISTORY: Intubation, OG placement  TECHNOLOGIST PROVIDED HISTORY:  Reason for exam:->Intubation, OG placement  What reading provider will be dictating this exam?->CRC    FINDINGS:  Bilateral pleural effusions of the bilateral airspace opacities. No  pneumothorax.   The cardiomediastinal silhouette is without acute process. The  osseous structures are without acute process. The roseann is not well  visualized. ET tube tip most likely within 1.5 cm of the roseann. Enteric  tube tip in the body of the stomach. Impression  Enteric tube tip in the body of the stomach. The roseann is not well seen. ET tube tip most likely within 1.5 cm of the  roseann. CT CHEST WO CONTRAST    Narrative  EXAMINATION:  CT OF THE CHEST WITHOUT CONTRAST 12/17/2022 1:47 pm    TECHNIQUE:  CT of the chest was performed without the administration of intravenous  contrast. Multiplanar reformatted images are provided for review. Automated  exposure control, iterative reconstruction, and/or weight based adjustment of  the mA/kV was utilized to reduce the radiation dose to as low as reasonably  achievable. COMPARISON:  None. HISTORY:  ORDERING SYSTEM PROVIDED HISTORY: worsening hypoxia  TECHNOLOGIST PROVIDED HISTORY:  Reason for exam:->worsening hypoxia  What reading provider will be dictating this exam?->CRC    FINDINGS:  Mediastinum: Thyroid is homogeneous in appearance. There is distension  identified of the esophagus with fluid filling the mid and distal esophagus  to suggest reflux. Achalasia cannot be excluded. There is mild dilatation  of the cardiac chambers. No pericardial effusion. Coronary artery  calcification as well as vascular calcifications seen within the thoracic  aorta and great vessels. Lungs/pleura: Dense consolidative infiltrate identified within the left upper  and lower lung field. There is a moderate size effusion identified on the  left. There is consolidative infiltrate and nodular density seen in the  aerated portions of the left upper lobe. There is ground-glass nodular  density seen in the periphery and inferior aspect of the right upper and  lower lung field with can not dense consolidative infiltrate in the right  lung base suggesting pneumonia.   There is a pigtail catheter identified  inferiorly within the right pleural space.    Upper Abdomen: Visualized upper abdomen reveal cysts within the liver.  Mild  gastric distension of the stomach.  There is stones in the gallbladder.    Soft Tissues/Bones: Degenerative changes seen diffusely throughout the spine  with kyphoplasty identified within the spine with hardware fusing the lumbar  spine.  No acute chest wall abnormality.    Impression  Large right pleural effusion of mixed density to suggest complex effusion.  There is a pigtail catheter seen in the pleural space posteriorly and  inferiorly.  Small to moderate size left pleural effusion with consolidative  infiltrate throughout the left upper and lower lung field and aerated  portions revealing nodular ground-glass opacity to suggest an infectious  process. Nodular ground-glass opacity inferiorly within the aerated right  upper and lower lung field concerning for an infectious process as well.  There is less consolidative infiltrate identified within the right lower lobe.        Imagery last 7 days  CT CHEST WO CONTRAST    Result Date: 12/17/2022  EXAMINATION: CT OF THE CHEST WITHOUT CONTRAST 12/17/2022 1:47 pm TECHNIQUE: CT of the chest was performed without the administration of intravenous contrast. Multiplanar reformatted images are provided for review. Automated exposure control, iterative reconstruction, and/or weight based adjustment of the mA/kV was utilized to reduce the radiation dose to as low as reasonably achievable. COMPARISON: None. HISTORY: ORDERING SYSTEM PROVIDED HISTORY: worsening hypoxia TECHNOLOGIST PROVIDED HISTORY: Reason for exam:->worsening hypoxia What reading provider will be dictating this exam?->CRC FINDINGS: Mediastinum: Thyroid is homogeneous in appearance.  There is distension identified of the esophagus with fluid filling the mid and distal esophagus to suggest reflux.  Achalasia cannot be excluded.  There is mild dilatation of the cardiac chambers.   No pericardial effusion. Coronary artery calcification as well as vascular calcifications seen within the thoracic aorta and great vessels. Lungs/pleura: Dense consolidative infiltrate identified within the left upper and lower lung field. There is a moderate size effusion identified on the left. There is consolidative infiltrate and nodular density seen in the aerated portions of the left upper lobe. There is ground-glass nodular density seen in the periphery and inferior aspect of the right upper and lower lung field with can not dense consolidative infiltrate in the right lung base suggesting pneumonia. There is a pigtail catheter identified inferiorly within the right pleural space. Upper Abdomen: Visualized upper abdomen reveal cysts within the liver. Mild gastric distension of the stomach. There is stones in the gallbladder. Soft Tissues/Bones: Degenerative changes seen diffusely throughout the spine with kyphoplasty identified within the spine with hardware fusing the lumbar spine. No acute chest wall abnormality. Large right pleural effusion of mixed density to suggest complex effusion. There is a pigtail catheter seen in the pleural space posteriorly and inferiorly. Small to moderate size left pleural effusion with consolidative infiltrate throughout the left upper and lower lung field and aerated portions revealing nodular ground-glass opacity to suggest an infectious process. Nodular ground-glass opacity inferiorly within the aerated right upper and lower lung field concerning for an infectious process as well. There is less consolidative infiltrate identified within the right lower lobe.      CT ABDOMEN PELVIS W IV CONTRAST Additional Contrast? Oral    Result Date: 12/14/2022  EXAMINATION: CTA OF THE CHEST; CT OF THE ABDOMEN AND PELVIS WITH CONTRAST 12/14/2022 12:21 pm TECHNIQUE: CTA of the chest was performed after the administration of intravenous contrast.  Multiplanar reformatted images are provided for review. MIP images are provided for review. Automated exposure control, iterative reconstruction, and/or weight based adjustment of the mA/kV was utilized to reduce the radiation dose to as low as reasonably achievable.; CT of the abdomen and pelvis was performed with the administration of intravenous contrast. Multiplanar reformatted images are provided for review. Automated exposure control, iterative reconstruction, and/or weight based adjustment of the mA/kV was utilized to reduce the radiation dose to as low as reasonably achievable. COMPARISON: 07/22/2022 HISTORY: ORDERING SYSTEM PROVIDED HISTORY: SOB, hypoxia, cough TECHNOLOGIST PROVIDED HISTORY: Reason for exam:->SOB, hypoxia, cough Decision Support Exception - unselect if not a suspected or confirmed emergency medical condition->Emergency Medical Condition (MA) What reading provider will be dictating this exam?->CRC; FINDINGS: CHEST CT SCAN: Unremarkable opacification of the pulmonary vessels, no evidence of pulmonary embolism is seen. Scattered calcifications visualized in the aortic arch, no evidence for aneurysmal dilatation or arterial dissection is seen. The heart is prominent in size, no evidence of pericardial effusion is seen. Atherosclerotic calcifications visualized in the coronary vessels. Subtle hilar and mediastinal lymphadenopathy is seen. Extensive thickening visualized in the wall of the esophagus, dilated esophagus is seen axial series 302, image 68 with wall measuring approximately 0.7 cm.  Narrowing visualized at the gastroesophageal junction visualized on axial series 302, image 130 Soft tissue density visualized in the posterior medial aspect of the right lower lung field with deviation of the mediastinum to the left, elevation of the right hemidiaphragm is seen, suboptimal opacification is visualized cannot ascertain if this represents pleural fluid, bowel or a soft tissue density however on the prior study obtained on 07/22/2022 there was no soft tissue mass at this location. Mild bronchial wall thickening is visualized. Bilateral lower lobe consolidation and pleural effusions visualized. No evidence of endobronchial or endoluminal lesions are seen. Bilateral breast implants. No evidence of chest wall mass Degenerative bone changes are seen. ABDOMEN AND PELVIS CT SCAN: The liver demonstrates increased attenuation with mild nodularity of its surface. , a 1.2 cm simple cyst is visualized in the right lobe of the liver, no evidence of masses no evidence of intrahepatic biliary dilatation is seen. The gallbladder is distended, some small layering stones visualized within the neck of the gallbladder measuring approximately 0.6 cm,, no evidence of gallbladder wall thickening or pericholecystic  stranding. The common bile duct is unremarkable. The pancreas and spleen demonstrate no evidence of masses. The pancreas is atrophic, the spleen is small in size. The adrenal glands demonstrate unremarkable contours with no evidence of masses. The kidneys demonstrate no evidence of stones no evidence of renal masses. No evidence of hydronephrosis or hydroureter is seen. GI/Bowel: Narrowing of the gastroesophageal junction visualized on coronal series 605, image 64. The stomach is slightly prominent, thickening of the wall of the distal stomach/pyloric outlet is seen on coronal series 605, image 52, no evidence of masses. No significant distention of the small and large bowel loops is visualized. The appendix is visualized and is unremarkable. Abundance of stool is visualized in the large bowel. Scattered diverticular disease visualized but no evidence of acute diverticulitis. Pelvis: The urinary bladder is not optimally distended. The prostate gland is unremarkable. No evidence of free fluid in the pelvis. No evidence of pelvic mass is seen. Peritoneum/Retroperitoneum: No evidence of free fluid or air within the peritoneal cavity. Bones/Soft Tissues Abdominal wall soft tissues are unremarkable. Internal fixation of the thoracolumbar junction is seen. Vertebral augmentation of the T10, T11, L3 and L4 vertebral bodies is seen. Degenerative changes of the lumbar spine visualized. No evidence of pulmonary embolism. Bilateral lower lobe pulmonary consolidations and pleural effusions seen. Deviation of the mediastinum to the left is visualized. Mild soft tissue prominence visualized surrounding the esophagus with narrowing at the gastroesophageal junction but appears to be due to external compression. Cannot rule left soft tissue mass at this location. Wall thickening visualized in the gastric outlet with mild prominence of the stomach seen, this could be artifactual. Distended gallbladder is seen. Degenerative bone changes, postoperative changes and multilevel vertebral augmentation seen. No evidence of acute abdominal/pelvic pathology. FL ESOPHAGRAM    Result Date: 12/15/2022  EXAMINATION: SINGLE CONTRAST ESOPHAGRAM 12/15/2022 HISTORY: ORDERING SYSTEM PROVIDED HISTORY: evaluate for esophageal perforation TECHNOLOGIST PROVIDED HISTORY: Reason for exam:->evaluate for esophageal perforation Should a barium tablet be used, if available? ->No What reading provider will be dictating this exam?->CRC Cough, chest pain COMPARISON: CTA chest dated 12/14/2022 TECHNIQUE: Multiple single contrast images of the esophagus and gastroesophageal junction were obtained following the oral administration of water soluble contrast and thin barium FLUOROSCOPY DOSE AND TYPE OR TIME AND EXPOSURES: Fluoroscopy time 1.2 minutes. Air kerma 114 mGy FINDINGS: The study was somewhat compromised due to patient's clinical condition and limited mobility. On the single contrast images, no evidence of stricture or obstruction. The esophagus demonstrates decreased peristalsis under fluoroscopy. No tertiary contractions seen. No evidence of leak.  There is a small sliding hiatus hernia. The gastric fundus is constricted as it passes through the esophageal hiatus. Otherwise, no abnormality seen at the GE junction. No gastroesophageal reflux was elicited during examination. 1. No evidence of esophageal perforation or leak. 2. Decreased peristalsis. No evidence of esophageal mass or obstructing lesion. 3. Small sliding hiatus hernia. No evidence of GE reflux. The gastric foreign disc does appear constricted as it passes through the esophageal hiatus. XR CHEST PORTABLE    Result Date: 12/19/2022  EXAMINATION: ONE XRAY VIEW OF THE CHEST 12/19/2022 10:22 am COMPARISON: 12/19/2022 HISTORY: ORDERING SYSTEM PROVIDED HISTORY: Intubation, OG placement TECHNOLOGIST PROVIDED HISTORY: Reason for exam:->Intubation, OG placement What reading provider will be dictating this exam?->CRC FINDINGS: Bilateral pleural effusions of the bilateral airspace opacities. No pneumothorax. The cardiomediastinal silhouette is without acute process. The osseous structures are without acute process. The roseann is not well visualized. ET tube tip most likely within 1.5 cm of the roseann. Enteric tube tip in the body of the stomach. Enteric tube tip in the body of the stomach. The roseann is not well seen. ET tube tip most likely within 1.5 cm of the roseann. XR CHEST PORTABLE    Result Date: 12/19/2022  EXAMINATION: ONE XRAY VIEW OF THE CHEST 12/19/2022 7:28 am COMPARISON: 18 December 2022 HISTORY: ORDERING SYSTEM PROVIDED HISTORY: respiratory failure TECHNOLOGIST PROVIDED HISTORY: Reason for exam:->respiratory failure What reading provider will be dictating this exam?->CRC FINDINGS: Unchanged central venous catheter. There are bilateral chest opacities likely represent a combination of layering pleural effusions with adjacent infiltrate and or atelectasis. Aeration is slightly improved on the left and appears slightly worse on the right compared to the prior study.      Pleural effusions with adjacent infiltrate and or atelectasis showing slightly improved aeration on the left and slightly poor aeration on the right. XR CHEST PORTABLE    Result Date: 12/18/2022  EXAMINATION: ONE XRAY VIEW OF THE CHEST 12/18/2022 8:42 am COMPARISON: The previous study performed 12/17/2022. HISTORY: ORDERING SYSTEM PROVIDED HISTORY: respiratory failure TECHNOLOGIST PROVIDED HISTORY: Reason for exam:->respiratory failure What reading provider will be dictating this exam?->CRC FINDINGS: There has been interval improvement in the opacification of the left hemithorax. There is now aeration involving the left upper lung field. There has been no significant interval change in the parenchymal and pleural disease involving the right hemithorax. A right-sided pleural catheter is again identified in place. Evaluation of the cardiac silhouette is again difficult due to silhouetting by the bilateral lung opacities. The mediastinum is without significant interval change. A right-sided IJ line is again noted, with the tip by the cavoatrial junction. Interval improvement in the opacification of the left hemithorax, with aeration in the left upper lung field when compared to the previous study performed 1 day earlier. No other significant interval change. XR CHEST PORTABLE    Result Date: 12/17/2022  EXAMINATION: ONE XRAY VIEW OF THE CHEST 12/17/2022 3:12 pm COMPARISON: 12/17/2022. HISTORY: ORDERING SYSTEM PROVIDED HISTORY: s/p TLC cathete placement TECHNOLOGIST PROVIDED HISTORY: Reason for exam:->s/p TLC cathete placement What reading provider will be dictating this exam?->CRC FINDINGS: There is a new right internal jugular central venous catheter terminating near the SVC-right atrium junction. No pneumothorax is seen. There is worsening opacification of the left hemithorax. The mediastinum is deviated to the left.   Pleural and parenchymal disease in the base of the right hemithorax does not appear significantly changed.     New right internal jugular central venous catheter terminating near the SVC-right atrium junction.  No post-procedure pneumothorax.  Worsening opacification of the left hemithorax and stable findings on the right.     XR CHEST PORTABLE    Result Date: 12/17/2022  EXAMINATION: ONE XRAY VIEW OF THE CHEST 12/17/2022 7:48 am COMPARISON: Comparison studies of a July 22nd through December 16 HISTORY: ORDERING SYSTEM PROVIDED HISTORY: resp distress TECHNOLOGIST PROVIDED HISTORY: Reason for exam:->resp distress What reading provider will be dictating this exam?->CRC FINDINGS: There is a large size Bochdalek type of diaphragmatic hernia across the midline which causes compression atelectasis in the medial inferior aspect of both lungs. Right-sided pigtail chest catheter has tip in the right base. If pleural effusion is present in the right base will be discrete or minimal. No right-sided pneumothorax seen. There are increased density in mid lower aspect of the left lung in addition to the diaphragmatic hernia which relates most likely with a moderate left-sided pleural effusion which is has increase since recent examinations. There is no left-sided pneumothorax. Patient had previous fusion of the thoracolumbar spine with levels of vertebroplasty in the lower thoracic spine.     1.  No changes position of the right-sided chest tube.  No right-sided pneumothorax.  The most of the right-sided pleural effusion has been drained, if present to be discrete or small. 2.  Moderate left-sided pleural effusion.  If quantification of left-sided pleural effusion will be needed for clinical management bedside ultrasound or left lateral decubitus views of the chest can be helpful. 3.  No pneumothorax on the right or on the left. 4.  Large size diaphragmatic across the midline.     XR CHEST PORTABLE    Result Date: 12/16/2022  EXAMINATION: ONE XRAY VIEW OF THE CHEST 12/16/2022 7:12 pm COMPARISON: 12/16/2022 HISTORY: ORDERING  SYSTEM PROVIDED HISTORY: Tachyonic, decreased oxygen saturation TECHNOLOGIST PROVIDED HISTORY: Reason for exam:->tachyonic, decreased oxygen saturation What reading provider will be dictating this exam?->CRC FINDINGS: There is a right chest tube present at the lung bases. No right effusions are observed. There is compressive atelectasis involving the right mid lower lung. On the left, there appears to be slightly increased parenchymal density suggest atelectasis. This process silhouettes the left heart border and diaphragm. There are no signs of associated pneumothorax. There are stable postoperative changes of the lumbar spine there signs of previous cement augmentation of vertebral bodies. 1.  Increased parenchymal density projecting over the left lower lung concerning for atelectasis and/or consolidation 2. No signs of right effusion 3. Parenchymal density projecting over the right lower lung concerning for atelectasis     XR CHEST PORTABLE    Result Date: 12/16/2022  EXAMINATION: ONE XRAY VIEW OF THE CHEST 12/16/2022 2:39 pm COMPARISON: Comparison studies of January 6, 2021 through December 14 1022. Reviewed the previous CT chest of December 14 and July 22nd 2022. Christo Woods HISTORY: ORDERING SYSTEM PROVIDED HISTORY: Post R Pigtail placement TECHNOLOGIST PROVIDED HISTORY: Reason for exam:->Post R Pigtail placement What reading provider will be dictating this exam?->CRC FINDINGS: Patient has a large Bochdalek type of a diaphragmatic hernia across the midline which causes compression atelectasis in the medial inferior aspect of both lungs. There is a right-sided chest tube pigtail extremity is in the right base. No conspicuous right-sided pneumothorax seen. There is no left-sided pneumothorax. No conspicuous right-sided pleural effusions seen. There appears to be a small left-sided pleural effusion present. Above the level of the hernia there is no indication for infiltrates lung parenchyma.   There is no perihilar vascular congestion. The heart is difficult identified due the distension of the diaphragmatic hernia. 1.  Right-sided pigtail catheter in the right base. 2.  No conspicuous right-sided pleural effusions identified. No right-sided pneumothorax. 3.  Cannot exclude a small left-sided pleural effusion. 4.  Large size diaphragmatic hernia across the midline causing compression atelectasis in the medial aspect of both lungs. XR CHEST PORTABLE    Result Date: 12/14/2022  EXAMINATION: ONE XRAY VIEW OF THE CHEST 12/14/2022 10:39 am COMPARISON: CT of the chest of 07/22/2022 HISTORY: ORDERING SYSTEM PROVIDED HISTORY: sob TECHNOLOGIST PROVIDED HISTORY: Reason for exam:->sob What reading provider will be dictating this exam?->CRC FINDINGS: The heart is enlarged. There is a chronic diaphragmatic hernia/large hiatal hernia. The hernia accounts for blunting of the right costophrenic angle. There is consolidation seen in the left lung base and there is blunting the left costophrenic angle. The upper lobes are clear. 1. Consolidation seen within the left lung base with blunting the left costophrenic angle which could represent pneumonia or atelectasis 2. A trace left pleural effusion cannot be excluded 3. Large right diaphragmatic hernia/hiatal hernia which was noted on the patient's previous CT of the chest of 07/22/2022. CTA PULMONARY W CONTRAST    Result Date: 12/14/2022  EXAMINATION: CTA OF THE CHEST; CT OF THE ABDOMEN AND PELVIS WITH CONTRAST 12/14/2022 12:21 pm TECHNIQUE: CTA of the chest was performed after the administration of intravenous contrast.  Multiplanar reformatted images are provided for review. MIP images are provided for review.  Automated exposure control, iterative reconstruction, and/or weight based adjustment of the mA/kV was utilized to reduce the radiation dose to as low as reasonably achievable.; CT of the abdomen and pelvis was performed with the administration of intravenous contrast. Multiplanar reformatted images are provided for review. Automated exposure control, iterative reconstruction, and/or weight based adjustment of the mA/kV was utilized to reduce the radiation dose to as low as reasonably achievable. COMPARISON: 07/22/2022 HISTORY: ORDERING SYSTEM PROVIDED HISTORY: SOB, hypoxia, cough TECHNOLOGIST PROVIDED HISTORY: Reason for exam:->SOB, hypoxia, cough Decision Support Exception - unselect if not a suspected or confirmed emergency medical condition->Emergency Medical Condition (MA) What reading provider will be dictating this exam?->CRC; FINDINGS: CHEST CT SCAN: Unremarkable opacification of the pulmonary vessels, no evidence of pulmonary embolism is seen. Scattered calcifications visualized in the aortic arch, no evidence for aneurysmal dilatation or arterial dissection is seen. The heart is prominent in size, no evidence of pericardial effusion is seen. Atherosclerotic calcifications visualized in the coronary vessels. Subtle hilar and mediastinal lymphadenopathy is seen. Extensive thickening visualized in the wall of the esophagus, dilated esophagus is seen axial series 302, image 68 with wall measuring approximately 0.7 cm. Narrowing visualized at the gastroesophageal junction visualized on axial series 302, image 130 Soft tissue density visualized in the posterior medial aspect of the right lower lung field with deviation of the mediastinum to the left, elevation of the right hemidiaphragm is seen, suboptimal opacification is visualized cannot ascertain if this represents pleural fluid, bowel or a soft tissue density however on the prior study obtained on 07/22/2022 there was no soft tissue mass at this location. Mild bronchial wall thickening is visualized. Bilateral lower lobe consolidation and pleural effusions visualized. No evidence of endobronchial or endoluminal lesions are seen. Bilateral breast implants.  No evidence of chest wall mass Degenerative bone changes are seen. ABDOMEN AND PELVIS CT SCAN: The liver demonstrates increased attenuation with mild nodularity of its surface. , a 1.2 cm simple cyst is visualized in the right lobe of the liver, no evidence of masses no evidence of intrahepatic biliary dilatation is seen. The gallbladder is distended, some small layering stones visualized within the neck of the gallbladder measuring approximately 0.6 cm,, no evidence of gallbladder wall thickening or pericholecystic  stranding. The common bile duct is unremarkable. The pancreas and spleen demonstrate no evidence of masses. The pancreas is atrophic, the spleen is small in size. The adrenal glands demonstrate unremarkable contours with no evidence of masses. The kidneys demonstrate no evidence of stones no evidence of renal masses. No evidence of hydronephrosis or hydroureter is seen. GI/Bowel: Narrowing of the gastroesophageal junction visualized on coronal series 605, image 64. The stomach is slightly prominent, thickening of the wall of the distal stomach/pyloric outlet is seen on coronal series 605, image 52, no evidence of masses. No significant distention of the small and large bowel loops is visualized. The appendix is visualized and is unremarkable. Abundance of stool is visualized in the large bowel. Scattered diverticular disease visualized but no evidence of acute diverticulitis. Pelvis: The urinary bladder is not optimally distended. The prostate gland is unremarkable. No evidence of free fluid in the pelvis. No evidence of pelvic mass is seen. Peritoneum/Retroperitoneum: No evidence of free fluid or air within the peritoneal cavity. Bones/Soft Tissues Abdominal wall soft tissues are unremarkable. Internal fixation of the thoracolumbar junction is seen. Vertebral augmentation of the T10, T11, L3 and L4 vertebral bodies is seen. Degenerative changes of the lumbar spine visualized. No evidence of pulmonary embolism.  Bilateral lower lobe pulmonary consolidations and pleural effusions seen. Deviation of the mediastinum to the left is visualized. Mild soft tissue prominence visualized surrounding the esophagus with narrowing at the gastroesophageal junction but appears to be due to external compression. Cannot rule left soft tissue mass at this location. Wall thickening visualized in the gastric outlet with mild prominence of the stomach seen, this could be artifactual. Distended gallbladder is seen. Degenerative bone changes, postoperative changes and multilevel vertebral augmentation seen. No evidence of acute abdominal/pelvic pathology. US CHEST INCLUDING MEDIASTINUM    Result Date: 12/15/2022  EXAMINATION: ULTRASOUND OF THE CHEST 12/15/2022 10:12 am COMPARISON: None. HISTORY: ORDERING SYSTEM PROVIDED HISTORY: pleural effusion TECHNOLOGIST PROVIDED HISTORY: Reason for exam:->pleural effusion What reading provider will be dictating this exam?->CRC FINDINGS: Probable complex loculated right pleural effusion. Simple left pleural effusion is present. Probable complex loculated right pleural effusion. Simple left pleural effusion. IR GUIDED PERC PLEURAL DRAIN W CATH INSERT    Result Date: 12/16/2022  PROCEDURE: IR PERC CATH PLEURAL DRAIN W/IMAG MODERATE CONSCIOUS SEDATION 12/16/2022 HISTORY: ORDERING SYSTEM PROVIDED HISTORY: righ chest tube pigtail drain for loculated pleural effusion TECHNOLOGIST PROVIDED HISTORY: Place right chest tube for loculated effusion. Send fluid for: gram stain, culture, fungal culture, LDH, Protein, pH, glucose, cell count with diff, triglycerides, cytology Reason for exam:->righ chest tube pigtail drain for loculated pleural effusion Which side should the procedure be performed?->Right What reading provider will be dictating this exam?->CRC TECHNIQUE: Ultrasound CONTRAST: None SEDATION: Moderate sedation was ordered and supervised by the attending with physician face-to-face monitoring.  Medications were provided and recorded by Radiology nurses. Moderate sedation was provided for 23 minutes FLUOROSCOPY DOSE AND TYPE OR TIME AND EXPOSURES: None Number of images: 6 DESCRIPTION OF PROCEDURE: Informed consent was obtained after a detailed explanation of the procedure including risks, benefits, and alternatives. Universal protocol was observed. Sterile gowns, masks, hats and gloves utilized for maximal sterile barrier. The right chest wall was prepped and draped using sterile technique. 1% lidocaine was used for local anesthesia. Using a scalpel an incision was made. Under ultrasound guidance using a one-step needle access into the right pleural fluid collection was achieved and serous fluid was aspirated. An Amplatz guidewire was placed. The tract was dilated to 7 [place] Ning and an 8 [place] pigtail catheter was guided into the pleural space. A right thoracentesis was performed. The catheter was sutured with 2-0 Ethilon and set to a Pleur-Evac device. FINDINGS: There is a fluid collection projecting over the right lower lung. The images reveal signs of atelectasis. Approximately 105 cc of fluid was removed. The patient tolerated the procedure well and there were no immediate complications. 1.  Successful ultrasound-guided right thoracentesis. 2.  Successful placement of an 8 [place] pleural drain       All Others since admission  CT CHEST WO CONTRAST    Result Date: 12/17/2022  Large right pleural effusion of mixed density to suggest complex effusion. There is a pigtail catheter seen in the pleural space posteriorly and inferiorly. Small to moderate size left pleural effusion with consolidative infiltrate throughout the left upper and lower lung field and aerated portions revealing nodular ground-glass opacity to suggest an infectious process. Nodular ground-glass opacity inferiorly within the aerated right upper and lower lung field concerning for an infectious process as well.  There is less consolidative infiltrate identified within the right lower lobe. CT ABDOMEN PELVIS W IV CONTRAST Additional Contrast? Oral    Result Date: 12/14/2022  No evidence of pulmonary embolism. Bilateral lower lobe pulmonary consolidations and pleural effusions seen. Deviation of the mediastinum to the left is visualized. Mild soft tissue prominence visualized surrounding the esophagus with narrowing at the gastroesophageal junction but appears to be due to external compression. Cannot rule left soft tissue mass at this location. Wall thickening visualized in the gastric outlet with mild prominence of the stomach seen, this could be artifactual. Distended gallbladder is seen. Degenerative bone changes, postoperative changes and multilevel vertebral augmentation seen. No evidence of acute abdominal/pelvic pathology. FL ESOPHAGRAM    Result Date: 12/15/2022  1. No evidence of esophageal perforation or leak. 2. Decreased peristalsis. No evidence of esophageal mass or obstructing lesion. 3. Small sliding hiatus hernia. No evidence of GE reflux. The gastric foreign disc does appear constricted as it passes through the esophageal hiatus. XR CHEST PORTABLE    Result Date: 12/19/2022  Enteric tube tip in the body of the stomach. The roseann is not well seen. ET tube tip most likely within 1.5 cm of the roseann. XR CHEST PORTABLE    Result Date: 12/19/2022  Pleural effusions with adjacent infiltrate and or atelectasis showing slightly improved aeration on the left and slightly poor aeration on the right. XR CHEST PORTABLE    Result Date: 12/18/2022  Interval improvement in the opacification of the left hemithorax, with aeration in the left upper lung field when compared to the previous study performed 1 day earlier. No other significant interval change. XR CHEST PORTABLE    Result Date: 12/17/2022  New right internal jugular central venous catheter terminating near the SVC-right atrium junction. No post-procedure pneumothorax. Worsening opacification of the left hemithorax and stable findings on the right. XR CHEST PORTABLE    Result Date: 12/17/2022  1. No changes position of the right-sided chest tube. No right-sided pneumothorax. The most of the right-sided pleural effusion has been drained, if present to be discrete or small. 2.  Moderate left-sided pleural effusion. If quantification of left-sided pleural effusion will be needed for clinical management bedside ultrasound or left lateral decubitus views of the chest can be helpful. 3.  No pneumothorax on the right or on the left. 4.  Large size diaphragmatic across the midline. XR CHEST PORTABLE    Result Date: 12/16/2022  1. Increased parenchymal density projecting over the left lower lung concerning for atelectasis and/or consolidation 2. No signs of right effusion 3. Parenchymal density projecting over the right lower lung concerning for atelectasis     XR CHEST PORTABLE    Result Date: 12/16/2022  1. Right-sided pigtail catheter in the right base. 2.  No conspicuous right-sided pleural effusions identified. No right-sided pneumothorax. 3.  Cannot exclude a small left-sided pleural effusion. 4.  Large size diaphragmatic hernia across the midline causing compression atelectasis in the medial aspect of both lungs. XR CHEST PORTABLE    Result Date: 12/14/2022  1. Consolidation seen within the left lung base with blunting the left costophrenic angle which could represent pneumonia or atelectasis 2. A trace left pleural effusion cannot be excluded 3. Large right diaphragmatic hernia/hiatal hernia which was noted on the patient's previous CT of the chest of 07/22/2022. CTA PULMONARY W CONTRAST    Result Date: 12/14/2022  No evidence of pulmonary embolism. Bilateral lower lobe pulmonary consolidations and pleural effusions seen. Deviation of the mediastinum to the left is visualized.  Mild soft tissue prominence visualized surrounding the esophagus with narrowing at the gastroesophageal junction but appears to be due to external compression. Cannot rule left soft tissue mass at this location. Wall thickening visualized in the gastric outlet with mild prominence of the stomach seen, this could be artifactual. Distended gallbladder is seen. Degenerative bone changes, postoperative changes and multilevel vertebral augmentation seen. No evidence of acute abdominal/pelvic pathology. US CHEST INCLUDING MEDIASTINUM    Result Date: 12/15/2022  Probable complex loculated right pleural effusion. Simple left pleural effusion. IR GUIDED PERC PLEURAL Ronaldo Gander CATH INSERT    Result Date: 12/16/2022  1. Successful ultrasound-guided right thoracentesis. 2.  Successful placement of an 8 Syriac pleural drain                          Assessment and Plan of care     Diagnosis:  Acute hypoxemic respiratory failure due to bilateral pneumonia  Bilateral pleural effusion with loculations. Left-sided pleural effusion is complex and loculated. S/p right pigtail placement 12/16 by IR  EILEEN mejia RVR  Acute on chronic anemia due to blood loss  History of breast cancer  PAWAN  E. Cloacae pneumonia with Candida colonizer. Plan:    Neuro: Patient is intubated 12/19 due to acute respiratory failure. Patient was following commands with no focal neurodeficit prior to intubation. Pulmonary: Bilateral pleural effusion with bilateral pneumonia. Left pleural effusion is larger than the right. S/p right-sided pigtail placement by IR which is draining serous fluid. Plan for surgery to do VATS on the left side on 12/20. Bilateral pneumonia managed with meropenem and Vanco.  Pleural effusion might be related to breast cancer history. Cardiovascular: Patient was transiently hypotensive after intubation. 2 L LR bolus given and Levophed drip was started transiently. Taper off Levophed drip. Abdomen/GI: Suspected GI bleed. Protonix 40 every 12. GI consult requested. Renal: PAWAN likely due to sepsis. MSK: No active issues   Skin: No active issues   Hematology: Acute on chronic anemia. S/p 2 unit PRBC transfusion. Suspected GI bleed. ID:E. Cloacae pneumonia, respiratory culture grew Enterobacter cloacae which is multidrug-resistant. DC Zosyn continue meropenem. Continue vancomycin. Candida colonizer noted in the sputum. Endocrine: Monitor BS  DVT/GI: Prophylaxis: SCDs and Protonix. Code Status: Full Code  Disposition: ICU  Total Critical care time spent  35 mins. This did not include any procedures. During multidisciplinary team rounds Talat Sandovalmaryann, was seen, examined and discussed. This is confirmation that I have personally seen and examined the patient and that the key elements of the encounter were performed by me (> 85 % time). The medications & laboratory data and imagery was discussed and adjusted where necessary. Key issues of the case were discussed among consultants. This patient has a high probability of sudden clinically significant deterioration. I managed/supervised life or organ supporting interventions that required frequent physician assessment. I devoted my full attention to the direct care of this patient for the period of time indicated below. In addition to above, time was devoted to teaching and to any procedure. NOTE: This report, in part or full, may have been transcribed using voice recognition software. Every effort was made to ensure accuracy; however, inadvertent computerized transcription errors may be present. Please excuse any transcriptional grammatical or spelling errors that may have escaped my editorial review. Total critical care time caring for this patient with life threatening, unstable organ failure, including direct patient contact, management of life support systems, review of data including imaging and labs, discussions with other team members and physicians is at least 28 Min so far today, excluding procedures.       Electronically signed by Radha Raman MD on 12/19/2022 at 1:03 PM  [address]

## 2022-12-19 NOTE — PROGRESS NOTES
Please refer to Resident consult note on 12/18     [facility]  SURGICAL INTENSIVE CARE UNIT (SICU)  ATTENDING PHYSICIAN CRITICAL CARE PROGRESS NOTE     I have examined the patient, reviewed the record,and discussed the case with the APN/  Resident. I have reviewed all relevant labs and imaging data. Please refer to the  APN/ resident's note. I agree with the  assessment and plan. Patient currently intubated and sedated unable to provide HPI, ROS or PMH/PSH/PSH/PFH all gathered from the chart. Large post operative seroma after Type IV hiatal hernia repair. Attempted to be drained by IR without success plan for discussion of attempted repeat drainage vs right VATS with direct drainage and Chest tube placement. Attempting to gather consent form daughter as mother is currently intubated and sedated to discuss risk and benefits including bleeding, infection, risks to nearby structures , possible open thoracotomy and small risk cardiac arrest and death. Attempted calling Mobile number 3 times and home number once with no answer- Message machine was not set up therefore message not left. Will attempt later. Makenna Qui MD      I was able to speak to the patient's daughter Vlad Robledo and we discussed the above. We discussed the 2 options of attempting IR to replace the drain but considering they attempted already treatment tried this and were unable to get into the cavity and solely drained the pleural effusion that the success rate might be poor versus going for VATS and directly draining the seroma cavity and placing a drain within it daughter wanting to proceed with VATS with direct drainage. Discussed all of the risks and benefits as above. We also discussed that I am a trauma surgeon/acute care surgeon and then I and Dr. Aparna Mendez select thoracic procedures considering Dr. Tobi Walker absence and she agrees to proceed .

## 2022-12-19 NOTE — PROGRESS NOTES
Discussed patient's need for larger chest tube with attending. Agree with this assessment but since pigtail was placed by IR and patient will likely not tolerate bedside chest tube placement, would be better for patient to receive upsizing of pigtail by IR tomorrow morning.      Electronically signed by Talha Rubalcava MD on 12/18/22 at 7:07 PM EST

## 2022-12-19 NOTE — PROGRESS NOTES
Pharmacy Consultation Note  (Antibiotic Dosing and Monitoring)    Initial consult date: 12/17/2022  Consulting physician/provider: Mallika Payton   Drug: Vancomycin  Indication: Sepsis of unknown etiology     Age/  Gender Height Weight IBW  Allergy Information   77 y.o./female 5' 2\" (157.5 cm) 147 lb (66.7 kg)     Ideal body weight: 50.1 kg (110 lb 7.2 oz)  Adjusted ideal body weight: 57.3 kg (126 lb 4.3 oz)   Benadryl [diphenhydramine] and Ibuprofen      Renal Function:  Recent Labs     12/17/22  1629 12/18/22  0401 12/19/22  0402   BUN 44* 39* 39*   CREATININE 2.3* 2.3* 2.2*         Intake/Output Summary (Last 24 hours) at 12/19/2022 1303  Last data filed at 12/19/2022 1156  Gross per 24 hour   Intake 1307.67 ml   Output 822 ml   Net 485.67 ml         Vancomycin Monitoring:  Trough:  No results for input(s): VANCOTROUGH in the last 72 hours. Random:    Recent Labs     12/18/22  0401 12/19/22  0402   VANCORANDOM 20.1 12.2         Recent Labs     12/17/22  1522   BLOODCULT2 24 Hours no growth          Historical Cultures:  Organism   Date Value Ref Range Status   12/17/2022 Enterobacter cloacae complex (A)  Final   12/17/2022 Candida albicans (A)  Final     Recent Labs     12/17/22  1522   BC 24 Hours no growth       Vancomycin Administration Times:    Recent vancomycin administrations                     vancomycin (VANCOCIN) 500 mg in dextrose 5 % 100 mL IVPB (mg) 500 mg New Bag 12/18/22 1308    vancomycin (VANCOCIN) 1,750 mg in dextrose 5 % 500 mL IVPB (mg) 1,750 mg New Bag 12/17/22 1613                  Assessment:  Patient is a 65 y.o. female who has been initiated on vancomycin and Zosyn for sepsis of unknown etiology. Estimated Creatinine Clearance: 19 mL/min (A) (based on SCr of 2.2 mg/dL (H)). To dose vancomycin, pharmacy will be utilizing dosing based off of levels because of patient's renal impairment/insufficiency.    12/19: Scr 2.2, UOP 0.6 mL/kg/hr, random level is 12.2 mcg/mL    Plan:  Vancomycin 1000 mg IV x1 - random tomorrow AM   Will continue to monitor renal function. Pharmacy to follow.      Thank you for the consult,   Juan Daniel Handley PharmD, BCPS, LATRELL 12/19/2022 1:05 PM    ADDENDUM:  Vancomycin has been discontinued   Clinical Pharmacy to sign-off  Physician to re-consult pharmacy if future dosing is needed    Thank you for the consult,    Juan Daniel Handley PharmD, BCPS, LATRELL 12/19/2022 3:27 PM

## 2022-12-19 NOTE — PROGRESS NOTES
GENERAL SURGERY  DAILY PROGRESS NOTE  12/19/2022    CHIEF COMPLAINT:  Chief Complaint   Patient presents with    Shortness of Breath     Sent from [facility] for SOB patient went for hernia repair patient on 6L NC on arrival       SUBJECTIVE:  Short of breath this morning. Denies nausea or emesis. OBJECTIVE:  /75   Pulse 98   Temp 96.8 °F (36 °C) (Axillary)   Resp 22   Ht 5' 2\" (1.575 m)   Wt 150 lb (68 kg)   SpO2 98%   BMI 27.44 kg/m²     GENERAL: Calm, no acute distress  LUNGS: Equal chest rise on heated high flow nasal cannula. Right-sided pigtail catheter with 120cc/24hr  CARDIOVASCULAR: Intermittent tachycardia  ABDOMEN:  Soft, non-distended, non-tender. No guarding, rigidity, rebound. Incisions clean dry and intact. Peterson catheter in place-870 cc recorded overnight  Pleural fluid cultures remain negative to date    ASSESSMENT/PLAN:  65 y.o. female s/p post hiatal hernia repair 88/7 complicated byrespiratory insufficiency concerning for post op aspiration pneumonia without signs of esophageal leak or perforation    Plan:  N.p.o.-okay for meds. Okay for ice chips and popsicles if respiratory status improves  Continue TPN - Monitor for refeeding syndrome  Correct electrolytes as needed  As needed pain and nausea control  Avoid NG tube, If NG tube is needed due to patient being intubated please page [facility] for placement of NG or OG as it may be difficult due to recent hiatal hernia repair. - Will place NG when patient is in OR for procedure tomorrow.   PPI twice daily  Carafate  Nephrology following for PAWAN  Appreciate critical care's assistance with respiratory issues - For VATS tomorrow  Electronically signed by Sujit Batista MD on 12/19/2022 at 7:24 AM

## 2022-12-19 NOTE — PROGRESS NOTES
Department of Internal Medicine  Nephrology Progress Note      Events reviewed. SUBJECTIVE: We are following Mehnaz Lee for PAWAN. Patient reports not feeling well.     PHYSICAL EXAM:      Vitals:    VITALS:  /75   Pulse 83   Temp 97.3 °F (36.3 °C) (Temporal)   Resp 16   Ht 5' 2\" (1.575 m)   Wt 150 lb (68 kg)   SpO2 100%   BMI 27.44 kg/m²   24HR INTAKE/OUTPUT:    Intake/Output Summary (Last 24 hours) at 12/19/2022 1213  Last data filed at 12/19/2022 1156  Gross per 24 hour   Intake 1307.67 ml   Output 882 ml   Net 425.67 ml         Constitutional: Pale, chronically ill-appearing, in NAD  HEENT: Normocephalic, atraumatic, PERRLA  Respiratory: Diminished right lung base  Cardiovascular/Edema: RRR, normal S1, normal S2, no edema  Gastrointestinal: Soft, not distended, nontender  Neurologic: Nonfocal  Skin: Pale, dry, no lesions, no rash    Scheduled Meds:   propofol        norepinephrine        pantoprazole (PROTONIX) 40 mg injection  40 mg IntraVENous Q12H    albumin human-kjda  25 g IntraVENous Q8H    acetylcysteine  4 mL Inhalation Q4H WA    sodium chloride flush  5-40 mL IntraVENous 2 times per day    heparin flush  3 mL IntraVENous 2 times per day    metoclopramide  10 mg IntraVENous Q6H    piperacillin-tazobactam  4,500 mg IntraVENous Q12H    vancomycin (VANCOCIN) intermittent dosing (placeholder)   Other RX Placeholder    Arformoterol Tartrate  15 mcg Nebulization BID    budesonide  0.5 mg Nebulization BID    albuterol  2.5 mg Nebulization Q4H WA    [Held by provider] metoprolol  5 mg IntraVENous Q6H    rosuvastatin  10 mg Oral Nightly    sodium chloride flush  5-40 mL IntraVENous 2 times per day    levothyroxine  50 mcg Oral Daily    [Held by provider] enoxaparin  30 mg SubCUTAneous Daily     Continuous Infusions:   PN-Adult  3 IN 1 Central Line (Custom)      propofol 30 mcg/kg/min (12/19/22 0959)    fentaNYL 100 mcg/hr (12/19/22 0955)    norepinephrine 4 mcg/min (12/19/22 0959)    PN-Adult 3 IN 1 Central Line (Custom) 43.8 mL/hr at 12/19/22 0555    sodium chloride      sodium chloride       PRN Meds:.sodium chloride flush, sodium chloride, heparin flush, fentanNYL, oxyCODONE **OR** oxyCODONE, sodium chloride flush, sodium chloride, ondansetron **OR** ondansetron, magnesium hydroxide, acetaminophen **OR** acetaminophen, ipratropium-albuterol    DATA:    CBC with Differential:    Lab Results   Component Value Date/Time    WBC 14.2 12/19/2022 04:02 AM    RBC 2.46 12/19/2022 04:02 AM    HGB 7.3 12/19/2022 04:02 AM    HCT 22.5 12/19/2022 04:02 AM     12/19/2022 04:02 AM    MCV 91.5 12/19/2022 04:02 AM    MCH 29.7 12/19/2022 04:02 AM    MCHC 32.4 12/19/2022 04:02 AM    RDW 14.9 12/19/2022 04:02 AM    LYMPHOPCT 5.2 12/19/2022 04:02 AM    MONOPCT 0.9 12/19/2022 04:02 AM    BASOPCT 0.4 12/19/2022 04:02 AM    MONOSABS 0.14 12/19/2022 04:02 AM    LYMPHSABS 0.71 12/19/2022 04:02 AM    EOSABS 0.00 12/19/2022 04:02 AM    BASOSABS 0.00 12/19/2022 04:02 AM     CMP:    Lab Results   Component Value Date/Time     12/19/2022 04:02 AM    K 4.3 12/19/2022 04:02 AM    K 4.3 12/19/2022 04:02 AM     12/19/2022 04:02 AM    CO2 26 12/19/2022 04:02 AM    BUN 39 12/19/2022 04:02 AM    CREATININE 2.2 12/19/2022 04:02 AM    GFRAA >60 07/22/2022 01:42 PM    LABGLOM 22 12/19/2022 04:02 AM    GLUCOSE 268 12/19/2022 04:02 AM    PROT 5.6 12/19/2022 04:02 AM    LABALBU 3.1 12/19/2022 04:02 AM    LABALBU 4.3 01/24/2011 01:15 PM    CALCIUM 8.6 12/19/2022 04:02 AM    BILITOT 0.6 12/19/2022 04:02 AM    ALKPHOS 55 12/19/2022 04:02 AM    AST 27 12/19/2022 04:02 AM    ALT 32 12/19/2022 04:02 AM     Magnesium:    Lab Results   Component Value Date/Time    MG 2.4 12/19/2022 04:02 AM     Phosphorus:    Lab Results   Component Value Date/Time    PHOS 3.6 12/19/2022 04:02 AM     Radiology Review:      CT Chest and Abd/Pelvis 12/14/22      No evidence of pulmonary embolism.      Bilateral lower lobe pulmonary consolidations and pleural effusions seen. Deviation of the mediastinum to the left is visualized. Mild soft tissue prominence visualized surrounding the esophagus with   narrowing at the gastroesophageal junction but appears to be due to external   compression. Cannot rule left soft tissue mass at this location. Wall thickening visualized in the gastric outlet with mild prominence of the   stomach seen, this could be artifactual.     Distended gallbladder is seen. Degenerative bone changes, postoperative changes and multilevel vertebral   augmentation seen. No evidence of acute abdominal/pelvic pathology. US Chest 12/15/22       Probable complex loculated right pleural effusion. Simple left pleural effusion. BRIEF SUMMARY OF INITIAL CONSULT:    Briefly, Dwight Jay is a 41-year-old female, past medical history significant for hypothyroidism, hiatal hernia and TIA, who presented to the ED on 12/14/2022 from [facility], patient recently underwent a hiatal hernia repair on 12/9/22, developed acute respiratory distress and was transferred to [facility] on 12/14/2022 for further management and treatment. Initial ED work-up revealed creatinine level of 1.7, patient then underwent two seperate IV contrast studies CTA and creatinine subsequently increased to 2.5, on 12/15/2022 creatinine level increased to 3.5 mg/dL, reason for this consultation. Review of [facility] from [facility] reveals that baseline creatinine appears to be between 0.9 to 1.0 mg/dL, creatinine was 1.0 on 12/13/2022, patient was started on lisinopril during hospitalization, was given multiple doses IV furosemide with poor oral intake and vomiting.     Problems resolved:  HAGMA, 2/2 uremia and starvation ketoacidosis    IMPRESSION/RECOMMENDATIONS:     PAWAN, stage II, nonoliguric, ATN (ischemic +/- contrast induced , intravascular volume depletion due to poor oral intake, loop diuretics and vomiting in the setting of ACE inhibition versus,) FEUrea 28.3%, FENa 1.1% . creatinine level seem to be plateauing without further renal function recovery, will continue to monitor.     Elevated proBNP, 1373, to monitor  Hypophosphatemia, phosphorus levels 1.4, 2/2 poor intake/refeeding syndrome, rule out vitamin D deficiency, levels improved  Hemodynamic shock, septic, on vasopressors and antibiotics    ------------------------------------------------------  Respiratory failure, status post intubation  New onset AF with RVR  Loculated right pleural effusion, s/p IR thoracentesis   Complex right pleural effusion, poor VATS tomorrow  S/p hiatal hernia repair 12/9/2022  Hypothyroidism, on levothyroxine  Persistent vomiting, PRN ondansetron, esophagram negative for perforation  Normocytic anemia, multifactorial  Severe hypoalbuminemia  Nutrition, on TPN per surgery      Plan:    Continue TPN per surgery  Complete albumin 25 g every 8 hours x 6 doses  Continue to monitor renal function      Electronically signed by Giorgio Koch MD on 12/19/2022 at 12:13 PM

## 2022-12-19 NOTE — PROGRESS NOTES
Intubation Record    Date: 12/19/2022  Time: 0820  Patient identity confirmed: Yes  Indications: respiratory distress   Preoxygenation: BVM with 100% O2   Laryngoscope size and type: glidescope  Airway introducer used: Yes  Evac: Yes  Tube size: 7.5  Number of attempts :1  Cords visualized:  [x]Clearly [] Poorly   Breath sounds present bilaterally: Yes  ETCO2 28  ETT to lip: 25  Tube secured with: maren  Chest x-ray ordered: Yes  Difficulties encountered: A lot of secretions Atraumatic intubation. Teeth already missing prior to intubation.  No blood in the airway      Difficult Airway: NO          Anjali Monroy, RCP, RRT

## 2022-12-19 NOTE — PLAN OF CARE
Problem: Discharge Planning  Goal: Discharge to home or other facility with appropriate resources  Outcome: Not Progressing     Problem: Chronic Conditions and Co-morbidities  Goal: Patient's chronic conditions and co-morbidity symptoms are monitored and maintained or improved  Outcome: Progressing     Problem: Safety - Adult  Goal: Free from fall injury  Outcome: Progressing     Problem: Pain  Goal: Verbalizes/displays adequate comfort level or baseline comfort level  Outcome: Progressing     Problem: Discharge Planning  Goal: Discharge to home or other facility with appropriate resources  Outcome: Not Progressing

## 2022-12-19 NOTE — PROGRESS NOTES
TRAUMA/GENERAL SURGERY  DAILY PROGRESS NOTE  12/19/2022    CHIEF COMPLAINT:  Chief Complaint   Patient presents with    Shortness of Breath     Sent from [facility] for SOB patient went for hernia repair patient on 6L NC on arrival       SUBJECTIVE:  Worsening respiratory status this morning requiring intubation    OBJECTIVE:  BP (!) 162/95   Pulse 89   Temp 97.2 °F (36.2 °C)   Resp 16   Ht 5' 2\" (1.575 m)   Wt 150 lb (68 kg)   SpO2 100%   BMI 27.44 kg/m²     GENERAL:  Resting comfortably in bed. Intubated  LUNGS:  Intubated and on ventilator. R Chest tube with serous output. CARDIOVASCULAR: RR  ABDOMEN:  Soft, non-distended, non-tender. No guarding, rigidity, rebound.     ASSESSMENT/PLAN:  65 y.o. female with respiratory failure and complex right-sided effusion s/p robotic hiatal hernia repair 12/9 without signs of esophageal perforation or leak    Plan for VATS with chest tube placement on 12/20  No plans for additional chest tube placement and unable to accommodate for earlier VATS today  On TPN -- defer diet to Dr aRfa Werner team  NPO after midnight for procedure tomorrow      Kiley Thompson DO  Surgery Resident PGY-5  12/19/2022  10:46 AM

## 2022-12-19 NOTE — CARE COORDINATION
12/19 Care Coordination: On 12/17 pt was a RRT, primary encounter diagnosis was Pneumonia of left lower lobe. RRT called for worsening respiratory distress and hypoxic respiratory failure, requiring intubation. Transferred to MICU. Plan for VATS 12/20. Remains on Vent, IV ATB. Rt pigtail cath. On IV Sedation, IV Levophed drip. Discharge needs unclear. PTA Pt was Independent, Lives alone. Was not on any O2 at home. She has 2 grown children that are local and work. Will await Vats and follow for needs. CM/SW will continue to follow for discharge planning.    Alexia BAKERN,RN-CV-BC  [phone]

## 2022-12-19 NOTE — PROGRESS NOTES
Transfer center for [facility] called.  Informed them to cancel the transfer as Guadalupe Eisenmenger is able/will perform VATS after discussion with general surgery (Dr. Reji Zimmer and Dr. Mattie López)

## 2022-12-20 ENCOUNTER — ANESTHESIA EVENT (OUTPATIENT)
Dept: OPERATING ROOM | Age: 77
End: 2022-12-20
Payer: MEDICARE

## 2022-12-20 ENCOUNTER — ANESTHESIA (OUTPATIENT)
Dept: OPERATING ROOM | Age: 77
End: 2022-12-20
Payer: MEDICARE

## 2022-12-20 PROCEDURE — P9046 ALBUMIN (HUMAN), 25%, 20 ML: HCPCS

## 2022-12-20 PROCEDURE — 2580000003 HC RX 258

## 2022-12-20 PROCEDURE — 6360000002 HC RX W HCPCS

## 2022-12-20 PROCEDURE — 2500000003 HC RX 250 WO HCPCS

## 2022-12-20 PROCEDURE — P9041 ALBUMIN (HUMAN),5%, 50ML: HCPCS

## 2022-12-20 PROCEDURE — 6370000000 HC RX 637 (ALT 250 FOR IP)

## 2022-12-20 RX ORDER — ALBUMIN, HUMAN INJ 5% 5 %
SOLUTION INTRAVENOUS PRN
Status: DISCONTINUED | OUTPATIENT
Start: 2022-12-20 | End: 2022-12-20 | Stop reason: SDUPTHER

## 2022-12-20 RX ORDER — ALBUTEROL SULFATE 90 UG/1
AEROSOL, METERED RESPIRATORY (INHALATION) PRN
Status: DISCONTINUED | OUTPATIENT
Start: 2022-12-20 | End: 2022-12-20 | Stop reason: SDUPTHER

## 2022-12-20 RX ORDER — CALCIUM CHLORIDE 100 MG/ML
INJECTION INTRAVENOUS; INTRAVENTRICULAR PRN
Status: DISCONTINUED | OUTPATIENT
Start: 2022-12-20 | End: 2022-12-20 | Stop reason: SDUPTHER

## 2022-12-20 RX ORDER — SODIUM CHLORIDE 9 MG/ML
INJECTION, SOLUTION INTRAVENOUS CONTINUOUS PRN
Status: DISCONTINUED | OUTPATIENT
Start: 2022-12-20 | End: 2022-12-20 | Stop reason: SDUPTHER

## 2022-12-20 RX ORDER — VASOPRESSIN 20 U/ML
INJECTION PARENTERAL PRN
Status: DISCONTINUED | OUTPATIENT
Start: 2022-12-20 | End: 2022-12-20 | Stop reason: SDUPTHER

## 2022-12-20 RX ORDER — VECURONIUM BROMIDE 1 MG/ML
INJECTION, POWDER, LYOPHILIZED, FOR SOLUTION INTRAVENOUS PRN
Status: DISCONTINUED | OUTPATIENT
Start: 2022-12-20 | End: 2022-12-20 | Stop reason: SDUPTHER

## 2022-12-20 RX ORDER — MIDAZOLAM HYDROCHLORIDE 1 MG/ML
INJECTION INTRAMUSCULAR; INTRAVENOUS PRN
Status: DISCONTINUED | OUTPATIENT
Start: 2022-12-20 | End: 2022-12-20 | Stop reason: SDUPTHER

## 2022-12-20 RX ADMIN — ALBUMIN HUMAN 25 G: 0.25 SOLUTION INTRAVENOUS at 12:26

## 2022-12-20 RX ADMIN — VECURONIUM BROMIDE 10 MG: 1 INJECTION, POWDER, LYOPHILIZED, FOR SOLUTION INTRAVENOUS at 11:40

## 2022-12-20 RX ADMIN — VASOPRESSIN 2 UNITS: 20 INJECTION INTRAVENOUS at 12:08

## 2022-12-20 RX ADMIN — CALCIUM CHLORIDE 1 G: 100 INJECTION, SOLUTION INTRAVENOUS at 13:35

## 2022-12-20 RX ADMIN — VASOPRESSIN 2 UNITS: 20 INJECTION INTRAVENOUS at 11:57

## 2022-12-20 RX ADMIN — ALBUTEROL SULFATE 2 PUFF: 90 AEROSOL, METERED RESPIRATORY (INHALATION) at 13:58

## 2022-12-20 RX ADMIN — VASOPRESSIN 2 UNITS: 20 INJECTION INTRAVENOUS at 12:06

## 2022-12-20 RX ADMIN — SODIUM CHLORIDE: 9 INJECTION, SOLUTION INTRAVENOUS at 11:38

## 2022-12-20 RX ADMIN — VASOPRESSIN 2 UNITS: 20 INJECTION INTRAVENOUS at 11:47

## 2022-12-20 RX ADMIN — ALBUMIN (HUMAN) 25 G: 12.5 INJECTION, SOLUTION INTRAVENOUS at 13:22

## 2022-12-20 RX ADMIN — MIDAZOLAM 2 MG: 1 INJECTION INTRAMUSCULAR; INTRAVENOUS at 11:40

## 2022-12-20 NOTE — ANESTHESIA POSTPROCEDURE EVALUATION
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

## 2022-12-20 NOTE — OP NOTE
Operative Note      Patient: Marian Dawn  YOB: 1945  MRN: [id number]    Date of Procedure: 12/20/2022    Pre-Op Diagnosis: MEDIASTINAL FLUID COLLECTION, RESPIRATORY FAILURE, S/P HIATAL HERNIA REPAIR    Post-Op Diagnosis: SAME; LARGE MEDIASTINAL SEROMA & HEMATOMA       Procedure(s):  DIAGNOSTIC BRONCHOSCOPY  RIGHT VIDEO-ASSISTED THORACOSCOPY WITH DRAINAGE PLEURAL FLUID AND IDENTIFICATION OF MEDIASTINAL SEROMA  MINI-THORACOTOMY WITH DRAINAGE & FENESTRATION OF MEDIASTINAL SEROMA/HEMATOMA FLUID COLLECTION   PLACEMENT OF CHEST TUBE X1 AND MEDIASTINAL DRAINS X2    Surgeon(s):  Niesha Lindo MD    Assistant:   Resident: Hamlet Jerome DO    Anesthesia: General    Estimated Blood Loss (mL): 72EY    Complications: None    Specimens:   ID Type Source Tests Collected by Time Destination   1 : HEMATOMA Body Fluid Fluid CULTURE, ANAEROBIC, CULTURE, FUNGUS, GRAM STAIN, CULTURE, BODY FLUID, CULTURE WITH SMEAR, ACID FAST BACILLIUS Niesha Lindo MD 12/20/2022 1328        Implants:  * No implants in log *      Drains:   Closed/Suction Drain Right Other (Comment) Other (Comment) (Active)       Closed/Suction Drain Right Other (Comment) Other (Comment) (Active)       Closed/Suction Drain Right Other (Comment) Other (Comment) (Active)       NG/OG/NJ/NE Tube Orogastric Right mouth (Active)   Surrounding Skin Clean, dry & intact 12/20/22 1200   Securement device Tape 12/20/22 1200   Status Clamped 12/20/22 1200   Placement Verified X-Ray (Initial) 12/20/22 1200   NG/OG/NJ/NE External Measurement (cm) 65 cm 12/20/22 1200   Tube Feeding Intake (mL) 30 ml 12/19/22 2000   Residual Volume (ml) 60 ml 12/19/22 2000       Urinary Catheter 12/15/22 Peterson (Active)   Catheter Indications Urinary retention (acute or chronic), continuous bladder irrigation or bladder outlet obstruction 12/20/22 1200   Site Assessment No urethral drainage 12/20/22 1200   Urine Color Yellow 12/20/22 1200   Urine Appearance Clear 12/20/22 1200   Output (mL) 15 mL 12/20/22 1100       [REMOVED] Chest Tube Right Pleural 1 (Removed)   Status Gravity 12/20/22 0600   Suction To water seal 12/20/22 0600   Drainage Description Serous 12/20/22 1200   Dressing Status Clean, dry & intact 12/20/22 1200   Chest Tube Dressing Dry 12/19/22 2000   Site Assessment Clean, dry & intact 12/20/22 1200   Surrounding Skin Clean, dry & intact 12/20/22 1200   Output (ml) 0 ml 12/20/22 0700       Findings: Large mediastinal extending into R/L chest encapsulated fluid collection. Unable to single-lung ventilate due to poor respiratory status, limited VATS. Identification of cystic fluid collection, drainage and fenestration of cyst wall, evacuation of 1100cc serous fluid and dark blood (no succus or bile) via mini-thoracotomy (2 inch incision over identified cyst cavity) . Placement of chest tube and mediastinal cavity drains x2. Detailed Description of Procedure: The patient was brought to the operating room and positioned right side up on the OR table. Sequential compression devices were placed on the patient's lower extremities and functioning. Preoperative antibiotics were administered. Anesthesia was obtained without complication as per the anesthesia record. Immediately prior to the procedure a time-out was called and the surgical checklist was reviewed and agreed upon by all present. Patient was already on vasopressors and requiring increased vent requirements she was saturating in the high 80's on 100% FiO2 and on 10mcg levophed with vasopressin being started. Anesthesia elected to not attempt bronchial blocker and attempt any single-lung ventilation due to poor P/F ratio and clinical status. A bronchoscopy was performed which revealed edematous bronchial  tissue without any thick secretions. Thin watery secretions were suctioned until clear. The patient was then prepped and draped in the standard fashion.  An incision was made 5th intercostal space mid-axillary line. Blunt dissection carried down to the rib. The tonsil was inserted superior to the rib into the chest. A finger was inserted to sweep away the lung. A 5mm optiview trocar was then placed into the thoracic cavity and the chest was insufflated to 8mmHg. The camera was inserted. The diaphragm was identified and an additional 5mm port was placed inferiorly and medially under direct visualization. The chest was inspected. View was very limited due to patient's respiratory status and inability to have single lung ventilation. There was serous fluid consistent with pleural effusion throughout the cavity, this was suctioned until clear. We were able to identify a large tense cystic fluid collection out laterally. We also were able to identify the IR placed Pigtail catheter which was just outside the fluid collection and didn't penetrate the cystic capsule. We confirmed the cyst cavity with intrathoracic US. Unable to safely drain this collection laparoscopically due to inability to perform single lung ventilation. Decision made to perform a mini-thoracotomy for open drainage. A 2inch thoracotomy incision was made posterolaterally with scalpel, dissection carried down to surface of the rib with cautery. Cautery was used to dissect the intercostal muscles just superior to the rib and the chest was entered. The wall of the cyst was grasped with allis clamp. Cautery was used to open the cyst wall through the visceral peritoneum and immediate return of mostly serrous fluid mixed with old venous blood was seen. We evacuated 1100cc of fluid from the fluid collection which extended into the mediastinum. Cautery was used to fenestrate the cyst wall in multiple locations as was safely possible. A 28Fr courtney drain was then placed through the superior-most port side and directed down into the fluid cavity.  An incision was made just lateral to the first drain, and another 19 Fr courtney drain was placed through this incision and also fed down into the fluid cavity space. A 28Fr courtney drain was then inserted into the chest through the last inferomedial trocar site and directed to the apex through the thoractomy incision to serve as a chest tube. All three drains were secured to the skin with 0 silk. The IR pigtail was cut and removed. The chest was irrigated and suctioned clear. The thoractomy incision was then closed in multiple layers. 0 Vicryl was used to close the fascia, followed by 2-0 vicryl to approximate scarpas, 3-0 vicryl dermal sutures, and then a running subcuticular 4-0 vicryl suture. Skin glue was applied. A figure-of-eight silk suture was used to close the IR drain site. Sterile dressings and drain sponges were placed. The drains were hooked to atrium and placed to -20 suction. Left chest US was preformed and there was about 1.5-2cm of fluid in the left chest but considering the patient was decreasing on pressors and saturating 99% at the end of my procedure I opted to monitor this and not place another chest tube. Needle, sponge, and instrument counts were reported as correct x2. The patient tolerated the procedure well without complications and was transferred to the recovery area in stable condition on significantly less vasopressor support. Dr. Roselia Roca was present and scrubbed throughout the case. Kailee Baum DO  Surgery Resident PGY-5  12/20/2022  2:52 PM    [facility]   Attending Physician Statement:  I was present during the entire procedure and was actively supervising and directing the resident. There were no immediate complications.     Damian Altman MD

## 2022-12-20 NOTE — PROGRESS NOTES
Attempted to see patient in [facility], but was off the floor for surgery.   Will attempt later or see tomorrow

## 2022-12-20 NOTE — PROGRESS NOTES
Pulmonary Subsequent Hospital F/U note    Patient is being followed for: acute respiratory failure with hypoxia, pneumonia, bilateral pleural effusions    Interval HPI:  Ms. Russel Danielle was seen in the ICU  Op note is pending from today  She is currently sedated  2 chest tubes in place  On norepinephrine 8mcg/min  Current vent settings: ACVC 95%, 16, 350cc, 8    ROS:  Unable to obtain      Exam:  BP (!) 102/50   Pulse (!) 107   Temp 98.6 °F (37 °C) (Temporal)   Resp 16   Ht 5' 2\" (1.575 m)   Wt 150 lb (68 kg)   SpO2 92%   BMI 27.44 kg/m²    General: Patient is intubated and sedated, appears ill  HEENT: PERRL, ETT in place   Neck: supple no adenopathy  CV: tachycardic, no murmur   Lungs: rhonchi bilaterally, R chest tubes in place  Abd: soft, ND, NT, bowel sounds normal  Ext: warm, 1+ pitting edema     Data:    Oximetry:  SpO2 Readings from Last 1 Encounters:   12/20/22 92%       Imaging personally reviewed by myself:  CT chest  Impression   Large right pleural effusion of mixed density to suggest complex effusion. There is a pigtail catheter seen in the pleural space posteriorly and   inferiorly. Small to moderate size left pleural effusion with consolidative   infiltrate throughout the left upper and lower lung field and aerated   portions revealing nodular ground-glass opacity to suggest an infectious   process. Nodular ground-glass opacity inferiorly within the aerated right   upper and lower lung field concerning for an infectious process as well. There is less consolidative infiltrate identified within the right lower lobe.          Respiratory cultures  Enterobacter, zosyn resistant    Pertinent labs reviewed and noted:  Lab Results   Component Value Date/Time    WBC 16.1 12/20/2022 04:01 AM    HGB 10.1 12/20/2022 04:01 AM    HCT 29.0 12/20/2022 01:31 PM    MCV 85.2 12/20/2022 04:01 AM    MCH 29.9 12/20/2022 04:01 AM    MCHC 35.1 12/20/2022 04:01 AM    RDW 14.8 12/20/2022 04:01 AM     12/20/2022 04:01 AM    MPV 10.8 12/20/2022 04:01 AM     Lab Results   Component Value Date/Time     12/20/2022 04:01 AM    K 4.7 12/20/2022 01:31 PM    K 3.8 12/20/2022 04:01 AM     12/20/2022 04:01 AM    CO2 23 12/20/2022 04:01 AM    BUN 42 12/20/2022 04:01 AM    CREATININE 3.0 12/20/2022 01:31 PM    CREATININE 2.5 12/20/2022 04:01 AM    LABALBU 3.1 12/20/2022 04:01 AM    LABALBU 4.3 01/24/2011 01:15 PM    CALCIUM 8.6 12/20/2022 04:01 AM    GFRAA >60 07/22/2022 01:42 PM    LABGLOM 19 12/20/2022 04:01 AM     Lab Results   Component Value Date/Time    PROTIME 14.7 12/20/2022 04:01 AM    INR 1.3 12/20/2022 04:01 AM       Assessment:  1. Acute hypoxic respiratory failure requiring intubation  2. Post operative respiratory insufficiency  3. Pneumonia  4. R loculated pleural effusion s/p IR chest tube with minimal drainage, s/p R VATS - op note pending  5. L pleural effusion   6. S/p large hiatal hernia repair  7.  Acute kidney injury    Plan:  Continue mechanical ventilation, wean FiO2 as able  Aggressive bronchopulmonary hygiene  Recommend bronchoscopy if this was not done intraoperatively  CXR and ABG post op  Chest tube management per surgery  Antibiotics per ID  Wean vasopressors for MAP >65    Discussed with critical care NP     Electronically signed by Triny Hand MD on 12/20/2022 at 3:13 PM

## 2022-12-20 NOTE — PROGRESS NOTES
Occupational Therapy    Date:2022  Patient Name: Kim Avila  MRN: [id number]  : 1945  Room: [address] [place] [address]A     OT orders received and chart reviewed. OT eval on hold at this time pending tentative plan for VATS this date (22). OT will follow and re-attempt eval as appropriate at a later time/date.     Yamilet Lam OTR/L; [id number]

## 2022-12-20 NOTE — PROGRESS NOTES
Discussed care with daughter bedside. Discussed her mother is more critical today. On levophed, poor saturation higher risk convertin to thoracotomy or death. Discussed that decline in status could be increase in fluid collection or could be septic shock for her aspiration PNA. CT scans and Xrays all reviewed with her and her aunt. All questions answered.      Viola Coyne MD

## 2022-12-20 NOTE — PLAN OF CARE
Problem: Chronic Conditions and Co-morbidities  Goal: Patient's chronic conditions and co-morbidity symptoms are monitored and maintained or improved  12/20/2022 0258 by Enmanuel Aleman RN  Outcome: Progressing  12/19/2022 2317 by Enmanuel Aleman RN  Outcome: Progressing  12/19/2022 1743 by Seven Hooper RN  Outcome: Progressing     Problem: Discharge Planning  Goal: Discharge to home or other facility with appropriate resources  12/20/2022 0258 by Enmanuel Aleman RN  Outcome: Progressing  12/19/2022 2317 by Enmanuel Aleman RN  Outcome: Progressing  12/19/2022 1743 by Seven Hooper RN  Outcome: Not Progressing     Problem: Safety - Adult  Goal: Free from fall injury  12/20/2022 0258 by Enmanuel Aleman RN  Outcome: Progressing  12/19/2022 2317 by Enmanuel Aleman RN  Outcome: Progressing  12/19/2022 1743 by Seven Hooper RN  Outcome: Progressing     Problem: Pain  Goal: Verbalizes/displays adequate comfort level or baseline comfort level  12/20/2022 0258 by Enmanuel Aleman RN  Outcome: Progressing  12/19/2022 2317 by Enmanuel Aleman RN  Outcome: Progressing  12/19/2022 1743 by Seven Hooper RN  Outcome: Progressing     Problem: Nutrition Deficit:  Goal: Optimize nutritional status  12/20/2022 0258 by Enmanuel Aleman RN  Outcome: Progressing  12/19/2022 2317 by Enmanuel Aleman RN  Outcome: Progressing     Problem: Skin/Tissue Integrity  Goal: Absence of new skin breakdown  Description: 1. Monitor for areas of redness and/or skin breakdown  2. Assess vascular access sites hourly  3. Every 4-6 hours minimum:  Change oxygen saturation probe site  4. Every 4-6 hours:  If on nasal continuous positive airway pressure, respiratory therapy assess nares and determine need for appliance change or resting period.   12/20/2022 0258 by Enmanuel Aleman RN  Outcome: Progressing  12/19/2022 2317 by Enmanuel Aleman RN  Outcome: Progressing     Problem: Discharge Planning  Goal: Discharge to home or other facility with appropriate resources  12/20/2022 0258 by Enmanuel Aleman RN  Outcome: Progressing  12/19/2022 2317 by Enmanuel Aleman RN  Outcome: Progressing  12/19/2022 1743 by Seven Hooper RN  Outcome: Not Progressing

## 2022-12-20 NOTE — PROGRESS NOTES
GENERAL SURGERY  DAILY PROGRESS NOTE  12/20/2022  Chief Complaint   Patient presents with    Shortness of Breath     Sent from [facility] for SOB patient went for hernia repair patient on 6L NC on arrival       Subjective:  Upon seeing the patient today, she is mechanically ventilated and unresponsive. Her nurse states that she is ready for her VATS procedure, and denies any acute abnormalities or overnight events.     Objective:  BP (!) 102/50   Pulse (!) 107   Temp 98.6 °F (37 °C) (Temporal)   Resp 16   Ht 5' 2\" (1.575 m)   Wt 150 lb (68 kg)   SpO2 92%   BMI 27.44 kg/m²     GENERAL:  Laying in bed, not awake, not alert, appears critically ill  HEAD: Normocephalic, atraumatic  EYES: No sclera icterus, pupils equal  LUNGS:  On mechanical ventilation  CARDIOVASCULAR:  On vasopressors to elevate MAP  ABDOMEN:  Moderate distension, incisions from surgery are dry and intact  EXTREMITIES: No edema or swelling  SKIN: Warm and dry    Assessment/Plan:  65 y.o. female s/p hiatal hernia repair complicated with respiratory insufficiency and mediastinal fluid collection    Hospital Problem List:   Postoperative pneumonia           Hypoxia           Pneumonia of left lower lobe due to infectious organism           Aspiration pneumonia of both lower lobes due to gastric secretions [facility]            Plan for video assisted thoracoscopy, possible thoracotomy  Monitor vasopressors as appropriate  Continue PPI regimen  IV fluids to fix electrolyte disturbances  Continue TPN as appropriate  Renal- follow Nephrology recommendations  Neuro- no acute abnormalities at this time, continue to monitor for any focal or neurological deficits  MSK- No acute abnormalities at this time  - Replace Peterson as appropriate      Plan to go to the OR later today    Plan to be dicussed with attending physician      Electronically signed by Phyllis Kumar on 12/20/2022 at 3:37 PM

## 2022-12-20 NOTE — PROGRESS NOTES
TRAUMA SURGERY  DAILY PROGRESS NOTE  2022    Subjective:  Remains critically ill on vasopressor support her maps greater than 65. On high FiO2 to maintain adequate saturations. Planning for OR today. Objective:  Last 24Hrs  Temp  Av.6 °F (36.4 °C)  Min: 97 °F (36.1 °C)  Max: 98.4 °F (36.9 °C)  Resp  Av.2  Min: 15  Max: 34  Pulse  Av.7  Min: 78  Max: 901  Systolic (82UMT), BXV:049 , Min:63 , PPC:106     Diastolic (87DIV), RTW:49, Min:43, Max:95    SpO2  Av.1 %  Min: 93 %  Max: 100 %    I/O last 3 completed shifts: In: 3737.3 [I.V.:1863; Blood:566.7; IV Piggyback:450.9]  Out: 1342 [Urine:1212; Chest Tube:130]      General: Critically ill appearing  Cardiovascular: On vasopressors to support maps greater than 65  Respiratory/Chest: Mechanically ventilated, AC VC, tidal volume 400, respiratory rate 16, 5 PEEP, 85% FiO2; right chest pigtail catheter to waterseal without air leak and minimal serous appearing drainage  Abdomen: Moderately distended, incisions with Dermabond are clean dry and intact          CBC  Recent Labs     22  0401 22  1045 22  0402 22  1200 22  2152 22  0057 22  0401   WBC 12.1*  --  14.2*  --   --   --  16.1*   RBC 2.36*  --  2.46*  --   --   --  3.38*   HGB 7.0*   < > 7.3*   < > 10.0* 10.0* 10.1*   HCT 21.0*   < > 22.5*   < > 29.3* 29.3* 28.8*   MCV 89.0  --  91.5  --   --   --  85.2   MCH 29.7  --  29.7  --   --   --  29.9   MCHC 33.3  --  32.4  --   --   --  35.1*   RDW 14.4  --  14.9  --   --   --  14.8     --  234  --   --   --  191   MPV 9.9  --  10.2  --   --   --  10.8    < > = values in this interval not displayed.        CMP  Recent Labs     22  0401 22    141 140   K 3.9 4.3  4.3 3.8  3.8   * 105 103   CO2 27 26 23   BUN 39* 39* 42*   CREATININE 2.3* 2.2* 2.5*   GLUCOSE 182* 268* 239*   CALCIUM 7.9* 8.6 8.6   PROT 4.4* 5.6* 5.0*   LABALBU 1.9* 3.1* 3.1* BILITOT 0.4 0.6 1.0   ALKPHOS 59 55 87   AST 61* 27 29   ALT 52* 32 20         Assessment/Plan:  65 y.o. female status post hiatal hernia repair on 77/9 complicated by respiratory insufficiency likely from aspiration event. Masslike effect from large seroma causing atelectasis and likely contribute to the etiology of her continue mechanical ventilation and respiratory support.     Plan for or today for right video-assisted thoracoscopy, possible thoracotomy  Will stop TPN perioperatively  Wean vasopressors as able  Continue PPI twice daily    Ky Field MD  General Surgery Resident, PGY-4    Electronically signed on 12/20/2022 at 6:58 AM

## 2022-12-20 NOTE — ANESTHESIA PRE PROCEDURE
Department of Anesthesiology  Preprocedure Note       Name:  Tess Ann   Age:  65 y.o.  :  1945                                          MRN:  [id number]         Date:  2022      Surgeon: Felicita Andrade):  Hugo Hickman MD    Procedure: Procedure(s):  RIGHT VATS    Medications prior to admission:   Prior to Admission medications    Medication Sig Start Date End Date Taking?  Authorizing Provider   Cholecalciferol (VITAMIN D3) 125 MCG (5000 UT) TABS Take by mouth    Historical Provider, MD   levothyroxine (SYNTHROID) 50 MCG tablet Take 50 mcg by mouth daily    Historical Provider, MD   rosuvastatin (CRESTOR) 20 MG tablet Take 1 tablet by mouth nightly 21   Jefrfey Kim MD   aspirin 325 MG EC tablet Take 1 tablet by mouth daily 21  Jeffrey Kim MD   Handicap Placard MISC by Does not apply route Duration: Lifetime 18   Dara Borges DO       Current medications:    Current Facility-Administered Medications   Medication Dose Route Frequency Provider Last Rate Last Admin    PN-Adult  3 IN 1 Central Line (Custom)   IntraVENous Continuous TPN Ariadna Saini MD        potassium phosphate 30 mmol in dextrose 5 % 250 mL IVPB  30 mmol IntraVENous Once Ariadna Saini MD 62.5 mL/hr at 22 0953 30 mmol at 22 0953    insulin lispro (HUMALOG) injection vial 0-4 Units  0-4 Units SubCUTAneous Q4H Kaiser Osei, APRN - CNP   1 Units at 22 1006    PN-Adult  3 IN 1 Central Line (Custom)   IntraVENous Continuous TPN Osmar Roque MD 58.3 mL/hr at 225 Rate Verify at 22    propofol injection  5-50 mcg/kg/min IntraVENous Continuous Sridevi Bellamy MD 10.2 mL/hr at 22 0627 25 mcg/kg/min at 22    fentaNYL 10 mcg/ml in 0.9%  ml infusion   mcg/hr IntraVENous Continuous Sridevi Bellamy MD 10 mL/hr at 22 0946 100 mcg/hr at 22 0946    norepinephrine (LEVOPHED) 16 mg in dextrose 5% 250 mL infusion  1-100 mcg/min IntraVENous Continuous Latesha Perez MD 9.4 mL/hr at 12/20/22 0636 10 mcg/min at 12/20/22 0636    0.9 % sodium chloride infusion   IntraVENous PRN Latesha Perez MD        ertapenem [place]gallo Benavides) 500 mg in sodium chloride 0.9 % 50 mL IVPB  500 mg IntraVENous Q24H Love Dorado MD   Stopped at 12/19/22 1735    pantoprazole (PROTONIX) 40 mg in sodium chloride (PF) 0.9 % 10 mL injection  40 mg IntraVENous Q12H Milton Ely MD   40 mg at 12/20/22 0957    albumin human-kjda 25 % IV solution 25 g  25 g IntraVENous Q8H [facility] Ranjeet Calderon MD   25 g at 12/20/22 0406    sodium chloride flush 0.9 % injection 5-40 mL  5-40 mL IntraVENous 2 times per day KAR Ness - CNP   10 mL at 12/19/22 1954    sodium chloride flush 0.9 % injection 5-40 mL  5-40 mL IntraVENous PRN KAR Ness - CNP        0.9 % sodium chloride infusion   IntraVENous PRN KAR Ness - CNP        heparin flush 100 UNIT/ML injection 300 Units  3 mL IntraVENous 2 times per day KAR Ness - CNP        heparin flush 100 UNIT/ML injection 300 Units  3 mL IntraCATHeter PRN KAR Ness CNP        metoclopramide (REGLAN) injection 10 mg  10 mg IntraVENous Q6H Federico Cheng MD   10 mg at 12/20/22 0957    Arformoterol Tartrate (BROVANA) nebulizer solution 15 mcg  15 mcg Nebulization BID Nicholas Waters APRN - CNP   15 mcg at 12/20/22 0850    budesonide (PULMICORT) nebulizer suspension 500 mcg  0.5 mg Nebulization BID Nicholas Waters APRN - CNP   500 mcg at 12/20/22 0850    albuterol (PROVENTIL) nebulizer solution 2.5 mg  2.5 mg Nebulization Q4H MEGHANN Arriaga MD   2.5 mg at 12/20/22 0849    [Held by provider] metoprolol (LOPRESSOR) injection 5 mg  5 mg IntraVENous Q6H KAR Ness - CNP   5 mg at 12/18/22 0820    [Held by provider] oxyCODONE (ROXICODONE) immediate release tablet 5 mg  5 mg Oral Q4H PRN Norma Wang DO        Or    [Held by provider] oxyCODONE HCl (OXY-IR) immediate release tablet 10 mg  10 mg Oral Q4H PRN Houston Abdullahi, DO   10 mg at 12/19/22 0231    rosuvastatin (CRESTOR) tablet 10 mg  10 mg Oral Nightly Southside Lard, APRN - CNP   10 mg at 12/19/22 1953    sodium chloride flush 0.9 % injection 5-40 mL  5-40 mL IntraVENous 2 times per day Bita Lard, APRN - CNP   10 mL at 12/20/22 1010    sodium chloride flush 0.9 % injection 10 mL  10 mL IntraVENous PRN Bita Lard, APRN - CNP        0.9 % sodium chloride infusion   IntraVENous PRN Southside Lard, APRN - CNP        ondansetron (ZOFRAN-ODT) disintegrating tablet 4 mg  4 mg Oral Q8H PRN Bita Lard, APRN - CNP   4 mg at 12/14/22 2155    Or    ondansetron (ZOFRAN) injection 4 mg  4 mg IntraVENous Q6H PRN Southside Lard, APRN - CNP   4 mg at 12/19/22 0138    magnesium hydroxide (MILK OF MAGNESIA) 400 MG/5ML suspension 30 mL  30 mL Oral Daily PRN Southside Lard, APRN - CNP        acetaminophen (TYLENOL) tablet 650 mg  650 mg Oral Q6H PRN Bita Lard, APRN - CNP        Or    acetaminophen (TYLENOL) suppository 650 mg  650 mg Rectal Q6H PRN Southside Lard, APRN - CNP        ipratropium-albuterol (DUONEB) nebulizer solution 1 ampule  1 ampule Inhalation Q4H PRN Southside Lard, APRN - CNP   1 ampule at 12/17/22 0216    levothyroxine (SYNTHROID) tablet 50 mcg  50 mcg Oral Daily Senthil Marks MD   50 mcg at 12/20/22 0957    [Held by provider] enoxaparin Sodium (LOVENOX) injection 30 mg  30 mg SubCUTAneous Daily Southside Lard, APRN - CNP   30 mg at 12/18/22 5703       Allergies:     Allergies   Allergen Reactions    Benadryl [Diphenhydramine] Itching    Ibuprofen Other (See Comments)     Doesn't know       Problem List:    Patient Active Problem List   Diagnosis Code    Compression fracture of L4 lumbar vertebra S32.040A    Other postprocedural status(V45.89)  Kyphoplasty L4 Z98.890    Low back pain M54.50    Acne rosacea L71.9    Drug reaction T50.905A    Hypothyroidism E03.9    Neck muscle spasm M62.838    Back pain M54.9    Headache R51.9    Vertebrobasilar TIAs G45.0    Hypercholesterolemia E78.00    Lumbar spinal stenosis L4-5 M48.061    Orthostatic hypotension I95.1    H/o compression fracture of lumbar vertebra, mulitple S32.000A    Compression fracture of L5 lumbar vertebra S32.050A    S/P kyphoplasty L4 Z98.890    Fibromyalgia M79.7    Chronic back pain M54.9, G89.29    Mass of breast N63.0    Contact dermatitis L25.9    Degeneration of intervertebral disc of lumbar region M51.36    Dermoid cyst of mouth K09.8    Vision disorder H53.9    Essential hypertension I10    Gastroesophageal reflux disease K21.9    Hyperlipidemia E78.5    Neuropathy G62.9    Breast pain N64.4    Sprain of knee S83. 90XA    Temporary cerebral vascular dysfunction I67.82    Pain of upper abdomen R10.10    TIA (transient ischemic attack) G45.9    Limb weakness R29.898    S/P lumbar fusion (history of PLIF) Z98.1    Cancer (HCC) C80.1    History of bilateral breast cancer Z85.3    Dizziness R42    Hx-TIA (transient ischemic attack) Z86.73    Pain syndrome, chronic G89.4    Anxiety with somatization F41.9, F45.0    Postoperative pneumonia J95.89, J18.9    Hypoxia R09.02    Pneumonia of left lower lobe due to infectious organism J18.9    Aspiration pneumonia of both lower lobes due to gastric secretions (HCC) J69.0       Past Medical History:        Diagnosis Date    Anxiety with somatization 3/30/2021    Compression fracture of L1 lumbar vertebra [facility]) november 1994    Compression fracture of L4 lumbar vertebra     secondary to fall in December 2013    Fall 1989 & 12/2013    History of bilateral breast cancer 1980's    breast    Hx-TIA (transient ischemic attack) 3/30/2021    Osteoporosis     Pain syndrome, chronic 3/30/2021    Thyroid disease     went off of medication    Type II or unspecified type diabetes mellitus without mention of complication, not stated as uncontrolled     resolved with weight loss of 20 lbs       Past Surgical History:        Procedure Laterality Date    BACK SURGERY  1989    PLIF T12 to L2 with pedicle screws and rods    BREAST SURGERY Bilateral     breast implants (after mastectomy)    FIXATION KYPHOPLASTY  03/10/2014    with bone biopsy    IR PERC CATH PLEURAL DRAIN W/IMAG  12/16/2022    IR PERC CATH PLEURAL DRAIN W/IMAG 12/16/2022 MAURICIO Cheng MD [facility]    MASTECTOMY Bilateral     TONSILLECTOMY AND ADENOIDECTOMY      TUBAL LIGATION         Social History:    Social History     Tobacco Use    Smoking status: Never    Smokeless tobacco: Never   Substance Use Topics    Alcohol use: Not Currently     Comment: 1 glass a month                                Counseling given: Not Answered      Vital Signs (Current):   Vitals:    12/20/22 0300 12/20/22 0400 12/20/22 0500 12/20/22 0600   BP: (!) 97/52 (!) 96/51 (!) 101/58 (!) 104/59   Pulse: 88 89 91 90   Resp: 16 16 16 16   Temp:  97.4 °F (36.3 °C)     TempSrc:  Temporal     SpO2: 96% 95% 94% 94%   Weight:       Height:                                                  BP Readings from Last 3 Encounters:   12/20/22 (!) 104/59   07/22/22 (!) 161/97   03/14/22 (!) 160/85       NPO Status:                                                   Date of last liquid consumption: 12/08/22                        Date of last solid food consumption: 12/08/22    BMI:   Wt Readings from Last 3 Encounters:   12/15/22 150 lb (68 kg)   07/22/22 155 lb (70.3 kg)   03/14/22 155 lb (70.3 kg)     Body mass index is 27.44 kg/m².     CBC:   Lab Results   Component Value Date/Time    WBC 16.1 12/20/2022 04:01 AM    RBC 3.38 12/20/2022 04:01 AM    HGB 10.1 12/20/2022 04:01 AM    HCT 28.8 12/20/2022 04:01 AM    MCV 85.2 12/20/2022 04:01 AM    RDW 14.8 12/20/2022 04:01 AM     12/20/2022 04:01 AM       CMP:   Lab Results   Component Value Date/Time     12/20/2022 04:01 AM    K 3.8 12/20/2022 04:01 AM    K 3.8 12/20/2022 04:01 AM     12/20/2022 04:01 AM    CO2 23 12/20/2022 04:01 AM    BUN 42 12/20/2022 04:01 AM    CREATININE 2.5 12/20/2022 04:01 AM    GFRAA >60 07/22/2022 01:42 PM    LABGLOM 19 12/20/2022 04:01 AM    GLUCOSE 239 12/20/2022 04:01 AM    PROT 5.0 12/20/2022 04:01 AM    CALCIUM 8.6 12/20/2022 04:01 AM    BILITOT 1.0 12/20/2022 04:01 AM    ALKPHOS 87 12/20/2022 04:01 AM    AST 29 12/20/2022 04:01 AM    ALT 20 12/20/2022 04:01 AM       POC Tests: No results for input(s): POCGLU, POCNA, POCK, POCCL, POCBUN, POCHEMO, POCHCT in the last 72 hours. Coags:   Lab Results   Component Value Date/Time    PROTIME 14.7 12/20/2022 04:01 AM    INR 1.3 12/20/2022 04:01 AM    APTT 34.1 12/20/2022 04:01 AM       HCG (If Applicable): No results found for: PREGTESTUR, PREGSERUM, HCG, HCGQUANT     ABGs: No results found for: PHART, PO2ART, XLV6PRH, FRJ2HDQ, BEART, [id number]     Type & Screen (If Applicable):  No results found for: LABABO, LABRH    Drug/Infectious Status (If Applicable):  Lab Results   Component Value Date/Time    HEPCAB NON REACT 02/28/2014 12:50 PM       COVID-19 Screening (If Applicable):   Lab Results   Component Value Date/Time    COVID19 Not Detected 12/17/2022 10:32 AM           Anesthesia Evaluation    Airway: Mallampati: Unable to assess / NA         Intubated Dental:          Pulmonary:   (+) pneumonia:                            ROS comment: Respiratory failure, intubated on mechanical ventilation. Cardiovascular:    (+) hypertension:,                   Neuro/Psych:   (+) neuromuscular disease:, TIA, headaches:, psychiatric history:            GI/Hepatic/Renal:   (+) GERD:,           Endo/Other:    (+) Diabetes, hypothyroidism::., .                 Abdominal:             Vascular: Other Findings:           Anesthesia Plan      general     ASA 4       Induction: intravenous. arterial line  MIPS: Postoperative ventilation.                         Angy Kc MD 12/20/2022

## 2022-12-20 NOTE — PROGRESS NOTES
[facility] PROGRESS NOTE      Chief complaint: Follow-up of pneumonia    This patient is a 54-year-old female with history of bilateral breast cancer, DM, hiatal hernia repair on 12/09, presented on 12/14 with shortness of breath for 2 days accompanied by productive cough and malaise prior to her hiatal hernia repair, thought to have large postoperative seroma after a type IV hiatal hernia repair. On admission, she was afebrile and hemodynamically stable with no leukocytosis. CT chest, abdomen and pelvis showed soft tissue density in the posterior medial aspect of the right lower lung field with deviation of the mediastinum to the left, bilateral lower lobe consolidation and pleural effusions, mild soft tissue prominence surrounding the esophagus with narrowing at the gastroesophageal junction appearing to be due to external compression, wall thickening in the gastric outlet with mild prominence of the stomach, distended gallbladder, no evidence of acute abdominal/pelvic pathology. Esophagram was negative for esophageal perforation. She underwent right-sided thoracentesis on 12/16 during which 105 mL of serous fluid was removed. Pleural fluid gram stain and culture showed rare polymorphonuclear leukocytes, no epithelial cells, no organisms, no growth. She was transferred to MICU on 12/17 for worsening hypoxemia. Repeat CT chest on 12/17 showed large right pleural effusion of mixed density suggestive of complex effusion with pigtail catheter in the pleural space posteriorly and inferiorly, small to moderate sized left pleural effusion with consolidative infiltrate throughout left upper and lower lung field and aerated portions revealing nodular groundglass opacity, nodular groundglass opacity inferiorly within the aerated right upper and lower lung field, less consolidative infiltrate within the right lower lobe.   Respiratory gram stain and culture showed no polymorphonuclear leukocytes, no epithelial cells, moderate yeast, moderate gram-positive diphtheroid-like rods, few gram-negative rods, moderate growth of Enterobacter cloacae, heavy growth of Candida albicans. Urine Streptococcus pneumoniae and Legionella antigens were negative. Respiratory pathogen PCR panel and MRSA nares culture were negative. Blood cultures showed no growth to date. She received a dose of ceftriaxone and doxycycline on admission. Ampicillin-sulbactam was started on admission and then switched to piperacillin-tazobactam on 12/17 then switched to meropenem on 12/19. Vancomycin was started on 12/17. She underwent right VATS, mini-thoracotomy with drainage and fenestration of mediastinal seroma/hematoma, chest tube and mediastinal drain placement on 12/20 during which 1100 mL serous fluid and dark blood (no succus or bile) extending into the mediastinum was drained via mini-thoracotomy. Subjective: Patient was seen and examined. She remains in critical condition, intubated and sedated, on minimal vasopressor support. Objective:  BP (!) 102/50   Pulse (!) 107   Temp 98.6 °F (37 °C) (Temporal)   Resp 16   Ht 5' 2\" (1.575 m)   Wt 150 lb (68 kg)   SpO2 92%   BMI 27.44 kg/m²   Constitutional: Intubated, no MV dyssynchrony  Respiratory: Clear breath sounds, no crackles, no wheezes  Cardiovascular: Regular rate and rhythm, no murmurs  Gastrointestinal: Bowel sounds present, soft, nontender  Skin: Warm and dry, no active dermatoses  Musculoskeletal: No joint swelling, no joint erythema    Labs, imaging, and medical records/notes were personally reviewed.     Laboratory:  Lab Results   Component Value Date    WBC 16.1 (H) 12/20/2022    WBC 14.2 (H) 12/19/2022    WBC 12.1 (H) 12/18/2022    HGB 10.1 (L) 12/20/2022    HCT 29.0 (L) 12/20/2022    MCV 85.2 12/20/2022     12/20/2022     Lab Results   Component Value Date    NEUTROABS 13.91 (H) 12/20/2022    NEUTROABS 13.35 (H) 12/19/2022    NEUTROABS 10.04 (H) 12/18/2022     Lab Results   Component Value Date    CRP 45.9 (H) 12/14/2022    CRP 0.3 03/30/2022    CRP 0.3 02/04/2021     No results found for: CRPHS  Lab Results   Component Value Date    SEDRATE 16 03/30/2022    SEDRATE 25 (H) 02/04/2021    SEDRATE 33 (H) 01/13/2020     Lab Results   Component Value Date    ALT 20 12/20/2022    AST 29 12/20/2022    ALKPHOS 87 12/20/2022    BILITOT 1.0 12/20/2022     Lab Results   Component Value Date/Time     12/20/2022 04:01 AM    K 4.7 12/20/2022 01:31 PM    K 3.8 12/20/2022 04:01 AM     12/20/2022 04:01 AM    CO2 23 12/20/2022 04:01 AM    BUN 42 12/20/2022 04:01 AM    CREATININE 3.0 12/20/2022 01:31 PM    CREATININE 3.1 12/20/2022 12:10 PM    CREATININE 2.5 12/20/2022 04:01 AM    CREATININE 2.2 12/19/2022 04:02 AM    CREATININE 2.3 12/18/2022 04:01 AM    GFRAA >60 07/22/2022 01:42 PM    LABGLOM 19 12/20/2022 04:01 AM    GLUCOSE 239 12/20/2022 04:01 AM    PROT 5.0 12/20/2022 04:01 AM    LABALBU 3.1 12/20/2022 04:01 AM    LABALBU 4.3 01/24/2011 01:15 PM    CALCIUM 8.6 12/20/2022 04:01 AM    BILITOT 1.0 12/20/2022 04:01 AM    ALKPHOS 87 12/20/2022 04:01 AM    AST 29 12/20/2022 04:01 AM    ALT 20 12/20/2022 04:01 AM       Radiology: CT chest 12/17: Large right pleural effusion of mixed density to suggest complex effusion. There is a pigtail catheter seen in the pleural space posteriorly and   inferiorly. Small to moderate size left pleural effusion with consolidative   infiltrate throughout the left upper and lower lung field and aerated   portions revealing nodular ground-glass opacity to suggest an infectious   process. Nodular ground-glass opacity inferiorly within the aerated right   upper and lower lung field concerning for an infectious process as well. There is less consolidative infiltrate identified within the right lower lobe.        Microbiology:     Blood cx  No results found for: BLOODCULT1  Culture, Blood 2   Date Value Ref Range Status   12/17/2022 24 Hours no growth Preliminary       Smear, Respiratory   Date Value Ref Range Status   12/17/2022   Final    Polymorphonuclear leukocytes not seen  Epithelial cells not seen  Moderate yeast  Moderate gram positive rods Diphtheroid like  Few Gram negative rods         CULTURE, RESPIRATORY   Date Value Ref Range Status   12/17/2022 Oral Pharyngeal Hilda absent (A)  Final   12/17/2022 Moderate growth  Final   12/17/2022 Heavy growth  Final   Enterobacter cloacae complex (1)    Antibiotic Interpretation Microscan  Method Status    amoxicillin-clavulanate Resistant >=^32 mcg/mL BACTERIAL SUSCEPTIBILITY PANEL BY CHEYANNE     ceFAZolin Resistant >=^64 mcg/mL BACTERIAL SUSCEPTIBILITY PANEL BY CHEYANNE     cefepime Sensitive ^0.5 mcg/mL BACTERIAL SUSCEPTIBILITY PANEL BY CHEYANNE     cefotaxime Resistant >=^64 mcg/mL BACTERIAL SUSCEPTIBILITY PANEL BY CHEYANNE     cefOXitin Resistant >=^64 mcg/mL BACTERIAL SUSCEPTIBILITY PANEL BY CHEYANNE     cefTAZidime-avibactam Sensitive ^0.5 mcg/mL BACTERIAL SUSCEPTIBILITY PANEL BY CHEYANNE     gentamicin Sensitive <=^1 mcg/mL BACTERIAL SUSCEPTIBILITY PANEL BY CHEYANNE     levofloxacin Sensitive <=^0.12 mcg/mL BACTERIAL SUSCEPTIBILITY PANEL BY CHEYANNE     meropenem Sensitive ^0.5 mcg/mL BACTERIAL SUSCEPTIBILITY PANEL BY CHEYANNE     piperacillin-tazobactam Resistant >=^128 mcg/mL BACTERIAL SUSCEPTIBILITY PANEL BY CHEYANNE     trimethoprim-sulfamethoxazole Sensitive <=^20 mcg/mL BACTERIAL SUSCEPTIBILITY PANEL BY CHEYANNE         BODY FLUID CULTURE  Body Fluid Culture, Sterile   Date Value Ref Range Status   12/16/2022 Growth not present  Final       MRSA Culture Only   Date Value Ref Range Status   12/17/2022 Methicillin resistant Staph aureus not isolated  Final       Respiratory culture  No results found for: RESPCULTURE    Assessment:  Acute hypoxic respiratory failure  Postoperative seroma, right chest, in the setting of robotic hiatal hernia repair on 12/09, s/p right VATS, mini-thoracotomy with drainage and fenestration of mediastinal seroma/hematoma, chest tube and mediastinal drain placement on 12/20  Enterobacter cloacae HAP. Candida albicans on respiratory culture likely colonization. Recommendations:  Continue ertapenem 500 mg every 24 hours dosed according to renal function to a target dose of 1 g every 24 hours for CrCl of at last 30 mL/min. Follow-up surgical cultures. Follow-up blood cultures. Continue supportive care. Thank you for involving me in the care of [facility]. I will continue to follow. Please do not hesitate to call for any questions or concerns.       Electronically signed by Kylah Chapman MD on 12/20/2022 at 10:19 AM

## 2022-12-20 NOTE — PROGRESS NOTES
Hospitalist Progress Note      PCP: Sirisha De Paz DO    Date of Admission: 12/14/2022        Hospital Course:  65 y.o. female presented with PNEUMONIA. STATES SHE HAD A HH REPAIR AT [facility] ON THE 8TH. SHE STATES SHE WAS NOT FEELING BAD, BUT WAS TOLD SHE NEEDED TO COME TO THE HOSPITAL BC SHE WAS SICK. SHE WAS FOUND TO BE SEPTIC, WITH A HAG, SHE WAS TACHY AND TACHYPNEIC . COFFEE GROUND EMESIS THIS MORNING, GASTROCULT  POS . SURGERY NOTIFIED ** HAD A THROACENTESIS ON YESTERDAY, 105 CC REMOVED.     ** RRT  DUE TO RESPIRATORY DISTRESS ** TO TRANSFER TO  OR [facility]** INTUBATED THIS MORNING DUE TO HYPOXIA           Subjective:  IN OR           Medications:  Reviewed    Infusion Medications    PN-Adult  3 IN 1 Central Line (Custom)      sodium chloride      PN-Adult  3 IN 1 Central Line (Custom) 58.3 mL/hr at 12/19/22 2125    propofol Stopped (12/20/22 1139)    fentaNYL 100 mcg/hr (12/20/22 1138)    norepinephrine 6 mcg/min (12/20/22 1408)    sodium chloride      sodium chloride      sodium chloride       Scheduled Medications    insulin lispro  0-4 Units SubCUTAneous Q4H    ertapenem (INVanz) IVPB  500 mg IntraVENous Q24H    pantoprazole (PROTONIX) 40 mg injection  40 mg IntraVENous Q12H    sodium chloride flush  5-40 mL IntraVENous 2 times per day    heparin flush  3 mL IntraVENous 2 times per day    metoclopramide  10 mg IntraVENous Q6H    Arformoterol Tartrate  15 mcg Nebulization BID    budesonide  0.5 mg Nebulization BID    albuterol  2.5 mg Nebulization Q4H WA    [Held by provider] metoprolol  5 mg IntraVENous Q6H    rosuvastatin  10 mg Oral Nightly    sodium chloride flush  5-40 mL IntraVENous 2 times per day    levothyroxine  50 mcg Oral Daily    [Held by provider] enoxaparin  30 mg SubCUTAneous Daily     PRN Meds: sodium chloride, sodium chloride, sodium chloride flush, sodium chloride, heparin flush, [Held by provider] oxyCODONE **OR** [Held by provider] oxyCODONE, sodium chloride flush, sodium chloride, ondansetron **OR** ondansetron, magnesium hydroxide, acetaminophen **OR** acetaminophen, ipratropium-albuterol      Intake/Output Summary (Last 24 hours) at 12/20/2022 1455  Last data filed at 12/20/2022 1328  Gross per 24 hour   Intake 4601.11 ml   Output 1183 ml   Net 3418. 11 ml       Exam:    BP (!) 102/50   Pulse (!) 107   Temp 98.6 °F (37 °C) (Temporal)   Resp 16   Ht 5' 2\" (1.575 m)   Wt 150 lb (68 kg)   SpO2 92%   BMI 27.44 kg/m²               Labs:   Recent Labs     12/18/22  0401 12/18/22  1045 12/19/22  0402 12/19/22  1200 12/19/22  2152 12/20/22  0057 12/20/22  0401 12/20/22  1210 12/20/22  1331   WBC 12.1*  --  14.2*  --   --   --  16.1*  --   --    HGB 7.0*   < > 7.3*   < > 10.0* 10.0* 10.1*  --   --    HCT 21.0*   < > 22.5*   < > 29.3* 29.3* 28.8* 30.0* 29.0*     --  234  --   --   --  191  --   --     < > = values in this interval not displayed. Recent Labs     12/18/22  0401 12/18/22  0401 12/19/22  0402 12/20/22  0401 12/20/22  1210 12/20/22  1331     --  141 140  --   --    K 3.9   < > 4.3  4.3 3.8  3.8 4.4 4.7   *  --  105 103  --   --    CO2 27  --  26 23  --   --    BUN 39*  --  39* 42*  --   --    CREATININE 2.3*  --  2.2* 2.5* 3.1* 3.0*   CALCIUM 7.9*  --  8.6 8.6  --   --    PHOS 1.4*  --  3.6 1.7*  --   --     < > = values in this interval not displayed. Recent Labs     12/18/22  0401 12/19/22  0402 12/20/22 0401   AST 61* 27 29   ALT 52* 32 20   BILITOT 0.4 0.6 1.0   ALKPHOS 59 55 87     Recent Labs     12/20/22 0401   INR 1.3     No results for input(s): CKTOTAL, TROPONINI in the last 72 hours. Recent Labs     12/18/22  0401 12/19/22  0402 12/20/22 0401   AST 61* 27 29   ALT 52* 32 20   BILITOT 0.4 0.6 1.0   ALKPHOS 59 55 87     No results for input(s): LACTA in the last 72 hours.   No results found for: Sarah De La Rosa  No results found for: AMMONIA    Assessment:    Active Hospital Problems    Diagnosis Date Noted    Hypoxia [R09.02] 12/17/2022     Priority: Medium    Pneumonia of left lower lobe due to infectious organism [J18.9] 12/17/2022     Priority: Medium    Aspiration pneumonia of both lower lobes due to gastric secretions ([facility] [address] [J69.0] 12/17/2022     Priority: Medium    Postoperative pneumonia [J95.89, J18.9] 12/14/2022     Priority: Medium   ACUTE RESP FAILURE WITH HYPOXIA  GASTROCULT POS  S/P HH REPAIR  PAWAN BASELINE CREATINE 1.0)  H.O BREAST CANCER   HLD   PLEURAL EFFUSIONS BILATERALLY     PLAN:  ROCEPHIN   ZITHROMAX   FLUIDS  MONITOR BMP   FOR  CAMACHO  DVT Prophylaxis: SCD  Diet: Diet NPO Exceptions are: Ice Chips  PN-Adult  3 IN 1 Central Line (Custom)  Diet NPO Exceptions are: Sips of Water with Meds  PN-Adult  3 IN 1 Central Line (Custom)  Code Status: Full Code     PT/OT Eval Status:   WHEN STABLE     Dispo -  TO [facility] OR         Electronically signed by Mardy Angelucci, DO on 12/20/2022 at 2:55 PM [facility]

## 2022-12-20 NOTE — PROGRESS NOTES
ETT retracted to 22 cm per physician order. Bilateral breath sounds auscultated and decreased Pplat pressures recorded.

## 2022-12-20 NOTE — PROGRESS NOTES
GENERAL SURGERY  DAILY PROGRESS NOTE  12/20/2022    CHIEF COMPLAINT:  Chief Complaint   Patient presents with    Shortness of Breath     [place] [facility] [place] patient went for hernia repair patient on 6L NC on arrival       SUBJECTIVE:  Intubated yesterday. On levophed      OBJECTIVE:  BP (!) 96/51   Pulse 89   Temp 97.4 °F (36.3 °C) (Temporal)   Resp 16   Ht 5' 2\" (1.575 m)   Wt 150 lb (68 kg)   SpO2 95%   BMI 27.44 kg/m²     GENERAL: intubated sedated  LUNGS: mechanical ventilation. Right-sided pigtail catheter with 20cc/24hr  CARDIOVASCULAR: Intermittent tachycardia  ABDOMEN:  Soft, non-distended, non-tender. No guarding, rigidity, rebound. Incisions clean dry and intact. Peterson catheter in place    ASSESSMENT/PLAN:  65 y.o. female s/p post hiatal hernia repair 51/9 complicated by respiratory insufficiency concerning for post op aspiration pneumonia without signs of esophageal leak or perforation.  Large seroma likely causing atelectasis and contributing to respiratory insufficiency    Plan:  Plan for VATS today with trauma service  Continue TPN - Monitor for refeeding syndrome   Aggressively replace electrolytes  OG tube was placed after intubation by ICU staff despite multiple conversations to have surgical resident place  PPI twice daily  Carafate  Nephrology following for PAWAN    Electronically signed by Kandice Fisher MD on 12/20/2022 at 5:58 AM

## 2022-12-20 NOTE — PLAN OF CARE
Problem: Chronic Conditions and Co-morbidities  Goal: Patient's chronic conditions and co-morbidity symptoms are monitored and maintained or improved  12/19/2022 2317 by Chelsea Lockwood RN  Outcome: Progressing  12/19/2022 1743 by Butch Roca RN  Outcome: Progressing     Problem: Discharge Planning  Goal: Discharge to home or other facility with appropriate resources  12/19/2022 2317 by Chelsea Lockwood RN  Outcome: Progressing  12/19/2022 1743 by Butch Roca RN  Outcome: Not Progressing     Problem: Safety - Adult  Goal: Free from fall injury  12/19/2022 2317 by Chelsea Lockwood RN  Outcome: Progressing  12/19/2022 1743 by Butch Roca RN  Outcome: Progressing     Problem: Pain  Goal: Verbalizes/displays adequate comfort level or baseline comfort level  12/19/2022 2317 by Chelsea Lockwood RN  Outcome: Progressing  12/19/2022 1743 by Butch Roca RN  Outcome: Progressing     Problem: Nutrition Deficit:  Goal: Optimize nutritional status  Outcome: Progressing     Problem: Skin/Tissue Integrity  Goal: Absence of new skin breakdown  Description: 1. Monitor for areas of redness and/or skin breakdown  2. Assess vascular access sites hourly  3. Every 4-6 hours minimum:  Change oxygen saturation probe site  4. Every 4-6 hours:  If on nasal continuous positive airway pressure, respiratory therapy assess nares and determine need for appliance change or resting period.   Outcome: Progressing     Problem: Discharge Planning  Goal: Discharge to home or other facility with appropriate resources  12/19/2022 2317 by Chelsea Lockwood RN  Outcome: Progressing  12/19/2022 1743 by Butch Roca RN  Outcome: Not Progressing

## 2022-12-20 NOTE — ANESTHESIA PROCEDURE NOTES
Arterial Line:    An arterial line was placed using ultrasound guidance, in the OR for the following indication(s): continuous blood pressure monitoring. A 20 gauge (size), 10 cm (length) (type) catheter was placed, into the left brachial artery, secured by Tegaderm. Anesthesia type: General    Events:  patient tolerated procedure well with no complications. 12/20/2022 12:00 PM12/20/2022 12:11 PM  Anesthesiologist: Lindsay Kennedy MD  Resident/CRNA: KAR Leiva - CRNA  Other anesthesia staff: Helen Guillen RN  Performed: Anesthesiologist   Preanesthetic Checklist  Completed: patient identified, IV checked, site marked, risks and benefits discussed, surgical/procedural consents, equipment checked, pre-op evaluation, timeout performed, anesthesia consent given, oxygen available, monitors applied/VS acknowledged, fire risk safety assessment completed and verbalized and blood product R/B/A discussed and consented

## 2022-12-20 NOTE — PROGRESS NOTES
[address] [facility]   Department of Internal Medicine   MICU Progress Note    Patient:  Hesham Sawyer 65 y.o. female   MRN: [id number]       Date of Service: 2022    Allergy: Benadryl [diphenhydramine] and Ibuprofen  CC hypoxia   Subjective   Intubated/sedated on vent  Does not look in distress  Plan for VATS today  Family updated  Unable to obtain ROS  Started on levophed overnight  Temps of 97.4  NO overnight event. Objective     TEMPERATURE:  Current - Temp: 97.4 °F (36.3 °C); Max - Temp  Av.7 °F (36.5 °C)  Min: 97 °F (36.1 °C)  Max: 98.4 °F (36.9 °C)    RESPIRATIONS RANGE: Resp  Av.2  Min: 14  Max: 21    PULSE RANGE: Pulse  Av.1  Min: 78  Max: 131    BLOOD PRESSURE RANGE:  Systolic (94JJU), BEATRICE:367 , Min:63 , UYJ:857   ; Diastolic (49RVL), UCU:25, Min:43, Max:92      PULSE OXIMETRY RANGE: SpO2  Av.9 %  Min: 90 %  Max: 100 %    I & O - 24hr:    Intake/Output Summary (Last 24 hours) at 2022 1157  Last data filed at 2022 0700  Gross per 24 hour   Intake 4001.11 ml   Output 333 ml   Net 3668. 11 ml     I/O last 3 completed shifts: In: 4719.3 [I.V.:2187.1; Blood:1061.7; NG/GT:30; IV Piggyback:200.9]  Out: 865 [Urine:825; Chest Tube:40] No intake/output data recorded. Weight change:     Physical Exam:  General Appearance: intubated/sedated on vent, does not look in distress. HEENT:  Head: Normocephalic, no lesions, without obvious abnormality. Eye: Normal external eye, conjunctiva, lids cornea, FILEMON. Nose: Normal external nose, mucus membranes and septum. Neck / Thyroid: Supple, no masses, nodes, nodules or enlargement. Neck: no adenopathy, no carotid bruit, no JVD, supple, symmetrical, trachea midline, and thyroid not enlarged, symmetric, no tenderness/mass/nodules  Lung: mild labor breathing, rhonchi throughout lung fields, no wheezing noted.    Heart: regular rate and rhythm, S1, S2 normal, no murmur, click, rub or gallop  Abdomen: soft, non-tender; bowel sounds normal; no masses,  no organomegaly incision noted, intact. Extremities:  extremities normal, atraumatic, no cyanosis or edema  Musculokeletal: No joint swelling, no muscle tenderness. ROM normal in all joints of extremities. Neurologic: Mental status: Alert, oriented, thought content appropriate. CN 2-12 intact.        Medications     Continuous Infusions:   PN-Adult  3 IN 1 Central Line (Custom)      sodium chloride      PN-Adult  3 IN 1 Central Line (Custom) 58.3 mL/hr at 12/19/22 2125    propofol 25 mcg/kg/min (12/20/22 0627)    fentaNYL 100 mcg/hr (12/20/22 0946)    norepinephrine 10 mcg/min (12/20/22 0636)    sodium chloride      sodium chloride      sodium chloride       Scheduled Meds:   potassium phosphate IVPB  30 mmol IntraVENous Once    insulin lispro  0-4 Units SubCUTAneous Q4H    ertapenem (INVanz) IVPB  500 mg IntraVENous Q24H    pantoprazole (PROTONIX) 40 mg injection  40 mg IntraVENous Q12H    albumin human-kjda  25 g IntraVENous Q8H    sodium chloride flush  5-40 mL IntraVENous 2 times per day    heparin flush  3 mL IntraVENous 2 times per day    metoclopramide  10 mg IntraVENous Q6H    Arformoterol Tartrate  15 mcg Nebulization BID    budesonide  0.5 mg Nebulization BID    albuterol  2.5 mg Nebulization Q4H WA    [Held by provider] metoprolol  5 mg IntraVENous Q6H    rosuvastatin  10 mg Oral Nightly    sodium chloride flush  5-40 mL IntraVENous 2 times per day    levothyroxine  50 mcg Oral Daily    [Held by provider] enoxaparin  30 mg SubCUTAneous Daily     PRN Meds: sodium chloride, sodium chloride, sodium chloride flush, sodium chloride, heparin flush, [Held by provider] oxyCODONE **OR** [Held by provider] oxyCODONE, sodium chloride flush, sodium chloride, ondansetron **OR** ondansetron, magnesium hydroxide, acetaminophen **OR** acetaminophen, ipratropium-albuterol  Nutrition:   TPN     Labs and Imaging Studies     CBC:   Recent Labs     12/18/22  0401 12/18/22  1045 12/19/22  0402 12/19/22  1200 12/19/22  2152 12/20/22  0057 12/20/22  0401   WBC 12.1*  --  14.2*  --   --   --  16.1*   RBC 2.36*  --  2.46*  --   --   --  3.38*   HGB 7.0*   < > 7.3*   < > 10.0* 10.0* 10.1*   HCT 21.0*   < > 22.5*   < > 29.3* 29.3* 28.8*   MCV 89.0  --  91.5  --   --   --  85.2   MCH 29.7  --  29.7  --   --   --  29.9   MCHC 33.3  --  32.4  --   --   --  35.1*   RDW 14.4  --  14.9  --   --   --  14.8     --  234  --   --   --  191   MPV 9.9  --  10.2  --   --   --  10.8    < > = values in this interval not displayed.        BMP:    Recent Labs     12/18/22  0401 12/19/22  0402 12/20/22  0401    141 140   K 3.9 4.3  4.3 3.8  3.8   * 105 103   CO2 27 26 23   BUN 39* 39* 42*   CREATININE 2.3* 2.2* 2.5*   GLUCOSE 182* 268* 239*   CALCIUM 7.9* 8.6 8.6   PROT 4.4* 5.6* 5.0*   LABALBU 1.9* 3.1* 3.1*   BILITOT 0.4 0.6 1.0   ALKPHOS 59 55 87   AST 61* 27 29   ALT 52* 32 20       LIVER PROFILE:   Recent Labs     12/18/22  0401 12/19/22  0402 12/20/22  0401   AST 61* 27 29   ALT 52* 32 20   BILITOT 0.4 0.6 1.0   ALKPHOS 59 55 87       PT/INR:   Recent Labs     12/20/22  0401   PROTIME 14.7*   INR 1.3       APTT:   Recent Labs     12/20/22 0401   APTT 34.1       Fasting Lipid Panel:    Lab Results   Component Value Date/Time    CHOL 45 12/19/2022 12:00 PM    TRIG 136 12/19/2022 12:00 PM    HDL 12 12/19/2022 12:00 PM       Cardiac Enzymes:    Lab Results   Component Value Date    CKTOTAL 59 01/13/2020    TROPONINI <0.01 01/13/2020       Notable Cultures:      Blood cultures   Blood Culture, Routine   Date Value Ref Range Status   12/17/2022 24 Hours no growth  Preliminary     Respiratory cultures No results found for: RESPCULTURE   Gram Stain Result   Date Value Ref Range Status   12/16/2022 Refer to ordered Gram stain for results  Final     Urine   Urine Culture, Routine   Date Value Ref Range Status   07/22/2022 <10,000 CFU/mL  Mixed gram positive organisms    Final     Legionella No results found for: LABLEGI  C Diff PCR No results found for: CDIFPCR  Wound culture/abscess: No results for input(s): WNDABS in the last 72 hours. Tip culture:No results for input(s): CXCATHTIP in the last 72 hours. Antibiotic  Days  Day started   Ertapenem      Zosyn                       Oxygen:     Vent Information  Ventilator ID: [id number]  Equipment Changed: Humidification    Additional Respiratory Assessments  Heart Rate: (!) 131  Resp: 14  SpO2: 90 %  Humidification Source: Heated wire  Humidification Temp: 37  Airway Type: ET  Airway Size: 7.5  Cuff Pressure (cm H2O): 29 cm H2O     Nasal cannula L/min     Face mask %     Reservoirs mask %       ABG     PH  7.41   PCO2  35   PO2  70   HCO3  21   Sat%  93   FIO2     DATES 12/20/22        Lines:  Site  Day  Date inserted     TLC   RIJ    12/17/22      PICC              Arterial line              Peripheral line              HD cath            Urinary Catheter 12/15/22 Peterson-Output (mL): 40 mL    Imaging Studies:    XR CHEST PORTABLE   Final Result   Low lying endotracheal tube which needs withdrawn approximately 2 cm. XR CHEST PORTABLE   Final Result   Enteric tube tip in the body of the stomach. The roseann is not well seen. ET tube tip most likely within 1.5 cm of the   roseann. XR CHEST PORTABLE   Final Result   Pleural effusions with adjacent infiltrate and or atelectasis showing   slightly improved aeration on the left and slightly poor aeration on the   right. XR CHEST PORTABLE   Final Result   Interval improvement in the opacification of the left hemithorax, with   aeration in the left upper lung field when compared to the previous study   performed 1 day earlier. No other significant interval change. XR CHEST PORTABLE   Final Result   New right internal jugular central venous catheter terminating near the   SVC-right atrium junction. No post-procedure pneumothorax.   Worsening   opacification of the left hemithorax and stable findings on the right. CT CHEST WO CONTRAST   Final Result   Large right pleural effusion of mixed density to suggest complex effusion. There is a pigtail catheter seen in the pleural space posteriorly and   inferiorly. Small to moderate size left pleural effusion with consolidative   infiltrate throughout the left upper and lower lung field and aerated   portions revealing nodular ground-glass opacity to suggest an infectious   process. Nodular ground-glass opacity inferiorly within the aerated right   upper and lower lung field concerning for an infectious process as well. There is less consolidative infiltrate identified within the right lower lobe. XR CHEST PORTABLE   Final Result   1. No changes position of the right-sided chest tube. No right-sided   pneumothorax. The most of the right-sided pleural effusion has been drained,   if present to be discrete or small. 2.  Moderate left-sided pleural effusion. If quantification of left-sided   pleural effusion will be needed for clinical management bedside ultrasound or   left lateral decubitus views of the chest can be helpful. 3.  No pneumothorax on the right or on the left. 4.  Large size diaphragmatic across the midline. XR CHEST PORTABLE   Final Result   1. Increased parenchymal density projecting over the left lower lung   concerning for atelectasis and/or consolidation      2. No signs of right effusion      3. Parenchymal density projecting over the right lower lung concerning for   atelectasis         IR GUIDED PERC PLEURAL DRAIN W CATH INSERT   Final Result   1. Successful ultrasound-guided right thoracentesis. 2.  Successful placement of an 8 Japanese pleural drain         XR CHEST PORTABLE   Final Result   1. Right-sided pigtail catheter in the right base. 2.  No conspicuous right-sided pleural effusions identified. No right-sided   pneumothorax.       3.  Cannot exclude a small left-sided pleural effusion. 4.  Large size diaphragmatic hernia across the midline causing compression   atelectasis in the medial aspect of both lungs. FL ESOPHAGRAM   Final Result   1. No evidence of esophageal perforation or leak. 2. Decreased peristalsis. No evidence of esophageal mass or obstructing   lesion. 3. Small sliding hiatus hernia. No evidence of GE reflux. The gastric   foreign disc does appear constricted as it passes through the esophageal   hiatus. US CHEST INCLUDING MEDIASTINUM   Final Result   Probable complex loculated right pleural effusion. Simple left pleural effusion. CTA PULMONARY W CONTRAST   Final Result   No evidence of pulmonary embolism. Bilateral lower lobe pulmonary consolidations and pleural effusions seen. Deviation of the mediastinum to the left is visualized. Mild soft tissue prominence visualized surrounding the esophagus with   narrowing at the gastroesophageal junction but appears to be due to external   compression. Cannot rule left soft tissue mass at this location. Wall thickening visualized in the gastric outlet with mild prominence of the   stomach seen, this could be artifactual.      Distended gallbladder is seen. Degenerative bone changes, postoperative changes and multilevel vertebral   augmentation seen. No evidence of acute abdominal/pelvic pathology. CT ABDOMEN PELVIS W IV CONTRAST Additional Contrast? Oral   Final Result   No evidence of pulmonary embolism. Bilateral lower lobe pulmonary consolidations and pleural effusions seen. Deviation of the mediastinum to the left is visualized. Mild soft tissue prominence visualized surrounding the esophagus with   narrowing at the gastroesophageal junction but appears to be due to external   compression. Cannot rule left soft tissue mass at this location.       Wall thickening visualized in the gastric outlet with mild prominence of the   stomach seen, this could be artifactual.      Distended gallbladder is seen. Degenerative bone changes, postoperative changes and multilevel vertebral   augmentation seen. No evidence of acute abdominal/pelvic pathology. XR CHEST PORTABLE   Final Result   1. Consolidation seen within the left lung base with blunting the left   costophrenic angle which could represent pneumonia or atelectasis   2. A trace left pleural effusion cannot be excluded   3. Large right diaphragmatic hernia/hiatal hernia which was noted on the   patient's previous CT of the chest of 07/22/2022. XR CHEST PORTABLE    (Results Pending)   XR CHEST PORTABLE    (Results Pending)        APRN- CNP Assessment and PLan   In summary 65 y.o. female with significant past medical history of hypothyroidism, breast cancer, TIA, hiatal hernia underwent hiatal hernia repair on 12/9/22 and developed acute respiratory distress and was transfer to [facility] on 12/14/22 for further management. Patient was seen by nephrology, pulmonology, general surgery. Patient noted to have worsening hypoxia requiring 15 L of oxygen, A. fib RVR therefore transferred to MICU and critical care was consulted.      Assessment:  Acute hypoxemic respiratory failure secondary to aspiration pneumonia/pleural effusion/bibasilar consolidation  Bilateral pleural effusion right greater than left status post right thoracentesis with pigtail chest tube placement on (12/16/22 IR)  Sepsis 2/2 PNA/loculated pleural effusion   AFIB RVR  PAWAN secondary to dehydration  [place] ptosis leukocytosis with left shift  Acute anemia  History of massive hiatal hernia  status post repair on 12/9/2022  History of TIA  History of breast cancer  History of hypothyroidism  History of lumbar compression fracture with history of PLIF T12-L2  History of anxiety     Plan:  -Intubated/sedated on ventilator, titrate FiO2 keep SPO2 goal 92%  -CT chest repeated on 12/17/22 showed large right pleural effusion of mixed density to suggest complex effusion. Small to moderate size left pleural effusion with consolidative infiltrate throughout the left upper and lower lung fields and aerated portions revealing nodular ground grass opacity to suggest infection process.   -Continue scheduled bronchodilators Brovana and Pulmicort in addition to the DuoNebs  Continue mucomyst   -Bilateral pleural effusion right greater than left, status post right thoracentesis pigtail chest tube placement with minimal output. --water seal.   - Patient hypotensive, currently on Levophed, titrate to keep MAP greater than 65 mmHg  -Check troponin, proBNP elevated, Echo showed EF of 55%, indeterminate diastolic function, moderate dilated right ventricle with reduced function, moderate tricuspid regurgitation. Echo in January 2021 showed EF of 60 to 65%,   -Hemoglobin stable. -Procalcitonin 5.70==> 1.57 today; RVP/COVID-19 PCR negative, MRSA nares negative, blood culture (NGTD) respiratory culture (Enterobacter Colace complex, Candida albicans) urine antigen negative. - Consult to ID, input appreciated.   -Enter Pentam, Zosyn. -General surgery consulted, input appreciated. -Esophagram negative for esophageal perforation, recommended antiemetic and mild type of oral pain control, no surgical intervention indicates  -Start TPN, triple-lumen catheter was placed to right IJ.  -PAWAN, nephrology following, input appreciated  -Strict intake and output  - SPA per nephrology  -Labs and chest x-ray in a.m.  -F/E/N KVO/ replace lytes/ Npo TPN  - DVT/GI scds/lovenox/protonix  - Code status: full code.        Lebron Fothergill, APRN-CNP   Critical Care     Discussed case with Attending Physician: Merrick Salinas

## 2022-12-20 NOTE — PROGRESS NOTES
Department of Internal Medicine  Nephrology Progress Note      Events reviewed. SUBJECTIVE: We are following Indu Garcia for PAWAN.   Presently intubated    PHYSICAL EXAM:      Vitals:    VITALS:  BP (!) 104/59   Pulse (!) 108   Temp 97.4 °F (36.3 °C) (Temporal)   Resp 15   Ht 5' 2\" (1.575 m)   Wt 150 lb (68 kg)   SpO2 90%   BMI 27.44 kg/m²   24HR INTAKE/OUTPUT:    Intake/Output Summary (Last 24 hours) at 12/20/2022 1108  Last data filed at 12/20/2022 0700  Gross per 24 hour   Intake 4001.11 ml   Output 373 ml   Net 3628.11 ml         Constitutional: Patient intubated, sedated  HEENT: Normocephalic, atraumatic, PERRLA, endotracheal tube in place  Respiratory: Diminished right lung base  Cardiovascular/Edema: RRR, normal S1, normal S2, no edema  Gastrointestinal: Soft, not distended, nontender  Neurologic: Patient is sedated  Skin: Pale, dry, no lesions, no rash, + edema     Scheduled Meds:   potassium phosphate IVPB  30 mmol IntraVENous Once    insulin lispro  0-4 Units SubCUTAneous Q4H    ertapenem (INVanz) IVPB  500 mg IntraVENous Q24H    pantoprazole (PROTONIX) 40 mg injection  40 mg IntraVENous Q12H    albumin human-kjda  25 g IntraVENous Q8H    sodium chloride flush  5-40 mL IntraVENous 2 times per day    heparin flush  3 mL IntraVENous 2 times per day    metoclopramide  10 mg IntraVENous Q6H    Arformoterol Tartrate  15 mcg Nebulization BID    budesonide  0.5 mg Nebulization BID    albuterol  2.5 mg Nebulization Q4H WA    [Held by provider] metoprolol  5 mg IntraVENous Q6H    rosuvastatin  10 mg Oral Nightly    sodium chloride flush  5-40 mL IntraVENous 2 times per day    levothyroxine  50 mcg Oral Daily    [Held by provider] enoxaparin  30 mg SubCUTAneous Daily     Continuous Infusions:   PN-Adult  3 IN 1 Central Line (Custom)      sodium chloride      PN-Adult  3 IN 1 Central Line (Custom) 58.3 mL/hr at 12/19/22 2125    propofol 25 mcg/kg/min (12/20/22 0627)    fentaNYL 100 mcg/hr (12/20/22 0946) norepinephrine 10 mcg/min (12/20/22 0636)    sodium chloride      sodium chloride      sodium chloride       PRN Meds:.sodium chloride, sodium chloride, sodium chloride flush, sodium chloride, heparin flush, [Held by provider] oxyCODONE **OR** [Held by provider] oxyCODONE, sodium chloride flush, sodium chloride, ondansetron **OR** ondansetron, magnesium hydroxide, acetaminophen **OR** acetaminophen, ipratropium-albuterol    DATA:    CBC with Differential:    Lab Results   Component Value Date/Time    WBC 16.1 12/20/2022 04:01 AM    RBC 3.38 12/20/2022 04:01 AM    HGB 10.1 12/20/2022 04:01 AM    HCT 28.8 12/20/2022 04:01 AM     12/20/2022 04:01 AM    MCV 85.2 12/20/2022 04:01 AM    MCH 29.9 12/20/2022 04:01 AM    MCHC 35.1 12/20/2022 04:01 AM    RDW 14.8 12/20/2022 04:01 AM    LYMPHOPCT 5.8 12/20/2022 04:01 AM    MONOPCT 4.5 12/20/2022 04:01 AM    BASOPCT 0.2 12/20/2022 04:01 AM    MONOSABS 0.72 12/20/2022 04:01 AM    LYMPHSABS 0.94 12/20/2022 04:01 AM    EOSABS 0.32 12/20/2022 04:01 AM    BASOSABS 0.03 12/20/2022 04:01 AM     CMP:    Lab Results   Component Value Date/Time     12/20/2022 04:01 AM    K 3.8 12/20/2022 04:01 AM    K 3.8 12/20/2022 04:01 AM     12/20/2022 04:01 AM    CO2 23 12/20/2022 04:01 AM    BUN 42 12/20/2022 04:01 AM    CREATININE 2.5 12/20/2022 04:01 AM    GFRAA >60 07/22/2022 01:42 PM    LABGLOM 19 12/20/2022 04:01 AM    GLUCOSE 239 12/20/2022 04:01 AM    PROT 5.0 12/20/2022 04:01 AM    LABALBU 3.1 12/20/2022 04:01 AM    LABALBU 4.3 01/24/2011 01:15 PM    CALCIUM 8.6 12/20/2022 04:01 AM    BILITOT 1.0 12/20/2022 04:01 AM    ALKPHOS 87 12/20/2022 04:01 AM    AST 29 12/20/2022 04:01 AM    ALT 20 12/20/2022 04:01 AM     Magnesium:    Lab Results   Component Value Date/Time    MG 2.1 12/20/2022 04:01 AM     Phosphorus:    Lab Results   Component Value Date/Time    PHOS 1.7 12/20/2022 04:01 AM     Radiology Review:      CT Chest and Abd/Pelvis 12/14/22      No evidence of pulmonary embolism. Bilateral lower lobe pulmonary consolidations and pleural effusions seen. Deviation of the mediastinum to the left is visualized. Mild soft tissue prominence visualized surrounding the esophagus with   narrowing at the gastroesophageal junction but appears to be due to external   compression. Cannot rule left soft tissue mass at this location. Wall thickening visualized in the gastric outlet with mild prominence of the   stomach seen, this could be artifactual.     Distended gallbladder is seen. Degenerative bone changes, postoperative changes and multilevel vertebral   augmentation seen. No evidence of acute abdominal/pelvic pathology. US Chest 12/15/22       Probable complex loculated right pleural effusion. Simple left pleural effusion. BRIEF SUMMARY OF INITIAL CONSULT:    Briefly, Indu Garcia is a 54-year-old female, past medical history significant for hypothyroidism, hiatal hernia and TIA, who presented to the ED on 12/14/2022 from [facility], patient recently underwent a hiatal hernia repair on 12/9/22, developed acute respiratory distress and [facility] management and treatment. Initial ED work-up revealed creatinine level of 1.7, patient then underwent two seperate IV contrast studies CTA and creatinine subsequently increased to 2.5, on 12/15/2022 creatinine level increased to 3.5 mg/dL, reason for this consultation. Review of records from [facility] reveals that baseline creatinine appears to be between 0.9 to 1.0 mg/dL, creatinine was 1.0 on 12/13/2022, patient was started on lisinopril during hospitalization, was given multiple doses IV furosemide with poor oral intake and vomiting.     Problems resolved:  HAGMA, 2/2 uremia and starvation ketoacidosis    IMPRESSION/RECOMMENDATIONS:     PAWAN, stage II, nonoliguric, ATN (ischemic +/- contrast induced , intravascular volume depletion due to poor oral intake, loop diuretics and vomiting in the setting of ACE inhibition versus,) FEUrea 28.3%, FENa 1.1%. Patient has now additional recurrent PAWAN due to severe hypotension, creatinine up to 2.5 mg/dL last 24 hours with significant drop in urine output.     Elevated proBNP, 1373, to monitor  Hypophosphatemia, poor intake/refeeding syndrome, rule out vitamin D deficiency, levels improved  Hemodynamic shock, septic, on vasopressors and antibiotics    ------------------------------------------------------  Respiratory failure, status post intubation  New onset AF with RVR  Loculated right pleural effusion, s/p IR thoracentesis   Complex right pleural effusion, poor VATS tomorrow  S/p hiatal hernia repair 12/9/2022  Hypothyroidism, on levothyroxine  Persistent vomiting, PRN ondansetron, esophagram negative for perforation  Normocytic anemia, multifactorial  Severe hypoalbuminemia  Nutrition, on TPN per surgery      Plan:    Continue TPN per surgery  Replace phosphorus  Obtain vitamin D 25 level  Continue to monitor renal function      Electronically signed by Radha Rene MD on 12/20/2022 at 11:08 AM

## 2022-12-20 NOTE — PROGRESS NOTES
Physical Therapy    PT consult to evaluate/treat received and appreciated. Pt chart reviewed and evaluation attempted. Pt is currently on hold for VATS. Will check back as able. Thank you.         Jose Gifford, PT, DPT   [id number]

## 2022-12-21 NOTE — PROGRESS NOTES
[address] [facility]   Department of Internal Medicine   MICU Progress Note    Patient:  Gaudencio Sparks 65 y.o. female   MRN: [id number]       Date of Service: 2022    Allergy: Benadryl [diphenhydramine] and Ibuprofen  CC hypoxia   Subjective   Intubated/sedated on vent  Does not look in distress  Poor oxygen, PF ratio low  Paralyzed, vent to ARDS protocol  Now on versed, fentanyl, and nimbex  Plan to prone   On pressors, fluid responsive, gave 2L IVF bolus  2 chest tube with minimal drainage  Temps of 97.8  No overnight event  Objective     TEMPERATURE:  Current - Temp: 97.8 °F (36.6 °C); Max - Temp  Av.9 °F (36.6 °C)  Min: 97.2 °F (36.2 °C)  Max: 98.6 °F (37 °C)    RESPIRATIONS RANGE: Resp  Av.6  Min: 14  Max: 21    PULSE RANGE: Pulse  Av.8  Min: 93  Max: 131    BLOOD PRESSURE RANGE:  Systolic (01ZCJ), PGV:648 , Min:94 , FAD:348   ; Diastolic (62XRW), FIM:66, Min:47, Max:63      PULSE OXIMETRY RANGE: SpO2  Av %  Min: 90 %  Max: 98 %    I & O - 24hr:    Intake/Output Summary (Last 24 hours) at 2022 0746  Last data filed at 2022 0654  Gross per 24 hour   Intake 4405.23 ml   Output 1710 ml   Net 2695.23 ml     I/O last 3 completed shifts: In: 5976.7 [I.V.:1219.6; Blood:495; NG/GT:60; IV Piggyback:2320.3]  Out: 9956 [JAPMH:699; Other:850; Chest Tube:360] No intake/output data recorded. Weight change:     Physical Exam:  General Appearance: intubated/sedated on vent, does not look in distress. Anasarca noted   HEENT:  Head: Normocephalic, no lesions, without obvious abnormality. Eye: Normal external eye, conjunctiva, lids cornea, FILEMON. Nose: Normal external nose, mucus membranes and septum. Neck / Thyroid: Supple, no masses, nodes, nodules or enlargement. Neck: no adenopathy, no carotid bruit, no JVD, supple, symmetrical, trachea midline, and thyroid not enlarged, symmetric, no tenderness/mass/nodules  Lung: Rhonchi throughout lung field, no wheezing noted.  2 chest tube s/p VATS    Heart: regular rate and rhythm, S1, S2 normal, no murmur, click, rub or gallop  Abdomen: soft, non-tender; bowel sounds normal; no masses,  no organomegaly incision noted, intact. Extremities:  extremities normal, atraumatic, no cyanosis or edema 3+ pitting edema bilaterally. Musculokeletal: No joint swelling, no muscle tenderness. ROM normal in all joints of extremities.    Neurologic: Mental status: intubated/sedated on vent, limited neuro exam       Medications     Continuous Infusions:   [Held by provider] cisatracurium (NIMBEX) infusion      PN-Adult  3 IN 1 Central Line (Custom)      midazolam      PN-Adult  3 IN 1 Central Line (Custom) 58.3 mL/hr at 12/21/22 0249    sodium chloride      fentaNYL 100 mcg/hr (12/21/22 0654)    norepinephrine 24 mcg/min (12/21/22 0654)    sodium chloride      sodium chloride      sodium chloride       Scheduled Meds:   heparin (porcine)  5,000 Units SubCUTAneous Q8H    sodium chloride  1,000 mL IntraVENous Once    acetylcysteine  4 mL Inhalation Q4H    insulin lispro  0-4 Units SubCUTAneous Q4H    ertapenem (INVanz) IVPB  500 mg IntraVENous Q24H    pantoprazole (PROTONIX) 40 mg injection  40 mg IntraVENous Q12H    sodium chloride flush  5-40 mL IntraVENous 2 times per day    heparin flush  3 mL IntraVENous 2 times per day    metoclopramide  10 mg IntraVENous Q6H    Arformoterol Tartrate  15 mcg Nebulization BID    budesonide  0.5 mg Nebulization BID    albuterol  2.5 mg Nebulization Q4H WA    [Held by provider] metoprolol  5 mg IntraVENous Q6H    rosuvastatin  10 mg Oral Nightly    sodium chloride flush  5-40 mL IntraVENous 2 times per day    levothyroxine  50 mcg Oral Daily     PRN Meds: sodium chloride, sodium chloride, sodium chloride flush, sodium chloride, heparin flush, sodium chloride flush, sodium chloride, ondansetron **OR** ondansetron, magnesium hydroxide, acetaminophen **OR** acetaminophen, ipratropium-albuterol  Nutrition:   TPN     Labs and Imaging Studies     CBC:   Recent Labs     12/20/22  0401 12/20/22  1210 12/20/22  1834 12/20/22 2316 12/21/22 0412   WBC 16.1*  --   --  19.9* 17.9*   RBC 3.38*  --   --  3.43* 3.39*   HGB 10.1*  --  10.1* 10.1* 10.0*   HCT 28.8*   < > 30.5* 30.7* 29.8*   MCV 85.2  --   --  89.5 87.9   MCH 29.9  --   --  29.4 29.5   MCHC 35.1*  --   --  32.9 33.6   RDW 14.8  --   --  15.6* 15.9*     --   --  247 239   MPV 10.8  --   --  11.3 11.2    < > = values in this interval not displayed. BMP:    Recent Labs     12/19/22 0402 12/20/22 0401 12/20/22 1210 12/20/22 1834 12/20/22 2316 12/21/22 0412    140  --  140 139 138   K 4.3  4.3 3.8  3.8   < > 4.6 4.9 5.1*    103  --  105 105 105   CO2 26 23  --  19* 20* 22   BUN 39* 42*  --  46* 45* 48*   CREATININE 2.2* 2.5*   < > 3.0* 3.1* 3.1*   GLUCOSE 268* 239*  --  155* 203* 212*   CALCIUM 8.6 8.6  --  9.1 8.6 8.4*   PROT 5.6* 5.0*  --   --   --  5.1*   LABALBU 3.1* 3.1*  --   --   --  3.3*   BILITOT 0.6 1.0  --   --   --  1.8*   ALKPHOS 55 87  --   --   --  101   AST 27 29  --   --   --  32*   ALT 32 20  --   --   --  14    < > = values in this interval not displayed.        LIVER PROFILE:   Recent Labs     12/19/22 0402 12/20/22  0401 12/21/22 0412   AST 27 29 32*   ALT 32 20 14   BILITOT 0.6 1.0 1.8*   ALKPHOS 55 87 101       PT/INR:   Recent Labs     12/20/22  0401   PROTIME 14.7*   INR 1.3       APTT:   Recent Labs     12/20/22  0401   APTT 34.1       Fasting Lipid Panel:    Lab Results   Component Value Date/Time    CHOL 45 12/19/2022 12:00 PM    TRIG 136 12/19/2022 12:00 PM    HDL 12 12/19/2022 12:00 PM       Cardiac Enzymes:    Lab Results   Component Value Date    CKTOTAL 59 01/13/2020    TROPONINI <0.01 01/13/2020       Notable Cultures:      Blood cultures   Blood Culture, Routine   Date Value Ref Range Status   12/17/2022 24 Hours no growth  Preliminary     Respiratory cultures No results found for: RESPCULTURE   Gram Stain Result   Date Value Ref Range Status   12/16/2022 Refer to ordered Gram stain for results  Final     Urine   Urine Culture, Routine   Date Value Ref Range Status   07/22/2022 <10,000 CFU/mL  Mixed gram positive organisms    Final     Legionella No results found for: LABLEGI  C Diff PCR No results found for: CDIFPCR  Wound culture/abscess: No results for input(s): WNDABS in the last 72 hours. Tip culture:No results for input(s): CXCATHTIP in the last 72 hours. Antibiotic  Days  Day started   Ertapenem      Zosyn                       Oxygen:     Vent Information  Ventilator ID: [id number]  Equipment Changed: Humidification    Additional Respiratory Assessments  Heart Rate: 98  Resp: 18  SpO2: 93 %  Position: Semi-Bach's  Humidification Source: Heated wire  Humidification Temp: 37  Circuit Condensation: Drained  Airway Type: ET  Airway Size: 7.5  Cuff Pressure (cm H2O): 22 cm H2O  Skin Barrier Applied: Yes     Nasal cannula L/min     Face mask %     Reservoirs mask %       ABG     PH  7.25   PCO2  45   PO2  68   HCO3  19   Sat%  91   FIO2     DATES 12/21/22       Lines:  Site  Day  Date inserted     TLC   RIJ    12/17/22      PICC              Arterial line   Left brachial    12/20/22     Peripheral line              HD cath            Urinary Catheter 12/15/22 Peterson-Output (mL): 35 mL    Imaging Studies:    XR CHEST PORTABLE   Final Result   Postoperative changes with the diffuse bilateral infiltrates and effusions   with improvement in the right side likely CHF/edema. Pneumonia is less   likely. Tubes and catheters as noted. XR CHEST PORTABLE   Final Result   Low lying endotracheal tube which needs withdrawn approximately 2 cm. XR CHEST PORTABLE   Final Result   Enteric tube tip in the body of the stomach. The roseann is not well seen. ET tube tip most likely within 1.5 cm of the   roseann.          XR CHEST PORTABLE   Final Result   Pleural effusions with adjacent infiltrate and or atelectasis showing   slightly improved aeration on the left and slightly poor aeration on the   right. XR CHEST PORTABLE   Final Result   Interval improvement in the opacification of the left hemithorax, with   aeration in the left upper lung field when compared to the previous study   performed 1 day earlier. No other significant interval change. XR CHEST PORTABLE   Final Result   New right internal jugular central venous catheter terminating near the   SVC-right atrium junction. No post-procedure pneumothorax. Worsening   opacification of the left hemithorax and stable findings on the right. CT CHEST WO CONTRAST   Final Result   Large right pleural effusion of mixed density to suggest complex effusion. There is a pigtail catheter seen in the pleural space posteriorly and   inferiorly. Small to moderate size left pleural effusion with consolidative   infiltrate throughout the left upper and lower lung field and aerated   portions revealing nodular ground-glass opacity to suggest an infectious   process. Nodular ground-glass opacity inferiorly within the aerated right   upper and lower lung field concerning for an infectious process as well. There is less consolidative infiltrate identified within the right lower lobe. XR CHEST PORTABLE   Final Result   1. No changes position of the right-sided chest tube. No right-sided   pneumothorax. The most of the right-sided pleural effusion has been drained,   if present to be discrete or small. 2.  Moderate left-sided pleural effusion. If quantification of left-sided   pleural effusion will be needed for clinical management bedside ultrasound or   left lateral decubitus views of the chest can be helpful. 3.  No pneumothorax on the right or on the left. 4.  Large size diaphragmatic across the midline. XR CHEST PORTABLE   Final Result   1.   Increased parenchymal density projecting over the left lower lung   concerning for atelectasis and/or consolidation      2. No signs of right effusion      3. Parenchymal density projecting over the right lower lung concerning for   atelectasis         IR GUIDED PERC PLEURAL DRAIN W CATH INSERT   Final Result   1. Successful ultrasound-guided right thoracentesis. 2.  Successful placement of an 8 Spanish pleural drain         XR CHEST PORTABLE   Final Result   1. Right-sided pigtail catheter in the right base. 2.  No conspicuous right-sided pleural effusions identified. No right-sided   pneumothorax. 3.  Cannot exclude a small left-sided pleural effusion. 4.  Large size diaphragmatic hernia across the midline causing compression   atelectasis in the medial aspect of both lungs. FL ESOPHAGRAM   Final Result   1. No evidence of esophageal perforation or leak. 2. Decreased peristalsis. No evidence of esophageal mass or obstructing   lesion. 3. Small sliding hiatus hernia. No evidence of GE reflux. The gastric   foreign disc does appear constricted as it passes through the esophageal   hiatus. US CHEST INCLUDING MEDIASTINUM   Final Result   Probable complex loculated right pleural effusion. Simple left pleural effusion. CTA PULMONARY W CONTRAST   Final Result   No evidence of pulmonary embolism. Bilateral lower lobe pulmonary consolidations and pleural effusions seen. Deviation of the mediastinum to the left is visualized. Mild soft tissue prominence visualized surrounding the esophagus with   narrowing at the gastroesophageal junction but appears to be due to external   compression. Cannot rule left soft tissue mass at this location. Wall thickening visualized in the gastric outlet with mild prominence of the   stomach seen, this could be artifactual.      Distended gallbladder is seen. Degenerative bone changes, postoperative changes and multilevel vertebral   augmentation seen.       No evidence of acute abdominal/pelvic pathology. CT ABDOMEN PELVIS W IV CONTRAST Additional Contrast? Oral   Final Result   No evidence of pulmonary embolism. Bilateral lower lobe pulmonary consolidations and pleural effusions seen. Deviation of the mediastinum to the left is visualized. Mild soft tissue prominence visualized surrounding the esophagus with   narrowing at the gastroesophageal junction but appears to be due to external   compression. Cannot rule left soft tissue mass at this location. Wall thickening visualized in the gastric outlet with mild prominence of the   stomach seen, this could be artifactual.      Distended gallbladder is seen. Degenerative bone changes, postoperative changes and multilevel vertebral   augmentation seen. No evidence of acute abdominal/pelvic pathology. XR CHEST PORTABLE   Final Result   1. Consolidation seen within the left lung base with blunting the left   costophrenic angle which could represent pneumonia or atelectasis   2. A trace left pleural effusion cannot be excluded   3. Large right diaphragmatic hernia/hiatal hernia which was noted on the   patient's previous CT of the chest of 07/22/2022. XR CHEST PORTABLE    (Results Pending)   XR CHEST PORTABLE    (Results Pending)   XR CHEST PORTABLE    (Results Pending)        APRN- CNP Assessment and PLan   In summary 65 y.o. female with significant past medical history of hypothyroidism, breast cancer, TIA, hiatal hernia underwent hiatal hernia repair on 12/9/22 and developed acute respiratory distress and [place] [facility] [place]. Patient was seen by nephrology, pulmonology, general surgery. Patient noted to have worsening hypoxia requiring 15 L of oxygen, A. fib RVR therefore transferred to MICU and critical care was consulted.      Assessment:  Acute hypoxemic respiratory failure secondary to aspiration pneumonia/pleural effusion/bibasilar consolidation now on ventilation (12/19/22)   ARDS   Bilateral pleural effusion right greater than left status post right thoracentesis with pigtail chest tube placement on (12/16/22 IR)   Septic shock  2/2 PNA/loculated pleural effusion  Loculated pleural effusion s/p VATS and Mini-thoracotomy with open drainage of fluid collection 12/20   AFIB RVR  PAWAN secondary to dehydration  [place] ptosis leukocytosis with left shift  Acute anemia  History of massive hiatal hernia  status post repair on 12/9/2022  History of TIA  History of breast cancer  History of hypothyroidism  History of lumbar compression fracture with history of PLIF T12-L2  History of anxiety     Plan:  -Intubated/sedated on ventilator, titrate FiO2 keep SPO2 goal 92%  - ARDS per protocol   - Low PF ratio; may need Prone   - versed, fentanyl and nimbex gtt  - loculated pleural effusion s/p VATS and mini thoracotomy with open drainage of fluid collection (12/20/22) s/p 2 chest tube with -20cm suction  - US showed effusion on left side, general surgery placing left chest tube. -Continue scheduled bronchodilators Brovana and Pulmicort in addition to the DuoNebs, mucomyst   - levophed gtt, titrate to keep MAP >65mmHg  -Echo showed EF of 55%, indeterminate diastolic function, moderate dilated right ventricle with reduced function, moderate tricuspid regurgitation. Echo in January 2021 showed EF of 60 to 65%,   -Procalcitonin 5.70==> 1.57 today; RVP/COVID-19 PCR negative, MRSA nares negative, blood culture (NGTD) respiratory culture (Enterobacter Colace complex, Candida albicans) urine antigen negative. - Consult to ID, input appreciated. - Ertapenem and Zosyn. -General surgery surgery consulted, input appreciated.   -Esophagram negative for esophageal perforation, recommended antiemetic and mild type of oral pain control, no surgical intervention indicates  -Start TPN  -PAWAN, nephrology following, input appreciated- low urine output, metabolic acidosis, will place temp HD catheter and may need CRRT  -Strict intake and output  -Labs and chest x-ray in a.m.  -F/E/N KVO/ replace lytes/ Npo TPN  - DVT/GI scds/lovenox/protonix  - Code status: DNR- limited No CPR, ok to everything.        KAR Malone-CNP   Critical Care     Discussed case with Attending Physician: Kayy Gibson

## 2022-12-21 NOTE — PLAN OF CARE
Problem: Chronic Conditions and Co-morbidities  Goal: Patient's chronic conditions and co-morbidity symptoms are monitored and maintained or improved  12/21/2022 0110 by Donnie Garcias RN  Outcome: Progressing  12/20/2022 1826 by Emi Tesfaye RN  Outcome: Progressing     Problem: Discharge Planning  Goal: Discharge to home or other facility with appropriate resources  12/21/2022 0110 by Donnie Garcias RN  Outcome: Progressing  12/20/2022 1826 by Emi Tesfaye RN  Outcome: Progressing     Problem: Safety - Adult  Goal: Free from fall injury  12/21/2022 0110 by Donnie Garcias RN  Outcome: Progressing  12/20/2022 1826 by Emi Tesfaye RN  Outcome: Progressing     Problem: Pain  Goal: Verbalizes/displays adequate comfort level or baseline comfort level  12/21/2022 0110 by Donnie Garcias RN  Outcome: Progressing  12/20/2022 1826 by Emi Tesfaye RN  Outcome: Progressing     Problem: Nutrition Deficit:  Goal: Optimize nutritional status  Outcome: Progressing     Problem: Skin/Tissue Integrity  Goal: Absence of new skin breakdown  Description: 1. Monitor for areas of redness and/or skin breakdown  2. Assess vascular access sites hourly  3. Every 4-6 hours minimum:  Change oxygen saturation probe site  4. Every 4-6 hours:  If on nasal continuous positive airway pressure, respiratory therapy assess nares and determine need for appliance change or resting period.   12/21/2022 0110 by Donnie Garcias RN  Outcome: Progressing  12/20/2022 1826 by Emi Tesfaye RN  Outcome: Progressing

## 2022-12-21 NOTE — PROCEDURES
Pre-procedure Dx:       Procedures: CENTRAL LINE [PRO85 (Type: Custom)]        Procedure: Insertion of temporary hemodialysis catherter via left intrajugular vein    Indications: Temporary hemodialysis     Anesthesia: Sedated and paralyzed on a ventilator.  Therefore did not use any lidocaine.    Consent: Informed written consent obtained from family- Patient is sedated, intubated.     Sterile barriers: all five maximum sterile barriers used - cap, mask, sterile gown, sterile gloves, and large sterile sheet.     Patient position: flat     Technique: Procedure was done using strict aseptic technique. The patient's Left intrajugular was prepped and draped in the usual sterile fashion.  Left intrajugular vein was seen through ultrasound guided left intrajugular. Using the Seldinger technique, a large-bore needle was placed into the left intrajugular vein with good backflow. A guidewire was placed. Then a small incision was made in the patient's skin. The large-bore needle was removed. Dilators were passed over the guidewire. Then, temporary hemodialysis was catheter was placed over the guidewire which was subsequently removed. Both ports were drawn and flushed with good flow. The catheter was sutured in place, and a sterile dressing was placed.     Number of sticks: 1    Number of Kits used: 1    Complications: No immediate complication.     Estimated blood loss: About 6 ml.     Comment: Patient tolerated the procedure well with no immediate complications.     KAR Saeed CNP      Chest x-ray pending

## 2022-12-21 NOTE — PROGRESS NOTES
GENERAL SURGERY  DAILY PROGRESS NOTE  12/21/2022    CHIEF COMPLAINT:  Chief Complaint   Patient presents with    Shortness of Breath     Sent from [facility] for SOB patient went for hernia repair patient on 6L NC on arrival       SUBJECTIVE:  Remains in critical condition. Intubated, sedated      OBJECTIVE:  BP (!) 98/48   Pulse 98   Temp 97.8 °F (36.6 °C) (Temporal)   Resp 18   Ht 5' 2\" (1.575 m)   Wt 147 lb 9.6 oz (67 kg)   SpO2 93%   BMI 27.00 kg/m²     GENERAL: intubated sedated  LUNGS: mechanical ventilation. Right-sided chest tube and seroma drain in place. CARDIOVASCULAR: Intermittent tachycardia  ABDOMEN:  Soft, non-distended, non-tender. No guarding, rigidity, rebound. Incisions clean dry and intact. Peterson catheter in place    ASSESSMENT/PLAN:  65 y.o. female s/p post hiatal hernia repair 21/8 complicated by respiratory insufficiency concerning for post op aspiration pneumonia without signs of esophageal leak or perforation. Large seroma likely causing atelectasis and contributing to respiratory insufficiency.  Now s/p mini-thoracotomy with evacuation of mediastinal seroma 12/20    Plan:  Critical care management for ventilator weaning and resuscitation - ABG this morning with respiratory acidosis likely contributing to high dose levophed requirement   Continue TPN   PPI twice daily    Electronically signed by Jesus Roy MD on 12/21/2022 at 6:56 AM

## 2022-12-21 NOTE — PLAN OF CARE
Problem: Respiratory - Adult  Goal: Achieves optimal ventilation and oxygenation  Outcome: Not Progressing    Started flolan and now 3 chest tubes

## 2022-12-21 NOTE — PROGRESS NOTES
Department of Internal Medicine  Nephrology Progress Note      Events reviewed. SUBJECTIVE: We are following Jovita Degroot for PAWAN.   Presently intubated    PHYSICAL EXAM:      Vitals:    VITALS:  BP (!) 98/48   Pulse (!) 103   Temp 98 °F (36.7 °C) (Axillary)   Resp 23   Ht 5' 2\" (1.575 m)   Wt 147 lb 9.6 oz (67 kg)   SpO2 91%   BMI 27.00 kg/m²   24HR INTAKE/OUTPUT:    Intake/Output Summary (Last 24 hours) at 12/21/2022 1218  Last data filed at 12/21/2022 0900  Gross per 24 hour   Intake 4435.23 ml   Output 1665 ml   Net 2770.23 ml         Constitutional: Patient intubated, sedated  HEENT: Normocephalic, atraumatic, PERRLA, endotracheal tube in place  Respiratory: Diminished right lung base  Cardiovascular/Edema: RRR, normal S1, normal S2, no edema  Gastrointestinal: Soft, not distended, nontender  Neurologic: Patient is sedated  Skin: Pale, dry, no lesions, no rash, + edema     Scheduled Meds:   heparin (porcine)  5,000 Units SubCUTAneous Q8H    acetylcysteine  4 mL Inhalation Q4H    artificial tears   Both Eyes Q4H    insulin lispro  0-4 Units SubCUTAneous Q4H    ertapenem (INVanz) IVPB  500 mg IntraVENous Q24H    pantoprazole (PROTONIX) 40 mg injection  40 mg IntraVENous Q12H    sodium chloride flush  5-40 mL IntraVENous 2 times per day    heparin flush  3 mL IntraVENous 2 times per day    metoclopramide  10 mg IntraVENous Q6H    Arformoterol Tartrate  15 mcg Nebulization BID    budesonide  0.5 mg Nebulization BID    albuterol  2.5 mg Nebulization Q4H WA    [Held by provider] metoprolol  5 mg IntraVENous Q6H    rosuvastatin  10 mg Oral Nightly    sodium chloride flush  5-40 mL IntraVENous 2 times per day    levothyroxine  50 mcg Oral Daily     Continuous Infusions:   cisatracurium (NIMBEX) infusion 2 mcg/kg/min (12/21/22 1008)    midazolam 2 mg/hr (12/21/22 0807)    PN-Adult  3 IN 1 Central Line (Custom)      PN-Adult  3 IN 1 Central Line (Custom) 58.3 mL/hr at 12/21/22 0249    sodium chloride fentaNYL 100 mcg/hr (12/21/22 1156)    norepinephrine 28 mcg/min (12/21/22 0821)    sodium chloride      sodium chloride      sodium chloride       PRN Meds:.sodium chloride, sodium chloride, sodium chloride flush, sodium chloride, heparin flush, sodium chloride flush, sodium chloride, ondansetron **OR** ondansetron, magnesium hydroxide, acetaminophen **OR** acetaminophen, ipratropium-albuterol    DATA:    CBC with Differential:    Lab Results   Component Value Date/Time    WBC 17.9 12/21/2022 04:12 AM    RBC 3.39 12/21/2022 04:12 AM    HGB 9.9 12/21/2022 09:23 AM    HCT 29.6 12/21/2022 09:23 AM    HCT 29.0 12/20/2022 01:31 PM     12/21/2022 04:12 AM    MCV 87.9 12/21/2022 04:12 AM    MCH 29.5 12/21/2022 04:12 AM    MCHC 33.6 12/21/2022 04:12 AM    RDW 15.9 12/21/2022 04:12 AM    NRBC 0.9 12/21/2022 04:12 AM    LYMPHOPCT 4.7 12/21/2022 04:12 AM    MONOPCT 1.7 12/21/2022 04:12 AM    BASOPCT 0.3 12/21/2022 04:12 AM    MONOSABS 0.36 12/21/2022 04:12 AM    LYMPHSABS 0.00 12/21/2022 04:12 AM    EOSABS 0.32 12/21/2022 04:12 AM    BASOSABS 0.00 12/21/2022 04:12 AM     CMP:    Lab Results   Component Value Date/Time     12/21/2022 09:23 AM    K 4.7 12/21/2022 09:23 AM     12/21/2022 09:23 AM    CO2 19 12/21/2022 09:23 AM    BUN 46 12/21/2022 09:23 AM    CREATININE 3.2 12/21/2022 09:23 AM    GFRAA >60 07/22/2022 01:42 PM    LABGLOM 14 12/21/2022 09:23 AM    GLUCOSE 218 12/21/2022 09:23 AM    PROT 5.1 12/21/2022 04:12 AM    LABALBU 3.3 12/21/2022 04:12 AM    LABALBU 4.3 01/24/2011 01:15 PM    CALCIUM 8.2 12/21/2022 09:23 AM    BILITOT 1.8 12/21/2022 04:12 AM    ALKPHOS 101 12/21/2022 04:12 AM    AST 32 12/21/2022 04:12 AM    ALT 14 12/21/2022 04:12 AM     Magnesium:    Lab Results   Component Value Date/Time    MG 1.9 12/21/2022 04:12 AM     Phosphorus:    Lab Results   Component Value Date/Time    PHOS 4.9 12/21/2022 04:12 AM     Radiology Review:      CT Chest and Abd/Pelvis 12/14/22      No evidence of pulmonary embolism. Bilateral lower lobe pulmonary consolidations and pleural effusions seen. Deviation of the mediastinum to the left is visualized. Mild soft tissue prominence visualized surrounding the esophagus with   narrowing at the gastroesophageal junction but appears to be due to external   compression. Cannot rule left soft tissue mass at this location. Wall thickening visualized in the gastric outlet with mild prominence of the   stomach seen, this could be artifactual.     Distended gallbladder is seen. Degenerative bone changes, postoperative changes and multilevel vertebral   augmentation seen. No evidence of acute abdominal/pelvic pathology. US Chest 12/15/22       Probable complex loculated right pleural effusion. Simple left pleural effusion. BRIEF SUMMARY OF INITIAL CONSULT:    Briefly, Gerry Lanza is a 76-year-old female, past medical history significant for hypothyroidism, hiatal hernia and TIA, who presented to the ED on 12/14/2022 from [facility] [place], patient recently underwent a hiatal hernia repair on 12/9/22, developed acute respiratory distress and was transferred to [facility] on 12/14/2022 for further management and treatment. Initial ED work-up revealed creatinine level of 1.7, patient then underwent two seperate IV contrast studies CTA and creatinine subsequently increased to 2.5, on 12/15/2022 creatinine level increased to 3.5 mg/dL, reason for this consultation. Review of records from [facility] reveals that baseline creatinine appears to be between 0.9 to 1.0 mg/dL, creatinine was 1.0 on 12/13/2022, patient was started on lisinopril during hospitalization, was given multiple doses IV furosemide with poor oral intake and vomiting.     Problems resolved:  HAGMA, 2/2 uremia and starvation ketoacidosis  Hypophosphatemia, poor intake/refeeding syndrome, rule out vitamin D deficiency, levels improved    IMPRESSION/RECOMMENDATIONS:     PAWAN, stage II, nonoliguric, ATN (ischemic +/- contrast induced , intravascular volume depletion due to poor oral intake, loop diuretics and vomiting in the setting of ACE inhibition versus,) FEUrea 28.3%, FENa 1.1%. Patient has now additional recurrent PAWAN due to severe hypotension. Presently with very borderline urine output and  increasing creatinine levels and hemodynamically unstable on vasopressors. We will start CRRT.     Elevated proBNP, 1373,  Hemodynamic shock, septic, on vasopressors and antibiotics  Vitamin D deficiency, vitamin D 25 level 11    ------------------------------------------------------  Respiratory failure, status post intubation  New onset AF with RVR  Complex right pleural effusion, s/p VATS    S/p hiatal hernia repair 12/9/2022  Hypothyroidism, on levothyroxine  Persistent vomiting, PRN ondansetron, esophagram negative for perforation  Normocytic anemia, multifactorial  Severe hypoalbuminemia  Nutrition, on TPN per surgery      Plan:    Start CVVHD 30 cc/kilo/hour  Continue TPN per surgery  Cholecalciferol 1000 units daily  Continue to monitor renal function      Electronically signed by Andrei Khan MD on 12/21/2022 at 12:18 PM

## 2022-12-21 NOTE — PROCEDURES
Mackenzie Kemp is a 77 y.o. female patient.  1. Pneumonia of left lower lobe due to infectious organism    2. Hypoxia    3. Postprocedural seroma of a respiratory system organ or structure following other procedure      Past Medical History:   Diagnosis Date    Anxiety with somatization 3/30/2021    Compression fracture of L1 lumbar vertebra (HCC) november 1994    Compression fracture of L4 lumbar vertebra     secondary to fall in December 2013    Fall 1989 & 12/2013    History of bilateral breast cancer 1980's    breast    Hx-TIA (transient ischemic attack) 3/30/2021    Osteoporosis     Pain syndrome, chronic 3/30/2021    Thyroid disease     went off of medication    Type II or unspecified type diabetes mellitus without mention of complication, not stated as uncontrolled     resolved with weight loss of 20 lbs     Blood pressure (!) 98/48, pulse (!) 103, temperature 98 °F (36.7 °C), temperature source Axillary, resp. rate 23, height 5' 2\" (1.575 m), weight 147 lb 9.6 oz (67 kg), SpO2 91 %.    Chest Tube Insertion    Date/Time: 12/21/2022 11:56 AM  Performed by: Sarina Chao MD  Authorized by: Ct Byrne MD   Consent: Verbal consent obtained.  Risks and benefits: risks, benefits and alternatives were discussed  Consent given by: power of   Patient identity confirmed: arm band  Time out: Immediately prior to procedure a \"time out\" was called to verify the correct patient, procedure, equipment, support staff and site/side marked as required.  Indications: pleural effusion    Sedation:  Patient sedated: yes  Sedatives: fentanyl  Analgesia: fentanyl    Preparation: skin prepped with ChloraPrep  Placement location: left lateral  Scalpel size: 11  Tube size: 28 Georgian  Dissection instrument: Eileen clamp  Ultrasound guidance: yes  Tension pneumothorax heard: no  Tube connected to: water seal  Drainage characteristics: serosanguinous  Drainage amount: 280 ml  Dressing: 4x4 sterile gauze  Patient tolerance:  patient tolerated the procedure well with no immediate complications  Comments: Dr. Roselia Roca was present for the entirety of the procedure. CXR was ordered.          Jeronimo Pozo MD  12/21/2022

## 2022-12-21 NOTE — PROGRESS NOTES
Pulmonary Subsequent Hospital F/U note    Patient is being followed for: acute respiratory failure with hypoxia, pneumonia, bilateral pleural effusions    Interval HPI:  Ms. Demond Hedrick was seen in the ICU  She went to surgery yesterday for a right VATS which was conversed into a partial open thoracotomy with 3 chest tube insertions. Fluid from chest tube looks thick, brownish-red and purulent. Upon entering the room, there was multiple staff in the room who reported they went to prone the patient but had to stop due to hypoxia. Patient is currently on full mechanical ventilator support with AC/VC of 24, tidal volume 250, FiO2 100%, PEEP of 10. SPO2 currently 84%. She is tachycardic. She is currently on TPN. At the present time she has Levophed, fentanyl, versed and vecuronium running. The present staff reported nephrology is recommending CRRT.    ROS:  Unable to obtain      Exam:  BP (!) 98/48   Pulse (!) 103   Temp 98 °F (36.7 °C) (Axillary)   Resp 23   Ht 5' 2\" (1.575 m)   Wt 147 lb 9.6 oz (67 kg)   SpO2 91%   BMI 27.00 kg/m²    General: Patient is intubated, paralyzed and sedated, appears ill  HEENT: PERRL, ETT in place   Neck: supple no adenopathy  CV: tachycardic, no murmur   Lungs: rhonchi bilaterally, R chest tubes in place x 3  Abd: soft, ND, NT, bowel sounds normal  Ext: warm, 1+ pitting edema     Data:    Oximetry:  SpO2 Readings from Last 1 Encounters:   12/21/22 91%       Imaging personally reviewed by myself:  CT chest  Impression   Large right pleural effusion of mixed density to suggest complex effusion. There is a pigtail catheter seen in the pleural space posteriorly and   inferiorly. Small to moderate size left pleural effusion with consolidative   infiltrate throughout the left upper and lower lung field and aerated   portions revealing nodular ground-glass opacity to suggest an infectious   process.  Nodular ground-glass opacity inferiorly within the aerated right   upper and lower lung field concerning for an infectious process as well. There is less consolidative infiltrate identified within the right lower lobe. Respiratory cultures  Enterobacter, zosyn resistant    Pertinent labs reviewed and noted:  Lab Results   Component Value Date/Time    WBC 17.9 12/21/2022 04:12 AM    HGB 9.9 12/21/2022 09:23 AM    HCT 29.6 12/21/2022 09:23 AM    HCT 29.0 12/20/2022 01:31 PM    MCV 87.9 12/21/2022 04:12 AM    MCH 29.5 12/21/2022 04:12 AM    MCHC 33.6 12/21/2022 04:12 AM    RDW 15.9 12/21/2022 04:12 AM     12/21/2022 04:12 AM    MPV 11.2 12/21/2022 04:12 AM     Lab Results   Component Value Date/Time     12/21/2022 09:23 AM    K 4.7 12/21/2022 09:23 AM     12/21/2022 09:23 AM    CO2 19 12/21/2022 09:23 AM    BUN 46 12/21/2022 09:23 AM    CREATININE 3.2 12/21/2022 09:23 AM    LABALBU 3.3 12/21/2022 04:12 AM    LABALBU 4.3 01/24/2011 01:15 PM    CALCIUM 8.2 12/21/2022 09:23 AM    GFRAA >60 07/22/2022 01:42 PM    LABGLOM 14 12/21/2022 09:23 AM     Lab Results   Component Value Date/Time    PROTIME 14.7 12/20/2022 04:01 AM    INR 1.3 12/20/2022 04:01 AM       Assessment:  1. Acute hypoxic respiratory failure - requiring intubation 12/19/22  2. Acute Respiratory Distress Syndrome  3. S/p RRT 12/17/22 for respiratory distress and increasing oxygen demands  4. R loculated pleural effusion s/p IR chest tube 12/17/22 with minimal drainage, s/p R VATS turned partial open thoracotomy 12/20/22 with insertion of chest tubes x 3  5. S/p bronchoscopy-friable mucosa 12/20/22  6. Pneumonia, Hospital acquired-Enterobacter cloacae-Invanz started 12/19/22  7. Massive hiatal hernia s/p repair 12/9 at [facility]  8. Acute kidney injury  9. Hx breast Ca s/p mastectomy  10. Hx lumbar compression fracture with hx of PLIF T12-L2  11.  Anxiety    Plan:  Continue mechanical ventilation, wean FiO2 as able  Aggressive bronchopulmonary hygiene  Deep sedation/paralytic  CVVHD to be initiated per Nephrology  TPN - management per surgery  Chest tube management per surgery  Antibiotics per ID-Invanz started 12/19/22  Rest per ICU team    This plan of care was reviewed in collaboration with Dr. Destinee Vo     Electronically signed by KAR Woods CNP on 12/21/2022 at 12:28 PM    I personally saw, examined, and cared for the patient. Labs, medications, radiographs reviewed.  I agree with history exam and plans detailed in NP note with the following additions:    Ms. Home Mayberry remains critically ill in the ICU  She has become significantly hypoxic now requiring paralysis and inhaled flolan  CXR with worsening opacities likely d/t volume  She received a L chest tube per surgery today  Volume removal with CVVHD  Remains on antibiotics  TPN  Prognosis guarded    aRgini García MD

## 2022-12-21 NOTE — PROGRESS NOTES
Comprehensive Nutrition Assessment    Type and Reason for Visit:  Reassess    Nutrition Recommendations/Plan:     Continue NPO, Modify Parenteral Nutrition to better meet est needs now intubated:     Rec Custom 3-in-1 (1200 ml tv @ 50 ml/hr) to provide 80 gm AA, 201 gm dextrose, 29 gm lipid, & 1300 total kcals. Monitor/ replace electrolytes prn. TG WNL       Malnutrition Assessment:  Malnutrition Status: At risk for malnutrition  (12/21/22 1034)    Context:  Acute Illness     Findings of the 6 clinical characteristics of malnutrition:  Energy Intake:  75% or less of estimated energy requirements for 7 or more days  Weight Loss:  No significant weight loss (per EMR outside encounter review)     Body Fat Loss:  No significant body fat loss     Muscle Mass Loss:  No significant muscle mass loss    Fluid Accumulation:  No significant fluid accumulation     Strength:  Not Performed    Nutrition Assessment:    Pt status declined now intubated 2/2 post-op PNA/ ARDS/ B/L pleural effusions s/p thoracentesis, L VATS/ mini-thoracotomy, & bronch (hernia sx @ [facility] 12/9). PMHx Breast CA & TIA. PN initiated d/t inability to use GI tract. Will provide updated TPN recs to better meet est needs.     Nutrition Related Findings:    Pt intubated/ sedated/ paralyzed, hypotension on pressor x 1, +I/O's 7L, +1 edema, hypoactive BS, previous N/V, closed suction drain x 3, CT x 2, OGT clamped     Wound Type: Surgical Incision       Current Nutrition Intake & Therapies:    Average Meal Intake: NPO    Current Parenteral Nutrition Orders:  Type and Formula: 3-in-1 Standard   Duration: Continuous  Rate/Volume: @41.3 ml/hr  Current PN Order Provides: ordered not started  Goal PN Orders Provides: 1000ml TV, 1035 kcal, 50g AA, 150g dex, 32.5g lipid    Anthropometric Measures:  Height: 5' 2\" (157.5 cm)  Ideal Body Weight (IBW): 110 lbs (50 kg)    Admission Body Weight: 150 lb (68 kg) (12/15 no method)  Current Body Weight: 147 lb 9.6 oz (67 kg) (12/21 actual), 136.4 % IBW. Current BMI (kg/m2): 27  Usual Body Weight: 153 lb 6 oz (69.6 kg) (measured wt from [facility] 12/23/21, wt hx appears stable)  % Weight Change (Calculated): -3.8, non-significant                    BMI Categories: Overweight (BMI 25.0-29. 9)    Estimated Daily Nutrient Needs:  Energy Requirements Based On: Formula  Weight Used for Energy Requirements: Current  Energy (kcal/day): PS3B 1208; 8268-3791  Weight Used for Protein Requirements: Ideal  Protein (g/day): 1.5-1.8 g/kg IBW; 75-90  Fluid (ml/day): per critical care    Nutrition Diagnosis:   Inadequate oral intake related to altered GI function (s/p hernia sx) as evidenced by NPO or clear liquid status due to medical condition, nutrition support - parenteral nutrition    Nutrition Interventions:   Nutrition Education/Counseling: Education not appropriate  Coordination of Nutrition Care: Continue to monitor while inpatient      Goals:  Previous Goal Met: Progressing toward Goal(s)  Goals: Tolerate nutrition support at goal rate      Nutrition Monitoring and Evaluation:   Food/Nutrient Intake Outcomes: Parenteral Nutrition Intake/Tolerance  Physical Signs/Symptoms Outcomes: Biochemical Data, Nausea or Vomiting, GI Status, Fluid Status or Edema, Hemodynamic Status, Nutrition Focused Physical Findings, Skin, Weight    Discharge Planning:     Too soon to determine     Chun Bourgeois RD, LD  Contact: Ext [phone]

## 2022-12-21 NOTE — CARE COORDINATION
Care Coordination: Pt originally sent from [place] post hernia repair. Ac resp failure, intubated. R pl effusion, s/p vats/ mini thoractomy/ chest tubes. Nimbex, fentanyl, levophed, TPN. invanz. Bronch on 12/20. Per prior notes, pt was independent at home. Back door to [facility] for now . Spoke to daughter eamon. She is agreeable to [facility] if meets criteria and insurance coverage. Long term goal is ANAMIKA vs home with [facility]. Once she is released to home. She will be staying with Roland Lin at [address] . If ANAMIKA is needed, discussed choices and would like a referral made to [facility]. No preference of [facility] if needed, [facility] vs [facility]. She will call me back as physicians are calling her on other line. Will follow and adjust plan as pt progresses    Jennifer Anderson      The Plan for Transition of Care is related to the following treatment goals  dc planning    The Patient and/or patient representative Daughter was provided with a choice of provider and agrees   with the discharge plan. [x] Yes [] No    Freedom of choice list was provided with basic dialogue that supports the patient's individualized plan of care/goals, treatment preferences and shares the quality data associated with the providers.  [x] Yes [] No

## 2022-12-21 NOTE — PROGRESS NOTES
TRAUMA/GENERAL SURGERY  DAILY PROGRESS NOTE  12/21/2022    CHIEF COMPLAINT:  Chief Complaint   Patient presents with    Shortness of Breath     Sent from [facility] for SOB patient went for hernia repair patient on 6L NC on arrival       SUBJECTIVE:  Remains critically ill    OBJECTIVE:  BP (!) 98/48   Pulse (!) 103   Temp 98 °F (36.7 °C) (Axillary)   Resp 23   Ht 5' 2\" (1.575 m)   Wt 147 lb 9.6 oz (67 kg)   SpO2 91%   BMI 27.00 kg/m²     GENERAL:  Resting comfortably in bed. Intubated  LUNGS:  Intubated and on ventilator. R Chest tube with serous output. CARDIOVASCULAR: RR  ABDOMEN:  Soft, non-distended, non-tender. No guarding, rigidity, rebound.     ASSESSMENT/PLAN:  65 y.o. female with respiratory failure and complex right-sided effusion s/p robotic hiatal hernia repair 12/9 without signs of esophageal perforation or leak  S/p VATS & Mini-thoractomy with open drainage of fluid collection 12/20    Remains critically ill with evolving ARDS  Increasing levophed requirements, acidotic and poor p/f ratio  Bronchoscopy yesterday showed friable muscosa w/o any thick purulent secretions  Continue chest tubes to suction  Daily CXR  +fungitel and respiratory cx growing candida recommend starting antifungals -- will talk to ID  Continue care per MICU and Dr Lowell Martinez team      Slava Miller DO  Surgery Resident PGY-5  12/21/2022  9:40 AM

## 2022-12-21 NOTE — PROGRESS NOTES
Hospitalist Progress Note      PCP: Darnell Hidalgo DO    Date of Admission: 12/14/2022        Hospital Course:  65 y.o. female presented with PNEUMONIA. STATES SHE HAD A HH REPAIR AT [facility] ON THE 8TH. SHE STATES SHE WAS NOT FEELING BAD, BUT WAS TOLD SHE NEEDED TO COME TO THE HOSPITAL BC SHE WAS SICK. SHE WAS FOUND TO BE SEPTIC, WITH A HAG, SHE WAS TACHY AND TACHYPNEIC . COFFEE GROUND EMESIS THIS MORNING, GASTROCULT  POS . SURGERY NOTIFIED ** HAD A THROACENTESIS ON YESTERDAY, 105 CC REMOVED.     ** RRT  DUE TO RESPIRATORY DISTRESS ** TO TRANSFER TO  OR [facility]** INTUBATED HYPOXIA**  HAD CHEST TUBES INSERTED ON 12.20               Subjective: SEDATED AND INTUBATED           Medications:  Reviewed    Infusion Medications    cisatracurium (NIMBEX) infusion 2 mcg/kg/min (12/21/22 1008)    PN-Adult  3 IN 1 Central Line (Custom)      epoprostenol (VELETRI) nebulization solution      prismaSATE BGK 4/0/1.2      sodium chloride      calcium chloride CRRT infusion      dextrose      midazolam      norepinephrine      PN-Adult  3 IN 1 Central Line (Custom) 58.3 mL/hr at 12/21/22 0249    sodium chloride      fentaNYL 100 mcg/hr (12/21/22 1156)    sodium chloride       Scheduled Medications    heparin (porcine)  5,000 Units SubCUTAneous Q8H    acetylcysteine  4 mL Inhalation Q4H    artificial tears   Both Eyes Q4H    hydrocortisone sodium succinate PF  100 mg IntraVENous Q8H    insulin lispro  0-8 Units SubCUTAneous Q4H    ertapenem (INVanz) IVPB  500 mg IntraVENous Q24H    pantoprazole (PROTONIX) 40 mg injection  40 mg IntraVENous Q12H    sodium chloride flush  5-40 mL IntraVENous 2 times per day    heparin flush  3 mL IntraVENous 2 times per day    metoclopramide  10 mg IntraVENous Q6H    Arformoterol Tartrate  15 mcg Nebulization BID    budesonide  0.5 mg Nebulization BID    albuterol  2.5 mg Nebulization Q4H WA    [Held by provider] metoprolol  5 mg IntraVENous Q6H    rosuvastatin  10 mg Oral Nightly levothyroxine  50 mcg Oral Daily     PRN Meds: sodium chloride, potassium chloride, magnesium sulfate, calcium gluconate **OR** calcium gluconate **OR** calcium gluconate **OR** calcium gluconate, sodium phosphate IVPB **OR** sodium phosphate IVPB **OR** sodium phosphate IVPB **OR** sodium phosphate IVPB, glucose, dextrose bolus **OR** dextrose bolus, glucagon (rDNA), dextrose, sodium chloride, sodium chloride flush, sodium chloride, heparin flush, ondansetron **OR** ondansetron, magnesium hydroxide, acetaminophen **OR** acetaminophen, ipratropium-albuterol      Intake/Output Summary (Last 24 hours) at 12/21/2022 1425  Last data filed at 12/21/2022 0900  Gross per 24 hour   Intake 3835.23 ml   Output 815 ml   Net 3020.23 ml       Exam:    BP (!) 98/48   Pulse (!) 118   Temp 98 °F (36.7 °C) (Axillary)   Resp 30   Ht 5' 2\" (1.575 m)   Wt 147 lb 9.6 oz (67 kg)   SpO2 94%   BMI 27.00 kg/m²       General appearance:  No apparent distress, appears stated age. HEENT:  Normal cephalic,   Neck: Supple, with full range of motion. Trachea midline. Respiratory:  Normal respiratory effort. COARSE BREATH SOUNDS  NOTED BILATERALLY  Cardiovascular:  RRR  Abdomen: Soft, non-tender, non-distended with normal bowel sounds. Musculoskeletal:  No clubbing, cyanosis or edema bilaterally. Skin: smooth and dry                 Labs:   Recent Labs     12/20/22  0401 12/20/22  1210 12/20/22  2316 12/21/22  0412 12/21/22  0923 12/21/22  1313   WBC 16.1*  --  19.9* 17.9*  --   --    HGB 10.1*   < > 10.1* 10.0* 9.9* 10.0*   HCT 28.8*   < > 30.7* 29.8* 29.6* 30.8*     --  247 239  --   --     < > = values in this interval not displayed.      Recent Labs     12/19/22  0402 12/20/22  0401 12/20/22  1210 12/20/22  2316 12/21/22  0412 12/21/22  0923    140   < > 139 138 136   K 4.3  4.3 3.8  3.8   < > 4.9 5.1* 4.7    103   < > 105 105 106   CO2 26 23   < > 20* 22 19*   BUN 39* 42*   < > 45* 48* 46*   CREATININE 2.2* 2.5*   < > 3.1* 3.1* 3.2*   CALCIUM 8.6 8.6   < > 8.6 8.4* 8.2*   PHOS 3.6 1.7*  --   --  4.9*  --     < > = values in this interval not displayed. Recent Labs     12/19/22  0402 12/20/22  0401 12/21/22  0412   AST 27 29 32*   ALT 32 20 14   BILITOT 0.6 1.0 1.8*   ALKPHOS 55 87 101     Recent Labs     12/20/22  0401   INR 1.3     No results for input(s): Odette Cameron in the last 72 hours. Recent Labs     12/19/22  0402 12/20/22  0401 12/21/22  0412   AST 27 29 32*   ALT 32 20 14   BILITOT 0.6 1.0 1.8*   ALKPHOS 55 87 101     Recent Labs     12/20/22  2316 12/21/22  0412 12/21/22  1313   LACTA 2.7* 2.3* 1.7     No results found for: Isatu Moreno  No results found for: AMMONIA    Assessment:    Active Hospital Problems    Diagnosis Date Noted    Hypoxia [R09.02] 12/17/2022     Priority: Medium    Pneumonia of left lower lobe due to infectious organism [J18.9] 12/17/2022     Priority: Medium    Aspiration pneumonia of both lower lobes due to gastric secretions ([facility] [address] [J69.0] 12/17/2022     Priority: Medium    Postoperative pneumonia [J95.89, J18.9] 12/14/2022     Priority: Medium   ACUTE RESP FAILURE WITH HYPOXIA  GASTROCULT POS  S/P HH REPAIR  PAWAN BASELINE CREATINE 1.0)  H.O BREAST CANCER   HLD   PLEURAL EFFUSIONS BILATERALLY   S/P CHEST TUBES  R loculated pleural effusion s/p IR chest tube 12/17/22 with minimal drainage, s/p R VATS turned partial open thoracotomy 12/20/22 with insertion of chest tubes x 3  PLAN:  INVANZ   FLUIDS  MONITOR BMP   FOR  CAMACHO  DVT Prophylaxis: SCD  Diet: Diet NPO Exceptions are: Ice Chips  PN-Adult  3 IN 1 Central Line (Custom)  Diet NPO Exceptions are: Sips of Water with Meds  PN-Adult  3 IN 1 Central Line (Custom)  Code Status: Full Code     PT/OT Eval Status:   WHEN STABLE     Dispo -  TO [facility] OR     Electronically signed by Mena Mayes DO on 12/21/2022 at 2:25 PM [facility]

## 2022-12-21 NOTE — PROGRESS NOTES
Patient seen and examined bedside. Intubated, sedated, paralyzed,  abdomen soft incisions c/d/I   Currently Saturating 82% and per ICU team about to prone her. All right sided chest tubes with serous drainage- all to remain for now    Care discussed with general surgery team as well as ICU team.  Placed NG tube to suction okay with general surgery team, ultrasound placed to left chest moderate-sized pleural effusion considering her respiratory status and currently 82% recommended placing left sided chest tube. After NG tube decompression and patient positioning patient was now saturating 88%. After placing Left chest tube patient  saturating 96%. Patient's potassium was climbing was 5.1 as well as creatinine climbing would likely require CVVHD and General surgery team will ensure no additional K in the TPN. Considering increasing pressors consider checking cortisol. Consider flolan prior to proning considering her now 4 chest tubes. Also Fungitell positive discuss antifungal coverage with ID. Patient ARDS, MOSF high risk death     Discussed care with daughter.  Daughter will make the patient a DNR-Limited-     YES- Electrocardioversion, Intubation and Medications     NO- Chest compression     Daysi Gage MD

## 2022-12-21 NOTE — PROGRESS NOTES
SVI CL HR TFC TPRI   Baseline 27 2.5 93 132    Challenge 30 2.9 96 132.3 2043   % 10.1% 13.7% 3.7% 0.2%       After 250ml bolus.

## 2022-12-21 NOTE — PROGRESS NOTES
GENERAL SURGERY  DAILY PROGRESS NOTE  12/21/2022    CHIEF COMPLAINT:  Chief Complaint   Patient presents with    Shortness of Breath     Sent from [facility] for SOB patient went for hernia repair patient on 6L NC on arrival       SUBJECTIVE:  Patient seen this morning. Sedated, intubated on ventilator. OBJECTIVE:  BP (!) 98/48   Pulse (!) 103   Temp 98 °F (36.7 °C) (Axillary)   Resp 23   Ht 5' 2\" (1.575 m)   Wt 147 lb 9.6 oz (67 kg)   SpO2 91%   BMI 27.00 kg/m²     GENERAL:  sedated   LUNGS:  intubated on ventilation. Right sided chest tube in place. X2 seroma drains in place. CARDIOVASCULAR: mild tachycardia   ABDOMEN:  Soft, non-distended, non-tender. No guarding, rigidity, rebound. ASSESSMENT/PLAN:  65 y.o. female s/p VATS and mini-thoracotomy for respiratory failure and complex bilateral pleural effusions s/p robotic hiatal hernia repair.      Continue chest tube to suction  Daily CXR  Monitor chest tube and drain output   Critical care managing  Monitor vitals        YEIMY Harris  12/21/2022  12:34 PM

## 2022-12-21 NOTE — PROGRESS NOTES
[facility] PROGRESS NOTE      Chief complaint: Follow-up of pneumonia    This patient is a 74-year-old female with history of bilateral breast cancer, DM, hiatal hernia repair on 12/09, presented on 12/14 with shortness of breath for 2 days accompanied by productive cough and malaise prior to her hiatal hernia repair, thought to have large postoperative seroma after a type IV hiatal hernia repair. On admission, she was afebrile and hemodynamically stable with no leukocytosis. CT chest, abdomen and pelvis showed soft tissue density in the posterior medial aspect of the right lower lung field with deviation of the mediastinum to the left, bilateral lower lobe consolidation and pleural effusions, mild soft tissue prominence surrounding the esophagus with narrowing at the gastroesophageal junction appearing to be due to external compression, wall thickening in the gastric outlet with mild prominence of the stomach, distended gallbladder, no evidence of acute abdominal/pelvic pathology. Esophagram was negative for esophageal perforation. She underwent right-sided thoracentesis on 12/16 during which 105 mL of serous fluid was removed. Pleural fluid gram stain and culture showed rare polymorphonuclear leukocytes, no epithelial cells, no organisms, no growth. She was transferred to MICU on 12/17 for worsening hypoxemia. Repeat CT chest on 12/17 showed large right pleural effusion of mixed density suggestive of complex effusion with pigtail catheter in the pleural space posteriorly and inferiorly, small to moderate sized left pleural effusion with consolidative infiltrate throughout left upper and lower lung field and aerated portions revealing nodular groundglass opacity, nodular groundglass opacity inferiorly within the aerated right upper and lower lung field, less consolidative infiltrate within the right lower lobe.   Respiratory gram stain and culture showed no polymorphonuclear leukocytes, no epithelial cells, moderate yeast, moderate gram-positive diphtheroid-like rods, few gram-negative rods, moderate growth of Enterobacter cloacae, heavy growth of Candida albicans. Urine Streptococcus pneumoniae and Legionella antigens were negative. Respiratory pathogen PCR panel and MRSA nares culture were negative. Blood cultures showed no growth to date. She received a dose of ceftriaxone and doxycycline on admission. Ampicillin-sulbactam was started on admission and then switched to piperacillin-tazobactam on 12/17 then switched to meropenem on 12/19. Vancomycin was started on 12/17. She underwent right VATS, mini-thoracotomy with drainage and fenestration of mediastinal seroma/hematoma, chest tube and mediastinal drain placement on 12/20 during which 1100 mL serous fluid and dark blood (no succus or bile) extending into the mediastinum was drained via mini-thoracotomy. Hematoma/seroma fluid Gram stain and culture showed abundant polymorphonuclear leukocytes, no epithelial cells, no organisms, Gram-negative rods. Serum 1,3 beta-d-glucan on 12/18 was elevated. Subjective: Patient was seen and examined. She remains in critical condition, intubated and sedated, on increasing vasopressor support. Objective:  BP (!) 98/48   Pulse (!) 103   Temp 98 °F (36.7 °C) (Axillary)   Resp 23   Ht 5' 2\" (1.575 m)   Wt 147 lb 9.6 oz (67 kg)   SpO2 91%   BMI 27.00 kg/m²   Constitutional: Intubated, no MV dyssynchrony  Respiratory: Clear breath sounds, no crackles, no wheezes  Cardiovascular: Regular rate and rhythm, no murmurs  Gastrointestinal: Bowel sounds present, soft, nontender  Skin: Warm and dry, no active dermatoses  Musculoskeletal: No joint swelling, no joint erythema    Labs, imaging, and medical records/notes were personally reviewed.     Laboratory:  Lab Results   Component Value Date    WBC 17.9 (H) 12/21/2022    WBC 19.9 (H) 12/20/2022    WBC 16.1 (H) 12/20/2022    HGB 9.9 (L) 12/21/2022    HCT 29.6 (L) 12/21/2022 MCV 87.9 12/21/2022     12/21/2022     Lab Results   Component Value Date    NEUTROABS 17.36 (H) 12/21/2022    NEUTROABS 18.51 (H) 12/20/2022    NEUTROABS 13.91 (H) 12/20/2022     Lab Results   Component Value Date    CRP 45.9 (H) 12/14/2022    CRP 0.3 03/30/2022    CRP 0.3 02/04/2021     No results found for: CRPHS  Lab Results   Component Value Date    SEDRATE 16 03/30/2022    SEDRATE 25 (H) 02/04/2021    SEDRATE 33 (H) 01/13/2020     Lab Results   Component Value Date    ALT 14 12/21/2022    AST 32 (H) 12/21/2022    ALKPHOS 101 12/21/2022    BILITOT 1.8 (H) 12/21/2022     Lab Results   Component Value Date/Time     12/21/2022 09:23 AM    K 4.7 12/21/2022 09:23 AM     12/21/2022 09:23 AM    CO2 19 12/21/2022 09:23 AM    BUN 46 12/21/2022 09:23 AM    CREATININE 3.2 12/21/2022 09:23 AM    CREATININE 3.1 12/21/2022 04:12 AM    CREATININE 3.1 12/20/2022 11:16 PM    GFRAA >60 07/22/2022 01:42 PM    LABGLOM 14 12/21/2022 09:23 AM    GLUCOSE 218 12/21/2022 09:23 AM    PROT 5.1 12/21/2022 04:12 AM    LABALBU 3.3 12/21/2022 04:12 AM    LABALBU 4.3 01/24/2011 01:15 PM    CALCIUM 8.2 12/21/2022 09:23 AM    BILITOT 1.8 12/21/2022 04:12 AM    ALKPHOS 101 12/21/2022 04:12 AM    AST 32 12/21/2022 04:12 AM    ALT 14 12/21/2022 04:12 AM       Radiology: CT chest 12/17: Large right pleural effusion of mixed density to suggest complex effusion. There is a pigtail catheter seen in the pleural space posteriorly and   inferiorly. Small to moderate size left pleural effusion with consolidative   infiltrate throughout the left upper and lower lung field and aerated   portions revealing nodular ground-glass opacity to suggest an infectious   process. Nodular ground-glass opacity inferiorly within the aerated right   upper and lower lung field concerning for an infectious process as well. There is less consolidative infiltrate identified within the right lower lobe.        Microbiology:     Blood cx  No results found for: BLOODCULT1  Culture, Blood 2   Date Value Ref Range Status   12/17/2022 24 Hours no growth  Preliminary       Smear, Respiratory   Date Value Ref Range Status   12/17/2022   Final    Polymorphonuclear leukocytes not seen  Epithelial cells not seen  Moderate yeast  Moderate gram positive rods Diphtheroid like  Few Gram negative rods         CULTURE, RESPIRATORY   Date Value Ref Range Status   12/17/2022 Oral Pharyngeal Hilda absent (A)  Final   12/17/2022 Moderate growth  Final   12/17/2022 Heavy growth  Final   Enterobacter cloacae complex (1)    Antibiotic Interpretation Microscan  Method Status    amoxicillin-clavulanate Resistant >=^32 mcg/mL BACTERIAL SUSCEPTIBILITY PANEL BY CHEYANNE     ceFAZolin Resistant >=^64 mcg/mL BACTERIAL SUSCEPTIBILITY PANEL BY CHEYANNE     cefepime Sensitive ^0.5 mcg/mL BACTERIAL SUSCEPTIBILITY PANEL BY CHEYANNE     cefotaxime Resistant >=^64 mcg/mL BACTERIAL SUSCEPTIBILITY PANEL BY CHEYANNE     cefOXitin Resistant >=^64 mcg/mL BACTERIAL SUSCEPTIBILITY PANEL BY CHEYANNE     cefTAZidime-avibactam Sensitive ^0.5 mcg/mL BACTERIAL SUSCEPTIBILITY PANEL BY CHEYANNE     gentamicin Sensitive <=^1 mcg/mL BACTERIAL SUSCEPTIBILITY PANEL BY CHEYANNE     levofloxacin Sensitive <=^0.12 mcg/mL BACTERIAL SUSCEPTIBILITY PANEL BY CHEYANNE     meropenem Sensitive ^0.5 mcg/mL BACTERIAL SUSCEPTIBILITY PANEL BY CHEYANNE     piperacillin-tazobactam Resistant >=^128 mcg/mL BACTERIAL SUSCEPTIBILITY PANEL BY CHEYANNE     trimethoprim-sulfamethoxazole Sensitive <=^20 mcg/mL BACTERIAL SUSCEPTIBILITY PANEL BY CHEYANNE         BODY FLUID CULTURE  Body Fluid Culture, Sterile   Date Value Ref Range Status   12/16/2022 Growth not present  Final       MRSA Culture Only   Date Value Ref Range Status   12/17/2022 Methicillin resistant Staph aureus not isolated  Final     Gram Stain [[id number]] Collected: 12/20/22 1328   Order Status: Completed Specimen: Fluid Updated: 12/21/22 0750    Gram Stain Orderable --    Gram stain performed on unspun fluid   Abundant Polymorphonuclear leukocytes   Epithelial cells not seen   No organisms seen    Narrative:     Source: FLUID       Site: HEMATOMA                 Assessment:  Acute hypoxic respiratory failure  Septic shock  Postoperative seroma/hematoma, right chest, in the setting of robotic hiatal hernia repair on 12/09, s/p right VATS, mini-thoracotomy with drainage and fenestration of mediastinal seroma/hematoma, chest tube and mediastinal drain placement on 12/20  Enterobacter cloacae HAP. Candida albicans on respiratory culture likely colonization. Elevated serum 1,3 beta-d-glucan    Recommendations:  Start anidulafungin 200 mg loading dose once followed by 100 mg q24h. Continue ertapenem 500 mg every 24 hours dosed according to renal function to a target dose of 1 g every 24 hours for CrCl of at last 30 mL/min. Follow-up surgical cultures. Follow-up blood cultures. Continue supportive care. Case was discussed with Dr. Roberto Carlos Mello.    Thank you for involving me in the care of [facility]. Dr. Jodee Cisneros will continue to follow. Please do not hesitate to call for any questions or concerns.       Electronically signed by Kylah Chapman MD on 12/21/2022 at 10:23 AM

## 2022-12-22 NOTE — PROGRESS NOTES
Pulmonary Subsequent Hospital F/U note    Patient is being followed for: acute respiratory failure with hypoxia, pneumonia, bilateral pleural effusions    Interval HPI:  Ms. Te Day was seen in the ICU  She continues to decline. She had a left chest tube placed at the bedside yesterday. She has been started on CVVHD and Flolan. She had a positive Fungitell and Candida and has been started on Eraxis. She also became hypothermic and is on a Pavan hugger. She is on TPN along with Versed, Levophed, fentanyl, and Nimbex. She remains on continuous mechanical ventilation with an AC VC of 30, tidal volume 250, FiO2 100%, PEEP 10. ROS:  Unable to obtain      Exam:  BP (!) 103/53   Pulse 100   Temp 98.2 °F (36.8 °C) (Esophageal)   Resp 30   Ht 5' 2\" (1.575 m)   Wt 152 lb 11.2 oz (69.3 kg)   SpO2 96%   BMI 27.93 kg/m²    General: Patient is intubated, paralyzed and sedated, and is toxic appearing  HEENT: PERRL, ETT in place   Neck: supple no adenopathy  CV: tachycardic, no murmur   Lungs: rhonchi bilaterally, R chest tubes in place x 3, L chest tube x 1  Abd: soft, ND, NT, bowel sounds normal  Ext: warm, 1+ pitting edema     Data:    Oximetry:  SpO2 Readings from Last 1 Encounters:   12/22/22 96%       Imaging personally reviewed by myself:  CT chest  Impression   Large right pleural effusion of mixed density to suggest complex effusion. There is a pigtail catheter seen in the pleural space posteriorly and   inferiorly. Small to moderate size left pleural effusion with consolidative   infiltrate throughout the left upper and lower lung field and aerated   portions revealing nodular ground-glass opacity to suggest an infectious   process. Nodular ground-glass opacity inferiorly within the aerated right   upper and lower lung field concerning for an infectious process as well. There is less consolidative infiltrate identified within the right lower lobe.          Respiratory cultures   Oral Pharyngeal Hilda absent Abnormal     Smear, Respiratory Polymorphonuclear leukocytes not seen   Epithelial cells not seen   Moderate yeast   Moderate gram positive rods Diphtheroid like   Few Gram negative rods    Organism Enterobacter cloacae complex Abnormal     CULTURE, RESPIRATORY Moderate growth    Organism Candida albicans Abnormal     CULTURE, RESPIRATORY Heavy growth       Latest Reference Range & Units Most Recent   (1,3)-Beta-D-Glucan (Fungitell) Interpretation Negative  Positive !  12/19/22 04:02   !: Data is abnormal    Pertinent labs reviewed and noted:  Lab Results   Component Value Date/Time    WBC 17.9 12/21/2022 04:12 AM    HGB 10.0 12/22/2022 12:09 AM    HCT 30.7 12/22/2022 12:09 AM    HCT 29.0 12/20/2022 01:31 PM    MCV 87.9 12/21/2022 04:12 AM    MCH 29.5 12/21/2022 04:12 AM    MCHC 33.6 12/21/2022 04:12 AM    RDW 15.9 12/21/2022 04:12 AM     12/21/2022 04:12 AM    MPV 11.2 12/21/2022 04:12 AM     Lab Results   Component Value Date/Time     12/22/2022 11:45 AM    K 4.6 12/22/2022 11:45 AM    K 4.7 12/21/2022 09:23 AM     12/22/2022 11:45 AM    CO2 22 12/22/2022 11:45 AM    BUN 24 12/22/2022 11:45 AM    CREATININE 1.8 12/22/2022 11:45 AM    LABALBU 2.5 12/22/2022 11:45 AM    LABALBU 4.3 01/24/2011 01:15 PM    CALCIUM 8.7 12/22/2022 11:45 AM    GFRAA >60 07/22/2022 01:42 PM    LABGLOM 28 12/22/2022 11:45 AM     Lab Results   Component Value Date/Time    PROTIME 14.7 12/20/2022 04:01 AM    INR 1.3 12/20/2022 04:01 AM       Assessment:  1. Acute hypoxic respiratory failure - requiring intubation 12/19/22  2. Acute Respiratory Distress Syndrome  3. S/p RRT 12/17/22 for respiratory distress and increasing oxygen demands  4. R loculated pleural effusion s/p IR chest tube 12/17/22 with minimal drainage, s/p R VATS turned partial open thoracotomy 12/20/22 with insertion of chest tubes x 3  5. S/p bronchoscopy-friable mucosa 12/20/22  6.  Pneumonia, Hospital acquired-Enterobacter cloacae-Invanz started 12/19/22  7. Massive hiatal hernia s/p repair 12/9 at [facility]  8. Acute kidney injury  9. L chest tube placement 12/21/22  10. CVVHD initiated 12/21/22  11.  Positive fungitell-eraxis started 12/21/22    Plan:  Continue mechanical ventilation, wean FiO2 as able  Aggressive bronchopulmonary hygiene  Deep sedation/paralytic  Fluid removal with CVVHD initiated per Nephrology  TPN - management per surgery  Chest tube x 4 management per surgery  Antimicrobials per ID-Invanz started 12/19/22, Eraxis started 12/21/22  Rest per ICU team  Prognosis guarded       Electronically signed by KAR Claudio - CNP on 12/22/2022 at 1:12 PM

## 2022-12-22 NOTE — PLAN OF CARE
Problem: Chronic Conditions and Co-morbidities  Goal: Patient's chronic conditions and co-morbidity symptoms are monitored and maintained or improved  Outcome: Not Progressing     Problem: Discharge Planning  Goal: Discharge to home or other facility with appropriate resources  Outcome: Not Progressing     Problem: Safety - Adult  Goal: Free from fall injury  Outcome: Not Progressing     Problem: Pain  Goal: Verbalizes/displays adequate comfort level or baseline comfort level  Outcome: Not Progressing     Problem: Nutrition Deficit:  Goal: Optimize nutritional status  Outcome: Not Progressing     Problem: Skin/Tissue Integrity  Goal: Absence of new skin breakdown  Description: 1. Monitor for areas of redness and/or skin breakdown  2. Assess vascular access sites hourly  3. Every 4-6 hours minimum:  Change oxygen saturation probe site  4. Every 4-6 hours:  If on nasal continuous positive airway pressure, respiratory therapy assess nares and determine need for appliance change or resting period.   Outcome: Not Progressing     Problem: Respiratory - Adult  Goal: Achieves optimal ventilation and oxygenation  12/22/2022 0657 by Asa Ormond, RN  Outcome: Not Progressing  12/21/2022 1848 by Bia Aguilar RCP  Outcome: Not Progressing     Problem: Chronic Conditions and Co-morbidities  Goal: Patient's chronic conditions and co-morbidity symptoms are monitored and maintained or improved  Outcome: Not Progressing     Problem: Discharge Planning  Goal: Discharge to home or other facility with appropriate resources  Outcome: Not Progressing     Problem: Safety - Adult  Goal: Free from fall injury  Outcome: Not Progressing     Problem: Pain  Goal: Verbalizes/displays adequate comfort level or baseline comfort level  Outcome: Not Progressing     Problem: Nutrition Deficit:  Goal: Optimize nutritional status  Outcome: Not Progressing     Problem: Skin/Tissue Integrity  Goal: Absence of new skin breakdown  Description: 1. Monitor for areas of redness and/or skin breakdown  2. Assess vascular access sites hourly  3. Every 4-6 hours minimum:  Change oxygen saturation probe site  4. Every 4-6 hours:  If on nasal continuous positive airway pressure, respiratory therapy assess nares and determine need for appliance change or resting period.   Outcome: Not Progressing     Problem: Respiratory - Adult  Goal: Achieves optimal ventilation and oxygenation  12/22/2022 0657 by Benjamin Lim RN  Outcome: Not Progressing  12/21/2022 1848 by Melissa Jones RCP  Outcome: Not Progressing

## 2022-12-22 NOTE — PROGRESS NOTES
TRAUMA/GENERAL SURGERY  DAILY PROGRESS NOTE  12/22/2022    CHIEF COMPLAINT:  Chief Complaint   Patient presents with    Shortness of Breath     Sent from [facility] for SOB patient went for hernia repair patient on 6L NC on arrival       SUBJECTIVE:  Multiple changes yesterday -- paralyzed, flolan, CVVHD, left chest tube placement, started on antifungal for for +fungitell and candida in chest cx  Remains critically ill but decreasing dose of vasopressors    OBJECTIVE:  BP (!) 98/48   Pulse 98   Temp 97.7 °F (36.5 °C) (Esophageal)   Resp 30   Ht 5' 2\" (1.575 m)   Wt 152 lb 11.2 oz (69.3 kg)   SpO2 96%   BMI 27.93 kg/m²     GENERAL:  Intubated, sedated, paralyzed  LUNGS:  Intubated and on ventilator. R Chest tube with serous output, L chest with dark colored output  CARDIOVASCULAR: RR  ABDOMEN:  Soft, non-distended, non-tender. No guarding, rigidity, rebound.     ASSESSMENT/PLAN:  65 y.o. female with respiratory failure and complex right-sided effusion s/p robotic hiatal hernia repair 12/9 without signs of esophageal perforation or leak however now +fungitell and growing candida in chest  S/p VATS & Mini-thoractomy & bronchoscopy with open drainage of fluid collection 12/20, left CT placement 12/21  ARDS & renal failure s/p chemical paralysis, flolan, CVVHD 12/21  Bronchoscopy intra-op showed friable muscosa w/o any thick purulent secretions    Remains critically ill  No big changes today from trauma surgery point of view  Continue care per MICU team & Dr Celi Mead team  Continue chest tubes to suction  Daily CXR    Paris Abarca DO  Surgery Resident PGY-5  12/22/2022  7:20 AM

## 2022-12-22 NOTE — PROGRESS NOTES
Department of Internal Medicine  Nephrology Progress Note      Events reviewed. SUBJECTIVE: We are following Garrison Cutter for PAWAN.   Presently intubated    PHYSICAL EXAM:      Vitals:    VITALS:  BP (!) 115/51   Pulse 92   Temp 97.9 °F (36.6 °C) (Esophageal)   Resp 30   Ht 5' 2\" (1.575 m)   Wt 152 lb 11.2 oz (69.3 kg)   SpO2 96%   BMI 27.93 kg/m²   24HR INTAKE/OUTPUT:    Intake/Output Summary (Last 24 hours) at 12/22/2022 1026  Last data filed at 12/22/2022 1003  Gross per 24 hour   Intake 5157.88 ml   Output 3427 ml   Net 1730.88 ml         Constitutional: Patient intubated, sedated  HEENT: Normocephalic, atraumatic, PERRLA, endotracheal tube in place  Respiratory: Diminished right lung base  Cardiovascular/Edema: RRR, normal S1, normal S2, no edema  Gastrointestinal: Soft, not distended, nontender  Neurologic: Patient is sedated  Skin: Pale, dry, no lesions, no rash, + edema     Scheduled Meds:   heparin (porcine)  5,000 Units SubCUTAneous Q8H    acetylcysteine  4 mL Inhalation Q4H    artificial tears   Both Eyes Q4H    hydrocortisone sodium succinate PF  100 mg IntraVENous Q8H    insulin lispro  0-8 Units SubCUTAneous Q4H    anidulafungin  100 mg IntraVENous Q24H    ertapenem (INVanz) IVPB  500 mg IntraVENous Q24H    pantoprazole (PROTONIX) 40 mg injection  40 mg IntraVENous Q12H    sodium chloride flush  5-40 mL IntraVENous 2 times per day    heparin flush  3 mL IntraVENous 2 times per day    metoclopramide  10 mg IntraVENous Q6H    Arformoterol Tartrate  15 mcg Nebulization BID    budesonide  0.5 mg Nebulization BID    albuterol  2.5 mg Nebulization Q4H WA    [Held by provider] metoprolol  5 mg IntraVENous Q6H    rosuvastatin  10 mg Oral Nightly    levothyroxine  50 mcg Oral Daily     Continuous Infusions:   PN-Adult  3 IN 1 Central Line (Custom)      cisatracurium (NIMBEX) infusion 2 mcg/kg/min (12/22/22 0721)    PN-Adult  3 IN 1 Central Line (Custom) 58.3 mL/hr at 12/21/22 5018    epoprostenol (VELETRI) nebulization solution 50 ng/kg/min (12/22/22 0750)    prismaSATE BGK 4/0/1.2 2,000 mL/hr at 12/22/22 0605    sodium chloride 100 mL/hr at 12/21/22 1540    calcium chloride CRRT infusion 50 mL/hr at 12/22/22 0052    dextrose      midazolam 2 mg/hr (12/21/22 1858)    norepinephrine 18 mcg/min (12/22/22 0628)    vasopressin (Septic Shock) infusion Stopped (12/21/22 1808)    fentaNYL 100 mcg/hr (12/22/22 0116)    sodium chloride       PRN Meds:.sodium chloride, potassium chloride, magnesium sulfate, calcium gluconate **OR** calcium gluconate **OR** calcium gluconate **OR** calcium gluconate, sodium phosphate IVPB **OR** sodium phosphate IVPB **OR** sodium phosphate IVPB **OR** sodium phosphate IVPB, glucose, dextrose bolus **OR** dextrose bolus, glucagon (rDNA), dextrose, sodium chloride flush, sodium chloride, heparin flush, ondansetron **OR** ondansetron, magnesium hydroxide, acetaminophen **OR** acetaminophen, ipratropium-albuterol    DATA:    CBC with Differential:    Lab Results   Component Value Date/Time    WBC 17.9 12/21/2022 04:12 AM    RBC 3.39 12/21/2022 04:12 AM    HGB 10.0 12/22/2022 12:09 AM    HCT 30.7 12/22/2022 12:09 AM    HCT 29.0 12/20/2022 01:31 PM     12/21/2022 04:12 AM    MCV 87.9 12/21/2022 04:12 AM    MCH 29.5 12/21/2022 04:12 AM    MCHC 33.6 12/21/2022 04:12 AM    RDW 15.9 12/21/2022 04:12 AM    NRBC 0.9 12/21/2022 04:12 AM    LYMPHOPCT 4.7 12/21/2022 04:12 AM    MONOPCT 1.7 12/21/2022 04:12 AM    BASOPCT 0.3 12/21/2022 04:12 AM    MONOSABS 0.36 12/21/2022 04:12 AM    LYMPHSABS 0.00 12/21/2022 04:12 AM    EOSABS 0.32 12/21/2022 04:12 AM    BASOSABS 0.00 12/21/2022 04:12 AM     CMP:    Lab Results   Component Value Date/Time     12/22/2022 05:44 AM    K 4.7 12/22/2022 05:44 AM    K 4.7 12/21/2022 09:23 AM     12/22/2022 05:44 AM    CO2 23 12/22/2022 05:44 AM    BUN 28 12/22/2022 05:44 AM    CREATININE 1.9 12/22/2022 05:44 AM    GFRAA >60 07/22/2022 01:42 PM LABGLOM 27 12/22/2022 05:44 AM    GLUCOSE 210 12/22/2022 05:44 AM    PROT 4.8 12/22/2022 05:44 AM    LABALBU 2.6 12/22/2022 05:44 AM    LABALBU 4.3 01/24/2011 01:15 PM    CALCIUM 7.9 12/22/2022 05:44 AM    BILITOT 2.4 12/22/2022 05:44 AM    ALKPHOS 209 12/22/2022 05:44 AM    AST 45 12/22/2022 05:44 AM    ALT 18 12/22/2022 05:44 AM     Magnesium:    Lab Results   Component Value Date/Time    MG 2.0 12/22/2022 05:44 AM     Phosphorus:    Lab Results   Component Value Date/Time    PHOS 3.2 12/22/2022 05:44 AM     Radiology Review:      CT Chest and Abd/Pelvis 12/14/22      No evidence of pulmonary embolism. Bilateral lower lobe pulmonary consolidations and pleural effusions seen. Deviation of the mediastinum to the left is visualized. Mild soft tissue prominence visualized surrounding the esophagus with   narrowing at the gastroesophageal junction but appears to be due to external   compression. Cannot rule left soft tissue mass at this location. Wall thickening visualized in the gastric outlet with mild prominence of the   stomach seen, this could be artifactual.     Distended gallbladder is seen. Degenerative bone changes, postoperative changes and multilevel vertebral   augmentation seen. No evidence of acute abdominal/pelvic pathology. US Chest 12/15/22       Probable complex loculated right pleural effusion. Simple left pleural effusion. BRIEF SUMMARY OF INITIAL CONSULT:    Briefly, Jaja Mcclure is a 48-year-old female, past medical history significant for hypothyroidism, hiatal hernia and TIA, who presented to the ED on 12/14/2022 from [facility], patient recently underwent a hiatal hernia repair on 12/9/22, developed acute respiratory distress and was transferred to [facility] on 12/14/2022 for further management and treatment.   Initial ED work-up revealed creatinine level of 1.7, patient then underwent two seperate IV contrast studies CTA and creatinine subsequently increased to 2.5, on 12/15/2022 creatinine level increased to 3.5 mg/dL, reason for this consultation. Review of records from [facility] reveals that baseline creatinine appears to be between 0.9 to 1.0 mg/dL, creatinine was 1.0 on 12/13/2022, patient was started on lisinopril during hospitalization, was given multiple doses IV furosemide with poor oral intake and vomiting. Problems resolved:  HAGMA, 2/2 uremia and starvation ketoacidosis  Hypophosphatemia, poor intake/refeeding syndrome, rule out vitamin D deficiency, levels improved    IMPRESSION/RECOMMENDATIONS:     PAWAN, stage II, nonoliguric, ATN (ischemic +/- contrast induced , intravascular volume depletion due to poor oral intake, loop diuretics and vomiting in the setting of ACE inhibition versus,) FEUrea 28.3%, FENa 1.1%. Started on renal replacement therapy on 12/21/2022. Tolerating CVVHD.      Hemodynamic shock, septic, on vasopressors and antibiotics  Vitamin D deficiency, vitamin D 25 level 11, on cholecalciferol     ------------------------------------------------------  Respiratory failure, status post intubation  New onset AF with RVR  Complex right pleural effusion, s/p VATS    S/p hiatal hernia repair 12/9/2022  Hypothyroidism, on levothyroxine  Persistent vomiting, PRN ondansetron, esophagram negative for perforation  Normocytic anemia, multifactorial  Severe hypoalbuminemia  Nutrition, on TPN per surgery      Plan:    Continue CVVHD 30 cc/kilo/hour, start net UF 50 cc/hour   Continue TPN per surgery  Continue to monitor renal function for recovery       Electronically signed by Sally Aquino MD on 12/22/2022 at 10:26 AM

## 2022-12-22 NOTE — PLAN OF CARE
Problem: Chronic Conditions and Co-morbidities  Goal: Patient's chronic conditions and co-morbidity symptoms are monitored and maintained or improved  12/22/2022 1236 by Katarina Bee  Outcome: Progressing     Problem: Safety - Adult  Goal: Free from fall injury  12/22/2022 1236 by Katarina Bee  Outcome: Progressing     Problem: Nutrition Deficit:  Goal: Optimize nutritional status  12/22/2022 1236 by Katarina Bee  Outcome: Progressing     Problem: Skin/Tissue Integrity  Goal: Absence of new skin breakdown  Description: 1. Monitor for areas of redness and/or skin breakdown  2. Assess vascular access sites hourly  3. Every 4-6 hours minimum:  Change oxygen saturation probe site  4. Every 4-6 hours:  If on nasal continuous positive airway pressure, respiratory therapy assess nares and determine need for appliance change or resting period.   12/22/2022 1236 by Katarina Bee  Outcome: Progressing     Problem: Chronic Conditions and Co-morbidities  Goal: Patient's chronic conditions and co-morbidity symptoms are monitored and maintained or improved  12/22/2022 1236 by Katarina Bee  Outcome: Progressing  12/22/2022 0657 by Rah Ornelas RN  Outcome: Not Progressing     Problem: Discharge Planning  Goal: Discharge to home or other facility with appropriate resources  12/22/2022 0657 by Rah Ornelas RN  Outcome: Not Progressing     Problem: Safety - Adult  Goal: Free from fall injury  12/22/2022 1236 by Katarina Bee  Outcome: Progressing  12/22/2022 0657 by Rah Ornelas RN  Outcome: Not Progressing     Problem: Pain  Goal: Verbalizes/displays adequate comfort level or baseline comfort level  12/22/2022 1236 by Katarina Bee  Outcome: Progressing  12/22/2022 0657 by Rah Ornelas RN  Outcome: Not Progressing     Problem: Nutrition Deficit:  Goal: Optimize nutritional status  12/22/2022 1236 by Katarina Bee  Outcome: Progressing  12/22/2022 0657 by Rah Ornelas RN  Outcome: Not Progressing     Problem: Skin/Tissue Integrity  Goal: Absence of new skin breakdown  Description: 1. Monitor for areas of redness and/or skin breakdown  2. Assess vascular access sites hourly  3. Every 4-6 hours minimum:  Change oxygen saturation probe site  4. Every 4-6 hours:  If on nasal continuous positive airway pressure, respiratory therapy assess nares and determine need for appliance change or resting period.   12/22/2022 1236 by Eduard Choudhary  Outcome: Progressing  12/22/2022 0657 by Kevin Garcia RN  Outcome: Not Progressing     Problem: Respiratory - Adult  Goal: Achieves optimal ventilation and oxygenation  12/22/2022 0657 by Kevin Garcia RN  Outcome: Not Progressing

## 2022-12-22 NOTE — PROGRESS NOTES
[facility] PROGRESS NOTE      Chief complaint: Follow-up of pneumonia    This patient is a 74-year-old female with history of bilateral breast cancer, DM, hiatal hernia repair on 12/09, presented on 12/14 with shortness of breath for 2 days accompanied by productive cough and malaise prior to her hiatal hernia repair, thought to have large postoperative seroma after a type IV hiatal hernia repair. On admission, she was afebrile and hemodynamically stable with no leukocytosis. CT chest, abdomen and pelvis showed soft tissue density in the posterior medial aspect of the right lower lung field with deviation of the mediastinum to the left, bilateral lower lobe consolidation and pleural effusions, mild soft tissue prominence surrounding the esophagus with narrowing at the gastroesophageal junction appearing to be due to external compression, wall thickening in the gastric outlet with mild prominence of the stomach, distended gallbladder, no evidence of acute abdominal/pelvic pathology. Esophagram was negative for esophageal perforation. She underwent right-sided thoracentesis on 12/16 during which 105 mL of serous fluid was removed. Pleural fluid gram stain and culture showed rare polymorphonuclear leukocytes, no epithelial cells, no organisms, no growth. She was transferred to MICU on 12/17 for worsening hypoxemia. Repeat CT chest on 12/17 showed large right pleural effusion of mixed density suggestive of complex effusion with pigtail catheter in the pleural space posteriorly and inferiorly, small to moderate sized left pleural effusion with consolidative infiltrate throughout left upper and lower lung field and aerated portions revealing nodular groundglass opacity, nodular groundglass opacity inferiorly within the aerated right upper and lower lung field, less consolidative infiltrate within the right lower lobe.   Respiratory gram stain and culture showed no polymorphonuclear leukocytes, no epithelial cells, moderate yeast, moderate gram-positive diphtheroid-like rods, few gram-negative rods, moderate growth of Enterobacter cloacae, heavy growth of Candida albicans. Urine Streptococcus pneumoniae and Legionella antigens were negative. Respiratory pathogen PCR panel and MRSA nares culture were negative. Blood cultures showed no growth to date. She received a dose of ceftriaxone and doxycycline on admission. Ampicillin-sulbactam was started on admission and then switched to piperacillin-tazobactam on 12/17 then switched to meropenem on 12/19. Vancomycin was started on 12/17. She underwent right VATS, mini-thoracotomy with drainage and fenestration of mediastinal seroma/hematoma, chest tube and mediastinal drain placement on 12/20 during which 1100 mL serous fluid and dark blood (no succus or bile) extending into the mediastinum was drained via mini-thoracotomy. Hematoma/seroma fluid Gram stain and culture showed abundant polymorphonuclear leukocytes, no epithelial cells, no organisms, Gram-negative rods. Serum 1,3 beta-d-glucan on 12/18 was elevated. Subjective: Patient was seen and examined. She remains in critical condition, intubated and sedated, on increasing vasopressor support. Objective:  BP (!) 115/51   Pulse 92   Temp 97.9 °F (36.6 °C) (Esophageal)   Resp 30   Ht 5' 2\" (1.575 m)   Wt 152 lb 11.2 oz (69.3 kg)   SpO2 96%   BMI 27.93 kg/m²   Constitutional: Intubated, no MV dyssynchrony  Respiratory: Clear breath sounds, no crackles, no wheezes  Cardiovascular: Regular rate and rhythm, no murmurs  Gastrointestinal: Bowel sounds present, soft, nontender  Skin: Warm and dry, no active dermatoses  Musculoskeletal: No joint swelling, no joint erythema    Labs, imaging, and medical records/notes were personally reviewed.     Laboratory:  Lab Results   Component Value Date    WBC 17.9 (H) 12/21/2022    WBC 19.9 (H) 12/20/2022    WBC 16.1 (H) 12/20/2022    HGB 10.0 (L) 12/22/2022    HCT 30.7 (L) 12/22/2022    MCV 87.9 12/21/2022     12/21/2022     Lab Results   Component Value Date    NEUTROABS 17.36 (H) 12/21/2022    NEUTROABS 18.51 (H) 12/20/2022    NEUTROABS 13.91 (H) 12/20/2022     Lab Results   Component Value Date    CRP 45.9 (H) 12/14/2022    CRP 0.3 03/30/2022    CRP 0.3 02/04/2021     No results found for: CRPHS  Lab Results   Component Value Date    SEDRATE 16 03/30/2022    SEDRATE 25 (H) 02/04/2021    SEDRATE 33 (H) 01/13/2020     Lab Results   Component Value Date    ALT 18 12/22/2022    AST 45 (H) 12/22/2022    ALKPHOS 209 (H) 12/22/2022    BILITOT 2.4 (H) 12/22/2022     Lab Results   Component Value Date/Time     12/22/2022 05:44 AM    K 4.7 12/22/2022 05:44 AM    K 4.7 12/21/2022 09:23 AM     12/22/2022 05:44 AM    CO2 23 12/22/2022 05:44 AM    BUN 28 12/22/2022 05:44 AM    CREATININE 1.9 12/22/2022 05:44 AM    CREATININE 2.1 12/22/2022 12:09 AM    CREATININE 2.7 12/21/2022 06:19 PM    GFRAA >60 07/22/2022 01:42 PM    LABGLOM 27 12/22/2022 05:44 AM    GLUCOSE 210 12/22/2022 05:44 AM    PROT 4.8 12/22/2022 05:44 AM    LABALBU 2.6 12/22/2022 05:44 AM    LABALBU 4.3 01/24/2011 01:15 PM    CALCIUM 7.9 12/22/2022 05:44 AM    BILITOT 2.4 12/22/2022 05:44 AM    ALKPHOS 209 12/22/2022 05:44 AM    AST 45 12/22/2022 05:44 AM    ALT 18 12/22/2022 05:44 AM       Radiology: CT chest 12/17: Large right pleural effusion of mixed density to suggest complex effusion. There is a pigtail catheter seen in the pleural space posteriorly and   inferiorly. Small to moderate size left pleural effusion with consolidative   infiltrate throughout the left upper and lower lung field and aerated   portions revealing nodular ground-glass opacity to suggest an infectious   process. Nodular ground-glass opacity inferiorly within the aerated right   upper and lower lung field concerning for an infectious process as well. There is less consolidative infiltrate identified within the right lower lobe. Microbiology:     Blood cx  No results found for: BLOODCULT1  Culture, Blood 2   Date Value Ref Range Status   12/17/2022 24 Hours no growth  Preliminary       Smear, Respiratory   Date Value Ref Range Status   12/17/2022   Final    Polymorphonuclear leukocytes not seen  Epithelial cells not seen  Moderate yeast  Moderate gram positive rods Diphtheroid like  Few Gram negative rods         CULTURE, RESPIRATORY   Date Value Ref Range Status   12/17/2022 Oral Pharyngeal Hilda absent (A)  Final   12/17/2022 Moderate growth  Final   12/17/2022 Heavy growth  Final   Enterobacter cloacae complex (1)    Antibiotic Interpretation Microscan  Method Status    amoxicillin-clavulanate Resistant >=^32 mcg/mL BACTERIAL SUSCEPTIBILITY PANEL BY CHEYANNE     ceFAZolin Resistant >=^64 mcg/mL BACTERIAL SUSCEPTIBILITY PANEL BY CHEYANNE     cefepime Sensitive ^0.5 mcg/mL BACTERIAL SUSCEPTIBILITY PANEL BY CHEYANNE     cefotaxime Resistant >=^64 mcg/mL BACTERIAL SUSCEPTIBILITY PANEL BY CHEYANNE     cefOXitin Resistant >=^64 mcg/mL BACTERIAL SUSCEPTIBILITY PANEL BY CHEYANNE     cefTAZidime-avibactam Sensitive ^0.5 mcg/mL BACTERIAL SUSCEPTIBILITY PANEL BY CHEYANNE     gentamicin Sensitive <=^1 mcg/mL BACTERIAL SUSCEPTIBILITY PANEL BY CHEYANNE     levofloxacin Sensitive <=^0.12 mcg/mL BACTERIAL SUSCEPTIBILITY PANEL BY CHEYANNE     meropenem Sensitive ^0.5 mcg/mL BACTERIAL SUSCEPTIBILITY PANEL BY CHEYANNE     piperacillin-tazobactam Resistant >=^128 mcg/mL BACTERIAL SUSCEPTIBILITY PANEL BY CHEYANNE     trimethoprim-sulfamethoxazole Sensitive <=^20 mcg/mL BACTERIAL SUSCEPTIBILITY PANEL BY CHEYANNE         BODY FLUID CULTURE  Body Fluid Culture, Sterile   Date Value Ref Range Status   12/20/2022 (A)  Preliminary    Growth present, evaluating for:  Gram negative rods     12/20/2022   Preliminary    Rare growth  Identification and sensitivity to follow     12/20/2022 Light growth  Preliminary       MRSA Culture Only   Date Value Ref Range Status   12/17/2022 Methicillin resistant Staph aureus not isolated  Final     Gram Stain [[id number]] Collected: 12/20/22 1328   Order Status: Completed Specimen: Fluid Updated: 12/21/22 0751    Gram Stain Orderable --    Gram stain performed on unspun fluid   Abundant Polymorphonuclear leukocytes   Epithelial cells not seen   No organisms seen    Narrative:     Source: FLUID       Site: HEMATOMA                 Assessment:  Acute hypoxic respiratory failure  Septic shock  Postoperative seroma/hematoma, right chest, in the setting of robotic hiatal hernia repair on 12/09, s/p right VATS, mini-thoracotomy with drainage and fenestration of mediastinal seroma/hematoma, chest tube and mediastinal drain placement on 12/20  Enterobacter cloacae HAP. Candida albicans on respiratory culture likely colonization. Elevated serum 1,3 beta-d-glucan    Recommendations:  Start anidulafungin 200 mg loading dose once followed by 100 mg q24h. Continue ertapenem 500 mg every 24 hours dosed according to renal function to a target dose of 1 g every 24 hours for CrCl of at last 30 mL/min. Follow-up surgical cultures. Follow-up blood cultures. Continue supportive care. Talked to daughter   Talked to Dr Vikas Carvalho    Case was discussed with Dr. Luis Stapleton.    Thank you for involving me in the care of Gaudencio Sparks. Dr. Cora Keller will continue to follow. Please do not hesitate to call for any questions or concerns.       Electronically signed by Ildefonso Perez MD on 12/22/2022 at 10:50 AM

## 2022-12-22 NOTE — PROGRESS NOTES
GENERAL SURGERY  DAILY PROGRESS NOTE  12/22/2022    CHIEF COMPLAINT:  Chief Complaint   Patient presents with    Shortness of Breath     Sent from [facility] for SOB patient went for hernia repair patient on 6L NC on arrival       SUBJECTIVE:  Patient remains intubated, sedated multiple thoracostomy tubes and required ICD. Critical condition. OBJECTIVE:  BP (!) 103/48   Pulse 90   Temp 97.9 °F (36.6 °C) (Esophageal)   Resp 30   Ht 5' 2\" (1.575 m)   Wt 152 lb 11.2 oz (69.3 kg)   SpO2 93%   BMI 27.93 kg/m²     GENERAL: intubated sedated  LUNGS: mechanical ventilation. Bilateral chest tubes in place draining serosanguineous fluid. CARDIOVASCULAR: Intermittent tachycardia  ABDOMEN:  Soft, non-distended, non-tender. No guarding, rigidity, rebound. Incisions clean dry and intact. Peterson catheter in place    ASSESSMENT/PLAN:  65 y.o. female s/p post hiatal hernia repair 73/7 complicated by respiratory insufficiency concerning for post op aspiration pneumonia without signs of esophageal leak or perforation. Large seroma likely causing atelectasis and contributing to respiratory insufficiency. Now s/p mini-thoracotomy with evacuation of mediastinal seroma 12/20    Plan:  - No evidence of esophageal leak  - Continue TPN  - Please reach out for any questions or concerns    Discussed with Dr. Maurice Vale     Electronically signed by Marques Beltran DO on 12/22/2022 at 8:28 AM    Attending Note:    Patient seen/examined 12/22/2022. I discussed case with the resident and reviewed all relevant labs/imaging. Agree with resident's assessment and plan.

## 2022-12-22 NOTE — PROGRESS NOTES
[facility]   Department of Internal Medicine   MICU Progress Note    Patient:  Julian Ramon 65 y.o. female   MRN: [id number]       Date of Service: 2022    Allergy: Benadryl [diphenhydramine] and Ibuprofen  CC hypoxia   Subjective   Intubated/sedated on vent  Does not look in distress  Poor oxygen, PF ratio low  Paralyzed, vent to ARDS protocol  Now on versed, fentanyl, and nimbex  Plan to prone   On pressors  3 chest tube with minimal drainage  Temps of 98.2  CRRT in progress with net removal today. No overnight event  Objective     TEMPERATURE:  Current - Temp: 98.2 °F (36.8 °C); Max - Temp  Av.4 °F (35.8 °C)  Min: 93.4 °F (34.1 °C)  Max: 98.2 °F (36.8 °C)    RESPIRATIONS RANGE: Resp  Av.7  Min: 24  Max: 30    PULSE RANGE: Pulse  Av.6  Min: 81  Max: 119    BLOOD PRESSURE RANGE:  Systolic (04WJM), WFX:122 , Min:103 , RDU:115   ; Diastolic (68ALZ), ALONSO:22, Min:48, Max:53      PULSE OXIMETRY RANGE: SpO2  Av.6 %  Min: 92 %  Max: 98 %    I & O - 24hr:    Intake/Output Summary (Last 24 hours) at 2022 1229  Last data filed at 2022 1200  Gross per 24 hour   Intake 5463.88 ml   Output 3752 ml   Net 1711.88 ml     I/O last 3 completed shifts: In: 8529.1 [I.V.:3837.3; NG/GT:60; IV Piggyback:2728.6]  Out: 1617 [Urine:630; Other:85; Chest Tube:570] I/O this shift: In: 770 [I.V.:770]  Out: 799 [Urine:110]   Weight change: 5 lb 1.6 oz (2.313 kg)    Physical Exam:  General Appearance: intubated/sedated on vent, does not look in distress. Anasarca noted   HEENT:  Head: Normocephalic, no lesions, without obvious abnormality. Eye: Normal external eye, conjunctiva, lids cornea, FILEMON. Nose: Normal external nose, mucus membranes and septum. Neck / Thyroid: Supple, no masses, nodes, nodules or enlargement.   Neck: no adenopathy, no carotid bruit, no JVD, supple, symmetrical, trachea midline, and thyroid not enlarged, symmetric, no tenderness/mass/nodules  Lung: Rhonchi throughout lung field, no wheezing noted. 3 chest tube    Heart: regular rate and rhythm, S1, S2 normal, no murmur, click, rub or gallop  Abdomen: soft, non-tender; bowel sounds normal; no masses,  no organomegaly incision noted, intact. Extremities:  extremities normal, atraumatic, no cyanosis or edema 3+ pitting edema bilaterally. Musculokeletal: No joint swelling, no muscle tenderness. ROM normal in all joints of extremities.    Neurologic: Mental status: intubated/sedated on vent, limited neuro exam       Medications     Continuous Infusions:   PN-Adult  3 IN 1 Central Line (Custom)      cisatracurium (NIMBEX) infusion 2 mcg/kg/min (12/22/22 0721)    PN-Adult  3 IN 1 Central Line (Custom) 58.3 mL/hr at 12/21/22 1858    epoprostenol (VELETRI) nebulization solution 50 ng/kg/min (12/22/22 0750)    prismaSATE BGK 4/0/1.2 2,000 mL/hr at 12/22/22 5254    sodium chloride 100 mL/hr at 12/21/22 1540    calcium chloride CRRT infusion 50 mL/hr at 12/22/22 0052    dextrose      midazolam 2 mg/hr (12/21/22 1858)    norepinephrine 18 mcg/min (12/22/22 0628)    vasopressin (Septic Shock) infusion Stopped (12/21/22 1808)    fentaNYL 100 mcg/hr (12/22/22 1058)    sodium chloride       Scheduled Meds:   heparin (porcine)  5,000 Units SubCUTAneous Q8H    acetylcysteine  4 mL Inhalation Q4H    artificial tears   Both Eyes Q4H    hydrocortisone sodium succinate PF  100 mg IntraVENous Q8H    insulin lispro  0-8 Units SubCUTAneous Q4H    anidulafungin  100 mg IntraVENous Q24H    ertapenem (INVanz) IVPB  500 mg IntraVENous Q24H    pantoprazole (PROTONIX) 40 mg injection  40 mg IntraVENous Q12H    sodium chloride flush  5-40 mL IntraVENous 2 times per day    heparin flush  3 mL IntraVENous 2 times per day    metoclopramide  10 mg IntraVENous Q6H    Arformoterol Tartrate  15 mcg Nebulization BID    budesonide  0.5 mg Nebulization BID    albuterol  2.5 mg Nebulization Q4H WA    [Held by provider] metoprolol  5 mg IntraVENous Q6H rosuvastatin  10 mg Oral Nightly    levothyroxine  50 mcg Oral Daily     PRN Meds: potassium chloride, magnesium sulfate, calcium gluconate **OR** calcium gluconate **OR** calcium gluconate **OR** calcium gluconate, sodium phosphate IVPB **OR** sodium phosphate IVPB **OR** sodium phosphate IVPB **OR** sodium phosphate IVPB, glucose, dextrose bolus **OR** dextrose bolus, glucagon (rDNA), dextrose, sodium chloride flush, sodium chloride, heparin flush, ondansetron **OR** ondansetron, magnesium hydroxide, acetaminophen **OR** acetaminophen, ipratropium-albuterol  Nutrition:   TPN     Labs and Imaging Studies     CBC:   Recent Labs     12/20/22  0401 12/20/22  1210 12/20/22  2316 12/21/22  0412 12/21/22  0923 12/21/22  1313 12/21/22  1820 12/22/22  0009   WBC 16.1*  --  19.9* 17.9*  --   --   --   --    RBC 3.38*  --  3.43* 3.39*  --   --   --   --    HGB 10.1*   < > 10.1* 10.0*   < > 10.0* 10.0* 10.0*   HCT 28.8*   < > 30.7* 29.8*   < > 30.8* 30.8* 30.7*   MCV 85.2  --  89.5 87.9  --   --   --   --    MCH 29.9  --  29.4 29.5  --   --   --   --    MCHC 35.1*  --  32.9 33.6  --   --   --   --    RDW 14.8  --  15.6* 15.9*  --   --   --   --      --  247 239  --   --   --   --    MPV 10.8  --  11.3 11.2  --   --   --   --     < > = values in this interval not displayed.        BMP:    Recent Labs     12/21/22  1819 12/22/22  0009 12/22/22  0544    136 138   K 4.9 4.6 4.7    104 105   CO2 19* 23 23   BUN 39* 32* 28*   CREATININE 2.7* 2.1* 1.9*   GLUCOSE 236* 237* 210*   CALCIUM 7.6* 7.6* 7.9*   PROT 4.9* 4.9* 4.8*   LABALBU 2.7* 2.7* 2.6*   BILITOT 2.6* 2.4* 2.4*   ALKPHOS 158* 141* 209*   AST 52* 51* 45*   ALT 18 18 18       LIVER PROFILE:   Recent Labs     12/21/22  1819 12/22/22  0009 12/22/22  0544   AST 52* 51* 45*   ALT 18 18 18   BILITOT 2.6* 2.4* 2.4*   ALKPHOS 158* 141* 209*       PT/INR:   Recent Labs     12/20/22  0401   PROTIME 14.7*   INR 1.3       APTT:   Recent Labs     12/20/22  0401   APTT 34.1       Fasting Lipid Panel:    Lab Results   Component Value Date/Time    CHOL 45 12/19/2022 12:00 PM    TRIG 136 12/19/2022 12:00 PM    HDL 12 12/19/2022 12:00 PM       Cardiac Enzymes:    Lab Results   Component Value Date    CKTOTAL 59 01/13/2020    TROPONINI <0.01 01/13/2020       Notable Cultures:      Blood cultures   Blood Culture, Routine   Date Value Ref Range Status   12/17/2022 24 Hours no growth  Preliminary     Respiratory cultures No results found for: RESPCULTURE   Gram Stain Result   Date Value Ref Range Status   12/20/2022 Refer to ordered Gram stain for results  Final     Urine   Urine Culture, Routine   Date Value Ref Range Status   07/22/2022 <10,000 CFU/mL  Mixed gram positive organisms    Final     Legionella No results found for: LABLEGI  C Diff PCR No results found for: CDIFPCR  Wound culture/abscess: No results for input(s): WNDABS in the last 72 hours. Tip culture:No results for input(s): CXCATHTIP in the last 72 hours. Antibiotic  Days  Day started   Ertapenem      A                      Oxygen:     Vent Information  Ventilator ID: [id number]  Equipment Changed: (S) Expiratory Filter    Additional Respiratory Assessments  Heart Rate: 100  Resp: 30  SpO2: 96 %  Position: Semi-Bach's  Humidification Source: Heated wire  Humidification Temp: 37  Circuit Condensation: Drained  Airway Type: ET  Airway Size: 7.5  Cuff Pressure (cm H2O): 22 cm H2O  Skin Barrier Applied: Yes     Nasal cannula L/min     Face mask %     Reservoirs mask %       ABG     PH  7.25   PCO2  45   PO2  68   HCO3  19   Sat%  91   FIO2     DATES 12/21/22       Lines:  Site  Day  Date inserted     TLC   RIJ    12/17/22      PICC              Arterial line   Left brachial    12/20/22     Peripheral line              HD cath            Urinary Catheter 12/15/22 Peterson-Output (mL): 5 mL    Imaging Studies:    XR CHEST PORTABLE   Final Result   Stable appearance of the chest compared to 12/21/2022.          XR CHEST PORTABLE Final Result   Adequately positioned left internal jugular hemodialysis catheter. The   remainder of the exam including additional lines and tubes are stable. XR CHEST PORTABLE   Final Result   Interval left chest tube placement, no definite pneumothorax. Bilateral lung infiltrates and pleural effusions, not significantly changed. These infiltrates may be due to pulmonary edema, pneumonia and/or ARDS. Stable borderline enlargement of the cardiac silhouette. XR CHEST PORTABLE   Final Result   Extensive bilateral airspace disease with interval progression on the right. XR CHEST PORTABLE   Final Result   Postoperative changes with the diffuse bilateral infiltrates and effusions   with improvement in the right side likely CHF/edema. Pneumonia is less   likely. Tubes and catheters as noted. XR CHEST PORTABLE   Final Result   Low lying endotracheal tube which needs withdrawn approximately 2 cm. XR CHEST PORTABLE   Final Result   Enteric tube tip in the body of the stomach. The roseann is not well seen. ET tube tip most likely within 1.5 cm of the   roseann. XR CHEST PORTABLE   Final Result   Pleural effusions with adjacent infiltrate and or atelectasis showing   slightly improved aeration on the left and slightly poor aeration on the   right. XR CHEST PORTABLE   Final Result   Interval improvement in the opacification of the left hemithorax, with   aeration in the left upper lung field when compared to the previous study   performed 1 day earlier. No other significant interval change. XR CHEST PORTABLE   Final Result   New right internal jugular central venous catheter terminating near the   SVC-right atrium junction. No post-procedure pneumothorax. Worsening   opacification of the left hemithorax and stable findings on the right.          CT CHEST WO CONTRAST   Final Result   Large right pleural effusion of mixed density to suggest complex effusion. There is a pigtail catheter seen in the pleural space posteriorly and   inferiorly. Small to moderate size left pleural effusion with consolidative   infiltrate throughout the left upper and lower lung field and aerated   portions revealing nodular ground-glass opacity to suggest an infectious   process. Nodular ground-glass opacity inferiorly within the aerated right   upper and lower lung field concerning for an infectious process as well. There is less consolidative infiltrate identified within the right lower lobe. XR CHEST PORTABLE   Final Result   1. No changes position of the right-sided chest tube. No right-sided   pneumothorax. The most of the right-sided pleural effusion has been drained,   if present to be discrete or small. 2.  Moderate left-sided pleural effusion. If quantification of left-sided   pleural effusion will be needed for clinical management bedside ultrasound or   left lateral decubitus views of the chest can be helpful. 3.  No pneumothorax on the right or on the left. 4.  Large size diaphragmatic across the midline. XR CHEST PORTABLE   Final Result   1. Increased parenchymal density projecting over the left lower lung   concerning for atelectasis and/or consolidation      2. No signs of right effusion      3. Parenchymal density projecting over the right lower lung concerning for   atelectasis         IR GUIDED PERC PLEURAL DRAIN W CATH INSERT   Final Result   1. Successful ultrasound-guided right thoracentesis. 2.  Successful placement of an 8 Romanian pleural drain         XR CHEST PORTABLE   Final Result   1. Right-sided pigtail catheter in the right base. 2.  No conspicuous right-sided pleural effusions identified. No right-sided   pneumothorax. 3.  Cannot exclude a small left-sided pleural effusion.       4.  Large size diaphragmatic hernia across the midline causing compression   atelectasis in the medial aspect of both lungs. FL ESOPHAGRAM   Final Result   1. No evidence of esophageal perforation or leak. 2. Decreased peristalsis. No evidence of esophageal mass or obstructing   lesion. 3. Small sliding hiatus hernia. No evidence of GE reflux. The gastric   foreign disc does appear constricted as it passes through the esophageal   hiatus. US CHEST INCLUDING MEDIASTINUM   Final Result   Probable complex loculated right pleural effusion. Simple left pleural effusion. CTA PULMONARY W CONTRAST   Final Result   No evidence of pulmonary embolism. Bilateral lower lobe pulmonary consolidations and pleural effusions seen. Deviation of the mediastinum to the left is visualized. Mild soft tissue prominence visualized surrounding the esophagus with   narrowing at the gastroesophageal junction but appears to be due to external   compression. Cannot rule left soft tissue mass at this location. Wall thickening visualized in the gastric outlet with mild prominence of the   stomach seen, this could be artifactual.      Distended gallbladder is seen. Degenerative bone changes, postoperative changes and multilevel vertebral   augmentation seen. No evidence of acute abdominal/pelvic pathology. CT ABDOMEN PELVIS W IV CONTRAST Additional Contrast? Oral   Final Result   No evidence of pulmonary embolism. Bilateral lower lobe pulmonary consolidations and pleural effusions seen. Deviation of the mediastinum to the left is visualized. Mild soft tissue prominence visualized surrounding the esophagus with   narrowing at the gastroesophageal junction but appears to be due to external   compression. Cannot rule left soft tissue mass at this location. Wall thickening visualized in the gastric outlet with mild prominence of the   stomach seen, this could be artifactual.      Distended gallbladder is seen.       Degenerative bone changes, postoperative changes and multilevel vertebral   augmentation seen. No evidence of acute abdominal/pelvic pathology. XR CHEST PORTABLE   Final Result   1. Consolidation seen within the left lung base with blunting the left   costophrenic angle which could represent pneumonia or atelectasis   2. A trace left pleural effusion cannot be excluded   3. Large right diaphragmatic hernia/hiatal hernia which was noted on the   patient's previous CT of the chest of 07/22/2022. XR CHEST PORTABLE    (Results Pending)   XR CHEST PORTABLE    (Results Pending)   XR CHEST PORTABLE    (Results Pending)        APRN- CNP Assessment and PLan   In summary 65 y.o. female with significant past medical history of hypothyroidism, breast cancer, TIA, hiatal hernia underwent hiatal hernia repair on 12/9/22 and developed acute respiratory distress and was transfer to [facility] on 12/14/22 for further management. Patient was seen by nephrology, pulmonology, general surgery. Patient noted to have worsening hypoxia requiring 15 L of oxygen, A. fib RVR therefore transferred to MICU and critical care was consulted.      Assessment:  Acute hypoxemic respiratory failure secondary to aspiration pneumonia/pleural effusion/bibasilar consolidation now on ventilation (12/19/22)   ARDS   Bilateral pleural effusion right greater than left status post right thoracentesis with pigtail chest tube placement on (12/16/22 IR)   Septic shock  2/2 PNA/loculated pleural effusion  Loculated pleural effusion s/p VATS and Mini-thoracotomy with open drainage of fluid collection 12/20   AFIB RVR  PAWAN secondary to dehydration  [place] ptosis leukocytosis with left shift  Acute anemia  History of massive hiatal hernia  status post repair on 12/9/2022  History of TIA  History of breast cancer  History of hypothyroidism  History of lumbar compression fracture with history of PLIF T12-L2  History of anxiety     Plan:  -Intubated/sedated on ventilator, titrate FiO2 keep SPO2 goal 92%  - ARDS per protocol   - Low PF ratio; may need Prone   - versed, fentanyl and nimbex gtt  - loculated pleural effusion s/p VATS and mini thoracotomy with open drainage of fluid collection (12/20/22) s/p 2 chest tube with -20cm suction  - US showed effusion on left side, general surgery placing left chest tube s/p Left chest tube (12/21/22)   -Continue scheduled bronchodilators Brovana and Pulmicort in addition to the DuoNebs, mucomyst   - levophed gtt, titrate to keep MAP >65mmHg  -Echo showed EF of 55%, indeterminate diastolic function, moderate dilated right ventricle with reduced function, moderate tricuspid regurgitation. Echo in January 2021 showed EF of 60 to 65%,   -Procalcitonin 5.70==> 1.57 today; RVP/COVID-19 PCR negative, MRSA nares negative, blood culture (NGTD) respiratory culture (Enterobacter Colace complex, Candida albicans) urine antigen negative. Body fluid hematoma==> GNR, candida albicans  - Consult to ID, input appreciated. - Ertapenem and anidulafungin   -General surgery surgery consulted, input appreciated. -Esophagram negative for esophageal perforation, recommended antiemetic and mild type of oral pain control, no surgical intervention indicates  - TPN  -PAWAN, nephrology following, input appreciated- low urine output, metabolic acidosis, will place temp HD catheter and may need CRRT, possible plan for removal.   -Strict intake and output  -Labs and chest x-ray in a.m.  -F/E/N KVO/ replace lytes/ Npo TPN  - DVT/GI scds/lovenox/protonix  - Code status: DNR- limited No CPR, ok to everything.        KAR Torres-CNP   Critical Care     Discussed case with Attending Physician: Vinay Rodriguez

## 2022-12-23 NOTE — PROGRESS NOTES
[facility]   Progress note      Subjective: Intubated sedated cooling blanket in place  Objective:    BP (!) 133/55   Pulse 98   Temp 98.2 °F (36.8 °C) (Esophageal)   Resp 30   Ht 5' 2\" (1.575 m)   Wt 152 lb 11.2 oz (69.3 kg)   SpO2 99%   BMI 27.93 kg/m²     In: 4069.4 [I.V.:4069.4]  Out: 4971   In: 4069.4   Out: 4971 [Urine:470]    General appearance: Intubated sedated  HEENT: AT/NC, MMM  Neck: FROM, supple  Lungs: Clear to auscultation  CV: RRR, no MRGs  Vasc: Radial pulses 2+  Abdomen: Soft, non-tender; no masses or HSM  Extremities: No peripheral edema or digital cyanosis  Skin: no rash, lesions or ulcers  Unable to assess neuro or psychological status     Recent Labs     12/20/22  0401 12/20/22  1210 12/20/22  2316 12/21/22  0412 12/21/22  0923 12/21/22  1313 12/21/22  1820 12/22/22  0009   WBC 16.1*  --  19.9* 17.9*  --   --   --   --    HGB 10.1*   < > 10.1* 10.0*   < > 10.0* 10.0* 10.0*   HCT 28.8*   < > 30.7* 29.8*   < > 30.8* 30.8* 30.7*     --  247 239  --   --   --   --     < > = values in this interval not displayed. Recent Labs     12/22/22  0009 12/22/22  0544 12/22/22  1145    138 137   K 4.6 4.7 4.6    105 105   CO2 23 23 22   BUN 32* 28* 24*   CREATININE 2.1* 1.9* 1.8*   CALCIUM 7.6* 7.9* 8.7       Assessment:    Principal Problem:    Postoperative pneumonia  Active Problems:    Hypoxia    Pneumonia of left lower lobe due to infectious organism    Aspiration pneumonia of both lower lobes due to gastric secretions (HCC)  Resolved Problems:    * No resolved hospital problems.  *      Plan:    65-year-old  woman is status post hiatal hernia repair 80/2/0862, course complicated by ARDS at which time she was transferred to [facility] for critical care management in ICU setting-intubated as unable to protect airway and found to be in atrial fibrillation with RVR      Remains intubated and critically ill in ICU setting  Paralytic/sedative, pressors on board  Vent management per ICU team  Status post right VATS with open thoracotomy   Multiple consultants on board  No family available at bedside  Mild renal insufficiency-continue IV fluid hydration  Ongoing IV antibiotic therapy for ARDS protocol    Critically ill  Code Status: DNR CCA-no intubation/no CPR, meds only  Consultants: Critical care, ID, renal, palliative care if not yet on, general surgery,      DVT Prophylaxis   PT/OT  Discharge planning           Elbert Umaña MD  7:21 PM  12/22/2022

## 2022-12-23 NOTE — PROGRESS NOTES
Margaret Holliday M.D.,[facility]  Elizabeth Jessica D.O., F.A.C.O.I., Sheron Landis M.D. Leonor Deshpande M.D. Cathie Pereira D.O. Daily Pulmonary Progress Note    Patient:  Rudy Omer 65 y.o. female MRN: [id number]     Date of Service: 12/23/2022      Synopsis     We are following patient for acute respiratory failure with hypoxia, pneumonia, bilateral pleural effusions    \"CC\" SOB    Code status: Limited-no compressions      Subjective      Patient was seen and examined ine the ICU. She is no longer on paralytic. She is still sedated and intubated. She is currently on TPN, Versed , Levophed, fentanyl and Flolan with CVVHD. Her saturation looks better it is not 100% on vent settings AC/VC 30, tidal volume 250, FiO2 90%, +10. She is still hypothermic and on a Pavan hugger. She still has 4 chest tubes. 3 on the right side, 1 on the left. No air leaks noted.       Review of Systems:  Unable to obtain-patient intubated and sedated    24-hour events:  Paralytic discontinued    Objective   Vitals: BP (!) 110/52   Pulse 79   Temp 96.8 °F (36 °C) (Esophageal)   Resp 30   Ht 5' 2\" (1.575 m)   Wt 151 lb 3.2 oz (68.6 kg)   SpO2 100%   BMI 27.65 kg/m²     I/O:    Intake/Output Summary (Last 24 hours) at 12/23/2022 1414  Last data filed at 12/23/2022 1406  Gross per 24 hour   Intake 3850.43 ml   Output 5928 ml   Net -2077.57 ml       Vent Information  Ventilator ID: [id number]  Equipment Changed: (S) Expiratory Filter                CURRENT MEDS :  Scheduled Meds:   ertapenem (INVanz) IVPB  1,000 mg IntraVENous Q24H    Vitamin D  1,000 Units Oral Daily    heparin (porcine)  5,000 Units SubCUTAneous Q8H    acetylcysteine  4 mL Inhalation Q4H    artificial tears   Both Eyes Q4H    hydrocortisone sodium succinate PF  100 mg IntraVENous Q8H    insulin lispro  0-8 Units SubCUTAneous Q4H    anidulafungin  100 mg IntraVENous Q24H    pantoprazole (PROTONIX) 40 mg injection  40 mg IntraVENous Q12H    sodium chloride flush  5-40 mL IntraVENous 2 times per day    heparin flush  3 mL IntraVENous 2 times per day    metoclopramide  10 mg IntraVENous Q6H    Arformoterol Tartrate  15 mcg Nebulization BID    budesonide  0.5 mg Nebulization BID    albuterol  2.5 mg Nebulization Q4H WA    [Held by provider] metoprolol  5 mg IntraVENous Q6H    rosuvastatin  10 mg Oral Nightly    levothyroxine  50 mcg Oral Daily       Physical Exam:  General Appearance: Patient is intubated, paralyzed and sedated, and is toxic appearing  HEENT: ETT in place, OG tube  Neck: Lips, mucosa, and tongue normal.  Supple, symmetrical, trachea midline, no adenopathy;thyroid:  no enlargement/tenderness/nodules or JVD. Lung: Bilateral crackles, right chest tube in place x3, left chest tube x1-no air leaks  Heart: Tachycardic  Abdomen: Soft, NT, ND. BS present x 4 quadrants. No bruit or organomegaly. Extremities: +1 pitting edema  Musculokeletal: No joint swelling, no muscle tenderness. ROM normal in all joints of extremities. Neurologic: Mental status: Intubated and sedated    Pertinent/ New Labs and Imaging Studies     Imaging Personally Reviewed:  Noncontrast CT chest 12/17/2022:  Impression   Large right pleural effusion of mixed density to suggest complex effusion. There is a pigtail catheter seen in the pleural space posteriorly and   inferiorly. Small to moderate size left pleural effusion with consolidative   infiltrate throughout the left upper and lower lung field and aerated   portions revealing nodular ground-glass opacity to suggest an infectious   process. Nodular ground-glass opacity inferiorly within the aerated right   upper and lower lung field concerning for an infectious process as well. There is less consolidative infiltrate identified within the right lower lobe. Chest x-ray 12/23/2022:    FINDINGS:   Extensive bilateral infiltrates worse in the right hemithorax are unchanged.    Apical chest tubes are unchanged without obvious pneumothorax. Heart size is   normal.           Impression   No interval change       ECHO 12/19/22:   Summary   Technically difficult study - limited visualization. Micro-bubble contrast injected to enhance left ventricular visualization. Normal left ventricular size and systolic function. Ejection fraction is visually estimated at 55%. Indeterminate diastolic function. No regional wall motion abnormalities seen. Mild left ventricular concentric hypertrophy noted. Moderately dilated right ventricle with reduced function. Moderate tricuspid regurgitation. Labs:  Lab Results   Component Value Date/Time    WBC 18.2 12/23/2022 06:05 AM    HGB 8.7 12/23/2022 06:05 AM    HCT 25.8 12/23/2022 06:05 AM    HCT 29.0 12/20/2022 01:31 PM    MCV 87.8 12/23/2022 06:05 AM    MCH 29.6 12/23/2022 06:05 AM    MCHC 33.7 12/23/2022 06:05 AM    RDW 15.5 12/23/2022 06:05 AM     12/23/2022 06:05 AM    MPV 11.6 12/23/2022 06:05 AM     Lab Results   Component Value Date/Time     12/23/2022 06:05 AM    K 4.1 12/23/2022 06:05 AM    K 4.7 12/21/2022 09:23 AM     12/23/2022 06:05 AM    CO2 24 12/23/2022 06:05 AM    BUN 23 12/23/2022 06:05 AM    CREATININE 1.2 12/23/2022 06:05 AM    LABALBU 2.4 12/23/2022 06:05 AM    LABALBU 4.3 01/24/2011 01:15 PM    CALCIUM 7.9 12/23/2022 06:05 AM    GFRAA >60 07/22/2022 01:42 PM    LABGLOM 46 12/23/2022 06:05 AM     Lab Results   Component Value Date/Time    PROTIME 14.7 12/20/2022 04:01 AM    INR 1.3 12/20/2022 04:01 AM     No results for input(s): PROBNP in the last 72 hours. No results for input(s): PROCAL in the last 72 hours. This SmartLink has not been configured with any valid records. Micro:  No results for input(s): CULTRESP in the last 72 hours. No results for input(s): LABGRAM in the last 72 hours. No results for input(s): LEGUR in the last 72 hours. No results for input(s): STREPNEUMAGU in the last 72 hours.   No results for input(s): LP1UAG in the last 72 hours. Respiratory cultures    Oral Pharyngeal Hilda absent Abnormal     Smear, Respiratory Polymorphonuclear leukocytes not seen   Epithelial cells not seen   Moderate yeast   Moderate gram positive rods Diphtheroid like   Few Gram negative rods    Organism Enterobacter cloacae complex Abnormal     CULTURE, RESPIRATORY Moderate growth    Organism Candida albicans Abnormal     CULTURE, RESPIRATORY Heavy growth         Latest Reference Range & Units Most Recent   (1,3)-Beta-D-Glucan (Fungitell) Interpretation Negative  Positive !  12/19/22 04:02   !: Data is abnormal       Assessment:    Acute hypoxic respiratory failure - requiring intubation 12/19/22  Acute Respiratory Distress Syndrome  S/p RRT 12/17/22 for respiratory distress and increasing oxygen demands  R loculated pleural effusion s/p IR chest tube 12/17/22 with minimal drainage, s/p R VATS turned partial open thoracotomy 12/20/22 with insertion of chest tubes x 3  S/p bronchoscopy-friable mucosa 12/20/22  Pneumonia, Hospital acquired-Enterobacter cloacae-Invanz started 12/19/22  Massive hiatal hernia s/p repair 12/9 at [facility]  Acute kidney injury  L chest tube placement 12/21/22  CVVHD initiated 12/21/22  Positive fungitell-eraxis started 12/21/22      Plan:   Continue mechanical ventilation, wean FiO2 as able  Aggressive bronchopulmonary hygiene  Deep sedation  Fluid removal with CVVHD initiated per Nephrology  TPN - management per surgery  Chest tube x 4 management per surgery  Antimicrobials per ID-Invanz started 12/19/22, Eraxis started 12/21/22  Rest per ICU team  Prognosis guarded      This plan of care was reviewed in collaboration with Dr. Shara Lozoya    Electronically signed by KAR Bailey - CNP on 12/23/2022 at 2:14 PM    I personally saw, examined, and cared for the patient. Labs, medications, radiographs reviewed. I agree with history exam and plans detailed in NP note.     Blanco Bailon MD

## 2022-12-23 NOTE — CARE COORDINATION
Care Coordination:  Select following. Intubated- Versed, fentanyl, nimbex, Chest tubes, , TPN, CVVHD. Limited code/ no chest compressions. Will touch base with daughter as pt progresses, it was her hope for [facility] vs Home with [facility]. She was agreeable to [facility] referral. referral made to [facility] to follow but insurance is out of network.  Will hold off on further choices for now and adjust plan as pt progresses    Sharlene Garcia

## 2022-12-23 NOTE — PROGRESS NOTES
12/23/22 1601   NICU Vent Information   Equipment Changed Expiratory Filter   Vent Type 980   Vent Mode AC/VC   Vt (Set, mL) 250 mL   Vt (Measured) 266 mL   Resp Rate (Set) 30 bmp   Rate Measured 31 br/min   Minute Volume (L/min) 7.98 Liters   Peak Flow 60 L/min   FiO2  90 %   Peak Inspiratory Pressure (cmH2O) 33 cmH2O   I:E Ratio 1:3.30   Sensitivity 3   PEEP/CPAP (cmH2O) 10   Mean Airway Pressure (cmH2O) 16 cmH20   Plateau Pressure 29 [id number]   Static Compliance 14 mL/cmH2O   Cough/Sputum   Sputum How Obtained Endotracheal;Suctioned   Sputum Amount Small   Sputum Color White   Tenacity Thick   Additional Respiratory Assessments   Heart Rate 78   Resp 30   SpO2 100 %   Position Semi-Bach's   Humidification Source Heated wire   Humidification Temp 37   Circuit Condensation Drained   Airway Type ET   Airway Size 7.5   Vent Alarm Settings   High Pressure (cmH2O) 50 cmH2O   Low Minute Volume (lpm) 6.5 L/min   High Minute Volume (lpm) 14 L/min   Low Exhaled Vt (ml) 230 mL   High Exhaled Vt (ml) 500 mL   RR High (bpm) 335 br/min   Apnea (secs) 10 secs   ETT    Placement Date/Time: 12/19/22 0820   Present on Admission/Arrival: No  Placed By: RT  Placement Verified By: Auscultation;Capnometry; Chest X-ray  Preoxygenation: Yes  Mask Ventilation: Ventilated by mask (1)  Technique: Direct laryngoscopy  Airway Typ. ..    Secured At 22 cm   Measured From Lips   Secured By Commercial tube turner

## 2022-12-23 NOTE — PROGRESS NOTES
[facility]   Progress note      Subjective: On cvvhd on eval       Objective:    BP (!) 108/48   Pulse 79   Temp 96.8 °F (36 °C) (Esophageal)   Resp 30   Ht 5' 2\" (1.575 m)   Wt 151 lb 3.2 oz (68.6 kg)   SpO2 99%   BMI 27.65 kg/m²     In: 4888.4 [I.V.:4768.4; NG/GT:120]  Out: 6846   In: 4888.4   Out: 6846 [Urine:530; Drains:16]    General appearance: Intubated sedated  HEENT: AT/NC, MMM  Neck: FROM, supple  Lungs: Clear to auscultation  CV: RRR, no MRGs  Vasc: Radial pulses 2+  Abdomen: Soft, non-tender; no masses or HSM  Extremities: No peripheral edema or digital cyanosis  Skin: no rash, lesions or ulcers  Unable to assess neuro or psychological status     Recent Labs     12/20/22  2316 12/21/22  0412 12/21/22  0923 12/21/22  1820 12/22/22  0009 12/23/22  0605   WBC 19.9* 17.9*  --   --   --  18.2*   HGB 10.1* 10.0*   < > 10.0* 10.0* 8.7*   HCT 30.7* 29.8*   < > 30.8* 30.7* 25.8*    239  --   --   --  154    < > = values in this interval not displayed. Recent Labs     12/22/22  1811 12/23/22  0042 12/23/22  0605    135 140   K 4.4 4.1 4.1    105 106   CO2 23 22 24   BUN 23 22 23   CREATININE 1.6* 1.3* 1.2*   CALCIUM 8.3* 7.9* 7.9*       Assessment:    Principal Problem:    Postoperative pneumonia  Active Problems:    Hypoxia    Pneumonia of left lower lobe due to infectious organism    Aspiration pneumonia of both lower lobes due to gastric secretions (HCC)  Resolved Problems:    * No resolved hospital problems.  *      Plan:    78-year-old  woman is status post hiatal hernia repair 16/3/3485, course complicated by ARDS at which time she was transferred to [facility] ([facility] for critical care management in ICU setting-intubated as unable to protect airway and found to be in atrial fibrillation with RVR      Remains intubated and critically ill in ICU setting  Paralytic/sedative, pressors on board  Vent management per ICU team  Status post right VATS with open thoracotomy   Multiple consultants on board  No family available at bedside  Mild renal insufficiency-continue IV fluid hydration  Ongoing IV antibiotic therapy for ARDS protocol    Critically ill  Code Status: DNR CCA-no intubation/no CPR, meds only  Consultants: Critical care, ID, renal, palliative care if not yet on, general surgery,    12/23/22  Remains intubated and sedated in icu   Critically ill   No acute events overnight   Remains on antifungal   TPN per gen surg  Critically ill     DVT Prophylaxis   PT/OT  Discharge planning     Anurag Torres MD  1:49 PM  12/23/2022

## 2022-12-23 NOTE — PROGRESS NOTES
TRAUMA/GENERAL SURGERY  DAILY PROGRESS NOTE  12/23/2022    CHIEF COMPLAINT:  Chief Complaint   Patient presents with    Shortness of Breath     Sent from [facility] for SOB patient went for hernia repair patient on 6L NC on arrival       SUBJECTIVE:  Clinically improving but still critical  No issues reported with chest/pleural drains    OBJECTIVE:  BP (!) 109/48   Pulse 79   Temp 97 °F (36.1 °C) (Esophageal)   Resp 30   Ht 5' 2\" (1.575 m)   Wt 151 lb 3.2 oz (68.6 kg)   SpO2 99%   BMI 27.65 kg/m²     GENERAL:  Intubated, sedated, paralyzed  LUNGS:  Intubated and on ventilator. R Chest tube with serous output, L chest with dark colored output  CARDIOVASCULAR: RR  ABDOMEN:  Soft, non-distended, non-tender. No guarding, rigidity, rebound.     ASSESSMENT/PLAN:  65 y.o. female with respiratory failure and complex right-sided effusion s/p robotic hiatal hernia repair 12/9 without signs of esophageal perforation or leak however now +fungitell and growing candida in chest  S/p VATS & Mini-thoractomy & bronchoscopy with open drainage of fluid collection 12/20, left CT placement 12/21  ARDS & renal failure s/p chemical paralysis, flolan, CVVHD 12/21  Bronchoscopy intra-op showed friable muscosa w/o any thick purulent secretions    Remains critically ill  No big changes today from trauma surgery point of view  Continue care per MICU team & Dr Gustavo Byrne team  Continue chest tubes to suction  Daily CXR

## 2022-12-23 NOTE — PROGRESS NOTES
[facility] PROGRESS NOTE      Chief complaint: Follow-up of pneumonia    This patient is a 21-year-old female with history of bilateral breast cancer, DM, hiatal hernia repair on 12/09, presented on 12/14 with shortness of breath for 2 days accompanied by productive cough and malaise prior to her hiatal hernia repair, thought to have large postoperative seroma after a type IV hiatal hernia repair. On admission, she was afebrile and hemodynamically stable with no leukocytosis. CT chest, abdomen and pelvis showed soft tissue density in the posterior medial aspect of the right lower lung field with deviation of the mediastinum to the left, bilateral lower lobe consolidation and pleural effusions, mild soft tissue prominence surrounding the esophagus with narrowing at the gastroesophageal junction appearing to be due to external compression, wall thickening in the gastric outlet with mild prominence of the stomach, distended gallbladder, no evidence of acute abdominal/pelvic pathology. Esophagram was negative for esophageal perforation. She underwent right-sided thoracentesis on 12/16 during which 105 mL of serous fluid was removed. Pleural fluid gram stain and culture showed rare polymorphonuclear leukocytes, no epithelial cells, no organisms, no growth. She was transferred to MICU on 12/17 for worsening hypoxemia. Repeat CT chest on 12/17 showed large right pleural effusion of mixed density suggestive of complex effusion with pigtail catheter in the pleural space posteriorly and inferiorly, small to moderate sized left pleural effusion with consolidative infiltrate throughout left upper and lower lung field and aerated portions revealing nodular groundglass opacity, nodular groundglass opacity inferiorly within the aerated right upper and lower lung field, less consolidative infiltrate within the right lower lobe.   Respiratory gram stain and culture showed no polymorphonuclear leukocytes, no epithelial cells, moderate yeast, moderate gram-positive diphtheroid-like rods, few gram-negative rods, moderate growth of Enterobacter cloacae, heavy growth of Candida albicans. Urine Streptococcus pneumoniae and Legionella antigens were negative. Respiratory pathogen PCR panel and MRSA nares culture were negative. Blood cultures showed no growth to date. She received a dose of ceftriaxone and doxycycline on admission. Ampicillin-sulbactam was started on admission and then switched to piperacillin-tazobactam on 12/17 then switched to meropenem on 12/19. Vancomycin was started on 12/17. She underwent right VATS, mini-thoracotomy with drainage and fenestration of mediastinal seroma/hematoma, chest tube and mediastinal drain placement on 12/20 during which 1100 mL serous fluid and dark blood (no succus or bile) extending into the mediastinum was drained via mini-thoracotomy. Hematoma/seroma fluid Gram stain and culture showed abundant polymorphonuclear leukocytes, no epithelial cells, no organisms, Gram-negative rods. Serum 1,3 beta-d-glucan on 12/18 was elevated. Subjective: Patient was seen and examined. She remains in critical condition, intubated and sedated, on increasing vasopressor support. Objective:  BP (!) 109/48   Pulse 79   Temp 97 °F (36.1 °C) (Esophageal)   Resp 30   Ht 5' 2\" (1.575 m)   Wt 151 lb 3.2 oz (68.6 kg)   SpO2 99%   BMI 27.65 kg/m²   Constitutional: Intubated, no MV dyssynchrony  Respiratory: Clear breath sounds, no crackles, no wheezes  Cardiovascular: Regular rate and rhythm, no murmurs  Gastrointestinal: Bowel sounds present, soft, nontender  Skin: Warm and dry, no active dermatoses  Musculoskeletal: No joint swelling, no joint erythema    Labs, imaging, and medical records/notes were personally reviewed.     Laboratory:  Lab Results   Component Value Date    WBC 18.2 (H) 12/23/2022    WBC 17.9 (H) 12/21/2022    WBC 19.9 (H) 12/20/2022    HGB 8.7 (L) 12/23/2022    HCT 25.8 (L) 12/23/2022 MCV 87.8 12/23/2022     12/23/2022     Lab Results   Component Value Date    NEUTROABS 17.65 (H) 12/23/2022    NEUTROABS 17.36 (H) 12/21/2022    NEUTROABS 18.51 (H) 12/20/2022     Lab Results   Component Value Date    CRP 45.9 (H) 12/14/2022    CRP 0.3 03/30/2022    CRP 0.3 02/04/2021     No results found for: CRPHS  Lab Results   Component Value Date    SEDRATE 16 03/30/2022    SEDRATE 25 (H) 02/04/2021    SEDRATE 33 (H) 01/13/2020     Lab Results   Component Value Date    ALT 16 12/23/2022    AST 51 (H) 12/23/2022    ALKPHOS 139 (H) 12/23/2022    BILITOT 2.3 (H) 12/23/2022     Lab Results   Component Value Date/Time     12/23/2022 06:05 AM    K 4.1 12/23/2022 06:05 AM    K 4.7 12/21/2022 09:23 AM     12/23/2022 06:05 AM    CO2 24 12/23/2022 06:05 AM    BUN 23 12/23/2022 06:05 AM    CREATININE 1.2 12/23/2022 06:05 AM    CREATININE 1.3 12/23/2022 12:42 AM    CREATININE 1.6 12/22/2022 06:11 PM    GFRAA >60 07/22/2022 01:42 PM    LABGLOM 46 12/23/2022 06:05 AM    GLUCOSE 168 12/23/2022 06:05 AM    PROT 4.8 12/23/2022 06:05 AM    LABALBU 2.4 12/23/2022 06:05 AM    LABALBU 4.3 01/24/2011 01:15 PM    CALCIUM 7.9 12/23/2022 06:05 AM    BILITOT 2.3 12/23/2022 06:05 AM    ALKPHOS 139 12/23/2022 06:05 AM    AST 51 12/23/2022 06:05 AM    ALT 16 12/23/2022 06:05 AM       Radiology: CT chest 12/17: Large right pleural effusion of mixed density to suggest complex effusion. There is a pigtail catheter seen in the pleural space posteriorly and   inferiorly. Small to moderate size left pleural effusion with consolidative   infiltrate throughout the left upper and lower lung field and aerated   portions revealing nodular ground-glass opacity to suggest an infectious   process. Nodular ground-glass opacity inferiorly within the aerated right   upper and lower lung field concerning for an infectious process as well. There is less consolidative infiltrate identified within the right lower lobe. Microbiology:     Blood cx  No results found for: BLOODCULT1  Culture, Blood 2   Date Value Ref Range Status   12/17/2022 5 Days no growth  Final       Smear, Respiratory   Date Value Ref Range Status   12/17/2022   Final    Polymorphonuclear leukocytes not seen  Epithelial cells not seen  Moderate yeast  Moderate gram positive rods Diphtheroid like  Few Gram negative rods         CULTURE, RESPIRATORY   Date Value Ref Range Status   12/17/2022 Oral Pharyngeal Hilda absent (A)  Final   12/17/2022 Moderate growth  Final   12/17/2022 Heavy growth  Final   Enterobacter cloacae complex (1)    Antibiotic Interpretation Microscan  Method Status    amoxicillin-clavulanate Resistant >=^32 mcg/mL BACTERIAL SUSCEPTIBILITY PANEL BY CHEYANNE     ceFAZolin Resistant >=^64 mcg/mL BACTERIAL SUSCEPTIBILITY PANEL BY CHEYANNE     cefepime Sensitive ^0.5 mcg/mL BACTERIAL SUSCEPTIBILITY PANEL BY CHEYANNE     cefotaxime Resistant >=^64 mcg/mL BACTERIAL SUSCEPTIBILITY PANEL BY CHEYANNE     cefOXitin Resistant >=^64 mcg/mL BACTERIAL SUSCEPTIBILITY PANEL BY CHEYANNE     cefTAZidime-avibactam Sensitive ^0.5 mcg/mL BACTERIAL SUSCEPTIBILITY PANEL BY CHEYANNE     gentamicin Sensitive <=^1 mcg/mL BACTERIAL SUSCEPTIBILITY PANEL BY CHEYANNE     levofloxacin Sensitive <=^0.12 mcg/mL BACTERIAL SUSCEPTIBILITY PANEL BY CHEYANNE     meropenem Sensitive ^0.5 mcg/mL BACTERIAL SUSCEPTIBILITY PANEL BY CHEYANNE     piperacillin-tazobactam Resistant >=^128 mcg/mL BACTERIAL SUSCEPTIBILITY PANEL BY CHEYANNE     trimethoprim-sulfamethoxazole Sensitive <=^20 mcg/mL BACTERIAL SUSCEPTIBILITY PANEL BY CHEYANNE         BODY FLUID CULTURE  Body Fluid Culture, Sterile   Date Value Ref Range Status   12/20/2022 Rare growth  Final   12/20/2022 Light growth  Final       MRSA Culture Only   Date Value Ref Range Status   12/17/2022 Methicillin resistant Staph aureus not isolated  Final     Gram Stain [[id number]] Collected: 12/20/22 1328   Order Status: Completed Specimen: Fluid Updated: 12/21/22 075    Gram Stain Orderable --    Gram stain performed on unspun fluid   Abundant Polymorphonuclear leukocytes   Epithelial cells not seen   No organisms seen    Narrative:     Source: FLUID       Site: HEMATOMA                 Assessment:  Acute hypoxic respiratory failure  Septic shock  Postoperative seroma/hematoma, right chest, in the setting of robotic hiatal hernia repair on 12/09, s/p right VATS, mini-thoracotomy with drainage and fenestration of mediastinal seroma/hematoma, chest tube and mediastinal drain placement on 12/20  Enterobacter cloacae HAP. Candida albicans on respiratory culture likely colonization. Elevated serum 1,3 beta-d-glucan    Recommendations:  Start anidulafungin 200 mg loading dose once followed by 100 mg q24h. Continue ertapenem 500 mg every 24 hours dosed according to renal function to a target dose of 1 g every 24 hours for CrCl of at last 30 mL/min. Follow-up surgical cultures. Follow-up blood cultures. Continue supportive care. Critical care note noted           Thank you for involving me in the care of [facility]. Dr. Sara Vazquez will continue to follow. Please do not hesitate to call for any questions or concerns.       Electronically signed by Lee Sheldon MD on 12/23/2022 at 11:58 AM

## 2022-12-23 NOTE — PROGRESS NOTES
OT consult received and appreciated. Chart reviewed. Pt is currently on sedated on CVV after multiple holds. OT will d/c orders at this time. Please re-consult as indicated. Thank you.  Nurys Fleming, OTR/L # [id number]

## 2022-12-23 NOTE — PLAN OF CARE
Problem: Discharge Planning  Goal: Discharge to home or other facility with appropriate resources  Outcome: Not Progressing     Problem: Pain  Goal: Verbalizes/displays adequate comfort level or baseline comfort level  Outcome: Not Progressing     Problem: Nutrition Deficit:  Goal: Optimize nutritional status  Outcome: Not Progressing     Problem: Respiratory - Adult  Goal: Achieves optimal ventilation and oxygenation  Outcome: Not Progressing

## 2022-12-23 NOTE — PROGRESS NOTES
[address] [facility]   Department of Internal Medicine   MICU Progress Note    Patient:  Navarro Nicole 65 y.o. female   MRN: [id number]       Date of Service: 2022    Allergy: Benadryl [diphenhydramine] and Ibuprofen  CC hypoxia   Subjective   Intubated/sedated on vent  Does not look in distress  Poor oxygen, PF ratio low  Paralyzed, vent to ARDS protocol  Now on versed, fentanyl, and nimbex  Plan to prone   On pressors  3 chest tube with minimal drainage  Temps of 98.2  CRRT in progress with net removal today. No overnight event  Objective     TEMPERATURE:  Current - Temp: 97 °F (36.1 °C); Max - Temp  Av.9 °F (36.6 °C)  Min: 97 °F (36.1 °C)  Max: 99 °F (37.2 °C)    RESPIRATIONS RANGE: Resp  Av.7  Min: 20  Max: 30    PULSE RANGE: Pulse  Av.6  Min: 75  Max: 105    BLOOD PRESSURE RANGE:  Systolic (86VQI), XHQ:419 , Min:89 , BMB:390   ; Diastolic (22XST), VBQ:97, Min:48, Max:55      PULSE OXIMETRY RANGE: SpO2  Av.1 %  Min: 96 %  Max: 100 %    I & O - 24hr:    Intake/Output Summary (Last 24 hours) at 2022 0943  Last data filed at 2022 0915  Gross per 24 hour   Intake 4588.43 ml   Output 5534 ml   Net -945.57 ml     I/O last 3 completed shifts: In: 6445.4 [I.V.:6325.4; NG/GT:120]  Out: 7612 [Urine:720; Drains:10; Other:85; Chest Tube:156] I/O this shift:  In: 312 [I.V.:312]  Out: 541 [Urine:45; Chest Tube:10]   Weight change: -1 lb 8 oz (-0.68 kg)    Physical Exam:  General Appearance: intubated/sedated on vent, does not look in distress. Anasarca noted   HEENT:  Head: Normocephalic, no lesions, without obvious abnormality. Eye: Normal external eye, conjunctiva, lids cornea, FILEMON. Nose: Normal external nose, mucus membranes and septum. Neck / Thyroid: Supple, no masses, nodes, nodules or enlargement.   Neck: no adenopathy, no carotid bruit, no JVD, supple, symmetrical, trachea midline, and thyroid not enlarged, symmetric, no tenderness/mass/nodules  Lung: Rhonchi throughout lung field, no wheezing noted. 3 chest tube    Heart: regular rate and rhythm, S1, S2 normal, no murmur, click, rub or gallop  Abdomen: soft, non-tender; bowel sounds normal; no masses,  no organomegaly incision noted, intact. Extremities:  extremities normal, atraumatic, no cyanosis or edema 3+ pitting edema bilaterally. Musculokeletal: No joint swelling, no muscle tenderness. ROM normal in all joints of extremities.    Neurologic: Mental status: intubated/sedated on vent, limited neuro exam       Medications     Continuous Infusions:   PN-Adult  3 IN 1 Central Line (Custom)      PN-Adult  3 IN 1 Central Line (Custom) 50 mL/hr at 12/23/22 0915    cisatracurium (NIMBEX) infusion Stopped (12/23/22 0900)    epoprostenol (VELETRI) nebulization solution 50 ng/kg/min (12/23/22 0203)    prismaSATE BGK 4/0/1.2 2,000 mL/hr at 12/23/22 0930    sodium chloride 100 mL/hr at 12/22/22 2300    calcium chloride CRRT infusion 8,000 mg (12/22/22 1259)    dextrose      midazolam 2 mg/hr (12/23/22 0915)    norepinephrine 5 mcg/min (12/23/22 0915)    vasopressin (Septic Shock) infusion Stopped (12/21/22 1808)    fentaNYL 100 mcg/hr (12/23/22 0915)    sodium chloride       Scheduled Meds:   ertapenem (INVanz) IVPB  1,000 mg IntraVENous Q24H    Vitamin D  1,000 Units Oral Daily    heparin (porcine)  5,000 Units SubCUTAneous Q8H    acetylcysteine  4 mL Inhalation Q4H    artificial tears   Both Eyes Q4H    hydrocortisone sodium succinate PF  100 mg IntraVENous Q8H    insulin lispro  0-8 Units SubCUTAneous Q4H    anidulafungin  100 mg IntraVENous Q24H    pantoprazole (PROTONIX) 40 mg injection  40 mg IntraVENous Q12H    sodium chloride flush  5-40 mL IntraVENous 2 times per day    heparin flush  3 mL IntraVENous 2 times per day    metoclopramide  10 mg IntraVENous Q6H    Arformoterol Tartrate  15 mcg Nebulization BID    budesonide  0.5 mg Nebulization BID    albuterol  2.5 mg Nebulization Q4H WA    [Held by provider] metoprolol  5 mg IntraVENous Q6H    rosuvastatin  10 mg Oral Nightly    levothyroxine  50 mcg Oral Daily     PRN Meds: potassium chloride, magnesium sulfate, calcium gluconate **OR** calcium gluconate **OR** calcium gluconate **OR** calcium gluconate, sodium phosphate IVPB **OR** sodium phosphate IVPB **OR** sodium phosphate IVPB **OR** sodium phosphate IVPB, glucose, dextrose bolus **OR** dextrose bolus, glucagon (rDNA), dextrose, sodium chloride flush, sodium chloride, heparin flush, ondansetron **OR** ondansetron, magnesium hydroxide, acetaminophen **OR** acetaminophen, ipratropium-albuterol  Nutrition:   TPN     Labs and Imaging Studies     CBC:   Recent Labs     12/20/22  2316 12/21/22  0412 12/21/22  0923 12/21/22  1820 12/22/22  0009 12/23/22  0605   WBC 19.9* 17.9*  --   --   --  18.2*   RBC 3.43* 3.39*  --   --   --  2.94*   HGB 10.1* 10.0*   < > 10.0* 10.0* 8.7*   HCT 30.7* 29.8*   < > 30.8* 30.7* 25.8*   MCV 89.5 87.9  --   --   --  87.8   MCH 29.4 29.5  --   --   --  29.6   MCHC 32.9 33.6  --   --   --  33.7   RDW 15.6* 15.9*  --   --   --  15.5*    239  --   --   --  154   MPV 11.3 11.2  --   --   --  11.6    < > = values in this interval not displayed. BMP:    Recent Labs     12/22/22  1811 12/23/22  0042 12/23/22  0605    135 140   K 4.4 4.1 4.1    105 106   CO2 23 22 24   BUN 23 22 23   CREATININE 1.6* 1.3* 1.2*   GLUCOSE 189* 195* 168*   CALCIUM 8.3* 7.9* 7.9*   PROT 5.0* 4.8* 4.8*   LABALBU 2.4* 2.2* 2.4*   BILITOT 2.4* 2.5* 2.3*   ALKPHOS 199* 159* 139*   AST 48* 48* 51*   ALT 16 16 16       LIVER PROFILE:   Recent Labs     12/22/22  1811 12/23/22  0042 12/23/22  0605   AST 48* 48* 51*   ALT 16 16 16   BILITOT 2.4* 2.5* 2.3*   ALKPHOS 199* 159* 139*       PT/INR:   No results for input(s): PROTIME, INR in the last 72 hours. APTT:   No results for input(s): APTT in the last 72 hours.       Fasting Lipid Panel:    Lab Results   Component Value Date/Time    CHOL 45 12/19/2022 12:00 PM    TRIG 136 12/19/2022 12:00 PM    HDL 12 12/19/2022 12:00 PM       Cardiac Enzymes:    Lab Results   Component Value Date    CKTOTAL 59 01/13/2020    TROPONINI <0.01 01/13/2020       Notable Cultures:      Blood cultures   Blood Culture, Routine   Date Value Ref Range Status   12/17/2022 5 Days no growth  Final     Respiratory cultures No results found for: RESPCULTURE   Gram Stain Result   Date Value Ref Range Status   12/20/2022 Refer to ordered Gram stain for results  Final     Urine   Urine Culture, Routine   Date Value Ref Range Status   07/22/2022 <10,000 CFU/mL  Mixed gram positive organisms    Final     Legionella No results found for: LABLEGI  C Diff PCR No results found for: CDIFPCR  Wound culture/abscess: No results for input(s): WNDABS in the last 72 hours. Tip culture:No results for input(s): CXCATHTIP in the last 72 hours. Antibiotic  Days  Day started   Ertapenem      A                      Oxygen:     Vent Information  Ventilator ID: [id number]  Equipment Changed: (S) Expiratory Filter    Additional Respiratory Assessments  Heart Rate: 79  Resp: 30  SpO2: 99 %  Position: Semi-Bach's  Humidification Source: Heated wire  Humidification Temp: 37  Circuit Condensation: Drained  Airway Type: ET  Airway Size: 7.5 (22)  Cuff Pressure (cm H2O): 29 cm H2O  Skin Barrier Applied: Yes     Nasal cannula L/min     Face mask %     Reservoirs mask %       ABG     PH  7.25   PCO2  45   PO2  68   HCO3  19   Sat%  91   FIO2     DATES 12/21/22       Lines:  Site  Day  Date inserted     TLC   RIJ    12/17/22      PICC              Arterial line   Left brachial    12/20/22     Peripheral line              HD cath            Urinary Catheter 12/15/22 Peterson-Output (mL): 30 mL    Imaging Studies:    XR CHEST PORTABLE   Final Result   Stable appearance of the chest compared to 12/21/2022. XR CHEST PORTABLE   Final Result   Adequately positioned left internal jugular hemodialysis catheter.   The remainder of the exam including additional lines and tubes are stable. XR CHEST PORTABLE   Final Result   Interval left chest tube placement, no definite pneumothorax. Bilateral lung infiltrates and pleural effusions, not significantly changed. These infiltrates may be due to pulmonary edema, pneumonia and/or ARDS. Stable borderline enlargement of the cardiac silhouette. XR CHEST PORTABLE   Final Result   Extensive bilateral airspace disease with interval progression on the right. XR CHEST PORTABLE   Final Result   Postoperative changes with the diffuse bilateral infiltrates and effusions   with improvement in the right side likely CHF/edema. Pneumonia is less   likely. Tubes and catheters as noted. XR CHEST PORTABLE   Final Result   Low lying endotracheal tube which needs withdrawn approximately 2 cm. XR CHEST PORTABLE   Final Result   Enteric tube tip in the body of the stomach. The roseann is not well seen. ET tube tip most likely within 1.5 cm of the   roseann. XR CHEST PORTABLE   Final Result   Pleural effusions with adjacent infiltrate and or atelectasis showing   slightly improved aeration on the left and slightly poor aeration on the   right. XR CHEST PORTABLE   Final Result   Interval improvement in the opacification of the left hemithorax, with   aeration in the left upper lung field when compared to the previous study   performed 1 day earlier. No other significant interval change. XR CHEST PORTABLE   Final Result   New right internal jugular central venous catheter terminating near the   SVC-right atrium junction. No post-procedure pneumothorax. Worsening   opacification of the left hemithorax and stable findings on the right. CT CHEST WO CONTRAST   Final Result   Large right pleural effusion of mixed density to suggest complex effusion.    There is a pigtail catheter seen in the pleural space posteriorly and   inferiorly. Small to moderate size left pleural effusion with consolidative   infiltrate throughout the left upper and lower lung field and aerated   portions revealing nodular ground-glass opacity to suggest an infectious   process. Nodular ground-glass opacity inferiorly within the aerated right   upper and lower lung field concerning for an infectious process as well. There is less consolidative infiltrate identified within the right lower lobe. XR CHEST PORTABLE   Final Result   1. No changes position of the right-sided chest tube. No right-sided   pneumothorax. The most of the right-sided pleural effusion has been drained,   if present to be discrete or small. 2.  Moderate left-sided pleural effusion. If quantification of left-sided   pleural effusion will be needed for clinical management bedside ultrasound or   left lateral decubitus views of the chest can be helpful. 3.  No pneumothorax on the right or on the left. 4.  Large size diaphragmatic across the midline. XR CHEST PORTABLE   Final Result   1. Increased parenchymal density projecting over the left lower lung   concerning for atelectasis and/or consolidation      2. No signs of right effusion      3. Parenchymal density projecting over the right lower lung concerning for   atelectasis         IR GUIDED PERC PLEURAL DRAIN W CATH INSERT   Final Result   1. Successful ultrasound-guided right thoracentesis. 2.  Successful placement of an 8 Luxembourgish pleural drain         XR CHEST PORTABLE   Final Result   1. Right-sided pigtail catheter in the right base. 2.  No conspicuous right-sided pleural effusions identified. No right-sided   pneumothorax. 3.  Cannot exclude a small left-sided pleural effusion. 4.  Large size diaphragmatic hernia across the midline causing compression   atelectasis in the medial aspect of both lungs. FL ESOPHAGRAM   Final Result   1.  No evidence of esophageal perforation or leak. 2. Decreased peristalsis. No evidence of esophageal mass or obstructing   lesion. 3. Small sliding hiatus hernia. No evidence of GE reflux. The gastric   foreign disc does appear constricted as it passes through the esophageal   hiatus. US CHEST INCLUDING MEDIASTINUM   Final Result   Probable complex loculated right pleural effusion. Simple left pleural effusion. CTA PULMONARY W CONTRAST   Final Result   No evidence of pulmonary embolism. Bilateral lower lobe pulmonary consolidations and pleural effusions seen. Deviation of the mediastinum to the left is visualized. Mild soft tissue prominence visualized surrounding the esophagus with   narrowing at the gastroesophageal junction but appears to be due to external   compression. Cannot rule left soft tissue mass at this location. Wall thickening visualized in the gastric outlet with mild prominence of the   stomach seen, this could be artifactual.      Distended gallbladder is seen. Degenerative bone changes, postoperative changes and multilevel vertebral   augmentation seen. No evidence of acute abdominal/pelvic pathology. CT ABDOMEN PELVIS W IV CONTRAST Additional Contrast? Oral   Final Result   No evidence of pulmonary embolism. Bilateral lower lobe pulmonary consolidations and pleural effusions seen. Deviation of the mediastinum to the left is visualized. Mild soft tissue prominence visualized surrounding the esophagus with   narrowing at the gastroesophageal junction but appears to be due to external   compression. Cannot rule left soft tissue mass at this location. Wall thickening visualized in the gastric outlet with mild prominence of the   stomach seen, this could be artifactual.      Distended gallbladder is seen. Degenerative bone changes, postoperative changes and multilevel vertebral   augmentation seen.       No evidence of acute abdominal/pelvic pathology. XR CHEST PORTABLE   Final Result   1. Consolidation seen within the left lung base with blunting the left   costophrenic angle which could represent pneumonia or atelectasis   2. A trace left pleural effusion cannot be excluded   3. Large right diaphragmatic hernia/hiatal hernia which was noted on the   patient's previous CT of the chest of 07/22/2022. XR CHEST PORTABLE    (Results Pending)   XR CHEST PORTABLE    (Results Pending)   XR CHEST PORTABLE    (Results Pending)   XR CHEST PORTABLE    (Results Pending)        APRN- CNP Assessment and PLan   In summary 65 y.o. female with significant past medical history of hypothyroidism, breast cancer, TIA, hiatal hernia underwent hiatal hernia repair on 12/9/22 and developed acute respiratory distress and was transfer to [facility] on 12/14/22 for further management. Patient was seen by nephrology, pulmonology, general surgery. Patient noted to have worsening hypoxia requiring 15 L of oxygen, A. fib RVR therefore transferred to MICU and critical care was consulted.      Assessment:  Acute hypoxemic respiratory failure secondary to aspiration pneumonia/pleural effusion/bibasilar consolidation now on ventilation (12/19/22)   ARDS   Bilateral pleural effusion right greater than left status post right thoracentesis with pigtail chest tube placement on (12/16/22 IR)   Septic shock  2/2 PNA/loculated pleural effusion  Loculated pleural effusion s/p VATS and Mini-thoracotomy with open drainage of fluid collection 12/20   AFIB RVR  PAWAN secondary to dehydration  [place] ptosis leukocytosis with left shift  Acute anemia  History of massive hiatal hernia  status post repair on 12/9/2022  History of TIA  History of breast cancer  History of hypothyroidism  History of lumbar compression fracture with history of PLIF T12-L2  History of anxiety     Plan:  -Intubated/sedated on ventilator, titrate FiO2 keep SPO2 goal 92%  - ARDS per protocol   -Improvement in PF ratio noted, stop Nimbex and repeat ABG  - versed, fentanyl to continue  - loculated pleural effusion s/p VATS and mini thoracotomy with open drainage of fluid collection (12/20/22) s/p 2 chest tube with -20cm suction  - US showed effusion on left side, s/p left-sided chest tube 12/21  -Continue scheduled bronchodilators Brovana and Pulmicort in addition to the DuoNebs, mucomyst   - levophed gtt, titrate to keep MAP >65mmHg  -Echo showed EF of 55%, indeterminate diastolic function, moderate dilated right ventricle with reduced function, moderate tricuspid regurgitation. Echo in January 2021 showed EF of 60 to 65%,   -Procalcitonin 5.70==> 1.57 today; RVP/COVID-19 PCR negative, MRSA nares negative, blood culture (NGTD) respiratory culture (Enterobacter Colace complex, Candida albicans) urine antigen negative. Body fluid hematoma==> GNR, candida albicans  - Consult to ID, input appreciated. - Ertapenem and anidulafungin   -General surgery surgery consulted, input appreciated. -Esophagram negative for esophageal perforation, recommended antiemetic and mild type of oral pain control, no surgical intervention indicates  - TPN  -PAWAN, continue CRRT with ultrafiltrate -75 mL/h. Increase to -100 mL/h in the afternoon.  -Strict intake and output  -Labs and chest x-ray in a.m.  -F/E/N KVO/ replace lytes/ Npo TPN  - DVT/GI scds/lovenox/protonix  - Code status: DNR- limited No CPR, ok to everything.      Mary Aguillon MD  Critical Care

## 2022-12-23 NOTE — PLAN OF CARE
Problem: Respiratory - Adult  Goal: Achieves optimal ventilation and oxygenation  12/23/2022 1603 by Prem Hernandez RCP  Outcome: Progressing

## 2022-12-24 NOTE — PROGRESS NOTES
Hospitalist Progress Note      PCP: Joseph Flannery DO    Date of Admission: 12/14/2022        Hospital Course:  65 y.o. female presented with PNEUMONIA. STATES SHE HAD A HH REPAIR AT [facility] ON THE 8TH. SHE STATES SHE WAS NOT FEELING BAD, BUT WAS TOLD SHE NEEDED TO COME TO THE HOSPITAL BC SHE WAS SICK. SHE WAS FOUND TO BE SEPTIC, WITH A HAG, SHE WAS TACHY AND TACHYPNEIC . COFFEE GROUND EMESIS THIS MORNING, GASTROCULT  POS . SURGERY NOTIFIED ** HAD A THROACENTESIS ON YESTERDAY, 105 CC REMOVED.     ** RRT  DUE TO RESPIRATORY DISTRESS ** TO TRANSFER TO  OR [facility]** INTUBATED HYPOXIA**  HAD CHEST TUBES INSERTED ON 12.20 ** DEVI FOUND ON CULTURES, NOW ON CVVHD               Subjective: SEDATED AND INTUBATED           Medications:  Reviewed    Infusion Medications    PN-Adult  3 IN 1 Central Line (Custom)      PN-Adult  3 IN 1 Central Line (Custom) 50 mL/hr at 12/24/22 1433    cisatracurium (NIMBEX) infusion Stopped (12/23/22 0900)    prismaSATE BGK 4/0/1.2 2,000 mL/hr at 12/24/22 1308    sodium chloride 100 mL/hr at 12/22/22 2300    calcium chloride CRRT infusion 8,000 mg (12/24/22 0855)    dextrose      midazolam Stopped (12/24/22 0832)    norepinephrine 5.013 mcg/min (12/24/22 1506)    vasopressin (Septic Shock) infusion Stopped (12/21/22 1808)    fentaNYL Stopped (12/24/22 0841)    sodium chloride       Scheduled Medications    ertapenem (INVanz) IVPB  1,000 mg IntraVENous Q24H    Vitamin D  1,000 Units Oral Daily    heparin (porcine)  5,000 Units SubCUTAneous Q8H    acetylcysteine  4 mL Inhalation Q4H    artificial tears   Both Eyes Q4H    hydrocortisone sodium succinate PF  100 mg IntraVENous Q8H    insulin lispro  0-8 Units SubCUTAneous Q4H    anidulafungin  100 mg IntraVENous Q24H    pantoprazole (PROTONIX) 40 mg injection  40 mg IntraVENous Q12H    sodium chloride flush  5-40 mL IntraVENous 2 times per day    heparin flush  3 mL IntraVENous 2 times per day    metoclopramide  10 mg IntraVENous Q6H    Arformoterol Tartrate  15 mcg Nebulization BID    budesonide  0.5 mg Nebulization BID    albuterol  2.5 mg Nebulization Q4H WA    [Held by provider] metoprolol  5 mg IntraVENous Q6H    rosuvastatin  10 mg Oral Nightly    levothyroxine  50 mcg Oral Daily     PRN Meds: potassium chloride, magnesium sulfate, calcium gluconate **OR** calcium gluconate **OR** calcium gluconate **OR** calcium gluconate, sodium phosphate IVPB **OR** sodium phosphate IVPB **OR** sodium phosphate IVPB **OR** sodium phosphate IVPB, glucose, dextrose bolus **OR** dextrose bolus, glucagon (rDNA), dextrose, sodium chloride flush, sodium chloride, heparin flush, ondansetron **OR** ondansetron, magnesium hydroxide, acetaminophen **OR** acetaminophen, ipratropium-albuterol      Intake/Output Summary (Last 24 hours) at 12/24/2022 1541  Last data filed at 12/24/2022 1501  Gross per 24 hour   Intake 3089 ml   Output 5982 ml   Net -2893 ml       Exam:    BP (!) 106/45   Pulse 91   Temp 97.9 °F (36.6 °C) (Esophageal)   Resp 30   Ht 5' 2\" (1.575 m)   Wt 145 lb 3.2 oz (65.9 kg)   SpO2 94%   BMI 26.56 kg/m²       General appearance:  No apparent distress, appears stated age. HEENT:  Normal cephalic,   Neck: Supple, with full range of motion. Trachea midline. Respiratory:  Normal respiratory effort. COARSE BREATH SOUNDS  NOTED BILATERALLY  Cardiovascular:  RRR  Abdomen: Soft, non-tender, non-distended with normal bowel sounds. Musculoskeletal:  No clubbing, cyanosis or edema bilaterally.     Skin: smooth and dry             Labs:   Recent Labs     12/22/22  0009 12/23/22  0605 12/24/22  0553   WBC  --  18.2* 15.8*   HGB 10.0* 8.7* 8.3*   HCT 30.7* 25.8* 24.9*   PLT  --  154 111*     Recent Labs     12/23/22  1445 12/23/22  1752 12/24/22  0012 12/24/22  0553 12/24/22  1219      < > 141 141 141   K 4.0   < > 3.9 4.0 4.0      < > 105 106 106   CO2 23   < > 24 24 25   BUN 24*   < > 26* 27* 30*   CREATININE 1.2*   < > 1.0 1.0 1.1* CALCIUM 8.1*   < > 8.0* 7.8* 7.9*   PHOS 3.0  --  1.9* 2.7  --     < > = values in this interval not displayed. Recent Labs     12/24/22  0012 12/24/22  0553 12/24/22  1219   AST 56* 56* 57*   ALT 16 16 16   BILITOT 2.2* 2.1* 2.0*   ALKPHOS 128* 131* 132*     Recent Labs     12/24/22  0553   INR 1.0     No results for input(s): CKTOTAL, TROPONINI in the last 72 hours. Recent Labs     12/24/22  0012 12/24/22  0553 12/24/22  1219   AST 56* 56* 57*   ALT 16 16 16   BILITOT 2.2* 2.1* 2.0*   ALKPHOS 128* 131* 132*     Recent Labs     12/23/22  1445 12/23/22  1752 12/24/22  0553   LACTA 1.6 1.9 1.6     No results found for: Ashlee Jacob  No results found for: AMMONIA    Assessment:    Active Hospital Problems    Diagnosis Date Noted    Hypoxia [R09.02] 12/17/2022     Priority: Medium    Pneumonia of left lower lobe due to infectious organism [J18.9] 12/17/2022     Priority: Medium    Aspiration pneumonia of both lower lobes due to gastric secretions ([facility] [address] [J69.0] 12/17/2022     Priority: Medium    Postoperative pneumonia [J95.89, J18.9] 12/14/2022     Priority: Medium   ACUTE RESP FAILURE WITH HYPOXIA  GASTROCULT POS  S/P HH REPAIR  PAWAN BASELINE CREATINE 1.0)  H.O BREAST CANCER   HLD   PLEURAL EFFUSIONS BILATERALLY   S/P CHEST TUBES  R loculated pleural effusion s/p IR chest tube 12/17/22 with minimal drainage, s/p R VATS turned partial open thoracotomy 12/20/22 with insertion of chest tubes x 3  PLAN:  anidulafungin 200    FLUIDS  MONITOR BMP   FOR  CAMACHO  DVT Prophylaxis: SCD  Diet: Diet NPO Exceptions are: Ice Chips  PN-Adult  3 IN 1 Central Line (Custom)  Diet NPO Exceptions are: Sips of Water with Meds  PN-Adult  3 IN 1 Central Line (Custom)  Code Status: Full Code     PT/OT Eval Status:   WHEN STABLE     Dispo -  TO [facility] OR       Electronically signed by Claudia Kc DO on 12/24/2022 at 3:41 PM [facility]

## 2022-12-24 NOTE — PLAN OF CARE
Problem: Respiratory - Adult  Goal: Achieves optimal ventilation and oxygenation  12/24/2022 0842 by Neto Briceño RCP  Outcome: Progressing

## 2022-12-24 NOTE — PROGRESS NOTES
Ying Alva M.D.,[facility]  Tyrone Camacho D.O., F.A.C.O.I., Georgie Kwon M.D. Gabriella Peter M.D. Mihai Wills D.O. Daily Pulmonary Progress Note    Patient:  Margaret Coley 65 y.o. female MRN: [id number]     Date of Service: 12/24/2022      Synopsis     We are following patient for acute respiratory failure with hypoxia, pneumonia, bilateral pleural effusions    \"CC\" SOB    Code status: Limited-no compressions      Subjective      Patient was seen and examined ine the ICU. She had been on Fentanyl, was on sedation vacation when I saw her. Family at bedside. Lung sounds are CTA, diminished. Current vent settings: AC/VC 14, 400, 40%, +8. This is improved. Chest tubes intact. CRRT with ultrafiltrate.      Review of Systems:  Unable to obtain-patient intubated     24-hour events:  Paralytic discontinued    Objective   Vitals: BP (!) 112/46   Pulse 83   Temp 97.7 °F (36.5 °C)   Resp 30   Ht 5' 2\" (1.575 m)   Wt 145 lb 3.2 oz (65.9 kg)   SpO2 97%   BMI 26.56 kg/m²     I/O:    Intake/Output Summary (Last 24 hours) at 12/24/2022 1038  Last data filed at 12/24/2022 1001  Gross per 24 hour   Intake 3292 ml   Output 6402 ml   Net -3110 ml         Vent Information  Ventilator ID: [id number]  Equipment Changed: Expiratory Filter                CURRENT MEDS :  Scheduled Meds:   ertapenem (INVanz) IVPB  1,000 mg IntraVENous Q24H    Vitamin D  1,000 Units Oral Daily    heparin (porcine)  5,000 Units SubCUTAneous Q8H    acetylcysteine  4 mL Inhalation Q4H    artificial tears   Both Eyes Q4H    hydrocortisone sodium succinate PF  100 mg IntraVENous Q8H    insulin lispro  0-8 Units SubCUTAneous Q4H    anidulafungin  100 mg IntraVENous Q24H    pantoprazole (PROTONIX) 40 mg injection  40 mg IntraVENous Q12H    sodium chloride flush  5-40 mL IntraVENous 2 times per day    heparin flush  3 mL IntraVENous 2 times per day    metoclopramide  10 mg IntraVENous Q6H    Arformoterol Tartrate  15 mcg Nebulization BID budesonide  0.5 mg Nebulization BID    albuterol  2.5 mg Nebulization Q4H WA    [Held by provider] metoprolol  5 mg IntraVENous Q6H    rosuvastatin  10 mg Oral Nightly    levothyroxine  50 mcg Oral Daily       Physical Exam:  General Appearance: Patient is intubated, paralyzed and sedated, and is toxic appearing  HEENT: ETT in place, OG tube  Neck: Lips, mucosa, and tongue normal.  Supple, symmetrical, trachea midline, no adenopathy;thyroid:  no enlargement/tenderness/nodules or JVD. Lung: Bilaterally diminished, right chest tube in place x3, left chest tube x1-no air leaks  Heart: Tachycardic  Abdomen: Soft, NT, ND. BS present x 4 quadrants. No bruit or organomegaly. Extremities: +1 pitting edema  Musculokeletal: No joint swelling, no muscle tenderness. ROM normal in all joints of extremities. Neurologic: Mental status: Intubated and sedated    Pertinent/ New Labs and Imaging Studies     Imaging Personally Reviewed:  Noncontrast CT chest 12/17/2022:  Impression   Large right pleural effusion of mixed density to suggest complex effusion. There is a pigtail catheter seen in the pleural space posteriorly and   inferiorly. Small to moderate size left pleural effusion with consolidative   infiltrate throughout the left upper and lower lung field and aerated   portions revealing nodular ground-glass opacity to suggest an infectious   process. Nodular ground-glass opacity inferiorly within the aerated right   upper and lower lung field concerning for an infectious process as well. There is less consolidative infiltrate identified within the right lower lobe. Chest x-ray 12/23/2022:    FINDINGS:   Extensive bilateral infiltrates worse in the right hemithorax are unchanged. Apical chest tubes are unchanged without obvious pneumothorax. Heart size is   normal.           Impression   No interval change       ECHO 12/19/22:   Summary   Technically difficult study - limited visualization.    Micro-bubble contrast injected to enhance left ventricular visualization. Normal left ventricular size and systolic function. Ejection fraction is visually estimated at 55%. Indeterminate diastolic function. No regional wall motion abnormalities seen. Mild left ventricular concentric hypertrophy noted. Moderately dilated right ventricle with reduced function. Moderate tricuspid regurgitation. Labs:  Lab Results   Component Value Date/Time    WBC 15.8 12/24/2022 05:53 AM    HGB 8.3 12/24/2022 05:53 AM    HCT 24.9 12/24/2022 05:53 AM    HCT 29.0 12/20/2022 01:31 PM    MCV 87.7 12/24/2022 05:53 AM    MCH 29.2 12/24/2022 05:53 AM    MCHC 33.3 12/24/2022 05:53 AM    RDW 15.8 12/24/2022 05:53 AM     12/24/2022 05:53 AM    MPV 11.9 12/24/2022 05:53 AM     Lab Results   Component Value Date/Time     12/24/2022 05:53 AM    K 4.0 12/24/2022 05:53 AM    K 4.7 12/21/2022 09:23 AM     12/24/2022 05:53 AM    CO2 24 12/24/2022 05:53 AM    BUN 27 12/24/2022 05:53 AM    CREATININE 1.0 12/24/2022 05:53 AM    LABALBU 2.3 12/24/2022 05:53 AM    LABALBU 4.3 01/24/2011 01:15 PM    CALCIUM 7.8 12/24/2022 05:53 AM    GFRAA >60 07/22/2022 01:42 PM    LABGLOM 58 12/24/2022 05:53 AM     Lab Results   Component Value Date/Time    PROTIME 11.1 12/24/2022 05:53 AM    INR 1.0 12/24/2022 05:53 AM     No results for input(s): PROBNP in the last 72 hours. No results for input(s): PROCAL in the last 72 hours. This SmartLink has not been configured with any valid records. Micro:  No results for input(s): CULTRESP in the last 72 hours. No results for input(s): LABGRAM in the last 72 hours. No results for input(s): LEGUR in the last 72 hours. No results for input(s): STREPNEUMAGU in the last 72 hours. No results for input(s): LP1UAG in the last 72 hours.   Respiratory cultures    Oral Pharyngeal Hilda absent Abnormal     Smear, Respiratory Polymorphonuclear leukocytes not seen   Epithelial cells not seen   Moderate yeast   Moderate gram positive rods Diphtheroid like   Few Gram negative rods    Organism Enterobacter cloacae complex Abnormal     CULTURE, RESPIRATORY Moderate growth    Organism Candida albicans Abnormal     CULTURE, RESPIRATORY Heavy growth         Latest Reference Range & Units Most Recent   (1,3)-Beta-D-Glucan (Fungitell) Interpretation Negative  Positive !  12/19/22 04:02   !: Data is abnormal       Assessment:    Acute hypoxic respiratory failure - requiring intubation 12/19/22  Acute Respiratory Distress Syndrome  S/p RRT 12/17/22 for respiratory distress and increasing oxygen demands  R loculated pleural effusion s/p IR chest tube 12/17/22 with minimal drainage, s/p R VATS turned partial open thoracotomy 12/20/22 with insertion of chest tubes x 3  S/p bronchoscopy-friable mucosa 12/20/22  Pneumonia, Hospital acquired-Enterobacter cloacae-Invanz started 12/19/22  Massive hiatal hernia s/p repair 12/9 at [facility]  Acute kidney injury  L chest tube placement 12/21/22  CVVHD initiated 12/21/22  Positive fungitell-eraxis started 12/21/22      Plan:   Continue mechanical ventilation, wean FiO2 as able  Aggressive bronchopulmonary hygiene  Deep sedation, sedation vacation as tolerated  Fluid removal with CVVHD initiated per Nephrology  TPN - management per surgery  Chest tube x 4 management per surgery  Antimicrobials per ID-Invanz started 12/19/22, Eraxis started 12/21/22  Rest per ICU team  Prognosis guarded    Electronically signed by KAR Weber - CNP on 12/24/2022 at 10:38 AM

## 2022-12-24 NOTE — PLAN OF CARE
Problem: Chronic Conditions and Co-morbidities  Goal: Patient's chronic conditions and co-morbidity symptoms are monitored and maintained or improved  12/24/2022 0547 by Sunny Essex, RN  Outcome: Not Progressing  12/23/2022 1911 by Karissa Augustine RN  Outcome: Progressing     Problem: Discharge Planning  Goal: Discharge to home or other facility with appropriate resources  12/24/2022 0547 by Sunny Essex, RN  Outcome: Not Progressing  12/23/2022 1911 by Karissa Augustine RN  Outcome: Progressing     Problem: Nutrition Deficit:  Goal: Optimize nutritional status  12/24/2022 0547 by Sunny Essex, RN  Outcome: Not Progressing  12/23/2022 1911 by Karissa Augustine RN  Outcome: Progressing     Problem: Safety - Adult  Goal: Free from fall injury  12/24/2022 0547 by Sunny Essex, RN  Outcome: Progressing  12/23/2022 1911 by Karissa Augustine RN  Outcome: Progressing     Problem: Pain  Goal: Verbalizes/displays adequate comfort level or baseline comfort level  12/24/2022 0547 by Sunny Essex, RN  Outcome: Progressing  12/23/2022 1911 by Karissa Augustine RN  Outcome: Progressing     Problem: Skin/Tissue Integrity  Goal: Absence of new skin breakdown  Description: 1. Monitor for areas of redness and/or skin breakdown  2. Assess vascular access sites hourly  3. Every 4-6 hours minimum:  Change oxygen saturation probe site  4. Every 4-6 hours:  If on nasal continuous positive airway pressure, respiratory therapy assess nares and determine need for appliance change or resting period.   12/24/2022 0547 by Sunny Essex, RN  Outcome: Progressing  12/23/2022 1911 by Karissa Augustine RN  Outcome: Progressing     Problem: Respiratory - Adult  Goal: Achieves optimal ventilation and oxygenation  12/24/2022 0547 by Sunny Essex, RN  Outcome: Progressing  12/23/2022 1911 by Karissa Augustine RN  Outcome: Progressing  12/23/2022 1603 by Jase Graff RCP  Outcome: Progressing     Problem: Chronic Conditions and Co-morbidities  Goal: Patient's chronic conditions and co-morbidity symptoms are monitored and maintained or improved  12/24/2022 0547 by Kurt Rodrigues RN  Outcome: Not Progressing  12/23/2022 1911 by Elie Viera RN  Outcome: Progressing     Problem: Discharge Planning  Goal: Discharge to home or other facility with appropriate resources  12/24/2022 0547 by Kurt Rodrigues RN  Outcome: Not Progressing  12/23/2022 1911 by Elie Viera RN  Outcome: Progressing     Problem: Nutrition Deficit:  Goal: Optimize nutritional status  12/24/2022 0547 by Kurt Rodrigues RN  Outcome: Not Progressing  12/23/2022 1911 by Elie Viera RN  Outcome: Progressing

## 2022-12-24 NOTE — PROGRESS NOTES
GENERAL SURGERY  DAILY PROGRESS NOTE  12/23/2022    CHIEF COMPLAINT:  Chief Complaint   Patient presents with    Shortness of Breath     Sent from [facility] for SOB patient went for hernia repair patient on 6L NC on arrival       SUBJECTIVE:  Patient seen this morning. Remains sedated and intubated. Overall no real change in status, patient remains critically ill. OBJECTIVE:  BP (!) 111/51   Pulse 73   Temp 96.8 °F (36 °C) (Esophageal)   Resp 30   Ht 5' 2\" (1.575 m)   Wt 151 lb 3.2 oz (68.6 kg)   SpO2 100%   BMI 27.65 kg/m²     GENERAL:  NAD. A&Ox3. LUNGS:  Intubated. On ventilator   CARDIOVASCULAR: RR  ABDOMEN:  Soft, non-distended, non-tender. No guarding, rigidity, rebound. ASSESSMENT/PLAN:  65 y.o. female s/p VATS and mini-thoracotomy for respiratory failure and complex bilateral pleural effusions s/p robotic hiatal hernia repair.      Continue chest tube to suction  Daily CXR  Monitor chest tube and drain output   MICU managing  Monitor vitals      Aston Brandon OMS-IV

## 2022-12-24 NOTE — PROGRESS NOTES
[address] [facility]   Department of Internal Medicine   MICU Progress Note    Patient:  Margaret Coley 65 y.o. female   MRN: [id number]       Date of Service: 2022    Allergy: Benadryl [diphenhydramine] and Ibuprofen  CC hypoxia   Subjective   Intubated/sedated on vent  Does not look in distress  Poor oxygen, PF ratio low  Paralyzed, vent to ARDS protocol  Now on versed, fentanyl, and nimbex  Plan to prone   On pressors  3 chest tube with minimal drainage  Temps of 98.2  CRRT in progress with net removal today. No overnight event  Objective     TEMPERATURE:  Current - Temp: 97.7 °F (36.5 °C); Max - Temp  Av.2 °F (36.2 °C)  Min: 96.8 °F (36 °C)  Max: 97.7 °F (36.5 °C)    RESPIRATIONS RANGE: Resp  Av.3  Min: 29  Max: 32    PULSE RANGE: Pulse  Av.7  Min: 71  Max: 83    BLOOD PRESSURE RANGE:  Systolic (25TTK), LW , Min:100 , NID:227   ; Diastolic (17TRI), ANA MARIA:57, Min:45, Max:52      PULSE OXIMETRY RANGE: SpO2  Av.2 %  Min: 96 %  Max: 100 %    I & O - 24hr:    Intake/Output Summary (Last 24 hours) at 2022 0947  Last data filed at 2022 0911  Gross per 24 hour   Intake 3514 ml   Output 6406 ml   Net -2892 ml     I/O last 3 completed shifts: In: 6060 [I.V.:5940; NG/GT:120]  Out: 9127 [Urine:505; Emesis/NG output:50; Drains:31; Chest Tube:176] I/O this shift: In: 0   Out: 441 [Urine:15; Emesis/NG output:75; Drains:10; Chest Tube:4]   Weight change: -6 lb (-2.722 kg)    Physical Exam:  General Appearance: intubated/sedated on vent, does not look in distress. Anasarca noted   HEENT:  Head: Normocephalic, no lesions, without obvious abnormality. Eye: Normal external eye, conjunctiva, lids cornea, FILEMON. Nose: Normal external nose, mucus membranes and septum. Neck / Thyroid: Supple, no masses, nodes, nodules or enlargement.   Neck: no adenopathy, no carotid bruit, no JVD, supple, symmetrical, trachea midline, and thyroid not enlarged, symmetric, no tenderness/mass/nodules  Lung: Rhonchi throughout lung field, no wheezing noted. 3 chest tube    Heart: regular rate and rhythm, S1, S2 normal, no murmur, click, rub or gallop  Abdomen: soft, non-tender; bowel sounds normal; no masses,  no organomegaly incision noted, intact. Extremities:  extremities normal, atraumatic, no cyanosis or edema 3+ pitting edema bilaterally. Musculokeletal: No joint swelling, no muscle tenderness. ROM normal in all joints of extremities.    Neurologic: Mental status: intubated/sedated on vent, limited neuro exam       Medications     Continuous Infusions:   PN-Adult  3 IN 1 Central Line (Custom)      PN-Adult  3 IN 1 Central Line (Custom) 50 mL/hr at 12/24/22 0911    cisatracurium (NIMBEX) infusion Stopped (12/23/22 0900)    epoprostenol (VELETRI) nebulization solution 50 ng/kg/min (12/24/22 0640)    prismaSATE BGK 4/0/1.2 2,000 mL/hr at 12/24/22 0048    sodium chloride 100 mL/hr at 12/22/22 2300    calcium chloride CRRT infusion 8,000 mg (12/24/22 0855)    dextrose      midazolam Stopped (12/24/22 0832)    norepinephrine 5 mcg/min (12/24/22 0911)    vasopressin (Septic Shock) infusion Stopped (12/21/22 1808)    fentaNYL Stopped (12/24/22 0841)    sodium chloride       Scheduled Meds:   ertapenem (INVanz) IVPB  1,000 mg IntraVENous Q24H    Vitamin D  1,000 Units Oral Daily    heparin (porcine)  5,000 Units SubCUTAneous Q8H    acetylcysteine  4 mL Inhalation Q4H    artificial tears   Both Eyes Q4H    hydrocortisone sodium succinate PF  100 mg IntraVENous Q8H    insulin lispro  0-8 Units SubCUTAneous Q4H    anidulafungin  100 mg IntraVENous Q24H    pantoprazole (PROTONIX) 40 mg injection  40 mg IntraVENous Q12H    sodium chloride flush  5-40 mL IntraVENous 2 times per day    heparin flush  3 mL IntraVENous 2 times per day    metoclopramide  10 mg IntraVENous Q6H    Arformoterol Tartrate  15 mcg Nebulization BID    budesonide  0.5 mg Nebulization BID    albuterol  2.5 mg Nebulization Q4H WA    [Held by provider] metoprolol  5 mg IntraVENous Q6H    rosuvastatin  10 mg Oral Nightly    levothyroxine  50 mcg Oral Daily     PRN Meds: potassium chloride, magnesium sulfate, calcium gluconate **OR** calcium gluconate **OR** calcium gluconate **OR** calcium gluconate, sodium phosphate IVPB **OR** sodium phosphate IVPB **OR** sodium phosphate IVPB **OR** sodium phosphate IVPB, glucose, dextrose bolus **OR** dextrose bolus, glucagon (rDNA), dextrose, sodium chloride flush, sodium chloride, heparin flush, ondansetron **OR** ondansetron, magnesium hydroxide, acetaminophen **OR** acetaminophen, ipratropium-albuterol  Nutrition:   TPN     Labs and Imaging Studies     CBC:   Recent Labs     12/22/22  0009 12/23/22  0605 12/24/22  0553   WBC  --  18.2* 15.8*   RBC  --  2.94* 2.84*   HGB 10.0* 8.7* 8.3*   HCT 30.7* 25.8* 24.9*   MCV  --  87.8 87.7   MCH  --  29.6 29.2   MCHC  --  33.7 33.3   RDW  --  15.5* 15.8*   PLT  --  154 111*   MPV  --  11.6 11.9       BMP:    Recent Labs     12/23/22 1752 12/24/22  0012 12/24/22  0553    141 141   K 3.9 3.9 4.0    105 106   CO2 23 24 24   BUN 24* 26* 27*   CREATININE 1.1* 1.0 1.0   GLUCOSE 209* 174* 196*   CALCIUM 8.0* 8.0* 7.8*   PROT 4.9* 4.9* 5.1*   LABALBU 2.2* 2.3* 2.3*   BILITOT 2.3* 2.2* 2.1*   ALKPHOS 131* 128* 131*   AST 51* 56* 56*   ALT 15 16 16       LIVER PROFILE:   Recent Labs     12/23/22 1752 12/24/22  0012 12/24/22  0553   AST 51* 56* 56*   ALT 15 16 16   BILITOT 2.3* 2.2* 2.1*   ALKPHOS 131* 128* 131*       PT/INR:   Recent Labs     12/24/22  0553   PROTIME 11.1   INR 1.0         APTT:   Recent Labs     12/24/22  0553   APTT 38.7*         Fasting Lipid Panel:    Lab Results   Component Value Date/Time    CHOL 45 12/19/2022 12:00 PM    TRIG 136 12/19/2022 12:00 PM    HDL 12 12/19/2022 12:00 PM       Cardiac Enzymes:    Lab Results   Component Value Date    CKTOTAL 59 01/13/2020    TROPONINI <0.01 01/13/2020       Notable Cultures:      Blood cultures   Blood Culture, Routine   Date Value Ref Range Status   12/17/2022 5 Days no growth  Final     Respiratory cultures No results found for: RESPCULTURE   Gram Stain Result   Date Value Ref Range Status   12/20/2022 Refer to ordered Gram stain for results  Final     Urine   Urine Culture, Routine   Date Value Ref Range Status   07/22/2022 <10,000 CFU/mL  Mixed gram positive organisms    Final     Legionella No results found for: LABLEGI  C Diff PCR No results found for: CDIFPCR  Wound culture/abscess: No results for input(s): WNDABS in the last 72 hours. Tip culture:No results for input(s): CXCATHTIP in the last 72 hours. Antibiotic  Days  Day started   Ertapenem      A                      Oxygen:     Vent Information  Ventilator ID: [id number]  Equipment Changed: Expiratory Filter    Additional Respiratory Assessments  Heart Rate: 83  Resp: 30  SpO2: 97 %  Position: Semi-Bach's  Humidification Source: Heated wire  Humidification Temp: 37  Circuit Condensation: Drained  Airway Type: ET  Airway Size: 7.5  Cuff Pressure (cm H2O): 29 cm H2O  Skin Barrier Applied: Yes     Nasal cannula L/min     Face mask %     Reservoirs mask %       ABG     PH  7.25   PCO2  45   PO2  68   HCO3  19   Sat%  91   FIO2     DATES 12/21/22       Lines:  Site  Day  Date inserted     TLC   RIJ    12/17/22      PICC              Arterial line   Left brachial    12/20/22     Peripheral line              HD cath            Urinary Catheter 12/15/22 Peterson-Output (mL): 5 mL    Imaging Studies:    XR CHEST PORTABLE   Final Result   Suggestion of some partial resolution of bilateral basilar infiltrates         XR CHEST PORTABLE   Final Result   No interval change         XR CHEST PORTABLE   Final Result   Stable appearance of the chest compared to 12/21/2022. XR CHEST PORTABLE   Final Result   Adequately positioned left internal jugular hemodialysis catheter.   The   remainder of the exam including additional lines and tubes are stable. XR CHEST PORTABLE   Final Result   Interval left chest tube placement, no definite pneumothorax. Bilateral lung infiltrates and pleural effusions, not significantly changed. These infiltrates may be due to pulmonary edema, pneumonia and/or ARDS. Stable borderline enlargement of the cardiac silhouette. XR CHEST PORTABLE   Final Result   Extensive bilateral airspace disease with interval progression on the right. XR CHEST PORTABLE   Final Result   Postoperative changes with the diffuse bilateral infiltrates and effusions   with improvement in the right side likely CHF/edema. Pneumonia is less   likely. Tubes and catheters as noted. XR CHEST PORTABLE   Final Result   Low lying endotracheal tube which needs withdrawn approximately 2 cm. XR CHEST PORTABLE   Final Result   Enteric tube tip in the body of the stomach. The roseann is not well seen. ET tube tip most likely within 1.5 cm of the   roseann. XR CHEST PORTABLE   Final Result   Pleural effusions with adjacent infiltrate and or atelectasis showing   slightly improved aeration on the left and slightly poor aeration on the   right. XR CHEST PORTABLE   Final Result   Interval improvement in the opacification of the left hemithorax, with   aeration in the left upper lung field when compared to the previous study   performed 1 day earlier. No other significant interval change. XR CHEST PORTABLE   Final Result   New right internal jugular central venous catheter terminating near the   SVC-right atrium junction. No post-procedure pneumothorax. Worsening   opacification of the left hemithorax and stable findings on the right. CT CHEST WO CONTRAST   Final Result   Large right pleural effusion of mixed density to suggest complex effusion. There is a pigtail catheter seen in the pleural space posteriorly and   inferiorly.   Small to moderate size left pleural effusion with consolidative   infiltrate throughout the left upper and lower lung field and aerated   portions revealing nodular ground-glass opacity to suggest an infectious   process. Nodular ground-glass opacity inferiorly within the aerated right   upper and lower lung field concerning for an infectious process as well. There is less consolidative infiltrate identified within the right lower lobe. XR CHEST PORTABLE   Final Result   1. No changes position of the right-sided chest tube. No right-sided   pneumothorax. The most of the right-sided pleural effusion has been drained,   if present to be discrete or small. 2.  Moderate left-sided pleural effusion. If quantification of left-sided   pleural effusion will be needed for clinical management bedside ultrasound or   left lateral decubitus views of the chest can be helpful. 3.  No pneumothorax on the right or on the left. 4.  Large size diaphragmatic across the midline. XR CHEST PORTABLE   Final Result   1. Increased parenchymal density projecting over the left lower lung   concerning for atelectasis and/or consolidation      2. No signs of right effusion      3. Parenchymal density projecting over the right lower lung concerning for   atelectasis         IR GUIDED PERC PLEURAL DRAIN W CATH INSERT   Final Result   1. Successful ultrasound-guided right thoracentesis. 2.  Successful placement of an 8 Japanese pleural drain         XR CHEST PORTABLE   Final Result   1. Right-sided pigtail catheter in the right base. 2.  No conspicuous right-sided pleural effusions identified. No right-sided   pneumothorax. 3.  Cannot exclude a small left-sided pleural effusion. 4.  Large size diaphragmatic hernia across the midline causing compression   atelectasis in the medial aspect of both lungs. FL ESOPHAGRAM   Final Result   1. No evidence of esophageal perforation or leak. 2. Decreased peristalsis.   No evidence of esophageal mass or obstructing   lesion. 3. Small sliding hiatus hernia. No evidence of GE reflux. The gastric   foreign disc does appear constricted as it passes through the esophageal   hiatus. US CHEST INCLUDING MEDIASTINUM   Final Result   Probable complex loculated right pleural effusion. Simple left pleural effusion. CTA PULMONARY W CONTRAST   Final Result   No evidence of pulmonary embolism. Bilateral lower lobe pulmonary consolidations and pleural effusions seen. Deviation of the mediastinum to the left is visualized. Mild soft tissue prominence visualized surrounding the esophagus with   narrowing at the gastroesophageal junction but appears to be due to external   compression. Cannot rule left soft tissue mass at this location. Wall thickening visualized in the gastric outlet with mild prominence of the   stomach seen, this could be artifactual.      Distended gallbladder is seen. Degenerative bone changes, postoperative changes and multilevel vertebral   augmentation seen. No evidence of acute abdominal/pelvic pathology. CT ABDOMEN PELVIS W IV CONTRAST Additional Contrast? Oral   Final Result   No evidence of pulmonary embolism. Bilateral lower lobe pulmonary consolidations and pleural effusions seen. Deviation of the mediastinum to the left is visualized. Mild soft tissue prominence visualized surrounding the esophagus with   narrowing at the gastroesophageal junction but appears to be due to external   compression. Cannot rule left soft tissue mass at this location. Wall thickening visualized in the gastric outlet with mild prominence of the   stomach seen, this could be artifactual.      Distended gallbladder is seen. Degenerative bone changes, postoperative changes and multilevel vertebral   augmentation seen. No evidence of acute abdominal/pelvic pathology. XR CHEST PORTABLE   Final Result   1. Consolidation seen within the left lung base with blunting the left   costophrenic angle which could represent pneumonia or atelectasis   2. A trace left pleural effusion cannot be excluded   3. Large right diaphragmatic hernia/hiatal hernia which was noted on the   patient's previous CT of the chest of 07/22/2022. XR CHEST PORTABLE    (Results Pending)   XR CHEST PORTABLE    (Results Pending)        APRN- CNP Assessment and PLan   In summary 65 y.o. female with significant past medical history of hypothyroidism, breast cancer, TIA, hiatal hernia underwent hiatal hernia repair on 12/9/22 and developed acute respiratory distress and was transfer to [facility] on 12/14/22 for further management. Patient was seen by nephrology, pulmonology, general surgery. Patient noted to have worsening hypoxia requiring 15 L of oxygen, A. fib RVR therefore transferred to MICU and critical care was consulted.      Assessment:  Acute hypoxemic respiratory failure secondary to aspiration pneumonia/pleural effusion/bibasilar consolidation now on ventilation (12/19/22)   ARDS   Bilateral pleural effusion right greater than left status post right thoracentesis with pigtail chest tube placement on (12/16/22 IR)   Septic shock  2/2 PNA/loculated pleural effusion  Loculated pleural effusion s/p VATS and Mini-thoracotomy with open drainage of fluid collection 12/20   AFIB RVR  PAWAN secondary to dehydration  [place] ptosis leukocytosis with left shift  Acute anemia  History of massive hiatal hernia  status post repair on 12/9/2022  History of TIA  History of breast cancer  History of hypothyroidism  History of lumbar compression fracture with history of PLIF T12-L2  History of anxiety     Plan:  -Intubated/sedated on ventilator, titrate FiO2 keep SPO2 goal 92%  - ARDS per protocol   -Improvement in PF ratio noted, stop Nimbex and repeat ABG  - versed, fentanyl to continue  - loculated pleural effusion s/p VATS and mini thoracotomy with open drainage of fluid collection (12/20/22) s/p 2 chest tube with -20cm suction  - US showed effusion on left side, s/p left-sided chest tube 12/21  -Continue scheduled bronchodilators Brovana and Pulmicort in addition to the DuoNebs, mucomyst   - levophed gtt, titrate to keep MAP >65mmHg  -Echo showed EF of 55%, indeterminate diastolic function, moderate dilated right ventricle with reduced function, moderate tricuspid regurgitation. Echo in January 2021 showed EF of 60 to 65%,   -RVP/COVID-19 PCR negative, MRSA nares negative, blood culture (NGTD) respiratory culture (Enterobacter Colace complex, Candida albicans) urine antigen negative. Body fluid hematoma==> E.cloacae, candida albicans  - Consult to ID, input appreciated. - Ertapenem and anidulafungin   -General surgery surgery consulted, input appreciated. -Esophagram negative for esophageal perforation, recommended antiemetic and mild type of oral pain control, no surgical intervention indicates  - TPN  -PAWAN, continue CRRT with ultrafiltrate -100 mL/h.    -Strict intake and output  -Labs and chest x-ray in a.m.  -F/E/N KVO/ replace lytes/ Npo TPN  - DVT/GI scds/lovenox/protonix  - Code status: DNR- limited No CPR, ok to everything. Critical care time spent 35 minutes. This did not include any procedures.     Nick Denise MD  Critical Care

## 2022-12-24 NOTE — PROGRESS NOTES
[facility] PROGRESS NOTE      Chief complaint: Follow-up of pneumonia    This patient is a 69-year-old female with history of bilateral breast cancer, DM, hiatal hernia repair on 12/09, presented on 12/14 with shortness of breath for 2 days accompanied by productive cough and malaise prior to her hiatal hernia repair, thought to have large postoperative seroma after a type IV hiatal hernia repair. On admission, she was afebrile and hemodynamically stable with no leukocytosis. CT chest, abdomen and pelvis showed soft tissue density in the posterior medial aspect of the right lower lung field with deviation of the mediastinum to the left, bilateral lower lobe consolidation and pleural effusions, mild soft tissue prominence surrounding the esophagus with narrowing at the gastroesophageal junction appearing to be due to external compression, wall thickening in the gastric outlet with mild prominence of the stomach, distended gallbladder, no evidence of acute abdominal/pelvic pathology. Esophagram was negative for esophageal perforation. She underwent right-sided thoracentesis on 12/16 during which 105 mL of serous fluid was removed. Pleural fluid gram stain and culture showed rare polymorphonuclear leukocytes, no epithelial cells, no organisms, no growth. She was transferred to MICU on 12/17 for worsening hypoxemia. Repeat CT chest on 12/17 showed large right pleural effusion of mixed density suggestive of complex effusion with pigtail catheter in the pleural space posteriorly and inferiorly, small to moderate sized left pleural effusion with consolidative infiltrate throughout left upper and lower lung field and aerated portions revealing nodular groundglass opacity, nodular groundglass opacity inferiorly within the aerated right upper and lower lung field, less consolidative infiltrate within the right lower lobe.   Respiratory gram stain and culture showed no polymorphonuclear leukocytes, no epithelial cells, moderate yeast, moderate gram-positive diphtheroid-like rods, few gram-negative rods, moderate growth of Enterobacter cloacae, heavy growth of Candida albicans. Urine Streptococcus pneumoniae and Legionella antigens were negative. Respiratory pathogen PCR panel and MRSA nares culture were negative. Blood cultures showed no growth to date. She received a dose of ceftriaxone and doxycycline on admission. Ampicillin-sulbactam was started on admission and then switched to piperacillin-tazobactam on 12/17 then switched to meropenem on 12/19. Vancomycin was started on 12/17. She underwent right VATS, mini-thoracotomy with drainage and fenestration of mediastinal seroma/hematoma, chest tube and mediastinal drain placement on 12/20 during which 1100 mL serous fluid and dark blood (no succus or bile) extending into the mediastinum was drained via mini-thoracotomy. Hematoma/seroma fluid Gram stain and culture showed abundant polymorphonuclear leukocytes, no epithelial cells, no organisms, Gram-negative rods. Serum 1,3 beta-d-glucan on 12/18 was elevated. Subjective: Patient was seen and examined. She remains in critical condition, intubated and sedated, on increasing vasopressor support. Objective:  BP (!) 112/46   Pulse 83   Temp 97.7 °F (36.5 °C)   Resp 30   Ht 5' 2\" (1.575 m)   Wt 145 lb 3.2 oz (65.9 kg)   SpO2 97%   BMI 26.56 kg/m²   Constitutional: Intubated, no MV dyssynchrony  Respiratory: Clear breath sounds, no crackles, no wheezes  Cardiovascular: Regular rate and rhythm, no murmurs  Gastrointestinal: Bowel sounds present, soft, nontender  Skin: Warm and dry, no active dermatoses  Musculoskeletal: No joint swelling, no joint erythema    Labs, imaging, and medical records/notes were personally reviewed.     Laboratory:  Lab Results   Component Value Date    WBC 15.8 (H) 12/24/2022    WBC 18.2 (H) 12/23/2022    WBC 17.9 (H) 12/21/2022    HGB 8.3 (L) 12/24/2022    HCT 24.9 (L) 12/24/2022    MCV 87.7 12/24/2022     (L) 12/24/2022     Lab Results   Component Value Date    NEUTROABS 14.30 (H) 12/24/2022    NEUTROABS 17.65 (H) 12/23/2022    NEUTROABS 17.36 (H) 12/21/2022     Lab Results   Component Value Date    CRP 45.9 (H) 12/14/2022    CRP 0.3 03/30/2022    CRP 0.3 02/04/2021     No results found for: CRPHS  Lab Results   Component Value Date    SEDRATE 16 03/30/2022    SEDRATE 25 (H) 02/04/2021    SEDRATE 33 (H) 01/13/2020     Lab Results   Component Value Date    ALT 16 12/24/2022    AST 56 (H) 12/24/2022    ALKPHOS 131 (H) 12/24/2022    BILITOT 2.1 (H) 12/24/2022     Lab Results   Component Value Date/Time     12/24/2022 05:53 AM    K 4.0 12/24/2022 05:53 AM    K 4.7 12/21/2022 09:23 AM     12/24/2022 05:53 AM    CO2 24 12/24/2022 05:53 AM    BUN 27 12/24/2022 05:53 AM    CREATININE 1.0 12/24/2022 05:53 AM    CREATININE 1.0 12/24/2022 12:12 AM    CREATININE 1.1 12/23/2022 05:52 PM    GFRAA >60 07/22/2022 01:42 PM    LABGLOM 58 12/24/2022 05:53 AM    GLUCOSE 196 12/24/2022 05:53 AM    PROT 5.1 12/24/2022 05:53 AM    LABALBU 2.3 12/24/2022 05:53 AM    LABALBU 4.3 01/24/2011 01:15 PM    CALCIUM 7.8 12/24/2022 05:53 AM    BILITOT 2.1 12/24/2022 05:53 AM    ALKPHOS 131 12/24/2022 05:53 AM    AST 56 12/24/2022 05:53 AM    ALT 16 12/24/2022 05:53 AM       Radiology: CT chest 12/17: Large right pleural effusion of mixed density to suggest complex effusion. There is a pigtail catheter seen in the pleural space posteriorly and   inferiorly. Small to moderate size left pleural effusion with consolidative   infiltrate throughout the left upper and lower lung field and aerated   portions revealing nodular ground-glass opacity to suggest an infectious   process. Nodular ground-glass opacity inferiorly within the aerated right   upper and lower lung field concerning for an infectious process as well. There is less consolidative infiltrate identified within the right lower lobe.        Microbiology: Blood cx  No results found for: BLOODCULT1  Culture, Blood 2   Date Value Ref Range Status   12/17/2022 5 Days no growth  Final       Smear, Respiratory   Date Value Ref Range Status   12/17/2022   Final    Polymorphonuclear leukocytes not seen  Epithelial cells not seen  Moderate yeast  Moderate gram positive rods Diphtheroid like  Few Gram negative rods         CULTURE, RESPIRATORY   Date Value Ref Range Status   12/17/2022 Oral Pharyngeal Hilda absent (A)  Final   12/17/2022 Moderate growth  Final   12/17/2022 Heavy growth  Final   Enterobacter cloacae complex (1)    Antibiotic Interpretation Microscan  Method Status    amoxicillin-clavulanate Resistant >=^32 mcg/mL BACTERIAL SUSCEPTIBILITY PANEL BY CHEYANNE     ceFAZolin Resistant >=^64 mcg/mL BACTERIAL SUSCEPTIBILITY PANEL BY CHEYANNE     cefepime Sensitive ^0.5 mcg/mL BACTERIAL SUSCEPTIBILITY PANEL BY CHEYANNE     cefotaxime Resistant >=^64 mcg/mL BACTERIAL SUSCEPTIBILITY PANEL BY CHEYANNE     cefOXitin Resistant >=^64 mcg/mL BACTERIAL SUSCEPTIBILITY PANEL BY CHEYANNE     cefTAZidime-avibactam Sensitive ^0.5 mcg/mL BACTERIAL SUSCEPTIBILITY PANEL BY CHEYANNE     gentamicin Sensitive <=^1 mcg/mL BACTERIAL SUSCEPTIBILITY PANEL BY CHEYANNE     levofloxacin Sensitive <=^0.12 mcg/mL BACTERIAL SUSCEPTIBILITY PANEL BY CHEYANNE     meropenem Sensitive ^0.5 mcg/mL BACTERIAL SUSCEPTIBILITY PANEL BY CHEYANNE     piperacillin-tazobactam Resistant >=^128 mcg/mL BACTERIAL SUSCEPTIBILITY PANEL BY CHEYANNE     trimethoprim-sulfamethoxazole Sensitive <=^20 mcg/mL BACTERIAL SUSCEPTIBILITY PANEL BY CHEYANNE         BODY FLUID CULTURE  Body Fluid Culture, Sterile   Date Value Ref Range Status   12/20/2022 Rare growth  Final   12/20/2022 Light growth  Final       MRSA Culture Only   Date Value Ref Range Status   12/17/2022 Methicillin resistant Staph aureus not isolated  Final     Gram Stain [[id number]] Collected: 12/20/22 1328   Order Status: Completed Specimen: Fluid Updated: 12/21/22 0755    Gram Stain Orderable --    Gram stain performed on unspun fluid   Abundant Polymorphonuclear leukocytes   Epithelial cells not seen   No organisms seen    Narrative:     Source: FLUID       Site: HEMATOMA                 Assessment:  Acute hypoxic respiratory failure  Septic shock  Postoperative seroma/hematoma, right chest, in the setting of robotic hiatal hernia repair on 12/09, s/p right VATS, mini-thoracotomy with drainage and fenestration of mediastinal seroma/hematoma, chest tube and mediastinal drain placement on 12/20  Enterobacter cloacae HAP. Candida albicans on respiratory culture likely colonization. Elevated serum 1,3 beta-d-glucan  WBC 15,800  Sebastien repeat resp culture    Recommendations:  Start anidulafungin 200 mg loading dose once followed by 100 mg q24h. Continue ertapenem 500 mg every 24 hours dosed according to renal function to a target dose of 1 g every 24 hours for CrCl of at last 30 mL/min. Follow-up surgical cultures. Follow-up blood cultures. Continue supportive care. Critical care note noted   Subspecialty notes noted           Thank you for involving me in the care of Nasreen Young. Dr. Sandra Mayes will continue to follow. Please do not hesitate to call for any questions or concerns.       Electronically signed by Aldo Campbell MD on 12/24/2022 at 11:48 AM

## 2022-12-24 NOTE — PLAN OF CARE
Problem: Discharge Planning  Goal: Discharge to home or other facility with appropriate resources  12/23/2022 1911 by Karissa Augustine RN  Outcome: Progressing  12/23/2022 0625 by Sunny Essex, RN  Outcome: Not Progressing     Problem: Pain  Goal: Verbalizes/displays adequate comfort level or baseline comfort level  12/23/2022 1911 by Karissa Augustine RN  Outcome: Progressing  12/23/2022 0625 by Sunny Essex, RN  Outcome: Not Progressing     Problem: Nutrition Deficit:  Goal: Optimize nutritional status  12/23/2022 1911 by Karissa Augustine RN  Outcome: Progressing  12/23/2022 0625 by Sunny Essex, RN  Outcome: Not Progressing     Problem: Respiratory - Adult  Goal: Achieves optimal ventilation and oxygenation  12/23/2022 1911 by Karissa Augustine RN  Outcome: Progressing  12/23/2022 1603 by Jase Graff RCP  Outcome: Progressing  12/23/2022 0625 by Sunny Essex, RN  Outcome: Not Progressing

## 2022-12-24 NOTE — PROGRESS NOTES
TRAUMA/GENERAL SURGERY  DAILY PROGRESS NOTE  12/24/2022    CHIEF COMPLAINT:  Chief Complaint   Patient presents with    Shortness of Breath     Sent from [facility] for SOB patient went for hernia repair patient on 6L NC on arrival       SUBJECTIVE:  Off paralytic still requiring sedation due to respiratory difficulty. No events per nursing staff overnight  CV running--  -100cc/h    OBJECTIVE:  BP (!) 112/46   Pulse 83   Temp 97.7 °F (36.5 °C)   Resp 30   Ht 5' 2\" (1.575 m)   Wt 145 lb 3.2 oz (65.9 kg)   SpO2 97%   BMI 26.56 kg/m²     GENERAL:  Intubated, sedated, (drips-2 mg/hr Versed, 100 mcg/hr fentanyl)  LUNGS:  Intubated and on ventilator. R Chest tube with serous output, L chest with dark colored output  CARDIOVASCULAR: RR  ABDOMEN:  Soft, non-distended, non-tender. No guarding, rigidity, rebound. Incisions clean with ecchymosis    ASSESSMENT/PLAN:  65 y.o. female with respiratory failure and complex right-sided effusion s/p robotic hiatal hernia repair 12/9 without signs of esophageal perforation or leak however now +fungitell and growing candida in chest  S/p VATS & Mini-thoractomy & bronchoscopy with open drainage of fluid collection 12/20, left CT placement 12/21 with ARDS & renal failure s/p chemical paralysis, flolan, CVVHD 12/21  Bronchoscopy intra-op showed friable muscosa w/o any thick purulent secretions    Remains critically ill  Continue TPN  Return of bowel function  Continue chest tubes to suction. Appreciate critical care assistance with medical management. Daily CXR  Discussed with Dr. Arminda Reyes    Electronically signed by Bo Coburn MD on 12/24/22 at 9:47 AM EST      Attending Physician Statement:  I have examined the patient, reviewed the record, and discussed the case with the surgical team.  I agree with the assessment and plan with the following additions, corrections, and changes.     Drains all securely positioned and functioning  CXR stable  Continue current drains    Electronically signed by Ortiz Gerardo MD on 12/24/22 at 11:53 AM EST

## 2022-12-24 NOTE — PROGRESS NOTES
Department of Internal Medicine  Nephrology Progress Note      Events reviewed. SUBJECTIVE: We are following Jayde Donell for PAWAN. Presently intubated.     PHYSICAL EXAM:      Vitals:    VITALS:  BP (!) 112/46   Pulse 83   Temp 97.7 °F (36.5 °C)   Resp 30   Ht 5' 2\" (1.575 m)   Wt 145 lb 3.2 oz (65.9 kg)   SpO2 97%   BMI 26.56 kg/m²   24HR INTAKE/OUTPUT:    Intake/Output Summary (Last 24 hours) at 12/24/2022 5429  Last data filed at 12/24/2022 6777  Gross per 24 hour   Intake 3514 ml   Output 6406 ml   Net -2892 ml         Constitutional: Patient intubated, sedated  HEENT: Normocephalic, atraumatic, PERRLA, endotracheal tube in place  Respiratory: Diminished right lung base  Cardiovascular/Edema: RRR, normal S1, normal S2, no edema  Gastrointestinal: Soft, not distended, nontender  Neurologic: Patient is sedated  Skin: Pale, dry, no lesions, no rash, + edema     Scheduled Meds:   ertapenem (INVanz) IVPB  1,000 mg IntraVENous Q24H    Vitamin D  1,000 Units Oral Daily    heparin (porcine)  5,000 Units SubCUTAneous Q8H    acetylcysteine  4 mL Inhalation Q4H    artificial tears   Both Eyes Q4H    hydrocortisone sodium succinate PF  100 mg IntraVENous Q8H    insulin lispro  0-8 Units SubCUTAneous Q4H    anidulafungin  100 mg IntraVENous Q24H    pantoprazole (PROTONIX) 40 mg injection  40 mg IntraVENous Q12H    sodium chloride flush  5-40 mL IntraVENous 2 times per day    heparin flush  3 mL IntraVENous 2 times per day    metoclopramide  10 mg IntraVENous Q6H    Arformoterol Tartrate  15 mcg Nebulization BID    budesonide  0.5 mg Nebulization BID    albuterol  2.5 mg Nebulization Q4H WA    [Held by provider] metoprolol  5 mg IntraVENous Q6H    rosuvastatin  10 mg Oral Nightly    levothyroxine  50 mcg Oral Daily     Continuous Infusions:   PN-Adult  3 IN 1 Central Line (Custom)      PN-Adult  3 IN 1 Central Line (Custom) 50 mL/hr at 12/24/22 0911    cisatracurium (NIMBEX) infusion Stopped (12/23/22 0900) epoprostenol (VELETRI) nebulization solution 50 ng/kg/min (12/24/22 0640)    prismaSATE BGK 4/0/1.2 2,000 mL/hr at 12/24/22 0048    sodium chloride 100 mL/hr at 12/22/22 2300    calcium chloride CRRT infusion 8,000 mg (12/24/22 0855)    dextrose      midazolam Stopped (12/24/22 0832)    norepinephrine 5 mcg/min (12/24/22 0911)    vasopressin (Septic Shock) infusion Stopped (12/21/22 1808)    fentaNYL Stopped (12/24/22 0841)    sodium chloride       PRN Meds:.potassium chloride, magnesium sulfate, calcium gluconate **OR** calcium gluconate **OR** calcium gluconate **OR** calcium gluconate, sodium phosphate IVPB **OR** sodium phosphate IVPB **OR** sodium phosphate IVPB **OR** sodium phosphate IVPB, glucose, dextrose bolus **OR** dextrose bolus, glucagon (rDNA), dextrose, sodium chloride flush, sodium chloride, heparin flush, ondansetron **OR** ondansetron, magnesium hydroxide, acetaminophen **OR** acetaminophen, ipratropium-albuterol    DATA:    CBC with Differential:    Lab Results   Component Value Date/Time    WBC 15.8 12/24/2022 05:53 AM    RBC 2.84 12/24/2022 05:53 AM    HGB 8.3 12/24/2022 05:53 AM    HCT 24.9 12/24/2022 05:53 AM    HCT 29.0 12/20/2022 01:31 PM     12/24/2022 05:53 AM    MCV 87.7 12/24/2022 05:53 AM    MCH 29.2 12/24/2022 05:53 AM    MCHC 33.3 12/24/2022 05:53 AM    RDW 15.8 12/24/2022 05:53 AM    NRBC 0.9 12/21/2022 04:12 AM    LYMPHOPCT 2.8 12/24/2022 05:53 AM    MONOPCT 4.0 12/24/2022 05:53 AM    MYELOPCT 0.9 12/23/2022 06:05 AM    BASOPCT 0.3 12/24/2022 05:53 AM    MONOSABS 0.63 12/24/2022 05:53 AM    LYMPHSABS 0.45 12/24/2022 05:53 AM    EOSABS 0.01 12/24/2022 05:53 AM    BASOSABS 0.04 12/24/2022 05:53 AM     CMP:    Lab Results   Component Value Date/Time     12/24/2022 05:53 AM    K 4.0 12/24/2022 05:53 AM    K 4.7 12/21/2022 09:23 AM     12/24/2022 05:53 AM    CO2 24 12/24/2022 05:53 AM    BUN 27 12/24/2022 05:53 AM    CREATININE 1.0 12/24/2022 05:53 AM    GFRAA >60 07/22/2022 01:42 PM    LABGLOM 58 12/24/2022 05:53 AM    GLUCOSE 196 12/24/2022 05:53 AM    PROT 5.1 12/24/2022 05:53 AM    LABALBU 2.3 12/24/2022 05:53 AM    LABALBU 4.3 01/24/2011 01:15 PM    CALCIUM 7.8 12/24/2022 05:53 AM    BILITOT 2.1 12/24/2022 05:53 AM    ALKPHOS 131 12/24/2022 05:53 AM    AST 56 12/24/2022 05:53 AM    ALT 16 12/24/2022 05:53 AM     Magnesium:    Lab Results   Component Value Date/Time    MG 2.1 12/24/2022 05:53 AM     Phosphorus:    Lab Results   Component Value Date/Time    PHOS 2.7 12/24/2022 05:53 AM     Radiology Review:      CT Chest and Abd/Pelvis 12/14/22      No evidence of pulmonary embolism. Bilateral lower lobe pulmonary consolidations and pleural effusions seen. Deviation of the mediastinum to the left is visualized. Mild soft tissue prominence visualized surrounding the esophagus with   narrowing at the gastroesophageal junction but appears to be due to external   compression. Cannot rule left soft tissue mass at this location. Wall thickening visualized in the gastric outlet with mild prominence of the   stomach seen, this could be artifactual.     Distended gallbladder is seen. Degenerative bone changes, postoperative changes and multilevel vertebral   augmentation seen. No evidence of acute abdominal/pelvic pathology. US Chest 12/15/22       Probable complex loculated right pleural effusion. Simple left pleural effusion. BRIEF SUMMARY OF INITIAL CONSULT:    Briefly, Mehnaz Lee is a 67-year-old female, past medical history significant for hypothyroidism, hiatal hernia and TIA, who presented to the ED on 12/14/2022 from [facility], patient recently underwent a hiatal hernia repair on 12/9/22, developed acute respiratory distress and was transferred to [facility] on 12/14/2022 for further management and treatment.   Initial ED work-up revealed creatinine level of 1.7, patient then underwent two seperate IV contrast studies CTA and creatinine subsequently increased to 2.5, on 12/15/2022 creatinine level increased to 3.5 mg/dL, reason for this consultation. Review of records from [facility] reveals that baseline creatinine appears to be between 0.9 to 1.0 mg/dL, creatinine was 1.0 on 12/13/2022, patient was started on lisinopril during hospitalization, was given multiple doses IV furosemide with poor oral intake and vomiting. Problems resolved:  HAGMA, 2/2 uremia and starvation ketoacidosis  Hypophosphatemia, poor intake/refeeding syndrome, rule out vitamin D deficiency, levels improved    IMPRESSION/RECOMMENDATIONS:     PAWAN, stage II, nonoliguric, ATN (ischemic +/- contrast induced , intravascular volume depletion due to poor oral intake, loop diuretics and vomiting in the setting of ACE inhibition versus,) FEUrea 28.3%, FENa 1.1%. Started on renal replacement therapy on 12/21/2022. Tolerating CVVHD with fluid removal.    Hemodynamic shock, septic, on vasopressors and antibiotics, requiring less vasopressors.   Vitamin D deficiency, vitamin D 25 level 11, on cholecalciferol   ABGs with normal pH and PCO2    ------------------------------------------------------  Respiratory failure, status post intubation  New onset AF with RVR  Complex right pleural effusion, s/p VATS    S/p hiatal hernia repair 12/9/2022  Hypothyroidism, on levothyroxine  Persistent vomiting, PRN ondansetron, esophagram negative for perforation  Hospital-acquired pneumonia, + Enterobacter cloacae, on ertapenem  Possibly focal pneumonia, Candida albicans?,  On anidulafungin  Normocytic anemia, multifactorial  Severe hypoalbuminemia  Nutrition, on TPN @50 cc/hour per surgery      Plan:    Continue CVVHD 30 cc/kilo/hour, increase UF to 100 cc/hour   Continue TPN per surgery  Continue to monitor renal function for recovery       Electronically signed by Ambar Haque MD on 12/24/2022 at 9:52 AM

## 2022-12-25 NOTE — PROGRESS NOTES
[facility] PROGRESS NOTE      Chief complaint: Follow-up of pneumonia    This patient is a 67-year-old female with history of bilateral breast cancer, DM, hiatal hernia repair on 12/09, presented on 12/14 with shortness of breath for 2 days accompanied by productive cough and malaise prior to her hiatal hernia repair, thought to have large postoperative seroma after a type IV hiatal hernia repair. On admission, she was afebrile and hemodynamically stable with no leukocytosis. CT chest, abdomen and pelvis showed soft tissue density in the posterior medial aspect of the right lower lung field with deviation of the mediastinum to the left, bilateral lower lobe consolidation and pleural effusions, mild soft tissue prominence surrounding the esophagus with narrowing at the gastroesophageal junction appearing to be due to external compression, wall thickening in the gastric outlet with mild prominence of the stomach, distended gallbladder, no evidence of acute abdominal/pelvic pathology. Esophagram was negative for esophageal perforation. She underwent right-sided thoracentesis on 12/16 during which 105 mL of serous fluid was removed. Pleural fluid gram stain and culture showed rare polymorphonuclear leukocytes, no epithelial cells, no organisms, no growth. She was transferred to MICU on 12/17 for worsening hypoxemia. Repeat CT chest on 12/17 showed large right pleural effusion of mixed density suggestive of complex effusion with pigtail catheter in the pleural space posteriorly and inferiorly, small to moderate sized left pleural effusion with consolidative infiltrate throughout left upper and lower lung field and aerated portions revealing nodular groundglass opacity, nodular groundglass opacity inferiorly within the aerated right upper and lower lung field, less consolidative infiltrate within the right lower lobe.   Respiratory gram stain and culture showed no polymorphonuclear leukocytes, no epithelial cells, moderate yeast, moderate gram-positive diphtheroid-like rods, few gram-negative rods, moderate growth of Enterobacter cloacae, heavy growth of Candida albicans. Urine Streptococcus pneumoniae and Legionella antigens were negative. Respiratory pathogen PCR panel and MRSA nares culture were negative. Blood cultures showed no growth to date. She received a dose of ceftriaxone and doxycycline on admission. Ampicillin-sulbactam was started on admission and then switched to piperacillin-tazobactam on 12/17 then switched to meropenem on 12/19. Vancomycin was started on 12/17. She underwent right VATS, mini-thoracotomy with drainage and fenestration of mediastinal seroma/hematoma, chest tube and mediastinal drain placement on 12/20 during which 1100 mL serous fluid and dark blood (no succus or bile) extending into the mediastinum was drained via mini-thoracotomy. Hematoma/seroma fluid Gram stain and culture showed abundant polymorphonuclear leukocytes, no epithelial cells, no organisms, Gram-negative rods. Serum 1,3 beta-d-glucan on 12/18 was elevated. Subjective: Patient was seen and examined. She remains in critical condition, intubated and sedated, on increasing vasopressor support. Objective:  BP (!) 117/48   Pulse 87   Temp 97 °F (36.1 °C) (Esophageal)   Resp 30   Ht 5' 2\" (1.575 m)   Wt 143 lb 11.2 oz (65.2 kg)   SpO2 95%   BMI 26.28 kg/m²   Constitutional: Intubated, no MV dyssynchrony  Respiratory: Clear breath sounds, no crackles, no wheezes  Cardiovascular: Regular rate and rhythm, no murmurs  Gastrointestinal: Bowel sounds present, soft, nontender  Skin: Warm and dry, no active dermatoses  Musculoskeletal: No joint swelling, no joint erythema    Labs, imaging, and medical records/notes were personally reviewed.     Laboratory:  Lab Results   Component Value Date    WBC 14.4 (H) 12/25/2022    WBC 15.8 (H) 12/24/2022    WBC 18.2 (H) 12/23/2022    HGB 8.3 (L) 12/25/2022    HCT 25.4 (L) 12/25/2022 MCV 88.2 12/25/2022     (L) 12/25/2022     Lab Results   Component Value Date    NEUTROABS 12.88 (H) 12/25/2022    NEUTROABS 14.30 (H) 12/24/2022    NEUTROABS 17.65 (H) 12/23/2022     Lab Results   Component Value Date    CRP 45.9 (H) 12/14/2022    CRP 0.3 03/30/2022    CRP 0.3 02/04/2021     No results found for: CRPHS  Lab Results   Component Value Date    SEDRATE 16 03/30/2022    SEDRATE 25 (H) 02/04/2021    SEDRATE 33 (H) 01/13/2020     Lab Results   Component Value Date    ALT 18 12/25/2022    AST 55 (H) 12/25/2022    ALKPHOS 136 (H) 12/25/2022    BILITOT 1.7 (H) 12/25/2022     Lab Results   Component Value Date/Time     12/25/2022 06:15 AM    K 3.9 12/25/2022 06:15 AM    K 4.7 12/21/2022 09:23 AM     12/25/2022 06:15 AM    CO2 26 12/25/2022 06:15 AM    BUN 34 12/25/2022 06:15 AM    CREATININE 1.0 12/25/2022 06:15 AM    CREATININE 1.0 12/25/2022 12:15 AM    CREATININE 1.1 12/24/2022 05:20 PM    GFRAA >60 07/22/2022 01:42 PM    LABGLOM 58 12/25/2022 06:15 AM    GLUCOSE 159 12/25/2022 06:15 AM    PROT 5.4 12/25/2022 06:15 AM    LABALBU 2.4 12/25/2022 06:15 AM    LABALBU 4.3 01/24/2011 01:15 PM    CALCIUM 7.9 12/25/2022 06:15 AM    BILITOT 1.7 12/25/2022 06:15 AM    ALKPHOS 136 12/25/2022 06:15 AM    AST 55 12/25/2022 06:15 AM    ALT 18 12/25/2022 06:15 AM       Radiology: CT chest 12/17: Large right pleural effusion of mixed density to suggest complex effusion. There is a pigtail catheter seen in the pleural space posteriorly and   inferiorly. Small to moderate size left pleural effusion with consolidative   infiltrate throughout the left upper and lower lung field and aerated   portions revealing nodular ground-glass opacity to suggest an infectious   process. Nodular ground-glass opacity inferiorly within the aerated right   upper and lower lung field concerning for an infectious process as well. There is less consolidative infiltrate identified within the right lower lobe. Microbiology:     Blood cx  No results found for: BLOODCULT1  Culture, Blood 2   Date Value Ref Range Status   12/17/2022 5 Days no growth  Final       Smear, Respiratory   Date Value Ref Range Status   12/17/2022   Final    Polymorphonuclear leukocytes not seen  Epithelial cells not seen  Moderate yeast  Moderate gram positive rods Diphtheroid like  Few Gram negative rods         CULTURE, RESPIRATORY   Date Value Ref Range Status   12/17/2022 Oral Pharyngeal Hilda absent (A)  Final   12/17/2022 Moderate growth  Final   12/17/2022 Heavy growth  Final   Enterobacter cloacae complex (1)    Antibiotic Interpretation Microscan  Method Status    amoxicillin-clavulanate Resistant >=^32 mcg/mL BACTERIAL SUSCEPTIBILITY PANEL BY CHEYANNE     ceFAZolin Resistant >=^64 mcg/mL BACTERIAL SUSCEPTIBILITY PANEL BY CHEYANNE     cefepime Sensitive ^0.5 mcg/mL BACTERIAL SUSCEPTIBILITY PANEL BY CHEYANNE     cefotaxime Resistant >=^64 mcg/mL BACTERIAL SUSCEPTIBILITY PANEL BY CHEYANNE     cefOXitin Resistant >=^64 mcg/mL BACTERIAL SUSCEPTIBILITY PANEL BY CHEYANNE     cefTAZidime-avibactam Sensitive ^0.5 mcg/mL BACTERIAL SUSCEPTIBILITY PANEL BY CHEYANNE     gentamicin Sensitive <=^1 mcg/mL BACTERIAL SUSCEPTIBILITY PANEL BY CHEYANNE     levofloxacin Sensitive <=^0.12 mcg/mL BACTERIAL SUSCEPTIBILITY PANEL BY CHEYANNE     meropenem Sensitive ^0.5 mcg/mL BACTERIAL SUSCEPTIBILITY PANEL BY CHEYANNE     piperacillin-tazobactam Resistant >=^128 mcg/mL BACTERIAL SUSCEPTIBILITY PANEL BY CHEYANNE     trimethoprim-sulfamethoxazole Sensitive <=^20 mcg/mL BACTERIAL SUSCEPTIBILITY PANEL BY CHEYANNE         BODY FLUID CULTURE  Body Fluid Culture, Sterile   Date Value Ref Range Status   12/20/2022 Rare growth  Final   12/20/2022 Light growth  Final       MRSA Culture Only   Date Value Ref Range Status   12/17/2022 Methicillin resistant Staph aureus not isolated  Final     Gram Stain [[id number]] Collected: 12/20/22 1328   Order Status: Completed Specimen: Fluid Updated: 12/21/22 0751    Gram Stain Orderable --    Gram stain performed on unspun fluid   Abundant Polymorphonuclear leukocytes   Epithelial cells not seen   No organisms seen    Narrative:     Source: FLUID       Site: HEMATOMA                 Assessment:  Acute hypoxic respiratory failure  Septic shock  Postoperative seroma/hematoma, right chest, in the setting of robotic hiatal hernia repair on 12/09, s/p right VATS, mini-thoracotomy with drainage and fenestration of mediastinal seroma/hematoma, chest tube and mediastinal drain placement on 12/20  Enterobacter cloacae HAP. Candida albicans on respiratory culture likely colonization. Elevated serum 1,3 beta-d-glucan  WBC 15,800  Sebastien repeat resp culture    Recommendations:  Start anidulafungin 200 mg loading dose once followed by 100 mg q24h. Continue ertapenem 500 mg every 24 hours dosed according to renal function to a target dose of 1 g every 24 hours for CrCl of at last 30 mL/min. Follow-up surgical cultures. Follow-up blood cultures. Continue supportive care. Critical care note noted   Subspecialty notes noted   Dr Zaida Cornejo note noted          Thank you for involving me in the care of [facility]. Dr. Hansa Tam will continue to follow. Please do not hesitate to call for any questions or concerns.       Electronically signed by Joaquin Hilton MD on 12/25/2022 at 10:26 AM

## 2022-12-25 NOTE — PROGRESS NOTES
Joycelyn Gamez M.D.,[facility]  Allyson Leija D.O., F.A.C.O.I., Pollo Buckner M.D. Boom Newell M.D. Cristóbal Perez D.O. Daily Pulmonary Progress Note    Patient:  Tara Nunez 65 y.o. female MRN: [id number]     Date of Service: 12/25/2022      Synopsis     We are following patient for acute respiratory failure with hypoxia, pneumonia, bilateral pleural effusions    \"CC\" SOB    Code status: Limited-no compressions      Subjective      Patient was seen and examined in the ICU. At the moment she is off all pressors, sedation and paralytics. Continuous TPN remains. CVVHD with Flolan continues. Patient's SPO2 95% on the current vent settings AC/VC 30, tidal volume 250, FiO2 50%, PEEP +8. Chest tubes x4 present, negative for airleak. Creatinine improved to 1 today. Review of Systems:  Unable to obtain-patient intubated     24-hour events:  Off pressors, sedation and paralytics stopped  Ultrafiltration increased to 100 mL/h yesterday-improvement in creatinine to 1 today.     Objective   Vitals: BP (!) 117/48   Pulse 87   Temp 97 °F (36.1 °C) (Esophageal)   Resp 30   Ht 5' 2\" (1.575 m)   Wt 143 lb 11.2 oz (65.2 kg)   SpO2 95%   BMI 26.28 kg/m²     I/O:    Intake/Output Summary (Last 24 hours) at 12/25/2022 0904  Last data filed at 12/25/2022 1963  Gross per 24 hour   Intake 2725 ml   Output 5282 ml   Net -2557 ml         Vent Information  Ventilator ID: [id number]  Equipment Changed: Expiratory Filter                CURRENT MEDS :  Scheduled Meds:   sterile water        docusate sodium  100 mg Oral BID    sennosides  5 mL Oral Nightly    midodrine  10 mg Oral TID WC    ertapenem (INVanz) IVPB  1,000 mg IntraVENous Q24H    Vitamin D  1,000 Units Oral Daily    heparin (porcine)  5,000 Units SubCUTAneous Q8H    artificial tears   Both Eyes Q4H    hydrocortisone sodium succinate PF  100 mg IntraVENous Q8H    insulin lispro  0-8 Units SubCUTAneous Q4H    anidulafungin  100 mg IntraVENous Q24H pantoprazole (PROTONIX) 40 mg injection  40 mg IntraVENous Q12H    sodium chloride flush  5-40 mL IntraVENous 2 times per day    heparin flush  3 mL IntraVENous 2 times per day    metoclopramide  10 mg IntraVENous Q6H    Arformoterol Tartrate  15 mcg Nebulization BID    budesonide  0.5 mg Nebulization BID    albuterol  2.5 mg Nebulization Q4H WA    [Held by provider] metoprolol  5 mg IntraVENous Q6H    rosuvastatin  10 mg Oral Nightly    levothyroxine  50 mcg Oral Daily       Physical Exam:  General Appearance: Patient is intubated, and is toxic appearing  HEENT: ETT in place, OG tube  Neck: Lips, mucosa, and tongue normal.  Supple, symmetrical, trachea midline, no adenopathy;thyroid:  no enlargement/tenderness/nodules or JVD. Lung: Bilaterally diminished, right chest tube in place x3, left chest tube x1-no air leaks  Heart: Tachycardic  Abdomen: Soft, NT, ND. BS present x 4 quadrants. No bruit or organomegaly. Extremities: +1 pitting edema  Musculokeletal: No joint swelling, no muscle tenderness. ROM normal in all joints of extremities. Neurologic: Mental status: Intubated    Pertinent/ New Labs and Imaging Studies     Imaging Personally Reviewed:  Noncontrast CT chest 12/17/2022:  Impression   Large right pleural effusion of mixed density to suggest complex effusion. There is a pigtail catheter seen in the pleural space posteriorly and   inferiorly. Small to moderate size left pleural effusion with consolidative   infiltrate throughout the left upper and lower lung field and aerated   portions revealing nodular ground-glass opacity to suggest an infectious   process. Nodular ground-glass opacity inferiorly within the aerated right   upper and lower lung field concerning for an infectious process as well. There is less consolidative infiltrate identified within the right lower lobe.          Chest x-ray 12/25/2022:    Report pending        ECHO 12/19/22:   Summary   Technically difficult study - limited visualization. Micro-bubble contrast injected to enhance left ventricular visualization. Normal left ventricular size and systolic function. Ejection fraction is visually estimated at 55%. Indeterminate diastolic function. No regional wall motion abnormalities seen. Mild left ventricular concentric hypertrophy noted. Moderately dilated right ventricle with reduced function. Moderate tricuspid regurgitation. Labs:  Lab Results   Component Value Date/Time    WBC 14.4 12/25/2022 06:15 AM    HGB 8.3 12/25/2022 06:15 AM    HCT 25.4 12/25/2022 06:15 AM    HCT 29.0 12/20/2022 01:31 PM    MCV 88.2 12/25/2022 06:15 AM    MCH 28.8 12/25/2022 06:15 AM    MCHC 32.7 12/25/2022 06:15 AM    RDW 16.1 12/25/2022 06:15 AM     12/25/2022 06:15 AM    MPV 12.9 12/25/2022 06:15 AM     Lab Results   Component Value Date/Time     12/25/2022 06:15 AM    K 3.9 12/25/2022 06:15 AM    K 4.7 12/21/2022 09:23 AM     12/25/2022 06:15 AM    CO2 26 12/25/2022 06:15 AM    BUN 34 12/25/2022 06:15 AM    CREATININE 1.0 12/25/2022 06:15 AM    LABALBU 2.4 12/25/2022 06:15 AM    LABALBU 4.3 01/24/2011 01:15 PM    CALCIUM 7.9 12/25/2022 06:15 AM    GFRAA >60 07/22/2022 01:42 PM    LABGLOM 58 12/25/2022 06:15 AM     Lab Results   Component Value Date/Time    PROTIME 11.2 12/25/2022 06:15 AM    INR 1.0 12/25/2022 06:15 AM     No results for input(s): PROBNP in the last 72 hours. No results for input(s): PROCAL in the last 72 hours. This SmartLink has not been configured with any valid records. Micro:  No results for input(s): CULTRESP in the last 72 hours. No results for input(s): LABGRAM in the last 72 hours. No results for input(s): LEGUR in the last 72 hours. No results for input(s): STREPNEUMAGU in the last 72 hours. No results for input(s): LP1UAG in the last 72 hours.   Respiratory cultures    Oral Pharyngeal Hilda absent Abnormal     Smear, Respiratory Polymorphonuclear leukocytes not seen Epithelial cells not seen   Moderate yeast   Moderate gram positive rods Diphtheroid like   Few Gram negative rods    Organism Enterobacter cloacae complex Abnormal     CULTURE, RESPIRATORY Moderate growth    Organism Candida albicans Abnormal     CULTURE, RESPIRATORY Heavy growth         Latest Reference Range & Units Most Recent   (1,3)-Beta-D-Glucan (Fungitell) Interpretation Negative  Positive !  12/19/22 04:02   !: Data is abnormal      ABGs 12/25/2022:     Latest Reference Range & Units 12/25/22 06:24   Source:  Blood Arterial   pH, Blood Gas 7.350 - 7.450  7.376   PCO2 35.0 - 45.0 mmHg 42.0   pO2 75.0 - 100.0 mmHg 86.0   HCO3 22.0 - 26.0 mmol/L 24.1   Base Excess -3.0 - 3.0 mmol/L -1.1   O2 Sat 92.0 - 98.5 % 96.1   THB,THB 11.5 - 16.5 g/dL 9.5 (L)   O2Hb 94.0 - 97.0 % 95.4   Carboxy-Hgb 0.0 - 1.5 % 0.4   METHB,METHB 0.0 - 1.5 % 0.3   HHb 0.0 - 5.0 % 3.9   AaDO2 mmHg 210.8   RI(T)  2.45   FIO2 % 50.0   PO2/FIO2 mmHg/% 1.72   (L): Data is abnormally low     Assessment:    Acute hypoxic respiratory failure - requiring intubation 12/19/22  Acute Respiratory Distress Syndrome  S/p RRT 12/17/22 for respiratory distress and increasing oxygen demands  R loculated pleural effusion s/p IR chest tube 12/17/22 with minimal drainage, s/p R VATS turned partial open thoracotomy 12/20/22 with insertion of chest tubes x 3  S/p bronchoscopy-friable mucosa 12/20/22  Pneumonia, Hospital acquired-Enterobacter cloacae-Invanz started 12/19/22  Massive hiatal hernia s/p repair 12/9 at [facility]  Acute kidney injury  L chest tube placement 12/21/22  CVVHD initiated 12/21/22  Positive fungitell-eraxis started 12/21/22      Plan:   Continue mechanical ventilation, wean FiO2 as able  Aggressive bronchopulmonary hygiene  Fluid removal with CVVHD initiated per Nephrology-ultrafiltration increased yesterday to 100 mL/h  TPN - management per surgery  Chest tube x 4 management per surgery  Antimicrobials per ID-Invanz started 12/19/22, Eraxis started 12/21/22  Rest per ICU team  Prognosis guarded    Electronically signed by KAR Teran CNP on 12/25/2022 at 9:04 AM

## 2022-12-25 NOTE — PROGRESS NOTES
TRAUMA/GENERAL SURGERY  DAILY PROGRESS NOTE  12/25/2022    CHIEF COMPLAINT:  Chief Complaint   Patient presents with    Shortness of Breath     Sent from [facility] for SOB patient went for hernia repair patient on 6L NC on arrival       SUBJECTIVE:  Continues to be intubated, sedated. On TPN, no bowel function. No respiratory changes overnight. Off Levo since 0400. No events per nursing staff overnight  CV running--  total 4.8L off    OBJECTIVE:  BP (!) 117/48   Pulse 84   Temp 97 °F (36.1 °C) (Esophageal)   Resp 30   Ht 5' 2\" (1.575 m)   Wt 143 lb 11.2 oz (65.2 kg)   SpO2 98%   BMI 26.28 kg/m²     GENERAL:  Intubated, sedated,   LUNGS:  Intubated and on ventilator. R Chest tube with serous output, L chest with serosang. Right mediastinal drains with dark output. CARDIOVASCULAR: RR  ABDOMEN:  Soft, non-distended, non-tender. No guarding, rigidity, rebound. Incisions clean with ecchymosis    ASSESSMENT/PLAN:  65 y.o. female with respiratory failure and complex right-sided effusion s/p robotic hiatal hernia repair 12/9 without signs of esophageal perforation or leak however now +fungitell and growing candida in chest  S/p VATS & Mini-thoractomy & bronchoscopy with open drainage of fluid collection 12/20, left CT placement 12/21 with ARDS & renal failure s/p chemical paralysis, flolan, CVVHD 12/21  Bronchoscopy intra-op showed friable muscosa w/o any thick purulent secretions    Remains critically ill  Continue TPN - defer to ICU team for TPN management. Return of bowel function  Continue chest tubes to suction. Appreciate critical care assistance with medical management.   Daily CXR, stable  Defer to General Surgery, Dr. Marbella Campbell, for decision regarding diet advancement    Discussed with Dr. Trinidad Manning    Electronically signed by Odalis Ocampo DO on 12/25/2022 at 7:52 AM

## 2022-12-25 NOTE — PROGRESS NOTES
[address] [facility]   Department of Internal Medicine   MICU Progress Note    Patient:  Marian Dawn 65 y.o. female   MRN: [id number]       Date of Service: 2022    Allergy: Benadryl [diphenhydramine] and Ibuprofen  CC hypoxia   Subjective   Intubated/sedated on vent  Does not look in distress  Poor oxygen, PF ratio low  Paralyzed, vent to ARDS protocol  Now on versed, fentanyl, and nimbex  Plan to prone   On pressors  3 chest tube with minimal drainage  Temps of 98.2  CRRT in progress with net removal today. No overnight event  Objective     TEMPERATURE:  Current - Temp: 97 °F (36.1 °C); Max - Temp  Av.7 °F (36.5 °C)  Min: 97 °F (36.1 °C)  Max: 98.2 °F (36.8 °C)    RESPIRATIONS RANGE: Resp  Av  Min: 30  Max: 32    PULSE RANGE: Pulse  Av.3  Min: 70  Max: 103    BLOOD PRESSURE RANGE:  Systolic (48FZQ), QQA:813 , Min:97 , GMB:315   ; Diastolic (63WRY), QNB:48, Min:43, Max:48      PULSE OXIMETRY RANGE: SpO2  Av.4 %  Min: 92 %  Max: 98 %    I & O - 24hr:    Intake/Output Summary (Last 24 hours) at 2022 0948  Last data filed at 2022 0858  Gross per 24 hour   Intake 2725 ml   Output 5503 ml   Net -2778 ml     I/O last 3 completed shifts: In: 65 [I.V.:4602]  Out: 8504 [Urine:270; Emesis/NG output:75; Drains:72; Chest Tube:184] I/O this shift: In: 0   Out: 561    Weight change: -1 lb 8 oz (-0.68 kg)    Physical Exam:  General Appearance: intubated/sedated on vent, does not look in distress. Anasarca noted   HEENT:  Head: Normocephalic, no lesions, without obvious abnormality. Eye: Normal external eye, conjunctiva, lids cornea, FILEMON. Nose: Normal external nose, mucus membranes and septum. Neck / Thyroid: Supple, no masses, nodes, nodules or enlargement. Neck: no adenopathy, no carotid bruit, no JVD, supple, symmetrical, trachea midline, and thyroid not enlarged, symmetric, no tenderness/mass/nodules  Lung: Rhonchi throughout lung field, no wheezing noted.  3 chest tube Heart: regular rate and rhythm, S1, S2 normal, no murmur, click, rub or gallop  Abdomen: soft, non-tender; bowel sounds normal; no masses,  no organomegaly incision noted, intact. Extremities:  extremities normal, atraumatic, no cyanosis or edema 3+ pitting edema bilaterally. Musculokeletal: No joint swelling, no muscle tenderness. ROM normal in all joints of extremities.    Neurologic: Mental status: intubated/sedated on vent, limited neuro exam       Medications     Continuous Infusions:   PN-Adult  3 IN 1 Central Line (Custom)      PN-Adult  3 IN 1 Central Line (Custom) 50 mL/hr at 12/25/22 0729    cisatracurium (NIMBEX) infusion Stopped (12/23/22 0900)    prismaSATE BGK 4/0/1.2 2,000 mL/hr at 12/25/22 0159    sodium chloride 100 mL/hr at 12/22/22 2300    calcium chloride CRRT infusion 8,000 mg (12/25/22 0820)    dextrose      midazolam Stopped (12/24/22 0832)    norepinephrine 2 mcg/min (12/25/22 0947)    vasopressin (Septic Shock) infusion Stopped (12/21/22 1808)    fentaNYL Stopped (12/24/22 0841)    sodium chloride       Scheduled Meds:   sterile water        docusate sodium  100 mg Oral BID    sennosides  5 mL Oral Nightly    midodrine  10 mg Oral TID WC    ertapenem (INVanz) IVPB  1,000 mg IntraVENous Q24H    Vitamin D  1,000 Units Oral Daily    heparin (porcine)  5,000 Units SubCUTAneous Q8H    artificial tears   Both Eyes Q4H    hydrocortisone sodium succinate PF  100 mg IntraVENous Q8H    insulin lispro  0-8 Units SubCUTAneous Q4H    anidulafungin  100 mg IntraVENous Q24H    pantoprazole (PROTONIX) 40 mg injection  40 mg IntraVENous Q12H    sodium chloride flush  5-40 mL IntraVENous 2 times per day    heparin flush  3 mL IntraVENous 2 times per day    metoclopramide  10 mg IntraVENous Q6H    Arformoterol Tartrate  15 mcg Nebulization BID    budesonide  0.5 mg Nebulization BID    albuterol  2.5 mg Nebulization Q4H WA    [Held by provider] metoprolol  5 mg IntraVENous Q6H    rosuvastatin  10 mg Oral Nightly    levothyroxine  50 mcg Oral Daily     PRN Meds: potassium chloride, magnesium sulfate, calcium gluconate **OR** calcium gluconate **OR** calcium gluconate **OR** calcium gluconate, sodium phosphate IVPB **OR** sodium phosphate IVPB **OR** sodium phosphate IVPB **OR** sodium phosphate IVPB, glucose, dextrose bolus **OR** dextrose bolus, glucagon (rDNA), dextrose, sodium chloride flush, sodium chloride, heparin flush, ondansetron **OR** ondansetron, magnesium hydroxide, acetaminophen **OR** acetaminophen, ipratropium-albuterol  Nutrition:   TPN     Labs and Imaging Studies     CBC:   Recent Labs     12/23/22  0605 12/24/22  0553 12/25/22  0615   WBC 18.2* 15.8* 14.4*   RBC 2.94* 2.84* 2.88*   HGB 8.7* 8.3* 8.3*   HCT 25.8* 24.9* 25.4*   MCV 87.8 87.7 88.2   MCH 29.6 29.2 28.8   MCHC 33.7 33.3 32.7   RDW 15.5* 15.8* 16.1*    111* 108*   MPV 11.6 11.9 12.9*       BMP:    Recent Labs     12/24/22 1720 12/25/22  0015 12/25/22  0615    137 139   K 4.1 4.0 3.9    105 104   CO2 24 23 26   BUN 31* 33* 34*   CREATININE 1.1* 1.0 1.0   GLUCOSE 209* 221* 159*   CALCIUM 7.8* 7.8* 7.9*   PROT 5.1* 5.5* 5.4*   LABALBU 2.2* 2.2* 2.4*   BILITOT 1.8* 1.8* 1.7*   ALKPHOS 144* 140* 136*   AST 52* 48* 55*   ALT 17 16 18       LIVER PROFILE:   Recent Labs     12/24/22  1720 12/25/22  0015 12/25/22  0615   AST 52* 48* 55*   ALT 17 16 18   BILITOT 1.8* 1.8* 1.7*   ALKPHOS 144* 140* 136*       PT/INR:   Recent Labs     12/24/22  0553 12/25/22  0615   PROTIME 11.1 11.2   INR 1.0 1.0         APTT:   Recent Labs     12/24/22  0553 12/25/22  0615   APTT 38.7* 43.5*         Fasting Lipid Panel:    Lab Results   Component Value Date/Time    CHOL 45 12/19/2022 12:00 PM    TRIG 136 12/19/2022 12:00 PM    HDL 12 12/19/2022 12:00 PM       Cardiac Enzymes:    Lab Results   Component Value Date    CKTOTAL 59 01/13/2020    TROPONINI <0.01 01/13/2020       Notable Cultures:      Blood cultures   Blood Culture, Routine   Date Value Ref Range Status   12/17/2022 5 Days no growth  Final     Respiratory cultures No results found for: RESPCULTURE   Gram Stain Result   Date Value Ref Range Status   12/20/2022 Refer to ordered Gram stain for results  Final     Urine   Urine Culture, Routine   Date Value Ref Range Status   07/22/2022 <10,000 CFU/mL  Mixed gram positive organisms    Final     Legionella No results found for: LABLEGI  C Diff PCR No results found for: CDIFPCR  Wound culture/abscess: No results for input(s): WNDABS in the last 72 hours. Tip culture:No results for input(s): CXCATHTIP in the last 72 hours. Antibiotic  Days  Day started   Ertapenem      A                      Oxygen:     Vent Information  Ventilator ID: [id number]  Equipment Changed: Expiratory Filter    Additional Respiratory Assessments  Heart Rate: 87  Resp: 30  SpO2: 95 %  Position: Semi-Bach's  Humidification Source: Heated wire  Humidification Temp: 37  Circuit Condensation: Drained  Airway Type: ET  Airway Size: 7.5  Cuff Pressure (cm H2O): 29 cm H2O  Skin Barrier Applied: Yes     Nasal cannula L/min     Face mask %     Reservoirs mask %       ABG     PH  7.25   PCO2  45   PO2  68   HCO3  19   Sat%  91   FIO2     DATES 12/21/22       Lines:  Site  Day  Date inserted     TLC   RIJ    12/17/22      PICC              Arterial line   Left brachial    12/20/22     Peripheral line              HD cath            Urinary Catheter 12/15/22 Peterson-Output (mL): 75 mL    Imaging Studies:    XR CHEST PORTABLE   Final Result   Suggestion of some partial resolution of bilateral basilar infiltrates         XR CHEST PORTABLE   Final Result   No interval change         XR CHEST PORTABLE   Final Result   Stable appearance of the chest compared to 12/21/2022. XR CHEST PORTABLE   Final Result   Adequately positioned left internal jugular hemodialysis catheter. The   remainder of the exam including additional lines and tubes are stable.          XR CHEST PORTABLE   Final Result   Interval left chest tube placement, no definite pneumothorax. Bilateral lung infiltrates and pleural effusions, not significantly changed. These infiltrates may be due to pulmonary edema, pneumonia and/or ARDS. Stable borderline enlargement of the cardiac silhouette. XR CHEST PORTABLE   Final Result   Extensive bilateral airspace disease with interval progression on the right. XR CHEST PORTABLE   Final Result   Postoperative changes with the diffuse bilateral infiltrates and effusions   with improvement in the right side likely CHF/edema. Pneumonia is less   likely. Tubes and catheters as noted. XR CHEST PORTABLE   Final Result   Low lying endotracheal tube which needs withdrawn approximately 2 cm. XR CHEST PORTABLE   Final Result   Enteric tube tip in the body of the stomach. The roseann is not well seen. ET tube tip most likely within 1.5 cm of the   roseann. XR CHEST PORTABLE   Final Result   Pleural effusions with adjacent infiltrate and or atelectasis showing   slightly improved aeration on the left and slightly poor aeration on the   right. XR CHEST PORTABLE   Final Result   Interval improvement in the opacification of the left hemithorax, with   aeration in the left upper lung field when compared to the previous study   performed 1 day earlier. No other significant interval change. XR CHEST PORTABLE   Final Result   New right internal jugular central venous catheter terminating near the   SVC-right atrium junction. No post-procedure pneumothorax. Worsening   opacification of the left hemithorax and stable findings on the right. CT CHEST WO CONTRAST   Final Result   Large right pleural effusion of mixed density to suggest complex effusion. There is a pigtail catheter seen in the pleural space posteriorly and   inferiorly.   Small to moderate size left pleural effusion with consolidative   infiltrate throughout the left upper and lower lung field and aerated   portions revealing nodular ground-glass opacity to suggest an infectious   process. Nodular ground-glass opacity inferiorly within the aerated right   upper and lower lung field concerning for an infectious process as well. There is less consolidative infiltrate identified within the right lower lobe. XR CHEST PORTABLE   Final Result   1. No changes position of the right-sided chest tube. No right-sided   pneumothorax. The most of the right-sided pleural effusion has been drained,   if present to be discrete or small. 2.  Moderate left-sided pleural effusion. If quantification of left-sided   pleural effusion will be needed for clinical management bedside ultrasound or   left lateral decubitus views of the chest can be helpful. 3.  No pneumothorax on the right or on the left. 4.  Large size diaphragmatic across the midline. XR CHEST PORTABLE   Final Result   1. Increased parenchymal density projecting over the left lower lung   concerning for atelectasis and/or consolidation      2. No signs of right effusion      3. Parenchymal density projecting over the right lower lung concerning for   atelectasis         IR GUIDED PERC PLEURAL DRAIN W CATH INSERT   Final Result   1. Successful ultrasound-guided right thoracentesis. 2.  Successful placement of an 8 Gabonese pleural drain         XR CHEST PORTABLE   Final Result   1. Right-sided pigtail catheter in the right base. 2.  No conspicuous right-sided pleural effusions identified. No right-sided   pneumothorax. 3.  Cannot exclude a small left-sided pleural effusion. 4.  Large size diaphragmatic hernia across the midline causing compression   atelectasis in the medial aspect of both lungs. FL ESOPHAGRAM   Final Result   1. No evidence of esophageal perforation or leak. 2. Decreased peristalsis. No evidence of esophageal mass or obstructing   lesion. 3. Small sliding hiatus hernia. No evidence of GE reflux. The gastric   foreign disc does appear constricted as it passes through the esophageal   hiatus. US CHEST INCLUDING MEDIASTINUM   Final Result   Probable complex loculated right pleural effusion. Simple left pleural effusion. CTA PULMONARY W CONTRAST   Final Result   No evidence of pulmonary embolism. Bilateral lower lobe pulmonary consolidations and pleural effusions seen. Deviation of the mediastinum to the left is visualized. Mild soft tissue prominence visualized surrounding the esophagus with   narrowing at the gastroesophageal junction but appears to be due to external   compression. Cannot rule left soft tissue mass at this location. Wall thickening visualized in the gastric outlet with mild prominence of the   stomach seen, this could be artifactual.      Distended gallbladder is seen. Degenerative bone changes, postoperative changes and multilevel vertebral   augmentation seen. No evidence of acute abdominal/pelvic pathology. CT ABDOMEN PELVIS W IV CONTRAST Additional Contrast? Oral   Final Result   No evidence of pulmonary embolism. Bilateral lower lobe pulmonary consolidations and pleural effusions seen. Deviation of the mediastinum to the left is visualized. Mild soft tissue prominence visualized surrounding the esophagus with   narrowing at the gastroesophageal junction but appears to be due to external   compression. Cannot rule left soft tissue mass at this location. Wall thickening visualized in the gastric outlet with mild prominence of the   stomach seen, this could be artifactual.      Distended gallbladder is seen. Degenerative bone changes, postoperative changes and multilevel vertebral   augmentation seen. No evidence of acute abdominal/pelvic pathology. XR CHEST PORTABLE   Final Result   1.  Consolidation seen within the left lung base with blunting the left   costophrenic angle which could represent pneumonia or atelectasis   2. A trace left pleural effusion cannot be excluded   3. Large right diaphragmatic hernia/hiatal hernia which was noted on the   patient's previous CT of the chest of 07/22/2022. XR CHEST PORTABLE    (Results Pending)   XR CHEST PORTABLE    (Results Pending)   CT HEAD WO CONTRAST    (Results Pending)        APRN- CNP Assessment and PLan   In summary 65 y.o. female with significant past medical history of hypothyroidism, breast cancer, TIA, hiatal hernia underwent hiatal hernia repair on 12/9/22 and developed acute respiratory distress and [place] [facility] [place] further management. Patient was seen by nephrology, pulmonology, general surgery. Patient noted to have worsening hypoxia requiring 15 L of oxygen, A. fib RVR therefore transferred to MICU and critical care was consulted.      Assessment:  Acute hypoxemic respiratory failure secondary to aspiration pneumonia/pleural effusion/bibasilar consolidation now on ventilation (12/19/22)   ARDS   Bilateral pleural effusion right greater than left status post right thoracentesis with pigtail chest tube placement on (12/16/22 IR)   Septic shock  2/2 PNA/loculated pleural effusion  Loculated pleural effusion s/p VATS and Mini-thoracotomy with open drainage of fluid collection 12/20   AFIB RVR  PAWAN secondary to dehydration  [place] ptosis leukocytosis with left shift  Acute anemia  History of massive hiatal hernia  status post repair on 12/9/2022  History of TIA  History of breast cancer  History of hypothyroidism  History of lumbar compression fracture with history of PLIF T12-L2  History of anxiety     Plan:  - Patient is off sedation for 24 hrs, not following commands, CT head ordered.  -Intubated on ventilator, titrate FiO2 keep SPO2 goal 92%  - ARDS per protocol   -Improvement in PF ratio noted, stop Nimbex and repeat ABG  - versed, fentanyl to continue  - loculated pleural effusion s/p VATS and mini thoracotomy with open drainage of fluid collection (12/20/22) s/p 2 chest tube with -20cm suction  - US showed effusion on left side, s/p left-sided chest tube 12/21  -Continue scheduled bronchodilators Brovana and Pulmicort in addition to the DuoNebs, mucomyst   - off pressors.  -Echo showed EF of 55%, indeterminate diastolic function, moderate dilated right ventricle with reduced function, moderate tricuspid regurgitation. Echo in January 2021 showed EF of 60 to 65%,   -RVP/COVID-19 PCR negative, MRSA nares negative, blood culture (NGTD) respiratory culture (Enterobacter Colace complex, Candida albicans) urine antigen negative. Body fluid hematoma==> E.cloacae, candida albicans  - Consult to ID, input appreciated. - Ertapenem and anidulafungin   -General surgery recommended to DC Enema, Colace to start & start enteral trickle feeds.  -Esophagram negative for esophageal perforation, recommended antiemetic and mild type of oral pain control, no surgical intervention indicates  - TPN to continue   -PAWAN, continue CRRT with ultrafiltrate -100 mL/h.    -Strict intake and output  -Labs and chest x-ray in a.m.  -F/E/N KVO/ replace lytes/ Npo TPN  - DVT/GI scds/lovenox/protonix  - Code status: DNR- limited No CPR, ok to everything. Critical care time spent 35 minutes. This did not include any procedures.     Radha Head MD  Critical Care

## 2022-12-25 NOTE — PROGRESS NOTES
GENERAL SURGERY  DAILY PROGRESS NOTE  12/25/2022  Chief Complaint   Patient presents with    Shortness of Breath     Sent from [facility] for SOB patient went for hernia repair patient on 6L NC on arrival         Subjective:  Continues to be intubated, sedated. On TPN, no bowel function. No respiratory changes overnight. Off Levo since 0400. No events per nursing staff overnight  CV running--  total 4.8L off    Objective:  BP (!) 117/48   Pulse 84   Temp 97 °F (36.1 °C) (Esophageal)   Resp 30   Ht 5' 2\" (1.575 m)   Wt 143 lb 11.2 oz (65.2 kg)   SpO2 98%   BMI 26.28 kg/m²     GENERAL:  Intubated, sedated,   HEAD: Normocephalic, atraumatic  LUNGS:  Intubated and on ventilator. R Chest tube with serous output, L chest with serosang. Right mediastinal drains with dark output. CARDIOVASCULAR: RR  ABDOMEN:  Soft, non-distended, non-tender. No guarding, rigidity, rebound. Incisions clean with ecchymosis  EXTREMITIES: no deformities, +edema  SKIN: Warm and dry, no rashes or lesions    Assessment/Plan:  65 y.o. female  female with respiratory failure and complex right-sided effusion s/p robotic hiatal hernia repair 12/9 without signs of esophageal perforation or leak however now +fungitell and growing candida in chest  S/p VATS & Mini-thoractomy & bronchoscopy with open drainage of fluid collection 12/20, left CT placement 12/21 with ARDS & renal failure     From General Surgery (Dr. Andie Huffman) standpoint, ok to start tube feeds now that off pressors. Will trial trickle feeds  Continue TPN until tolerating goal enteral feeds  Abdominal exam reassuring. Electronically signed by Jackie Chase DO on 12/25/2022 at 8:02 AM      Attending Physician Statement:  I have examined the patient, reviewed the record, and discussed the case with the surgical team.  I agree with the assessment and plan with the following additions, corrections, and changes.     Continue all current chest tubes/drains to suction  Daily CXR to check drain placements  Lung and pleural fluid cultures growing Enterobacter and C.  Albicans - ID managing antibiotics      Electronically signed by Britton Ayers MD on 12/25/22 at 12:08 PM EST

## 2022-12-25 NOTE — PROGRESS NOTES
Department of Internal Medicine  Nephrology Progress Note      Events reviewed. SUBJECTIVE: We are following Chante Kern for PAWAN. Presently intubated.     PHYSICAL EXAM:      Vitals:    VITALS:  BP (!) 117/48   Pulse 87   Temp 97 °F (36.1 °C) (Esophageal)   Resp 30   Ht 5' 2\" (1.575 m)   Wt 143 lb 11.2 oz (65.2 kg)   SpO2 95%   BMI 26.28 kg/m²   24HR INTAKE/OUTPUT:    Intake/Output Summary (Last 24 hours) at 12/25/2022 0856  Last data filed at 12/25/2022 4717  Gross per 24 hour   Intake 2856 ml   Output 5436 ml   Net -2580 ml         Constitutional: Patient intubated, sedated  HEENT: Normocephalic, atraumatic, PERRLA, endotracheal tube in place  Respiratory: Diminished right lung base  Cardiovascular/Edema: RRR, normal S1, normal S2, no edema  Gastrointestinal: Soft, not distended, nontender  Neurologic: Patient is sedated  Skin: Pale, dry, no lesions, no rash, + edema     Scheduled Meds:   sterile water        docusate sodium  100 mg Oral BID    sennosides  5 mL Oral Nightly    midodrine  10 mg Oral TID WC    ertapenem (INVanz) IVPB  1,000 mg IntraVENous Q24H    Vitamin D  1,000 Units Oral Daily    heparin (porcine)  5,000 Units SubCUTAneous Q8H    artificial tears   Both Eyes Q4H    hydrocortisone sodium succinate PF  100 mg IntraVENous Q8H    insulin lispro  0-8 Units SubCUTAneous Q4H    anidulafungin  100 mg IntraVENous Q24H    pantoprazole (PROTONIX) 40 mg injection  40 mg IntraVENous Q12H    sodium chloride flush  5-40 mL IntraVENous 2 times per day    heparin flush  3 mL IntraVENous 2 times per day    metoclopramide  10 mg IntraVENous Q6H    Arformoterol Tartrate  15 mcg Nebulization BID    budesonide  0.5 mg Nebulization BID    albuterol  2.5 mg Nebulization Q4H WA    [Held by provider] metoprolol  5 mg IntraVENous Q6H    rosuvastatin  10 mg Oral Nightly    levothyroxine  50 mcg Oral Daily     Continuous Infusions:   PN-Adult  3 IN 1 Central Line (Custom)      PN-Adult  3 IN 1 [facility] (Custom) 50 mL/hr at 12/25/22 0729    cisatracurium (NIMBEX) infusion Stopped (12/23/22 0900)    prismaSATE BGK 4/0/1.2 2,000 mL/hr at 12/25/22 0159    sodium chloride 100 mL/hr at 12/22/22 2300    calcium chloride CRRT infusion 8,000 mg (12/25/22 0820)    dextrose      midazolam Stopped (12/24/22 0832)    norepinephrine Stopped (12/25/22 0300)    vasopressin (Septic Shock) infusion Stopped (12/21/22 1808)    fentaNYL Stopped (12/24/22 0841)    sodium chloride       PRN Meds:.potassium chloride, magnesium sulfate, calcium gluconate **OR** calcium gluconate **OR** calcium gluconate **OR** calcium gluconate, sodium phosphate IVPB **OR** sodium phosphate IVPB **OR** sodium phosphate IVPB **OR** sodium phosphate IVPB, glucose, dextrose bolus **OR** dextrose bolus, glucagon (rDNA), dextrose, sodium chloride flush, sodium chloride, heparin flush, ondansetron **OR** ondansetron, magnesium hydroxide, acetaminophen **OR** acetaminophen, ipratropium-albuterol    DATA:    CBC with Differential:    Lab Results   Component Value Date/Time    WBC 14.4 12/25/2022 06:15 AM    RBC 2.88 12/25/2022 06:15 AM    HGB 8.3 12/25/2022 06:15 AM    HCT 25.4 12/25/2022 06:15 AM    HCT 29.0 12/20/2022 01:31 PM     12/25/2022 06:15 AM    MCV 88.2 12/25/2022 06:15 AM    MCH 28.8 12/25/2022 06:15 AM    MCHC 32.7 12/25/2022 06:15 AM    RDW 16.1 12/25/2022 06:15 AM    NRBC 0.9 12/21/2022 04:12 AM    LYMPHOPCT 3.3 12/25/2022 06:15 AM    MONOPCT 4.5 12/25/2022 06:15 AM    MYELOPCT 0.9 12/23/2022 06:05 AM    BASOPCT 0.2 12/25/2022 06:15 AM    MONOSABS 0.65 12/25/2022 06:15 AM    LYMPHSABS 0.48 12/25/2022 06:15 AM    EOSABS 0.00 12/25/2022 06:15 AM    BASOSABS 0.03 12/25/2022 06:15 AM     CMP:    Lab Results   Component Value Date/Time     12/25/2022 06:15 AM    K 3.9 12/25/2022 06:15 AM    K 4.7 12/21/2022 09:23 AM     12/25/2022 06:15 AM    CO2 26 12/25/2022 06:15 AM    BUN 34 12/25/2022 06:15 AM    CREATININE 1.0 12/25/2022 06:15 AM    GFRAA >60 07/22/2022 01:42 PM    LABGLOM 58 12/25/2022 06:15 AM    GLUCOSE 159 12/25/2022 06:15 AM    PROT 5.4 12/25/2022 06:15 AM    LABALBU 2.4 12/25/2022 06:15 AM    LABALBU 4.3 01/24/2011 01:15 PM    CALCIUM 7.9 12/25/2022 06:15 AM    BILITOT 1.7 12/25/2022 06:15 AM    ALKPHOS 136 12/25/2022 06:15 AM    AST 55 12/25/2022 06:15 AM    ALT 18 12/25/2022 06:15 AM     Magnesium:    Lab Results   Component Value Date/Time    MG 2.0 12/25/2022 06:15 AM     Phosphorus:    Lab Results   Component Value Date/Time    PHOS 1.9 12/25/2022 06:15 AM     Radiology Review:      CT Chest and Abd/Pelvis 12/14/22      No evidence of pulmonary embolism. Bilateral lower lobe pulmonary consolidations and pleural effusions seen. Deviation of the mediastinum to the left is visualized. Mild soft tissue prominence visualized surrounding the esophagus with   narrowing at the gastroesophageal junction but appears to be due to external   compression. Cannot rule left soft tissue mass at this location. Wall thickening visualized in the gastric outlet with mild prominence of the   stomach seen, this could be artifactual.     Distended gallbladder is seen. Degenerative bone changes, postoperative changes and multilevel vertebral   augmentation seen. No evidence of acute abdominal/pelvic pathology. US Chest 12/15/22       Probable complex loculated right pleural effusion. Simple left pleural effusion. BRIEF SUMMARY OF INITIAL CONSULT:    Briefly, Jovita Degroot is a 65-year-old female, past medical history significant for hypothyroidism, hiatal hernia and TIA, who presented to the ED on 12/14/2022 from [facility], patient recently underwent a hiatal hernia repair on 12/9/22, developed acute respiratory distress and was transferred to [facility] on 12/14/2022 for further management and treatment.   Initial ED work-up revealed creatinine level of 1.7, patient then underwent two seperate IV contrast studies CTA and creatinine subsequently increased to 2.5, on 12/15/2022 creatinine level increased to 3.5 mg/dL, reason for this consultation. Review of records from [facility] reveals that baseline creatinine appears to be between 0.9 to 1.0 mg/dL, creatinine was 1.0 on 12/13/2022, patient was started on lisinopril during hospitalization, was given multiple doses IV furosemide with poor oral intake and vomiting. Problems resolved:  HAGMA, 2/2 uremia and starvation ketoacidosis  Hypophosphatemia, poor intake/refeeding syndrome, rule out vitamin D deficiency, levels improved    IMPRESSION/RECOMMENDATIONS:     PAWAN, stage II, nonoliguric, ATN (ischemic +/- contrast induced , intravascular volume depletion due to poor oral intake, loop diuretics and vomiting in the setting of ACE inhibition versus,) FEUrea 28.3%, FENa 1.1%. Started on renal replacement therapy on 12/21/2022.  Tolerating CVVHD with fluid removal.    Hemodynamic shock, septic, on antibiotics and vasopressors  Vitamin D deficiency, vitamin D 25 level 11, on cholecalciferol   ABGs with normal pH and PCO2    ------------------------------------------------------  Respiratory failure, status post intubation  New onset AF with RVR  Complex right pleural effusion, s/p VATS    S/p hiatal hernia repair 12/9/2022  Hypothyroidism, on levothyroxine  Persistent vomiting, PRN ondansetron, esophagram negative for perforation  Hospital-acquired pneumonia, + Enterobacter cloacae, on ertapenem  Possibly focal pneumonia, Candida albicans?,  On anidulafungin  Normocytic anemia, multifactorial  Severe hypoalbuminemia  Nutrition, on TPN @50 cc/hour per surgery      Plan:    Continue CVVHD 30 cc/kilo/hour, continue UF to 100 cc/hour   Continue TPN per surgery  Continue to monitor renal function for recovery       Electronically signed by KAR Thomson CNP on 12/25/2022 at 8:56 AM

## 2022-12-25 NOTE — PLAN OF CARE
Problem: Respiratory - Adult  Goal: Achieves optimal ventilation and oxygenation  Outcome: Not Progressing     Problem: Respiratory - Adult  Goal: Achieves optimal ventilation and oxygenation  Outcome: Not Progressing

## 2022-12-25 NOTE — PROGRESS NOTES
Hospitalist Progress Note      PCP: Nayan Grewal DO    Date of Admission: 12/14/2022        Hospital Course:  65 y.o. female presented with PNEUMONIA. STATES SHE HAD A HH REPAIR AT [facility] ON THE 8TH. SHE STATES SHE WAS NOT FEELING BAD, BUT WAS TOLD SHE NEEDED TO COME TO THE HOSPITAL BC SHE WAS SICK. SHE WAS FOUND TO BE SEPTIC, WITH A HAG, SHE WAS TACHY AND TACHYPNEIC . COFFEE GROUND EMESIS THIS MORNING, GASTROCULT  POS . SURGERY NOTIFIED ** HAD A THROACENTESIS ON YESTERDAY, 105 CC REMOVED.     ** RRT  DUE TO RESPIRATORY DISTRESS ** TO TRANSFER TO  OR [facility]** INTUBATED HYPOXIA**  HAD CHEST TUBES INSERTED ON 12.20 ** DEVI FOUND ON CULTURES, NOW ON CVVHD            Subjective: INTUBATED, SEDATION OFF           Medications:  Reviewed    Infusion Medications    PN-Adult  3 IN 1 Central Line (Custom)      PN-Adult  3 IN 1 Central Line (Custom) 50 mL/hr at 12/25/22 1133    cisatracurium (NIMBEX) infusion Stopped (12/23/22 0900)    prismaSATE BGK 4/0/1.2 2,000 mL/hr at 12/25/22 0159    sodium chloride 100 mL/hr at 12/22/22 2300    calcium chloride CRRT infusion 8,000 mg (12/25/22 0820)    dextrose      midazolam Stopped (12/24/22 0832)    norepinephrine 5 mcg/min (12/25/22 1133)    vasopressin (Septic Shock) infusion Stopped (12/21/22 1808)    fentaNYL Stopped (12/24/22 0841)    sodium chloride       Scheduled Medications    sterile water        docusate sodium  100 mg Oral BID    sennosides  5 mL Oral Nightly    midodrine  10 mg Oral TID     ertapenem (INVanz) IVPB  1,000 mg IntraVENous Q24H    Vitamin D  1,000 Units Oral Daily    heparin (porcine)  5,000 Units SubCUTAneous Q8H    artificial tears   Both Eyes Q4H    hydrocortisone sodium succinate PF  100 mg IntraVENous Q8H    insulin lispro  0-8 Units SubCUTAneous Q4H    anidulafungin  100 mg IntraVENous Q24H    pantoprazole (PROTONIX) 40 mg injection  40 mg IntraVENous Q12H    sodium chloride flush  5-40 mL IntraVENous 2 times per day heparin flush  3 mL IntraVENous 2 times per day    metoclopramide  10 mg IntraVENous Q6H    Arformoterol Tartrate  15 mcg Nebulization BID    budesonide  0.5 mg Nebulization BID    albuterol  2.5 mg Nebulization Q4H WA    [Held by provider] metoprolol  5 mg IntraVENous Q6H    rosuvastatin  10 mg Oral Nightly    levothyroxine  50 mcg Oral Daily     PRN Meds: potassium chloride, magnesium sulfate, calcium gluconate **OR** calcium gluconate **OR** calcium gluconate **OR** calcium gluconate, sodium phosphate IVPB **OR** sodium phosphate IVPB **OR** sodium phosphate IVPB **OR** sodium phosphate IVPB, glucose, dextrose bolus **OR** dextrose bolus, glucagon (rDNA), dextrose, sodium chloride flush, sodium chloride, heparin flush, ondansetron **OR** ondansetron, magnesium hydroxide, acetaminophen **OR** acetaminophen, ipratropium-albuterol      Intake/Output Summary (Last 24 hours) at 12/25/2022 1248  Last data filed at 12/25/2022 1133  Gross per 24 hour   Intake 2391 ml   Output 5226 ml   Net -2835 ml       Exam:    BP (!) 112/44   Pulse 89   Temp 97.7 °F (36.5 °C) (Esophageal)   Resp 30   Ht 5' 2\" (1.575 m)   Wt 143 lb 11.2 oz (65.2 kg)   SpO2 95%   BMI 26.28 kg/m²       General appearance:  No apparent distress, appears stated age. HEENT:  Normal cephalic,   Neck: Supple, with full range of motion. Trachea midline. Respiratory:  Normal respiratory effort. COARSE BREATH SOUNDS  NOTED BILATERALLY  Cardiovascular:  RRR  Abdomen: Soft, non-tender, non-distended with normal bowel sounds. Musculoskeletal:  No clubbing, cyanosis or edema bilaterally.     Skin: smooth and dry                Labs:   Recent Labs     12/23/22  0605 12/24/22  0553 12/25/22  0615   WBC 18.2* 15.8* 14.4*   HGB 8.7* 8.3* 8.3*   HCT 25.8* 24.9* 25.4*    111* 108*     Recent Labs     12/24/22  1720 12/25/22  0015 12/25/22  0615    137 139   K 4.1 4.0 3.9    105 104   CO2 24 23 26   BUN 31* 33* 34*   CREATININE 1.1* 1.0 1.0 CALCIUM 7.8* 7.8* 7.9*   PHOS 1.8* 2.7 1.9*     Recent Labs     12/24/22  1720 12/25/22  0015 12/25/22  0615   AST 52* 48* 55*   ALT 17 16 18   BILITOT 1.8* 1.8* 1.7*   ALKPHOS 144* 140* 136*     Recent Labs     12/24/22  0553 12/25/22  0615   INR 1.0 1.0     No results for input(s): CKTOTAL, TROPONINI in the last 72 hours. Recent Labs     12/24/22  1720 12/25/22  0015 12/25/22  0615   AST 52* 48* 55*   ALT 17 16 18   BILITOT 1.8* 1.8* 1.7*   ALKPHOS 144* 140* 136*     Recent Labs     12/23/22  1445 12/23/22  1752 12/24/22  0553   LACTA 1.6 1.9 1.6     No results found for: Bull Singleton  No results found for: AMMONIA    Assessment:    Active Hospital Problems    Diagnosis Date Noted    Hypoxia [R09.02] 12/17/2022     Priority: Medium    Pneumonia of left lower lobe due to infectious organism [J18.9] 12/17/2022     Priority: Medium    Aspiration pneumonia of both lower lobes due to gastric secretions ([facility] [address] [J69.0] 12/17/2022     Priority: Medium    Postoperative pneumonia [J95.89, J18.9] 12/14/2022     Priority: Medium         Priority: Medium   ACUTE RESP FAILURE WITH HYPOXIA  GASTROCULT POS  S/P HH REPAIR  PAWAN BASELINE CREATINE 1.0)  H.O BREAST CANCER   HLD   PLEURAL EFFUSIONS BILATERALLY   S/P CHEST TUBES  R loculated pleural effusion s/p IR chest tube 12/17/22 with minimal drainage, s/p R VATS turned partial open thoracotomy 12/20/22 with insertion of chest tubes x 3  PLAN:  anidulafungin 200    FLUIDS  MONITOR BMP   FOR  CAMACHO  DVT Prophylaxis: SCD  Diet: Diet NPO Exceptions are: Ice Chips  PN-Adult  3 IN 1 Central Line (Custom)  Diet NPO Exceptions are: Sips of Water with Meds  PN-Adult  3 IN 1 Central Line (Custom)  Code Status: Full Code     PT/OT Eval Status:   WHEN STABLE     Dispo -  TO [facility] OR           Electronically signed by Matias Saint, DO on 12/25/2022 at 12:48 PM [facility]

## 2022-12-25 NOTE — PLAN OF CARE
Problem: Chronic Conditions and Co-morbidities  Goal: Patient's chronic conditions and co-morbidity symptoms are monitored and maintained or improved  12/24/2022 1907 by Benny Naranjo RN  Outcome: Progressing  12/24/2022 0547 by Estefania Blunt RN  Outcome: Not Progressing     Problem: Discharge Planning  Goal: Discharge to home or other facility with appropriate resources  12/24/2022 1907 by Benny Naranjo RN  Outcome: Progressing  12/24/2022 0547 by Estefania Blunt RN  Outcome: Not Progressing     Problem: Safety - Adult  Goal: Free from fall injury  12/24/2022 1907 by Benny Naranjo RN  Outcome: Progressing  12/24/2022 0547 by Estefania Blunt RN  Outcome: Progressing     Problem: Pain  Goal: Verbalizes/displays adequate comfort level or baseline comfort level  12/24/2022 1907 by Benny Naranjo RN  Outcome: Progressing  12/24/2022 0547 by Estefania Blunt RN  Outcome: Progressing     Problem: Nutrition Deficit:  Goal: Optimize nutritional status  12/24/2022 1907 by Benny Naranjo RN  Outcome: Progressing  12/24/2022 0547 by Estefania Blunt RN  Outcome: Not Progressing     Problem: Skin/Tissue Integrity  Goal: Absence of new skin breakdown  Description: 1.  Monitor for areas of redness and/or skin breakdown  2.  Assess vascular access sites hourly  3.  Every 4-6 hours minimum:  Change oxygen saturation probe site  4.  Every 4-6 hours:  If on nasal continuous positive airway pressure, respiratory therapy assess nares and determine need for appliance change or resting period.  12/24/2022 1907 by Benny Naranjo RN  Outcome: Progressing  12/24/2022 0547 by Estefania Blunt RN  Outcome: Progressing     Problem: Respiratory - Adult  Goal: Achieves optimal ventilation and oxygenation  12/24/2022 1907 by Benny Naranjo RN  Outcome: Progressing  12/24/2022 0842 by Dulce Maria Acharya RCP  Outcome: Progressing  12/24/2022 0547 by Estefania Blunt RN  Outcome: Progressing     Problem: Chronic  Conditions and Co-morbidities  Goal: Patient's chronic conditions and co-morbidity symptoms are monitored and maintained or improved  12/24/2022 1907 by Frederick Llanes RN  Outcome: Progressing  12/24/2022 0547 by Damion Nettles RN  Outcome: Not Progressing     Problem: Discharge Planning  Goal: Discharge to home or other facility with appropriate resources  12/24/2022 1907 by Frederick Llanes RN  Outcome: Progressing  12/24/2022 0547 by Damion Nettles RN  Outcome: Not Progressing     Problem: Nutrition Deficit:  Goal: Optimize nutritional status  12/24/2022 1907 by Frederick Llanes RN  Outcome: Progressing  12/24/2022 0547 by Damion Netltes RN  Outcome: Not Progressing

## 2022-12-26 NOTE — PROGRESS NOTES
GENERAL SURGERY  DAILY PROGRESS NOTE  12/26/2022    CHIEF COMPLAINT:  Chief Complaint   Patient presents with    Shortness of Breath     Sent from [facility] for SOB patient went for hernia repair patient on 6L NC on arrival       SUBJECTIVE:  Continues to be intubated, sedated. Tube feeds currently running at 15/h, patient not currently on pressor support. Fentanyl at 100. OBJECTIVE:  BP (!) 116/54   Pulse (!) 105   Temp 98.1 °F (36.7 °C) (Esophageal)   Resp 26   Ht 5' 2\" (1.575 m)   Wt 136 lb 8 oz (61.9 kg)   SpO2 100%   BMI 24.97 kg/m²     GENERAL:  Intubated, sedated,   LUNGS:  Intubated and on ventilator. R Chest tube with serous output, L chest with serosang. Right mediastinal drains with dark output. CARDIOVASCULAR: RR  ABDOMEN:  Soft, non-distended, non-tender. No guarding, rigidity, rebound. Incisions clean with ecchymosis    ASSESSMENT/PLAN:  65 y.o. female with respiratory failure and complex right-sided effusion s/p robotic hiatal hernia repair 12/9 without signs of esophageal perforation or leak however now +fungitell and growing candida in chest  S/p VATS & Mini-thoractomy & bronchoscopy with open drainage of fluid collection 12/20, left CT placement 12/21 with ARDS & renal failure s/p chemical paralysis, flolan, CVVHD 12/21  Bronchoscopy intra-op showed friable muscosa w/o any thick purulent secretions    Remains critically ill  Advance tube feeds to goal  Discontinue TPN  Await return of bowel function  Continue chest tubes to suction. Appreciate critical care assistance with medical management.   Daily CXR    Discussed with Dr. Tina Harman    Electronically signed by Caroline Browne DO on 12/26/2022 at 8:02 AM    Electronically signed by Frank Osei MD on 12/26/22 at 10:38 AM EST

## 2022-12-26 NOTE — PROGRESS NOTES
Spoke with NP regarding rhythm change. NP advised pt goes in and out of a fib. No new orders at this time.

## 2022-12-26 NOTE — PROGRESS NOTES
Department of Internal Medicine  Nephrology Progress Note      Events reviewed. SUBJECTIVE: We are following Lizeth Tello for PAWAN. Presently intubated.     PHYSICAL EXAM:      Vitals:    VITALS:  BP (!) 116/54   Pulse 97   Temp 98.1 °F (36.7 °C) (Esophageal)   Resp 20   Ht 5' 2\" (1.575 m)   Wt 136 lb 8 oz (61.9 kg)   SpO2 98%   BMI 24.97 kg/m²   24HR INTAKE/OUTPUT:    Intake/Output Summary (Last 24 hours) at 12/26/2022 3529  Last data filed at 12/26/2022 0900  Gross per 24 hour   Intake 3301 ml   Output 6240 ml   Net -2939 ml         Constitutional: Patient intubated, sedated  HEENT: Normocephalic, atraumatic, PERRLA, endotracheal tube in place  Respiratory: Diminished right lung base  Cardiovascular/Edema: RRR, normal S1, normal S2, no edema  Gastrointestinal: Soft, not distended, nontender  Neurologic: Patient is sedated  Skin: Pale, dry, no lesions, no rash, + edema     Scheduled Meds:   hydrocortisone sodium succinate PF  50 mg IntraVENous Q12H    insulin lispro  0-16 Units SubCUTAneous Q4H    potassium chloride  20 mEq IntraVENous Once    docusate sodium  100 mg Oral BID    sennosides  5 mL Oral Nightly    midodrine  10 mg Oral TID WC    ertapenem (INVanz) IVPB  1,000 mg IntraVENous Q24H    Vitamin D  1,000 Units Oral Daily    heparin (porcine)  5,000 Units SubCUTAneous Q8H    artificial tears   Both Eyes Q4H    anidulafungin  100 mg IntraVENous Q24H    pantoprazole (PROTONIX) 40 mg injection  40 mg IntraVENous Q12H    sodium chloride flush  5-40 mL IntraVENous 2 times per day    heparin flush  3 mL IntraVENous 2 times per day    metoclopramide  10 mg IntraVENous Q6H    Arformoterol Tartrate  15 mcg Nebulization BID    budesonide  0.5 mg Nebulization BID    albuterol  2.5 mg Nebulization Q4H WA    [Held by provider] metoprolol  5 mg IntraVENous Q6H    [Held by provider] rosuvastatin  10 mg Oral Nightly    levothyroxine  50 mcg Oral Daily     Continuous Infusions:   cisatracurium (NIMBEX) infusion Stopped (12/23/22 0900)    prismaSATE BGK 4/0/1.2 2,000 mL/hr at 12/26/22 0415    sodium chloride 100 mL/hr at 12/22/22 2300    calcium chloride CRRT infusion 55 mL/hr at 12/26/22 0650    dextrose      [Held by provider] fentaNYL 100 mcg/hr (12/26/22 0020)    sodium chloride       PRN Meds:.potassium chloride, magnesium sulfate, calcium gluconate **OR** calcium gluconate **OR** calcium gluconate **OR** calcium gluconate, sodium phosphate IVPB **OR** sodium phosphate IVPB **OR** sodium phosphate IVPB **OR** sodium phosphate IVPB, glucose, dextrose bolus **OR** dextrose bolus, glucagon (rDNA), dextrose, sodium chloride flush, sodium chloride, heparin flush, ondansetron **OR** ondansetron, magnesium hydroxide, acetaminophen **OR** acetaminophen, ipratropium-albuterol    DATA:    CBC with Differential:    Lab Results   Component Value Date/Time    WBC 17.1 12/26/2022 05:42 AM    RBC 2.89 12/26/2022 05:42 AM    HGB 8.5 12/26/2022 05:42 AM    HCT 25.2 12/26/2022 05:42 AM    HCT 29.0 12/20/2022 01:31 PM     12/26/2022 05:42 AM    MCV 87.2 12/26/2022 05:42 AM    MCH 29.4 12/26/2022 05:42 AM    MCHC 33.7 12/26/2022 05:42 AM    RDW 16.3 12/26/2022 05:42 AM    NRBC 0.9 12/21/2022 04:12 AM    LYMPHOPCT 2.6 12/26/2022 05:42 AM    MONOPCT 3.5 12/26/2022 05:42 AM    MYELOPCT 0.9 12/23/2022 06:05 AM    BASOPCT 0.2 12/26/2022 05:42 AM    MONOSABS 0.68 12/26/2022 05:42 AM    LYMPHSABS 0.51 12/26/2022 05:42 AM    EOSABS 0.00 12/26/2022 05:42 AM    BASOSABS 0.00 12/26/2022 05:42 AM     CMP:    Lab Results   Component Value Date/Time     12/26/2022 05:42 AM    K 3.7 12/26/2022 05:42 AM    K 4.7 12/21/2022 09:23 AM     12/26/2022 05:42 AM    CO2 24 12/26/2022 05:42 AM    BUN 39 12/26/2022 05:42 AM    CREATININE 1.0 12/26/2022 05:42 AM    GFRAA >60 07/22/2022 01:42 PM    LABGLOM 58 12/26/2022 05:42 AM    GLUCOSE 216 12/26/2022 05:42 AM    PROT 5.6 12/26/2022 05:42 AM    LABALBU 2.4 12/26/2022 05:42 AM    LABALBU 4.3 01/24/2011 01:15 PM    CALCIUM 7.6 12/26/2022 05:42 AM    BILITOT 1.5 12/26/2022 05:42 AM    ALKPHOS 231 12/26/2022 05:42 AM     12/26/2022 05:42 AM     12/26/2022 05:42 AM     Magnesium:    Lab Results   Component Value Date/Time    MG 2.2 12/26/2022 05:42 AM     Phosphorus:    Lab Results   Component Value Date/Time    PHOS 2.3 12/26/2022 05:42 AM     Radiology Review:      CT Chest and Abd/Pelvis 12/14/22      No evidence of pulmonary embolism. Bilateral lower lobe pulmonary consolidations and pleural effusions seen. Deviation of the mediastinum to the left is visualized. Mild soft tissue prominence visualized surrounding the esophagus with   narrowing at the gastroesophageal junction but appears to be due to external   compression. Cannot rule left soft tissue mass at this location. Wall thickening visualized in the gastric outlet with mild prominence of the   stomach seen, this could be artifactual.     Distended gallbladder is seen. Degenerative bone changes, postoperative changes and multilevel vertebral   augmentation seen. No evidence of acute abdominal/pelvic pathology. US Chest 12/15/22       Probable complex loculated right pleural effusion. Simple left pleural effusion. BRIEF SUMMARY OF INITIAL CONSULT:    Briefly, Tarun Ferrari is a 65-year-old female, past medical history significant for hypothyroidism, hiatal hernia and TIA, who presented to the ED on 12/14/2022 from [facility], patient recently underwent a hiatal hernia repair on 12/9/22, developed acute respiratory distress and [facility] management and treatment. Initial ED work-up revealed creatinine level of 1.7, patient then underwent two seperate IV contrast studies CTA and creatinine subsequently increased to 2.5, on 12/15/2022 creatinine level increased to 3.5 mg/dL, reason for this consultation.   Review of records from [facility] reveals that baseline creatinine appears to be between 0.9 to 1.0 mg/dL, creatinine was 1.0 on 12/13/2022, patient was started on lisinopril during hospitalization, was given multiple doses IV furosemide with poor oral intake and vomiting. Problems resolved:  HAGMA, 2/2 uremia and starvation ketoacidosis  Hypophosphatemia, poor intake/refeeding syndrome, rule out vitamin D deficiency, levels improved  Hemodynamic shock, septic, on antibiotics and vasopressors    IMPRESSION/RECOMMENDATIONS:     PAWAN, stage II, nonoliguric, ATN (ischemic +/- contrast induced , intravascular volume depletion due to poor oral intake, loop diuretics and vomiting in the setting of ACE inhibition versus,) FEUrea 28.3%, FENa 1.1%. Started on renal replacement therapy on 12/21/2022. Tolerating CVVHD with fluid removal.  Presently off pressors. We will discontinue CRRT tonight.     Vitamin D deficiency, vitamin D 25 level 11, on cholecalciferol   Hypophosphatemia, 2/2 phosphorus removal with CRRT, with replacement per protocol  Hypocalcemia, 2/2 calcium removal with CRRT, with replacement per protocol  ABGs with normal pH and PCO2    ------------------------------------------------------  Respiratory failure, status post intubation  New onset AF with RVR  Complex right pleural effusion, s/p VATS    S/p hiatal hernia repair 12/9/2022  Hypothyroidism, on levothyroxine  Persistent vomiting, PRN ondansetron, esophagram negative for perforation  Hospital-acquired pneumonia, + Enterobacter cloacae, on ertapenem  Possibly focal pneumonia, Candida albicans?,  On anidulafungin  Normocytic anemia, multifactorial  Severe hypoalbuminemia  Nutrition, on TPN @50 cc/hour per surgery      Plan:    Continue CVVHD 30 cc/kilo/hour, continue UF to 100 cc/hour   Discontinue CVVHD tonight if patient is still off pressors  Continue TPN per surgery  Continue to monitor renal function for recovery       Electronically signed by Giorgio Koch MD on 12/26/2022 at 9:23 AM

## 2022-12-26 NOTE — PROGRESS NOTES
Pulmonary Subsequent Hospital F/U note    Patient is being followed for: acute respiratory failure with hypoxia, pneumonia, bilateral pleural effusions    Interval HPI:  Seen in the ICU  Remains sedated on the vent   Settings ACVC 50%, PEEP 8, tidal volume 300cc, rate 20  Off pressors  Remains on CVVHD  Chest tubes remain    ROS:  Unable to obtain      Exam:  BP (!) 100/47   Pulse 98   Temp 97.3 °F (36.3 °C) (Esophageal)   Resp 27   Ht 5' 2\" (1.575 m)   Wt 136 lb 8 oz (61.9 kg)   SpO2 98%   BMI 24.97 kg/m²    General: Patient is intubated and sedated   HEENT: PERRL, ETT in place   Neck: supple no adenopathy  CV: RRR without murmur   Lungs: clear without wheezing or rhonchi today  Abd: soft, ND, NT, bowel sounds normal  Ext: warm, trace edema     Data:    Oximetry:  SpO2 Readings from Last 1 Encounters:   12/26/22 98%       Imaging personally reviewed by myself:  CXR  FINDINGS:   Given some differences in film technique, there has been no significant   interval change in the bilateral lung opacities. A small right pleural   effusion is again noted. The cardiac silhouette and mediastinal structures   are without significant interval change. Bilateral chest tubes are again   seen in place. No pneumothorax is appreciated. An endotracheal tube is   again noted, with the tip 3.1 cm above the roseann. An NG tube is again   noted, with tip in the stomach. Bilateral IJ lines are again noted, without   change in position. There is still oral contrast seen within the stomach. Patient is again status post lumbar spine surgery and multilevel   vertebroplasty. CT chest  Impression   Large right pleural effusion of mixed density to suggest complex effusion. There is a pigtail catheter seen in the pleural space posteriorly and   inferiorly.   Small to moderate size left pleural effusion with consolidative   infiltrate throughout the left upper and lower lung field and aerated   portions revealing nodular ground-glass opacity to suggest an infectious   process. Nodular ground-glass opacity inferiorly within the aerated right   upper and lower lung field concerning for an infectious process as well. There is less consolidative infiltrate identified within the right lower lobe. Respiratory cultures  Enterobacter, zosyn resistant    Pertinent labs reviewed and noted:  Lab Results   Component Value Date/Time    WBC 17.1 12/26/2022 05:42 AM    HGB 8.5 12/26/2022 05:42 AM    HCT 25.2 12/26/2022 05:42 AM    HCT 29.0 12/20/2022 01:31 PM    MCV 87.2 12/26/2022 05:42 AM    MCH 29.4 12/26/2022 05:42 AM    MCHC 33.7 12/26/2022 05:42 AM    RDW 16.3 12/26/2022 05:42 AM     12/26/2022 05:42 AM    MPV 13.4 12/26/2022 05:42 AM     Lab Results   Component Value Date/Time     12/26/2022 12:17 PM    K 4.1 12/26/2022 12:17 PM    K 4.7 12/21/2022 09:23 AM     12/26/2022 12:17 PM    CO2 23 12/26/2022 12:17 PM    BUN 37 12/26/2022 12:17 PM    CREATININE 1.0 12/26/2022 12:17 PM    LABALBU 2.4 12/26/2022 12:17 PM    LABALBU 4.3 01/24/2011 01:15 PM    CALCIUM 8.2 12/26/2022 12:17 PM    GFRAA >60 07/22/2022 01:42 PM    LABGLOM 58 12/26/2022 12:17 PM     Lab Results   Component Value Date/Time    PROTIME 11.2 12/25/2022 06:15 AM    INR 1.0 12/25/2022 06:15 AM       Assessment:  1. Acute hypoxic respiratory failure - requiring intubation 12/19/22  2. ARDS  3. S/p RRT 12/17/22 for respiratory distress and increasing oxygen demands  4. R loculated pleural effusion s/p IR chest tube 12/17/22 with minimal drainage, s/p R VATS turned partial open thoracotomy 12/20/22 with insertion of chest tubes x 3  6. Pneumonia, Hospital acquired-Enterobacter cloacae-Invanz started 12/19/22  7. Massive hiatal hernia s/p repair 12/9 at [facility]  8. Acute kidney injury on CVVHD  9. Hx breast Ca s/p mastectomy  10.  Hx lumbar compression fracture with hx of PLIF T12-L2    Plan:  Continue mechanical ventilation, wean FiO2 as able - now with improving P/F ratio  Aggressive bronchopulmonary hygiene  Sedation per ICU team  CVVHD   CXR improving   Chest tube management per surgery  Antimicrobials as per ID - invanz and eraxis  Will follow       Electronically signed by Brook Carty MD on 12/26/2022 at 1:36 PM

## 2022-12-26 NOTE — PLAN OF CARE
Problem: Chronic Conditions and Co-morbidities  Goal: Patient's chronic conditions and co-morbidity symptoms are monitored and maintained or improved  Outcome: Progressing  Flowsheets (Taken 12/25/2022 2000)  Care Plan - Patient's Chronic Conditions and Co-Morbidity Symptoms are Monitored and Maintained or Improved: Monitor and assess patient's chronic conditions and comorbid symptoms for stability, deterioration, or improvement     Problem: Discharge Planning  Goal: Discharge to home or other facility with appropriate resources  Outcome: Progressing  Flowsheets (Taken 12/25/2022 2000)  Discharge to home or other facility with appropriate resources: Identify barriers to discharge with patient and caregiver     Problem: Safety - Adult  Goal: Free from fall injury  Outcome: Progressing  Flowsheets (Taken 12/25/2022 2000)  Free From Fall Injury: Instruct family/caregiver on patient safety     Problem: Pain  Goal: Verbalizes/displays adequate comfort level or baseline comfort level  Outcome: Progressing  Flowsheets (Taken 12/25/2022 2000)  Verbalizes/displays adequate comfort level or baseline comfort level: Assess pain using appropriate pain scale     Problem: Nutrition Deficit:  Goal: Optimize nutritional status  Outcome: Progressing     Problem: Skin/Tissue Integrity  Goal: Absence of new skin breakdown  Description: 1. Monitor for areas of redness and/or skin breakdown  2. Assess vascular access sites hourly  3. Every 4-6 hours minimum:  Change oxygen saturation probe site  4. Every 4-6 hours:  If on nasal continuous positive airway pressure, respiratory therapy assess nares and determine need for appliance change or resting period.   Outcome: Progressing     Problem: Respiratory - Adult  Goal: Achieves optimal ventilation and oxygenation  12/25/2022 2201 by Salinas Denson RN  Outcome: Progressing     Problem: Respiratory - Adult  Goal: Achieves optimal ventilation and oxygenation  12/25/2022 2201 by Margot Bass RN  Outcome: Progressing  12/25/2022 1618 by Minta Hashimoto, RCP  Outcome: Not Progressing

## 2022-12-26 NOTE — PROGRESS NOTES
[address] [facility]   Department of Internal Medicine   MICU Progress Note    Patient:  Julian Ramon 65 y.o. female   MRN: [id number]       Date of Service: 2022    Allergy: Benadryl [diphenhydramine] and Ibuprofen  CC hypoxia   Subjective   Intubated/sedated on vent  SBT this AM if able to  Hold sedation   Not on pressors  3 chest tube with minimal drainage  Temps of 97.3  CRRT in progress with net removal of 100cc/hour  No overnight event  Unable to obtain ROS   Objective     TEMPERATURE:  Current - Temp: 97.3 °F (36.3 °C); Max - Temp  Av.7 °F (36.5 °C)  Min: 97.2 °F (36.2 °C)  Max: 98.1 °F (36.7 °C)    RESPIRATIONS RANGE: Resp  Av.7  Min: 7  Max: 35    PULSE RANGE: Pulse  Av.4  Min: 89  Max: 113    BLOOD PRESSURE RANGE:  Systolic (27LUZ), TOQ:115 , Min:97 , XDO:183   ; Diastolic (65SOI), GRL:25, Min:38, Max:60      PULSE OXIMETRY RANGE: SpO2  Av %  Min: 91 %  Max: 100 %    I & O - 24hr:    Intake/Output Summary (Last 24 hours) at 2022 1322  Last data filed at 2022 1306  Gross per 24 hour   Intake 4424 ml   Output 6810 ml   Net -2386 ml     I/O last 3 completed shifts: In: 0674 [I.V.:4512; NG/GT:223]  Out: 8802 [Urine:240; Drains:40; Chest Tube:310] I/O this shift:  In: 1123 [I.V.:955; NG/GT:168]  Out: 1609 [Chest Tube:60]   Weight change: -7 lb 3.2 oz (-3.266 kg)    Physical Exam:  General Appearance: intubated/sedated on vent, does not look in distress. Anasarca noted   HEENT:  Head: Normocephalic, no lesions, without obvious abnormality. Eye: Normal external eye, conjunctiva, lids cornea, FILEMON. Nose: Normal external nose, mucus membranes and septum. Neck / Thyroid: Supple, no masses, nodes, nodules or enlargement. Neck: no adenopathy, no carotid bruit, no JVD, supple, symmetrical, trachea midline, and thyroid not enlarged, symmetric, no tenderness/mass/nodules  Lung: Rhonchi throughout lung field, no wheezing noted.  3 chest tube    Heart: regular rate and rhythm, S1, S2 normal, no murmur, click, rub or gallop  Abdomen: soft, non-tender; bowel sounds normal; no masses,  no organomegaly incision noted, intact. Extremities:  extremities normal, atraumatic, no cyanosis or edema 3+ pitting edema bilaterally. Musculokeletal: No joint swelling, no muscle tenderness. ROM normal in all joints of extremities.    Neurologic: Mental status: intubated/sedated on vent, limited neuro exam       Medications     Continuous Infusions:   sodium chloride      cisatracurium (NIMBEX) infusion Stopped (12/23/22 0900)    prismaSATE BGK 4/0/1.2 2,000 mL/hr at 12/26/22 0415    sodium chloride 100 mL/hr at 12/22/22 2300    calcium chloride CRRT infusion 55 mL/hr at 12/26/22 0650    dextrose      [Held by provider] fentaNYL Stopped (12/26/22 0834)    sodium chloride       Scheduled Meds:   insulin lispro  0-16 Units SubCUTAneous Q4H    lidocaine PF  5 mL IntraDERmal Once    sodium chloride flush  5-40 mL IntraVENous 2 times per day    heparin flush  3 mL IntraVENous 2 times per day    docusate sodium  100 mg Oral BID    sennosides  5 mL Oral Nightly    midodrine  10 mg Oral TID WC    ertapenem (INVanz) IVPB  1,000 mg IntraVENous Q24H    Vitamin D  1,000 Units Oral Daily    heparin (porcine)  5,000 Units SubCUTAneous Q8H    artificial tears   Both Eyes Q4H    anidulafungin  100 mg IntraVENous Q24H    pantoprazole (PROTONIX) 40 mg injection  40 mg IntraVENous Q12H    sodium chloride flush  5-40 mL IntraVENous 2 times per day    heparin flush  3 mL IntraVENous 2 times per day    metoclopramide  10 mg IntraVENous Q6H    Arformoterol Tartrate  15 mcg Nebulization BID    budesonide  0.5 mg Nebulization BID    albuterol  2.5 mg Nebulization Q4H WA    [Held by provider] metoprolol  5 mg IntraVENous Q6H    [Held by provider] rosuvastatin  10 mg Oral Nightly    levothyroxine  50 mcg Oral Daily     PRN Meds: sodium chloride flush, sodium chloride, heparin flush, potassium chloride, magnesium sulfate, calcium gluconate **OR** calcium gluconate **OR** calcium gluconate **OR** calcium gluconate, sodium phosphate IVPB **OR** sodium phosphate IVPB **OR** sodium phosphate IVPB **OR** sodium phosphate IVPB, glucose, dextrose bolus **OR** dextrose bolus, glucagon (rDNA), dextrose, sodium chloride flush, sodium chloride, heparin flush, ondansetron **OR** ondansetron, magnesium hydroxide, acetaminophen **OR** acetaminophen, ipratropium-albuterol  Nutrition:   Tube feeds     Labs and Imaging Studies     CBC:   Recent Labs     12/24/22  0553 12/25/22  0615 12/26/22  0542   WBC 15.8* 14.4* 17.1*   RBC 2.84* 2.88* 2.89*   HGB 8.3* 8.3* 8.5*   HCT 24.9* 25.4* 25.2*   MCV 87.7 88.2 87.2   MCH 29.2 28.8 29.4   MCHC 33.3 32.7 33.7   RDW 15.8* 16.1* 16.3*   * 108* 131   MPV 11.9 12.9* 13.4*       BMP:    Recent Labs     12/26/22  0015 12/26/22  0542 12/26/22  1217    137 134   K 3.7 3.7 4.1    105 103   CO2 23 24 23   BUN 38* 39* 37*   CREATININE 1.0 1.0 1.0   GLUCOSE 215* 216* 188*   CALCIUM 7.6* 7.6* 8.2*   PROT 5.8* 5.6* 5.7*   LABALBU 2.5* 2.4* 2.4*   BILITOT 1.5* 1.5* 1.3*   ALKPHOS 186* 231* 243*   AST 74* 212* 224*   ALT 40* 110* 147*       LIVER PROFILE:   Recent Labs     12/26/22  0015 12/26/22  0542 12/26/22  1217   AST 74* 212* 224*   ALT 40* 110* 147*   BILITOT 1.5* 1.5* 1.3*   ALKPHOS 186* 231* 243*       PT/INR:   Recent Labs     12/24/22  0553 12/25/22  0615   PROTIME 11.1 11.2   INR 1.0 1.0       APTT:   Recent Labs     12/24/22  0553 12/25/22 0615   APTT 38.7* 43.5*       Fasting Lipid Panel:    Lab Results   Component Value Date/Time    CHOL 45 12/19/2022 12:00 PM    TRIG 136 12/19/2022 12:00 PM    HDL 12 12/19/2022 12:00 PM       Cardiac Enzymes:    Lab Results   Component Value Date    CKTOTAL 59 01/13/2020    TROPONINI <0.01 01/13/2020       Notable Cultures:      Blood cultures   Blood Culture, Routine   Date Value Ref Range Status   12/17/2022 5 Days no growth  Final     Respiratory cultures No results found for: RESPCULTURE   Gram Stain Result   Date Value Ref Range Status   12/20/2022 Refer to ordered Gram stain for results  Final     Urine   Urine Culture, Routine   Date Value Ref Range Status   07/22/2022 <10,000 CFU/mL  Mixed gram positive organisms    Final     Legionella No results found for: LABLEGI  C Diff PCR No results found for: CDIFPCR  Wound culture/abscess: No results for input(s): WNDABS in the last 72 hours. Tip culture:No results for input(s): CXCATHTIP in the last 72 hours. Antibiotic  Days  Day started   Ertapenem      Anidulafungin                       Oxygen:     Vent Information  Ventilator ID: [id number]  Equipment Changed: Expiratory Filter  Vent Mode: AC/VC    Additional Respiratory Assessments  Heart Rate: 98  Resp: 27  SpO2: 98 %  Position: Semi-Bach's  Humidification Source: Heated wire  Humidification Temp: 37  Circuit Condensation: Drained  Airway Type: ET  Airway Size: 7.5  Cuff Pressure (cm H2O):  (MLT)  Skin Barrier Applied: Yes     Nasal cannula L/min     Face mask %     Reservoirs mask %       ABG     PH  7.39   PCO2  36   PO2  109   HCO3  21   Sat%  98   FIO2     DATES 12/26/22       Lines:  Site  Day  Date inserted     TLC   RIJ    12/17/22      PICC              Arterial line   Left brachial    12/20/22     Peripheral line              HD cath Left IJ    12/21/22     Urinary Catheter 12/15/22 Peterson-Output (mL): 0 mL    Imaging Studies:    XR CHEST PORTABLE   Final Result   No significant interval change, when compared to the previous study performed   1 day earlier. CT HEAD WO CONTRAST   Final Result   1. No acute intracranial abnormality. 2.  Chronic right maxillary ethmoid and frontal sinusitis. Chronic sphenoid   sinusitis. Fluid opacification of both mastoid air cells. 3.  No CT evidence of large territory infarct. MCA vessels appear normal in   density.          CT ABDOMEN PELVIS WO CONTRAST Additional Contrast? None   Final Result   1. No evidence of bowel obstruction or significant ileus. 2.  Gastrostomy tube is located in the stomach. Oral contrast material is   noted in the dependent portion of the gastric cardia. No abnormal bowel   dilatation. Oral contrast material noted in the right and left colon without   colonic distension. 3.  Mild abdominal ascites. 4.  Patchy asymmetric gland glass opacities in the lung bases with some areas   of confluent consolidation in the right lower lobe. Trace pleural effusions. Bilateral chest tubes. 5.  Very small left pneumothorax. XR CHEST PORTABLE   Final Result   1. Asymmetric bilateral airspace opacities, not significantly changed. 2.  No of visible pneumothorax. 3.  Support devices appear stable. XR CHEST PORTABLE   Final Result   Suggestion of some partial resolution of bilateral basilar infiltrates         XR CHEST PORTABLE   Final Result   No interval change         XR CHEST PORTABLE   Final Result   Stable appearance of the chest compared to 12/21/2022. XR CHEST PORTABLE   Final Result   Adequately positioned left internal jugular hemodialysis catheter. The   remainder of the exam including additional lines and tubes are stable. XR CHEST PORTABLE   Final Result   Interval left chest tube placement, no definite pneumothorax. Bilateral lung infiltrates and pleural effusions, not significantly changed. These infiltrates may be due to pulmonary edema, pneumonia and/or ARDS. Stable borderline enlargement of the cardiac silhouette. XR CHEST PORTABLE   Final Result   Extensive bilateral airspace disease with interval progression on the right. XR CHEST PORTABLE   Final Result   Postoperative changes with the diffuse bilateral infiltrates and effusions   with improvement in the right side likely CHF/edema. Pneumonia is less   likely. Tubes and catheters as noted.          XR CHEST PORTABLE Final Result   Low lying endotracheal tube which needs withdrawn approximately 2 cm. XR CHEST PORTABLE   Final Result   Enteric tube tip in the body of the stomach. The roseann is not well seen. ET tube tip most likely within 1.5 cm of the   roseann. XR CHEST PORTABLE   Final Result   Pleural effusions with adjacent infiltrate and or atelectasis showing   slightly improved aeration on the left and slightly poor aeration on the   right. XR CHEST PORTABLE   Final Result   Interval improvement in the opacification of the left hemithorax, with   aeration in the left upper lung field when compared to the previous study   performed 1 day earlier. No other significant interval change. XR CHEST PORTABLE   Final Result   New right internal jugular central venous catheter terminating near the   SVC-right atrium junction. No post-procedure pneumothorax. Worsening   opacification of the left hemithorax and stable findings on the right. CT CHEST WO CONTRAST   Final Result   Large right pleural effusion of mixed density to suggest complex effusion. There is a pigtail catheter seen in the pleural space posteriorly and   inferiorly. Small to moderate size left pleural effusion with consolidative   infiltrate throughout the left upper and lower lung field and aerated   portions revealing nodular ground-glass opacity to suggest an infectious   process. Nodular ground-glass opacity inferiorly within the aerated right   upper and lower lung field concerning for an infectious process as well. There is less consolidative infiltrate identified within the right lower lobe. XR CHEST PORTABLE   Final Result   1. No changes position of the right-sided chest tube. No right-sided   pneumothorax. The most of the right-sided pleural effusion has been drained,   if present to be discrete or small. 2.  Moderate left-sided pleural effusion.   If quantification of left-sided pleural effusion will be needed for clinical management bedside ultrasound or   left lateral decubitus views of the chest can be helpful. 3.  No pneumothorax on the right or on the left. 4.  Large size diaphragmatic across the midline. XR CHEST PORTABLE   Final Result   1. Increased parenchymal density projecting over the left lower lung   concerning for atelectasis and/or consolidation      2. No signs of right effusion      3. Parenchymal density projecting over the right lower lung concerning for   atelectasis         IR GUIDED PERC PLEURAL DRAIN W CATH INSERT   Final Result   1. Successful ultrasound-guided right thoracentesis. 2.  Successful placement of an 8 Congolese pleural drain         XR CHEST PORTABLE   Final Result   1. Right-sided pigtail catheter in the right base. 2.  No conspicuous right-sided pleural effusions identified. No right-sided   pneumothorax. 3.  Cannot exclude a small left-sided pleural effusion. 4.  Large size diaphragmatic hernia across the midline causing compression   atelectasis in the medial aspect of both lungs. FL ESOPHAGRAM   Final Result   1. No evidence of esophageal perforation or leak. 2. Decreased peristalsis. No evidence of esophageal mass or obstructing   lesion. 3. Small sliding hiatus hernia. No evidence of GE reflux. The gastric   foreign disc does appear constricted as it passes through the esophageal   hiatus. US CHEST INCLUDING MEDIASTINUM   Final Result   Probable complex loculated right pleural effusion. Simple left pleural effusion. CTA PULMONARY W CONTRAST   Final Result   No evidence of pulmonary embolism. Bilateral lower lobe pulmonary consolidations and pleural effusions seen. Deviation of the mediastinum to the left is visualized.       Mild soft tissue prominence visualized surrounding the esophagus with   narrowing at the gastroesophageal junction but appears to be due to external   compression. Cannot rule left soft tissue mass at this location. Wall thickening visualized in the gastric outlet with mild prominence of the   stomach seen, this could be artifactual.      Distended gallbladder is seen. Degenerative bone changes, postoperative changes and multilevel vertebral   augmentation seen. No evidence of acute abdominal/pelvic pathology. CT ABDOMEN PELVIS W IV CONTRAST Additional Contrast? Oral   Final Result   No evidence of pulmonary embolism. Bilateral lower lobe pulmonary consolidations and pleural effusions seen. Deviation of the mediastinum to the left is visualized. Mild soft tissue prominence visualized surrounding the esophagus with   narrowing at the gastroesophageal junction but appears to be due to external   compression. Cannot rule left soft tissue mass at this location. Wall thickening visualized in the gastric outlet with mild prominence of the   stomach seen, this could be artifactual.      Distended gallbladder is seen. Degenerative bone changes, postoperative changes and multilevel vertebral   augmentation seen. No evidence of acute abdominal/pelvic pathology. XR CHEST PORTABLE   Final Result   1. Consolidation seen within the left lung base with blunting the left   costophrenic angle which could represent pneumonia or atelectasis   2. A trace left pleural effusion cannot be excluded   3. Large right diaphragmatic hernia/hiatal hernia which was noted on the   patient's previous CT of the chest of 07/22/2022. XR CHEST PORTABLE    (Results Pending)        APRN- CNP Assessment and PLan   In summary 65 y.o. female with significant past medical history of hypothyroidism, breast cancer, TIA, hiatal hernia underwent hiatal hernia repair on 12/9/22 and developed acute respiratory distress and was transfer to [facility] on 12/14/22 for further management.   Patient was seen by nephrology, pulmonology, general surgery. Patient noted to have worsening hypoxia requiring 15 L of oxygen, A. fib RVR therefore transferred to MICU and critical care was consulted. Assessment:  Acute hypoxemic respiratory failure secondary to aspiration pneumonia/pleural effusion/bibasilar consolidation now on ventilation (12/19/22)   ARDS   Bilateral pleural effusion right greater than left status post right thoracentesis with pigtail chest tube placement on (12/16/22 IR)   Septic shock  2/2 PNA/loculated pleural effusion  Loculated pleural effusion s/p VATS and Mini-thoracotomy with open drainage of fluid collection 12/20   AFIB RVR  PAWAN secondary to dehydration  [place] ptosis leukocytosis with left shift  Acute anemia  History of massive hiatal hernia  status post repair on 12/9/2022  History of TIA  History of breast cancer  History of hypothyroidism  History of lumbar compression fracture with history of PLIF T12-L2  History of anxiety     Plan:  -ventilator (day #8), titrate FiO2 keep SPO2 goal 92%  - vent changes, repeat abg   - hold fentanyl for now, possible SBT this AM  - Midodrine 10mg TID, titrate to keep MAP >65mmHg   - loculated pleural effusion s/p VATS and mini thoracotomy with open drainage of fluid collection (12/20/22) s/p 2 chest tube   - US showed effusion on left side, general surgery placing left chest tube s/p Left chest tube (12/21/22)   -Continue scheduled bronchodilators Brovana and Pulmicort in addition to the DuoNebs, mucomyst   - levophed gtt, titrate to keep MAP >65mmHg  -Echo showed EF of 55%, indeterminate diastolic function, moderate dilated right ventricle with reduced function, moderate tricuspid regurgitation. Echo in January 2021 showed EF of 60 to 65%,   -Procalcitonin 5.70==> 1.57 today; RVP/COVID-19 PCR negative, MRSA nares negative, blood culture (NGTD) respiratory culture (Enterobacter Colace complex, Candida albicans) urine antigen negative.  Body fluid hematoma==> enterobacter, candida albicans; Respiratory culture (GNR)   - Consult to ID, input appreciated. - Ertapenem and anidulafungin   -General surgery surgery consulted, input appreciated. - ok to start Tube feeds, renal dose tube feeds, dietician consulted. -PAWAN, nephrology following, input appreciated- low urine output, metabolic acidosis, will place temp HD catheter and may need CRRT with removal of 100cc/hour.   -Strict intake and output  -Labs and chest x-ray in a.m.  -F/E/N KVO/ replace lytes/ Npo TPN  - DVT/GI scds/lovenox/protonix  - Code status: DNR- limited No CPR, ok to everything.        KAR Ashley-CNP   Critical Care     Discussed case with Attending Physician: Candice Butt

## 2022-12-26 NOTE — PROGRESS NOTES
TRAUMA/GENERAL SURGERY  DAILY PROGRESS NOTE  12/26/2022    CHIEF COMPLAINT:  Chief Complaint   Patient presents with    Shortness of Breath     Sent from [facility] for SOB patient went for hernia repair patient on 6L NC on arrival       SUBJECTIVE:  No bowel movements overnight per nursing staff has been weaned off the Levophed. OBJECTIVE:  BP (!) 116/54   Pulse 99   Temp 98.1 °F (36.7 °C) (Esophageal)   Resp 25   Ht 5' 2\" (1.575 m)   Wt 136 lb 8 oz (61.9 kg)   SpO2 98%   BMI 24.97 kg/m²     GENERAL:  Intubated, sedated,   LUNGS:  Intubated and on ventilator. Right anterior chest tube with serous drainage, right posterior chest tube with purulent drainage without air leak, left chest tube with serous drainage. AC ventilation 30/250/60/8  CARDIOVASCULAR: RR  ABDOMEN:  Soft, non-distended, non-tender. No guarding, rigidity, rebound. Incisions clean with ecchymosis  On CVVHD- running -100cc/hr    ASSESSMENT/PLAN:  65 y.o. female with respiratory failure and complex right-sided effusion s/p robotic hiatal hernia repair 12/9 without signs of esophageal perforation or leak however now +fungitell and growing candida in chest  S/p VATS & Mini-thoractomy & bronchoscopy with open drainage of fluid collection 12/20, left CT placement 12/21 with ARDS & renal failure s/p chemical paralysis, flolan, CVVHD 12/21. Who is currently clinically improving. Remains critically ill  Tube feeds/TPN per Dr. Barbie Carpenter service  Continue chest tubes to suction. Appreciate critical care assistance with medical management. Antibiotic management per ID  Daily CXR    Discussed with Dr. Meehan Males    Electronically signed by Bessy Walters MD on 12/26/22 at 10:38 AM EST       Agree, cont ICU care,   Cxr stable, no leaks, brown drainage from chest tubes on R.   No leak, on antifungals and abx, monitor,     Geoff Deleon MD

## 2022-12-26 NOTE — PROGRESS NOTES
Hospitalist Progress Note      PCP: Marcus Rolle DO    Date of Admission: 12/14/2022        Hospital Course:  65 y.o. female presented with PNEUMONIA. STATES SHE HAD A HH REPAIR AT [facility] ON THE 8TH. SHE STATES SHE WAS NOT FEELING BAD, BUT WAS TOLD SHE NEEDED TO COME TO THE HOSPITAL BC SHE WAS SICK. SHE WAS FOUND TO BE SEPTIC, WITH A HAG, SHE WAS TACHY AND TACHYPNEIC . COFFEE GROUND EMESIS THIS MORNING, GASTROCULT  POS . SURGERY NOTIFIED ** HAD A THROACENTESIS ON YESTERDAY, 105 CC REMOVED.     ** RRT  DUE TO RESPIRATORY DISTRESS ** TO TRANSFER TO  OR [facility]** INTUBATED HYPOXIA**  HAD CHEST TUBES INSERTED ON 12.20 ** DEVI FOUND ON CULTURES,            Subjective: INTUBATED, SEDATION OFF            Medications:  Reviewed    Infusion Medications    sodium chloride      cisatracurium (NIMBEX) infusion Stopped (12/23/22 0900)    prismaSATE BGK 4/0/1.2 2,000 mL/hr at 12/26/22 0415    sodium chloride 100 mL/hr at 12/22/22 2300    calcium chloride CRRT infusion 55 mL/hr at 12/26/22 0650    dextrose      [Held by provider] fentaNYL Stopped (12/26/22 0834)    sodium chloride       Scheduled Medications    insulin lispro  0-16 Units SubCUTAneous Q4H    lidocaine PF  5 mL IntraDERmal Once    sodium chloride flush  5-40 mL IntraVENous 2 times per day    heparin flush  3 mL IntraVENous 2 times per day    docusate sodium  100 mg Oral BID    sennosides  5 mL Oral Nightly    midodrine  10 mg Oral TID WC    ertapenem (INVanz) IVPB  1,000 mg IntraVENous Q24H    Vitamin D  1,000 Units Oral Daily    heparin (porcine)  5,000 Units SubCUTAneous Q8H    artificial tears   Both Eyes Q4H    anidulafungin  100 mg IntraVENous Q24H    pantoprazole (PROTONIX) 40 mg injection  40 mg IntraVENous Q12H    sodium chloride flush  5-40 mL IntraVENous 2 times per day    heparin flush  3 mL IntraVENous 2 times per day    metoclopramide  10 mg IntraVENous Q6H    Arformoterol Tartrate  15 mcg Nebulization BID    budesonide  0.5 mg Nebulization BID    albuterol  2.5 mg Nebulization Q4H WA    [Held by provider] metoprolol  5 mg IntraVENous Q6H    [Held by provider] rosuvastatin  10 mg Oral Nightly    levothyroxine  50 mcg Oral Daily     PRN Meds: sodium chloride flush, sodium chloride, heparin flush, potassium chloride, magnesium sulfate, calcium gluconate **OR** calcium gluconate **OR** calcium gluconate **OR** calcium gluconate, sodium phosphate IVPB **OR** sodium phosphate IVPB **OR** sodium phosphate IVPB **OR** sodium phosphate IVPB, glucose, dextrose bolus **OR** dextrose bolus, glucagon (rDNA), dextrose, sodium chloride flush, sodium chloride, heparin flush, ondansetron **OR** ondansetron, magnesium hydroxide, acetaminophen **OR** acetaminophen, ipratropium-albuterol      Intake/Output Summary (Last 24 hours) at 12/26/2022 1446  Last data filed at 12/26/2022 1400  Gross per 24 hour   Intake 4424 ml   Output 7261 ml   Net -2837 ml       Exam:    BP (!) 100/47   Pulse 98   Temp 97.3 °F (36.3 °C) (Esophageal)   Resp 27   Ht 5' 2\" (1.575 m)   Wt 136 lb 8 oz (61.9 kg)   SpO2 98%   BMI 24.97 kg/m²       General appearance:  No apparent distress, appears stated age. HEENT:  Normal cephalic,   Neck: Supple, with full range of motion. Trachea midline. Respiratory:  Normal respiratory effort. COARSE BREATH SOUNDS  NOTED BILATERALLY  Cardiovascular:  RRR  Abdomen: Soft, non-tender, non-distended with normal bowel sounds. Musculoskeletal:  No clubbing, cyanosis or edema bilaterally.     Skin: smooth and dry               Labs:   Recent Labs     12/24/22  0553 12/25/22  0615 12/26/22  0542   WBC 15.8* 14.4* 17.1*   HGB 8.3* 8.3* 8.5*   HCT 24.9* 25.4* 25.2*   * 108* 131     Recent Labs     12/26/22  0015 12/26/22  0542 12/26/22  1217    137 134   K 3.7 3.7 4.1    105 103   CO2 23 24 23   BUN 38* 39* 37*   CREATININE 1.0 1.0 1.0   CALCIUM 7.6* 7.6* 8.2*   PHOS 2.3* 2.3* 2.0*     Recent Labs     12/26/22  0015 12/26/22  0542 12/26/22  1217   AST 74* 212* 224*   ALT 40* 110* 147*   BILITOT 1.5* 1.5* 1.3*   ALKPHOS 186* 231* 243*     Recent Labs     12/24/22  0553 12/25/22  0615   INR 1.0 1.0     No results for input(s): CKTOTAL, TROPONINI in the last 72 hours.  Recent Labs     12/26/22  0015 12/26/22  0542 12/26/22  1217   AST 74* 212* 224*   ALT 40* 110* 147*   BILITOT 1.5* 1.5* 1.3*   ALKPHOS 186* 231* 243*     Recent Labs     12/23/22  1752 12/24/22  0553   LACTA 1.9 1.6     No results found for: LABURIC, URICACID  No results found for: AMMONIA    Assessment:    Active Hospital Problems    Diagnosis Date Noted    Hypoxia [R09.02] 12/17/2022     Priority: Medium    Pneumonia of left lower lobe due to infectious organism [J18.9] 12/17/2022     Priority: Medium    Aspiration pneumonia of both lower lobes due to gastric secretions (HCC) [J69.0] 12/17/2022     Priority: Medium    Postoperative pneumonia [J95.89, J18.9] 12/14/2022     Priority: Medium   ACUTE RESP FAILURE WITH HYPOXIA  GASTROCULT POS  S/P HH REPAIR  PAWAN BASELINE CREATINE 1.0)  H.O BREAST CANCER   HLD   PLEURAL EFFUSIONS BILATERALLY   S/P CHEST TUBES  R loculated pleural effusion s/p IR chest tube 12/17/22 with minimal drainage, s/p R VATS turned partial open thoracotomy 12/20/22 with insertion of chest tubes x 3  PLAN:  anidulafungin 200    FLUIDS  MONITOR BMP   FOR  CAMACHO   TO STOP CVVHD      DVT Prophylaxis: SCD  Diet: Diet NPO Exceptions are: Ice Chips  PN-Adult  3 IN 1 Central Line (Custom)  Diet NPO Exceptions are: Sips of Water with Meds  PN-Adult  3 IN 1 Central Line (Custom)  Code Status: Full Code     PT/OT Eval Status:  WHEN STABLE     Dispo -  TO [facility] OR      Electronically signed by Karsten Alicia DO on 12/26/2022 at 2:46 PM FACOI

## 2022-12-26 NOTE — PLAN OF CARE
Problem: Chronic Conditions and Co-morbidities  Goal: Patient's chronic conditions and co-morbidity symptoms are monitored and maintained or improved  12/26/2022 0011 by Margot Polanco RN  Outcome: Progressing  12/25/2022 2201 by Margot Polanco RN  Outcome: Progressing  Flowsheets (Taken 12/25/2022 2000)  Care Plan - Patient's Chronic Conditions and Co-Morbidity Symptoms are Monitored and Maintained or Improved: Monitor and assess patient's chronic conditions and comorbid symptoms for stability, deterioration, or improvement     Problem: Discharge Planning  Goal: Discharge to home or other facility with appropriate resources  12/26/2022 0011 by Margot Polanco RN  Outcome: Progressing  12/25/2022 2201 by Margot Polanco RN  Outcome: Khadar Arguello (Taken 12/25/2022 2000)  Discharge to home or other facility with appropriate resources: Identify barriers to discharge with patient and caregiver     Problem: Safety - Adult  Goal: Free from fall injury  12/26/2022 1044 by Jewell Sommer RN  Outcome: Progressing  12/26/2022 0011 by Margot Polanco RN  Outcome: Progressing  [address] (Taken 12/26/2022 0000)  Free From Fall Injury: Instruct family/caregiver on patient safety  12/25/2022 2201 by Margot Polanco RN  Outcome: Progressing  [address] (Taken 12/25/2022 2000)  Free From Fall Injury: Instruct family/caregiver on patient safety     Problem: Pain  Goal: Verbalizes/displays adequate comfort level or baseline comfort level  12/26/2022 1044 by Jewell Sommer RN  Outcome: Progressing  12/26/2022 0011 by Margot Polanco RN  Outcome: Progressing  Flowsheets (Taken 12/26/2022 0000)  Verbalizes/displays adequate comfort level or baseline comfort level: Assess pain using appropriate pain scale  12/25/2022 2201 by Margot Polanco RN  Outcome: Progressing  Flowsheets (Taken 12/25/2022 2000)  Verbalizes/displays adequate comfort level or baseline comfort level: Assess pain using appropriate pain scale     Problem: Nutrition Deficit:  Goal: Optimize nutritional status  12/26/2022 1044 by Bonilla Cm RN  Outcome: Progressing  12/26/2022 0011 by Parag Gotti RN  Outcome: Progressing  12/25/2022 2201 by Parag Gotti RN  Outcome: Progressing     Problem: Skin/Tissue Integrity  Goal: Absence of new skin breakdown  Description: 1. Monitor for areas of redness and/or skin breakdown  2. Assess vascular access sites hourly  3. Every 4-6 hours minimum:  Change oxygen saturation probe site  4. Every 4-6 hours:  If on nasal continuous positive airway pressure, respiratory therapy assess nares and determine need for appliance change or resting period.   12/26/2022 1044 by Bonilla Cm RN  Outcome: Progressing  12/26/2022 0011 by Parag Gotti RN  Outcome: Progressing  12/25/2022 2201 by Parag Gotti RN  Outcome: Progressing     Problem: Respiratory - Adult  Goal: Achieves optimal ventilation and oxygenation  12/26/2022 0011 by Parag Gotti RN  Outcome: Progressing  12/25/2022 2201 by Parag Gotti RN  Outcome: Progressing

## 2022-12-26 NOTE — PROGRESS NOTES
Comprehensive Nutrition Assessment    Type and Reason for Visit:  Reassess    Nutrition Recommendations/Plan:     Continue NPO. TPN d/c yesterday & EN advanced:    Modify Tube Feeding; Renal formula not appropriate w/ PAWAN as K WNL & Phos is Low    Rec Peptide Based (Vital AF 1.2) for optimal GI tolerance @ 45 ml/hr. Will provide: 1080 ml tv, 1440 kcals, 1296, 81 gm pro, 875 ml free water  Regimen meets 100% est calorie & protein needs       Malnutrition Assessment:  Malnutrition Status: At risk for malnutrition (12/26/22 1049)    Context:  Acute Illness     Findings of the 6 clinical characteristics of malnutrition:  Energy Intake:  75% or less of estimated energy requirements for 7 or more days (average)  Weight Loss:  Unable to assess (fluid shifts/ wt fluctuations)     Body Fat Loss:  No significant body fat loss     Muscle Mass Loss:  No significant muscle mass loss    Fluid Accumulation:  No significant fluid accumulation     Strength:  Not Performed    Nutrition Assessment:    Pt remains at nutritional risk d/t ongoing intubated 2/2 post-operative PNA/ ARDS/ B/L pleural effusions s/p thoracentesis, L VATS/ mini-thoracotomy, & bronchs (hernia sx @ [facility] 12/9). Noted PAWAN now on CVVHD. PMHx Breast CA & TIA. PN initiated d/t inability to use GI tract. TPN d/c & EN support trickle feeds running, will provide updated TF recs & monitor.     Nutrition Related Findings:    Pt remains intubated/ sedated, off paralytic, s/p proning, hypotension improved, fluid bal WNL, +2/+3 pitting edema, hypoactive BS, CT x 3, OGT w/ TF, CVVHD (K WNL, Phos Low), elevated LFTs     Wound Type: Surgical Incision       Current Nutrition Intake & Therapies:    Current Tube Feeding (TF) Orders:  Feeding Route: Orogastric  Formula: Renal Formula  Schedule: Continuous  Feeding Regimen: 35 ml/hr, running at 15 ml/hr just stared  Water Flushes: 30 ml q 4 hr = 180 ml water  Goal TF & Flush Orders Provides: 840 ml tv, 1512 kacls, 68 gm pro, 610 ml free water, 790 ml total water w/ flushes    Anthropometric Measures:  Height: 5' 2\" (157.5 cm)  Ideal Body Weight (IBW): 110 lbs (50 kg)    Admission Body Weight: 150 lb (68 kg) (12/15 no method)  Current Body Weight: 136 lb 8 oz (61.9 kg) (12/26 actual, wt fluctuations d/t fluid shifts), 136.4 % IBW. Current BMI (kg/m2): 25  Usual Body Weight: 153 lb 6 oz (69.6 kg) (measured wt from [facility] 12/23/21, wt hx appears stable)  % Weight Change (Calculated): -3.8                BMI Categories: Overweight (BMI 25.0-29. 9)    Estimated Daily Nutrient Needs:  Energy Requirements Based On: Formula  Weight Used for Energy Requirements: Current  Energy (kcal/day): PS3B 1155; 5307-0910  Weight Used for Protein Requirements: Ideal  Protein (g/day): 1.5-1.8 g/kg IBW; 75-90  Fluid (ml/day): per critical care    Nutrition Diagnosis:   Inadequate oral intake related to impaired respiratory function as evidenced by NPO or clear liquid status due to medical condition, intubation, nutrition support - enteral nutrition    Nutrition Interventions:   Nutrition Education/Counseling: Education not appropriate  Coordination of Nutrition Care: Continue to monitor while inpatient      Goals:  Previous Goal Met: Progressing toward Goal(s)  Goals: Tolerate nutrition support at goal rate     Nutrition Monitoring and Evaluation:   Food/Nutrient Intake Outcomes: Enteral Nutrition Intake/Tolerance  Physical Signs/Symptoms Outcomes: Biochemical Data, Nutrition Focused Physical Findings, Skin, Weight, GI Status, Fluid Status or Edema, Hemodynamic Status    Discharge Planning:     Too soon to determine     Robyn Del Castillo RD, LD  Contact: Ext [phone]

## 2022-12-26 NOTE — PROGRESS NOTES
[facility] PROGRESS NOTE      Chief complaint: Follow-up of pneumonia    This patient is a 76-year-old female with history of bilateral breast cancer, DM, hiatal hernia repair on 12/09, presented on 12/14 with shortness of breath for 2 days accompanied by productive cough and malaise prior to her hiatal hernia repair, thought to have large postoperative seroma after a type IV hiatal hernia repair. On admission, she was afebrile and hemodynamically stable with no leukocytosis. CT chest, abdomen and pelvis showed soft tissue density in the posterior medial aspect of the right lower lung field with deviation of the mediastinum to the left, bilateral lower lobe consolidation and pleural effusions, mild soft tissue prominence surrounding the esophagus with narrowing at the gastroesophageal junction appearing to be due to external compression, wall thickening in the gastric outlet with mild prominence of the stomach, distended gallbladder, no evidence of acute abdominal/pelvic pathology. Esophagram was negative for esophageal perforation. She underwent right-sided thoracentesis on 12/16 during which 105 mL of serous fluid was removed. Pleural fluid gram stain and culture showed rare polymorphonuclear leukocytes, no epithelial cells, no organisms, no growth. She was transferred to MICU on 12/17 for worsening hypoxemia. Repeat CT chest on 12/17 showed large right pleural effusion of mixed density suggestive of complex effusion with pigtail catheter in the pleural space posteriorly and inferiorly, small to moderate sized left pleural effusion with consolidative infiltrate throughout left upper and lower lung field and aerated portions revealing nodular groundglass opacity, nodular groundglass opacity inferiorly within the aerated right upper and lower lung field, less consolidative infiltrate within the right lower lobe.   Respiratory gram stain and culture showed no polymorphonuclear leukocytes, no epithelial cells, moderate yeast, moderate gram-positive diphtheroid-like rods, few gram-negative rods, moderate growth of Enterobacter cloacae, heavy growth of Candida albicans. Urine Streptococcus pneumoniae and Legionella antigens were negative. Respiratory pathogen PCR panel and MRSA nares culture were negative. Blood cultures showed no growth to date. She received a dose of ceftriaxone and doxycycline on admission. Ampicillin-sulbactam was started on admission and then switched to piperacillin-tazobactam on 12/17 then switched to meropenem on 12/19. Vancomycin was started on 12/17. She underwent right VATS, mini-thoracotomy with drainage and fenestration of mediastinal seroma/hematoma, chest tube and mediastinal drain placement on 12/20 during which 1100 mL serous fluid and dark blood (no succus or bile) extending into the mediastinum was drained via mini-thoracotomy. Hematoma/seroma fluid Gram stain and culture showed abundant polymorphonuclear leukocytes, no epithelial cells, no organisms, Gram-negative rods. Serum 1,3 beta-d-glucan on 12/18 was elevated. Subjective: Patient was seen and examined. She remains in critical condition, intubated and sedated, on increasing vasopressor support. Objective:  BP (!) 100/47   Pulse 99   Temp 97.3 °F (36.3 °C) (Esophageal)   Resp 29   Ht 5' 2\" (1.575 m)   Wt 136 lb 8 oz (61.9 kg)   SpO2 98%   BMI 24.97 kg/m²   Constitutional: Intubated, no MV dyssynchrony  Respiratory: Clear breath sounds, no crackles, no wheezes  Cardiovascular: Regular rate and rhythm, no murmurs  Gastrointestinal: Bowel sounds present, soft, nontender  Skin: Warm and dry, no active dermatoses  Musculoskeletal: No joint swelling, no joint erythema    Labs, imaging, and medical records/notes were personally reviewed.     Laboratory:  Lab Results   Component Value Date    WBC 17.1 (H) 12/26/2022    WBC 14.4 (H) 12/25/2022    WBC 15.8 (H) 12/24/2022    HGB 8.5 (L) 12/26/2022    HCT 25.2 (L) 12/26/2022 MCV 87.2 12/26/2022     12/26/2022     Lab Results   Component Value Date    NEUTROABS 16.07 (H) 12/26/2022    NEUTROABS 12.88 (H) 12/25/2022    NEUTROABS 14.30 (H) 12/24/2022     Lab Results   Component Value Date    CRP 45.9 (H) 12/14/2022    CRP 0.3 03/30/2022    CRP 0.3 02/04/2021     No results found for: CRPHS  Lab Results   Component Value Date    SEDRATE 16 03/30/2022    SEDRATE 25 (H) 02/04/2021    SEDRATE 33 (H) 01/13/2020     Lab Results   Component Value Date     (H) 12/26/2022     (H) 12/26/2022    ALKPHOS 243 (H) 12/26/2022    BILITOT 1.3 (H) 12/26/2022     Lab Results   Component Value Date/Time     12/26/2022 12:17 PM    K 4.1 12/26/2022 12:17 PM    K 4.7 12/21/2022 09:23 AM     12/26/2022 12:17 PM    CO2 23 12/26/2022 12:17 PM    BUN 37 12/26/2022 12:17 PM    CREATININE 1.0 12/26/2022 12:17 PM    CREATININE 1.0 12/26/2022 05:42 AM    CREATININE 1.0 12/26/2022 12:15 AM    GFRAA >60 07/22/2022 01:42 PM    LABGLOM 58 12/26/2022 12:17 PM    GLUCOSE 188 12/26/2022 12:17 PM    PROT 5.7 12/26/2022 12:17 PM    LABALBU 2.4 12/26/2022 12:17 PM    LABALBU 4.3 01/24/2011 01:15 PM    CALCIUM 8.2 12/26/2022 12:17 PM    BILITOT 1.3 12/26/2022 12:17 PM    ALKPHOS 243 12/26/2022 12:17 PM     12/26/2022 12:17 PM     12/26/2022 12:17 PM       Radiology: CT chest 12/17: Large right pleural effusion of mixed density to suggest complex effusion. There is a pigtail catheter seen in the pleural space posteriorly and   inferiorly. Small to moderate size left pleural effusion with consolidative   infiltrate throughout the left upper and lower lung field and aerated   portions revealing nodular ground-glass opacity to suggest an infectious   process. Nodular ground-glass opacity inferiorly within the aerated right   upper and lower lung field concerning for an infectious process as well. There is less consolidative infiltrate identified within the right lower lobe. Microbiology:     Blood cx  No results found for: BLOODCULT1  Culture, Blood 2   Date Value Ref Range Status   12/17/2022 5 Days no growth  Final       Smear, Respiratory   Date Value Ref Range Status   12/25/2022   Final    Few Polymorphonuclear leukocytes  Few Epithelial cells  No organisms seen         CULTURE, RESPIRATORY   Date Value Ref Range Status   12/25/2022 Oral Pharyngeal Hilda absent (A)  Preliminary   12/25/2022   Preliminary    Heavy growth  Identification and sensitivity to follow     Enterobacter cloacae complex (1)    Antibiotic Interpretation Microscan  Method Status    amoxicillin-clavulanate Resistant >=^32 mcg/mL BACTERIAL SUSCEPTIBILITY PANEL BY CHEYANNE     ceFAZolin Resistant >=^64 mcg/mL BACTERIAL SUSCEPTIBILITY PANEL BY CHEYANNE     cefepime Sensitive ^0.5 mcg/mL BACTERIAL SUSCEPTIBILITY PANEL BY CHEYANNE     cefotaxime Resistant >=^64 mcg/mL BACTERIAL SUSCEPTIBILITY PANEL BY CHEYANNE     cefOXitin Resistant >=^64 mcg/mL BACTERIAL SUSCEPTIBILITY PANEL BY CHEYANNE     cefTAZidime-avibactam Sensitive ^0.5 mcg/mL BACTERIAL SUSCEPTIBILITY PANEL BY CHEYANNE     gentamicin Sensitive <=^1 mcg/mL BACTERIAL SUSCEPTIBILITY PANEL BY CHEYANNE     levofloxacin Sensitive <=^0.12 mcg/mL BACTERIAL SUSCEPTIBILITY PANEL BY CHEYANNE     meropenem Sensitive ^0.5 mcg/mL BACTERIAL SUSCEPTIBILITY PANEL BY CHEYANNE     piperacillin-tazobactam Resistant >=^128 mcg/mL BACTERIAL SUSCEPTIBILITY PANEL BY CHEYANNE     trimethoprim-sulfamethoxazole Sensitive <=^20 mcg/mL BACTERIAL SUSCEPTIBILITY PANEL BY CHEYANNE         BODY FLUID CULTURE  Body Fluid Culture, Sterile   Date Value Ref Range Status   12/20/2022 Rare growth  Final   12/20/2022 Light growth  Final       MRSA Culture Only   Date Value Ref Range Status   12/17/2022 Methicillin resistant Staph aureus not isolated  Final     Gram Stain [[id number]] Collected: 12/20/22 1328   Order Status: Completed Specimen: Fluid Updated: 12/21/22 0751    Gram Stain Orderable --    Gram stain performed on unspun fluid Abundant Polymorphonuclear leukocytes   Epithelial cells not seen   No organisms seen    Narrative:     Source: FLUID       Site: HEMATOMA                 Assessment:  Acute hypoxic respiratory failure  Septic shock  Postoperative seroma/hematoma, right chest, in the setting of robotic hiatal hernia repair on 12/09, s/p right VATS, mini-thoracotomy with drainage and fenestration of mediastinal seroma/hematoma, chest tube and mediastinal drain placement on 12/20  Enterobacter cloacae HAP. Candida albicans on respiratory culture likely colonization. Elevated serum 1,3 beta-d-glucan  WBC 15,800  Sebastien repeat resp culture    Recommendations:  Start anidulafungin 200 mg loading dose once followed by 100 mg q24h. Continue ertapenem 500 mg every 24 hours dosed according to renal function to a target dose of 1 g every 24 hours for CrCl of at last 30 mL/min. Follow-up surgical cultures. Follow-up blood cultures. Continue supportive care  Labs reviewed           Thank you for involving me in the care of [facility]. Dr. Ld Horton will continue to follow. Please do not hesitate to call for any questions or concerns.       Electronically signed by Emerita Schroeder MD on 12/26/2022 at 3:56 PM

## 2022-12-26 NOTE — PROGRESS NOTES
Surgical resident at bedside. Informed him that right chest tube inserted on 12/20 is draining brown/murky fluid. Assessed by surgical resident.

## 2022-12-26 NOTE — PLAN OF CARE
Problem: Chronic Conditions and Co-morbidities  Goal: Patient's chronic conditions and co-morbidity symptoms are monitored and maintained or improved  12/26/2022 0011 by Edwin Bocanegra RN  Outcome: Progressing     Problem: Discharge Planning  Goal: Discharge to home or other facility with appropriate resources  12/26/2022 0011 by Edwin Bocanegra RN  Outcome: Progressing     Problem: Safety - Adult  Goal: Free from fall injury  12/26/2022 0011 by Edwin Bocanegra RN  Outcome: Progressing  Flowsheets (Taken 12/26/2022 0000)  Free From Fall Injury: Instruct family/caregiver on patient safety     Problem: Pain  Goal: Verbalizes/displays adequate comfort level or baseline comfort level  12/26/2022 0011 by Edwin Bocanegra RN  Outcome: Progressing  Flowsheets (Taken 12/26/2022 0000)  Verbalizes/displays adequate comfort level or baseline comfort level: Assess pain using appropriate pain scale     Problem: Nutrition Deficit:  Goal: Optimize nutritional status  12/26/2022 0011 by Edwin Bocanegra RN  Outcome: Progressing     Problem: Skin/Tissue Integrity  Goal: Absence of new skin breakdown  Description: 1. Monitor for areas of redness and/or skin breakdown  2. Assess vascular access sites hourly  3. Every 4-6 hours minimum:  Change oxygen saturation probe site  4. Every 4-6 hours:  If on nasal continuous positive airway pressure, respiratory therapy assess nares and determine need for appliance change or resting period.   12/26/2022 0011 by Edwin Bocanegra RN  Outcome: Progressing     Problem: Respiratory - Adult  Goal: Achieves optimal ventilation and oxygenation  12/26/2022 0011 by Edwin Bocanegra RN  Outcome: Progressing     Problem: Respiratory - Adult  Goal: Achieves optimal ventilation and oxygenation  12/26/2022 0011 by Edwin Bocanegra RN  Outcome: Progressing  12/25/2022 2201 by Edwin Bocanegra RN  Outcome: Progressing  12/25/2022 1618 by Deedee Mathews RCP  Outcome: Not Progressing

## 2022-12-27 NOTE — PROGRESS NOTES
DAILY VENTILATOR WEANING ASSESSMENT PERFORMED    P/FIO2 Ratio = 2.17                Cs = 19     Plat. Pressure = 20  MV = 9.13  RSBI = N/A    Instabilities:       Cardiovascular =       CNS =       Respiratory = 2       Metabolic =    Parameters    no    Wean per protocol  no    Ask Physician for a weaning plan no    Additional Comments: Was asked to place patient on PS by Dr. Kat Harris however patient was very tachyneic and was immediately placed back on [facility], operated by [facility]. Performed by Selin oGoden RCP RRT      Reference Table:    Cardiovascular     CNS      1. Mean BP less than or equal to 75   1. Neuromuscular blockade  2. Heart Rate greater than 130   2. RASS of -3, -4, -5  3. Myocardial Ischemia    3. RASS of +3, +4  4. Mechanical Assist Device    4. ICP greater than 15 or             Intracranial Hypertension         Respiratory      Metabolic  1. PEEP equal to or greater than 10cm/H20  1. Temp. (8hrs) less than 95 or > 103  2. Respiratory Rate greater than 35   2. WBC < 5000 or > 08652  3. Minute Volume greater than 15L  4. pH less than 7.30  5.  Deteriorating chest X-ray

## 2022-12-27 NOTE — PLAN OF CARE
Problem: Respiratory - Adult  Goal: Achieves optimal ventilation and oxygenation  12/27/2022 1005 by Dot Koo RCP  Flowsheets (Taken 12/27/2022 1005)  Achieves optimal ventilation and oxygenation:   Assess for changes in respiratory status   Respiratory therapy support as indicated   Oxygen supplementation based on oxygen saturation or arterial blood gases

## 2022-12-27 NOTE — PROGRESS NOTES
Department of Internal Medicine  Nephrology Progress Note      Events reviewed. CRRT was temporarily on hold but it was restarted due to patient requiring vasopressors. SUBJECTIVE: We are following Sara Wilcox for PAWAN. Presently intubated.     PHYSICAL EXAM:      Vitals:    VITALS:  BP (!) 106/53   Pulse (!) 117   Temp 96.8 °F (36 °C) (Esophageal)   Resp 29   Ht 5' 2\" (1.575 m)   Wt 130 lb (59 kg)   SpO2 97%   BMI 23.78 kg/m²   24HR INTAKE/OUTPUT:    Intake/Output Summary (Last 24 hours) at 12/27/2022 1240  Last data filed at 12/27/2022 1200  Gross per 24 hour   Intake 2392.38 ml   Output 6238 ml   Net -3845.62 ml         Constitutional: Patient intubated, sedated  HEENT: Normocephalic, atraumatic, PERRLA, endotracheal tube in place  Respiratory: Diminished right lung base  Cardiovascular/Edema: RRR, normal S1, normal S2, no edema  Gastrointestinal: Soft, not distended, nontender  Neurologic: Patient is sedated  Skin: Pale, dry, no lesions, no rash, + edema     Scheduled Meds:   [Held by provider] midodrine  15 mg Oral TID WC    insulin lispro  0-16 Units SubCUTAneous Q4H    lidocaine PF  5 mL IntraDERmal Once    sodium chloride flush  5-40 mL IntraVENous 2 times per day    heparin flush  3 mL IntraVENous 2 times per day    ertapenem (INVanz) IVPB  1,000 mg IntraVENous Q24H    Vitamin D  1,000 Units Oral Daily    heparin (porcine)  5,000 Units SubCUTAneous Q8H    artificial tears   Both Eyes Q4H    anidulafungin  100 mg IntraVENous Q24H    pantoprazole (PROTONIX) 40 mg injection  40 mg IntraVENous Q12H    sodium chloride flush  5-40 mL IntraVENous 2 times per day    heparin flush  3 mL IntraVENous 2 times per day    Arformoterol Tartrate  15 mcg Nebulization BID    budesonide  0.5 mg Nebulization BID    albuterol  2.5 mg Nebulization Q4H WA    [Held by provider] metoprolol  5 mg IntraVENous Q6H    [Held by provider] rosuvastatin  10 mg Oral Nightly    [Held by provider] levothyroxine  50 mcg Oral Daily     Continuous Infusions:   PN-Adult  3 IN 1 Central Line (Custom)      sodium chloride      norepinephrine 6 mcg/min (12/27/22 0600)    prismaSATE BGK 4/0/1.2 2,000 mL/hr at 12/26/22 0415    sodium chloride 100 mL/hr at 12/22/22 2300    calcium chloride CRRT infusion 8,000 mg (12/27/22 0442)    dextrose      [Held by provider] fentaNYL 50 mcg/hr (12/27/22 0600)    sodium chloride       PRN Meds:.fentanNYL, sodium chloride flush, sodium chloride, heparin flush, potassium chloride, magnesium sulfate, calcium gluconate **OR** calcium gluconate **OR** calcium gluconate **OR** calcium gluconate, sodium phosphate IVPB **OR** sodium phosphate IVPB **OR** sodium phosphate IVPB **OR** sodium phosphate IVPB, glucose, dextrose bolus **OR** dextrose bolus, glucagon (rDNA), dextrose, sodium chloride flush, sodium chloride, heparin flush, ondansetron **OR** ondansetron, [DISCONTINUED] acetaminophen **OR** acetaminophen, ipratropium-albuterol    DATA:    CBC with Differential:    Lab Results   Component Value Date/Time    WBC 25.4 12/27/2022 05:50 AM    RBC 3.48 12/27/2022 05:50 AM    HGB 10.0 12/27/2022 05:50 AM    HCT 31.0 12/27/2022 05:50 AM    HCT 29.0 12/20/2022 01:31 PM     12/27/2022 05:50 AM    MCV 89.1 12/27/2022 05:50 AM    MCH 28.7 12/27/2022 05:50 AM    MCHC 32.3 12/27/2022 05:50 AM    RDW 15.9 12/27/2022 05:50 AM    NRBC 0.9 12/21/2022 04:12 AM    LYMPHOPCT 6.7 12/27/2022 05:50 AM    MONOPCT 3.0 12/27/2022 05:50 AM    MYELOPCT 0.9 12/23/2022 06:05 AM    BASOPCT 0.4 12/27/2022 05:50 AM    MONOSABS 0.77 12/27/2022 05:50 AM    LYMPHSABS 1.71 12/27/2022 05:50 AM    EOSABS 0.01 12/27/2022 05:50 AM    BASOSABS 0.09 12/27/2022 05:50 AM     CMP:    Lab Results   Component Value Date/Time     12/27/2022 05:50 AM    K 4.4 12/27/2022 05:50 AM    K 4.7 12/21/2022 09:23 AM     12/27/2022 05:50 AM    CO2 18 12/27/2022 05:50 AM    BUN 33 12/27/2022 05:50 AM    CREATININE 1.0 12/27/2022 05:50 AM    GFRAA >60  07/22/2022 01:42 PM    LABGLOM 58 12/27/2022 05:50 AM    GLUCOSE 133 12/27/2022 05:50 AM    PROT 6.4 12/27/2022 05:50 AM    LABALBU 2.8 12/27/2022 05:50 AM    LABALBU 4.3 01/24/2011 01:15 PM    CALCIUM 8.2 12/27/2022 05:50 AM    BILITOT 1.9 12/27/2022 05:50 AM    ALKPHOS 245 12/27/2022 05:50 AM    AST 92 12/27/2022 05:50 AM     12/27/2022 05:50 AM     Magnesium:    Lab Results   Component Value Date/Time    MG 2.0 12/27/2022 05:50 AM     Phosphorus:    Lab Results   Component Value Date/Time    PHOS 3.7 12/27/2022 05:50 AM     Radiology Review:      CT Chest and Abd/Pelvis 12/14/22      No evidence of pulmonary embolism. Bilateral lower lobe pulmonary consolidations and pleural effusions seen. Deviation of the mediastinum to the left is visualized. Mild soft tissue prominence visualized surrounding the esophagus with   narrowing at the gastroesophageal junction but appears to be due to external   compression. Cannot rule left soft tissue mass at this location. Wall thickening visualized in the gastric outlet with mild prominence of the   stomach seen, this could be artifactual.     Distended gallbladder is seen. Degenerative bone changes, postoperative changes and multilevel vertebral   augmentation seen. No evidence of acute abdominal/pelvic pathology. US Chest 12/15/22       Probable complex loculated right pleural effusion. Simple left pleural effusion. BRIEF SUMMARY OF INITIAL CONSULT:    Briefly, Festus Jackson is a 76-year-old female, past medical history significant for hypothyroidism, hiatal hernia and TIA, who presented to the ED on 12/14/2022 from [facility], patient recently underwent a hiatal hernia repair on 12/9/22, developed acute respiratory distress and was transferred to [facility] on 12[facility]14[facility] for further management and treatment.   Initial ED work-up revealed creatinine level of 1.7, patient then underwent two seperate IV contrast studies CTA and creatinine subsequently increased to 2.5, on 12/15/2022 creatinine level increased to 3.5 mg/dL, reason for this consultation. Review of records from [facility] reveals that baseline creatinine appears to be between 0.9 to 1.0 mg/dL, creatinine was 1.0 on 12/13/2022, patient was started on lisinopril during hospitalization, was given multiple doses IV furosemide with poor oral intake and vomiting. Problems resolved:    HAGMA, 2/2 uremia and starvation ketoacidosis  Hemodynamic shock, septic, on antibiotics and vasopressors  Hypocalcemia, 2/2 calcium removal with CRRT, with replacement per protocol    IMPRESSION/RECOMMENDATIONS:     PAWAN, stage II, nonoliguric, ATN (ischemic +/- contrast induced , intravascular volume depletion due to poor oral intake, loop diuretics and vomiting in the setting of ACE inhibition versus,) FEUrea 28.3%, FENa 1.1%. Started on renal replacement therapy on 12/21/2022.  Tolerating CVVHD with fluid removal.       Vitamin D deficiency, vitamin D 25 level 11, on cholecalciferol   Hypophosphatemia, 2/2 phosphorus removal with CRRT, with replacement per protocol  Normal pH with respiratory alkalosis and metabolic acidosis    --------------------------------------------------------  Respiratory failure, status post intubation  New onset AF with RVR  Complex right pleural effusion, s/p VATS    S/p hiatal hernia repair 12/9/2022  Hypothyroidism, on levothyroxine  Persistent vomiting, PRN ondansetron, esophagram negative for perforation  Hospital-acquired pneumonia, + Enterobacter cloacae, on ertapenem  Possibly focal pneumonia, Candida albicans?,  On anidulafungin  Normocytic anemia, multifactorial  Severe hypoalbuminemia  Nutrition, on TPN @50 cc/hour per surgery      Plan:    Continue CVVHD 30 cc/kilo/hour, hold net fluid removal, to run even  Continue TPN per surgery  Continue to monitor renal function for recovery       Electronically signed by Elia Vidal MD on 12/27/2022 at 12:40 PM

## 2022-12-27 NOTE — PLAN OF CARE
Problem: Chronic Conditions and Co-morbidities  Goal: Patient's chronic conditions and co-morbidity symptoms are monitored and maintained or improved  Outcome: Progressing  Flowsheets (Taken 12/26/2022 2000)  Care Plan - Patient's Chronic Conditions and Co-Morbidity Symptoms are Monitored and Maintained or Improved: Monitor and assess patient's chronic conditions and comorbid symptoms for stability, deterioration, or improvement     Problem: Discharge Planning  Goal: Discharge to home or other facility with appropriate resources  Outcome: Progressing  Flowsheets (Taken 12/26/2022 2000)  Discharge to home or other facility with appropriate resources: Identify barriers to discharge with patient and caregiver     Problem: Safety - Adult  Goal: Free from fall injury  12/26/2022 2031 by Kavon Ghotra RN  Outcome: Progressing  [address] (Taken 12/26/2022 2000)  Free From Fall Injury: Instruct family/caregiver on patient safety     Problem: Pain  Goal: Verbalizes/displays adequate comfort level or baseline comfort level  12/26/2022 2031 by Kavon Ghotra RN  Outcome: Progressing  Flowsheets (Taken 12/26/2022 2000)  Verbalizes/displays adequate comfort level or baseline comfort level: Assess pain using appropriate pain scale     Problem: Nutrition Deficit:  Goal: Optimize nutritional status  12/26/2022 2031 by Kavon Ghtora RN  Outcome: Progressing     Problem: Skin/Tissue Integrity  Goal: Absence of new skin breakdown  Description: 1. Monitor for areas of redness and/or skin breakdown  2. Assess vascular access sites hourly  3. Every 4-6 hours minimum:  Change oxygen saturation probe site  4. Every 4-6 hours:  If on nasal continuous positive airway pressure, respiratory therapy assess nares and determine need for appliance change or resting period.   12/26/2022 2031 by Kavon Ghotra RN  Outcome: Progressing     Problem: Respiratory - Adult  Goal: Achieves optimal ventilation and oxygenation  Outcome: Progressing

## 2022-12-27 NOTE — PROCEDURES
Department of Internal Medicine  Division of Pulmonary, Critical Care & Sleep Medicine  Pulmonary [address] [facility]     PROCEDURE NOTE     PROCEDURE:            Arterial line placement. INDICATIONS:           Continuous hemodynamic monitoring of vital signs. CNP:               KAR Carrizales CNP        CONSCENT:   Informed written obtained. PROCEDURAL TECHNIQUE:  Procedure was done using strict aseptic technique. Right radial site was cleaned with chloraprep and draped. Radial artery was identified, then Lidocaine 1% was infiltrated locally. Radial arterial line was inserted, unable to get good blood blow, after couple attempts, procedure was discontinue due to unsuccessful. Procedure was restarted on right femoral site via ultrasound guided. Procedure was done using strict aseptic technique. Right femoral site was cleaned with chloraprep and draped. Radial artery was identified, then Lidocaine 1% was infiltrated locally. Right femoral arterial line was inserted, unable to get good blood blow, after couple attempts, procedure was discontinue due to unsuccessful. Will have general surgery resident to place art line vs place brachial art line tomorrow via attending. COMPLICATIONS: No immediate complication.          KAR Carrizales CNP

## 2022-12-27 NOTE — PROGRESS NOTES
TRAUMA/GENERAL SURGERY  DAILY PROGRESS NOTE  12/27/2022    CHIEF COMPLAINT:  Chief Complaint   Patient presents with    Shortness of Breath     Sent from [facility] for SOB patient went for hernia repair patient on 6L NC on arrival       SUBJECTIVE:  Increased purulent drainage from right chest tube overnight, which per nursing staff correlated with increasing patient's tube feeds. No BMs per nursing staff  Right chest tubes-1 L/24 hours  Left chest tube-45 cc / 24 hours    OBJECTIVE:  BP (!) 98/51   Pulse (!) 112   Temp 96.8 °F (36 °C) (Esophageal)   Resp 27   Ht 5' 2\" (1.575 m)   Wt 136 lb 8 oz (61.9 kg)   SpO2 98%   BMI 24.97 kg/m²     GENERAL:  Intubated, narcotize, winces to pain  LUNGS:  Intubated and on ventilator. Right anterior chest tube with purulent drainage, right posterior chest tube with purulent drainage without air leak, left chest tube with serous drainage. AC ventilation 20/250/50/8  CARDIOVASCULAR: Tachycardic-wide pulse pressure, on 6 mcg of Levophed per hour  ABDOMEN:  Soft, non-distended, non-tender. No guarding, rigidity, rebound. Incisions clean with ecchymosis  On CVVHD- running -100cc/min    ASSESSMENT/PLAN:  65 y.o. female with respiratory failure and complex right-sided effusion s/p robotic hiatal hernia repair 12/9 without signs of esophageal perforation or leak however now +fungitell and growing candida in chest  S/p VATS & Mini-thoractomy & bronchoscopy with open drainage of fluid collection 12/20, left CT placement 12/21 with ARDS & renal failure s/p chemical paralysis, flolan, CVVHD 12/21. Who is currently clinically improving. Remains critically ill  CT abdomen pelvis from 12/25 showed oral contrast within the stomach without leak. Stop tube feeds-monitor chest tube output we will discuss possible tube feeds with methylene blue 12/28 to evaluate if there is any true leak  Continue chest tubes to suction.   Appreciate critical care assistance with medical management.  Antibiotic management per ID  Will discuss possible irrigation of chest tubes with antibiotics.    Daily CXR    Plan will be discussed with Dr. Castaneda on AM rounds. For questions after 6 AM please reach out to surgical resident on call.       Electronically signed by Paco Ac MD on 12/26/22 at 10:38 AM EST       Attending Attestation   Patient seen and examined, agree with resident note except for changes made by me, for remaining HP/Consult/progress note details please see resident HP/Consult/progress note.      - discussed case with Dr Hutchins, looks like a leak from distal esophagus?  Surgical endoscopy planning to stent this.  Cont supportive ICU care and abx antifungals.      - restart TPN      Osmani Castaneda MD

## 2022-12-27 NOTE — PROGRESS NOTES
[facility] PROGRESS NOTE      Chief complaint: Follow-up of pneumonia    This patient is a 78-year-old female with history of bilateral breast cancer, DM, hiatal hernia repair on 12/09, presented on 12/14 with shortness of breath for 2 days accompanied by productive cough and malaise prior to her hiatal hernia repair, thought to have large postoperative seroma after a type IV hiatal hernia repair. On admission, she was afebrile and hemodynamically stable with no leukocytosis. CT chest, abdomen and pelvis showed soft tissue density in the posterior medial aspect of the right lower lung field with deviation of the mediastinum to the left, bilateral lower lobe consolidation and pleural effusions, mild soft tissue prominence surrounding the esophagus with narrowing at the gastroesophageal junction appearing to be due to external compression, wall thickening in the gastric outlet with mild prominence of the stomach, distended gallbladder, no evidence of acute abdominal/pelvic pathology. Esophagram was negative for esophageal perforation. She underwent right-sided thoracentesis on 12/16 during which 105 mL of serous fluid was removed. Pleural fluid gram stain and culture showed rare polymorphonuclear leukocytes, no epithelial cells, no organisms, no growth. She was transferred to MICU on 12/17 for worsening hypoxemia. Repeat CT chest on 12/17 showed large right pleural effusion of mixed density suggestive of complex effusion with pigtail catheter in the pleural space posteriorly and inferiorly, small to moderate sized left pleural effusion with consolidative infiltrate throughout left upper and lower lung field and aerated portions revealing nodular groundglass opacity, nodular groundglass opacity inferiorly within the aerated right upper and lower lung field, less consolidative infiltrate within the right lower lobe.   Respiratory gram stain and culture showed no polymorphonuclear leukocytes, no epithelial cells, moderate yeast, moderate gram-positive diphtheroid-like rods, few gram-negative rods, moderate growth of Enterobacter cloacae, heavy growth of Candida albicans. Urine Streptococcus pneumoniae and Legionella antigens were negative. Respiratory pathogen PCR panel and MRSA nares culture were negative. Blood cultures showed no growth to date. She received a dose of ceftriaxone and doxycycline on admission. Ampicillin-sulbactam was started on admission and then switched to piperacillin-tazobactam on 12/17 then switched to meropenem on 12/19. Vancomycin was started on 12/17. She underwent right VATS, mini-thoracotomy with drainage and fenestration of mediastinal seroma/hematoma, chest tube and mediastinal drain placement on 12/20 during which 1100 mL serous fluid and dark blood (no succus or bile) extending into the mediastinum was drained via mini-thoracotomy. Hematoma/seroma fluid Gram stain and culture showed abundant polymorphonuclear leukocytes, no epithelial cells, no organisms, rare growth of Enterobacter cloacae (susceptible to fluoroquinolones and cotrimoxazole) and light growth of Candida albicans. Serum 1,3 beta-d-glucan on 12/18 was elevated. Endotracheal aspirate Gram stain and culture on 12/25 showed few polymorphonuclear leukocytes, few epithelial cells, no organisms, heavy growth of Enterobacter cloacae. She was noted to have increasing output from right sided chest tubes confirmed to have esophageal perforation with active methylene blue extravasation into both right sided tubes on bedside leak test on 12/27. Subjective: Patient was seen and examined. She remains in critical condition, intubated and sedated, off vasopressor support.       Objective:  BP (!) 106/53   Pulse (!) 101   Temp 96.8 °F (36 °C) (Esophageal)   Resp 29   Ht 5' 2\" (1.575 m)   Wt 130 lb (59 kg)   SpO2 98%   BMI 23.78 kg/m²   Constitutional: Intubated, no MV dyssynchrony  Respiratory: Clear breath sounds, no crackles, no wheezes  Cardiovascular: Regular rate and rhythm, no murmurs  Gastrointestinal: Bowel sounds present, soft, nontender  Skin: Warm and dry, no active dermatoses  Musculoskeletal: No joint swelling, no joint erythema    Labs, imaging, and medical records/notes were personally reviewed. Assessment:  Acute hypoxic respiratory failure  Septic shock  Postoperative seroma/hematoma, right chest, associated with esophageal perforation in the setting of robotic hiatal hernia repair on 12/09, s/p right VATS, mini-thoracotomy with drainage and fenestration of mediastinal seroma/hematoma, chest tube and mediastinal drain placement on 12/20  Esophageal perforation  Enterobacter cloacae HAP  Elevated serum 1,3 beta-d-glucan    Recommendations:  Continue anidulafungin 100 mg q24h. Continue ertapenem 1 g every 24 hours. Plan for EGD with esophageal stent placement is noted. Continue supportive care. Thank you for involving me in the care of Kwesi Herron. I will continue to follow. Please do not hesitate to call for any questions or concerns.       Electronically signed by Shanique Kessler MD on 12/27/2022 at 10:03 AM

## 2022-12-27 NOTE — PROGRESS NOTES
[facility] PROGRESS NOTE      Chief complaint: Follow-up of pneumonia    This patient is a 44-year-old female with history of bilateral breast cancer, DM, hiatal hernia repair on 12/09, presented on 12/14 with shortness of breath for 2 days accompanied by productive cough and malaise prior to her hiatal hernia repair, thought to have large postoperative seroma after a type IV hiatal hernia repair. On admission, she was afebrile and hemodynamically stable with no leukocytosis. CT chest, abdomen and pelvis showed soft tissue density in the posterior medial aspect of the right lower lung field with deviation of the mediastinum to the left, bilateral lower lobe consolidation and pleural effusions, mild soft tissue prominence surrounding the esophagus with narrowing at the gastroesophageal junction appearing to be due to external compression, wall thickening in the gastric outlet with mild prominence of the stomach, distended gallbladder, no evidence of acute abdominal/pelvic pathology. Esophagram was negative for esophageal perforation. She underwent right-sided thoracentesis on 12/16 during which 105 mL of serous fluid was removed. Pleural fluid gram stain and culture showed rare polymorphonuclear leukocytes, no epithelial cells, no organisms, no growth. She was transferred to MICU on 12/17 for worsening hypoxemia. Repeat CT chest on 12/17 showed large right pleural effusion of mixed density suggestive of complex effusion with pigtail catheter in the pleural space posteriorly and inferiorly, small to moderate sized left pleural effusion with consolidative infiltrate throughout left upper and lower lung field and aerated portions revealing nodular groundglass opacity, nodular groundglass opacity inferiorly within the aerated right upper and lower lung field, less consolidative infiltrate within the right lower lobe.   Respiratory gram stain and culture showed no polymorphonuclear leukocytes, no epithelial cells, moderate yeast, moderate gram-positive diphtheroid-like rods, few gram-negative rods, moderate growth of Enterobacter cloacae, heavy growth of Candida albicans. Urine Streptococcus pneumoniae and Legionella antigens were negative. Respiratory pathogen PCR panel and MRSA nares culture were negative. Blood cultures showed no growth to date. She received a dose of ceftriaxone and doxycycline on admission. Ampicillin-sulbactam was started on admission and then switched to piperacillin-tazobactam on 12/17 then switched to meropenem on 12/19. Vancomycin was started on 12/17. She underwent right VATS, mini-thoracotomy with drainage and fenestration of mediastinal seroma/hematoma, chest tube and mediastinal drain placement on 12/20 during which 1100 mL serous fluid and dark blood (no succus or bile) extending into the mediastinum was drained via mini-thoracotomy. Hematoma/seroma fluid Gram stain and culture showed abundant polymorphonuclear leukocytes, no epithelial cells, no organisms, Gram-negative rods. Serum 1,3 beta-d-glucan on 12/18 was elevated. Subjective: Patient was seen and examined. She remains in critical condition, intubated and sedated, on increasing vasopressor support. Objective:  BP (!) 106/53   Pulse (!) 117   Temp 96.8 °F (36 °C) (Esophageal)   Resp 29   Ht 5' 2\" (1.575 m)   Wt 130 lb (59 kg)   SpO2 97%   BMI 23.78 kg/m²   Constitutional: Intubated, no MV dyssynchrony  Respiratory: Clear breath sounds, no crackles, no wheezes  Cardiovascular: Regular rate and rhythm, no murmurs  Gastrointestinal: Bowel sounds present, soft, nontender  Skin: Warm and dry, no active dermatoses  Musculoskeletal: No joint swelling, no joint erythema    Labs, imaging, and medical records/notes were personally reviewed.     Laboratory:  Lab Results   Component Value Date    WBC 25.4 (H) 12/27/2022    WBC 17.1 (H) 12/26/2022    WBC 14.4 (H) 12/25/2022    HGB 10.0 (L) 12/27/2022    HCT 31.0 (L) 12/27/2022 MCV 89.1 12/27/2022     12/27/2022     Lab Results   Component Value Date    NEUTROABS 21.86 (H) 12/27/2022    NEUTROABS 16.07 (H) 12/26/2022    NEUTROABS 12.88 (H) 12/25/2022     Lab Results   Component Value Date    CRP 45.9 (H) 12/14/2022    CRP 0.3 03/30/2022    CRP 0.3 02/04/2021     No results found for: CRPHS  Lab Results   Component Value Date    SEDRATE 16 03/30/2022    SEDRATE 25 (H) 02/04/2021    SEDRATE 33 (H) 01/13/2020     Lab Results   Component Value Date     (H) 12/27/2022    AST 92 (H) 12/27/2022    ALKPHOS 245 (H) 12/27/2022    BILITOT 1.9 (H) 12/27/2022     Lab Results   Component Value Date/Time     12/27/2022 05:50 AM    K 4.4 12/27/2022 05:50 AM    K 4.7 12/21/2022 09:23 AM     12/27/2022 05:50 AM    CO2 18 12/27/2022 05:50 AM    BUN 33 12/27/2022 05:50 AM    CREATININE 1.0 12/27/2022 05:50 AM    CREATININE 1.2 12/26/2022 11:56 PM    CREATININE 1.0 12/26/2022 12:17 PM    GFRAA >60 07/22/2022 01:42 PM    LABGLOM 58 12/27/2022 05:50 AM    GLUCOSE 133 12/27/2022 05:50 AM    PROT 6.4 12/27/2022 05:50 AM    LABALBU 2.8 12/27/2022 05:50 AM    LABALBU 4.3 01/24/2011 01:15 PM    CALCIUM 8.2 12/27/2022 05:50 AM    BILITOT 1.9 12/27/2022 05:50 AM    ALKPHOS 245 12/27/2022 05:50 AM    AST 92 12/27/2022 05:50 AM     12/27/2022 05:50 AM       Radiology: CT chest 12/17: Large right pleural effusion of mixed density to suggest complex effusion. There is a pigtail catheter seen in the pleural space posteriorly and   inferiorly. Small to moderate size left pleural effusion with consolidative   infiltrate throughout the left upper and lower lung field and aerated   portions revealing nodular ground-glass opacity to suggest an infectious   process. Nodular ground-glass opacity inferiorly within the aerated right   upper and lower lung field concerning for an infectious process as well. There is less consolidative infiltrate identified within the right lower lobe. Microbiology:     Blood cx  No results found for: BLOODCULT1  Culture, Blood 2   Date Value Ref Range Status   12/17/2022 5 Days no growth  Final       Smear, Respiratory   Date Value Ref Range Status   12/25/2022   Final    Few Polymorphonuclear leukocytes  Few Epithelial cells  No organisms seen         CULTURE, RESPIRATORY   Date Value Ref Range Status   12/25/2022 Oral Pharyngeal Hilda absent (A)  Final   12/25/2022 Heavy growth  Final   Enterobacter cloacae complex (1)    Antibiotic Interpretation Microscan  Method Status    amoxicillin-clavulanate Resistant >=^32 mcg/mL BACTERIAL SUSCEPTIBILITY PANEL BY CHEYANNE     ceFAZolin Resistant >=^64 mcg/mL BACTERIAL SUSCEPTIBILITY PANEL BY CHEYANNE     cefepime Sensitive ^0.5 mcg/mL BACTERIAL SUSCEPTIBILITY PANEL BY CHEYANNE     cefotaxime Resistant >=^64 mcg/mL BACTERIAL SUSCEPTIBILITY PANEL BY CHEYANNE     cefOXitin Resistant >=^64 mcg/mL BACTERIAL SUSCEPTIBILITY PANEL BY CHEYANNE     cefTAZidime-avibactam Sensitive ^0.5 mcg/mL BACTERIAL SUSCEPTIBILITY PANEL BY CHEYANNE     gentamicin Sensitive <=^1 mcg/mL BACTERIAL SUSCEPTIBILITY PANEL BY CHEYANNE     levofloxacin Sensitive <=^0.12 mcg/mL BACTERIAL SUSCEPTIBILITY PANEL BY CHEYANNE     meropenem Sensitive ^0.5 mcg/mL BACTERIAL SUSCEPTIBILITY PANEL BY CHEYANNE     piperacillin-tazobactam Resistant >=^128 mcg/mL BACTERIAL SUSCEPTIBILITY PANEL BY CHEYANNE     trimethoprim-sulfamethoxazole Sensitive <=^20 mcg/mL BACTERIAL SUSCEPTIBILITY PANEL BY CHEYANNE         BODY FLUID CULTURE  Body Fluid Culture, Sterile   Date Value Ref Range Status   12/20/2022 Rare growth  Final   12/20/2022 Light growth  Final       MRSA Culture Only   Date Value Ref Range Status   12/17/2022 Methicillin resistant Staph aureus not isolated  Final     Gram Stain [[id number]] Collected: 12/20/22 1328   Order Status: Completed Specimen: Fluid Updated: 12/21/22 075    Gram Stain Orderable --    Gram stain performed on unspun fluid   Abundant Polymorphonuclear leukocytes   Epithelial cells not seen   No organisms seen    Narrative:     Source: FLUID       Site: HEMATOMA                 Assessment:  Acute hypoxic respiratory failure  Septic shock  Postoperative seroma/hematoma, right chest, in the setting of robotic hiatal hernia repair on 12/09, s/p right VATS, mini-thoracotomy with drainage and fenestration of mediastinal seroma/hematoma, chest tube and mediastinal drain placement on 12/20  Enterobacter cloacae HAP. Candida albicans on respiratory culture likely colonization. Elevated serum 1,3 beta-d-glucan  WBC 15,800  Sebastien repeat resp culture    Recommendations:  Start anidulafungin 200 mg loading dose once followed by 100 mg q24h. Continue ertapenem 500 mg every 24 hours dosed according to renal function to a target dose of 1 g every 24 hours for CrCl of at last 30 mL/min. Follow-up surgical cultures. Follow-up blood cultures. Continue supportive care  Labs reviewed   No new cultures  DR Mercedes Andrade returns tomorrow          Thank you for involving me in the care of [facility]. Dr. Live Bro will continue to follow. Please do not hesitate to call for any questions or concerns.       Electronically signed by Je Martinez MD on 12/27/2022 at 12:53 PM

## 2022-12-27 NOTE — PROGRESS NOTES
[address] [facility]   Department of Internal Medicine   MICU Progress Note    Patient:  Little Carbajal 65 y.o. female   MRN: [id number]       Date of Service: 2022    Allergy: Benadryl [diphenhydramine] and Ibuprofen  CC hypoxia   Subjective   Intubated/sedated on vent  SBT this AM if able to  Hold sedation   On levophed  3 chest tube with minimal drainage, noted to have tube feeds through chest tube. Plan to  administer methylene blue and gastrografin contrast with x-ray, Esophageal Leak ==> laura for esophageal stenting. Temps of 96.8   CRRT in progress with net removal of 100cc/hour  No overnight event  Unable to obtain ROS   Objective     TEMPERATURE:  Current - Temp: 96.8 °F (36 °C); Max - Temp  Av.2 °F (36.8 °C)  Min: 96.4 °F (35.8 °C)  Max: 100.9 °F (38.3 °C)    RESPIRATIONS RANGE: Resp  Av.8  Min: 25  Max: 43    PULSE RANGE: Pulse  Av.7  Min: 90  Max: 126    BLOOD PRESSURE RANGE:  Systolic (44DCX), VUM:631 , Min:86 , QBZ:371   ; Diastolic (89QQA), YBZ:55, Min:43, Max:58      PULSE OXIMETRY RANGE: SpO2  Av.6 %  Min: 95 %  Max: 100 %    I & O - 24hr:    Intake/Output Summary (Last 24 hours) at 2022 1250  Last data filed at 2022 1200  Gross per 24 hour   Intake 2392.38 ml   Output 6238 ml   Net -3845.62 ml     I/O last 3 completed shifts: In: 5607.4 [I.V.:4827.6; NG/GT:734; IV Piggyback:45.8]  Out: 79553 [Urine:175; Chest Tube:1635] I/O this shift:  In: -   Out: 726    Weight change: -6 lb 8 oz (-2.948 kg)    Physical Exam:  General Appearance: intubated/sedated on vent, does not look in distress. Anasarca noted   HEENT:  Head: Normocephalic, no lesions, without obvious abnormality. Eye: Normal external eye, conjunctiva, lids cornea, FILEMON. Nose: Normal external nose, mucus membranes and septum. Neck / Thyroid: Supple, no masses, nodes, nodules or enlargement.   Neck: no adenopathy, no carotid bruit, no JVD, supple, symmetrical, trachea midline, and thyroid not enlarged, symmetric, no tenderness/mass/nodules  Lung: Rhonchi throughout lung field, no wheezing noted. 3 chest tube    Heart: regular rate and rhythm, S1, S2 normal, no murmur, click, rub or gallop  Abdomen: soft, non-tender; bowel sounds normal; no masses,  no organomegaly incision noted, intact. Extremities:  extremities normal, atraumatic, no cyanosis or edema 3+ pitting edema bilaterally. Musculokeletal: No joint swelling, no muscle tenderness. ROM normal in all joints of extremities.    Neurologic: Mental status: intubated/sedated on vent, limited neuro exam       Medications     Continuous Infusions:   PN-Adult  3 IN 1 Central Line (Custom)      sodium chloride      norepinephrine 6 mcg/min (12/27/22 0600)    prismaSATE BGK 4/0/1.2 2,000 mL/hr at 12/26/22 0415    sodium chloride 100 mL/hr at 12/22/22 2300    calcium chloride CRRT infusion 8,000 mg (12/27/22 0442)    dextrose      [Held by provider] fentaNYL 50 mcg/hr (12/27/22 0600)    sodium chloride       Scheduled Meds:   [Held by provider] midodrine  15 mg Oral TID WC    insulin lispro  0-16 Units SubCUTAneous Q4H    lidocaine PF  5 mL IntraDERmal Once    sodium chloride flush  5-40 mL IntraVENous 2 times per day    heparin flush  3 mL IntraVENous 2 times per day    ertapenem (INVanz) IVPB  1,000 mg IntraVENous Q24H    Vitamin D  1,000 Units Oral Daily    heparin (porcine)  5,000 Units SubCUTAneous Q8H    artificial tears   Both Eyes Q4H    anidulafungin  100 mg IntraVENous Q24H    pantoprazole (PROTONIX) 40 mg injection  40 mg IntraVENous Q12H    sodium chloride flush  5-40 mL IntraVENous 2 times per day    heparin flush  3 mL IntraVENous 2 times per day    Arformoterol Tartrate  15 mcg Nebulization BID    budesonide  0.5 mg Nebulization BID    albuterol  2.5 mg Nebulization Q4H WA    [Held by provider] metoprolol  5 mg IntraVENous Q6H    [Held by provider] rosuvastatin  10 mg Oral Nightly    [Held by provider] levothyroxine  50 mcg Oral Daily PRN Meds: fentanNYL, sodium chloride flush, sodium chloride, heparin flush, potassium chloride, magnesium sulfate, calcium gluconate **OR** calcium gluconate **OR** calcium gluconate **OR** calcium gluconate, sodium phosphate IVPB **OR** sodium phosphate IVPB **OR** sodium phosphate IVPB **OR** sodium phosphate IVPB, glucose, dextrose bolus **OR** dextrose bolus, glucagon (rDNA), dextrose, sodium chloride flush, sodium chloride, heparin flush, ondansetron **OR** ondansetron, [DISCONTINUED] acetaminophen **OR** acetaminophen, ipratropium-albuterol  Nutrition:   Tube feeds     Labs and Imaging Studies     CBC:   Recent Labs     12/25/22  0615 12/26/22  0542 12/27/22  0550   WBC 14.4* 17.1* 25.4*   RBC 2.88* 2.89* 3.48*   HGB 8.3* 8.5* 10.0*   HCT 25.4* 25.2* 31.0*   MCV 88.2 87.2 89.1   MCH 28.8 29.4 28.7   MCHC 32.7 33.7 32.3   RDW 16.1* 16.3* 15.9*   * 131 152   MPV 12.9* 13.4* 14.0*       BMP:    Recent Labs     12/26/22  1217 12/26/22  2356 12/27/22  0550    135 137   K 4.1 4.1 4.4    103 102   CO2 23 19* 18*   BUN 37* 35* 33*   CREATININE 1.0 1.2* 1.0   GLUCOSE 188* 141* 133*   CALCIUM 8.2* 7.9* 8.2*   PROT 5.7* 6.3* 6.4   LABALBU 2.4* 2.6* 2.8*   BILITOT 1.3* 1.6* 1.9*   ALKPHOS 243* 236* 245*   * 112* 92*   * 126* 115*       LIVER PROFILE:   Recent Labs     12/26/22  1217 12/26/22  2356 12/27/22  0550   * 112* 92*   * 126* 115*   BILITOT 1.3* 1.6* 1.9*   ALKPHOS 243* 236* 245*       PT/INR:   Recent Labs     12/25/22  0615   PROTIME 11.2   INR 1.0       APTT:   Recent Labs     12/25/22 0615   APTT 43.5*       Fasting Lipid Panel:    Lab Results   Component Value Date/Time    CHOL 45 12/19/2022 12:00 PM    TRIG 136 12/19/2022 12:00 PM    HDL 12 12/19/2022 12:00 PM       Cardiac Enzymes:    Lab Results   Component Value Date    CKTOTAL 59 01/13/2020    TROPONINI <0.01 01/13/2020       Notable Cultures:      Blood cultures   Blood Culture, Routine   Date Value Ref Range Status   12/17/2022 5 Days no growth  Final     Respiratory cultures No results found for: RESPCULTURE   Gram Stain Result   Date Value Ref Range Status   12/20/2022 Refer to ordered Gram stain for results  Final     Urine   Urine Culture, Routine   Date Value Ref Range Status   07/22/2022 <10,000 CFU/mL  Mixed gram positive organisms    Final     Legionella No results found for: LABLEGI  C Diff PCR No results found for: CDIFPCR  Wound culture/abscess: No results for input(s): WNDABS in the last 72 hours. Tip culture:No results for input(s): CXCATHTIP in the last 72 hours. Antibiotic  Days  Day started   Ertapenem      Anidulafungin                       Oxygen:     Vent Information  Ventilator ID: [id number]  Equipment Changed: Expiratory Filter  Vent Mode: AC/VC    Additional Respiratory Assessments  Heart Rate: (!) 117  Resp: 29  SpO2: 97 %  Position: Semi-Bach's  Humidification Source: Heated wire  Humidification Temp: 36.7  Circuit Condensation: Drained  Airway Type: ET  Airway Size: 7.5  Cuff Pressure (cm H2O):  (MLT)  Skin Barrier Applied: Yes     Nasal cannula L/min     Face mask %     Reservoirs mask %       ABG     PH  7.40   PCO2  33   PO2  108   HCO3  20   Sat%  97   FIO2     DATES 12/27/22       Lines:  Site  Day  Date inserted     TLC   RIJ    12/17/22      PICC              Arterial line   Left brachial    12/20/22     Peripheral line              HD cath Left IJ    12/21/22     Urinary Catheter 12/15/22 Peterson-Output (mL): 0 mL    Imaging Studies:    XR ABDOMEN (KUB) (SINGLE AP VIEW)   Final Result   NG tube again identified within the stomach, when compared to CT scan of the   abdomen pelvis performed 12/25/2022. There is oral contrast within the   stomach, however, there is oral contrast around the GE junction, suspicious   for focal perforation at the GE junction.   Oral contrast is also noted   tracking along the inferior right-sided chest tube, indicating extravasation   of contrast into the pleural space. The findings were discussed with Steven Hodges, the patient's bedside nurse, at   11:34 a.m.         XR CHEST PORTABLE   Final Result   No interval change         XR CHEST PORTABLE   Final Result   No significant interval change, when compared to the previous study performed   1 day earlier. CT HEAD WO CONTRAST   Final Result   1. No acute intracranial abnormality. 2.  Chronic right maxillary ethmoid and frontal sinusitis. Chronic sphenoid   sinusitis. Fluid opacification of both mastoid air cells. 3.  No CT evidence of large territory infarct. MCA vessels appear normal in   density. CT ABDOMEN PELVIS WO CONTRAST Additional Contrast? None   Final Result   1. No evidence of bowel obstruction or significant ileus. 2.  Gastrostomy tube is located in the stomach. Oral contrast material is   noted in the dependent portion of the gastric cardia. No abnormal bowel   dilatation. Oral contrast material noted in the right and left colon without   colonic distension. 3.  Mild abdominal ascites. 4.  Patchy asymmetric gland glass opacities in the lung bases with some areas   of confluent consolidation in the right lower lobe. Trace pleural effusions. Bilateral chest tubes. 5.  Very small left pneumothorax. XR CHEST PORTABLE   Final Result   1. Asymmetric bilateral airspace opacities, not significantly changed. 2.  No of visible pneumothorax. 3.  Support devices appear stable. XR CHEST PORTABLE   Final Result   Suggestion of some partial resolution of bilateral basilar infiltrates         XR CHEST PORTABLE   Final Result   No interval change         XR CHEST PORTABLE   Final Result   Stable appearance of the chest compared to 12/21/2022. XR CHEST PORTABLE   Final Result   Adequately positioned left internal jugular hemodialysis catheter. The   remainder of the exam including additional lines and tubes are stable.      XR CHEST PORTABLE   Final Result   Interval left chest tube placement, no definite pneumothorax.      Bilateral lung infiltrates and pleural effusions, not significantly changed.   These infiltrates may be due to pulmonary edema, pneumonia and/or ARDS.      Stable borderline enlargement of the cardiac silhouette.         XR CHEST PORTABLE   Final Result   Extensive bilateral airspace disease with interval progression on the right.         XR CHEST PORTABLE   Final Result   Postoperative changes with the diffuse bilateral infiltrates and effusions   with improvement in the right side likely CHF/edema.  Pneumonia is less   likely.      Tubes and catheters as noted.         XR CHEST PORTABLE   Final Result   Low lying endotracheal tube which needs withdrawn approximately 2 cm.         XR CHEST PORTABLE   Final Result   Enteric tube tip in the body of the stomach.      The roseann is not well seen.  ET tube tip most likely within 1.5 cm of the   roseann.         XR CHEST PORTABLE   Final Result   Pleural effusions with adjacent infiltrate and or atelectasis showing   slightly improved aeration on the left and slightly poor aeration on the   right.         XR CHEST PORTABLE   Final Result   Interval improvement in the opacification of the left hemithorax, with   aeration in the left upper lung field when compared to the previous study   performed 1 day earlier.  No other significant interval change.         XR CHEST PORTABLE   Final Result   New right internal jugular central venous catheter terminating near the   SVC-right atrium junction.  No post-procedure pneumothorax.  Worsening   opacification of the left hemithorax and stable findings on the right.         CT CHEST WO CONTRAST   Final Result   Large right pleural effusion of mixed density to suggest complex effusion.   There is a pigtail catheter seen in the pleural space posteriorly and   inferiorly.  Small to moderate size left pleural effusion with  consolidative   infiltrate throughout the left upper and lower lung field and aerated   portions revealing nodular ground-glass opacity to suggest an infectious   process. Nodular ground-glass opacity inferiorly within the aerated right   upper and lower lung field concerning for an infectious process as well. There is less consolidative infiltrate identified within the right lower lobe. XR CHEST PORTABLE   Final Result   1. No changes position of the right-sided chest tube. No right-sided   pneumothorax. The most of the right-sided pleural effusion has been drained,   if present to be discrete or small. 2.  Moderate left-sided pleural effusion. If quantification of left-sided   pleural effusion will be needed for clinical management bedside ultrasound or   left lateral decubitus views of the chest can be helpful. 3.  No pneumothorax on the right or on the left. 4.  Large size diaphragmatic across the midline. XR CHEST PORTABLE   Final Result   1. Increased parenchymal density projecting over the left lower lung   concerning for atelectasis and/or consolidation      2. No signs of right effusion      3. Parenchymal density projecting over the right lower lung concerning for   atelectasis         IR GUIDED PERC PLEURAL DRAIN W CATH INSERT   Final Result   1. Successful ultrasound-guided right thoracentesis. 2.  Successful placement of an 8 [place] pleural drain         XR CHEST PORTABLE   Final Result   1. Right-sided pigtail catheter in the right base. 2.  No conspicuous right-sided pleural effusions identified. No right-sided   pneumothorax. 3.  Cannot exclude a small left-sided pleural effusion. 4.  Large size diaphragmatic hernia across the midline causing compression   atelectasis in the medial aspect of both lungs. FL ESOPHAGRAM   Final Result   1. No evidence of esophageal perforation or leak. 2. Decreased peristalsis.   No evidence of esophageal mass or obstructing   lesion. 3. Small sliding hiatus hernia. No evidence of GE reflux. The gastric   foreign disc does appear constricted as it passes through the esophageal   hiatus. US CHEST INCLUDING MEDIASTINUM   Final Result   Probable complex loculated right pleural effusion. Simple left pleural effusion. CTA PULMONARY W CONTRAST   Final Result   No evidence of pulmonary embolism. Bilateral lower lobe pulmonary consolidations and pleural effusions seen. Deviation of the mediastinum to the left is visualized. Mild soft tissue prominence visualized surrounding the esophagus with   narrowing at the gastroesophageal junction but appears to be due to external   compression. Cannot rule left soft tissue mass at this location. Wall thickening visualized in the gastric outlet with mild prominence of the   stomach seen, this could be artifactual.      Distended gallbladder is seen. Degenerative bone changes, postoperative changes and multilevel vertebral   augmentation seen. No evidence of acute abdominal/pelvic pathology. CT ABDOMEN PELVIS W IV CONTRAST Additional Contrast? Oral   Final Result   No evidence of pulmonary embolism. Bilateral lower lobe pulmonary consolidations and pleural effusions seen. Deviation of the mediastinum to the left is visualized. Mild soft tissue prominence visualized surrounding the esophagus with   narrowing at the gastroesophageal junction but appears to be due to external   compression. Cannot rule left soft tissue mass at this location. Wall thickening visualized in the gastric outlet with mild prominence of the   stomach seen, this could be artifactual.      Distended gallbladder is seen. Degenerative bone changes, postoperative changes and multilevel vertebral   augmentation seen. No evidence of acute abdominal/pelvic pathology. XR CHEST PORTABLE   Final Result   1. Consolidation seen within the left lung base with blunting the left   costophrenic angle which could represent pneumonia or atelectasis   2. A trace left pleural effusion cannot be excluded   3. Large right diaphragmatic hernia/hiatal hernia which was noted on the   patient's previous CT of the chest of 07/22/2022. XR CHEST PORTABLE    (Results Pending)        APRN- CNP Assessment and PLan   In summary 65 y.o. female with significant past medical history of hypothyroidism, breast cancer, TIA, hiatal hernia underwent hiatal hernia repair on 12/9/22 and developed acute respiratory distress and [facility] for further management. Patient was seen by nephrology, pulmonology, general surgery. Patient noted to have worsening hypoxia requiring 15 L of oxygen, A. fib RVR therefore transferred to MICU and critical care was consulted. Assessment:  Acute hypoxemic respiratory failure secondary to aspiration pneumonia/pleural effusion/bibasilar consolidation now on ventilation (12/19/22)   ARDS   Bilateral pleural effusion right greater than left status post right thoracentesis with pigtail chest tube placement on (12/16/22 IR)   Septic shock  2/2 PNA/loculated pleural effusion  Loculated pleural effusion s/p VATS and Mini-thoracotomy with open drainage of fluid collection 12/20   Esophageal Leak ==> plan for EGD 12/30/2022.   AFIB RVR  PAWAN secondary to dehydration  [place] ptosis leukocytosis with left shift  Acute anemia  History of massive hiatal hernia  status post repair on 12/9/2022  History of TIA  History of breast cancer  History of hypothyroidism  History of lumbar compression fracture with history of PLIF T12-L2  History of anxiety     Plan:  -ventilator (day #9), titrate FiO2 keep SPO2 goal 92%  - vent changes, repeat abg   - hold fentanyl for now  - on levophed, titrate to keep MAP >65mmHg   - loculated pleural effusion s/p VATS and mini thoracotomy with open drainage of fluid collection (12/20/22) s/p 2 chest tube ;-  effusion on left side s/p Left chest tube (12/21/22)   -3 chest tube with minimal drainage, noted to have tube feeds through Right chest tube. General surgery resident administer methylene blue and gastrografin contrast with x-ray, Esophageal Leak ==> plan for EGD 12/30/2022. Plan for esophageal stenting by Dr. Yolande Perry with EGD. -Continue scheduled bronchodilators Brovana and Pulmicort in addition to the DuoNebs, mucomyst   -Echo showed EF of 55%, indeterminate diastolic function, moderate dilated right ventricle with reduced function, moderate tricuspid regurgitation. Echo in January 2021 showed EF of 60 to 65%,   -Procalcitonin 5.70==> 1.57 today; RVP/COVID-19 PCR negative, MRSA nares negative, blood culture (NGTD) respiratory culture (Enterobacter Colace complex, Candida albicans) urine antigen negative. Body fluid hematoma==> enterobacter, candida albicans; Respiratory culture (GNR)   - Consult to ID, input appreciated. - Ertapenem and anidulafungin   -General surgery surgery consulted, input appreciated. - NPO, hold all oral medication and TUBE FEEDS   -PAWAN, nephrology following, input appreciated- low urine output, metabolic acidosis, will place temp HD catheter and may need CRRT with removal of 100cc/hour.   -Strict intake and output  -Labs and chest x-ray in a.m.  -F/E/N KVO/ replace lytes/ Npo   - DVT/GI scds/lovenox/protonix  - Code status: DNR- limited No CPR, ok to everything.        KAR Owens-CNP   Critical Care     Discussed case with Attending Physician: Amanda Rand

## 2022-12-27 NOTE — PROGRESS NOTES
Krystle Ford M.D.,[facility]  Rosi Pacheco D.O., F.A.C.O.I., Esther Steiner M.D. Carla Medina M.D. Nikhil Justice D.O. Daily Pulmonary Progress Note    Patient:  Kwesi Herron 65 y.o. female MRN: [id number]     Date of Service: 12/27/2022      Synopsis     We are following patient for acute respiratory failure with hypoxia, pneumonia, bilateral pleural effusions    \"CC\" SOB    Code status: Limited-no compressions      Subjective      Patient was seen and examined in the ICU. CVVHD was discontinued yesterday, however blood pressure became unstable and patient was hypotensive requiring the restart of vasopressors at which time CVVHD was restarted. There also was some concern of one of the chest tubes putting out tube feed so tube feeding was held and general surgery notified. GS placed an OG tube and instilled methane blue and gastrograffin prior to taking some radiology films. There is ally blue dye draining from the right chest tubes concerning for a perforation or fistula.    Review of Systems:  Unable to obtain-patient intubated     24-hour events:  CVVHD and vasopressors restarted after return of hypotension  Right-sided chest tubes evaluated by general surgery-concern for esophageal perforation or fistula    Objective   Vitals: BP (!) 106/53   Pulse (!) 101   Temp 96.8 °F (36 °C) (Esophageal)   Resp 29   Ht 5' 2\" (1.575 m)   Wt 130 lb (59 kg)   SpO2 98%   BMI 23.78 kg/m²     I/O:    Intake/Output Summary (Last 24 hours) at 12/27/2022 1000  Last data filed at 12/27/2022 0900  Gross per 24 hour   Intake 2704.38 ml   Output 6460 ml   Net -3755.62 ml         Vent Information  Ventilator ID: [id number]  Equipment Changed: Expiratory Filter  Vent Mode: AC/VC                CURRENT MEDS :  Scheduled Meds:   midodrine  15 mg Oral TID WC    diatrizoate meglumine-sodium  30 mL Oral Once    methylene blue  1 mL IntraVENous Once    insulin lispro  0-16 Units SubCUTAneous Q4H    lidocaine PF 5 mL IntraDERmal Once    sodium chloride flush  5-40 mL IntraVENous 2 times per day    heparin flush  3 mL IntraVENous 2 times per day    docusate sodium  100 mg Oral BID    sennosides  5 mL Oral Nightly    ertapenem (INVanz) IVPB  1,000 mg IntraVENous Q24H    Vitamin D  1,000 Units Oral Daily    heparin (porcine)  5,000 Units SubCUTAneous Q8H    artificial tears   Both Eyes Q4H    anidulafungin  100 mg IntraVENous Q24H    pantoprazole (PROTONIX) 40 mg injection  40 mg IntraVENous Q12H    sodium chloride flush  5-40 mL IntraVENous 2 times per day    heparin flush  3 mL IntraVENous 2 times per day    metoclopramide  10 mg IntraVENous Q6H    Arformoterol Tartrate  15 mcg Nebulization BID    budesonide  0.5 mg Nebulization BID    albuterol  2.5 mg Nebulization Q4H WA    [Held by provider] metoprolol  5 mg IntraVENous Q6H    [Held by provider] rosuvastatin  10 mg Oral Nightly    levothyroxine  50 mcg Oral Daily       Physical Exam:  General Appearance: Patient is intubated, and is toxic appearing  HEENT: ETT in place, OG tube  Neck: Lips, mucosa, and tongue normal.  Supple, symmetrical, trachea midline, no adenopathy;thyroid:  no enlargement/tenderness/nodules or JVD. Lung: Bilaterally diminished, right chest tube in place x3, left chest tube x1-no air leaks  Heart: Tachycardic  Abdomen: Soft, NT, ND. BS present x 4 quadrants. No bruit or organomegaly. Extremities: +1 pitting edema  Musculokeletal: No joint swelling, no muscle tenderness. ROM normal in all joints of extremities. Neurologic: Mental status: Intubated    Pertinent/ New Labs and Imaging Studies     Imaging Personally Reviewed:  Noncontrast CT chest 12/17/2022:  Impression   Large right pleural effusion of mixed density to suggest complex effusion. There is a pigtail catheter seen in the pleural space posteriorly and   inferiorly.   Small to moderate size left pleural effusion with consolidative   infiltrate throughout the left upper and lower lung field and aerated   portions revealing nodular ground-glass opacity to suggest an infectious   process. Nodular ground-glass opacity inferiorly within the aerated right   upper and lower lung field concerning for an infectious process as well. There is less consolidative infiltrate identified within the right lower lobe. Chest x-ray 12/27/2022:    FINDINGS:   Bibasilar atelectasis/infiltrate are unchanged. There are bilateral chest   tubes. There is no definite pneumothorax. Support lines are appropriate. There is a small right effusion and PleurX catheter. Impression   No interval change       KUB 12/27/2022:    Impression   NG tube again identified within the stomach, when compared to CT scan of the   abdomen pelvis performed 12/25/2022. There is oral contrast within the   stomach, however, there is oral contrast around the GE junction, suspicious   for focal perforation at the GE junction. Oral contrast is also noted   tracking along the inferior right-sided chest tube, indicating extravasation   of contrast into the pleural space. ECHO 12/19/2022:   Summary   Technically difficult study - limited visualization. Micro-bubble contrast injected to enhance left ventricular visualization. Normal left ventricular size and systolic function. Ejection fraction is visually estimated at 55%. Indeterminate diastolic function. No regional wall motion abnormalities seen. Mild left ventricular concentric hypertrophy noted. Moderately dilated right ventricle with reduced function. Moderate tricuspid regurgitation.       Labs:  Lab Results   Component Value Date/Time    WBC 25.4 12/27/2022 05:50 AM    HGB 10.0 12/27/2022 05:50 AM    HCT 31.0 12/27/2022 05:50 AM    HCT 29.0 12/20/2022 01:31 PM    MCV 89.1 12/27/2022 05:50 AM    MCH 28.7 12/27/2022 05:50 AM    MCHC 32.3 12/27/2022 05:50 AM    RDW 15.9 12/27/2022 05:50 AM     12/27/2022 05:50 AM    MPV 14.0 12/27/2022 05:50 AM     Lab Results   Component Value Date/Time     12/27/2022 05:50 AM    K 4.4 12/27/2022 05:50 AM    K 4.7 12/21/2022 09:23 AM     12/27/2022 05:50 AM    CO2 18 12/27/2022 05:50 AM    BUN 33 12/27/2022 05:50 AM    CREATININE 1.0 12/27/2022 05:50 AM    LABALBU 2.8 12/27/2022 05:50 AM    LABALBU 4.3 01/24/2011 01:15 PM    CALCIUM 8.2 12/27/2022 05:50 AM    GFRAA >60 07/22/2022 01:42 PM    LABGLOM 58 12/27/2022 05:50 AM     Lab Results   Component Value Date/Time    PROTIME 11.2 12/25/2022 06:15 AM    INR 1.0 12/25/2022 06:15 AM     No results for input(s): PROBNP in the last 72 hours. No results for input(s): PROCAL in the last 72 hours. This SmartLink has not been configured with any valid records. Micro:  Recent Labs     12/25/22  0915   CULTRESP Oral Pharyngeal Hilda absent*  Heavy growth  Identification and sensitivity to follow       No results for input(s): LABGRAM in the last 72 hours. No results for input(s): LEGUR in the last 72 hours. No results for input(s): STREPNEUMAGU in the last 72 hours. No results for input(s): LP1UAG in the last 72 hours.   Respiratory cultures    Oral Pharyngeal Hilda absent Abnormal     Smear, Respiratory Polymorphonuclear leukocytes not seen   Epithelial cells not seen   Moderate yeast   Moderate gram positive rods Diphtheroid like   Few Gram negative rods    Organism Enterobacter cloacae complex Abnormal     CULTURE, RESPIRATORY Moderate growth    Organism Candida albicans Abnormal     CULTURE, RESPIRATORY Heavy growth         Latest Reference Range & Units Most Recent   (1,3)-Beta-D-Glucan (Fungitell) Interpretation Negative  Positive !  12/19/22 04:02   !: Data is abnormal      ABGs 12/25/2022:     Latest Reference Range & Units 12/25/22 06:24   Source:  Blood Arterial   pH, Blood Gas 7.350 - 7.450  7.376   PCO2 35.0 - 45.0 mmHg 42.0   pO2 75.0 - 100.0 mmHg 86.0   HCO3 22.0 - 26.0 mmol/L 24.1   Base Excess -3.0 - 3.0 mmol/L -1.1   O2 Sat 92.0 - 98.5 % 96. 1   THB,THB 11.5 - 16.5 g/dL 9.5 (L)   O2Hb 94.0 - 97.0 % 95.4   Carboxy-Hgb 0.0 - 1.5 % 0.4   METHB,METHB 0.0 - 1.5 % 0.3   HHb 0.0 - 5.0 % 3.9   AaDO2 mmHg 210.8   RI(T)  2.45   FIO2 % 50.0   PO2/FIO2 mmHg/% 1.72   (L): Data is abnormally low     Assessment:    Acute hypoxic respiratory failure - requiring intubation 12/19/22  Acute Respiratory Distress Syndrome  S/p RRT 12/17/22 for respiratory distress and increasing oxygen demands  R loculated pleural effusion s/p IR chest tube 12/17/22 with minimal drainage, s/p R VATS turned partial open thoracotomy 12/20/22 with insertion of chest tubes x 3  S/p bronchoscopy-friable mucosa 12/20/22  Pneumonia, Hospital acquired-Enterobacter cloacae-Invanz started 12/19/22  Massive hiatal hernia s/p repair 12/9 at [facility]  Esophageal perforation secondary to hiatal hernia repair  Acute kidney injury  L chest tube placement 12/21/22  CVVHD initiated 12/21/22  Positive fungitell-eraxis started 12/21/22      Plan:   Continue mechanical ventilation, wean FiO2 as able  Aggressive bronchopulmonary hygiene  Fluid removal with CVVHD initiated per Nephrology-ultrafiltration of 100 mL/h  TPN-to be restarted- management per surgery  Chest tube x 4 management per surgery  Possible EGD with stent for esophageal perforation-Dr. Katelyn Zapien consulted  Antimicrobials per ID-Invanz started 12/19/22, Eraxis started 12/21/22  Rest per ICU team  Prognosis guarded    This plan of care was reviewed in collaboration with Dr. Dieter Cardoso. Electronically signed by KAR Cruz - CNP on 12/27/2022 at 10:00 AM    I personally saw, examined, and cared for the patient. Labs, medications, radiographs reviewed. I agree with history exam and plans detailed in NP note. +methylene blue leak from esophagus to chest tube. Imaging also consistent extravasation to pleural space. Concern for perforation or fistula. Given complex nature would recommend transfer to a tertiary center.     Nikunj Hernandez Mikal, DO

## 2022-12-27 NOTE — PROGRESS NOTES
Hospitalist Progress Note      PCP: Christy Galindo DO    Date of Admission: 12/14/2022        Hospital Course:  65 y.o. female presented with PNEUMONIA. STATES SHE HAD A HH REPAIR AT [facility] ON THE 8TH. SHE STATES SHE WAS NOT FEELING BAD, BUT WAS TOLD SHE NEEDED TO COME TO THE HOSPITAL BC SHE WAS SICK. SHE WAS FOUND TO BE SEPTIC, WITH A HAG, SHE WAS TACHY AND TACHYPNEIC . COFFEE GROUND EMESIS THIS MORNING, GASTROCULT  POS . SURGERY NOTIFIED ** HAD A THROACENTESIS ON YESTERDAY, 105 CC REMOVED. ** RRT  DUE TO RESPIRATORY DISTRESS ** TO TRANSFER TO  OR [facility]** INTUBATED HYPOXIA**  HAD CHEST TUBES INSERTED ON 12.20 ** DEVI FOUND ON CULTURES,  ** APPEARS TO HAVE A FISTULA, IN CHEST CAVITY. ?  IF TRANSFER TO [facility] VS         Subjective:  SEDATED AND INTUBATED           Medications:  Reviewed    Infusion Medications    PN-Adult  3 IN 1 Central Line (Custom)      vasopressin (Septic Shock) infusion 0.03 Units/min (12/27/22 1509)    sodium chloride      norepinephrine 13 mcg/min (12/27/22 1256)    prismaSATE BGK 4/0/1.2 2,000 mL/hr at 12/26/22 0415    sodium chloride 100 mL/hr at 12/22/22 2300    calcium chloride CRRT infusion 60 mL/hr at 12/27/22 1527    dextrose      [Held by provider] fentaNYL Stopped (12/27/22 0806)    sodium chloride       Scheduled Medications    [Held by provider] midodrine  15 mg Oral TID WC    insulin lispro  0-16 Units SubCUTAneous Q4H    lidocaine PF  5 mL IntraDERmal Once    sodium chloride flush  5-40 mL IntraVENous 2 times per day    heparin flush  3 mL IntraVENous 2 times per day    ertapenem (INVanz) IVPB  1,000 mg IntraVENous Q24H    [Held by provider] Vitamin D  1,000 Units Oral Daily    heparin (porcine)  5,000 Units SubCUTAneous Q8H    artificial tears   Both Eyes Q4H    anidulafungin  100 mg IntraVENous Q24H    pantoprazole (PROTONIX) 40 mg injection  40 mg IntraVENous Q12H    sodium chloride flush  5-40 mL IntraVENous 2 times per day    heparin flush  3 mL IntraVENous 2 times per day    Arformoterol Tartrate  15 mcg Nebulization BID    budesonide  0.5 mg Nebulization BID    albuterol  2.5 mg Nebulization Q4H WA    [Held by provider] metoprolol  5 mg IntraVENous Q6H    [Held by provider] rosuvastatin  10 mg Oral Nightly    [Held by provider] levothyroxine  50 mcg Oral Daily     PRN Meds: fentanNYL, sodium chloride flush, sodium chloride, heparin flush, potassium chloride, magnesium sulfate, calcium gluconate **OR** calcium gluconate **OR** calcium gluconate **OR** calcium gluconate, sodium phosphate IVPB **OR** sodium phosphate IVPB **OR** sodium phosphate IVPB **OR** sodium phosphate IVPB, glucose, dextrose bolus **OR** dextrose bolus, glucagon (rDNA), dextrose, sodium chloride flush, sodium chloride, heparin flush, ondansetron **OR** ondansetron, [DISCONTINUED] acetaminophen **OR** acetaminophen, ipratropium-albuterol      Intake/Output Summary (Last 24 hours) at 12/27/2022 1543  Last data filed at 12/27/2022 1500  Gross per 24 hour   Intake 1943.38 ml   Output 6022 ml   Net -4078.62 ml       Exam:    BP (!) 106/53   Pulse (!) 117   Temp 96.8 °F (36 °C) (Esophageal)   Resp 29   Ht 5' 2\" (1.575 m)   Wt 130 lb (59 kg)   SpO2 97%   BMI 23.78 kg/m²       General appearance:  No apparent distress, appears stated age. HEENT:  Normal cephalic,   Neck: Supple, with full range of motion. Trachea midline. Respiratory:  Normal respiratory effort. DIMINISHED BREATH SOUNDS  NOTED BILATERALLY  Cardiovascular:  RRR  Abdomen: Soft, non-tender, non-distended with normal bowel sounds. Musculoskeletal:  No clubbing, cyanosis or edema bilaterally.     Skin: smooth and dry          Labs:   Recent Labs     12/25/22  0615 12/26/22  0542 12/27/22  0550   WBC 14.4* 17.1* 25.4*   HGB 8.3* 8.5* 10.0*   HCT 25.4* 25.2* 31.0*   * 131 152     Recent Labs     12/26/22  2356 12/27/22  0550 12/27/22  1239    137 136   K 4.1 4.4 4.3    102 102   CO2 19* 18* 19*   BUN 35* 33* 34*   CREATININE 1.2* 1.0 1.1*   CALCIUM 7.9* 8.2* 7.9*   PHOS 3.2 3.7 2.9     Recent Labs     12/26/22  2356 12/27/22  0550 12/27/22  1239   * 92* 57*   * 115* 85*   BILITOT 1.6* 1.9* 2.8*   ALKPHOS 236* 245* 184*     Recent Labs     12/25/22  0615   INR 1.0     No results for input(s): CKTOTAL, TROPONINI in the last 72 hours. Recent Labs     12/26/22  2356 12/27/22  0550 12/27/22  1239   * 92* 57*   * 115* 85*   BILITOT 1.6* 1.9* 2.8*   ALKPHOS 236* 245* 184*     No results for input(s): LACTA in the last 72 hours. No results found for: Javier Gaudy  No results found for: AMMONIA    Assessment:    Active Hospital Problems    Diagnosis Date Noted    Hypoxia [R09.02] 12/17/2022     Priority: Medium    Pneumonia of left lower lobe due to infectious organism [J18.9] 12/17/2022     Priority: Medium    Aspiration pneumonia of both lower lobes due to gastric secretions ([facility] [address] [J69.0] 12/17/2022     Priority: Medium    Postoperative pneumonia [J95.89, J18.9] 12/14/2022     Priority: Medium   FISTULA CHEST CAVITY  ACUTE RESP FAILURE WITH HYPOXIA  GASTROCULT POS  S/P HH REPAIR  PAWAN BASELINE CREATINE 1.0)  H.O BREAST CANCER   HLD   PLEURAL EFFUSIONS BILATERALLY   S/P CHEST TUBES  R loculated pleural effusion s/p IR chest tube 12/17/22 with minimal drainage, s/p R VATS turned partial open thoracotomy 12/20/22 with insertion of chest tubes x 3  PLAN:  anidulafungin 200    FLUIDS  MONITOR BMP   FOR  CAMACHO   TO STOP CVVHD        DVT Prophylaxis: SCD  Diet: Diet NPO Exceptions are: Ice Chips  PN-Adult  3 IN 1 Central Line (Custom)  Diet NPO Exceptions are: Sips of Water with Meds  PN-Adult  3 IN 1 Central Line (Custom)  Code Status: Full Code     PT/OT Eval Status:   WHEN STABLE     Dispo -  TO [facility] OR      Electronically signed by Rosanne Reagan DO on 12/27/2022 at 3:43 PM [facility]

## 2022-12-27 NOTE — PROGRESS NOTES
Increase in output from right sided chest tubes overnight and into the morning despite negative work-up thus far for possible esophageal injury. Tube feedings were stopped this morning. Bedside evaluation performed with methylene blue and gastrograffin contrast administration with xray at the bedside. Bluish coloration of output from right chest tubes seen almost immediately after administration via OG. Gastrograffin administration illustrated contrast extravasation above the hiatus and into the right sided chest tube lumens. This confirmed presence of leak. At this point, we will consult Dr. Onesimo Dior for evaluation for esophageal stenting. OG to remain to LIWS. Do not administer anything via OG. Will re-order TPN.             Deborah William MD  PGY-4

## 2022-12-27 NOTE — PROGRESS NOTES
GENERAL SURGERY  DAILY PROGRESS NOTE  12/27/2022    CHIEF COMPLAINT:  Chief Complaint   Patient presents with    Shortness of Breath     Sent from [facility] for SOB patient went for hernia repair patient on 6L NC on arrival       SUBJECTIVE:  Patient on fentanyl only. Increase in right sided chest tube output that correlated with increase in tube feeds. OBJECTIVE:  BP (!) 106/53   Pulse (!) 126   Temp 96.8 °F (36 °C) (Esophageal)   Resp 27   Ht 5' 2\" (1.575 m)   Wt 130 lb (59 kg)   SpO2 95%   BMI 23.78 kg/m²     GENERAL:  Intubated, minimally responsive    LUNGS: Mechanically ventilated. R Chest tube with creamy output, L chest with serosang output. Right mediastinal drain with creamy output. CARDIOVASCULAR: RR  ABDOMEN:  Soft, non-distended, non-tender. No guarding, rigidity, rebound. Incisions clean with ecchymosis  EXT: warm and well perfused    ASSESSMENT/PLAN:  65 y.o. female with respiratory failure and complex right-sided effusion s/p robotic hiatal hernia repair 12/9 without signs of esophageal perforation or leak however now +fungitell and growing candida in chest. S/p VATS & Mini-thoractomy & bronchoscopy with open drainage of fluid collection 12/20, left CT placement 12/21 with ARDS & renal failure s/p chemical paralysis, flolan, CVVHD 12/21  Bronchoscopy intra-op showed friable muscosa w/o any thick purulent secretions    Remains critically ill  Discontinue TF for now  Discontinue TPN  Await return of bowel function  Continue chest tubes to suction. Appreciate critical care assistance with medical management. Daily CXR    Will be discussed with Dr. Alexia Madrigal    Electronically signed by Santino Longoria MD on 12/27/2022 at 7:12 AM    Attending Note:    Patient seen/examined 12/27/2022. I discussed case with the resident and reviewed all relevant labs/imaging.  Agree with resident's assessment and plan with the following corrections/additions which summarizes my clinical findings and independent assessment: methylene blue shows leak near GEJ. Consult surgical endoscopy for possible esophageal stent insertion.

## 2022-12-27 NOTE — PLAN OF CARE
Problem: Chronic Conditions and Co-morbidities  Goal: Patient's chronic conditions and co-morbidity symptoms are monitored and maintained or improved  12/27/2022 0008 by Sarahi Alexis RN  Outcome: Progressing     Problem: Discharge Planning  Goal: Discharge to home or other facility with appropriate resources  12/27/2022 0008 by Sarahi Alexis RN  Outcome: Progressing     Problem: Safety - Adult  Goal: Free from fall injury  12/27/2022 0008 by Sarahi Alexis RN  Outcome: Progressing  Flowsheets (Taken 12/27/2022 0000)  Free From Fall Injury: Instruct family/caregiver on patient safety     Problem: Pain  Goal: Verbalizes/displays adequate comfort level or baseline comfort level  12/27/2022 0008 by Sarahi Alexis RN  Outcome: Progressing  Flowsheets (Taken 12/27/2022 0000)  Verbalizes/displays adequate comfort level or baseline comfort level: Assess pain using appropriate pain scale     Problem: Nutrition Deficit:  Goal: Optimize nutritional status  12/27/2022 0008 by Sarahi Alexis RN  Outcome: Progressing     Problem: Skin/Tissue Integrity  Goal: Absence of new skin breakdown  Description: 1. Monitor for areas of redness and/or skin breakdown  2. Assess vascular access sites hourly  3. Every 4-6 hours minimum:  Change oxygen saturation probe site  4. Every 4-6 hours:  If on nasal continuous positive airway pressure, respiratory therapy assess nares and determine need for appliance change or resting period.   12/27/2022 0008 by Sarahi Alexis RN  Outcome: Progressing     Problem: Respiratory - Adult  Goal: Achieves optimal ventilation and oxygenation  12/27/2022 0008 by Sarahi Alexis RN  Outcome: Progressing

## 2022-12-27 NOTE — CONSULTS
SURGICAL ENDOSCOPY  CONSULT NOTE  12/27/2022    Chief Complaint   Patient presents with    Shortness of Breath     Sent from [facility] [place] for SOB patient went for hernia repair patient on 6L NC on arrival       HPI  Iwona Bourgeois is a 65 y.o. female from surgical endoscopy was consulted for esophageal leak. The patient underwent laparoscopic, robot-assisted hiatal hernia repair on 12/9 and admitted with respiratory failure and complex right-sided effusion without signs of esophageal perforation or leak. Course included VATS and minithoracotomy and bronchoscopy with open drainage of fluid collection on 12/20 and left chest tube placement on 12/21. Her course has been complicated by ARDS requiring paralytic and Flolan at times as well as renal failure with CVVHD initiated on 12/21. Right-sided chest tube output increased overnight into the morning. Tube feeds were running but stopped. All work-up prior to this morning was negative for esophageal perforation and leak however bedside administration of methylene blue and Gastrografin with x-ray showed active extravasation and bluish discoloration of the output of the right-sided chest tubes. This confirmed the presence of the leak. Surgical endoscopy was consulted for evaluation for possible esophageal stenting. She remains critically ill on norepinephrine to support maps greater than 65.     Patient Active Problem List   Diagnosis    Compression fracture of L4 lumbar vertebra    Other postprocedural status(V45.89)  Kyphoplasty L4    Low back pain    Acne rosacea    Drug reaction    Hypothyroidism    Neck muscle spasm    Back pain    Headache    Vertebrobasilar TIAs    Hypercholesterolemia    Lumbar spinal stenosis L4-5    Orthostatic hypotension    H/o compression fracture of lumbar vertebra, mulitple    Compression fracture of L5 lumbar vertebra    S/P kyphoplasty L4    Fibromyalgia    Chronic back pain    Mass of breast    Contact dermatitis Degeneration of intervertebral disc of lumbar region    Dermoid cyst of mouth    Vision disorder    Essential hypertension    Gastroesophageal reflux disease    Hyperlipidemia    Neuropathy    Breast pain    Sprain of knee    Temporary cerebral vascular dysfunction    Pain of upper abdomen    TIA (transient ischemic attack)    Limb weakness    S/P lumbar fusion (history of PLIF)    Cancer ([facility] [address]    History of bilateral breast cancer    Dizziness    Hx-TIA (transient ischemic attack)    Pain syndrome, chronic    Anxiety with somatization    Postoperative pneumonia    Hypoxia    Pneumonia of left lower lobe due to infectious organism    Aspiration pneumonia of both lower lobes due to gastric secretions [facility])       Past Medical History:   Diagnosis Date    Anxiety with somatization 3/30/2021    Compression fracture of L1 lumbar vertebra [facility]) november 1994    Compression fracture of L4 lumbar vertebra     secondary to fall in December 2013    Fall 1989 & 12/2013    History of bilateral breast cancer 1980's    breast    Hx-TIA (transient ischemic attack) 3/30/2021    Osteoporosis     Pain syndrome, chronic 3/30/2021    Thyroid disease     went off of medication    Type II or unspecified type diabetes mellitus without mention of complication, not stated as uncontrolled     resolved with weight loss of 20 lbs       Past Surgical History:   Procedure Laterality Date    BACK SURGERY  1989    PLIF T12 to L2 with pedicle screws and rods    BREAST SURGERY Bilateral     breast implants (after mastectomy)    FIXATION KYPHOPLASTY  03/10/2014    with bone biopsy    IR PERC CATH PLEURAL DRAIN W/IMAG  12/16/2022    IR PERC CATH PLEURAL DRAIN W/IMAG 12/16/2022 Dayanara Corbin MD [facility]    MASTECTOMY Bilateral     THORACOSCOPY Right 12/20/2022    RIGHT VATS performed by Tonya Mckeon MD at \[facility]\"" 81         Prior to Admission medications    Medication Sig Start Date End Date Taking? Authorizing Provider   Cholecalciferol (VITAMIN D3) 125 MCG (5000 UT) TABS Take by mouth    Historical Provider, MD   levothyroxine (SYNTHROID) 50 MCG tablet Take 50 mcg by mouth daily    Historical Provider, MD   rosuvastatin (CRESTOR) 20 MG tablet Take 1 tablet by mouth nightly 1/7/21   Courtney Antoine MD   aspirin 325 MG EC tablet Take 1 tablet by mouth daily 1/7/21 1/7/22  Courtney Antoine MD   Handicap Placard MISC by Does not apply route Duration: Lifetime 6/21/18   Gertrudis Borges,        Allergies   Allergen Reactions    Benadryl [Diphenhydramine] Itching    Ibuprofen Other (See Comments)     Doesn't know       Family History   Problem Relation Age of Onset    Heart Disease Mother     Diabetes Mother     Other Mother         aneurysm    Heart Disease Father     Cancer Father         thyroid        Social History     Tobacco Use    Smoking status: Never    Smokeless tobacco: Never   Substance Use Topics    Alcohol use: Not Currently     Comment: 1 glass a month    Drug use: No        [Held by provider] midodrine  15 mg Oral TID WC    insulin lispro  0-16 Units SubCUTAneous Q4H    lidocaine PF  5 mL IntraDERmal Once    sodium chloride flush  5-40 mL IntraVENous 2 times per day    heparin flush  3 mL IntraVENous 2 times per day    ertapenem (INVanz) IVPB  1,000 mg IntraVENous Q24H    Vitamin D  1,000 Units Oral Daily    heparin (porcine)  5,000 Units SubCUTAneous Q8H    artificial tears   Both Eyes Q4H    anidulafungin  100 mg IntraVENous Q24H    pantoprazole (PROTONIX) 40 mg injection  40 mg IntraVENous Q12H    sodium chloride flush  5-40 mL IntraVENous 2 times per day    heparin flush  3 mL IntraVENous 2 times per day    Arformoterol Tartrate  15 mcg Nebulization BID    budesonide  0.5 mg Nebulization BID    albuterol  2.5 mg Nebulization Q4H WA    [Held by provider] metoprolol  5 mg IntraVENous Q6H    [Held by provider] rosuvastatin  10 mg Oral Nightly    [Held by provider]  levothyroxine  50 mcg Oral Daily      PN-Adult  3 IN 1 Central Line (Standard)      sodium chloride      norepinephrine 6 mcg/min (22 0600)    prismaSATE BGK 4/0/1.2 2,000 mL/hr at 22 0415    sodium chloride 100 mL/hr at 22 2300    calcium chloride CRRT infusion 8,000 mg (22 0442)    dextrose      [Held by provider] fentaNYL 50 mcg/hr (22 0600)    sodium chloride       fentanNYL, sodium chloride flush, sodium chloride, heparin flush, potassium chloride, magnesium sulfate, calcium gluconate **OR** calcium gluconate **OR** calcium gluconate **OR** calcium gluconate, sodium phosphate IVPB **OR** sodium phosphate IVPB **OR** sodium phosphate IVPB **OR** sodium phosphate IVPB, glucose, dextrose bolus **OR** dextrose bolus, glucagon (rDNA), dextrose, sodium chloride flush, sodium chloride, heparin flush, ondansetron **OR** ondansetron, [DISCONTINUED] acetaminophen **OR** acetaminophen, ipratropium-albuterol    REVIEW OF SYSTEMS:  Unable to obtain secondary to patient status       PHYSICAL EXAM:  Temp  Av.1 °F (36.7 °C)  Min: 96.4 °F (35.8 °C)  Max: 100.9 °F (38.3 °C)  Pulse  Av.6  Min: 90  Max: 545  Systolic (48LZY), TOÑA:856 , Min:86 , Max:117     Resp  Av.7  Min: 25  Max: 43  SpO2  Av.7 %  Min: 95 %  Max: 100 %      Intake/Output Summary (Last 24 hours) at 2022 1119  Last data filed at 2022 1100  Gross per 24 hour   Intake 2559.38 ml   Output 6539 ml   Net -3979.62 ml         General appearance: Critically ill-appearing, surrounded by life support devices  Head: Endotracheal tube, orogastric tube to low intermittent wall suction  Lungs/Chest: equal chest rise bilaterally, mechanically ventilated, L chest tube w/ serous drainage and w/o airleak; R anterior and posterior chest tubes w/ bluish staining w/o airleak  Heart: tachycardic w/ irregular rhythm, on norepinephrine at 13mcg/min, warm throughout  Abdomen: soft, minimally distended, incisions c/d/i    LABS:  Recent Labs     12/25/22  0615 12/26/22  0542 12/27/22  0550   WBC 14.4* 17.1* 25.4*   HGB 8.3* 8.5* 10.0*   HCT 25.4* 25.2* 31.0*   * 131 152       Recent Labs     12/26/22  1217 12/26/22  2356 12/27/22  0550    135 137   K 4.1 4.1 4.4    103 102   CO2 23 19* 18*   BUN 37* 35* 33*   CREATININE 1.0 1.2* 1.0   GLUCOSE 188* 141* 133*   CALCIUM 8.2* 7.9* 8.2*   PROT 5.7* 6.3* 6.4   LABALBU 2.4* 2.6* 2.8*   BILITOT 1.3* 1.6* 1.9*   ALKPHOS 243* 236* 245*   * 112* 92*   * 126* 115*       Recent Labs     12/26/22  1217 12/26/22  2356 12/27/22  0550   BILITOT 1.3* 1.6* 1.9*   * 112* 92*   * 126* 115*   ALKPHOS 243* 236* 245*   LABALBU 2.4* 2.6* 2.8*       No results for input(s): POCGLU in the last 72 hours. Recent Labs     12/27/22  0500   PH 7.400   PO2 108.4*   PCO2 33.0*   HCO3 20.0*   BE -4.1*   O2SAT 98.2       No results for input(s): LACTA in the last 72 hours. RADIOLOGY:  CT ABDOMEN PELVIS WO CONTRAST Additional Contrast? None    Result Date: 12/25/2022  EXAMINATION: CT OF THE ABDOMEN AND PELVIS WITHOUT CONTRAST 12/25/2022 1:30 pm TECHNIQUE: CT of the abdomen and pelvis was performed without the administration of intravenous contrast. Multiplanar reformatted images are provided for review. Automated exposure control, iterative reconstruction, and/or weight based adjustment of the mA/kV was utilized to reduce the radiation dose to as low as reasonably achievable. COMPARISON: 12/14/2022 HISTORY: ORDERING SYSTEM PROVIDED HISTORY: ileus TECHNOLOGIST PROVIDED HISTORY: Reason for exam:->ileus Additional Contrast?->None What reading provider will be dictating this exam?->CRC FINDINGS: Lower Chest: Areas of alveolar consolidation and ground-glass opacity a noted in the lower lobes. Trace left pneumothorax. Bilateral pleural catheters are present. Heart appears normal in size with a prominent left ventricle.  There is a gastric catheter passing into the stomach. Bilateral silicone breast implants are present. Organs: Mild perihepatic ascites. Liver and spleen are normal in size without new focal lesion. Stable 12 mm lesion in the anterior right hepatic lobe. Gallbladder is mildly distended in there is cholelithiasis. Common bile duct is normal.  Pancreas appears normal.  Kidneys are somewhat small. No renal obstruction detected. GI/Bowel: No evidence of bowel obstruction or abnormal dilatation. No CT findings of significant ileus. Pelvis: Uterus appears normal.  Urinary bladder is contracted and contains a Peterson catheter. There is mild pelvic ascites. Peritoneum/Retroperitoneum: No evidence of retroperitoneal mass or adenopathy. Aorta is nonaneurysmal. Bones/Soft Tissues: Osteoporosis with multiple vertebroplasties. Posterior metallic fusion from D75-Q7. Mild subcutaneous edema along the right left body wall and in the lower abdomen anteriorly suggest volume overload/anasarca. 1.  No evidence of bowel obstruction or significant ileus. 2.  Gastrostomy tube is located in the stomach. Oral contrast material is noted in the dependent portion of the gastric cardia. No abnormal bowel dilatation. Oral contrast material noted in the right and left colon without colonic distension. 3.  Mild abdominal ascites. 4.  Patchy asymmetric gland glass opacities in the lung bases with some areas of confluent consolidation in the right lower lobe. Trace pleural effusions. Bilateral chest tubes. 5.  Very small left pneumothorax. CT HEAD WO CONTRAST    Result Date: 12/25/2022  EXAMINATION: CT OF THE HEAD WITHOUT CONTRAST  12/25/2022 1:30 pm TECHNIQUE: CT of the head was performed without the administration of intravenous contrast. Automated exposure control, iterative reconstruction, and/or weight based adjustment of the mA/kV was utilized to reduce the radiation dose to as low as reasonably achievable.  COMPARISON: 01/06/2021 HISTORY: ORDERING SYSTEM PROVIDED HISTORY: r/o stroke TECHNOLOGIST PROVIDED HISTORY: Reason for exam:->r/o stroke Has a \"code stroke\" or \"stroke alert\" been called? ->No What reading provider will be dictating this exam?->CRC FINDINGS: BRAIN/VENTRICLES: There is no acute intracranial hemorrhage, mass effect or midline shift. No abnormal extra-axial fluid collection. The gray-white differentiation is maintained without evidence of an acute infarct. There is no evidence of hydrocephalus. Incidental cavum septum pellucidum. Mild atrophy. ORBITS: The visualized portion of the orbits demonstrate no acute abnormality. SINUSES: Soft tissue density in the superior nasal cavity anteriorly similar to the prior study. There is opacification of the right maxillary sinus, anterior ethmoid air cells and right frontal sinus similar to the prior study. Fluid density is noted in both mastoid air cells. Lobular mucosal thickening in the sphenoid sinuses again noted. There is some fluid in the posterior nasopharynx. There is an orogastric and endotracheal tube in place within the oropharynx. SOFT TISSUES/SKULL:  No acute abnormality of the visualized skull or soft tissues. 1.  No acute intracranial abnormality. 2.  Chronic right maxillary ethmoid and frontal sinusitis. Chronic sphenoid sinusitis. Fluid opacification of both mastoid air cells. 3.  No CT evidence of large territory infarct. MCA vessels appear normal in density. XR CHEST PORTABLE    Result Date: 12/27/2022  EXAMINATION: ONE XRAY VIEW OF THE CHEST 12/27/2022 7:05 am COMPARISON: 12/26/2022 HISTORY: ORDERING SYSTEM PROVIDED HISTORY: PNA TECHNOLOGIST PROVIDED HISTORY: Reason for exam:->PNA What reading provider will be dictating this exam?->CRC FINDINGS: Bibasilar atelectasis/infiltrate are unchanged. There are bilateral chest tubes. There is no definite pneumothorax. Support lines are appropriate. There is a small right effusion and PleurX catheter.      No interval change     XR CHEST PORTABLE    Result Date: 12/26/2022  EXAMINATION: ONE XRAY VIEW OF THE CHEST 12/26/2022 8:00 am COMPARISON: The previous study performed 12/25/2022. HISTORY: ORDERING SYSTEM PROVIDED HISTORY: PNA TECHNOLOGIST PROVIDED HISTORY: Reason for exam:->PNA What reading provider will be dictating this exam?->CRC FINDINGS: Given some differences in film technique, there has been no significant interval change in the bilateral lung opacities. A small right pleural effusion is again noted. The cardiac silhouette and mediastinal structures are without significant interval change. Bilateral chest tubes are again seen in place. No pneumothorax is appreciated. An endotracheal tube is again noted, with the tip 3.1 cm above the roseann. An NG tube is again noted, with tip in the stomach. Bilateral IJ lines are again noted, without change in position. There is still oral contrast seen within the stomach. Patient is again status post lumbar spine surgery and multilevel vertebroplasty. No significant interval change, when compared to the previous study performed 1 day earlier. I have reviewed the relevant labs/imaging from this admission and my interpretation is included in my assessment and plan. ASSESSMENT:  65 y.o. female with esophageal perforation after laparoscopic, robot assisted hiatal hernia repair on 36/8 w/ course complicated by ARDS and renal failure. PLAN:  - OG to LIWS - no medications or fluid administration  - will restart TPN today  - continue drainage w/ chest tubes  - will discuss possible EGD w/ stenting w/ Dr. Teo Linder MD  General Surgery Resident, PGY-4    Electronically signed on 12/27/22 at 11:19 AM EST      New finding of increasing output from right chest tubes. TF were tolerated but output increased this am.  Methylene blue given and found to exit from chest tubes confirming a leak. Esophogram from 12/15 which was pod 6 was unremarkable for a leak.   Hold TF.  Start TPN. Adequate drainage with chest tubes. Discussed the case with GI for esophageal stent placement.     Leah Chaparro MD

## 2022-12-28 NOTE — PROGRESS NOTES
Nutrition Note    TPN recommendation per physician consult. Tube feedings have been discontinued. Recommend to continue current TPN orders at this time (3-in-1 KobeProvasculon Custom @50/hr to provide 1200ml, 1293 calories, 80g AA, 201g dextrose, 29g lipids). This meets 100% of patients estimated calorie and protein needs. See full nutrition assessment on 12/26.      Estimated Daily Nutrient Needs:  Energy Requirements Based On: Formula  Weight Used for Energy Requirements: Current  Energy (kcal/day): PS3B 1358-9735 (REE 1160 ; minute volume 9.08 ; Tmax 98.2)  Weight Used for Protein Requirements: Ideal  Protein (g/day): 70-85 (1.3-1.5g/kg CBW)  Fluid (ml/day): per critical care    Electronically signed by Ed Flood RD, PARK on 12/28/22 at 11:12 AM EST    Contact: [phone]

## 2022-12-28 NOTE — PROGRESS NOTES
Department of Internal Medicine  Nephrology Progress Note      Events reviewed. SUBJECTIVE: We are following Lizeth Tello for PAWAN. Presently intubated.     PHYSICAL EXAM:      Vitals:    VITALS:  BP (!) 130/52   Pulse (!) 110   Temp 98.2 °F (36.8 °C) (Esophageal)   Resp 28   Ht 5' 2\" (1.575 m)   Wt 122 lb 3.2 oz (55.4 kg)   SpO2 99%   BMI 22.35 kg/m²   24HR INTAKE/OUTPUT:    Intake/Output Summary (Last 24 hours) at 12/28/2022 1016  Last data filed at 12/28/2022 1000  Gross per 24 hour   Intake 3113.9 ml   Output 3043 ml   Net 70.9 ml         Constitutional: Patient intubated, sedated  HEENT: Normocephalic, atraumatic, PERRLA, endotracheal tube in place  Respiratory: Diminished right lung base  Cardiovascular/Edema: RRR, normal S1, normal S2, no edema  Gastrointestinal: Soft, not distended, nontender  Neurologic: Patient is sedated  Skin: Pale, dry, no lesions, no rash, + edema     Scheduled Meds:   [Held by provider] midodrine  15 mg Oral TID WC    insulin lispro  0-16 Units SubCUTAneous Q4H    lidocaine PF  5 mL IntraDERmal Once    sodium chloride flush  5-40 mL IntraVENous 2 times per day    heparin flush  3 mL IntraVENous 2 times per day    ertapenem (INVanz) IVPB  1,000 mg IntraVENous Q24H    [Held by provider] Vitamin D  1,000 Units Oral Daily    heparin (porcine)  5,000 Units SubCUTAneous Q8H    artificial tears   Both Eyes Q4H    anidulafungin  100 mg IntraVENous Q24H    pantoprazole (PROTONIX) 40 mg injection  40 mg IntraVENous Q12H    sodium chloride flush  5-40 mL IntraVENous 2 times per day    heparin flush  3 mL IntraVENous 2 times per day    Arformoterol Tartrate  15 mcg Nebulization BID    budesonide  0.5 mg Nebulization BID    albuterol  2.5 mg Nebulization Q4H WA    [Held by provider] metoprolol  5 mg IntraVENous Q6H    [Held by provider] rosuvastatin  10 mg Oral Nightly    [Held by provider] levothyroxine  50 mcg Oral Daily     Continuous Infusions:   PN-Adult  3 IN 1 [facility] (Custom)      PN-Adult  3 IN 1 [facility] (Custom) 50 mL/hr at 12/28/22 0601    vasopressin (Septic Shock) infusion Stopped (12/27/22 2159)    sodium chloride      norepinephrine 1 mcg/min (12/28/22 0806)    prismaSATE BGK 4/0/1.2 2,000 mL/hr at 12/28/22 0659    sodium chloride 100 mL/hr at 12/28/22 0502    calcium chloride CRRT infusion 55 mL/hr at 12/28/22 0554    dextrose      [Held by provider] fentaNYL Stopped (12/27/22 0806)    sodium chloride       PRN Meds:.fentanNYL, sodium chloride flush, sodium chloride, heparin flush, potassium chloride, magnesium sulfate, calcium gluconate **OR** calcium gluconate **OR** calcium gluconate **OR** calcium gluconate, sodium phosphate IVPB **OR** sodium phosphate IVPB **OR** sodium phosphate IVPB **OR** sodium phosphate IVPB, glucose, dextrose bolus **OR** dextrose bolus, glucagon (rDNA), dextrose, sodium chloride flush, sodium chloride, heparin flush, ondansetron **OR** ondansetron, [DISCONTINUED] acetaminophen **OR** acetaminophen, ipratropium-albuterol    DATA:    CBC with Differential:    Lab Results   Component Value Date/Time    WBC 20.0 12/28/2022 04:50 AM    RBC 2.50 12/28/2022 04:50 AM    HGB 7.4 12/28/2022 04:50 AM    HCT 21.5 12/28/2022 04:50 AM    HCT 29.0 12/20/2022 01:31 PM     12/28/2022 04:50 AM    MCV 86.0 12/28/2022 04:50 AM    MCH 29.6 12/28/2022 04:50 AM    MCHC 34.4 12/28/2022 04:50 AM    RDW 15.8 12/28/2022 04:50 AM    NRBC 0.9 12/21/2022 04:12 AM    LYMPHOPCT 5.2 12/28/2022 04:50 AM    MONOPCT 2.2 12/28/2022 04:50 AM    MYELOPCT 0.9 12/23/2022 06:05 AM    BASOPCT 0.2 12/28/2022 04:50 AM    MONOSABS 0.43 12/28/2022 04:50 AM    LYMPHSABS 1.03 12/28/2022 04:50 AM    EOSABS 0.01 12/28/2022 04:50 AM    BASOSABS 0.03 12/28/2022 04:50 AM     CMP:    Lab Results   Component Value Date/Time     12/28/2022 04:50 AM    K 4.3 12/28/2022 04:50 AM    K 4.7 12/21/2022 09:23 AM     12/28/2022 04:50 AM    CO2 21 12/28/2022 04:50 AM    BUN 35 12/28/2022 04:50 AM    CREATININE 1.0 12/28/2022 04:50 AM    GFRAA >60 07/22/2022 01:42 PM    LABGLOM 58 12/28/2022 04:50 AM    GLUCOSE 189 12/28/2022 04:50 AM    PROT 5.3 12/28/2022 04:50 AM    LABALBU 2.6 12/28/2022 04:50 AM    LABALBU 4.3 01/24/2011 01:15 PM    CALCIUM 8.8 12/28/2022 04:50 AM    BILITOT 1.5 12/28/2022 04:50 AM    ALKPHOS 151 12/28/2022 04:50 AM    AST 31 12/28/2022 04:50 AM    ALT 53 12/28/2022 04:50 AM     Magnesium:    Lab Results   Component Value Date/Time    MG 2.1 12/28/2022 04:50 AM     Phosphorus:    Lab Results   Component Value Date/Time    PHOS 2.0 12/28/2022 04:50 AM     Radiology Review:      CT Chest and Abd/Pelvis 12/14/22      No evidence of pulmonary embolism. Bilateral lower lobe pulmonary consolidations and pleural effusions seen. Deviation of the mediastinum to the left is visualized. Mild soft tissue prominence visualized surrounding the esophagus with   narrowing at the gastroesophageal junction but appears to be due to external   compression. Cannot rule left soft tissue mass at this location. Wall thickening visualized in the gastric outlet with mild prominence of the   stomach seen, this could be artifactual.     Distended gallbladder is seen. Degenerative bone changes, postoperative changes and multilevel vertebral   augmentation seen. No evidence of acute abdominal/pelvic pathology. US Chest 12/15/22       Probable complex loculated right pleural effusion. Simple left pleural effusion. BRIEF SUMMARY OF INITIAL CONSULT:    Briefly, Jovita Degroot is a 76-year-old female, past medical history significant for hypothyroidism, hiatal hernia and TIA, who presented to the ED on 12/14/2022 from [facility], patient recently underwent a hiatal hernia repair on 12/9/22, developed acute respiratory distress and was transferred to [facility] on 12/14/2022 for further management and treatment.   Initial ED work-up revealed creatinine level of 1.7, patient then underwent two seperate IV contrast studies CTA and creatinine subsequently increased to 2.5, on 12/15/2022 creatinine level increased to 3.5 mg/dL, reason for this consultation. Review of records from [facility] reveals that baseline creatinine appears to be between 0.9 to 1.0 mg/dL, creatinine was 1.0 on 12/13/2022, patient was started on lisinopril during hospitalization, was given multiple doses IV furosemide with poor oral intake and vomiting. Problems resolved:    HAGMA, 2/2 uremia and starvation ketoacidosis  Hemodynamic shock, septic, on antibiotics and vasopressors  Hypocalcemia, 2/2 calcium removal with CRRT, with replacement per protocol    IMPRESSION/RECOMMENDATIONS:     PAWAN, stage II, nonoliguric, ATN (ischemic +/- contrast induced , intravascular volume depletion due to poor oral intake, loop diuretics and vomiting in the setting of ACE inhibition versus,) FEUrea 28.3%, FENa 1.1%. Started on renal replacement therapy on 12/21/2022. Tolerating CVVHD.     Vitamin D deficiency, vitamin D 25 level 11, on cholecalciferol   Hypophosphatemia, 2/2 phosphorus removal with CRRT, with replacement per protocol  Normal pH with respiratory alkalosis and metabolic acidosis    --------------------------------------------------------  Respiratory failure, status post intubation  New onset AF with RVR  Complex right pleural effusion, s/p VATS    S/p hiatal hernia repair 12/9/2022  Hypothyroidism, on levothyroxine  Persistent vomiting, PRN ondansetron, esophagram negative for perforation  Hospital-acquired pneumonia, + Enterobacter cloacae, on ertapenem  Possibly focal pneumonia, Candida albicans?,  On anidulafungin  Normocytic anemia, multifactorial  Severe hypoalbuminemia  Nutrition, on TPN @50 cc/hour per surgery      Plan:    Continue CVVHD 30 cc/kilo/hour, net fluid removal even  Continue TPN per surgery  Continue to monitor renal function for recovery       Electronically signed by Shlomo Walls Heather Sheikh MD on 12/28/2022 at 10:16 AM

## 2022-12-28 NOTE — PROGRESS NOTES
Venessa Meza M.D.,[facility]  Jemal Vega D.O., F.A.C.O.I., Gerard Brewer M.D. Herbert Lew M.D. Yash Castillo D.O. Daily Pulmonary Progress Note    Patient:  Nikole Centeno 65 y.o. female MRN: [id number]     Date of Service: 12/28/2022      Synopsis     We are following patient for acute respiratory failure with hypoxia, pneumonia, bilateral pleural effusions    \"CC\" SOB    Code status: Limited-no compressions      Subjective      Patient was seen and examined in the ICU. CVVHD currently running. Patient is on TPN and Levophed. Her SPO2 was noted to be in 100% with the following vent settings: AC/VC + 20, tidal volume 300, FiO2 40%, PEEP +8. There is blue dye still draining from the right-sided chest tubes. Patient is for EGD with stent placement tomorrow with Dr. Ambreen Knight.     Review of Systems:  Unable to obtain-patient intubated     24-hour events:  No new events    Objective   Vitals: BP (!) 130/52   Pulse (!) 110   Temp 98.2 °F (36.8 °C) (Esophageal)   Resp 28   Ht 5' 2\" (1.575 m)   Wt 122 lb 3.2 oz (55.4 kg)   SpO2 99%   BMI 22.35 kg/m²     I/O:    Intake/Output Summary (Last 24 hours) at 12/28/2022 1030  Last data filed at 12/28/2022 1000  Gross per 24 hour   Intake 3113.9 ml   Output 3043 ml   Net 70.9 ml         Vent Information  Ventilator ID: [id number]  Equipment Changed: Expiratory Filter  Vent Mode: AC/VC                CURRENT MEDS :  Scheduled Meds:   [Held by provider] midodrine  15 mg Oral TID WC    insulin lispro  0-16 Units SubCUTAneous Q4H    lidocaine PF  5 mL IntraDERmal Once    sodium chloride flush  5-40 mL IntraVENous 2 times per day    heparin flush  3 mL IntraVENous 2 times per day    ertapenem (INVanz) IVPB  1,000 mg IntraVENous Q24H    [Held by provider] Vitamin D  1,000 Units Oral Daily    heparin (porcine)  5,000 Units SubCUTAneous Q8H    artificial tears   Both Eyes Q4H    anidulafungin  100 mg IntraVENous Q24H    pantoprazole (PROTONIX) 40 mg injection  40 mg IntraVENous Q12H    sodium chloride flush  5-40 mL IntraVENous 2 times per day    heparin flush  3 mL IntraVENous 2 times per day    Arformoterol Tartrate  15 mcg Nebulization BID    budesonide  0.5 mg Nebulization BID    albuterol  2.5 mg Nebulization Q4H WA    [Held by provider] metoprolol  5 mg IntraVENous Q6H    [Held by provider] rosuvastatin  10 mg Oral Nightly    [Held by provider] levothyroxine  50 mcg Oral Daily       Physical Exam:  General Appearance: Patient is intubated, and is toxic appearing  HEENT: ETT in place, OG tube  Neck: Lips, mucosa, and tongue normal.  Supple, symmetrical, trachea midline, no adenopathy;thyroid:  no enlargement/tenderness/nodules or JVD. Lung: Bilateral crackles and rhonchi, right chest tube in place x3, left chest tube x1-no air leaks  Heart: Tachycardic  Abdomen: Soft, NT, ND. BS present x 4 quadrants. No bruit or organomegaly. Extremities: +1 pitting edema  Musculokeletal: No joint swelling, no muscle tenderness. ROM normal in all joints of extremities. Neurologic: Mental status: Intubated    Pertinent/ New Labs and Imaging Studies     Imaging Personally Reviewed:  Noncontrast CT chest 12/17/2022:  Impression   Large right pleural effusion of mixed density to suggest complex effusion. There is a pigtail catheter seen in the pleural space posteriorly and   inferiorly. Small to moderate size left pleural effusion with consolidative   infiltrate throughout the left upper and lower lung field and aerated   portions revealing nodular ground-glass opacity to suggest an infectious   process. Nodular ground-glass opacity inferiorly within the aerated right   upper and lower lung field concerning for an infectious process as well. There is less consolidative infiltrate identified within the right lower lobe. Chest x-ray 12/27/2022:    FINDINGS:   Bibasilar atelectasis/infiltrate are unchanged. There are bilateral chest   tubes.   There is no definite pneumothorax. Support lines are appropriate. There is a small right effusion and PleurX catheter. Impression   No interval change       KUB 12/27/2022:    Impression   NG tube again identified within the stomach, when compared to CT scan of the   abdomen pelvis performed 12/25/2022. There is oral contrast within the   stomach, however, there is oral contrast around the GE junction, suspicious   for focal perforation at the GE junction. Oral contrast is also noted   tracking along the inferior right-sided chest tube, indicating extravasation   of contrast into the pleural space. Chest x-ray 12/28/2022:    FINDINGS:   Endotracheal tube is 4 cm above the roseann. NG tube courses below the level   of the diaphragm in expected location of the stomach. Left IJ central venous   catheter is present with distal tip of location of SVC. There are bibasilar   opacities silhouetting the hemidiaphragms. No pneumothorax. Redemonstration   of right and left-sided chest tubes. Impression   Stable chest radiograph with pulmonary vascular congestion and bibasilar   opacities related to atelectasis or pneumonia. ECHO 12/19/2022:   Summary   Technically difficult study - limited visualization. Micro-bubble contrast injected to enhance left ventricular visualization. Normal left ventricular size and systolic function. Ejection fraction is visually estimated at 55%. Indeterminate diastolic function. No regional wall motion abnormalities seen. Mild left ventricular concentric hypertrophy noted. Moderately dilated right ventricle with reduced function. Moderate tricuspid regurgitation.       Labs:  Lab Results   Component Value Date/Time    WBC 20.0 12/28/2022 04:50 AM    HGB 7.4 12/28/2022 04:50 AM    HCT 21.5 12/28/2022 04:50 AM    HCT 29.0 12/20/2022 01:31 PM    MCV 86.0 12/28/2022 04:50 AM    MCH 29.6 12/28/2022 04:50 AM    MCHC 34.4 12/28/2022 04:50 AM    RDW 15.8 12/28/2022 04:50 AM     12/28/2022 04:50 AM    MPV 14.2 12/28/2022 04:50 AM     Lab Results   Component Value Date/Time     12/28/2022 04:50 AM    K 4.3 12/28/2022 04:50 AM    K 4.7 12/21/2022 09:23 AM     12/28/2022 04:50 AM    CO2 21 12/28/2022 04:50 AM    BUN 35 12/28/2022 04:50 AM    CREATININE 1.0 12/28/2022 04:50 AM    LABALBU 2.6 12/28/2022 04:50 AM    LABALBU 4.3 01/24/2011 01:15 PM    CALCIUM 8.8 12/28/2022 04:50 AM    GFRAA >60 07/22/2022 01:42 PM    LABGLOM 58 12/28/2022 04:50 AM     Lab Results   Component Value Date/Time    PROTIME 11.2 12/25/2022 06:15 AM    INR 1.0 12/25/2022 06:15 AM     No results for input(s): PROBNP in the last 72 hours. No results for input(s): PROCAL in the last 72 hours. This SmartLink has not been configured with any valid records. Micro:  No results for input(s): CULTRESP in the last 72 hours. No results for input(s): LABGRAM in the last 72 hours. No results for input(s): LEGUR in the last 72 hours. No results for input(s): STREPNEUMAGU in the last 72 hours. No results for input(s): LP1UAG in the last 72 hours.   Respiratory cultures    Oral Pharyngeal Hilda absent Abnormal     Smear, Respiratory Polymorphonuclear leukocytes not seen   Epithelial cells not seen   Moderate yeast   Moderate gram positive rods Diphtheroid like   Few Gram negative rods    Organism Enterobacter cloacae complex Abnormal     CULTURE, RESPIRATORY Moderate growth    Organism Candida albicans Abnormal     CULTURE, RESPIRATORY Heavy growth         Latest Reference Range & Units Most Recent   (1,3)-Beta-D-Glucan (Fungitell) Interpretation Negative  Positive !  12/19/22 04:02   !: Data is abnormal      ABGs 12/28/2022:     Latest Reference Range & Units 12/28/22 05:00   pH, Blood Gas 7.350 - 7.450  7.454 (H)   PCO2 35.0 - 45.0 mmHg 30.9 (L)   pO2 75.0 - 100.0 mmHg 103.1 (H)   HCO3 22.0 - 26.0 mmol/L 21.2 (L)   Base Excess -3.0 - 3.0 mmol/L -2.3   O2 Sat 92.0 - 98.5 % 97.6 THB,THB 11.5 - 16.5 g/dL 8.2 (L)   O2Hb 94.0 - 97.0 % 95.8   Carboxy-Hgb 0.0 - 1.5 % 1.4   METHB,METHB 0.0 - 1.5 % 0.4   HHb 0.0 - 5.0 % 2.4   (H): Data is abnormally high  (L): Data is abnormally low     Assessment:    Acute hypoxic respiratory failure - requiring intubation 12/19/22  Acute Respiratory Distress Syndrome  S/p RRT 12/17/22 for respiratory distress and increasing oxygen demands  R loculated pleural effusion s/p IR chest tube 12/17/22 with minimal drainage, s/p R VATS turned partial open thoracotomy 12/20/22 with insertion of chest tubes x 3  S/p bronchoscopy-friable mucosa 12/20/22  Pneumonia, Hospital acquired-Enterobacter cloacae-Invanz started 12/19/22  Massive hiatal hernia s/p repair 12/9 at [facility]  Esophageal perforation secondary to hiatal hernia repair  Acute kidney injury  L chest tube placement 12/21/22  CVVHD initiated 12/21/22  Positive fungitell-eraxis started 12/21/22      Plan:   Continue mechanical ventilation, wean FiO2 as able  Aggressive bronchopulmonary hygiene  Fluid removal with CVVHD initiated per Nephrology  TPN- management per surgery  Chest tube x 4 management per surgery  For EGD with stent for esophageal perforation with Dr. Vargas Homes per ID-Invanz started 12/19/22, Eraxis started 12/21/22  Rest per ICU team  Prognosis guarded    This plan of care was reviewed in collaboration with Dr. Judy Johnson. Electronically signed by KAR Ott CNP on 12/28/2022 at 10:30 AM    I personally saw, examined, and cared for the patient. Labs, medications, radiographs reviewed.  I agree with history exam and plans detailed in NP note with the following additions:    Reviewed recent events  Appears to have an esophageal leak  Plan for EGD with stent tomorrow  PSV trials thereafter as tolerated  ICU care    Mickey Balbuena MD

## 2022-12-28 NOTE — PROGRESS NOTES
ENDOSCOPIC SURGERY  DAILY PROGRESS NOTE  12/28/2022    CHIEF COMPLAINT:  Chief Complaint   Patient presents with    Shortness of Breath     Sent from [facility] for SOB patient went for hernia repair patient on 6L NC on arrival       SUBJECTIVE:  Bedside leak test showed methylene blue present in both right sided chest tubes. TF have been held, plan for EGD w/ stent placement tomorrow. OBJECTIVE:  BP (!) 109/41   Pulse 96   Temp 98.8 °F (37.1 °C) (Esophageal)   Resp 29   Ht 5' 2\" (1.575 m)   Wt 122 lb 3.2 oz (55.4 kg)   SpO2 99%   BMI 22.35 kg/m²     GENERAL:  Intubated, minimally responsive    LUNGS: Mechanically ventilated. R Chest tube with blue output, L chest with serosang output. Right mediastinal drain with blue output. CARDIOVASCULAR: RR  ABDOMEN:  Soft, non-distended, non-tender. No guarding, rigidity, rebound. Incisions clean with ecchymosis  EXT: warm and well perfused    ASSESSMENT/PLAN:  65 y.o. female with respiratory failure and complex right-sided effusion s/p robotic hiatal hernia repair 12/9 without signs of esophageal perforation or leak however now +fungitell and growing candida in chest. S/p VATS & Mini-thoractomy & bronchoscopy with open drainage of fluid collection 12/20, left CT placement 12/21 with ARDS & renal failure s/p chemical paralysis, flolan, CVVHD 12/21  Bronchoscopy intra-op showed friable muscosa w/o any thick purulent secretions. Bedside leak test showed active methylene blue extravasation into both right sided tubes. Remains critically ill  Discontinue TF  Re-ordered TPN  Continue chest tubes to suction. Appreciate critical care assistance with medical management. Daily CXR  Plan for EGD w/ stent placement 12/29    Will be discussed with Dr. Daphney Valente.      Electronically signed by Genna Bonilla MD on 12/28/2022 at 7:14 AM

## 2022-12-28 NOTE — CONSULTS
SURGICAL ENDOSCOPY  CONSULT NOTE  12/27/2022    Chief Complaint   Patient presents with    Shortness of Breath     Sent from [facility] for SOB patient went for hernia repair patient on 6L NC on arrival       HPI  Tea Kirkpatrick is a 65 y.o. female from surgical endoscopy was consulted for esophageal leak. The patient underwent laparoscopic, robot-assisted hiatal hernia repair on 12/9 and admitted with respiratory failure and complex right-sided effusion without signs of esophageal perforation or leak. Course included VATS and minithoracotomy and bronchoscopy with open drainage of fluid collection on 12/20 and left chest tube placement on 12/21. Her course has been complicated by ARDS requiring paralytic and Flolan at times as well as renal failure with CVVHD initiated on 12/21. Right-sided chest tube output increased overnight into the morning. Tube feeds were running but stopped. All work-up prior to this morning was negative for esophageal perforation and leak however bedside administration of methylene blue and Gastrografin with x-ray showed active extravasation and bluish discoloration of the output of the right-sided chest tubes. This confirmed the presence of the leak. Surgical endoscopy was consulted for evaluation for possible esophageal stenting. She remains critically ill on norepinephrine to support maps greater than 65.     Patient Active Problem List   Diagnosis    Compression fracture of L4 lumbar vertebra    Other postprocedural status(V45.89)  Kyphoplasty L4    Low back pain    Acne rosacea    Drug reaction    Hypothyroidism    Neck muscle spasm    Back pain    Headache    Vertebrobasilar TIAs    Hypercholesterolemia    Lumbar spinal stenosis L4-5    Orthostatic hypotension    H/o compression fracture of lumbar vertebra, mulitple    Compression fracture of L5 lumbar vertebra    S/P kyphoplasty L4    Fibromyalgia    Chronic back pain    Mass of breast    Contact dermatitis Degeneration of intervertebral disc of lumbar region    Dermoid cyst of mouth    Vision disorder    Essential hypertension    Gastroesophageal reflux disease    Hyperlipidemia    Neuropathy    Breast pain    Sprain of knee    Temporary cerebral vascular dysfunction    Pain of upper abdomen    TIA (transient ischemic attack)    Limb weakness    S/P lumbar fusion (history of PLIF)    Cancer ([facility] [address]    History of bilateral breast cancer    Dizziness    Hx-TIA (transient ischemic attack)    Pain syndrome, chronic    Anxiety with somatization    Postoperative pneumonia    Hypoxia    Pneumonia of left lower lobe due to infectious organism    Aspiration pneumonia of both lower lobes due to gastric secretions [facility])       Past Medical History:   Diagnosis Date    Anxiety with somatization 3/30/2021    Compression fracture of L1 lumbar vertebra [facility]) november 1994    Compression fracture of L4 lumbar vertebra     secondary to fall in December 2013    Fall 1989 & 12/2013    History of bilateral breast cancer 1980's    breast    Hx-TIA (transient ischemic attack) 3/30/2021    Osteoporosis     Pain syndrome, chronic 3/30/2021    Thyroid disease     went off of medication    Type II or unspecified type diabetes mellitus without mention of complication, not stated as uncontrolled     resolved with weight loss of 20 lbs       Past Surgical History:   Procedure Laterality Date    BACK SURGERY  1989    PLIF T12 to L2 with pedicle screws and rods    BREAST SURGERY Bilateral     breast implants (after mastectomy)    FIXATION KYPHOPLASTY  03/10/2014    with bone biopsy    IR PERC CATH PLEURAL DRAIN W/IMAG  12/16/2022    IR PERC CATH PLEURAL DRAIN W/IMAG 12/16/2022 Paolo Chavez MD [facility]    MASTECTOMY Bilateral     THORACOSCOPY Right 12/20/2022    RIGHT VATS performed by Camron Madera MD at [facility]         Prior to Admission medications    Medication Sig Start Date End Date Taking?  Authorizing Provider   Cholecalciferol (VITAMIN D3) 125 MCG (5000 UT) TABS Take by mouth    Historical Provider, MD   levothyroxine (SYNTHROID) 50 MCG tablet Take 50 mcg by mouth daily    Historical Provider, MD   rosuvastatin (CRESTOR) 20 MG tablet Take 1 tablet by mouth nightly 1/7/21   Sahra Fay MD   aspirin 325 MG EC tablet Take 1 tablet by mouth daily 1/7/21 1/7/22  Sahra Fay MD   Handicap Placard MISC by Does not apply route Duration: Lifetime 6/21/18   Debbie Webb Jony, DO       Allergies   Allergen Reactions    Benadryl [Diphenhydramine] Itching    Ibuprofen Other (See Comments)     Doesn't know       Family History   Problem Relation Age of Onset    Heart Disease Mother     Diabetes Mother     Other Mother         aneurysm    Heart Disease Father     Cancer Father         thyroid        Social History     Tobacco Use    Smoking status: Never    Smokeless tobacco: Never   Substance Use Topics    Alcohol use: Not Currently     Comment: 1 glass a month    Drug use: No        [Held by provider] midodrine  15 mg Oral TID WC    insulin lispro  0-16 Units SubCUTAneous Q4H    lidocaine PF  5 mL IntraDERmal Once    sodium chloride flush  5-40 mL IntraVENous 2 times per day    heparin flush  3 mL IntraVENous 2 times per day    ertapenem Kinney Rice) IVPB  1,000 mg IntraVENous Q24H    [Held by provider] Vitamin D  1,000 Units Oral Daily    heparin (porcine)  5,000 Units SubCUTAneous Q8H    artificial tears   Both Eyes Q4H    anidulafungin  100 mg IntraVENous Q24H    pantoprazole (PROTONIX) 40 mg injection  40 mg IntraVENous Q12H    sodium chloride flush  5-40 mL IntraVENous 2 times per day    heparin flush  3 mL IntraVENous 2 times per day    Arformoterol Tartrate  15 mcg Nebulization BID    budesonide  0.5 mg Nebulization BID    albuterol  2.5 mg Nebulization Q4H WA    [Held by provider] metoprolol  5 mg IntraVENous Q6H    [Held by provider] rosuvastatin  10 mg Oral Nightly    [Held by provider] levothyroxine  50 mcg Oral Daily      PN-Adult  3 IN 1 Central Line (Custom) 50 mL/hr at 22    vasopressin (Septic Shock) infusion 0.03 Units/min (22)    sodium chloride      norepinephrine 10 mcg/min (22)    prismaSATE BGK 4/0/1.2 2,000 mL/hr at 22    sodium chloride 100 mL/hr at 22    calcium chloride CRRT infusion 60 mL/hr at 22    dextrose      [Held by provider] fentaNYL Stopped (22)    sodium chloride       fentanNYL, sodium chloride flush, sodium chloride, heparin flush, potassium chloride, magnesium sulfate, calcium gluconate **OR** calcium gluconate **OR** calcium gluconate **OR** calcium gluconate, sodium phosphate IVPB **OR** sodium phosphate IVPB **OR** sodium phosphate IVPB **OR** sodium phosphate IVPB, glucose, dextrose bolus **OR** dextrose bolus, glucagon (rDNA), dextrose, sodium chloride flush, sodium chloride, heparin flush, ondansetron **OR** ondansetron, [DISCONTINUED] acetaminophen **OR** acetaminophen, ipratropium-albuterol    REVIEW OF SYSTEMS:  Unable to obtain secondary to patient status       PHYSICAL EXAM:  Temp  Av °F (36.1 °C)  Min: 96.4 °F (35.8 °C)  Max: 97.9 °F (36.6 °C)  Pulse  Av.1  Min: 92  Max: 069  Systolic (74OZJ), IVL:266 , Min:98 , Max:153     Resp  Av.8  Min: 23  Max: 34  SpO2  Av.5 %  Min: 95 %  Max: 100 %      Intake/Output Summary (Last 24 hours) at 2022  Last data filed at 2022  Gross per 24 hour   Intake 2684.9 ml   Output 4753 ml   Net -2068.1 ml         General appearance: Critically ill-appearing, surrounded by life support devices  Head: Endotracheal tube, orogastric tube to low intermittent wall suction  Lungs/Chest: equal chest rise bilaterally, mechanically ventilated, L chest tube w/ serous drainage and w/o airleak; R anterior and posterior chest tubes w/ bluish staining w/o airleak  Heart: tachycardic w/ irregular rhythm, on norepinephrine at 13mcg/min, warm throughout  Abdomen: soft, minimally distended, incisions c/d/i    LABS:  Recent Labs     12/25/22  0615 12/26/22  0542 12/27/22  0550   WBC 14.4* 17.1* 25.4*   HGB 8.3* 8.5* 10.0*   HCT 25.4* 25.2* 31.0*   * 131 152       Recent Labs     12/27/22  0550 12/27/22  1239 12/27/22  1925    136 134   K 4.4 4.3 4.7    102 102   CO2 18* 19* 19*   BUN 33* 34* 35*   CREATININE 1.0 1.1* 1.1*   GLUCOSE 133* 158* 234*   CALCIUM 8.2* 7.9* 8.7   PROT 6.4 6.2* 5.9*   LABALBU 2.8* 3.2* 2.8*   BILITOT 1.9* 2.8* 2.2*   ALKPHOS 245* 184* 171*   AST 92* 57* 45*   * 85* 71*       Recent Labs     12/27/22  0550 12/27/22  1239 12/27/22  1925   BILITOT 1.9* 2.8* 2.2*   AST 92* 57* 45*   * 85* 71*   ALKPHOS 245* 184* 171*   LABALBU 2.8* 3.2* 2.8*       No results for input(s): POCGLU in the last 72 hours. Recent Labs     12/27/22  0500   PH 7.400   PO2 108.4*   PCO2 33.0*   HCO3 20.0*   BE -4.1*   O2SAT 98.2       No results for input(s): LACTA in the last 72 hours. RADIOLOGY:  CT ABDOMEN PELVIS WO CONTRAST Additional Contrast? None    Result Date: 12/25/2022  EXAMINATION: CT OF THE ABDOMEN AND PELVIS WITHOUT CONTRAST 12/25/2022 1:30 pm TECHNIQUE: CT of the abdomen and pelvis was performed without the administration of intravenous contrast. Multiplanar reformatted images are provided for review. Automated exposure control, iterative reconstruction, and/or weight based adjustment of the mA/kV was utilized to reduce the radiation dose to as low as reasonably achievable. COMPARISON: 12/14/2022 HISTORY: ORDERING SYSTEM PROVIDED HISTORY: ileus TECHNOLOGIST PROVIDED HISTORY: Reason for exam:->ileus Additional Contrast?->None What reading provider will be dictating this exam?->CRC FINDINGS: Lower Chest: Areas of alveolar consolidation and ground-glass opacity a noted in the lower lobes. Trace left pneumothorax. Bilateral pleural catheters are present.   Heart appears normal in size with a prominent left ventricle. There is a gastric catheter passing into the stomach. Bilateral silicone breast implants are present. Organs: Mild perihepatic ascites. Liver and spleen are normal in size without new focal lesion. Stable 12 mm lesion in the anterior right hepatic lobe. Gallbladder is mildly distended in there is cholelithiasis. Common bile duct is normal.  Pancreas appears normal.  Kidneys are somewhat small. No renal obstruction detected. GI/Bowel: No evidence of bowel obstruction or abnormal dilatation. No CT findings of significant ileus. Pelvis: Uterus appears normal.  Urinary bladder is contracted and contains a Peterson catheter. There is mild pelvic ascites. Peritoneum/Retroperitoneum: No evidence of retroperitoneal mass or adenopathy. Aorta is nonaneurysmal. Bones/Soft Tissues: Osteoporosis with multiple vertebroplasties. Posterior metallic fusion from D21-U9. Mild subcutaneous edema along the right left body wall and in the lower abdomen anteriorly suggest volume overload/anasarca. 1.  No evidence of bowel obstruction or significant ileus. 2.  Gastrostomy tube is located in the stomach. Oral contrast material is noted in the dependent portion of the gastric cardia. No abnormal bowel dilatation. Oral contrast material noted in the right and left colon without colonic distension. 3.  Mild abdominal ascites. 4.  Patchy asymmetric gland glass opacities in the lung bases with some areas of confluent consolidation in the right lower lobe. Trace pleural effusions. Bilateral chest tubes. 5.  Very small left pneumothorax.      CT HEAD WO CONTRAST    Result Date: 12/25/2022  EXAMINATION: CT OF THE HEAD WITHOUT CONTRAST  12/25/2022 1:30 pm TECHNIQUE: CT of the head was performed without the administration of intravenous contrast. Automated exposure control, iterative reconstruction, and/or weight based adjustment of the mA/kV was utilized to reduce the radiation dose to as low as reasonably achievable. COMPARISON: 01/06/2021 HISTORY: ORDERING SYSTEM PROVIDED HISTORY: r/o stroke TECHNOLOGIST PROVIDED HISTORY: Reason for exam:->r/o stroke Has a \"code stroke\" or \"stroke alert\" been called? ->No What reading provider will be dictating this exam?->CRC FINDINGS: BRAIN/VENTRICLES: There is no acute intracranial hemorrhage, mass effect or midline shift. No abnormal extra-axial fluid collection. The gray-white differentiation is maintained without evidence of an acute infarct. There is no evidence of hydrocephalus. Incidental cavum septum pellucidum. Mild atrophy. ORBITS: The visualized portion of the orbits demonstrate no acute abnormality. SINUSES: Soft tissue density in the superior nasal cavity anteriorly similar to the prior study. There is opacification of the right maxillary sinus, anterior ethmoid air cells and right frontal sinus similar to the prior study. Fluid density is noted in both mastoid air cells. Lobular mucosal thickening in the sphenoid sinuses again noted. There is some fluid in the posterior nasopharynx. There is an orogastric and endotracheal tube in place within the oropharynx. SOFT TISSUES/SKULL:  No acute abnormality of the visualized skull or soft tissues. 1.  No acute intracranial abnormality. 2.  Chronic right maxillary ethmoid and frontal sinusitis. Chronic sphenoid sinusitis. Fluid opacification of both mastoid air cells. 3.  No CT evidence of large territory infarct. MCA vessels appear normal in density. XR CHEST PORTABLE    Result Date: 12/27/2022  EXAMINATION: ONE XRAY VIEW OF THE CHEST 12/27/2022 7:05 am COMPARISON: 12/26/2022 HISTORY: ORDERING SYSTEM PROVIDED HISTORY: PNA TECHNOLOGIST PROVIDED HISTORY: Reason for exam:->PNA What reading provider will be dictating this exam?->CRC FINDINGS: Bibasilar atelectasis/infiltrate are unchanged. There are bilateral chest tubes. There is no definite pneumothorax. Support lines are appropriate. There is a small right effusion and PleurX catheter. No interval change     XR CHEST PORTABLE    Result Date: 12/26/2022  EXAMINATION: ONE XRAY VIEW OF THE CHEST 12/26/2022 8:00 am COMPARISON: The previous study performed 12/25/2022. HISTORY: ORDERING SYSTEM PROVIDED HISTORY: PNA TECHNOLOGIST PROVIDED HISTORY: Reason for exam:->PNA What reading provider will be dictating this exam?->CRC FINDINGS: Given some differences in film technique, there has been no significant interval change in the bilateral lung opacities. A small right pleural effusion is again noted. The cardiac silhouette and mediastinal structures are without significant interval change. Bilateral chest tubes are again seen in place. No pneumothorax is appreciated. An endotracheal tube is again noted, with the tip 3.1 cm above the roseann. An NG tube is again noted, with tip in the stomach. Bilateral IJ lines are again noted, without change in position. There is still oral contrast seen within the stomach. Patient is again status post lumbar spine surgery and multilevel vertebroplasty. No significant interval change, when compared to the previous study performed 1 day earlier. I have reviewed the relevant labs/imaging from this admission and my interpretation is included in my assessment and plan. ASSESSMENT:  65 y.o. female with clinical evidence of esophageal leak/perforation 2 weeks after laparoscopic, robot assisted hiatal hernia repair on 51/4 w/ course complicated by ARDS and renal failure    - Of note, CT with enteric contrast and Esophagram 6 days postop did not reveal any extravasation of contrast      PLAN:  - OG to LIWS - no medications or fluid administration  -  On TPN   - continue drainage w/ chest tubes  - Etiology of leak/perforation is unclear. This could be as a result for delayed perforation from the index surgery.  Patient has also had many other interventions since including positive pressure ventilation in the setting of ARDS, Placement of NG tubes/esophageal probes and VATS drainage of mediastinal seroma and placement of drains. I discussed placement of covered esophageal stent with Dr. Slava Ro  - This was also discussed extensively with patient's daughter including the risks/benefits/alternatives.  She verbalized understand and agreed to proceed  - WIll tentatively plan for stent placement 12/29/22    Laron Barclay MD

## 2022-12-28 NOTE — PROGRESS NOTES
GENERAL SURGERY  DAILY PROGRESS NOTE  12/28/2022    CHIEF COMPLAINT:  Chief Complaint   Patient presents with    Shortness of Breath     Sent from [facility] for SOB patient went for hernia repair patient on 6L NC on arrival       SUBJECTIVE:  Bedside leak test showed methylene blue present in both right sided chest tubes. TF have been held, Dr. Guy Cornejo consulted for esophageal stent placement. OBJECTIVE:  BP (!) 109/41   Pulse 96   Temp 98.8 °F (37.1 °C) (Esophageal)   Resp 29   Ht 5' 2\" (1.575 m)   Wt 122 lb 3.2 oz (55.4 kg)   SpO2 99%   BMI 22.35 kg/m²     GENERAL:  Intubated, minimally responsive    LUNGS: Mechanically ventilated. R Chest tube with blue output, L chest with serosang output. Right mediastinal drain with blue output. CARDIOVASCULAR: RR  ABDOMEN:  Soft, non-distended, non-tender. No guarding, rigidity, rebound. Incisions clean with ecchymosis  EXT: warm and well perfused    ASSESSMENT/PLAN:  65 y.o. female with respiratory failure and complex right-sided effusion s/p robotic hiatal hernia repair 12/9 without signs of esophageal perforation or leak however now +fungitell and growing candida in chest. S/p VATS & Mini-thoractomy & bronchoscopy with open drainage of fluid collection 12/20, left CT placement 12/21 with ARDS & renal failure s/p chemical paralysis, flolan, CVVHD 12/21  Bronchoscopy intra-op showed friable muscosa w/o any thick purulent secretions. Bedside leak test showed active methylene blue extravasation into both right sided tubes. Remains critically ill  Discontinue TF  Re-ordered TPN  Continue chest tubes to suction. Appreciate critical care assistance with medical management.   Daily CXR  Appreciate endoscopy assistance, plan for EGD w/ stent placement 12/29    Will be discussed with Dr. Rock rPitchett    Electronically signed by Shanique Henley MD on 12/28/2022 at 7:09 AM

## 2022-12-28 NOTE — CARE COORDINATION
12/28 Care Coordination: Remains in MICU. S/P hernia repair from [facility]. intubated/sedated on vent. [facility] following. [facility] following if would need ANAMIKA. .. CRRT in progress CM/SW will continue to follow for discharge planning.    Nancie Williamson BSN,RN-CV-BC  [phone]

## 2022-12-28 NOTE — PROGRESS NOTES
TRAUMA/GENERAL SURGERY  DAILY PROGRESS NOTE  12/28/2022    CHIEF COMPLAINT:  Chief Complaint   Patient presents with    Shortness of Breath     Sent from [facility] for SOB patient went for hernia repair patient on 6L NC on arrival       SUBJECTIVE:  Intubated and sedated    OBJECTIVE:  BP (!) 109/41   Pulse 96   Temp 98.8 °F (37.1 °C) (Esophageal)   Resp 29   Ht 5' 2\" (1.575 m)   Wt 122 lb 3.2 oz (55.4 kg)   SpO2 99%   BMI 22.35 kg/m²     Levophed - 2  Right chest tubes- 300 cc/24 hours   Left chest tube- 0 cc / 24 hours    GENERAL:  Intubated, narcotize, winces to pain  LUNGS:  Intubated and on ventilator. 40%/8, chest tubes as above bs clear   CARDIOVASCULAR: Normal sinus tachycardia no extra heart sounds   ABDOMEN:  Soft, non-distended, non-tender. No guarding, rigidity, rebound. Incisions clean with ecchymosis  On CVVHD-  EXT NVI feet warm minimal edema     ASSESSMENT/PLAN:  65 y.o. female with respiratory failure and complex right-sided effusion s/p robotic hiatal hernia repair 12/9 without signs of esophageal perforation or leak however now +fungitell and growing candida in chest  S/p VATS & Mini-thoractomy & bronchoscopy with open drainage of fluid collection 12/20, left CT placement 12/21 with ARDS & renal failure s/p chemical paralysis, flolan, CVVHD 12/21. Who is currently clinically improving.      - leak from possible distal esophagus noted on imaging yesterday, chest is widely drained plan for esophageal stent tomorrow, will cont TPN,     Electronically signed by Karina Owens MD on 12/28/2022 at 7:34 AM      Attending Attestation   Patient seen and examined, agree with resident note except for changes made by me, for remaining HP/Consult/progress note details please see resident HP/Consult/progress note. Continue ICU and supportive care  Stent tomorrow  Discussed case with Dr Alexia Madrigal.     Juan Conner MD

## 2022-12-28 NOTE — PROGRESS NOTES
[facility]   Department of Internal Medicine   MICU Progress Note    Patient:  Mackenzie Kemp 77 y.o. female   MRN: [id number]       Date of Service: 2022    Allergy: Benadryl [diphenhydramine] and Ibuprofen  CC hypoxia   Subjective   Intubated/sedated on vent  Sedation on hold  If no improvement in mentation, may nee MRI   Off levophed.   Plan for esophageal stenting     Temps of 96.8   CRRT in progress with net removal of 100cc/hour  No overnight event  Unable to obtain ROS   Family updated at bedside   Objective     TEMPERATURE:  Current - Temp: 98.2 °F (36.8 °C); Max - Temp  Av.3 °F (36.8 °C)  Min: 97.9 °F (36.6 °C)  Max: 98.8 °F (37.1 °C)    RESPIRATIONS RANGE: Resp  Av.4  Min: 23  Max: 36    PULSE RANGE: Pulse  Av.4  Min: 77  Max: 123    BLOOD PRESSURE RANGE:  Systolic (24hrs), Av , Min:109 , Max:153   ; Diastolic (24hrs), Av, Min:41, Max:56      PULSE OXIMETRY RANGE: SpO2  Av.1 %  Min: 97 %  Max: 100 %    I & O - 24hr:    Intake/Output Summary (Last 24 hours) at 2022 0846  Last data filed at 2022 0800  Gross per 24 hour   Intake 2805.9 ml   Output 3074 ml   Net -268.1 ml     I/O last 3 completed shifts:  In: 3925.9 [I.V.:3478.4; NG/GT:158; IV Piggyback:142.7]  Out: 6465 [Urine:10; Drains:20; Chest Tube:1240] I/O this shift:  In: 117 [I.V.:117]  Out: 123    Weight change: -7 lb 12.8 oz (-3.538 kg)    Physical Exam:  General Appearance: intubated/sedated on vent, does not look in distress. Anasarca noted   HEENT:  Head: Normocephalic, no lesions, without obvious abnormality.  Eye: Normal external eye, conjunctiva, lids cornea, FILEMON.  Nose: Normal external nose, mucus membranes and septum.  Neck / Thyroid: Supple, no masses, nodes, nodules or enlargement.  Neck: no adenopathy, no carotid bruit, no JVD, supple, symmetrical, trachea midline, and thyroid not enlarged, symmetric, no tenderness/mass/nodules  Lung: Rhonchi throughout lung field, no  wheezing noted. 3 chest tube    Heart: regular rate and rhythm, S1, S2 normal, no murmur, click, rub or gallop  Abdomen: soft, non-tender; bowel sounds normal; no masses,  no organomegaly incision noted, intact. Extremities:  extremities normal, atraumatic, no cyanosis or edema 3+ pitting edema bilaterally. Musculokeletal: No joint swelling, no muscle tenderness. ROM normal in all joints of extremities.    Neurologic: Mental status: intubated/sedated on vent, limited neuro exam       Medications     Continuous Infusions:   PN-Adult  3 IN 1 Central Line (Custom)      PN-Adult  3 IN 1 Central Line (Custom) 50 mL/hr at 12/28/22 0601    vasopressin (Septic Shock) infusion Stopped (12/27/22 2159)    sodium chloride      norepinephrine 1 mcg/min (12/28/22 0806)    prismaSATE BGK 4/0/1.2 2,000 mL/hr at 12/28/22 0659    sodium chloride 100 mL/hr at 12/28/22 0502    calcium chloride CRRT infusion 55 mL/hr at 12/28/22 0554    dextrose      [Held by provider] fentaNYL Stopped (12/27/22 0806)    sodium chloride       Scheduled Meds:   [Held by provider] midodrine  15 mg Oral TID WC    insulin lispro  0-16 Units SubCUTAneous Q4H    lidocaine PF  5 mL IntraDERmal Once    sodium chloride flush  5-40 mL IntraVENous 2 times per day    heparin flush  3 mL IntraVENous 2 times per day    ertapenem (INVanz) IVPB  1,000 mg IntraVENous Q24H    [Held by provider] Vitamin D  1,000 Units Oral Daily    heparin (porcine)  5,000 Units SubCUTAneous Q8H    artificial tears   Both Eyes Q4H    anidulafungin  100 mg IntraVENous Q24H    pantoprazole (PROTONIX) 40 mg injection  40 mg IntraVENous Q12H    sodium chloride flush  5-40 mL IntraVENous 2 times per day    heparin flush  3 mL IntraVENous 2 times per day    Arformoterol Tartrate  15 mcg Nebulization BID    budesonide  0.5 mg Nebulization BID    albuterol  2.5 mg Nebulization Q4H WA    [Held by provider] metoprolol  5 mg IntraVENous Q6H    [Held by provider] rosuvastatin  10 mg Oral Nightly [Held by provider] levothyroxine  50 mcg Oral Daily     PRN Meds: fentanNYL, sodium chloride flush, sodium chloride, heparin flush, potassium chloride, magnesium sulfate, calcium gluconate **OR** calcium gluconate **OR** calcium gluconate **OR** calcium gluconate, sodium phosphate IVPB **OR** sodium phosphate IVPB **OR** sodium phosphate IVPB **OR** sodium phosphate IVPB, glucose, dextrose bolus **OR** dextrose bolus, glucagon (rDNA), dextrose, sodium chloride flush, sodium chloride, heparin flush, ondansetron **OR** ondansetron, [DISCONTINUED] acetaminophen **OR** acetaminophen, ipratropium-albuterol  Nutrition:   TPN    Labs and Imaging Studies     CBC:   Recent Labs     12/26/22  0542 12/27/22  0550 12/28/22  0450   WBC 17.1* 25.4* 20.0*   RBC 2.89* 3.48* 2.50*   HGB 8.5* 10.0* 7.4*   HCT 25.2* 31.0* 21.5*   MCV 87.2 89.1 86.0   MCH 29.4 28.7 29.6   MCHC 33.7 32.3 34.4   RDW 16.3* 15.9* 15.8*    152 108*   MPV 13.4* 14.0* 14.2*       BMP:    Recent Labs     12/27/22 1925 12/27/22 2347 12/28/22 0450    132 137   K 4.7 4.2 4.3    102 104   CO2 19* 20* 21*   BUN 35* 37* 35*   CREATININE 1.1* 1.1* 1.0   GLUCOSE 234* 266* 189*   CALCIUM 8.7 8.9 8.8   PROT 5.9* 5.5* 5.3*   LABALBU 2.8* 2.6* 2.6*   BILITOT 2.2* 1.6* 1.5*   ALKPHOS 171* 154* 151*   AST 45* 35* 31   ALT 71* 60* 53*       LIVER PROFILE:   Recent Labs     12/27/22 1925 12/27/22 2347 12/28/22  0450   AST 45* 35* 31   ALT 71* 60* 53*   BILITOT 2.2* 1.6* 1.5*   ALKPHOS 171* 154* 151*       PT/INR:   No results for input(s): PROTIME, INR in the last 72 hours. APTT:   No results for input(s): APTT in the last 72 hours.       Fasting Lipid Panel:    Lab Results   Component Value Date/Time    CHOL 45 12/19/2022 12:00 PM    TRIG 143 12/28/2022 04:50 AM    HDL 12 12/19/2022 12:00 PM       Cardiac Enzymes:    Lab Results   Component Value Date    CKTOTAL 59 01/13/2020    TROPONINI <0.01 01/13/2020       Notable Cultures:      Blood cultures   Blood Culture, Routine   Date Value Ref Range Status   12/17/2022 5 Days no growth  Final     Respiratory cultures No results found for: RESPCULTURE   Gram Stain Result   Date Value Ref Range Status   12/20/2022 Refer to ordered Gram stain for results  Final     Urine   Urine Culture, Routine   Date Value Ref Range Status   07/22/2022 <10,000 CFU/mL  Mixed gram positive organisms    Final     Legionella No results found for: LABLEGI  C Diff PCR No results found for: CDIFPCR  Wound culture/abscess: No results for input(s): WNDABS in the last 72 hours. Tip culture:No results for input(s): CXCATHTIP in the last 72 hours. Antibiotic  Days  Day started   Ertapenem      Anidulafungin                       Oxygen:     Vent Information  Ventilator ID: [id number]  Equipment Changed: Expiratory Filter  Vent Mode: AC/VC    Additional Respiratory Assessments  Heart Rate: (!) 110  Resp: 26  SpO2: 100 %  Position: Semi-Bach's  Humidification Source: Heated wire  Humidification Temp: 36.7  Circuit Condensation: Drained  Airway Type: ET  Airway Size: 7.5  Cuff Pressure (cm H2O):  (MLT)  Skin Barrier Applied: Yes     Nasal cannula L/min     Face mask %     Reservoirs mask %       ABG     PH  7.45   PCO2  30   PO2  103   HCO3  21   Sat%  97   FIO2     DATES 12/28/22       Lines:  Site  Day  Date inserted     TLC   Rfemoral    12/17/22      PICC              Arterial line   Left brachial    12/20/22     Peripheral line              HD cath Left IJ    12/21/22     [REMOVED] Urinary Catheter 12/15/22 Peterson-Output (mL): 0 mL    Imaging Studies:    XR ABDOMEN (KUB) (SINGLE AP VIEW)   Final Result   NG tube again identified within the stomach, when compared to CT scan of the   abdomen pelvis performed 12/25/2022. There is oral contrast within the   stomach, however, there is oral contrast around the GE junction, suspicious   for focal perforation at the GE junction.   Oral contrast is also noted   tracking along the inferior right-sided chest tube, indicating extravasation   of contrast into the pleural space. The findings were discussed with Naseem Gillespie, the patient's bedside nurse, at   11:34 a.m.         XR CHEST PORTABLE   Final Result   No interval change         XR CHEST PORTABLE   Final Result   No significant interval change, when compared to the previous study performed   1 day earlier. CT HEAD WO CONTRAST   Final Result   1. No acute intracranial abnormality. 2.  Chronic right maxillary ethmoid and frontal sinusitis. Chronic sphenoid   sinusitis. Fluid opacification of both mastoid air cells. 3.  No CT evidence of large territory infarct. MCA vessels appear normal in   density. CT ABDOMEN PELVIS WO CONTRAST Additional Contrast? None   Final Result   1. No evidence of bowel obstruction or significant ileus. 2.  Gastrostomy tube is located in the stomach. Oral contrast material is   noted in the dependent portion of the gastric cardia. No abnormal bowel   dilatation. Oral contrast material noted in the right and left colon without   colonic distension. 3.  Mild abdominal ascites. 4.  Patchy asymmetric gland glass opacities in the lung bases with some areas   of confluent consolidation in the right lower lobe. Trace pleural effusions. Bilateral chest tubes. 5.  Very small left pneumothorax. XR CHEST PORTABLE   Final Result   1. Asymmetric bilateral airspace opacities, not significantly changed. 2.  No of visible pneumothorax. 3.  Support devices appear stable. XR CHEST PORTABLE   Final Result   Suggestion of some partial resolution of bilateral basilar infiltrates         XR CHEST PORTABLE   Final Result   No interval change         XR CHEST PORTABLE   Final Result   Stable appearance of the chest compared to 12/21/2022. XR CHEST PORTABLE   Final Result   Adequately positioned left internal jugular hemodialysis catheter.   The   remainder of the exam including additional lines and tubes are stable. XR CHEST PORTABLE   Final Result   Interval left chest tube placement, no definite pneumothorax. Bilateral lung infiltrates and pleural effusions, not significantly changed. These infiltrates may be due to pulmonary edema, pneumonia and/or ARDS. Stable borderline enlargement of the cardiac silhouette. XR CHEST PORTABLE   Final Result   Extensive bilateral airspace disease with interval progression on the right. XR CHEST PORTABLE   Final Result   Postoperative changes with the diffuse bilateral infiltrates and effusions   with improvement in the right side likely CHF/edema. Pneumonia is less   likely. Tubes and catheters as noted. XR CHEST PORTABLE   Final Result   Low lying endotracheal tube which needs withdrawn approximately 2 cm. XR CHEST PORTABLE   Final Result   Enteric tube tip in the body of the stomach. The roseann is not well seen. ET tube tip most likely within 1.5 cm of the   roseann. XR CHEST PORTABLE   Final Result   Pleural effusions with adjacent infiltrate and or atelectasis showing   slightly improved aeration on the left and slightly poor aeration on the   right. XR CHEST PORTABLE   Final Result   Interval improvement in the opacification of the left hemithorax, with   aeration in the left upper lung field when compared to the previous study   performed 1 day earlier. No other significant interval change. XR CHEST PORTABLE   Final Result   New right internal jugular central venous catheter terminating near the   SVC-right atrium junction. No post-procedure pneumothorax. Worsening   opacification of the left hemithorax and stable findings on the right. CT CHEST WO CONTRAST   Final Result   Large right pleural effusion of mixed density to suggest complex effusion.    There is a pigtail catheter seen in the pleural space posteriorly and inferiorly. Small to moderate size left pleural effusion with consolidative   infiltrate throughout the left upper and lower lung field and aerated   portions revealing nodular ground-glass opacity to suggest an infectious   process. Nodular ground-glass opacity inferiorly within the aerated right   upper and lower lung field concerning for an infectious process as well. There is less consolidative infiltrate identified within the right lower lobe. XR CHEST PORTABLE   Final Result   1. No changes position of the right-sided chest tube. No right-sided   pneumothorax. The most of the right-sided pleural effusion has been drained,   if present to be discrete or small. 2.  Moderate left-sided pleural effusion. If quantification of left-sided   pleural effusion will be needed for clinical management bedside ultrasound or   left lateral decubitus views of the chest can be helpful. 3.  No pneumothorax on the right or on the left. 4.  Large size diaphragmatic across the midline. XR CHEST PORTABLE   Final Result   1. Increased parenchymal density projecting over the left lower lung   concerning for atelectasis and/or consolidation      2. No signs of right effusion      3. Parenchymal density projecting over the right lower lung concerning for   atelectasis         IR GUIDED PERC PLEURAL DRAIN W CATH INSERT   Final Result   1. Successful ultrasound-guided right thoracentesis. 2.  Successful placement of an 8 Nepali pleural drain         XR CHEST PORTABLE   Final Result   1. Right-sided pigtail catheter in the right base. 2.  No conspicuous right-sided pleural effusions identified. No right-sided   pneumothorax. 3.  Cannot exclude a small left-sided pleural effusion. 4.  Large size diaphragmatic hernia across the midline causing compression   atelectasis in the medial aspect of both lungs. FL ESOPHAGRAM   Final Result   1.  No evidence of esophageal perforation or leak. 2. Decreased peristalsis. No evidence of esophageal mass or obstructing   lesion. 3. Small sliding hiatus hernia. No evidence of GE reflux. The gastric   foreign disc does appear constricted as it passes through the esophageal   hiatus. US CHEST INCLUDING MEDIASTINUM   Final Result   Probable complex loculated right pleural effusion. Simple left pleural effusion. CTA PULMONARY W CONTRAST   Final Result   No evidence of pulmonary embolism. Bilateral lower lobe pulmonary consolidations and pleural effusions seen. Deviation of the mediastinum to the left is visualized. Mild soft tissue prominence visualized surrounding the esophagus with   narrowing at the gastroesophageal junction but appears to be due to external   compression. Cannot rule left soft tissue mass at this location. Wall thickening visualized in the gastric outlet with mild prominence of the   stomach seen, this could be artifactual.      Distended gallbladder is seen. Degenerative bone changes, postoperative changes and multilevel vertebral   augmentation seen. No evidence of acute abdominal/pelvic pathology. CT ABDOMEN PELVIS W IV CONTRAST Additional Contrast? Oral   Final Result   No evidence of pulmonary embolism. Bilateral lower lobe pulmonary consolidations and pleural effusions seen. Deviation of the mediastinum to the left is visualized. Mild soft tissue prominence visualized surrounding the esophagus with   narrowing at the gastroesophageal junction but appears to be due to external   compression. Cannot rule left soft tissue mass at this location. Wall thickening visualized in the gastric outlet with mild prominence of the   stomach seen, this could be artifactual.      Distended gallbladder is seen. Degenerative bone changes, postoperative changes and multilevel vertebral   augmentation seen.       No evidence of acute abdominal/pelvic pathology. XR CHEST PORTABLE   Final Result   1. Consolidation seen within the left lung base with blunting the left   costophrenic angle which could represent pneumonia or atelectasis   2. A trace left pleural effusion cannot be excluded   3. Large right diaphragmatic hernia/hiatal hernia which was noted on the   patient's previous CT of the chest of 07/22/2022. XR CHEST PORTABLE    (Results Pending)   XR CHEST PORTABLE    (Results Pending)        APRN- CNP Assessment and PLan   In summary 65 y.o. female with significant past medical history of hypothyroidism, breast cancer, TIA, hiatal hernia underwent hiatal hernia repair on 12/9/22 and developed acute respiratory distress and was transfer to [facility] [place] on 12/14/22 for further management. Patient was seen by nephrology, pulmonology, general surgery. Patient noted to have worsening hypoxia requiring 15 L of oxygen, A. fib RVR therefore transferred to MICU and critical care was consulted. Assessment:  Acute hypoxemic respiratory failure secondary to aspiration pneumonia/pleural effusion/bibasilar consolidation now on ventilation (12/19/22)   ARDS   Bilateral pleural effusion right greater than left status post right thoracentesis with pigtail chest tube placement on (12/16/22 IR)   Septic shock  2/2 PNA/loculated pleural effusion  Loculated pleural effusion s/p VATS and Mini-thoracotomy with open drainage of fluid collection 12/20   Esophageal Leak ==> plan for EGD 12/30/2022. AFIB RVR  PAWAN secondary to dehydration  [place] ptosis leukocytosis with left shift  Acute anemia  History of massive hiatal hernia  status post repair on 12/9/2022  History of TIA  History of breast cancer  History of hypothyroidism  History of lumbar compression fracture with history of PLIF T12-L2  History of anxiety     Plan:  -ventilator (day #10), titrate FiO2 keep SPO2 goal 92%  -Off sedation, if no improvement in mentation, may need CT of head.   - off levophed, titrate to keep MAP >65mmHg   - loculated pleural effusion s/p VATS and mini thoracotomy with open drainage of fluid collection (12/20/22) s/p 2 chest tube ;-  effusion on left side s/p Left chest tube (12/21/22)   -3 chest tube with minimal drainage, noted to have tube feeds through Right chest tube. General surgery resident administer methylene blue and gastrografin contrast with x-ray, positive for tEsophageal Leak ==> Plan for esophageal stenting by Dr. Yuly Bueno with EGD on 12/29/2022  -Continue scheduled bronchodilators Brovana and Pulmicort in addition to the DuoNebs, mucomyst   -Echo showed EF of 55%, indeterminate diastolic function, moderate dilated right ventricle with reduced function, moderate tricuspid regurgitation. Echo in January 2021 showed EF of 60 to 65%,   -Procalcitonin 5.70==> 1.57 today; RVP/COVID-19 PCR negative, MRSA nares negative, blood culture (NGTD) respiratory culture (Enterobacter Colace complex, Candida albicans) urine antigen negative. Body fluid hematoma==> enterobacter, candida albicans; Respiratory culture (heavy growth Enterobacter)  - Consult to ID, input appreciated. - Ertapenem and anidulafungin   -General surgery surgery consulted, input appreciated. -Strict NPO with no medications, hold all oral medication and TUBE FEEDS   -PAWAN, nephrology following, input appreciated- low urine output, metabolic acidosis, will place temp HD catheter and may need CRRT with removal of 100cc/hour.   -Strict intake and output  -Labs and chest x-ray in a.m.  -F/E/N KVO/ replace lytes/ Npo   - DVT/GI scds/lovenox/protonix  - Code status: DNR- limited No CPR, ok to everything.        Kvng Morales APRN-CNP   Critical Care     Discussed case with Attending Physician: Catie Roche

## 2022-12-28 NOTE — PROCEDURES
Central Line Insertion     Procedure: right femoral vein Triple Lumen Catheter placement. Indications: vascular access    Consent: The family members were counseled regarding the procedure, its indications, risks, potential complications and alternatives, and any questions were answered. Consent was obtained to proceed. Number of sticks: 1    Number of Kits used: 1    Procedure: Time Out: Immediately prior to the procedure a \"timeout\" was called to verify the correct patient and procedure. The patient was place in the trendelenburg position and the skin over the right femoral vein was prepped with betadine and draped in a sterile fashion and draped in a sterile fashion. Local anesthesia was obtained by infiltration using 2% Lidocaine without epinephrine. With Ultrasound guidance a large bore needle was used to identify the vein, dark non pulsatile blood returned. The guide wire was then inserted through the needle with minimal resistance. 2 mm nick was made in the skin beside the guidewire. Then a dilator was inserted and removed. A triple lumen catheter was then inserted into the vessel over the guide wire using the Seldinger technique to the 15 cm jose. All ports showed good, free flowing blood return and were flushed with saline solution. The catheter was then securely fastened to the skin with sutures and with an adhesive dressing and covered with a bio patch and sterile dressing. A post procedure X-ray was not indicated. Complications: None   The patient tolerated the procedure well. Estimated blood loss: 5 ml.     KAR Dowling CNP  12/27/2022  7:00 PM

## 2022-12-28 NOTE — PROGRESS NOTES
Hospitalist Progress Note      PCP: Ashely Faria DO    Date of Admission: 12/14/2022        Hospital Course:  65 y.o. female presented with PNEUMONIA. STATES SHE HAD A HH REPAIR AT [facility] ON THE 8TH. SHE STATES SHE WAS NOT FEELING BAD, BUT WAS TOLD SHE NEEDED TO COME TO THE HOSPITAL BC SHE WAS SICK. SHE WAS FOUND TO BE SEPTIC, WITH A HAG, SHE WAS TACHY AND TACHYPNEIC . COFFEE GROUND EMESIS THIS MORNING, GASTROCULT  POS . SURGERY NOTIFIED ** HAD A THROACENTESIS ON YESTERDAY, 105 CC REMOVED. ** RRT  DUE TO RESPIRATORY DISTRESS ** TO TRANSFER TO  OR [facility]** INTUBATED HYPOXIA**  HAD CHEST TUBES INSERTED ON 12.20 ** DEVI FOUND ON CULTURES,  ** APPEARS TO HAVE A FISTULA, IN CHEST CAVITY. ?  IF TRANSFER TO [facility] VS ** FOR STENT PLACEMENT 12/29           Subjective:  SEDATED AND INTUBATED           Medications:  Reviewed    Infusion Medications    PN-Adult  3 IN 1 Central Line (Custom)      sodium chloride      PN-Adult  3 IN 1 Central Line (Custom) 50 mL/hr at 12/28/22 0601    sodium chloride      norepinephrine Stopped (12/28/22 1005)    prismaSATE BGK 4/0/1.2 2,000 mL/hr at 12/28/22 0659    sodium chloride 100 mL/hr at 12/28/22 0502    calcium chloride CRRT infusion 55 mL/hr at 12/28/22 0554    dextrose      sodium chloride       Scheduled Medications    [Held by provider] midodrine  15 mg Oral TID WC    insulin lispro  0-16 Units SubCUTAneous Q4H    lidocaine PF  5 mL IntraDERmal Once    sodium chloride flush  5-40 mL IntraVENous 2 times per day    heparin flush  3 mL IntraVENous 2 times per day    ertapenem (INVanz) IVPB  1,000 mg IntraVENous Q24H    [Held by provider] Vitamin D  1,000 Units Oral Daily    heparin (porcine)  5,000 Units SubCUTAneous Q8H    artificial tears   Both Eyes Q4H    anidulafungin  100 mg IntraVENous Q24H    pantoprazole (PROTONIX) 40 mg injection  40 mg IntraVENous Q12H    sodium chloride flush  5-40 mL IntraVENous 2 times per day    heparin flush  3 mL IntraVENous 2 times per day    Arformoterol Tartrate  15 mcg Nebulization BID    budesonide  0.5 mg Nebulization BID    albuterol  2.5 mg Nebulization Q4H WA    [Held by provider] metoprolol  5 mg IntraVENous Q6H    [Held by provider] rosuvastatin  10 mg Oral Nightly    [Held by provider] levothyroxine  50 mcg Oral Daily     PRN Meds: sodium chloride, fentanNYL, sodium chloride flush, sodium chloride, heparin flush, potassium chloride, magnesium sulfate, calcium gluconate **OR** calcium gluconate **OR** calcium gluconate **OR** calcium gluconate, sodium phosphate IVPB **OR** sodium phosphate IVPB **OR** sodium phosphate IVPB **OR** sodium phosphate IVPB, glucose, dextrose bolus **OR** dextrose bolus, glucagon (rDNA), dextrose, sodium chloride flush, sodium chloride, heparin flush, ondansetron **OR** ondansetron, [DISCONTINUED] acetaminophen **OR** acetaminophen, ipratropium-albuterol      Intake/Output Summary (Last 24 hours) at 12/28/2022 1547  Last data filed at 12/28/2022 1500  Gross per 24 hour   Intake 3184.9 ml   Output 3034 ml   Net 150.9 ml       Exam:    BP (!) 130/52   Pulse (!) 103   Temp 97.5 °F (36.4 °C) (Esophageal)   Resp 25   Ht 5' 2\" (1.575 m)   Wt 122 lb 3.2 oz (55.4 kg)   SpO2 100%   BMI 22.35 kg/m²       General appearance:  No apparent distress, appears stated age. HEENT:  Normal cephalic,   Neck: Supple, with full range of motion. Trachea midline. Respiratory:  Normal respiratory effort. DIMINISHED BREATH SOUNDS  NOTED BILATERALLY  Cardiovascular:  RRR  Abdomen: Soft, non-tender, non-distended with normal bowel sounds. Musculoskeletal:  No clubbing, cyanosis or edema bilaterally.     Skin: smooth and dry                   Labs:   Recent Labs     12/27/22  0550 12/28/22  0450 12/28/22  1137   WBC 25.4* 20.0* 17.0*   HGB 10.0* 7.4* 7.0*   HCT 31.0* 21.5* 20.8*    108* 89*     Recent Labs     12/27/22  1239 12/27/22  1925 12/27/22  2347 12/28/22  0450 12/28/22  1137      < > 132 137 138 K 4.3   < > 4.2 4.3 3.9      < > 102 104 105   CO2 19*   < > 20* 21* 23   BUN 34*   < > 37* 35* 35*   CREATININE 1.1*   < > 1.1* 1.0 0.9   CALCIUM 7.9*   < > 8.9 8.8 8.5*   PHOS 2.9  --   --  2.0* 2.2*    < > = values in this interval not displayed. Recent Labs     12/27/22  2347 12/28/22  0450 12/28/22  1137   AST 35* 31 34*   ALT 60* 53* 46*   BILITOT 1.6* 1.5* 1.5*   ALKPHOS 154* 151* 140*     Recent Labs     12/28/22  1137   INR 1.3     No results for input(s): CKTOTAL, TROPONINI in the last 72 hours. Recent Labs     12/27/22  2347 12/28/22  0450 12/28/22  1137   AST 35* 31 34*   ALT 60* 53* 46*   BILITOT 1.6* 1.5* 1.5*   ALKPHOS 154* 151* 140*     No results for input(s): LACTA in the last 72 hours.   No results found for: Baron Leaau  Lab Results   Component Value Date    AMMONIA 21.0 12/28/2022       Assessment:    Active Hospital Problems    Diagnosis Date Noted    Hypoxia [R09.02] 12/17/2022     Priority: Medium    Pneumonia of left lower lobe due to infectious organism [J18.9] 12/17/2022     Priority: Medium    Aspiration pneumonia of both lower lobes due to gastric secretions ([facility] [address] [J69.0] 12/17/2022     Priority: Medium    Postoperative pneumonia [J95.89, J18.9] 12/14/2022     Priority: Medium   FISTULA CHEST CAVITY  ACUTE RESP FAILURE WITH HYPOXIA  GASTROCULT POS  S/P HH REPAIR  PAWAN BASELINE CREATINE 1.0)  H.O BREAST CANCER   HLD   PLEURAL EFFUSIONS BILATERALLY   S/P CHEST TUBES  R loculated pleural effusion s/p IR chest tube 12/17/22 with minimal drainage, s/p R VATS turned partial open thoracotomy 12/20/22 with insertion of chest tubes x 3  PLAN:  anidulafungin 200    FLUIDS  MONITOR BMP   FOR  CAMACHO   FOR TENT PLACEMENT IN AM WITH DR SAMARIA        DVT Prophylaxis: SCD  Diet: Diet NPO Exceptions are: Ice Chips  PN-Adult  3 IN 1 Central Line (Custom)  Diet NPO Exceptions are: Sips of Water with Meds  PN-Adult  3 IN 1 Central Line (Custom)  Code Status: Full Code     PT/OT Eval Status: WHEN STABLE     Dispo -  TO [facility] OR          Electronically signed by Karsten Alicia,  on 12/28/2022 at 3:47 PM [facility]

## 2022-12-29 ENCOUNTER — ANESTHESIA (OUTPATIENT)
Dept: OPERATING ROOM | Age: 77
End: 2022-12-29
Payer: MEDICARE

## 2022-12-29 ENCOUNTER — ANESTHESIA EVENT (OUTPATIENT)
Dept: OPERATING ROOM | Age: 77
End: 2022-12-29
Payer: MEDICARE

## 2022-12-29 NOTE — PROGRESS NOTES
Hospitalist Progress Note      PCP: Vasile Galicia DO    Date of Admission: 12/14/2022        Hospital Course:  65 y.o. female presented with PNEUMONIA. STATES SHE HAD A HH REPAIR AT [facility] ON THE 8TH. SHE STATES SHE WAS NOT FEELING BAD, BUT WAS TOLD SHE NEEDED TO COME TO THE HOSPITAL BC SHE WAS SICK. SHE WAS FOUND TO BE SEPTIC, WITH A HAG, SHE WAS TACHY AND TACHYPNEIC . COFFEE GROUND EMESIS THIS MORNING, GASTROCULT  POS . SURGERY NOTIFIED ** HAD A THROACENTESIS ON YESTERDAY, 105 CC REMOVED. ** RRT  DUE TO RESPIRATORY DISTRESS ** TO TRANSFER TO  OR [facility]** INTUBATED HYPOXIA**  HAD CHEST TUBES INSERTED ON 12.20 ** DEVI FOUND ON CULTURES,  ** APPEARS TO HAVE A FISTULA, IN CHEST CAVITY. ?  IF TRANSFER TO [facility] VS ** FOR STENT PLACEMENT 12/29                 Subjective:  SEDATED AND INTUBATED           Medications:  Reviewed    Infusion Medications    PN-Adult  3 IN 1 Central Line (Custom)      PN-Adult  3 IN 1 Central Line (Custom) 50 mL/hr at 12/29/22 5904    sodium chloride      sodium chloride 10 mL/hr at 12/29/22 4778    norepinephrine 2 mcg/min (12/29/22 1115)    prismaSATE BGK 4/0/1.2 2,000 mL/hr at 12/28/22 0659    sodium chloride 100 mL/hr at 12/29/22 0227    calcium chloride CRRT infusion 8,000 mg (12/28/22 1816)    dextrose      sodium chloride       Scheduled Medications    phosphorus  500 mg Oral Once    [Held by provider] midodrine  15 mg Oral TID WC    insulin lispro  0-16 Units SubCUTAneous Q4H    lidocaine PF  5 mL IntraDERmal Once    sodium chloride flush  5-40 mL IntraVENous 2 times per day    heparin flush  3 mL IntraVENous 2 times per day    ertapenem (INVanz) IVPB  1,000 mg IntraVENous Q24H    [Held by provider] Vitamin D  1,000 Units Oral Daily    heparin (porcine)  5,000 Units SubCUTAneous Q8H    artificial tears   Both Eyes Q4H    anidulafungin  100 mg IntraVENous Q24H    pantoprazole (PROTONIX) 40 mg injection  40 mg IntraVENous Q12H    sodium chloride flush  5-40 mL IntraVENous 2 times per day    heparin flush  3 mL IntraVENous 2 times per day    Arformoterol Tartrate  15 mcg Nebulization BID    budesonide  0.5 mg Nebulization BID    albuterol  2.5 mg Nebulization Q4H WA    [Held by provider] metoprolol  5 mg IntraVENous Q6H    [Held by provider] rosuvastatin  10 mg Oral Nightly    [Held by provider] levothyroxine  50 mcg Oral Daily     PRN Meds: sodium chloride, fentanNYL, sodium chloride flush, sodium chloride, heparin flush, potassium chloride, magnesium sulfate, calcium gluconate **OR** calcium gluconate **OR** calcium gluconate **OR** calcium gluconate, sodium phosphate IVPB **OR** sodium phosphate IVPB **OR** sodium phosphate IVPB **OR** sodium phosphate IVPB, glucose, dextrose bolus **OR** dextrose bolus, glucagon (rDNA), dextrose, sodium chloride flush, sodium chloride, heparin flush, ondansetron **OR** ondansetron, [DISCONTINUED] acetaminophen **OR** acetaminophen, ipratropium-albuterol      Intake/Output Summary (Last 24 hours) at 12/29/2022 1359  Last data filed at 12/29/2022 1200  Gross per 24 hour   Intake 3434 ml   Output 3669 ml   Net -235 ml       Exam:    BP (!) 127/56   Pulse (!) 144   Temp 98.8 °F (37.1 °C) (Esophageal)   Resp (!) 31   Ht 5' 2\" (1.575 m)   Wt 122 lb 2.2 oz (55.4 kg)   SpO2 99%   BMI 22.34 kg/m²       General appearance:  No apparent distress, appears stated age. HEENT:  Normal cephalic,   Neck: Supple, with full range of motion. Trachea midline. Respiratory:  Normal respiratory effort. DIMINISHED BREATH SOUNDS  NOTED BILATERALLY  Cardiovascular:  RRR  Abdomen: Soft, non-tender, non-distended with normal bowel sounds. Musculoskeletal:  No clubbing, cyanosis or edema bilaterally.     Skin: smooth and dry                     Labs:   Recent Labs     12/28/22  0450 12/28/22  1137 12/28/22  1825 12/29/22  0539   WBC 20.0* 17.0*  --  15.0*   HGB 7.4* 7.0* 8.6* 8.9*   HCT 21.5* 20.8* 25.3* 25.2*   * 89*  --  67*     Recent Labs 12/28/22  2336 12/29/22  0539 12/29/22  1142    138 137   K 4.1 4.1 4.1    107 107   CO2 21* 24 23   BUN 33* 32* 32*   CREATININE 1.0 1.0 1.0   CALCIUM 8.2* 8.3* 8.1*   PHOS 2.4* 2.5 1.9*     Recent Labs     12/28/22  2336 12/29/22  0539 12/29/22  1142   AST 40* 43* 41*   ALT 40* 38* 35*   BILITOT 1.7* 1.6* 1.6*   ALKPHOS 139* 137* 133*     Recent Labs     12/28/22  1137   INR 1.3     No results for input(s): CKTOTAL, TROPONINI in the last 72 hours. Recent Labs     12/28/22  2336 12/29/22  0539 12/29/22  1142   AST 40* 43* 41*   ALT 40* 38* 35*   BILITOT 1.7* 1.6* 1.6*   ALKPHOS 139* 137* 133*     No results for input(s): LACTA in the last 72 hours. No results found for: Barros Landau  Lab Results   Component Value Date    AMMONIA 21.0 12/28/2022       Assessment:    Active Hospital Problems    Diagnosis Date Noted    Hypoxia [R09.02] 12/17/2022     Priority: Medium    Pneumonia of left lower lobe due to infectious organism [J18.9] 12/17/2022     Priority: Medium    Aspiration pneumonia of both lower lobes due to gastric secretions ([address] [J69.0] 12/17/2022     Priority: Medium    Postoperative pneumonia [J95.89, J18.9] 12/14/2022     Priority: Medium   FISTULA CHEST CAVITY  ACUTE RESP FAILURE WITH HYPOXIA  GASTROCULT POS  S/P HH REPAIR  PAWAN BASELINE CREATINE 1.0)  H.O BREAST CANCER   HLD   PLEURAL EFFUSIONS BILATERALLY   S/P CHEST TUBES  R loculated pleural effusion s/p IR chest tube 12/17/22 with minimal drainage, s/p R VATS turned partial open thoracotomy 12/20/22 with insertion of chest tubes x 3  PLAN:  anidulafungin 200    FLUIDS  MONITOR BMP   FOR  CAMACHO   FOR TENT PLACEMENT IN AM WITH DR MERA        DVT Prophylaxis: SCD  Diet: Diet NPO Exceptions are: Ice Chips  PN-Adult  3 IN 1 Central Line (Custom)  Diet NPO Exceptions are: Sips of Water with Meds  PN-Adult  3 IN 1 Central Line (Custom)  Code Status: Full Code     PT/OT Eval Status:   WHEN STABLE     Dispo -  TO [facility] OR      Electronically signed by Zhen Guthrie DO on 12/29/2022 at 1:59 PM [facility]

## 2022-12-29 NOTE — PROGRESS NOTES
GENERAL SURGERY  DAILY PROGRESS NOTE  12/29/2022    CHIEF COMPLAINT:  Chief Complaint   Patient presents with    Shortness of Breath     Sent from [facility] for SOB patient went for hernia repair patient on 6L NC on arrival       SUBJECTIVE:  No acute events overnight. Briefly on pressors but currently off. For stent today. OBJECTIVE:  BP (!) 115/49   Pulse (!) 109   Temp 97.5 °F (36.4 °C) (Esophageal)   Resp 20   Ht 5' 2\" (1.575 m)   Wt 122 lb 2.2 oz (55.4 kg)   SpO2 100%   BMI 22.34 kg/m²     GENERAL:  Intubated, minimally responsive    LUNGS: Mechanically ventilated. R Chest tube with blue output, L chest with serosang output. Right mediastinal drain with blue output. CARDIOVASCULAR: RR  ABDOMEN:  Soft, non-distended, non-tender. No guarding, rigidity, rebound. Incisions clean with ecchymosis  EXT: warm and well perfused    ASSESSMENT/PLAN:  65 y.o. female with respiratory failure and complex right-sided effusion s/p robotic hiatal hernia repair 12/9 without signs of esophageal perforation or leak however now +fungitell and growing candida in chest. S/p VATS & Mini-thoractomy & bronchoscopy with open drainage of fluid collection 12/20, left CT placement 12/21 with ARDS & renal failure s/p chemical paralysis, flolan, CVVHD 12/21  Bronchoscopy intra-op showed friable muscosa w/o any thick purulent secretions. Bedside leak test showed active methylene blue extravasation into both right sided tubes. Remains critically ill  Discontinue TF  Re-ordered TPN  Continue chest tubes to suction. Appreciate critical care assistance with medical management.   Daily CXR  Appreciate endoscopy assistance, plan for EGD w/ stent placement 12/29    Electronically signed by Tatiana Hudson MD on 12/29/2022 at 7:32 AM

## 2022-12-29 NOTE — PROGRESS NOTES
ENDOSCOPIC SURGERY  DAILY PROGRESS NOTE  12/29/2022    CHIEF COMPLAINT:  Chief Complaint   Patient presents with    Shortness of Breath     Sent from [facility] for SOB patient went for hernia repair patient on 6L NC on arrival       SUBJECTIVE:  No acute events overnight. Briefly on pressors but currently off. For stent today. OBJECTIVE:  BP (!) 115/49   Pulse (!) 109   Temp 97.5 °F (36.4 °C) (Esophageal)   Resp 20   Ht 5' 2\" (1.575 m)   Wt 122 lb 2.2 oz (55.4 kg)   SpO2 100%   BMI 22.34 kg/m²     GENERAL:  Intubated, minimally responsive    LUNGS: Mechanically ventilated. R Chest tube with blue output, L chest with serosang output. Right mediastinal drain with blue output. CARDIOVASCULAR: RR  ABDOMEN:  Soft, non-distended, non-tender. No guarding, rigidity, rebound. Incisions clean with ecchymosis  EXT: warm and well perfused    ASSESSMENT/PLAN:  65 y.o. female with respiratory failure and complex right-sided effusion s/p robotic hiatal hernia repair 12/9 without signs of esophageal perforation or leak however now +fungitell and growing candida in chest. S/p VATS & Mini-thoractomy & bronchoscopy with open drainage of fluid collection 12/20, left CT placement 12/21 with ARDS & renal failure s/p chemical paralysis, flolan, CVVHD 12/21  Bronchoscopy intra-op showed friable muscosa w/o any thick purulent secretions. Bedside leak test showed active methylene blue extravasation into both right sided tubes. Remains critically ill  Discontinue TF  Re-ordered TPN  Continue chest tubes to suction. Appreciate critical care assistance with medical management.   Daily CXR  Plan for EGD w/ stent placement 12/29    Electronically signed by Kevin Perdue MD on 12/29/2022 at 7:34 AM    For EGD with stent placement 12/30, possible PEG placement

## 2022-12-29 NOTE — PROGRESS NOTES
Rodney Morgan M.D.,[facility]  Dejuan Hinds D.O., F.A.C.O.I., Megan Garcia M.D. Hossein Ho M.D. Prudencio Obregon D.O. Daily Pulmonary Progress Note    Patient:  Ivan Robbins 65 y.o. female MRN: [id number]     Date of Service: 12/29/2022      Synopsis     We are following patient for acute respiratory failure with hypoxia, pneumonia, bilateral pleural effusions    \"CC\" SOB    Code status: Limited-no compressions      Subjective      Patient was seen and examined in the ICU. CVVHD currently running. Patient is on TPN and Levophed. Her SPO2 was noted to be in 100% with the following vent settings: AC/VC + 16, tidal volume 300, FiO2 40%, PEEP +8. The patient is scheduled for an EGD with stent placement with Dr. Nabila Rod today.     Review of Systems:  Unable to obtain-patient intubated     24-hour events:  No new events    Objective   Vitals: BP (!) 127/56   Pulse (!) 114   Temp 98.8 °F (37.1 °C) (Esophageal)   Resp 22   Ht 5' 2\" (1.575 m)   Wt 122 lb 2.2 oz (55.4 kg)   SpO2 100%   BMI 22.34 kg/m²     I/O:    Intake/Output Summary (Last 24 hours) at 12/29/2022 1539  Last data filed at 12/29/2022 1500  Gross per 24 hour   Intake 3319 ml   Output 3476 ml   Net -157 ml         Vent Information  Ventilator ID: [id number]  Equipment Changed: Humidification  Vent Mode: AC/VC                CURRENT MEDS :  Scheduled Meds:   phosphorus  500 mg Oral Once    [Held by provider] midodrine  15 mg Oral TID WC    insulin lispro  0-16 Units SubCUTAneous Q4H    lidocaine PF  5 mL IntraDERmal Once    sodium chloride flush  5-40 mL IntraVENous 2 times per day    heparin flush  3 mL IntraVENous 2 times per day    ertapenem (INVanz) IVPB  1,000 mg IntraVENous Q24H    [Held by provider] Vitamin D  1,000 Units Oral Daily    heparin (porcine)  5,000 Units SubCUTAneous Q8H    artificial tears   Both Eyes Q4H    anidulafungin  100 mg IntraVENous Q24H    pantoprazole (PROTONIX) 40 mg injection  40 mg IntraVENous Q12H sodium chloride flush  5-40 mL IntraVENous 2 times per day    heparin flush  3 mL IntraVENous 2 times per day    Arformoterol Tartrate  15 mcg Nebulization BID    budesonide  0.5 mg Nebulization BID    albuterol  2.5 mg Nebulization Q4H WA    [Held by provider] metoprolol  5 mg IntraVENous Q6H    [Held by provider] rosuvastatin  10 mg Oral Nightly    [Held by provider] levothyroxine  50 mcg Oral Daily       Physical Exam:  General Appearance: Patient is intubated, and is toxic appearing  HEENT: ETT in place, OG tube  Neck: Lips, mucosa, and tongue normal.  Supple, symmetrical, trachea midline, no adenopathy;thyroid:  no enlargement/tenderness/nodules or JVD. Lung: Bilateral crackles and rhonchi, right chest tube in place x3, left chest tube x1-no air leaks  Heart: Tachycardic  Abdomen: Soft, NT, ND. BS present x 4 quadrants. No bruit or organomegaly. Extremities: +1 pitting edema  Musculokeletal: No joint swelling, no muscle tenderness. ROM normal in all joints of extremities. Neurologic: Mental status: Intubated    Pertinent/ New Labs and Imaging Studies     Imaging Personally Reviewed:  Noncontrast CT chest 12/17/2022:  Impression   Large right pleural effusion of mixed density to suggest complex effusion. There is a pigtail catheter seen in the pleural space posteriorly and   inferiorly. Small to moderate size left pleural effusion with consolidative   infiltrate throughout the left upper and lower lung field and aerated   portions revealing nodular ground-glass opacity to suggest an infectious   process. Nodular ground-glass opacity inferiorly within the aerated right   upper and lower lung field concerning for an infectious process as well. There is less consolidative infiltrate identified within the right lower lobe. Chest x-ray 12/27/2022:    FINDINGS:   Bibasilar atelectasis/infiltrate are unchanged. There are bilateral chest   tubes. There is no definite pneumothorax.   Support lines are appropriate. There is a small right effusion and PleurX catheter. Impression   No interval change       KUB 12/27/2022:    Impression   NG tube again identified within the stomach, when compared to CT scan of the   abdomen pelvis performed 12/25/2022. There is oral contrast within the   stomach, however, there is oral contrast around the GE junction, suspicious   for focal perforation at the GE junction. Oral contrast is also noted   tracking along the inferior right-sided chest tube, indicating extravasation   of contrast into the pleural space. Chest x-ray 12/29/2022:    FINDINGS:   Endotracheal tube is 3 cm above the roseann. Redemonstration of bilateral   chest tubes. No obvious pneumothorax. Left-sided central venous catheter   present with distal tip at location of SVC or right atrial/caval junction. NG tube courses below the diaphragm. Multiple telemetry leads overlie the   chest.  Redemonstration of bibasilar opacities silhouetting hemidiaphragms. Impression   Stable chest radiograph with bibasilar opacities related to atelectasis or   pneumonia notable on the right. ECHO 12/19/2022:   Summary   Technically difficult study - limited visualization. Micro-bubble contrast injected to enhance left ventricular visualization. Normal left ventricular size and systolic function. Ejection fraction is visually estimated at 55%. Indeterminate diastolic function. No regional wall motion abnormalities seen. Mild left ventricular concentric hypertrophy noted. Moderately dilated right ventricle with reduced function. Moderate tricuspid regurgitation.       Labs:  Lab Results   Component Value Date/Time    WBC 15.0 12/29/2022 05:39 AM    HGB 8.9 12/29/2022 05:39 AM    HCT 25.2 12/29/2022 05:39 AM    HCT 29.0 12/20/2022 01:31 PM    MCV 84.8 12/29/2022 05:39 AM    MCH 30.0 12/29/2022 05:39 AM    MCHC 35.3 12/29/2022 05:39 AM    RDW 15.6 12/29/2022 05:39 AM    PLT 67 12/29/2022 05:39 AM    MPV 14.0 12/29/2022 05:39 AM     Lab Results   Component Value Date/Time     12/29/2022 11:42 AM    K 4.1 12/29/2022 11:42 AM    K 4.7 12/21/2022 09:23 AM     12/29/2022 11:42 AM    CO2 23 12/29/2022 11:42 AM    BUN 32 12/29/2022 11:42 AM    CREATININE 1.0 12/29/2022 11:42 AM    LABALBU 2.2 12/29/2022 11:42 AM    LABALBU 4.3 01/24/2011 01:15 PM    CALCIUM 8.1 12/29/2022 11:42 AM    GFRAA >60 07/22/2022 01:42 PM    LABGLOM 58 12/29/2022 11:42 AM     Lab Results   Component Value Date/Time    PROTIME 13.6 12/28/2022 11:37 AM    INR 1.3 12/28/2022 11:37 AM     No results for input(s): PROBNP in the last 72 hours. No results for input(s): PROCAL in the last 72 hours. This SmartLink has not been configured with any valid records. Micro:  No results for input(s): CULTRESP in the last 72 hours. No results for input(s): LABGRAM in the last 72 hours. No results for input(s): LEGUR in the last 72 hours. No results for input(s): STREPNEUMAGU in the last 72 hours. No results for input(s): LP1UAG in the last 72 hours.   Respiratory cultures    Oral Pharyngeal Hilda absent Abnormal     Smear, Respiratory Polymorphonuclear leukocytes not seen   Epithelial cells not seen   Moderate yeast   Moderate gram positive rods Diphtheroid like   Few Gram negative rods    Organism Enterobacter cloacae complex Abnormal     CULTURE, RESPIRATORY Moderate growth    Organism Candida albicans Abnormal     CULTURE, RESPIRATORY Heavy growth         Latest Reference Range & Units Most Recent   (1,3)-Beta-D-Glucan (Fungitell) Interpretation Negative  Positive !  12/19/22 04:02   !: Data is abnormal      ABGs 12/29/2022:     Latest Reference Range & Units 12/29/22 04:36   Source:  Blood Arterial   pH, Blood Gas 7.350 - 7.450  7.453 (H)   PCO2 35.0 - 45.0 mmHg 34.1 (L)   pO2 75.0 - 100.0 mmHg 127.2 (H)   HCO3 22.0 - 26.0 mmol/L 23.3   Base Excess -3.0 - 3.0 mmol/L -0.3   O2 Sat 92.0 - 98.5 % 98.2   THB,THB 11.5 - 16.5 g/dL 9.8 (L)   O2Hb 94.0 - 97.0 % 97.6 (H)   Carboxy-Hgb 0.0 - 1.5 % 0.4   METHB,METHB 0.0 - 1.5 % 0.2   HHb 0.0 - 5.0 % 1.8   AaDO2 mmHg 108.8   RI(T)  0.86   FIO2 % 40.0   PO2/FIO2 mmHg/% 3.18   (H): Data is abnormally high  (L): Data is abnormally low     Assessment:    Acute hypoxic respiratory failure - requiring intubation 12/19/22  Acute Respiratory Distress Syndrome  S/p RRT 12/17/22 for respiratory distress and increasing oxygen demands  R loculated pleural effusion s/p IR chest tube 12/17/22 with minimal drainage, s/p R VATS turned partial open thoracotomy 12/20/22 with insertion of chest tubes x 3  S/p bronchoscopy-friable mucosa 12/20/22  Pneumonia, Hospital acquired-Enterobacter cloacae-Invanz started 12/19/22  Massive hiatal hernia s/p repair 12/9 at [facility]  Esophageal perforation secondary to hiatal hernia repair  Acute kidney injury  L chest tube placement 12/21/22  CVVHD initiated 12/21/22  Positive fungitell-eraxis started 12/21/22      Plan:   Continue mechanical ventilation, wean FiO2 as able  Aggressive bronchopulmonary hygiene  Fluid removal with CVVHD initiated per Nephrology  TPN- management per surgery  Chest tube x 4 management per surgery  For EGD with stent for esophageal perforation with Dr. Eunice Montesinos today  Antimicrobials per ID-Invanz started 12/19/22, Eraxis started 12/21/22  Rest per ICU team  Prognosis guarded    This plan of care was reviewed in collaboration with Dr. Tiffanie Mack. Electronically signed by KAR Lang CNP on 12/29/2022 at 3:39 PM      I personally saw, examined, and cared for the patient. Labs, medications, radiographs reviewed. I agree with history exam and plans detailed in NP note. Remains critically ill with guarded prognosis. Continue vent support. EGD has been planned. Reviewed with critical care. Pulmonary will continue to follow along with you.     Fadi Burr, DO

## 2022-12-29 NOTE — PLAN OF CARE
Problem: Safety - Adult  Goal: Free from fall injury  12/29/2022 0744 by Miki Kuo RN  Outcome: Progressing     Problem: Pain  Goal: Verbalizes/displays adequate comfort level or baseline comfort level  12/29/2022 0744 by Miki Kuo RN  Outcome: Progressing     Problem: Nutrition Deficit:  Goal: Optimize nutritional status  12/29/2022 0744 by Miki Kuo RN  Outcome: Progressing     Problem: Respiratory - Adult  Goal: Achieves optimal ventilation and oxygenation  12/29/2022 0744 by Miki Kuo RN  Outcome: Progressing     Problem: Cardiovascular - Adult  Goal: Maintains optimal cardiac output and hemodynamic stability  Outcome: Progressing     Problem: Hematologic - Adult  Goal: Maintains hematologic stability  Outcome: Progressing

## 2022-12-29 NOTE — PLAN OF CARE
Problem: Respiratory - Adult  Goal: Achieves optimal ventilation and oxygenation  12/29/2022 1011 by Marcie Painting RCP  Outcome: Progressing  12/29/2022 0744 by Kurt Kebede RN  Outcome: Progressing  12/29/2022 0744 by Kurt Kebede RN  Outcome: Progressing  12/28/2022 2114 by Asmita Peña RN  Outcome: Progressing

## 2022-12-29 NOTE — PLAN OF CARE
Problem: Chronic Conditions and Co-morbidities  Goal: Patient's chronic conditions and co-morbidity symptoms are monitored and maintained or improved  Outcome: Progressing     Problem: Discharge Planning  Goal: Discharge to home or other facility with appropriate resources  Outcome: Progressing     Problem: Safety - Adult  Goal: Free from fall injury  Outcome: Progressing     Problem: Pain  Goal: Verbalizes/displays adequate comfort level or baseline comfort level  Outcome: Progressing     Problem: Nutrition Deficit:  Goal: Optimize nutritional status  Outcome: Progressing     Problem: Skin/Tissue Integrity  Goal: Absence of new skin breakdown  Description: 1. Monitor for areas of redness and/or skin breakdown  2. Assess vascular access sites hourly  3. Every 4-6 hours minimum:  Change oxygen saturation probe site  4. Every 4-6 hours:  If on nasal continuous positive airway pressure, respiratory therapy assess nares and determine need for appliance change or resting period.   Outcome: Progressing     Problem: Respiratory - Adult  Goal: Achieves optimal ventilation and oxygenation  Outcome: Progressing

## 2022-12-29 NOTE — PROGRESS NOTES
GENERAL SURGERY  DAILY PROGRESS NOTE  12/29/2022    CHIEF COMPLAINT:  Chief Complaint   Patient presents with    Shortness of Breath     Sent from [facility] [place] for SOB patient went for hernia repair patient on 6L NC on arrival       SUBJECTIVE:  Patient intubated and sedated. No acute events overnight. OBJECTIVE:  BP (!) 109/47   Pulse 99   Temp 98.4 °F (36.9 °C) (Esophageal)   Resp 23   Ht 5' 2\" (1.575 m)   Wt 122 lb 2.2 oz (55.4 kg)   SpO2 100%   BMI 22.34 kg/m²     GENERAL:  NAD. A&Ox3. LUNGS:  No increased work of breathing. CARDIOVASCULAR: RR  ABDOMEN:  Soft, non-distended. No guarding, rigidity, rebound.     ASSESSMENT/PLAN:  65 y.o. female with respiratory failure and complex right sided effusion s/p hiatal hernia repair, s/p VATS and mini-thoracotomy and bronchoscopy    Continue chest tubes to suction   Continue diet: TPN   Esophageal stent today 12/29      Aston Brandon

## 2022-12-29 NOTE — PROGRESS NOTES
[facility]   Department of Internal Medicine   MICU Progress Note    Patient:  Mackenzie Kemp 77 y.o. female   MRN: [id number]       Date of Service: 2022    Allergy: Benadryl [diphenhydramine] and Ibuprofen  CC hypoxia   Subjective   Intubated/sedated on vent  Sedation on hold  No improvement in mentation -- will get CT chest   Plan for esophageal stenting     Temps of 98.4   CRRT in progress with no net removal   No overnight event  Unable to obtain ROS   Family updated at bedside   Objective     TEMPERATURE:  Current - Temp: 98.4 °F (36.9 °C); Max - Temp  Av.2 °F (36.2 °C)  Min: 95.9 °F (35.5 °C)  Max: 98.4 °F (36.9 °C)    RESPIRATIONS RANGE: Resp  Av.8  Min: 20  Max: 32    PULSE RANGE: Pulse  Av.8  Min: 91  Max: 124    BLOOD PRESSURE RANGE:  Systolic (24hrs), Av , Min:113 , Max:140   ; Diastolic (24hrs), Av, Min:48, Max:60      PULSE OXIMETRY RANGE: SpO2  Av.8 %  Min: 98 %  Max: 100 %    I & O - 24hr:    Intake/Output Summary (Last 24 hours) at 2022 0851  Last data filed at 2022 0800  Gross per 24 hour   Intake 3683 ml   Output 4011 ml   Net -328 ml     I/O last 3 completed shifts:  In: 5293.9 [I.V.:4709.4; Blood:295; IV Piggyback:142.7]  Out: 5486 [Drains:20; Chest Tube:40] I/O this shift:  In: 115 [I.V.:115]  Out: 111    Weight change: -1 oz (-0.029 kg)    Physical Exam:  General Appearance: intubated/sedated on vent, does not look in distress. Anasarca noted   HEENT:  Head: Normocephalic, no lesions, without obvious abnormality.  Eye: Normal external eye, conjunctiva, lids cornea, FILEMON.  Nose: Normal external nose, mucus membranes and septum.  Neck / Thyroid: Supple, no masses, nodes, nodules or enlargement.  Neck: no adenopathy, no carotid bruit, no JVD, supple, symmetrical, trachea midline, and thyroid not enlarged, symmetric, no tenderness/mass/nodules  Lung: Rhonchi throughout lung field, no wheezing noted. 3 chest tube    Heart: regular  rate and rhythm, S1, S2 normal, no murmur, click, rub or gallop  Abdomen: soft, non-tender; bowel sounds normal; no masses,  no organomegaly incision noted, intact. Extremities:  extremities normal, atraumatic, no cyanosis or edema 3+ pitting edema bilaterally. Musculokeletal: No joint swelling, no muscle tenderness. ROM normal in all joints of extremities.    Neurologic: Mental status: intubated/sedated on vent, limited neuro exam       Medications     Continuous Infusions:   PN-Adult  3 IN 1 Central Line (Custom)      PN-Adult  3 IN 1 Central Line (Custom) 50 mL/hr at 12/29/22 2248    sodium chloride      sodium chloride 10 mL/hr at 12/29/22 0611    norepinephrine Stopped (12/29/22 0301)    prismaSATE BGK 4/0/1.2 2,000 mL/hr at 12/28/22 0659    sodium chloride 100 mL/hr at 12/29/22 0227    calcium chloride CRRT infusion 8,000 mg (12/28/22 1816)    dextrose      sodium chloride       Scheduled Meds:   phosphorus  500 mg Oral Once    magnesium sulfate  1,000 mg IntraVENous Once    [Held by provider] midodrine  15 mg Oral TID WC    insulin lispro  0-16 Units SubCUTAneous Q4H    lidocaine PF  5 mL IntraDERmal Once    sodium chloride flush  5-40 mL IntraVENous 2 times per day    heparin flush  3 mL IntraVENous 2 times per day    ertapenem (INVanz) IVPB  1,000 mg IntraVENous Q24H    [Held by provider] Vitamin D  1,000 Units Oral Daily    heparin (porcine)  5,000 Units SubCUTAneous Q8H    artificial tears   Both Eyes Q4H    anidulafungin  100 mg IntraVENous Q24H    pantoprazole (PROTONIX) 40 mg injection  40 mg IntraVENous Q12H    sodium chloride flush  5-40 mL IntraVENous 2 times per day    heparin flush  3 mL IntraVENous 2 times per day    Arformoterol Tartrate  15 mcg Nebulization BID    budesonide  0.5 mg Nebulization BID    albuterol  2.5 mg Nebulization Q4H WA    [Held by provider] metoprolol  5 mg IntraVENous Q6H    [Held by provider] rosuvastatin  10 mg Oral Nightly    [Held by provider] levothyroxine  50 mcg Oral Daily     PRN Meds: sodium chloride, fentanNYL, sodium chloride flush, sodium chloride, heparin flush, potassium chloride, magnesium sulfate, calcium gluconate **OR** calcium gluconate **OR** calcium gluconate **OR** calcium gluconate, sodium phosphate IVPB **OR** sodium phosphate IVPB **OR** sodium phosphate IVPB **OR** sodium phosphate IVPB, glucose, dextrose bolus **OR** dextrose bolus, glucagon (rDNA), dextrose, sodium chloride flush, sodium chloride, heparin flush, ondansetron **OR** ondansetron, [DISCONTINUED] acetaminophen **OR** acetaminophen, ipratropium-albuterol  Nutrition:   TPN    Labs and Imaging Studies     CBC:   Recent Labs     12/28/22  0450 12/28/22  1137 12/28/22 1825 12/29/22  0539   WBC 20.0* 17.0*  --  15.0*   RBC 2.50* 2.36*  --  2.97*   HGB 7.4* 7.0* 8.6* 8.9*   HCT 21.5* 20.8* 25.3* 25.2*   MCV 86.0 88.1  --  84.8   MCH 29.6 29.7  --  30.0   MCHC 34.4 33.7  --  35.3*   RDW 15.8* 15.6*  --  15.6*   * 89*  --  67*   MPV 14.2* NOT CALC  --  14.0*       BMP:    Recent Labs     12/28/22 1825 12/28/22 2336 12/29/22  0539    133 138   K 3.7 4.1 4.1    103 107   CO2 21* 21* 24   BUN 36* 33* 32*   CREATININE 1.0 1.0 1.0   GLUCOSE 226* 258* 234*   CALCIUM 8.3* 8.2* 8.3*   PROT 5.1* 5.2* 5.3*   LABALBU 2.2* 2.3* 2.4*   BILITOT 1.5* 1.7* 1.6*   ALKPHOS 147* 139* 137*   AST 35* 40* 43*   ALT 40* 40* 38*       LIVER PROFILE:   Recent Labs     12/28/22 1825 12/28/22 2336 12/29/22  0539   AST 35* 40* 43*   ALT 40* 40* 38*   BILITOT 1.5* 1.7* 1.6*   ALKPHOS 147* 139* 137*       PT/INR:   Recent Labs     12/28/22  1137   PROTIME 13.6*   INR 1.3         APTT:   No results for input(s): APTT in the last 72 hours.       Fasting Lipid Panel:    Lab Results   Component Value Date/Time    CHOL 45 12/19/2022 12:00 PM    TRIG 143 12/28/2022 04:50 AM    HDL 12 12/19/2022 12:00 PM       Cardiac Enzymes:    Lab Results   Component Value Date    CKTOTAL 59 01/13/2020    TROPONINI <0.01 01/13/2020       Notable Cultures:      Blood cultures   Blood Culture, Routine   Date Value Ref Range Status   12/17/2022 5 Days no growth  Final     Respiratory cultures No results found for: RESPCULTURE   Gram Stain Result   Date Value Ref Range Status   12/20/2022 Refer to ordered Gram stain for results  Final     Urine   Urine Culture, Routine   Date Value Ref Range Status   07/22/2022 <10,000 CFU/mL  Mixed gram positive organisms    Final     Legionella No results found for: LABLEGI  C Diff PCR No results found for: CDIFPCR  Wound culture/abscess: No results for input(s): WNDABS in the last 72 hours. Tip culture:No results for input(s): CXCATHTIP in the last 72 hours. Antibiotic  Days  Day started   Ertapenem      Anidulafungin                       Oxygen:     Vent Information  Ventilator ID: [id number]  Equipment Changed: Humidification  Vent Mode: AC/VC    Additional Respiratory Assessments  Heart Rate: (!) 112  Resp: 29  SpO2: 100 %  Position: Semi-Bach's  Humidification Source: Heated wire  Humidification Temp: 37  Circuit Condensation: Drained  Airway Type: ET  Airway Size: 7.5  Cuff Pressure (cm H2O): 29 cm H2O  Skin Barrier Applied: Yes     Nasal cannula L/min     Face mask %     Reservoirs mask %       ABG     PH  7.45   PCO2  34   PO2  127   HCO3  23   Sat%  98   FIO2     DATES 12/29/22       Lines:  Site  Day  Date inserted     TLC   Rfemoral    12/17/22      PICC              Arterial line   Left brachial    12/20/22     Peripheral line              HD cath Left IJ    12/21/22     [REMOVED] Urinary Catheter 12/15/22 Peterson-Output (mL): 0 mL    Imaging Studies:    XR CHEST PORTABLE   Final Result   Stable chest radiograph with pulmonary vascular congestion and bibasilar   opacities related to atelectasis or pneumonia. XR ABDOMEN (KUB) (SINGLE AP VIEW)   Final Result   NG tube again identified within the stomach, when compared to CT scan of the   abdomen pelvis performed 12/25/2022.   There is oral contrast within the   stomach, however, there is oral contrast around the GE junction, suspicious   for focal perforation at the GE junction. Oral contrast is also noted   tracking along the inferior right-sided chest tube, indicating extravasation   of contrast into the pleural space. The findings were discussed with Cheri Young, the patient's bedside nurse, at   11:34 a.m.         XR CHEST PORTABLE   Final Result   No interval change         XR CHEST PORTABLE   Final Result   No significant interval change, when compared to the previous study performed   1 day earlier. CT HEAD WO CONTRAST   Final Result   1. No acute intracranial abnormality. 2.  Chronic right maxillary ethmoid and frontal sinusitis. Chronic sphenoid   sinusitis. Fluid opacification of both mastoid air cells. 3.  No CT evidence of large territory infarct. MCA vessels appear normal in   density. CT ABDOMEN PELVIS WO CONTRAST Additional Contrast? None   Final Result   1. No evidence of bowel obstruction or significant ileus. 2.  Gastrostomy tube is located in the stomach. Oral contrast material is   noted in the dependent portion of the gastric cardia. No abnormal bowel   dilatation. Oral contrast material noted in the right and left colon without   colonic distension. 3.  Mild abdominal ascites. 4.  Patchy asymmetric gland glass opacities in the lung bases with some areas   of confluent consolidation in the right lower lobe. Trace pleural effusions. Bilateral chest tubes. 5.  Very small left pneumothorax. XR CHEST PORTABLE   Final Result   1. Asymmetric bilateral airspace opacities, not significantly changed. 2.  No of visible pneumothorax. 3.  Support devices appear stable.          XR CHEST PORTABLE   Final Result   Suggestion of some partial resolution of bilateral basilar infiltrates         XR CHEST PORTABLE   Final Result   No interval change         XR CHEST PORTABLE   Final Result   Stable appearance of the chest compared to 12/21/2022. XR CHEST PORTABLE   Final Result   Adequately positioned left internal jugular hemodialysis catheter. The   remainder of the exam including additional lines and tubes are stable. XR CHEST PORTABLE   Final Result   Interval left chest tube placement, no definite pneumothorax. Bilateral lung infiltrates and pleural effusions, not significantly changed. These infiltrates may be due to pulmonary edema, pneumonia and/or ARDS. Stable borderline enlargement of the cardiac silhouette. XR CHEST PORTABLE   Final Result   Extensive bilateral airspace disease with interval progression on the right. XR CHEST PORTABLE   Final Result   Postoperative changes with the diffuse bilateral infiltrates and effusions   with improvement in the right side likely CHF/edema. Pneumonia is less   likely. Tubes and catheters as noted. XR CHEST PORTABLE   Final Result   Low lying endotracheal tube which needs withdrawn approximately 2 cm. XR CHEST PORTABLE   Final Result   Enteric tube tip in the body of the stomach. The roseann is not well seen. ET tube tip most likely within 1.5 cm of the   roseann. XR CHEST PORTABLE   Final Result   Pleural effusions with adjacent infiltrate and or atelectasis showing   slightly improved aeration on the left and slightly poor aeration on the   right. XR CHEST PORTABLE   Final Result   Interval improvement in the opacification of the left hemithorax, with   aeration in the left upper lung field when compared to the previous study   performed 1 day earlier. No other significant interval change. XR CHEST PORTABLE   Final Result   New right internal jugular central venous catheter terminating near the   SVC-right atrium junction. No post-procedure pneumothorax.   Worsening   opacification of the left hemithorax and stable findings on the right.         CT CHEST WO CONTRAST   Final Result   Large right pleural effusion of mixed density to suggest complex effusion. There is a pigtail catheter seen in the pleural space posteriorly and   inferiorly. Small to moderate size left pleural effusion with consolidative   infiltrate throughout the left upper and lower lung field and aerated   portions revealing nodular ground-glass opacity to suggest an infectious   process. Nodular ground-glass opacity inferiorly within the aerated right   upper and lower lung field concerning for an infectious process as well. There is less consolidative infiltrate identified within the right lower lobe. XR CHEST PORTABLE   Final Result   1. No changes position of the right-sided chest tube. No right-sided   pneumothorax. The most of the right-sided pleural effusion has been drained,   if present to be discrete or small. 2.  Moderate left-sided pleural effusion. If quantification of left-sided   pleural effusion will be needed for clinical management bedside ultrasound or   left lateral decubitus views of the chest can be helpful. 3.  No pneumothorax on the right or on the left. 4.  Large size diaphragmatic across the midline. XR CHEST PORTABLE   Final Result   1. Increased parenchymal density projecting over the left lower lung   concerning for atelectasis and/or consolidation      2. No signs of right effusion      3. Parenchymal density projecting over the right lower lung concerning for   atelectasis         IR GUIDED PERC PLEURAL DRAIN W CATH INSERT   Final Result   1. Successful ultrasound-guided right thoracentesis. 2.  Successful placement of an 8 Croat[place] pleural drain         XR CHEST PORTABLE   Final Result   1. Right-sided pigtail catheter in the right base. 2.  No conspicuous right-sided pleural effusions identified. No right-sided   pneumothorax. 3.  Cannot exclude a small left-sided pleural effusion. 4.  Large size diaphragmatic hernia across the midline causing compression   atelectasis in the medial aspect of both lungs. FL ESOPHAGRAM   Final Result   1. No evidence of esophageal perforation or leak. 2. Decreased peristalsis. No evidence of esophageal mass or obstructing   lesion. 3. Small sliding hiatus hernia. No evidence of GE reflux. The gastric   foreign disc does appear constricted as it passes through the esophageal   hiatus. US CHEST INCLUDING MEDIASTINUM   Final Result   Probable complex loculated right pleural effusion. Simple left pleural effusion. CTA PULMONARY W CONTRAST   Final Result   No evidence of pulmonary embolism. Bilateral lower lobe pulmonary consolidations and pleural effusions seen. Deviation of the mediastinum to the left is visualized. Mild soft tissue prominence visualized surrounding the esophagus with   narrowing at the gastroesophageal junction but appears to be due to external   compression. Cannot rule left soft tissue mass at this location. Wall thickening visualized in the gastric outlet with mild prominence of the   stomach seen, this could be artifactual.      Distended gallbladder is seen. Degenerative bone changes, postoperative changes and multilevel vertebral   augmentation seen. No evidence of acute abdominal/pelvic pathology. CT ABDOMEN PELVIS W IV CONTRAST Additional Contrast? Oral   Final Result   No evidence of pulmonary embolism. Bilateral lower lobe pulmonary consolidations and pleural effusions seen. Deviation of the mediastinum to the left is visualized. Mild soft tissue prominence visualized surrounding the esophagus with   narrowing at the gastroesophageal junction but appears to be due to external   compression. Cannot rule left soft tissue mass at this location.       Wall thickening visualized in the gastric outlet with mild prominence of the   stomach seen, this could be artifactual.      Distended gallbladder is seen. Degenerative bone changes, postoperative changes and multilevel vertebral   augmentation seen. No evidence of acute abdominal/pelvic pathology. XR CHEST PORTABLE   Final Result   1. Consolidation seen within the left lung base with blunting the left   costophrenic angle which could represent pneumonia or atelectasis   2. A trace left pleural effusion cannot be excluded   3. Large right diaphragmatic hernia/hiatal hernia which was noted on the   patient's previous CT of the chest of 07/22/2022. XR CHEST PORTABLE    (Results Pending)   XR CHEST PORTABLE    (Results Pending)        APRN- CNP Assessment and PLan   In summary 65 y.o. female with significant past medical history of hypothyroidism, breast cancer, TIA, hiatal hernia underwent hiatal hernia repair on 12/9/22 and developed acute respiratory distress and was transfer to [facility] [place] on 12/14/22 for further management. Patient was seen by nephrology, pulmonology, general surgery. Patient noted to have worsening hypoxia requiring 15 L of oxygen, A. fib RVR therefore transferred to MICU and critical care was consulted. Assessment:  Acute hypoxemic respiratory failure secondary to aspiration pneumonia/pleural effusion/bibasilar consolidation now on ventilation (12/19/22)   ARDS   Bilateral pleural effusion right greater than left status post right thoracentesis with pigtail chest tube placement on (12/16/22 IR)   Septic shock  2/2 PNA/loculated pleural effusion  Loculated pleural effusion s/p VATS and Mini-thoracotomy with open drainage of fluid collection 12/20   Esophageal Leak ==> plan for EGD 12/30/2022.   Thrombocytopenia   AFIB RVR  PAWAN secondary to dehydration  [place] ptosis leukocytosis with left shift  Acute anemia  History of massive hiatal hernia  status post repair on 12/9/2022  History of TIA  History of breast cancer  History of hypothyroidism  History of lumbar compression fracture with history of PLIF T12-L2  History of anxiety     Plan:  -ventilator (day #11), titrate FiO2 keep SPO2 goal 92%  -Off sedation, no improvement in mentation, will check CT of head. - off levophed, titrate to keep MAP >65mmHg   - loculated pleural effusion s/p VATS and mini thoracotomy with open drainage of fluid collection (12/20/22) s/p 2 chest tube ;-  effusion on left side s/p Left chest tube (12/21/22)   -3 chest tube with minimal drainage, noted to have tube feeds through Right chest tube. General surgery resident administer methylene blue and gastrografin contrast with x-ray, positive for Esophageal Leak ==> Plan for esophageal stenting by Dr. Antonella Zaragoza with EGD on 12/30/2022  -Continue scheduled bronchodilators Brovana and Pulmicort in addition to the DuoNebs, mucomyst   -Echo showed EF of 55%, indeterminate diastolic function, moderate dilated right ventricle with reduced function, moderate tricuspid regurgitation. Echo in January 2021 showed EF of 60 to 65%,   -Procalcitonin 5.70==> 1.57 today; RVP/COVID-19 PCR negative, MRSA nares negative, blood culture (NGTD) respiratory culture (Enterobacter Colace complex, Candida albicans) urine antigen negative. Body fluid hematoma==> enterobacter, candida albicans; Respiratory culture (heavy growth Enterobacter)  - Consult to ID, input appreciated. - Ertapenem and anidulafungin   -General surgery surgery consulted, input appreciated. -Strict NPO with no medications, hold all oral medication and TUBE FEEDS   -PAWAN, nephrology following, input appreciated- low urine output, metabolic acidosis, will place temp HD catheter and may need CRRT with no net removal.   -Strict intake and output  -Labs and chest x-ray in a.m.  -F/E/N KVO/ replace lytes/ Npo ; TPN   - DVT/GI scds/lovenox/protonix  - Code status: DNR- limited No CPR, ok to everything.        KAR Henning-CNP   Critical Care     Discussed case with Attending Physician: Luis Aguilera

## 2022-12-29 NOTE — PROGRESS NOTES
Department of Internal Medicine  Nephrology Progress Note      Events reviewed. SUBJECTIVE: We are following Jayde Donell for PAWAN. Presently intubated.     PHYSICAL EXAM:      Vitals:    VITALS:  BP (!) 140/60   Pulse (!) 121   Temp 98.4 °F (36.9 °C) (Esophageal)   Resp 24   Ht 5' 2\" (1.575 m)   Wt 122 lb 2.2 oz (55.4 kg)   SpO2 98%   BMI 22.34 kg/m²   24HR INTAKE/OUTPUT:    Intake/Output Summary (Last 24 hours) at 12/29/2022 5620  Last data filed at 12/29/2022 0900  Gross per 24 hour   Intake 3567 ml   Output 3902 ml   Net -335 ml         Constitutional: Patient intubated, sedated  HEENT: Normocephalic, atraumatic, PERRLA, endotracheal tube in place  Respiratory: Diminished right lung base  Cardiovascular/Edema: RRR, normal S1, normal S2, no edema  Gastrointestinal: Soft, not distended, nontender  Neurologic: Patient is sedated  Skin: Pale, dry, no lesions, no rash, + edema     Scheduled Meds:   phosphorus  500 mg Oral Once    magnesium sulfate  1,000 mg IntraVENous Once    [Held by provider] midodrine  15 mg Oral TID WC    insulin lispro  0-16 Units SubCUTAneous Q4H    lidocaine PF  5 mL IntraDERmal Once    sodium chloride flush  5-40 mL IntraVENous 2 times per day    heparin flush  3 mL IntraVENous 2 times per day    ertapenem (INVanz) IVPB  1,000 mg IntraVENous Q24H    [Held by provider] Vitamin D  1,000 Units Oral Daily    heparin (porcine)  5,000 Units SubCUTAneous Q8H    artificial tears   Both Eyes Q4H    anidulafungin  100 mg IntraVENous Q24H    pantoprazole (PROTONIX) 40 mg injection  40 mg IntraVENous Q12H    sodium chloride flush  5-40 mL IntraVENous 2 times per day    heparin flush  3 mL IntraVENous 2 times per day    Arformoterol Tartrate  15 mcg Nebulization BID    budesonide  0.5 mg Nebulization BID    albuterol  2.5 mg Nebulization Q4H WA    [Held by provider] metoprolol  5 mg IntraVENous Q6H    [Held by provider] rosuvastatin  10 mg Oral Nightly    [Held by provider] levothyroxine 50 mcg Oral Daily     Continuous Infusions:   PN-Adult  3 IN 1 Central Line (Custom)      PN-Adult  3 IN 1 Central Line (Custom) 50 mL/hr at 12/29/22 8820    sodium chloride      sodium chloride 10 mL/hr at 12/29/22 0611    norepinephrine Stopped (12/29/22 0301)    prismaSATE BGK 4/0/1.2 2,000 mL/hr at 12/28/22 0659    sodium chloride 100 mL/hr at 12/29/22 0227    calcium chloride CRRT infusion 8,000 mg (12/28/22 1816)    dextrose      sodium chloride       PRN Meds:.sodium chloride, fentanNYL, sodium chloride flush, sodium chloride, heparin flush, potassium chloride, magnesium sulfate, calcium gluconate **OR** calcium gluconate **OR** calcium gluconate **OR** calcium gluconate, sodium phosphate IVPB **OR** sodium phosphate IVPB **OR** sodium phosphate IVPB **OR** sodium phosphate IVPB, glucose, dextrose bolus **OR** dextrose bolus, glucagon (rDNA), dextrose, sodium chloride flush, sodium chloride, heparin flush, ondansetron **OR** ondansetron, [DISCONTINUED] acetaminophen **OR** acetaminophen, ipratropium-albuterol    DATA:    CBC with Differential:    Lab Results   Component Value Date/Time    WBC 15.0 12/29/2022 05:39 AM    RBC 2.97 12/29/2022 05:39 AM    HGB 8.9 12/29/2022 05:39 AM    HCT 25.2 12/29/2022 05:39 AM    HCT 29.0 12/20/2022 01:31 PM    PLT 67 12/29/2022 05:39 AM    MCV 84.8 12/29/2022 05:39 AM    MCH 30.0 12/29/2022 05:39 AM    MCHC 35.3 12/29/2022 05:39 AM    RDW 15.6 12/29/2022 05:39 AM    NRBC 0.9 12/29/2022 05:39 AM    LYMPHOPCT 0.9 12/29/2022 05:39 AM    MONOPCT 1.7 12/29/2022 05:39 AM    MYELOPCT 0.9 12/23/2022 06:05 AM    BASOPCT 0.3 12/29/2022 05:39 AM    MONOSABS 0.30 12/29/2022 05:39 AM    LYMPHSABS 0.15 12/29/2022 05:39 AM    EOSABS 0.00 12/29/2022 05:39 AM    BASOSABS 0.00 12/29/2022 05:39 AM     CMP:    Lab Results   Component Value Date/Time     12/29/2022 05:39 AM    K 4.1 12/29/2022 05:39 AM    K 4.7 12/21/2022 09:23 AM     12/29/2022 05:39 AM    CO2 24 12/29/2022 05:39 AM BUN 32 12/29/2022 05:39 AM    CREATININE 1.0 12/29/2022 05:39 AM    GFRAA >60 07/22/2022 01:42 PM    LABGLOM 58 12/29/2022 05:39 AM    GLUCOSE 234 12/29/2022 05:39 AM    PROT 5.3 12/29/2022 05:39 AM    LABALBU 2.4 12/29/2022 05:39 AM    LABALBU 4.3 01/24/2011 01:15 PM    CALCIUM 8.3 12/29/2022 05:39 AM    BILITOT 1.6 12/29/2022 05:39 AM    ALKPHOS 137 12/29/2022 05:39 AM    AST 43 12/29/2022 05:39 AM    ALT 38 12/29/2022 05:39 AM     Magnesium:    Lab Results   Component Value Date/Time    MG 2.0 12/29/2022 05:39 AM     Phosphorus:    Lab Results   Component Value Date/Time    PHOS 2.5 12/29/2022 05:39 AM     Radiology Review:      CT Chest and Abd/Pelvis 12/14/22      No evidence of pulmonary embolism. Bilateral lower lobe pulmonary consolidations and pleural effusions seen. Deviation of the mediastinum to the left is visualized. Mild soft tissue prominence visualized surrounding the esophagus with   narrowing at the gastroesophageal junction but appears to be due to external   compression. Cannot rule left soft tissue mass at this location. Wall thickening visualized in the gastric outlet with mild prominence of the   stomach seen, this could be artifactual.     Distended gallbladder is seen. Degenerative bone changes, postoperative changes and multilevel vertebral   augmentation seen. No evidence of acute abdominal/pelvic pathology. US Chest 12/15/22       Probable complex loculated right pleural effusion. Simple left pleural effusion. BRIEF SUMMARY OF INITIAL CONSULT:    Briefly, Ivett Butt is a 79-year-old female, past medical history significant for hypothyroidism, hiatal hernia and TIA, who presented to the ED on 12/14/2022 from [facility], patient recently underwent a hiatal hernia repair on 12/9/22, developed acute respiratory distress and was transferred to [facility] on 12/14/2022 for further management and treatment.   Initial ED work-up revealed creatinine level of 1.7, patient then underwent two seperate IV contrast studies CTA and creatinine subsequently increased to 2.5, on 12/15/2022 creatinine level increased to 3.5 mg/dL, reason for this consultation. Review of records from [facility] reveals that baseline creatinine appears to be between 0.9 to 1.0 mg/dL, creatinine was 1.0 on 12/13/2022, patient was started on lisinopril during hospitalization, was given multiple doses IV furosemide with poor oral intake and vomiting. Problems resolved:    HAGMA, 2/2 uremia and starvation ketoacidosis  Hemodynamic shock, septic, on antibiotics and vasopressors  Hypocalcemia, 2/2 calcium removal with CRRT, with replacement per protocol    IMPRESSION/RECOMMENDATIONS:     PAWAN, stage II, nonoliguric, ATN (ischemic +/- contrast induced , intravascular volume depletion due to poor oral intake, loop diuretics and vomiting in the setting of ACE inhibition versus,) FEUrea 28.3%, FENa 1.1%. Started on renal replacement therapy on 12/21/2022. Tolerating CVVHD. Vitamin D deficiency, vitamin D 25 level 11, on cholecalciferol   Hypophosphatemia, 2/2 phosphorus removal with CRRT, with replacement per protocol  Normal pH with respiratory alkalosis and metabolic acidosis    --------------------------------------------------------  Probably esophageal perforation, status post robotic hiatal hernia repair 12/9/20/2022, for esophageal stent placement today.   Respiratory failure, status post intubation  New onset AF with RVR  Complex right pleural effusion, s/p VATS    S/p hiatal hernia repair 12/9/2022  Hypothyroidism, on levothyroxine  Hospital-acquired pneumonia, + Enterobacter cloacae, on ertapenem  Possibly focal pneumonia, Candida albicans?,  On anidulafungin  Normocytic anemia, multifactorial  Severe hypoalbuminemia  Nutrition, on TPN @50 cc/hour per surgery      Plan:    Continue CVVHD 30 cc/kilo/hour, net fluid removal even  Continue TPN per surgery  Continue to monitor renal function for recovery   For esophageal stent today      Electronically signed by Zahida Saldaña MD on 12/29/2022 at 9:56 AM

## 2022-12-29 NOTE — PROGRESS NOTES
12/29/22 1111   [facility] Vent Information   Ventilator ID [id number]   Vent Type 980   Vent Mode AC/PRVC   Vt (Set, mL) 300 mL   Vt (Measured) 321 mL   Resp Rate (Set) 16 bmp   Rate Measured 25 br/min   Minute Volume (L/min) 7.95 Liters   FiO2  40 %   Peak Inspiratory Pressure (cmH2O) 26 cmH2O   I:E Ratio 1:2.10   PEEP/CPAP (cmH2O) 8   I Time/ I Time % 0.8 s   Mean Airway Pressure (cmH2O) 14 cmH20   Cough/Sputum   Sputum How Obtained Endotracheal;Suctioned   Cough Productive   Frequency Infrequent   Sputum Amount Small   Tenacity Thick   Breath Sounds   Right Upper Lobe Diminished   Right Middle Lobe Diminished   Right Lower Lobe Diminished;Rhonchi   Left Upper Lobe Diminished   Left Lower Lobe Diminished   Additional Respiratory Assessments   Heart Rate (!) 112   Resp 25   SpO2 100 %   Position Semi-Bach's   Humidification Source Heated wire   Humidification Temp 37.1   Circuit Condensation Drained   Airway Type ET   Airway Size 7.5   Cuff Pressure (cm H2O) 29 cm H2O   Vent Alarm Settings   High Pressure (cmH2O) 50 cmH2O   Low Minute Volume (lpm) 6 L/min   High Minute Volume (lpm) 14 L/min   Low Exhaled Vt (ml) 250 mL   High Exhaled Vt (ml) 900 mL   RR Low (bpm) 16   RR High (bpm) 40 br/min   Apnea (secs) 20 secs   NICU Ventilator Associated Pneumonia Bundle   Elevation of Head of Bed Yes   ETT/Trach Suctioning Yes   ETT    Placement Date/Time: 12/19/22 0820   Present on Admission/Arrival: No  Placed By: RT  Placement Verified By: Auscultation;Capnometry; Chest X-ray  Preoxygenation: Yes  Mask Ventilation: Ventilated by mask (1)  Technique: Direct laryngoscopy  Airway Typ. ..    Secured At 22 cm   Measured From Lips   ETT Placement Center   Secured By Commercial tube turner   Treatment   $Treatment Type $Inhaled Therapy/Meds

## 2022-12-29 NOTE — PROGRESS NOTES
DAILY VENTILATOR WEANING ASSESSMENT PERFORMED    P/FIO2 Ratio =    318       (<100= do not Wean)                  Cs =          12                (<32= Instability)  Plat. Pressure = 25  MV =4.6  RSBI =    Instabilities:       Cardiovascular =       CNS =       Respiratory =       Metabolic =    Parameters    no    Wean per protocol  yes    Ask Physician for a weaning plan yes    Additional Comments:     Performed by Jesús Cameron RCP RRT      Reference Table:    Cardiovascular     CNS      1. Mean BP less than or equal to 75   1. Neuromuscular blockade  2. Heart Rate greater than 130   2. RASS of -3, -4, -5  3. Myocardial Ischemia    3. RASS of +3, +4  4. Mechanical Assist Device    4. ICP greater than 15 or             Intracranial Hypertension         Respiratory      Metabolic  1. PEEP equal to or greater than 10cm/H20  1. Temp. (8hrs) less than 95 or > 103  2. Respiratory Rate greater than 35   2. WBC < 5000 or > 48980  3. Minute Volume greater than 15L  4. pH less than 7.30  5.  Deteriorating chest X-ray

## 2022-12-29 NOTE — PROGRESS NOTES
12/29/22 0814   NICU Vent Information   Skin Assessment Clean, dry, & intact   Ventilator ID [id number]   Equipment Changed Humidification   Vent Type 980   Vent Mode AC/PRVC   Vt (Set, mL) 300 mL   Vt (Measured) 259 mL   Resp Rate (Set) 16 bmp   Rate Measured 19 br/min   Minute Volume (L/min) 4.63 Liters   FiO2  40 %   Peak Inspiratory Pressure (cmH2O) 23 cmH2O   I:E Ratio 1:1.70   PEEP/CPAP (cmH2O) 8   I Time/ I Time % 0.8 s   Mean Airway Pressure (cmH2O) 13 cmH20   Plateau Pressure 25 XCN83   Static Compliance 12 mL/cmH2O   Total PEEP 8 cmH20   Auto PEEP 0 cmH20   Cough/Sputum   Sputum How Obtained Endotracheal;Tracheal   Cough Weak;Productive   Frequency Infrequent   Sputum Amount Small   Sputum Color Tan   Tenacity Thick   Breath Sounds   Right Upper Lobe Rhonchi;Diminished   Right Middle Lobe Diminished   Right Lower Lobe Diminished   Left Upper Lobe Rhonchi;Diminished   Additional Respiratory Assessments   Heart Rate (!) 111   Resp 20   SpO2 100 %   Position Semi-Bach's   Humidification Source Heated wire   Humidification Temp 37   Circuit Condensation Drained   Airway Type ET   Airway Size 7.5   Cuff Pressure (cm H2O) 29 cm H2O   Vent Alarm Settings   High Pressure (cmH2O) 50 cmH2O   Low Minute Volume (lpm) 6 L/min   High Minute Volume (lpm) 14 L/min   Low Exhaled Vt (ml) 250 mL   High Exhaled Vt (ml) 900 mL   RR Low (bpm) 16   RR High (bpm) 40 br/min   Apnea (secs) 20 secs   [facility] Ventilator Associated Pneumonia Bundle   Elevation of Head of Bed Yes   ETT/Trach Suctioning Yes   ETT    Placement Date/Time: 12/19/22 0820   Present on Admission/Arrival: No  Placed By: RT  Placement Verified By: Auscultation;Capnometry; Chest X-ray  Preoxygenation: Yes  Mask Ventilation: Ventilated by mask (1)  Technique: Direct laryngoscopy  Airway Typ. ..    Secured At 22 cm   Measured From Lips   ETT Placement Left   Secured By Commercial tube turner   Treatment   $Treatment Type $Inhaled Therapy/Meds

## 2022-12-29 NOTE — PROGRESS NOTES
TRAUMA/GENERAL SURGERY  DAILY PROGRESS NOTE  12/29/2022    CHIEF COMPLAINT:  Chief Complaint   Patient presents with    Shortness of Breath     Sent from [facility] for SOB patient went for hernia repair patient on 6L NC on arrival       SUBJECTIVE:  Intubated and sedated    OBJECTIVE:  BP (!) 140/60   Pulse (!) 112   Temp 98.4 °F (36.9 °C) (Esophageal)   Resp 29   Ht 5' 2\" (1.575 m)   Wt 122 lb 2.2 oz (55.4 kg)   SpO2 100%   BMI 22.34 kg/m²     Levophed - 2  Right chest tubes- 30cc/24 hours   Left chest tube- 10 cc / 24 hours    GENERAL:  Intubated, sedated  LUNGS:  Intubated and on ventilator. 40%/8, chest tubes as above   CARDIOVASCULAR: sinus tachycardia, normotensive  ABDOMEN:  Soft, non-distended, does not grimace to deep palpation, no rebound. On CVVHD:  EXT NVI feet warm minimal edema     ASSESSMENT/PLAN:  65 y.o. female with respiratory failure and complex right-sided effusion s/p robotic hiatal hernia repair 12/9 without signs of esophageal perforation or leak however now +fungitell and growing candida in chest  S/p VATS & Mini-thoractomy & bronchoscopy with open drainage of fluid collection 12/20, left CT placement 12/21 with ARDS & renal failure s/p chemical paralysis, flolan, CVVHD 12/21. Who is currently clinically improving.    - chest tubes to continuous suction  - continue TPN  - esophageal stent today with advanced endoscopy  - TF DC'd    Electronically signed by Harsh Arias DO on 12/29/2022 at 9:02 AM        Attending Attestation   Patient seen and examined, agree with resident note except for changes made by me, for remaining HP/Consult/progress note details please see resident HP/Consult/progress note. For endoscopy today possible stent.       Natali Pope MD

## 2022-12-29 NOTE — PROGRESS NOTES
Physical Therapy    PT consult to evaluate/treat received and appreciated. Pt chart reviewed and evaluation attempted. Pt is currently not appropriate for PT services. Please re-consulted when needed. Thank you.         Otto Calhoun, PT, DPT   [id number]

## 2022-12-29 NOTE — PROGRESS NOTES
[facility] PROGRESS NOTE      Chief complaint: Follow-up of pneumonia    This patient is a 77-year-old female with history of bilateral breast cancer, DM, hiatal hernia repair on 12/09, presented on 12/14 with shortness of breath for 2 days accompanied by productive cough and malaise prior to her hiatal hernia repair, thought to have large postoperative seroma after a type IV hiatal hernia repair. On admission, she was afebrile and hemodynamically stable with no leukocytosis. CT chest, abdomen and pelvis showed soft tissue density in the posterior medial aspect of the right lower lung field with deviation of the mediastinum to the left, bilateral lower lobe consolidation and pleural effusions, mild soft tissue prominence surrounding the esophagus with narrowing at the gastroesophageal junction appearing to be due to external compression, wall thickening in the gastric outlet with mild prominence of the stomach, distended gallbladder, no evidence of acute abdominal/pelvic pathology. Esophagram was negative for esophageal perforation. She underwent right-sided thoracentesis on 12/16 during which 105 mL of serous fluid was removed. Pleural fluid gram stain and culture showed rare polymorphonuclear leukocytes, no epithelial cells, no organisms, no growth. She was transferred to MICU on 12/17 for worsening hypoxemia. Repeat CT chest on 12/17 showed large right pleural effusion of mixed density suggestive of complex effusion with pigtail catheter in the pleural space posteriorly and inferiorly, small to moderate sized left pleural effusion with consolidative infiltrate throughout left upper and lower lung field and aerated portions revealing nodular groundglass opacity, nodular groundglass opacity inferiorly within the aerated right upper and lower lung field, less consolidative infiltrate within the right lower lobe.   Respiratory gram stain and culture showed no polymorphonuclear leukocytes, no epithelial cells, moderate yeast, moderate gram-positive diphtheroid-like rods, few gram-negative rods, moderate growth of Enterobacter cloacae, heavy growth of Candida albicans. Urine Streptococcus pneumoniae and Legionella antigens were negative. Respiratory pathogen PCR panel and MRSA nares culture were negative. Blood cultures showed no growth to date. She received a dose of ceftriaxone and doxycycline on admission. Ampicillin-sulbactam was started on admission and then switched to piperacillin-tazobactam on 12/17 then switched to meropenem on 12/19. Vancomycin was started on 12/17. She underwent right VATS, mini-thoracotomy with drainage and fenestration of mediastinal seroma/hematoma, chest tube and mediastinal drain placement on 12/20 during which 1100 mL serous fluid and dark blood (no succus or bile) extending into the mediastinum was drained via mini-thoracotomy. Hematoma/seroma fluid Gram stain and culture showed abundant polymorphonuclear leukocytes, no epithelial cells, no organisms, rare growth of Enterobacter cloacae (susceptible to fluoroquinolones and cotrimoxazole) and light growth of Candida albicans. Serum 1,3 beta-d-glucan on 12/18 was elevated. Endotracheal aspirate Gram stain and culture on 12/25 showed few polymorphonuclear leukocytes, few epithelial cells, no organisms, heavy growth of Enterobacter cloacae. She was noted to have increasing output from right sided chest tubes confirmed to have esophageal perforation with active methylene blue extravasation into both right sided tubes on bedside leak test on 12/27. Subjective: Patient was seen and examined. She remains in critical condition, intubated and sedated. Afebrile.       Objective:  BP (!) 140/60   Pulse 99   Temp 98.4 °F (36.9 °C) (Esophageal)   Resp 23   Ht 5' 2\" (1.575 m)   Wt 122 lb 2.2 oz (55.4 kg)   SpO2 100%   BMI 22.34 kg/m²   Constitutional: Intubated, no MV dyssynchrony  Respiratory: Clear breath sounds, no crackles, no wheezes  Cardiovascular: Regular rate and rhythm, no murmurs  Gastrointestinal: Bowel sounds present, soft, nontender  Skin: Warm and dry, no active dermatoses  Musculoskeletal: No joint swelling, no joint erythema    Labs, imaging, and medical records/notes were personally reviewed. Assessment:  Acute hypoxic respiratory failure  Septic shock, resolved  Postoperative seroma/hematoma, right chest, associated with esophageal perforation in the setting of robotic hiatal hernia repair on 12/09, s/p right VATS, mini-thoracotomy with drainage and fenestration of mediastinal seroma/hematoma, chest tube and mediastinal drain placement on 12/20  Esophageal perforation  Enterobacter cloacae HAP  Elevated serum 1,3 beta-d-glucan    Recommendations:  Continue anidulafungin 100 mg q24h. Continue ertapenem 1 g every 24 hours. Follow up plan for EGD with esophageal stent placement is noted. Continue supportive care. Thank you for involving me in the care of Kwesi Herron. I will continue to follow. Please do not hesitate to call for any questions or concerns.       Electronically signed by Onel Hoang MD on 12/29/2022 at 10:13 AM

## 2022-12-30 PROCEDURE — 2500000003 HC RX 250 WO HCPCS: Performed by: NURSE ANESTHETIST, CERTIFIED REGISTERED

## 2022-12-30 PROCEDURE — 2580000003 HC RX 258: Performed by: NURSE ANESTHETIST, CERTIFIED REGISTERED

## 2022-12-30 RX ORDER — LABETALOL HYDROCHLORIDE 5 MG/ML
INJECTION, SOLUTION INTRAVENOUS PRN
Status: DISCONTINUED | OUTPATIENT
Start: 2022-12-30 | End: 2022-12-30 | Stop reason: SDUPTHER

## 2022-12-30 RX ORDER — SODIUM CHLORIDE 9 MG/ML
INJECTION, SOLUTION INTRAVENOUS CONTINUOUS PRN
Status: DISCONTINUED | OUTPATIENT
Start: 2022-12-30 | End: 2022-12-30 | Stop reason: SDUPTHER

## 2022-12-30 RX ORDER — ROCURONIUM BROMIDE 10 MG/ML
INJECTION, SOLUTION INTRAVENOUS PRN
Status: DISCONTINUED | OUTPATIENT
Start: 2022-12-30 | End: 2022-12-30 | Stop reason: SDUPTHER

## 2022-12-30 RX ADMIN — LABETALOL HYDROCHLORIDE 5 MG: 5 INJECTION INTRAVENOUS at 17:26

## 2022-12-30 RX ADMIN — ROCURONIUM BROMIDE 20 MG: 10 INJECTION, SOLUTION INTRAVENOUS at 17:16

## 2022-12-30 RX ADMIN — SODIUM CHLORIDE: 9 INJECTION, SOLUTION INTRAVENOUS at 17:16

## 2022-12-30 RX ADMIN — ROCURONIUM BROMIDE 30 MG: 10 INJECTION, SOLUTION INTRAVENOUS at 17:45

## 2022-12-30 NOTE — PROGRESS NOTES
Department of Internal Medicine  Nephrology Progress Note      Events reviewed. SUBJECTIVE: We are following Lizeth Tello for PAWAN. Presently intubated.     PHYSICAL EXAM:      Vitals:    VITALS:  BP (!) 103/52   Pulse (!) 122   Temp 98.8 °F (37.1 °C) (Esophageal)   Resp 28   Ht 5' 2\" (1.575 m)   Wt 122 lb 6 oz (55.5 kg)   SpO2 98%   BMI 22.38 kg/m²   24HR INTAKE/OUTPUT:    Intake/Output Summary (Last 24 hours) at 12/30/2022 0854  Last data filed at 12/30/2022 0800  Gross per 24 hour   Intake 3167 ml   Output 3272 ml   Net -105 ml         Constitutional: Patient intubated, sedated  HEENT: Normocephalic, atraumatic, PERRLA, endotracheal tube in place  Respiratory: Diminished right lung base  Cardiovascular/Edema: RRR, normal S1, normal S2, no edema  Gastrointestinal: Soft, not distended, nontender  Neurologic: Patient is sedated  Skin: Pale, dry, no lesions, no rash, + edema     Scheduled Meds:   phosphorus  500 mg Oral Once    [Held by provider] midodrine  15 mg Oral TID WC    insulin lispro  0-16 Units SubCUTAneous Q4H    lidocaine PF  5 mL IntraDERmal Once    sodium chloride flush  5-40 mL IntraVENous 2 times per day    heparin flush  3 mL IntraVENous 2 times per day    ertapenem (INVanz) IVPB  1,000 mg IntraVENous Q24H    [Held by provider] Vitamin D  1,000 Units Oral Daily    heparin (porcine)  5,000 Units SubCUTAneous Q8H    artificial tears   Both Eyes Q4H    anidulafungin  100 mg IntraVENous Q24H    pantoprazole (PROTONIX) 40 mg injection  40 mg IntraVENous Q12H    sodium chloride flush  5-40 mL IntraVENous 2 times per day    heparin flush  3 mL IntraVENous 2 times per day    Arformoterol Tartrate  15 mcg Nebulization BID    budesonide  0.5 mg Nebulization BID    albuterol  2.5 mg Nebulization Q4H WA    [Held by provider] metoprolol  5 mg IntraVENous Q6H    [Held by provider] rosuvastatin  10 mg Oral Nightly    [Held by provider] levothyroxine  50 mcg Oral Daily     Continuous Infusions: PN-Adult  3 IN 1 Central Line (Custom)      PN-Adult  3 IN 1 Central Line (Custom) 50 mL/hr at 12/29/22 1756    sodium chloride      sodium chloride 10 mL/hr at 12/29/22 6332    norepinephrine 2 mcg/min (12/30/22 0517)    prismaSATE BGK 4/0/1.2 2,000 mL/hr at 12/28/22 0659    sodium chloride 100 mL/hr at 12/29/22 0227    calcium chloride CRRT infusion 55 mL/hr at 12/30/22 0028    dextrose      sodium chloride       PRN Meds:.sodium chloride, fentanNYL, sodium chloride flush, sodium chloride, heparin flush, potassium chloride, magnesium sulfate, calcium gluconate **OR** calcium gluconate **OR** calcium gluconate **OR** calcium gluconate, sodium phosphate IVPB **OR** sodium phosphate IVPB **OR** sodium phosphate IVPB **OR** sodium phosphate IVPB, glucose, dextrose bolus **OR** dextrose bolus, glucagon (rDNA), dextrose, sodium chloride flush, sodium chloride, heparin flush, ondansetron **OR** ondansetron, [DISCONTINUED] acetaminophen **OR** acetaminophen, ipratropium-albuterol    DATA:    CBC with Differential:    Lab Results   Component Value Date/Time    WBC 15.9 12/30/2022 06:33 AM    RBC 2.67 12/30/2022 06:33 AM    HGB 8.2 12/30/2022 06:33 AM    HCT 24.4 12/30/2022 06:33 AM    HCT 29.0 12/20/2022 01:31 PM    PLT 66 12/30/2022 06:33 AM    MCV 91.4 12/30/2022 06:33 AM    MCH 30.7 12/30/2022 06:33 AM    MCHC 33.6 12/30/2022 06:33 AM    RDW 16.6 12/30/2022 06:33 AM    NRBC 0.9 12/29/2022 05:39 AM    LYMPHOPCT 7.9 12/30/2022 06:33 AM    MONOPCT 5.2 12/30/2022 06:33 AM    MYELOPCT 0.9 12/23/2022 06:05 AM    BASOPCT 0.3 12/30/2022 06:33 AM    MONOSABS 0.82 12/30/2022 06:33 AM    LYMPHSABS 1.25 12/30/2022 06:33 AM    EOSABS 0.22 12/30/2022 06:33 AM    BASOSABS 0.04 12/30/2022 06:33 AM     CMP:    Lab Results   Component Value Date/Time     12/30/2022 04:33 AM    K 4.3 12/30/2022 04:33 AM    K 4.7 12/21/2022 09:23 AM     12/30/2022 04:33 AM    CO2 24 12/30/2022 04:33 AM    BUN 31 12/30/2022 04:33 AM CREATININE 1.1 12/30/2022 04:33 AM    GFRAA >60 07/22/2022 01:42 PM    LABGLOM 51 12/30/2022 04:33 AM    GLUCOSE 220 12/30/2022 04:33 AM    PROT 5.3 12/30/2022 04:33 AM    LABALBU 2.1 12/30/2022 04:33 AM    LABALBU 4.3 01/24/2011 01:15 PM    CALCIUM 8.1 12/30/2022 04:33 AM    BILITOT 3.1 12/30/2022 04:33 AM    ALKPHOS 155 12/30/2022 04:33 AM    AST 58 12/30/2022 04:33 AM    ALT 44 12/30/2022 04:33 AM     Magnesium:    Lab Results   Component Value Date/Time    MG 2.1 12/30/2022 04:33 AM     Phosphorus:    Lab Results   Component Value Date/Time    PHOS 2.9 12/30/2022 04:33 AM     Radiology Review:      CT Chest and Abd/Pelvis 12/14/22      No evidence of pulmonary embolism. Bilateral lower lobe pulmonary consolidations and pleural effusions seen. Deviation of the mediastinum to the left is visualized. Mild soft tissue prominence visualized surrounding the esophagus with   narrowing at the gastroesophageal junction but appears to be due to external   compression. Cannot rule left soft tissue mass at this location. Wall thickening visualized in the gastric outlet with mild prominence of the   stomach seen, this could be artifactual.     Distended gallbladder is seen. Degenerative bone changes, postoperative changes and multilevel vertebral   augmentation seen. No evidence of acute abdominal/pelvic pathology. US Chest 12/15/22       Probable complex loculated right pleural effusion. Simple left pleural effusion. BRIEF SUMMARY OF INITIAL CONSULT:    Briefly, Jacob Delarosa is a 71-year-old female, past medical history significant for hypothyroidism, hiatal hernia and TIA, who presented to the ED on 12/14/2022 from [facility], patient recently underwent a hiatal hernia repair on 12/9/22, developed acute respiratory distress and was transferred to [facility] on 12/14/2022 for further management and treatment.   Initial ED work-up revealed creatinine level of 1.7, patient then underwent two seperate IV contrast studies CTA and creatinine subsequently increased to 2.5, on 12/15/2022 creatinine level increased to 3.5 mg/dL, reason for this consultation. Review of records from [facility] reveals that baseline creatinine appears to be between 0.9 to 1.0 mg/dL, creatinine was 1.0 on 12/13/2022, patient was started on lisinopril during hospitalization, was given multiple doses IV furosemide with poor oral intake and vomiting. Problems resolved:    HAGMA, 2/2 uremia and starvation ketoacidosis  Hemodynamic shock, septic, on antibiotics and vasopressors  Hypocalcemia, 2/2 calcium removal with CRRT, with replacement per protocol  Hypophosphatemia, 2/2 phosphorus removal with CRRT, with replacement per protocol    IMPRESSION/RECOMMENDATIONS:     PAWAN, stage II, nonoliguric, ATN (ischemic +/- contrast induced , intravascular volume depletion due to poor oral intake, loop diuretics and vomiting in the setting of ACE inhibition versus,). Started on renal replacement therapy on 12/21/2022. Tolerating CVVHD. Vitamin D deficiency, vitamin D 25 level 11, on cholecalciferol   Respiratory alkalosis (pH: 7.464, PCO2: 32.8, HCO3: 23) and metabolic alkalosis (CVVHD)    --------------------------------------------------------  Probably esophageal perforation, status post robotic hiatal hernia repair 12/9/20/2022, for esophageal stent placement today.   Respiratory failure, status post intubation  New onset AF with RVR  Complex right pleural effusion, s/p VATS    S/p hiatal hernia repair 12/9/2022  Hypothyroidism, on levothyroxine  Hospital-acquired pneumonia, + Enterobacter cloacae, on ertapenem  Possibly focal pneumonia, Candida albicans?,  On anidulafungin  Normocytic anemia, multifactorial  Severe hypoalbuminemia  Nutrition, on TPN @50 cc/hour per surgery      Plan:    Continue CVVHD 30 cc/kilo/hour, net fluid removal even  Continue TPN per surgery  Continue to monitor renal function for recovery   For esophageal stent today      Electronically signed by Luther Schwab MD on 12/30/2022 at 8:54 AM

## 2022-12-30 NOTE — CARE COORDINATION
12/30 Care Coordination: Remains in MICU. S/P hernia repair from [facility]. intubated/sedated on vent. [facility] following. [facility] following if would need ANAMIKA. .. CRRT in progress. Currently on low dose pressors. On IV TPN. CM/SW will continue to follow for discharge planning.    Jacob ALCANTAR,RN-CV-BC  [phone]

## 2022-12-30 NOTE — ANESTHESIA PRE PROCEDURE
Department of Anesthesiology  Preprocedure Note       Name:  Verónica Roy   Age:  65 y.o.  :  1945                                          MRN:  [id number]         Date:  2022      Surgeon: Tacos Christine):  Fritzi Frankel, MD    Procedure: Procedure(s):  EGD ESOPHAGOGASTRODUODENOSCOPY, WITH ESOPHAGEAL  EGD ESOPHAGOGASTRODUODENOSCOPY PEG TUBE INSERTION    Medications prior to admission:   Prior to Admission medications    Medication Sig Start Date End Date Taking? Authorizing Provider   Cholecalciferol (VITAMIN D3) 125 MCG (5000 UT) TABS Take by mouth    Historical Provider, MD   levothyroxine (SYNTHROID) 50 MCG tablet Take 50 mcg by mouth daily    Historical Provider, MD   rosuvastatin (CRESTOR) 20 MG tablet Take 1 tablet by mouth nightly 21   Jearline Eisenmenger, MD   aspirin 325 MG EC tablet Take 1 tablet by mouth daily 21  Jearline Eisenmenger, MD   Handicap Placard MISC by Does not apply route Duration: Lifetime 18   Mendy Borges DO       Current medications:    No current facility-administered medications for this visit. No current outpatient medications on file.      Facility-Administered Medications Ordered in Other Visits   Medication Dose Route Frequency Provider Last Rate Last Admin    PN-Adult  3 IN 1 Central Line (Custom)   IntraVENous Continuous TPN Norma Nam MD        phosphorus (K PHOS NEUTRAL) tablet 2 tablet  500 mg Oral Once KAR Pacheco CNP        PN-Adult  3 IN 1 Central Line (Custom)   IntraVENous Continuous TPN Norma Nam MD 50 mL/hr at 22 1756 New Bag at 22 1756    0.9 % sodium chloride infusion   IntraVENous PRN Daphne Donovan MD        [Held by provider] midodrine (PROAMATINE) tablet 15 mg  15 mg Oral TID  KAR Pacheco CNP        fentaNYL (SUBLIMAZE) injection 25 mcg  25 mcg IntraVENous Q2H PRN KAR Pacheco CNP   25 mcg at 22 0148    insulin lispro (HUMALOG) injection vial 0-16 Units  0-16 Units SubCUTAneous Q4H Dejuan Megan, APRN - CNP   4 Units at 12/30/22 1323    lidocaine PF 1 % injection 5 mL  5 mL IntraDERmal Once Dejuan Megan, APRN - CNP        sodium chloride flush 0.9 % injection 5-40 mL  5-40 mL IntraVENous 2 times per day Dejuan Megan, APRN - CNP   10 mL at 12/28/22 2107    sodium chloride flush 0.9 % injection 5-40 mL  5-40 mL IntraVENous PRN Dejuan Megan, APRN - CNP        0.9 % sodium chloride infusion   IntraVENous PRN Dejuan Megan, APRN - CNP 10 mL/hr at 12/29/22 1847 Rate Verify at 12/29/22 1847    heparin flush 100 UNIT/ML injection 300 Units  3 mL IntraVENous 2 times per day Dejuan Megan, APRN - CNP        heparin flush 100 UNIT/ML injection 300 Units  3 mL IntraCATHeter PRN Dejuan Megan, APRN - CNP        norepinephrine (LEVOPHED) 16 mg in dextrose 5% 250 mL infusion  1-100 mcg/min IntraVENous Continuous Dejuan Megan, APRN - CNP 3.8 mL/hr at 12/30/22 1116 4 mcg/min at 12/30/22 1116    ertapenem (INVanz) 1000 mg IVPB minibag  1,000 mg IntraVENous Q24H Shanique Pleitez MD   Stopped at 12/30/22 1602    [Held by provider] Vitamin D (CHOLECALCIFEROL) tablet 1,000 Units  1,000 Units Oral Daily Francois Barrientos MD   1,000 Units at 12/26/22 0900    heparin (porcine) injection 5,000 Units  5,000 Units SubCUTAneous Q8H Bureallie Bue, APRN - CNP   5,000 Units at 12/30/22 6333    lubrifresh P.M. (artificial tears) ophthalmic ointment   Both Eyes Q4H Dejuan Megan, APRN - CNP   Given at 12/30/22 387 [facility] 4/0/1.2 dialysis solution   Dialysis Continuous Francois Barrientos MD 2,000 mL/hr at 12/28/22 0659 New Bag at 12/28/22 0659    0.9 % sodium chloride infusion   IntraVENous Continuous Francois Miller  mL/hr at 12/29/22 0227 New Bag at 12/29/22 0227    potassium chloride 20 mEq/50 mL IVPB (Central Line)  20 mEq IntraVENous PRN Francois Barrientos MD        magnesium sulfate 1000 mg in dextrose 5% 100 mL IVPB  1,000 mg IntraVENous PRN Roselia Gray MD       [facility] calcium gluconate 1000 mg in dextrose 5% 100 mL IVPB  1,000 mg IntraVENous PRN Yasir Daley MD   Stopped at 12/26/22 0857    Or    calcium gluconate 2,000 mg in dextrose 5 % 100 mL IVPB  2,000 mg IntraVENous PRN Francois Miller MD        Or    calcium gluconate 3,000 mg in dextrose 5 % 100 mL IVPB  3,000 mg IntraVENous PRN Francois Miller MD        Or    calcium gluconate 4,000 mg in dextrose 5 % 100 mL IVPB  4,000 mg IntraVENous PRN Francois Miller MD        sodium phosphate 6 mmol in sodium chloride 0.9 % 250 mL IVPB  6 mmol IntraVENous PRN Yasir Daley MD   Stopped at 12/30/22 0428    Or    sodium phosphate 12 mmol in sodium chloride 0.9 % 250 mL IVPB  12 mmol IntraVENous PRN Yasir Daley MD   Stopped at 12/29/22 1950    Or    sodium phosphate 18 mmol in sodium chloride 0.9 % 500 mL IVPB  18 mmol IntraVENous PRN Yasir Daley MD   Stopped at 12/23/22 1445    Or    sodium phosphate 24 mmol in sodium chloride 0.9 % 500 mL IVPB  24 mmol IntraVENous PRN Yasir Daley MD        calcium chloride 8,000 mg in sodium chloride 0.9 % 1,000 mL infusion  8 g IntraVENous Continuous Francois Miller MD 55 mL/hr at 12/30/22 0028 Rate Verify at 12/30/22 0028    glucose chewable tablet 16 g  4 tablet Oral PRN Ila Setting, APRN - CNP        dextrose bolus 10% 125 mL  125 mL IntraVENous PRN Ila Setting, APRN - CNP        Or    dextrose bolus 10% 250 mL  250 mL IntraVENous PRN Ila Setting, APRN - CNP        glucagon (rDNA) injection 1 mg  1 mg SubCUTAneous PRN Ila Setting, APRN - CNP        dextrose 10 % infusion   IntraVENous Continuous PRN Ila Setting, APRN - CNP        anidulafungin (ERAXIS) 100 mg in dextrose 5 % 130 mL IVPB  100 mg IntraVENous Q24H Alisa Marrufo MD 86.7 mL/hr at 12/30/22 1616 Rate Verify at 12/30/22 1616    pantoprazole (PROTONIX) 40 mg in sodium chloride (PF) 0.9 % 10 mL injection  40 mg IntraVENous Q12H Marta Snyder MD   40 mg at 12/30/22 0551    sodium chloride flush 0.9 % injection 5-40 mL  5-40 mL IntraVENous 2 times per day Balbirmelina Chacon, APRN - CNP   10 mL at 12/27/22 0824    sodium chloride flush 0.9 % injection 5-40 mL  5-40 mL IntraVENous PRN Balbir Leigh, APRN - CNP   10 mL at 12/21/22 2123    0.9 % sodium chloride infusion   IntraVENous PRN Balbir Chacon, APRN - CNP        heparin flush 100 UNIT/ML injection 300 Units  3 mL IntraVENous 2 times per day Balbir Leigh, APRN - CNP        heparin flush 100 UNIT/ML injection 300 Units  3 mL IntraCATHeter PRN Balbir Ines, APRN - CNP        Arformoterol Tartrate (BROVANA) nebulizer solution 15 mcg  15 mcg Nebulization BID Magalie Pedro, APRN - CNP   15 mcg at 12/30/22 0825    budesonide (PULMICORT) nebulizer suspension 500 mcg  0.5 mg Nebulization BID Magalie Pedro, APRN - CNP   500 mcg at 12/30/22 0825    albuterol (PROVENTIL) nebulizer solution 2.5 mg  2.5 mg Nebulization Q4H MEGHANN Handy MD   2.5 mg at 12/30/22 1248    [Held by provider] metoprolol (LOPRESSOR) injection 5 mg  5 mg IntraVENous Q6H Balbir Chacon, APRN - CNP   5 mg at 12/18/22 0820    [Held by provider] rosuvastatin (CRESTOR) tablet 10 mg  10 mg Oral Nightly Davisy Cheri, APRN - CNP   10 mg at 12/25/22 2134    ondansetron (ZOFRAN-ODT) disintegrating tablet 4 mg  4 mg Oral Q8H PRN Davisy Calk, APRN - CNP   4 mg at 12/14/22 2155    Or    ondansetron (ZOFRAN) injection 4 mg  4 mg IntraVENous Q6H PRN Conny Calk, APRN - CNP   4 mg at 12/19/22 0138    acetaminophen (TYLENOL) suppository 650 mg  650 mg Rectal Q6H PRN Conny Calk, APRN - CNP        ipratropium-albuterol (DUONEB) nebulizer solution 1 ampule  1 ampule Inhalation Q4H PRN Davisy Calk, APRN - CNP   1 ampule at 12/17/22 0216    [Held by provider] levothyroxine (SYNTHROID) tablet 50 mcg  50 mcg Oral Daily Axel Burkitt, MD   50 mcg at 12/27/22 3283       Allergies:     Allergies   Allergen Reactions    Benadryl [Diphenhydramine] Itching    Ibuprofen Other (See Comments)     Doesn't know       Problem List:    Patient Active Problem List   Diagnosis Code    Compression fracture of L4 lumbar vertebra S32.040A    Other postprocedural status(V45.89)  Kyphoplasty L4 Z98.890    Low back pain M54.50    Acne rosacea L71.9    Drug reaction T50.905A    Hypothyroidism E03.9    Neck muscle spasm M62.838    Back pain M54.9    Headache R51.9    Vertebrobasilar TIAs G45.0    Hypercholesterolemia E78.00    Lumbar spinal stenosis L4-5 M48.061    Orthostatic hypotension I95.1    H/o compression fracture of lumbar vertebra, mulitple S32.000A    Compression fracture of L5 lumbar vertebra S32.050A    S/P kyphoplasty L4 Z98.890    Fibromyalgia M79.7    Chronic back pain M54.9, G89.29    Mass of breast N63.0    Contact dermatitis L25.9    Degeneration of intervertebral disc of lumbar region M51.36    Dermoid cyst of mouth K09.8    Vision disorder H53.9    Essential hypertension I10    Gastroesophageal reflux disease K21.9    Hyperlipidemia E78.5    Neuropathy G62.9    Breast pain N64.4    Sprain of knee S83. 90XA    Temporary cerebral vascular dysfunction I67.82    Pain of upper abdomen R10.10    TIA (transient ischemic attack) G45.9    Limb weakness R29.898    S/P lumbar fusion (history of PLIF) Z98.1    Cancer (HCC) C80.1    History of bilateral breast cancer Z85.3    Dizziness R42    Hx-TIA (transient ischemic attack) Z86.73    Pain syndrome, chronic G89.4    Anxiety with somatization F41.9, F45.0    Postoperative pneumonia J95.89, J18.9    Hypoxia R09.02    Pneumonia of left lower lobe due to infectious organism J18.9    Aspiration pneumonia of both lower lobes due to gastric secretions (HCC) J69.0       Past Medical History:        Diagnosis Date    Anxiety with somatization 3/30/2021    Compression fracture of L1 lumbar vertebra [facility]) november 1994    Compression fracture of L4 lumbar vertebra     secondary to fall in December 2013  Fall 1989 & 12/2013    History of bilateral breast cancer 1980's    breast    Hx-TIA (transient ischemic attack) 3/30/2021    Osteoporosis     Pain syndrome, chronic 3/30/2021    Thyroid disease     went off of medication    Type II or unspecified type diabetes mellitus without mention of complication, not stated as uncontrolled     resolved with weight loss of 20 lbs       Past Surgical History:        Procedure Laterality Date    BACK SURGERY  1989    PLIF T12 to L2 with pedicle screws and rods    BREAST SURGERY Bilateral     breast implants (after mastectomy)    FIXATION KYPHOPLASTY  03/10/2014    with bone biopsy    IR PERC CATH PLEURAL DRAIN W/IMAG  12/16/2022    IR PERC CATH PLEURAL DRAIN W/IMAG 12/16/2022 L Genesis Ward MD [facility]    MASTECTOMY Bilateral     THORACOSCOPY Right 12/20/2022    RIGHT VATS performed by Dayan Velazquez MD at [address]         Social History:    Social History     Tobacco Use    Smoking status: Never    Smokeless tobacco: Never   Substance Use Topics    Alcohol use: Not Currently     Comment: 1 glass a month                                Counseling given: Not Answered      Vital Signs (Current): There were no vitals filed for this visit.                                            BP Readings from Last 3 Encounters:   12/30/22 132/62   07/22/22 (!) 161/97   03/14/22 (!) 160/85       NPO Status:                                                                                 BMI:   Wt Readings from Last 3 Encounters:   12/30/22 122 lb 6 oz (55.5 kg)   07/22/22 155 lb (70.3 kg)   03/14/22 155 lb (70.3 kg)     There is no height or weight on file to calculate BMI.    CBC:   Lab Results   Component Value Date/Time    WBC 15.9 12/30/2022 06:33 AM    RBC 2.67 12/30/2022 06:33 AM    HGB 8.2 12/30/2022 06:33 AM    HCT 24.4 12/30/2022 06:33 AM    HCT 29.0 12/20/2022 01:31 PM    MCV 91.4 12/30/2022 06:33 AM    RDW 16.6 12/30/2022 06:33 AM    PLT 66 12/30/2022 06:33 AM       CMP:   Lab Results   Component Value Date/Time     12/30/2022 11:43 AM    K 4.3 12/30/2022 11:43 AM    K 4.7 12/21/2022 09:23 AM     12/30/2022 11:43 AM    CO2 22 12/30/2022 11:43 AM    BUN 32 12/30/2022 11:43 AM    CREATININE 1.2 12/30/2022 11:43 AM    GFRAA >60 07/22/2022 01:42 PM    LABGLOM 46 12/30/2022 11:43 AM    GLUCOSE 237 12/30/2022 11:43 AM    PROT 5.4 12/30/2022 11:43 AM    CALCIUM 8.7 12/30/2022 11:43 AM    BILITOT 3.8 12/30/2022 11:43 AM    ALKPHOS 150 12/30/2022 11:43 AM    AST 62 12/30/2022 11:43 AM    ALT 44 12/30/2022 11:43 AM       POC Tests: No results for input(s): POCGLU, POCNA, POCK, POCCL, POCBUN, POCHEMO, POCHCT in the last 72 hours. Coags:   Lab Results   Component Value Date/Time    PROTIME 13.6 12/28/2022 11:37 AM    INR 1.3 12/28/2022 11:37 AM    APTT 43.5 12/25/2022 06:15 AM       HCG (If Applicable): No results found for: PREGTESTUR, PREGSERUM, HCG, HCGQUANT     ABGs: No results found for: PHART, PO2ART, IVK6JFH, RWZ3OQV, BEART, C1WTRAUY     Type & Screen (If Applicable):  No results found for: LABABO, LABRH    Drug/Infectious Status (If Applicable):  Lab Results   Component Value Date/Time    HEPCAB NON REACT 02/28/2014 12:50 PM       COVID-19 Screening (If Applicable):   Lab Results   Component Value Date/Time    COVID19 Not Detected 12/17/2022 10:32 AM           Anesthesia Evaluation    Airway: Mallampati: Unable to assess / NA         Intubated Dental:          Pulmonary:   (+) pneumonia:                            ROS comment: Respiratory failure, intubated on mechanical ventilation. Cardiovascular:    (+) hypertension:,                   Neuro/Psych:   (+) neuromuscular disease:, TIA, headaches:, psychiatric history:            GI/Hepatic/Renal:   (+) GERD:,           Endo/Other:    (+) Diabetes, hypothyroidism::., .                 Abdominal:             Vascular:           Other Findings:             Anesthesia Plan      general     ASA 4       Induction: intravenous. arterial line and CVP  MIPS: Postoperative ventilation. Anesthetic plan and risks discussed with child/children (Consent obtained by phone from Shravan Gonzalez, her daughter. ). Plan discussed with CRNA.                     Galina Cope MD   12/30/2022

## 2022-12-30 NOTE — PROGRESS NOTES
Nitza Dillard M.D.,[facility]  Yonny Chu D.O., F.A.C.O.I., Lea Viera M.D. Meaghan Julio M.D. Irving Ríos D.O. Daily Pulmonary Progress Note    Patient:  Ed Oliveros 65 y.o. female MRN: [id number]     Date of Service: 12/30/2022      Synopsis     We are following patient for acute respiratory failure with hypoxia, pneumonia, bilateral pleural effusions    \"CC\" SOB    Code status: Limited-no compressions      Subjective      Patient was seen and examined in the ICU. CVVHD currently running. Patient is still on TPN and Levophed. Her SPO2 was noted to be in 100% with the following vent settings: AC/VC + 16, tidal volume 300, FiO2 40%, PEEP +8. The patient is scheduled for an EGD with stent placement with Dr. Eunice Montesinos today.     Review of Systems:  Unable to obtain-patient intubated     24-hour events:  EGD with stent not done yesterday-plan is for today    Objective   Vitals: BP (!) 112/57   Pulse (!) 128   Temp 97.9 °F (36.6 °C) (Esophageal)   Resp 25   Ht 5' 2\" (1.575 m)   Wt 122 lb 6 oz (55.5 kg)   SpO2 100%   BMI 22.38 kg/m²     I/O:    Intake/Output Summary (Last 24 hours) at 12/30/2022 1346  Last data filed at 12/30/2022 1300  Gross per 24 hour   Intake 3083 ml   Output 3418 ml   Net -335 ml         Vent Information  Ventilator ID: [id number]  Equipment Changed: Humidification  Vent Mode: AC/VC                CURRENT MEDS :  Scheduled Meds:   phosphorus  500 mg Oral Once    [Held by provider] midodrine  15 mg Oral TID WC    insulin lispro  0-16 Units SubCUTAneous Q4H    lidocaine PF  5 mL IntraDERmal Once    sodium chloride flush  5-40 mL IntraVENous 2 times per day    heparin flush  3 mL IntraVENous 2 times per day    ertapenem (INVanz) IVPB  1,000 mg IntraVENous Q24H    [Held by provider] Vitamin D  1,000 Units Oral Daily    heparin (porcine)  5,000 Units SubCUTAneous Q8H    artificial tears   Both Eyes Q4H    anidulafungin  100 mg IntraVENous Q24H    pantoprazole (PROTONIX) 40 mg injection  40 mg IntraVENous Q12H    sodium chloride flush  5-40 mL IntraVENous 2 times per day    heparin flush  3 mL IntraVENous 2 times per day    Arformoterol Tartrate  15 mcg Nebulization BID    budesonide  0.5 mg Nebulization BID    albuterol  2.5 mg Nebulization Q4H WA    [Held by provider] metoprolol  5 mg IntraVENous Q6H    [Held by provider] rosuvastatin  10 mg Oral Nightly    [Held by provider] levothyroxine  50 mcg Oral Daily       Physical Exam:  General Appearance: Patient is intubated, and is toxic appearing  HEENT: ETT in place, OG tube  Neck: Lips, mucosa, and tongue normal.  Supple, symmetrical, trachea midline, no adenopathy;thyroid:  no enlargement/tenderness/nodules or JVD. Lung: Bilateral crackles and rhonchi, right chest tube in place x3, left chest tube x1-no air leaks  Heart: Tachycardic  Abdomen: Soft, NT, ND. BS present x 4 quadrants. No bruit or organomegaly. Extremities: +1 pitting edema  Musculokeletal: No joint swelling, no muscle tenderness. ROM normal in all joints of extremities. Neurologic: Mental status: Intubated    Pertinent/ New Labs and Imaging Studies     Imaging Personally Reviewed:  Noncontrast CT chest 12/17/2022:  Impression   Large right pleural effusion of mixed density to suggest complex effusion. There is a pigtail catheter seen in the pleural space posteriorly and   inferiorly. Small to moderate size left pleural effusion with consolidative   infiltrate throughout the left upper and lower lung field and aerated   portions revealing nodular ground-glass opacity to suggest an infectious   process. Nodular ground-glass opacity inferiorly within the aerated right   upper and lower lung field concerning for an infectious process as well. There is less consolidative infiltrate identified within the right lower lobe. Chest x-ray 12/27/2022:    FINDINGS:   Bibasilar atelectasis/infiltrate are unchanged. There are bilateral chest   tubes.   There is no definite pneumothorax. Support lines are appropriate. There is a small right effusion and PleurX catheter. Impression   No interval change       KUB 12/27/2022:    Impression   NG tube again identified within the stomach, when compared to CT scan of the   abdomen pelvis performed 12/25/2022. There is oral contrast within the   stomach, however, there is oral contrast around the GE junction, suspicious   for focal perforation at the GE junction. Oral contrast is also noted   tracking along the inferior right-sided chest tube, indicating extravasation   of contrast into the pleural space. Chest x-ray 12/29/2022:    FINDINGS:   Endotracheal tube is present with distal tip appearing 3.5 cm above the   roseann. NG tube is in satisfactory position. Redemonstration of bilateral   chest tubes. Stable opacities in right lung base silhouetting right   hemidiaphragm. No obvious pneumothorax. Left IJ central venous catheter is   present with distal tip at level of SVC. Impression   1. Endotracheal tube is 3.5 cm above the roseann. 2. Stable opacities in right lung base. 3. No radiographic evidence of pneumothorax. 4. Similar position of bilateral chest tubes. ECHO 12/19/2022:   Summary   Technically difficult study - limited visualization. Micro-bubble contrast injected to enhance left ventricular visualization. Normal left ventricular size and systolic function. Ejection fraction is visually estimated at 55%. Indeterminate diastolic function. No regional wall motion abnormalities seen. Mild left ventricular concentric hypertrophy noted. Moderately dilated right ventricle with reduced function. Moderate tricuspid regurgitation.       Labs:  Lab Results   Component Value Date/Time    WBC 15.9 12/30/2022 06:33 AM    HGB 8.2 12/30/2022 06:33 AM    HCT 24.4 12/30/2022 06:33 AM    HCT 29.0 12/20/2022 01:31 PM    MCV 91.4 12/30/2022 06:33 AM    MCH 30.7 12/30/2022 06:33 AM    MCHC 33.6 12/30/2022 06:33 AM    RDW 16.6 12/30/2022 06:33 AM    PLT 66 12/30/2022 06:33 AM    MPV NOT CALC 12/30/2022 06:33 AM     Lab Results   Component Value Date/Time     12/30/2022 11:43 AM    K 4.3 12/30/2022 11:43 AM    K 4.7 12/21/2022 09:23 AM     12/30/2022 11:43 AM    CO2 22 12/30/2022 11:43 AM    BUN 32 12/30/2022 11:43 AM    CREATININE 1.2 12/30/2022 11:43 AM    LABALBU 2.2 12/30/2022 11:43 AM    LABALBU 4.3 01/24/2011 01:15 PM    CALCIUM 8.7 12/30/2022 11:43 AM    GFRAA >60 07/22/2022 01:42 PM    LABGLOM 46 12/30/2022 11:43 AM     Lab Results   Component Value Date/Time    PROTIME 13.6 12/28/2022 11:37 AM    INR 1.3 12/28/2022 11:37 AM     No results for input(s): PROBNP in the last 72 hours. No results for input(s): PROCAL in the last 72 hours. This SmartLink has not been configured with any valid records. Micro:  No results for input(s): CULTRESP in the last 72 hours. No results for input(s): LABGRAM in the last 72 hours. No results for input(s): LEGUR in the last 72 hours. No results for input(s): STREPNEUMAGU in the last 72 hours. No results for input(s): LP1UAG in the last 72 hours.   Respiratory cultures    Oral Pharyngeal Hilda absent Abnormal     Smear, Respiratory Polymorphonuclear leukocytes not seen   Epithelial cells not seen   Moderate yeast   Moderate gram positive rods Diphtheroid like   Few Gram negative rods    Organism Enterobacter cloacae complex Abnormal     CULTURE, RESPIRATORY Moderate growth    Organism Candida albicans Abnormal     CULTURE, RESPIRATORY Heavy growth         Latest Reference Range & Units Most Recent   (1,3)-Beta-D-Glucan (Fungitell) Interpretation Negative  Positive !  12/19/22 04:02   !: Data is abnormal      ABGs 12/29/2022:     Latest Reference Range & Units 12/29/22 04:36   Source:  Blood Arterial   pH, Blood Gas 7.350 - 7.450  7.453 (H)   PCO2 35.0 - 45.0 mmHg 34.1 (L)   pO2 75.0 - 100.0 mmHg 127.2 (H)   HCO3 22.0 - 26.0 mmol/L 23.3   Base Excess -3.0 - 3.0 mmol/L -0.3   O2 Sat 92.0 - 98.5 % 98.2   THB,THB 11.5 - 16.5 g/dL 9.8 (L)   O2Hb 94.0 - 97.0 % 97.6 (H)   Carboxy-Hgb 0.0 - 1.5 % 0.4   METHB,METHB 0.0 - 1.5 % 0.2   HHb 0.0 - 5.0 % 1.8   AaDO2 mmHg 108.8   RI(T)  0.86   FIO2 % 40.0   PO2/FIO2 mmHg/% 3.18   (H): Data is abnormally high  (L): Data is abnormally low     Assessment:    Acute hypoxic respiratory failure - requiring intubation 12/19/22  Acute Respiratory Distress Syndrome  S/p RRT 12/17/22 for respiratory distress and increasing oxygen demands  R loculated pleural effusion s/p IR chest tube 12/17/22 with minimal drainage, s/p R VATS turned partial open thoracotomy 12/20/22 with insertion of chest tubes x 3  S/p bronchoscopy-friable mucosa 12/20/22  Pneumonia, Hospital acquired-Enterobacter cloacae-Invanz started 12/19/22  Massive hiatal hernia s/p repair 12/9 at [facility]  Esophageal perforation secondary to hiatal hernia repair  Acute kidney injury  L chest tube placement 12/21/22  CVVHD initiated 12/21/22  Positive fungitell-eraxis started 12/21/22      Plan:   Continue mechanical ventilation, wean FiO2 as able  Aggressive bronchopulmonary hygiene  Fluid removal with CVVHD initiated per Nephrology  TPN- management per surgery  Chest tube x 4 management per surgery  For EGD with stent for esophageal perforation with Dr. Onesimo Dior today  Antimicrobials per ID-Invanz started 12/19/22, Eraxis started 12/21/22  Rest per ICU team  Prognosis guarded    This plan of care was reviewed in collaboration with Dr. Jackie Huntley. Electronically signed by Diannia Osgood, APRN - CNP on 12/30/2022 at 1:46 PM    I personally saw, examined, and cared for the patient. Labs, medications, radiographs reviewed. I agree with history exam and plans detailed in NP note. Remains critically ill with guarded prognosis. Continue vent support. EGD has been planned. Reviewed with critical care.   Pulmonary will continue to follow along with you.    Jigna Antis, DO

## 2022-12-30 NOTE — PLAN OF CARE
Problem: Chronic Conditions and Co-morbidities  Goal: Patient's chronic conditions and co-morbidity symptoms are monitored and maintained or improved  Outcome: Progressing     Problem: Safety - Adult  Goal: Free from fall injury  Outcome: Progressing     Problem: Pain  Goal: Verbalizes/displays adequate comfort level or baseline comfort level  Outcome: Progressing     Problem: Nutrition Deficit:  Goal: Optimize nutritional status  Outcome: Progressing     Problem: Skin/Tissue Integrity  Goal: Absence of new skin breakdown  Description: 1. Monitor for areas of redness and/or skin breakdown  2. Assess vascular access sites hourly  3. Every 4-6 hours minimum:  Change oxygen saturation probe site  4. Every 4-6 hours:  If on nasal continuous positive airway pressure, respiratory therapy assess nares and determine need for appliance change or resting period.   Outcome: Progressing     Problem: Respiratory - Adult  Goal: Achieves optimal ventilation and oxygenation  Outcome: Progressing     Problem: Cardiovascular - Adult  Goal: Maintains optimal cardiac output and hemodynamic stability  Outcome: Progressing     Problem: Hematologic - Adult  Goal: Maintains hematologic stability  Outcome: Progressing

## 2022-12-30 NOTE — PROGRESS NOTES
ENDOSCOPIC SURGERY  DAILY PROGRESS NOTE  12/30/2022    CHIEF COMPLAINT:  Chief Complaint   Patient presents with    Shortness of Breath     Sent from [facility] for SOB patient went for hernia repair patient on 6L NC on arrival       SUBJECTIVE:  No acute events overnight. Currently on low dose pressors. Stent  unable to be placed yesterday, plan for today with PEGJ. OBJECTIVE:  BP (!) 143/67   Pulse (!) 118   Temp 98.8 °F (37.1 °C) (Esophageal)   Resp 25   Ht 5' 2\" (1.575 m)   Wt 122 lb 6 oz (55.5 kg)   SpO2 98%   BMI 22.38 kg/m²     GENERAL:  Intubated, minimally responsive    LUNGS: Mechanically ventilated. R Chest tube with blue output, L chest with serosang output. Right mediastinal drain with blue output. CARDIOVASCULAR: RR  ABDOMEN:  Soft, non-distended, non-tender. No guarding, rigidity, rebound. Incisions clean with ecchymosis  EXT: warm and well perfused    ASSESSMENT/PLAN:  65 y.o. female with respiratory failure and complex right-sided effusion s/p robotic hiatal hernia repair 12/9 without signs of esophageal perforation or leak however now +fungitell and growing candida in chest. S/p VATS & Mini-thoractomy & bronchoscopy with open drainage of fluid collection 12/20, left CT placement 12/21 with ARDS & renal failure s/p chemical paralysis, flolan, CVVHD 12/21  Bronchoscopy intra-op showed friable muscosa w/o any thick purulent secretions. Bedside leak test showed active methylene blue extravasation into both right sided tubes. Remains critically ill  Discontinue TF  Re-ordered TPN  Continue chest tubes to suction. Appreciate critical care assistance with medical management.   Daily CXR  Plan for EGD w/ stent placement 12/29    Electronically signed by Tatiana Hudson MD on 12/30/2022 at 8:05 AM

## 2022-12-30 NOTE — PROGRESS NOTES
[facility]   Department of Internal Medicine   MICU Progress Note    Patient:  Nasreen Young 65 y.o. female   MRN: [id number]       Date of Service: 2022    Allergy: Benadryl [diphenhydramine] and Ibuprofen  CC hypoxia   Subjective   Intubated, no sedation  Plan for CT head today  Open eyes but does not follow  Plan for EGD with esophageal stenting today  Will obtain CT chest abdomen pelvis along with CT head. Currently on CRRT with no net removal  On low-dose Levophed at 2 mics  We will try to change HD catheter and art line and triple-lumen catheter. Temps of 98.8  No overnight event. Objective     TEMPERATURE:  Current - Temp: 98.8 °F (37.1 °C); Max - Temp  Av.7 °F (37.1 °C)  Min: 97 °F (36.1 °C)  Max: 99.7 °F (37.6 °C)    RESPIRATIONS RANGE: Resp  Av.9  Min: 20  Max: 31    PULSE RANGE: Pulse  Av.9  Min: 99  Max: 144    BLOOD PRESSURE RANGE:  Systolic (49ERJ), MXH:824 , Min:103 , WOW:952   ; Diastolic (89DGO), XRU:06, Min:47, Max:67      PULSE OXIMETRY RANGE: SpO2  Av.3 %  Min: 98 %  Max: 100 %    I & O - 24hr:    Intake/Output Summary (Last 24 hours) at 2022 0823  Last data filed at 2022 0800  Gross per 24 hour   Intake 3167 ml   Output 3272 ml   Net -105 ml     I/O last 3 completed shifts: In: 5988 [I.V.:5126]  Out: 5393 [Chest Tube:170] I/O this shift:  In: 117 [I.V.:117]  Out: 101    Weight change: 3.8 oz (0.109 kg)    Physical Exam:  General Appearance: intubated/no sedated on vent, does not look in distress. Anasarca noted open eyes, does not follow. HEENT:  Head: Normocephalic, no lesions, without obvious abnormality. Eye: Normal external eye, conjunctiva, lids cornea, FILEMON. Nose: Normal external nose, mucus membranes and septum. Neck / Thyroid: Supple, no masses, nodes, nodules or enlargement.   Neck: no adenopathy, no carotid bruit, no JVD, supple, symmetrical, trachea midline, and thyroid not enlarged, symmetric, no tenderness/mass/nodules  Lung: Lung sounds diminished, no wheezing noted. 3 chest tube    Heart: regular rate and rhythm, S1, S2 normal, no murmur, click, rub or gallop  Abdomen: soft, non-tender; bowel sounds normal; no masses,  no organomegaly incision noted, intact. Extremities:  extremities normal, atraumatic, no cyanosis or edema 3+ pitting edema bilaterally. Musculokeletal: No joint swelling, no muscle tenderness. ROM normal in all joints of extremities.    Neurologic: Mental status: intubated/no sedation on vent, limited neuro exam       Medications     Continuous Infusions:   PN-Adult  3 IN 1 Central Line (Custom)      PN-Adult  3 IN 1 Central Line (Custom) 50 mL/hr at 12/29/22 1756    sodium chloride      sodium chloride 10 mL/hr at 12/29/22 4825    norepinephrine 2 mcg/min (12/30/22 0517)    prismaSATE BGK 4/0/1.2 2,000 mL/hr at 12/28/22 0659    sodium chloride 100 mL/hr at 12/29/22 0227    calcium chloride CRRT infusion 55 mL/hr at 12/30/22 0028    dextrose      sodium chloride       Scheduled Meds:   phosphorus  500 mg Oral Once    [Held by provider] midodrine  15 mg Oral TID WC    insulin lispro  0-16 Units SubCUTAneous Q4H    lidocaine PF  5 mL IntraDERmal Once    sodium chloride flush  5-40 mL IntraVENous 2 times per day    heparin flush  3 mL IntraVENous 2 times per day    ertapenem (INVanz) IVPB  1,000 mg IntraVENous Q24H    [Held by provider] Vitamin D  1,000 Units Oral Daily    heparin (porcine)  5,000 Units SubCUTAneous Q8H    artificial tears   Both Eyes Q4H    anidulafungin  100 mg IntraVENous Q24H    pantoprazole (PROTONIX) 40 mg injection  40 mg IntraVENous Q12H    sodium chloride flush  5-40 mL IntraVENous 2 times per day    heparin flush  3 mL IntraVENous 2 times per day    Arformoterol Tartrate  15 mcg Nebulization BID    budesonide  0.5 mg Nebulization BID    albuterol  2.5 mg Nebulization Q4H WA    [Held by provider] metoprolol  5 mg IntraVENous Q6H    [Held by provider] rosuvastatin 10 mg Oral Nightly    [Held by provider] levothyroxine  50 mcg Oral Daily     PRN Meds: sodium chloride, fentanNYL, sodium chloride flush, sodium chloride, heparin flush, potassium chloride, magnesium sulfate, calcium gluconate **OR** calcium gluconate **OR** calcium gluconate **OR** calcium gluconate, sodium phosphate IVPB **OR** sodium phosphate IVPB **OR** sodium phosphate IVPB **OR** sodium phosphate IVPB, glucose, dextrose bolus **OR** dextrose bolus, glucagon (rDNA), dextrose, sodium chloride flush, sodium chloride, heparin flush, ondansetron **OR** ondansetron, [DISCONTINUED] acetaminophen **OR** acetaminophen, ipratropium-albuterol  Nutrition:   TPN    Labs and Imaging Studies     CBC:   Recent Labs     12/28/22  1137 12/28/22  1825 12/29/22  0539 12/30/22  0633   WBC 17.0*  --  15.0* 15.9*   RBC 2.36*  --  2.97* 2.67*   HGB 7.0* 8.6* 8.9* 8.2*   HCT 20.8* 25.3* 25.2* 24.4*   MCV 88.1  --  84.8 91.4   MCH 29.7  --  30.0 30.7   MCHC 33.7  --  35.3* 33.6   RDW 15.6*  --  15.6* 16.6*   PLT 89*  --  67* 66*   MPV NOT CALC  --  14.0* NOT CALC       BMP:    Recent Labs     12/29/22  1804 12/29/22  2345 12/30/22  0433    137 137   K 4.2 4.2 4.3    105 107   CO2 20* 24 24   BUN 32* 32* 31*   CREATININE 1.1* 1.0 1.1*   GLUCOSE 221* 169* 220*   CALCIUM 8.0* 7.7* 8.1*   PROT 5.5* 5.2* 5.3*   LABALBU 2.3* 2.3* 2.1*   BILITOT 2.5* 2.5* 3.1*   ALKPHOS 151* 144* 155*   AST 54* 58* 58*   ALT 40* 40* 44*       LIVER PROFILE:   Recent Labs     12/29/22  1804 12/29/22  2345 12/30/22  0433   AST 54* 58* 58*   ALT 40* 40* 44*   BILITOT 2.5* 2.5* 3.1*   ALKPHOS 151* 144* 155*       PT/INR:   Recent Labs     12/28/22  1137   PROTIME 13.6*   INR 1.3         APTT:   No results for input(s): APTT in the last 72 hours.       Fasting Lipid Panel:    Lab Results   Component Value Date/Time    CHOL 45 12/19/2022 12:00 PM    TRIG 143 12/28/2022 04:50 AM    HDL 12 12/19/2022 12:00 PM       Cardiac Enzymes:    Lab Results   Component Value Date    CKTOTAL 59 01/13/2020    TROPONINI <0.01 01/13/2020       Notable Cultures:      Blood cultures   Blood Culture, Routine   Date Value Ref Range Status   12/17/2022 5 Days no growth  Final     Respiratory cultures No results found for: RESPCULTURE   Gram Stain Result   Date Value Ref Range Status   12/20/2022 Refer to ordered Gram stain for results  Final     Urine   Urine Culture, Routine   Date Value Ref Range Status   07/22/2022 <10,000 CFU/mL  Mixed gram positive organisms    Final     Legionella No results found for: LABLEGI  C Diff PCR No results found for: CDIFPCR  Wound culture/abscess: No results for input(s): WNDABS in the last 72 hours.  Tip culture:No results for input(s): CXCATHTIP in the last 72 hours.     Antibiotic  Days  Day started   Ertapenem      Anidulafungin                       Oxygen:     Vent Information  Ventilator ID: [id number]  Equipment Changed: Humidification  Vent Mode: AC/VC    Additional Respiratory Assessments  Heart Rate: (!) 118  Resp: 25  SpO2: 98 %  Position: Semi-Bach's  Humidification Source: Heated wire  Humidification Temp: 37  Circuit Condensation: Not drained  Airway Type: ET  Airway Size: 7.5  Cuff Pressure (cm H2O): 29 cm H2O  Skin Barrier Applied: Yes     Nasal cannula L/min     Face mask %     Reservoirs mask %       ABG     PH  7.46   PCO2  32   PO2  137   HCO3  23   Sat%  97   FIO2     DATES 12/30/22       Lines:  Site  Day  Date inserted     TLC   Rfemoral    12/17/22      PICC              Arterial line   Left brachial    12/20/22     Peripheral line              HD cath Left IJ    12/21/22     [REMOVED] Urinary Catheter 12/15/22 Peterson-Output (mL): 0 mL    Imaging Studies:    XR CHEST PORTABLE   Final Result   Stable chest radiograph with bibasilar opacities related to atelectasis or   pneumonia notable on the right.         XR CHEST PORTABLE   Final Result   Stable chest radiograph with pulmonary vascular congestion and bibasilar   opacities  related to atelectasis or pneumonia. XR ABDOMEN (KUB) (SINGLE AP VIEW)   Final Result   NG tube again identified within the stomach, when compared to CT scan of the   abdomen pelvis performed 12/25/2022. There is oral contrast within the   stomach, however, there is oral contrast around the GE junction, suspicious   for focal perforation at the GE junction. Oral contrast is also noted   tracking along the inferior right-sided chest tube, indicating extravasation   of contrast into the pleural space. The findings were discussed with Naseem Gillespie, the patient's bedside nurse, at   11:34 a.m.         XR CHEST PORTABLE   Final Result   No interval change         XR CHEST PORTABLE   Final Result   No significant interval change, when compared to the previous study performed   1 day earlier. CT HEAD WO CONTRAST   Final Result   1. No acute intracranial abnormality. 2.  Chronic right maxillary ethmoid and frontal sinusitis. Chronic sphenoid   sinusitis. Fluid opacification of both mastoid air cells. 3.  No CT evidence of large territory infarct. MCA vessels appear normal in   density. CT ABDOMEN PELVIS WO CONTRAST Additional Contrast? None   Final Result   1. No evidence of bowel obstruction or significant ileus. 2.  Gastrostomy tube is located in the stomach. Oral contrast material is   noted in the dependent portion of the gastric cardia. No abnormal bowel   dilatation. Oral contrast material noted in the right and left colon without   colonic distension. 3.  Mild abdominal ascites. 4.  Patchy asymmetric gland glass opacities in the lung bases with some areas   of confluent consolidation in the right lower lobe. Trace pleural effusions. Bilateral chest tubes. 5.  Very small left pneumothorax. XR CHEST PORTABLE   Final Result   1. Asymmetric bilateral airspace opacities, not significantly changed. 2.  No of visible pneumothorax.       3. Support devices appear stable. XR CHEST PORTABLE   Final Result   Suggestion of some partial resolution of bilateral basilar infiltrates         XR CHEST PORTABLE   Final Result   No interval change         XR CHEST PORTABLE   Final Result   Stable appearance of the chest compared to 12/21/2022. XR CHEST PORTABLE   Final Result   Adequately positioned left internal jugular hemodialysis catheter. The   remainder of the exam including additional lines and tubes are stable. XR CHEST PORTABLE   Final Result   Interval left chest tube placement, no definite pneumothorax. Bilateral lung infiltrates and pleural effusions, not significantly changed. These infiltrates may be due to pulmonary edema, pneumonia and/or ARDS. Stable borderline enlargement of the cardiac silhouette. XR CHEST PORTABLE   Final Result   Extensive bilateral airspace disease with interval progression on the right. XR CHEST PORTABLE   Final Result   Postoperative changes with the diffuse bilateral infiltrates and effusions   with improvement in the right side likely CHF/edema. Pneumonia is less   likely. Tubes and catheters as noted. XR CHEST PORTABLE   Final Result   Low lying endotracheal tube which needs withdrawn approximately 2 cm. XR CHEST PORTABLE   Final Result   Enteric tube tip in the body of the stomach. The roseann is not well seen. ET tube tip most likely within 1.5 cm of the   roseann. XR CHEST PORTABLE   Final Result   Pleural effusions with adjacent infiltrate and or atelectasis showing   slightly improved aeration on the left and slightly poor aeration on the   right. XR CHEST PORTABLE   Final Result   Interval improvement in the opacification of the left hemithorax, with   aeration in the left upper lung field when compared to the previous study   performed 1 day earlier. No other significant interval change.          XR CHEST PORTABLE Final Result   New right internal jugular central venous catheter terminating near the   SVC-right atrium junction. No post-procedure pneumothorax. Worsening   opacification of the left hemithorax and stable findings on the right. CT CHEST WO CONTRAST   Final Result   Large right pleural effusion of mixed density to suggest complex effusion. There is a pigtail catheter seen in the pleural space posteriorly and   inferiorly. Small to moderate size left pleural effusion with consolidative   infiltrate throughout the left upper and lower lung field and aerated   portions revealing nodular ground-glass opacity to suggest an infectious   process. Nodular ground-glass opacity inferiorly within the aerated right   upper and lower lung field concerning for an infectious process as well. There is less consolidative infiltrate identified within the right lower lobe. XR CHEST PORTABLE   Final Result   1. No changes position of the right-sided chest tube. No right-sided   pneumothorax. The most of the right-sided pleural effusion has been drained,   if present to be discrete or small. 2.  Moderate left-sided pleural effusion. If quantification of left-sided   pleural effusion will be needed for clinical management bedside ultrasound or   left lateral decubitus views of the chest can be helpful. 3.  No pneumothorax on the right or on the left. 4.  Large size diaphragmatic across the midline. XR CHEST PORTABLE   Final Result   1. Increased parenchymal density projecting over the left lower lung   concerning for atelectasis and/or consolidation      2. No signs of right effusion      3. Parenchymal density projecting over the right lower lung concerning for   atelectasis         IR GUIDED PERC PLEURAL DRAIN W CATH INSERT   Final Result   1. Successful ultrasound-guided right thoracentesis.       2.  Successful placement of an 8 Sudanese pleural drain         XR CHEST PORTABLE Final Result   1. Right-sided pigtail catheter in the right base. 2.  No conspicuous right-sided pleural effusions identified. No right-sided   pneumothorax. 3.  Cannot exclude a small left-sided pleural effusion. 4.  Large size diaphragmatic hernia across the midline causing compression   atelectasis in the medial aspect of both lungs. FL ESOPHAGRAM   Final Result   1. No evidence of esophageal perforation or leak. 2. Decreased peristalsis. No evidence of esophageal mass or obstructing   lesion. 3. Small sliding hiatus hernia. No evidence of GE reflux. The gastric   foreign disc does appear constricted as it passes through the esophageal   hiatus. US CHEST INCLUDING MEDIASTINUM   Final Result   Probable complex loculated right pleural effusion. Simple left pleural effusion. CTA PULMONARY W CONTRAST   Final Result   No evidence of pulmonary embolism. Bilateral lower lobe pulmonary consolidations and pleural effusions seen. Deviation of the mediastinum to the left is visualized. Mild soft tissue prominence visualized surrounding the esophagus with   narrowing at the gastroesophageal junction but appears to be due to external   compression. Cannot rule left soft tissue mass at this location. Wall thickening visualized in the gastric outlet with mild prominence of the   stomach seen, this could be artifactual.      Distended gallbladder is seen. Degenerative bone changes, postoperative changes and multilevel vertebral   augmentation seen. No evidence of acute abdominal/pelvic pathology. CT ABDOMEN PELVIS W IV CONTRAST Additional Contrast? Oral   Final Result   No evidence of pulmonary embolism. Bilateral lower lobe pulmonary consolidations and pleural effusions seen. Deviation of the mediastinum to the left is visualized.       Mild soft tissue prominence visualized surrounding the esophagus with   narrowing at the gastroesophageal junction but appears to be due to external   compression. Cannot rule left soft tissue mass at this location. Wall thickening visualized in the gastric outlet with mild prominence of the   stomach seen, this could be artifactual.      Distended gallbladder is seen. Degenerative bone changes, postoperative changes and multilevel vertebral   augmentation seen. No evidence of acute abdominal/pelvic pathology. XR CHEST PORTABLE   Final Result   1. Consolidation seen within the left lung base with blunting the left   costophrenic angle which could represent pneumonia or atelectasis   2. A trace left pleural effusion cannot be excluded   3. Large right diaphragmatic hernia/hiatal hernia which was noted on the   patient's previous CT of the chest of 07/22/2022. XR CHEST PORTABLE    (Results Pending)   CT HEAD WO CONTRAST    (Results Pending)   CT CHEST WO CONTRAST    (Results Pending)   CT ABDOMEN PELVIS WO CONTRAST Additional Contrast? None    (Results Pending)   XR CHEST PORTABLE    (Results Pending)        APRN- CNP Assessment and PLan   In summary 65 y.o. female with significant past medical history of hypothyroidism, breast cancer, TIA, hiatal hernia underwent hiatal hernia repair on 12/9/22 and developed acute respiratory distress and was transfer to [facility] on 12/14/22 for further management. Patient was seen by nephrology, pulmonology, general surgery. Patient noted to have worsening hypoxia requiring 15 L of oxygen, A. fib RVR therefore transferred to MICU and critical care was consulted.      Assessment:  Acute hypoxemic respiratory failure secondary to aspiration pneumonia/pleural effusion/bibasilar consolidation now on ventilation (12/19/22)   ARDS   Bilateral pleural effusion right greater than left status post right thoracentesis with pigtail chest tube placement on (12/16/22 IR)   Septic shock  2/2 PNA/loculated pleural effusion  Loculated pleural effusion s/p VATS and Mini-thoracotomy with open drainage of fluid collection 12/20   Esophageal Leak ==> plan for EGD 12/30/2022. Thrombocytopenia   AFIB RVR  PAWAN secondary to dehydration  [place] ptosis leukocytosis with left shift  Acute anemia  History of massive hiatal hernia  status post repair on 12/9/2022  History of TIA  History of breast cancer  History of hypothyroidism  History of lumbar compression fracture with history of PLIF T12-L2  History of anxiety     Plan:  -ventilator (day #12), titrate FiO2 keep SPO2 goal 92%  -Off sedation, no improvement in mentation, will check CT of head. Will obtain CT chest and abdomen pelvis. - off levophed, titrate to keep MAP >65mmHg   - loculated pleural effusion s/p VATS and mini thoracotomy with open drainage of fluid collection (12/20/22) s/p 2 chest tube ;R effusion on left side s/p Left chest tube (12/21/22)   -3 chest tube with minimal drainage, noted to have tube feeds through Right chest tube. General surgery resident administer methylene blue and gastrografin contrast with x-ray, positive for Esophageal Leak ==>  Plan for esophageal stenting by Dr. Abel Walls with EGD on 12/30/2022  -Continue scheduled bronchodilators Brovana and Pulmicort in addition to the DuoNebs, mucomyst   -Echo showed EF of 55%, indeterminate diastolic function, moderate dilated right ventricle with reduced function, moderate tricuspid regurgitation. Echo in January 2021 showed EF of 60 to 65%,   -Procalcitonin 5.70==> 1.57 today; RVP/COVID-19 PCR negative, MRSA nares negative, blood culture (NGTD) respiratory culture (Enterobacter Colace complex, Candida albicans) urine antigen negative. Body fluid hematoma==> enterobacter, candida albicans; Respiratory culture (heavy growth Enterobacter)  - Consult to ID, input appreciated. - Ertapenem and anidulafungin   -General surgery surgery consulted, input appreciated.   -Strict NPO with no medications, hold all oral medication and TUBE FEEDS   -PAWAN, nephrology following, input appreciated- low urine output, metabolic acidosis, will place temp HD catheter and may need CRRT with no net removal.   -Strict intake and output  -Labs and chest x-ray in a.m.  -F/E/N KVO/ replace lytes/ Npo ; TPN   - DVT/GI scds/lovenox/protonix  - Code status: DNR- limited No CPR, ok to everything.        KAR Tavarez-CNP   Critical Care     Discussed case with Attending Physician: Carlos Sweeney

## 2022-12-30 NOTE — PROGRESS NOTES
Comprehensive Nutrition Assessment    Type and Reason for Visit:  Reassess    Nutrition Recommendations/Plan:     Continue NPO, Continue Current Parenteral Nutrition- regimen meets 100% et needs    K/Phos/Mag & TG WNL    Pending PEG-J placement d/t +Esophageal leak        Malnutrition Assessment:  Malnutrition Status: At risk for malnutrition  (12/26/22 1049)    Context:  Acute Illness     Findings of the 6 clinical characteristics of malnutrition:  Energy Intake:  75% or less of estimated energy requirements for 7 or more days (average)  Weight Loss:  Unable to assess (fluid shifts/ wt fluctuations)     Body Fat Loss:  No significant body fat loss     Muscle Mass Loss:  No significant muscle mass loss    Fluid Accumulation:  No significant fluid accumulation     Strength:  Not Performed    Nutrition Assessment:    Pt remains at nutritional risk d/t ongoing intubation 2/2 post-operative PNA/ ARDS/ B/L pleural effusions s/p thoracentesis, L VATS/ mini-thoracotomy, & bronchs (hernia sx @ [facility] 12/9). Noted PAWAN on CVVHD. PMHx Breast CA & TIA. TF currently held d/t esophageal perf s/p bedside leak test (noted unable to perform stents pending PEG-J today). TPN re-initiated & running. Current order is appropriate, will monitor & follow.     Nutrition Related Findings:    Pt remains intubated, hypotension on low dose pressor x 1, -I/O's 4L, +2 weeping edema, CT x 4,  hypoactive BS, +esophageal perf, OGT LIS, TPN running, CVVHD (K/Phos WNL)     Wound Type: Surgical Incision       Current Nutrition Intake & Therapies:    Current Parenteral Nutrition Orders:  Type and Formula: 3-in-1 Custom   Lipids: None  Duration: Continuous  Rate/Volume: 1200 ml tv @ 50 ml/hr  Current PN Order Provides: 80 gm AA, 201 gm dextrose, 29 gm lipid, & 1300 total kcals    Anthropometric Measures:  Height: 5' 2\" (157.5 cm)  Ideal Body Weight (IBW): 110 lbs (50 kg)    Admission Body Weight: 147 lb (66.7 kg) (12/21 first measured)  Current Body Weight: 122 lb 6 oz (55.5 kg) (12/30 bedscale, noted 25lb wt loss since adm however possibly r/t fluid shifts/ scale error), 136.4 % IBW. Current BMI (kg/m2): 22.4  Usual Body Weight: 153 lb 6 oz (69.6 kg) (measured wt from [facility] 12/23/21, wt hx appears stable)  % Weight Change (Calculated): -3.8  BMI Categories: Normal Weight (BMI 22.0 to 24.9) age over 72    Estimated Daily Nutrient Needs:  Energy Requirements Based On: Formula  Weight Used for Energy Requirements: Current  Energy (kcal/day): PS3B 1210; 4670-6540  Weight Used for Protein Requirements: Ideal  Protein (g/day): 1.5-1.8 g/kg IBW; 75-90  Fluid (ml/day): per critical care    Nutrition Diagnosis:   Inadequate oral intake related to altered GI function (esophageal perf) as evidenced by NPO or clear liquid status due to medical condition, nutrition support - parenteral nutrition    Nutrition Interventions:     Nutrition Education/Counseling: Education not appropriate  Coordination of Nutrition Care: Continue to monitor while inpatient      Goals:  Previous Goal Met: Progressing toward Goal(s)  Goals: Tolerate nutrition support at goal rate      Nutrition Monitoring and Evaluation:     Food/Nutrient Intake Outcomes: Parenteral Nutrition Intake/Tolerance  Physical Signs/Symptoms Outcomes: Biochemical Data, Nutrition Focused Physical Findings, Skin, Weight, GI Status, Fluid Status or Edema, Hemodynamic Status    Discharge Planning:     Too soon to determine     Jennifer Farrell RD, LD  Contact: Ext [phone]

## 2022-12-30 NOTE — PROGRESS NOTES
GENERAL SURGERY  DAILY PROGRESS NOTE  12/30/2022    CHIEF COMPLAINT:  Chief Complaint   Patient presents with    Shortness of Breath     Sent from [facility] for SOB patient went for hernia repair patient on 6L NC on arrival       SUBJECTIVE:  No acute events overnight. Currently on low dose pressors. Stent unable to be placed yesterday, plan for today with PEGJ. OBJECTIVE:  BP (!) 143/67   Pulse (!) 118   Temp 98.8 °F (37.1 °C) (Esophageal)   Resp 25   Ht 5' 2\" (1.575 m)   Wt 122 lb 6 oz (55.5 kg)   SpO2 98%   BMI 22.38 kg/m²     GENERAL:  Intubated, minimally responsive    LUNGS: Mechanically ventilated. R Chest tube with blue output, L chest with serosang output. Right mediastinal drain with blue output. CARDIOVASCULAR: RR  ABDOMEN:  Soft, non-distended, non-tender. No guarding, rigidity, rebound. Incisions clean with ecchymosis  EXT: warm and well perfused    ASSESSMENT/PLAN:  65 y.o. female with respiratory failure and complex right-sided effusion s/p robotic hiatal hernia repair 12/9 without signs of esophageal perforation or leak however now +fungitell and growing candida in chest. S/p VATS & Mini-thoractomy & bronchoscopy with open drainage of fluid collection 12/20, left CT placement 12/21 with ARDS & renal failure s/p chemical paralysis, flolan, CVVHD 12/21  Bronchoscopy intra-op showed friable muscosa w/o any thick purulent secretions. Bedside leak test showed active methylene blue extravasation into both right sided tubes. Remains critically ill  Discontinue TF  Re-ordered TPN  Continue chest tubes to suction. Appreciate critical care assistance with medical management. Daily CXR  Appreciate endoscopy assistance, plan for EGD w/ stent placement and PEGJ 12/30    Electronically signed by Benjamín Musa MD on 12/30/2022 at 8:04 AM    Attending Note:    Patient seen/examined 12/30/2022. I discussed case with the resident and reviewed all relevant labs/imaging.  Agree with resident's assessment and plan with the following corrections/additions which summarizes my clinical findings and independent assessment: Care per surgical endoscopy once stent in place.

## 2022-12-30 NOTE — ANESTHESIA POSTPROCEDURE EVALUATION
Department of Anesthesiology  Postprocedure Note    Patient: Ignacia Hull  MRN: [id number]  YOB: 1945  Date of evaluation: 12/30/2022      Procedure Summary     Date: 12/30/22 Room / Location: [facility] OR 05 / [facility]    Anesthesia Start: 1125 Anesthesia Stop:     Procedures:       EGD ESOPHAGOGASTRODUODENOSCOPY, WITH ESOPHAGEAL STENT PLACEMENT (Esophagus)      EGD ESOPHAGOGASTRODUODENOSCOPY WITH PEG TUBE INSERTION (Left: Abdomen) Diagnosis:       Esophageal perforation      (/)    Surgeons: Arley Dodd MD Responsible Provider: Dimitris Rosa MD    Anesthesia Type: general ASA Status: 4          Anesthesia Type: No value filed.     Faina Phase I: Faina Score: 5    Faina Phase II:        Anesthesia Post Evaluation    Patient location during evaluation: ICU  Patient participation: complete - patient cannot participate  Level of consciousness: sedated and ventilated  Pain score: 0  Airway patency: patent  Nausea & Vomiting: no nausea  Complications: no  Cardiovascular status: hemodynamically stable  Respiratory status: ventilator  Hydration status: stable

## 2022-12-30 NOTE — PROGRESS NOTES
[facility] PROGRESS NOTE      Chief complaint: Follow-up of pneumonia    This patient is a 74-year-old female with history of bilateral breast cancer, DM, hiatal hernia repair on 12/09, presented on 12/14 with shortness of breath for 2 days accompanied by productive cough and malaise prior to her hiatal hernia repair, thought to have large postoperative seroma after a type IV hiatal hernia repair. On admission, she was afebrile and hemodynamically stable with no leukocytosis. CT chest, abdomen and pelvis showed soft tissue density in the posterior medial aspect of the right lower lung field with deviation of the mediastinum to the left, bilateral lower lobe consolidation and pleural effusions, mild soft tissue prominence surrounding the esophagus with narrowing at the gastroesophageal junction appearing to be due to external compression, wall thickening in the gastric outlet with mild prominence of the stomach, distended gallbladder, no evidence of acute abdominal/pelvic pathology. Esophagram was negative for esophageal perforation. She underwent right-sided thoracentesis on 12/16 during which 105 mL of serous fluid was removed. Pleural fluid gram stain and culture showed rare polymorphonuclear leukocytes, no epithelial cells, no organisms, no growth. She was transferred to MICU on 12/17 for worsening hypoxemia. Repeat CT chest on 12/17 showed large right pleural effusion of mixed density suggestive of complex effusion with pigtail catheter in the pleural space posteriorly and inferiorly, small to moderate sized left pleural effusion with consolidative infiltrate throughout left upper and lower lung field and aerated portions revealing nodular groundglass opacity, nodular groundglass opacity inferiorly within the aerated right upper and lower lung field, less consolidative infiltrate within the right lower lobe.   Respiratory gram stain and culture showed no polymorphonuclear leukocytes, no epithelial cells, moderate yeast, moderate gram-positive diphtheroid-like rods, few gram-negative rods, moderate growth of Enterobacter cloacae, heavy growth of Candida albicans. Urine Streptococcus pneumoniae and Legionella antigens were negative. Respiratory pathogen PCR panel and MRSA nares culture were negative. Blood cultures showed no growth to date. She received a dose of ceftriaxone and doxycycline on admission. Ampicillin-sulbactam was started on admission and then switched to piperacillin-tazobactam on 12/17 then switched to meropenem on 12/19. Vancomycin was started on 12/17. She underwent right VATS, mini-thoracotomy with drainage and fenestration of mediastinal seroma/hematoma, chest tube and mediastinal drain placement on 12/20 during which 1100 mL serous fluid and dark blood (no succus or bile) extending into the mediastinum was drained via mini-thoracotomy. Hematoma/seroma fluid Gram stain and culture showed abundant polymorphonuclear leukocytes, no epithelial cells, no organisms, rare growth of Enterobacter cloacae (susceptible to fluoroquinolones and cotrimoxazole) and light growth of Candida albicans. Serum 1,3 beta-d-glucan on 12/18 was elevated. Endotracheal aspirate Gram stain and culture on 12/25 showed few polymorphonuclear leukocytes, few epithelial cells, no organisms, heavy growth of Enterobacter cloacae. She was noted to have increasing output from right sided chest tubes confirmed to have esophageal perforation with active methylene blue extravasation into both right sided tubes on bedside leak test on 12/27. Subjective: Patient was seen and examined. She remains in critical condition, intubated and sedated, back on vasopressor support. Afebrile.       Objective:  BP (!) 103/52   Pulse (!) 122   Temp 98.8 °F (37.1 °C) (Esophageal)   Resp 28   Ht 5' 2\" (1.575 m)   Wt 122 lb 6 oz (55.5 kg)   SpO2 98%   BMI 22.38 kg/m²   Constitutional: Intubated, no MV dyssynchrony  Respiratory: Clear breath sounds, no crackles, no wheezes  Cardiovascular: Regular rate and rhythm, no murmurs  Gastrointestinal: Bowel sounds present, soft, nontender  Skin: Warm and dry, no active dermatoses  Musculoskeletal: No joint swelling, no joint erythema    Labs, imaging, and medical records/notes were personally reviewed. Assessment:  Acute hypoxic respiratory failure  Septic shock, resolved  Postoperative seroma/hematoma, right chest, associated with esophageal perforation in the setting of robotic hiatal hernia repair on 12/09, s/p right VATS, mini-thoracotomy with drainage and fenestration of mediastinal seroma/hematoma, chest tube and mediastinal drain placement on 12/20  Esophageal perforation  Enterobacter cloacae HAP  Elevated serum 1,3 beta-d-glucan    Recommendations:  Continue anidulafungin 100 mg q24h. Continue ertapenem 1 g every 24 hours. Follow up plan for EGD with esophageal stent placement. Continue supportive care. Thank you for involving me in the care of Kwesi Herron. I will continue to follow. Please do not hesitate to call for any questions or concerns.       Electronically signed by April Schmidt MD on 12/30/2022 at 9:35 AM

## 2022-12-31 NOTE — PROGRESS NOTES
Daughter at bedside and requesting to speak with surgical team. Perfectserve sent to resident to speak with daughter at bedside or via phone call.

## 2022-12-31 NOTE — PROGRESS NOTES
Department of Internal Medicine  Nephrology Progress Note      Events reviewed. SUBJECTIVE: We are following Festus Jackson for PAWAN. Presently intubated.     PHYSICAL EXAM:      Vitals:    VITALS:  BP (!) 125/55   Pulse (!) 111   Temp 97.2 °F (36.2 °C) (Esophageal)   Resp (!) 33   Ht 5' 2\" (1.575 m)   Wt 125 lb (56.7 kg)   SpO2 100%   BMI 22.86 kg/m²   24HR INTAKE/OUTPUT:    Intake/Output Summary (Last 24 hours) at 12/31/2022 1250  Last data filed at 12/31/2022 1200  Gross per 24 hour   Intake 2204.39 ml   Output 2445 ml   Net -240.61 ml         Constitutional: Patient intubated, sedated  HEENT: Normocephalic, atraumatic, PERRLA, endotracheal tube in place  Respiratory: Diminished right lung base  Cardiovascular/Edema: RRR, normal S1, normal S2, no edema  Gastrointestinal: Soft, not distended, nontender  Neurologic: Patient is sedated  Skin: Pale, dry, no lesions, no rash, + edema     Scheduled Meds:   insulin glargine  5 Units SubCUTAneous Daily    phosphorus  500 mg Oral Once    [Held by provider] midodrine  15 mg Oral TID WC    insulin lispro  0-16 Units SubCUTAneous Q4H    lidocaine PF  5 mL IntraDERmal Once    sodium chloride flush  5-40 mL IntraVENous 2 times per day    ertapenem (INVanz) IVPB  1,000 mg IntraVENous Q24H    [Held by provider] Vitamin D  1,000 Units Oral Daily    heparin (porcine)  5,000 Units SubCUTAneous Q8H    artificial tears   Both Eyes Q4H    anidulafungin  100 mg IntraVENous Q24H    pantoprazole (PROTONIX) 40 mg injection  40 mg IntraVENous Q12H    sodium chloride flush  5-40 mL IntraVENous 2 times per day    Arformoterol Tartrate  15 mcg Nebulization BID    budesonide  0.5 mg Nebulization BID    albuterol  2.5 mg Nebulization Q4H WA    [Held by provider] metoprolol  5 mg IntraVENous Q6H    [Held by provider] rosuvastatin  10 mg Oral Nightly    [Held by provider] levothyroxine  50 mcg Oral Daily     Continuous Infusions:   PN-Adult  3 IN 1 Central Line (Custom)      PN-Adult 3 IN 1 Central Line (Custom) 50 mL/hr at 12/31/22 0857    sodium chloride      sodium chloride 10 mL/hr at 12/31/22 0857    norepinephrine Stopped (12/31/22 0029)    prismaSATE BGK 4/0/1.2 2,000 mL/hr at 12/31/22 1052    sodium chloride 100 mL/hr at 12/31/22 7594    calcium chloride CRRT infusion 8,000 mg (12/31/22 0949)    dextrose      sodium chloride       PRN Meds:.sodium chloride, sodium chloride flush, sodium chloride, potassium chloride, magnesium sulfate, calcium gluconate **OR** calcium gluconate **OR** calcium gluconate **OR** calcium gluconate, sodium phosphate IVPB **OR** sodium phosphate IVPB **OR** sodium phosphate IVPB **OR** sodium phosphate IVPB, glucose, dextrose bolus **OR** dextrose bolus, glucagon (rDNA), dextrose, sodium chloride flush, sodium chloride, ondansetron **OR** ondansetron, [DISCONTINUED] acetaminophen **OR** acetaminophen, ipratropium-albuterol    DATA:    CBC with Differential:    Lab Results   Component Value Date/Time    WBC 18.1 12/31/2022 05:34 AM    RBC 2.56 12/31/2022 05:34 AM    HGB 7.8 12/31/2022 05:34 AM    HCT 23.7 12/31/2022 05:34 AM    HCT 29.0 12/20/2022 01:31 PM    PLT 81 12/31/2022 05:34 AM    MCV 92.6 12/31/2022 05:34 AM    MCH 30.5 12/31/2022 05:34 AM    MCHC 32.9 12/31/2022 05:34 AM    RDW 18.1 12/31/2022 05:34 AM    NRBC 0.9 12/29/2022 05:39 AM    LYMPHOPCT 2.7 12/31/2022 05:34 AM    MONOPCT 4.4 12/31/2022 05:34 AM    MYELOPCT 0.9 12/31/2022 05:34 AM    BASOPCT 0.3 12/31/2022 05:34 AM    MONOSABS 0.72 12/31/2022 05:34 AM    LYMPHSABS 0.54 12/31/2022 05:34 AM    EOSABS 0.00 12/31/2022 05:34 AM    BASOSABS 0.00 12/31/2022 05:34 AM     CMP:    Lab Results   Component Value Date/Time     12/31/2022 11:23 AM    K 3.9 12/31/2022 11:23 AM    K 4.3 12/31/2022 12:01 AM     12/31/2022 11:23 AM    CO2 20 12/31/2022 11:23 AM    BUN 38 12/31/2022 11:23 AM    CREATININE 1.1 12/31/2022 11:23 AM    GFRAA >60 07/22/2022 01:42 PM    LABGLOM 51 12/31/2022 11:23 AM GLUCOSE 199 12/31/2022 11:23 AM    PROT 5.4 12/31/2022 11:23 AM    LABALBU 2.1 12/31/2022 11:23 AM    LABALBU 4.3 01/24/2011 01:15 PM    CALCIUM 8.4 12/31/2022 11:23 AM    BILITOT 4.9 12/31/2022 11:23 AM    ALKPHOS 155 12/31/2022 11:23 AM     12/31/2022 11:23 AM    ALT 84 12/31/2022 11:23 AM     Magnesium:    Lab Results   Component Value Date/Time    MG 2.0 12/31/2022 11:23 AM     Phosphorus:    Lab Results   Component Value Date/Time    PHOS 2.2 12/31/2022 11:23 AM     Radiology Review:      CT Chest and Abd/Pelvis 12/14/22      No evidence of pulmonary embolism. Bilateral lower lobe pulmonary consolidations and pleural effusions seen. Deviation of the mediastinum to the left is visualized. Mild soft tissue prominence visualized surrounding the esophagus with   narrowing at the gastroesophageal junction but appears to be due to external   compression. Cannot rule left soft tissue mass at this location. Wall thickening visualized in the gastric outlet with mild prominence of the   stomach seen, this could be artifactual.     Distended gallbladder is seen. Degenerative bone changes, postoperative changes and multilevel vertebral   augmentation seen. No evidence of acute abdominal/pelvic pathology. US Chest 12/15/22       Probable complex loculated right pleural effusion. Simple left pleural effusion. BRIEF SUMMARY OF INITIAL CONSULT:    Briefly, Alisa Dougherty is a 59-year-old female, past medical history significant for hypothyroidism, hiatal hernia and TIA, who presented to the ED on 12/14/2022 from [facility], patient recently underwent a hiatal hernia repair on 12/9/22, developed acute respiratory distress and was transferred to [facility] on 12/14/2022 for further management and treatment.   Initial ED work-up revealed creatinine level of 1.7, patient then underwent two seperate IV contrast studies CTA and creatinine subsequently increased to 2.5, on 12/15/2022 creatinine level increased to 3.5 mg/dL, reason for this consultation. Review of records from [facility] reveals that baseline creatinine appears to be between 0.9 to 1.0 mg/dL, creatinine was 1.0 on 12/13/2022, patient was started on lisinopril during hospitalization, was given multiple doses IV furosemide with poor oral intake and vomiting. Problems resolved:    HAGMA, 2/2 uremia and starvation ketoacidosis  Hemodynamic shock, septic, on antibiotics and vasopressors  Hypocalcemia, 2/2 calcium removal with CRRT, with replacement per protocol  Hypophosphatemia, 2/2 phosphorus removal with CRRT, with replacement per protocol    IMPRESSION/RECOMMENDATIONS:     PAWAN, stage II, nonoliguric, ATN (ischemic +/- contrast induced , intravascular volume depletion due to poor oral intake, loop diuretics and vomiting in the setting of ACE inhibition versus,). Started on renal replacement therapy on 12/21/2022. Tolerating CVVHD. Vitamin D deficiency, vitamin D 25 level 11, on cholecalciferol   Respiratory alkalosis (pH: 7.464, PCO2: 32.8, HCO3: 23) and metabolic alkalosis (CVVHD)    --------------------------------------------------------  Probably esophageal perforation, status post robotic hiatal hernia repair 12/9/20/2022, for esophageal stent placement today.   Respiratory failure, status post intubation  New onset AF with RVR  Complex right pleural effusion, s/p VATS    S/p hiatal hernia repair 12/9/2022  Hypothyroidism, on levothyroxine  Hospital-acquired pneumonia, + Enterobacter cloacae, on ertapenem  Possibly focal pneumonia, Candida albicans?,  On anidulafungin  Normocytic anemia, multifactorial  Severe hypoalbuminemia  Nutrition, on TPN @50 cc/hour per surgery      Plan:    Continue CVVHD 30 cc/kilo/hour, net fluid removal even  Continue TPN per surgery  Continue to monitor renal function for recovery   For esophageal stent today      Electronically signed by Annette Nava MD on 12/31/2022 at 12:50 PM

## 2022-12-31 NOTE — PROGRESS NOTES
[facility] PROGRESS NOTE      Chief complaint: Follow-up of pneumonia    This patient is a 50-year-old female with history of bilateral breast cancer, DM, hiatal hernia repair on 12/09, presented on 12/14 with shortness of breath for 2 days accompanied by productive cough and malaise prior to her hiatal hernia repair, thought to have large postoperative seroma after a type IV hiatal hernia repair. On admission, she was afebrile and hemodynamically stable with no leukocytosis. CT chest, abdomen and pelvis showed soft tissue density in the posterior medial aspect of the right lower lung field with deviation of the mediastinum to the left, bilateral lower lobe consolidation and pleural effusions, mild soft tissue prominence surrounding the esophagus with narrowing at the gastroesophageal junction appearing to be due to external compression, wall thickening in the gastric outlet with mild prominence of the stomach, distended gallbladder, no evidence of acute abdominal/pelvic pathology. Esophagram was negative for esophageal perforation. She underwent right-sided thoracentesis on 12/16 during which 105 mL of serous fluid was removed. Pleural fluid gram stain and culture showed rare polymorphonuclear leukocytes, no epithelial cells, no organisms, no growth. She was transferred to MICU on 12/17 for worsening hypoxemia. Repeat CT chest on 12/17 showed large right pleural effusion of mixed density suggestive of complex effusion with pigtail catheter in the pleural space posteriorly and inferiorly, small to moderate sized left pleural effusion with consolidative infiltrate throughout left upper and lower lung field and aerated portions revealing nodular groundglass opacity, nodular groundglass opacity inferiorly within the aerated right upper and lower lung field, less consolidative infiltrate within the right lower lobe.   Respiratory gram stain and culture showed no polymorphonuclear leukocytes, no epithelial cells, moderate yeast, moderate gram-positive diphtheroid-like rods, few gram-negative rods, moderate growth of Enterobacter cloacae, heavy growth of Candida albicans. Urine Streptococcus pneumoniae and Legionella antigens were negative. Respiratory pathogen PCR panel and MRSA nares culture were negative. Blood cultures showed no growth to date. She received a dose of ceftriaxone and doxycycline on admission. Ampicillin-sulbactam was started on admission and then switched to piperacillin-tazobactam on 12/17 then switched to meropenem on 12/19. Vancomycin was started on 12/17. She underwent right VATS, mini-thoracotomy with drainage and fenestration of mediastinal seroma/hematoma, chest tube and mediastinal drain placement on 12/20 during which 1100 mL serous fluid and dark blood (no succus or bile) extending into the mediastinum was drained via mini-thoracotomy. Hematoma/seroma fluid Gram stain and culture showed abundant polymorphonuclear leukocytes, no epithelial cells, no organisms, rare growth of Enterobacter cloacae (susceptible to fluoroquinolones and cotrimoxazole) and light growth of Candida albicans. Serum 1,3 beta-d-glucan on 12/18 was elevated. Endotracheal aspirate Gram stain and culture on 12/25 showed few polymorphonuclear leukocytes, few epithelial cells, no organisms, heavy growth of Enterobacter cloacae. She was noted to have increasing output from right sided chest tubes confirmed to have esophageal perforation with active methylene blue extravasation into both right sided tubes on bedside leak test on 12/27. She underwent EGD with stent placement and PEG-J tube insertion on 12/30 during which 3-4 cm perforation in esophagus was covered with metal stent.  CT chest, abdomen and pelvis on 12/31 showed mediastinal fluid collection decreased in size and now predominantly to the right of the esophagus, measuring 5 cm x 4 cm x 6 cm; small amount oral contrast to the right of the esophagus, outside the stent, concerning for leak; decreased bilateral pulmonary consolidations/ground-glass opacities. Subjective: Patient was seen and examined. She remains in critical condition, intubated and sedated, off vasopressor support. Afebrile. Objective:  BP (!) 124/48   Pulse (!) 115   Temp (!) 96.1 °F (35.6 °C) (Axillary)   Resp 27   Ht 5' 2\" (1.575 m)   Wt 125 lb (56.7 kg)   SpO2 100%   BMI 22.86 kg/m²   Constitutional: Intubated, no MV dyssynchrony  Respiratory: Clear breath sounds, no crackles, no wheezes  Cardiovascular: Regular rate and rhythm, no murmurs  Gastrointestinal: Bowel sounds present, soft, nontender  Skin: Warm and dry, no active dermatoses  Musculoskeletal: No joint swelling, no joint erythema    Labs, imaging, and medical records/notes were personally reviewed. Assessment:  Acute hypoxic respiratory failure  Septic shock, resolved  Postoperative seroma/hematoma, right chest, associated with esophageal perforation in the setting of robotic hiatal hernia repair on 12/09, s/p right VATS, mini-thoracotomy with drainage and fenestration of mediastinal seroma/hematoma, chest tube and mediastinal drain placement on 12/20  Esophageal perforation, s/p  EGD with stent placement and PEG-J tube insertion on 12/30  Enterobacter cloacae HAP  Elevated serum 1,3 beta-d-glucan    Recommendations:  Continue anidulafungin 100 mg q24h. Continue ertapenem 1 g every 24 hours. Continue supportive care. Thank you for involving me in the care of [employer]. I will continue to follow. Please do not hesitate to call for any questions or concerns.       Electronically signed by Selma Berg MD on 12/31/2022 at 8:11 AM

## 2022-12-31 NOTE — PLAN OF CARE
Problem: Chronic Conditions and Co-morbidities  Goal: Patient's chronic conditions and co-morbidity symptoms are monitored and maintained or improved  Outcome: Progressing     Problem: Discharge Planning  Goal: Discharge to home or other facility with appropriate resources  Outcome: Progressing     Problem: Safety - Adult  Goal: Free from fall injury  Outcome: Progressing     Problem: Pain  Goal: Verbalizes/displays adequate comfort level or baseline comfort level  Outcome: Progressing     Problem: Nutrition Deficit:  Goal: Optimize nutritional status  Outcome: Progressing  Flowsheets (Taken 12/30/2022 3456 by Sharita Key RD, LD)  Nutrient intake appropriate for improving, restoring, or maintaining nutritional needs: Recommend, monitor, and adjust tube feedings and TPN/PPN based on assessed needs     Problem: Skin/Tissue Integrity  Goal: Absence of new skin breakdown  Description: 1. Monitor for areas of redness and/or skin breakdown  2. Assess vascular access sites hourly  3. Every 4-6 hours minimum:  Change oxygen saturation probe site  4. Every 4-6 hours:  If on nasal continuous positive airway pressure, respiratory therapy assess nares and determine need for appliance change or resting period.   Outcome: Progressing     Problem: Respiratory - Adult  Goal: Achieves optimal ventilation and oxygenation  Outcome: Progressing     Problem: Cardiovascular - Adult  Goal: Maintains optimal cardiac output and hemodynamic stability  Outcome: Progressing     Problem: Hematologic - Adult  Goal: Maintains hematologic stability  Outcome: Progressing

## 2022-12-31 NOTE — FLOWSHEET NOTE
Patient transport with 2 RN's, transport and respiratory via bed, monitor, and 3 chest tubes, tolerated well, returned to room with no incidents.

## 2022-12-31 NOTE — PROGRESS NOTES
SURGICAL ENDOSCOPY  DAILY PROGRESS NOTE  12/31/2022    CHIEF COMPLAINT:  Chief Complaint   Patient presents with    Shortness of Breath     Sent from [facility] for SOB patient went for hernia repair patient on 6L NC on arrival       SUBJECTIVE:  Seems to be doing little better after stent yesterday  Afebrile, remains off pressors, minimal drain output now    OBJECTIVE:  BP (!) 124/48   Pulse (!) 115   Temp (!) 96.1 °F (35.6 °C) (Axillary)   Resp 27   Ht 5' 2\" (1.575 m)   Wt 125 lb (56.7 kg)   SpO2 100%   BMI 22.86 kg/m²     GENERAL:  Intubated, sedated  LUNGS:  Intubated and on ventilator. Bilateral chest tubes in place. L chest tube serous w/o air leak. R chest tubes no air leak, no output overnight  CARDIOVASCULAR: sinus tachycardia, normotensive  ABDOMEN:  Soft, non-distended, does not grimace to deep palpation, no rebound.     EXTREMITIES: CVVHD line in groin    ASSESSMENT/PLAN:  65 y.o. female with respiratory failure and complex right-sided effusion s/p robotic hiatal hernia repair 12/9 without signs of esophageal perforation or leak on initial studies then evidence of esophageal leak on delayed evaluation 12/27  VATS & Mini-thoractomy & bronchoscopy with open drainage of fluid collection 12/20, left CT placement 12/21 with ARDS & renal failure s/p chemical paralysis, flolan, CVVHD 12/21  Now s/p EGD with esophageal stent and PEG-J 12/30    Little less tachycardic than she was, minimal to no drain output now, afebrile and remains off pressors  Continue TPN -- likely start trickle tube feeds tomorrow via PEG-J  General surgery also following will defer working on removing left chest tube and timing of tracheostomy to them    Rajendra March DO  Surgery Resident PGY-5  12/31/2022  8:21 AM    As above  Tolerating post stent placement  Ok trickle feeds  Cont TPN  IV abx  Will follow  Stent remain in place 6 weeks min     Maury Severs, MD, MS  Minimally Invasive and Bariatric Surgery  [phone] (p)  12/31/2022  9:43 AM

## 2022-12-31 NOTE — PROGRESS NOTES
Left chest tube placed to water seal.      R chest tube is draining blue/green. R medialstinal X 2 chest tubes brown sludge draining. Dr. Robert Vidal aware of the above.

## 2022-12-31 NOTE — PROGRESS NOTES
[facility]   Progress note      Subjective: On cvvhd on eval       Objective:    BP (!) 124/48   Pulse (!) 106   Temp 99.3 °F (37.4 °C) (Esophageal)   Resp 19   Ht 5' 2\" (1.575 m)   Wt 122 lb 6 oz (55.5 kg)   SpO2 99%   BMI 22.38 kg/m²     In: 2977 [I.V.:2977]  Out: 2853   In: 2977   Out: 2853     General appearance: Intubated sedated  HEENT: AT/NC, MMM  Neck: FROM, supple  Lungs: Clear to auscultation  CV: RRR, no MRGs  Vasc: Radial pulses 2+  Abdomen: Soft, non-tender; no masses or HSM  Extremities: No peripheral edema or digital cyanosis  Skin: no rash, lesions or ulcers  Unable to assess neuro or psychological status     Recent Labs     12/28/22  1137 12/28/22  1825 12/29/22  0539 12/30/22  0633   WBC 17.0*  --  15.0* 15.9*   HGB 7.0* 8.6* 8.9* 8.2*   HCT 20.8* 25.3* 25.2* 24.4*   PLT 89*  --  67* 66*       Recent Labs     12/29/22  2345 12/30/22  0433 12/30/22  1143    137 135   K 4.2 4.3 4.3    107 105   CO2 24 24 22   BUN 32* 31* 32*   CREATININE 1.0 1.1* 1.2*   CALCIUM 7.7* 8.1* 8.7       Assessment:    Principal Problem:    Postoperative pneumonia  Active Problems:    Hypoxia    Pneumonia of left lower lobe due to infectious organism    Aspiration pneumonia of both lower lobes due to gastric secretions (HCC)  Resolved Problems:    * No resolved hospital problems.  *      Plan:    65-year-old  woman is status post hiatal hernia repair 56/0/1168, course complicated by ARDS at which time she was transferred to [facility] (1-) for critical care management in ICU setting-intubated as unable to protect airway and found to be in atrial fibrillation with RVR      Remains intubated and critically ill in ICU setting  Paralytic/sedative, pressors on board  Vent management per ICU team  Status post right VATS with open thoracotomy   Multiple consultants on board  No family available at bedside  Mild renal insufficiency-continue IV fluid hydration  Ongoing IV antibiotic therapy for ARDS protocol    Critically ill  Code Status: DNR CCA-no intubation/no CPR, meds only  Consultants: Critical care, ID, renal, palliative care if not yet on, general surgery,    12/23/22  Remains intubated and sedated in icu   Critically ill   No acute events overnight   Remains on antifungal   TPN per gen surg  Critically ill     12/30/2022  Remains intubated sedated in ICU setting  Apparently awaiting stent for esophageal perforation today  Remains on IV antibiotics/antifungal with Invanz and Eraxis  Ongoing CVVHD on my evaluation this evening  Remains on TPN  Critically ill  Multiple consultants on board  Guarded to poor prognosis unfortunately      DVT Prophylaxis   PT/OT  Discharge Lenin Orozco MD  7:35 PM  12/30/2022

## 2022-12-31 NOTE — PROGRESS NOTES
TRAUMA/GENERAL SURGERY  DAILY PROGRESS NOTE  12/31/2022    CHIEF COMPLAINT:  Chief Complaint   Patient presents with    Shortness of Breath     Sent from [facility] for SOB patient went for hernia repair patient on 6L NC on arrival       SUBJECTIVE:  Intubated and sedated, off of pressors. OBJECTIVE:  BP (!) 124/48   Pulse (!) 115   Temp (!) 96.1 °F (35.6 °C) (Axillary)   Resp 27   Ht 5' 2\" (1.575 m)   Wt 125 lb (56.7 kg)   SpO2 100%   BMI 22.86 kg/m²     Right chest tubes- 185cc/24 hours   Left chest tube- 30 cc / 24 hours    GENERAL:  Intubated, sedated  LUNGS:  Intubated and on ventilator. 40%/8, chest tubes as above fairly clear b/l   CARDIOVASCULAR: sinus tachycardia, normotensive  ABDOMEN:  Soft, non-distended, does not grimace to deep palpation, no rebound. PEG J tube in place CDI,   On CVVHD:  EXT NVI feet warm minimal edema     ASSESSMENT/PLAN:  65 y.o. female with respiratory failure and complex right-sided effusion s/p robotic hiatal hernia repair 12/9 without signs of esophageal perforation or leak however now +fungitell and growing candida in chest  S/p VATS & Mini-thoractomy & bronchoscopy with open drainage of fluid collection 12/20, left CT placement 12/21 with ARDS & renal failure s/p chemical paralysis, flolan, CVVHD 12/21. Who is currently clinically improving.    - can water seal left chest tube  - continue TPN   - discuss trickle tube feed timing - likely tomorrow  - will likely need tracheostomy - timing TBD    Electronically signed by Nathan Ken MD on 12/31/2022 at 7:50 AM      Attending Attestation   Patient seen and examined, agree with resident note except for changes made by me, for remaining HP/Consult/progress note details please see resident HP/Consult/progress note.       CXR: looks ok, stent in good position, PEG J appears in appropriate position     - she is stable, successful esophageal stent placement yesterday, cont to monitor exam, cont supportive ICU care,   - water seal R chest tube possible removal tomorrow,   - Trickle tube feeds to start tomorrow     Trevor Avila MD

## 2022-12-31 NOTE — OP NOTE
Operative Note      Patient: Gaudencio Sparks  YOB: 1945  MRN: [id number]    Date of Procedure: 12/30/2022    Pre-Op Diagnosis: Esophageal fistula    Post-Op Diagnosis: Same       Procedure(s):  EGD ESOPHAGOGASTRODUODENOSCOPY, WITH ESOPHAGEAL STENT PLACEMENT  EGD ESOPHAGOGASTRODUODENOSCOPY WITH PEG TUBE INSERTION    Surgeon(s):  Ledy Vo MD    Assistant:   * No surgical staff found *    Anesthesia: General    Estimated Blood Loss (mL): less than 50     Complications: None    Specimens:   * No specimens in log *    Implants:  Implant Name Type Inv. Item Serial No.  Lot No. LRB No. Used Action   STENT ES L12CM [id number] CATH 18. 5FR L120CM MTL FULL CVR SELF - [id number]  STENT ES L12CM [id number] CATH 18. 5FR L120CM MTL FULL CVR SELF  [employer] [id number] N/A 1 Implanted   TUBE JEJUSTMY 8.5FR JEJU FEED TTP BENT TAPR TEMP TIP - [id number]  TUBE JEJUSTMY 8.5FR JEJU FEED TTP BENT TAPR TEMP TIP  [employer] [id number] Left 1 Implanted         Drains:   Chest Tube Right Pleural 1 (Active)   Chest Tube Airleak No 12/31/22 0600   Status Continuous Suction 12/31/22 0400   Suction -20 cm H2O 12/31/22 0400   Y Connector Used Yes 12/31/22 0600   Drainage Description Serous 12/31/22 0600   Dressing Status Clean, dry & intact 12/31/22 0600   Chest Tube Dressing Petroleum Jelly 12/31/22 0400   Site Assessment Not assessed 12/31/22 0600   Surrounding Skin Unable to view 12/31/22 0600   Output (ml) 0 ml 12/31/22 0600       Chest Tube Right (Active)   Chest Tube Airleak No 12/31/22 0600   Status Continuous Suction 12/31/22 0400   Suction -20 cm H2O 12/31/22 0400   Y Connector Used Yes 12/31/22 0600   Drainage Description Serous 12/31/22 0600   Dressing Status Clean, dry & intact 12/31/22 0600   Chest Tube Dressing Petroleum Jelly 12/31/22 0400   Site Assessment Not assessed 12/31/22 0600   Surrounding Skin Unable to view 12/31/22 0600   Output (ml) 5 ml 12/31/22 0600       Chest Tube Left Midaxillary (Active)   Chest Tube Airleak No 12/31/22 0600   Status Continuous Suction 12/31/22 0400   Suction -20 cm H2O 12/31/22 0400   Y Connector Used No 12/31/22 0200   Drainage Description Serous 12/31/22 0600   Dressing Status Clean, dry & intact 12/31/22 0600   Chest Tube Dressing Petroleum Jelly 12/31/22 0400   Site Assessment Not assessed 12/31/22 0600   Surrounding Skin Unable to view 12/31/22 0600   Output (ml) 0 ml 12/31/22 0600       Gastrostomy/Enterostomy/Jejunostomy Tube Percutaneous Endoscopic Gastrostomy (PEG) LUQ 8.5 fr (Active)   $ Gastrostomy insertion $ Yes 12/30/22 1947   Site Description Clean, dry & intact 12/31/22 0600   Surrounding Skin Unable to view 12/31/22 0600   Dressing Status Clean, dry & intact 12/31/22 0600   Dressing Type Split gauze 12/31/22 0600   G-Tube Care Completed Yes 12/31/22 0600   Output (mL) 0 ml 12/31/22 0400       [REMOVED] Closed/Suction Drain Right Other (Comment) Other (Comment) (Removed)   Site Description Clean, dry & intact 12/31/22 0200   Dressing Status Clean, dry & intact 12/31/22 0200   Drainage Appearance Serosanguinous 12/31/22 0200   Drain Status Suction-low continuous 12/31/22 0200   Output (ml) 0 ml 12/29/22 0600       [REMOVED] Closed/Suction Drain Right Other (Comment) Other (Comment) (Removed)   Site Description Clean, dry & intact 12/31/22 0200   Dressing Status Clean, dry & intact 12/31/22 0200   Drainage Appearance Coffee Ground 12/31/22 0200   Drain Status Suction-low continuous 12/31/22 0200   Output (ml) 0 ml 12/29/22 0600       [REMOVED] Closed/Suction Drain Right Other (Comment) Other (Comment) (Removed)   Site Description Clean, dry & intact 12/31/22 0200   Dressing Status Clean, dry & intact 12/31/22 0200   Drainage Appearance Serosanguinous;Dark Red 12/31/22 0200   Drain Status Suction-low continuous 12/31/22 0200   Output (ml) 0 ml 12/29/22 0600       [REMOVED] NG/OG/NJ/NE Tube Orogastric Right mouth (Removed)   Surrounding Skin Clean, dry & intact 12/31/22 0200   Securement device Tape 12/31/22 0200   Status Suction-low intermittent 12/31/22 0200   Placement Verified X-Ray (Initial) 12/31/22 0200   NG/OG/NJ/NE External Measurement (cm) 57 cm 12/31/22 0200   Drainage Appearance Bile;Brown 12/21/22 1100   Tube Feeding Other Tube Feeding (must specify product in comment) 12/28/22 0600   Tube feeding/verify rate (mL/hr) 10 mL/hr 12/27/22 0400   Tube Feeding Intake (mL) 0 ml 12/27/22 0700   Free Water/Flush (mL) 0 mL 12/27/22 0700   Output (mL) 75 ml 12/24/22 0800   Residual Volume (ml) 300 ml 12/21/22 1400       [REMOVED] Urinary Catheter 12/15/22 Peterson (Removed)   Catheter Indications Urinary retention (acute or chronic), continuous bladder irrigation or bladder outlet obstruction 12/28/22 0200   Site Assessment No urethral drainage 12/28/22 0200   Urine Color Yellow 12/28/22 0200   Urine Appearance Sediment 12/28/22 0200   Output (mL) 0 mL 12/28/22 0200       Findings: 3 to 4 mm defect full-thickness at 30 cm from the incisors and the esophagus    Detailed Description of Procedure:     DESCRIPTION OF PROCEDURE: PROCEDURE: The patient was taken to the operating room and laid supine on the operating room table. She has already intubated and undergoing general anesthesia by the anesthesia team.  A bite-block was placed. A lubricated gastroscope was inserted into the oropharynx and advanced under direct vision to the esophagus. There was evidence of barium in the esophagus which was carefully suctioned. Careful evaluation of the esophagus revealed a full-thickness perforation at 30 cm from the incisors laterally. This was about 3 to 4 mm in diameter with healthy edges and no evidence of necrosis. The endoscope was then safely and carefully passed through the GE junction into the stomach where again more barium material was noted. There was no other abnormality in the stomach. At this point, I elected to place a gastrostomy tube.      The stomach was insufflated. The anterior abdominal wall was transilluminated. The light source was easily visualized. Indentation was observed with palpation of the abdominal wall. This area was prepped and draped. Local anesthetic was injected. A #11 blade was used to make a transverse incision. A snare forceps was inserted through the gastroscope and deployed into the gastric lumen. An angiocatheter was inserted through the incision into the gastric lumen under direct vision. A wire was inserted through the anterior catheter and grasped   with the snare forceps. The gastroscope, snare, and wire were then pulled out through the patient's mouth. The gastrostomy tube was connected to the wire, and the wire was pulled through the stomach and out through the anterior abdominal wall. A round bumper was placed around the tube and the dressing was placed. The gastroscope was carefully reintroduced into the stomach. There was no evidence of further injury to the esophagus. A 8.5 [place] jejunal tube was then inserted through the the PEG tube and grasped in the stomach lumen. The suture at the end of the catheter was grasped with a clip and was tried through the pylorus into the third portion of the duodenum. The end of the jejunal tube was then clipped to the duodenal mucosa. This was confirmed to be in adequate position on fluoroscopy. The endoscope was carefully withdrawn back to the esophagus. I then elected to place a stent in the esophagus to cover the esophageal fistula. A 0.035 stiff wire was passed through the endoscope into the stomach under fluoroscopic guidance. The endoscope was then removed over the wire. A 23 mm x 12 cm fully covered wall flex stent was advanced over the wire under fluoroscopic guidance. The stent was then deployed with the distalmost portion of the stent just above the GE junction and the proximalmost portion at 25 cm from the incisors.   This ensured adequate coverage of the esophageal perforation with adequate overlap on either end of the perforation. I then proceeded to secure the stent to the esophageal wall using 4 resolution clips at the proximal end of the stent. The endoscope was easily passed through the lumen of the stent into the stomach and no other abnormality was noted. The endoscope was then withdrawn and the procedure was terminated.       Electronically signed by Aleida Hensley MD on 12/31/2022 at 8:34 AM

## 2022-12-31 NOTE — PROGRESS NOTES
[address] [facility]   Department of Internal Medicine   MICU Progress Note    Patient:  Hesham Sawyer 65 y.o. female   MRN: [id number]       Date of Service: 2022    Allergy: Benadryl [diphenhydramine] and Ibuprofen  CC hypoxia   Subjective   Intubated, no sedation  CT head negative  Open eyes, does not follow commands  Temps of 96.1  CRRT in progress - no net removal  Off pressors  S/p esophogeal stent and peg placement 2022  No overnight event  Unable to obtain ROS     Objective     TEMPERATURE:  Current - Temp: (!) 96.1 °F (35.6 °C); Max - Temp  Av °F (36.7 °C)  Min: 96.1 °F (35.6 °C)  Max: 99.3 °F (37.4 °C)    RESPIRATIONS RANGE: Resp  Av.1  Min: 16  Max: 32    PULSE RANGE: Pulse  Av.7  Min: 85  Max: 128    BLOOD PRESSURE RANGE:  Systolic (33YHT), ZUF:196 , Min:112 , OLJ:107   ; Diastolic (67XFA), NXH:22, Min:48, Max:62      PULSE OXIMETRY RANGE: SpO2  Av.3 %  Min: 98 %  Max: 100 %    I & O - 24hr:    Intake/Output Summary (Last 24 hours) at 2022 0815  Last data filed at 2022 0600  Gross per 24 hour   Intake 1992.39 ml   Output 1875 ml   Net 117.39 ml     I/O last 3 completed shifts: In: 3775.4 [I.V.:3099.1; IV Piggyback:381.5]  Out: 7034 [Chest Tube:315] No intake/output data recorded. Weight change: 2 lb 10 oz (1.191 kg)    Physical Exam:  General Appearance: intubated/no sedated on vent, does not look in distress. Anasarca noted open eyes, does not follow. HEENT:  Head: Normocephalic, no lesions, without obvious abnormality. Eye: Normal external eye, conjunctiva, lids cornea, FILEMON. Nose: Normal external nose, mucus membranes and septum. Neck / Thyroid: Supple, no masses, nodes, nodules or enlargement. Neck: no adenopathy, no carotid bruit, no JVD, supple, symmetrical, trachea midline, and thyroid not enlarged, symmetric, no tenderness/mass/nodules  Lung: Lung sounds diminished, no wheezing noted.  4 chest tube    Heart: regular rate and rhythm, S1, S2 normal, no murmur, click, rub or gallop  Abdomen: soft, non-tender; bowel sounds normal; no masses,  no organomegaly incision noted, intact. Peg tube placement   Extremities:  extremities normal, atraumatic, no cyanosis or edema 3+ pitting edema bilaterally. Musculokeletal: No joint swelling, no muscle tenderness. ROM normal in all joints of extremities.    Neurologic: Mental status: intubated/no sedation on vent, limited neuro exam       Medications     Continuous Infusions:   PN-Adult  3 IN 1 Central Line (Custom)      PN-Adult  3 IN 1 Central Line (Custom) 50 mL/hr at 12/30/22 2217    sodium chloride      sodium chloride 10 mL/hr at 12/30/22 2217    norepinephrine Stopped (12/31/22 0030)    prismaSATE BGK 4/0/1.2 2,000 mL/hr at 12/31/22 0813    sodium chloride 100 mL/hr at 12/31/22 1853    calcium chloride CRRT infusion 56 mL/hr at 12/31/22 0031    dextrose      sodium chloride       Scheduled Meds:   phosphorus  500 mg Oral Once    [Held by provider] midodrine  15 mg Oral TID WC    insulin lispro  0-16 Units SubCUTAneous Q4H    lidocaine PF  5 mL IntraDERmal Once    sodium chloride flush  5-40 mL IntraVENous 2 times per day    heparin flush  3 mL IntraVENous 2 times per day    ertapenem (INVanz) IVPB  1,000 mg IntraVENous Q24H    [Held by provider] Vitamin D  1,000 Units Oral Daily    heparin (porcine)  5,000 Units SubCUTAneous Q8H    artificial tears   Both Eyes Q4H    anidulafungin  100 mg IntraVENous Q24H    pantoprazole (PROTONIX) 40 mg injection  40 mg IntraVENous Q12H    sodium chloride flush  5-40 mL IntraVENous 2 times per day    heparin flush  3 mL IntraVENous 2 times per day    Arformoterol Tartrate  15 mcg Nebulization BID    budesonide  0.5 mg Nebulization BID    albuterol  2.5 mg Nebulization Q4H WA    [Held by provider] metoprolol  5 mg IntraVENous Q6H    [Held by provider] rosuvastatin  10 mg Oral Nightly    [Held by provider] levothyroxine  50 mcg Oral Daily     PRN Meds: sodium chloride, sodium chloride flush, sodium chloride, heparin flush, potassium chloride, magnesium sulfate, calcium gluconate **OR** calcium gluconate **OR** calcium gluconate **OR** calcium gluconate, sodium phosphate IVPB **OR** sodium phosphate IVPB **OR** sodium phosphate IVPB **OR** sodium phosphate IVPB, glucose, dextrose bolus **OR** dextrose bolus, glucagon (rDNA), dextrose, sodium chloride flush, sodium chloride, heparin flush, ondansetron **OR** ondansetron, [DISCONTINUED] acetaminophen **OR** acetaminophen, ipratropium-albuterol  Nutrition:   TPN    Labs and Imaging Studies     CBC:   Recent Labs     12/29/22  0539 12/30/22  0633 12/31/22  0534   WBC 15.0* 15.9* 18.1*   RBC 2.97* 2.67* 2.56*   HGB 8.9* 8.2* 7.8*   HCT 25.2* 24.4* 23.7*   MCV 84.8 91.4 92.6   MCH 30.0 30.7 30.5   MCHC 35.3* 33.6 32.9   RDW 15.6* 16.6* 18.1*   PLT 67* 66* 81*   MPV 14.0* NOT CALC NOT CALC       BMP:    Recent Labs     12/30/22  1845 12/31/22  0001 12/31/22  0534    133 136   K 4.6 4.3 4.4    103 106   CO2 21* 20* 22   BUN 42* 55* 45*   CREATININE 1.5* 1.9* 1.4*   GLUCOSE 250* 241* 238*   CALCIUM 8.9 8.5* 8.3*   PROT 5.1* 5.1* 5.3*   LABALBU 2.0* 2.0* 2.1*   BILITOT 4.2* 4.6* 5.0*   ALKPHOS 158* 151* 157*   * 135* 129*   ALT 60* 82* 87*       LIVER PROFILE:   Recent Labs     12/30/22  1845 12/31/22  0001 12/31/22  0534   * 135* 129*   ALT 60* 82* 87*   BILITOT 4.2* 4.6* 5.0*   ALKPHOS 158* 151* 157*       PT/INR:   Recent Labs     12/28/22  1137   PROTIME 13.6*   INR 1.3         APTT:   No results for input(s): APTT in the last 72 hours.       Fasting Lipid Panel:    Lab Results   Component Value Date/Time    CHOL 45 12/19/2022 12:00 PM    TRIG 143 12/28/2022 04:50 AM    HDL 12 12/19/2022 12:00 PM       Cardiac Enzymes:    Lab Results   Component Value Date    CKTOTAL 59 01/13/2020    TROPONINI <0.01 01/13/2020       Notable Cultures:      Blood cultures   Blood Culture, Routine   Date Value Ref Range Status 12/17/2022 5 Days no growth  Final     Respiratory cultures No results found for: RESPCULTURE   Gram Stain Result   Date Value Ref Range Status   12/20/2022 Refer to ordered Gram stain for results  Final     Urine   Urine Culture, Routine   Date Value Ref Range Status   07/22/2022 <10,000 CFU/mL  Mixed gram positive organisms    Final     Legionella No results found for: LABLEGI  C Diff PCR No results found for: CDIFPCR  Wound culture/abscess: No results for input(s): WNDABS in the last 72 hours. Tip culture:No results for input(s): CXCATHTIP in the last 72 hours. Antibiotic  Days  Day started   Ertapenem      Anidulafungin                       Oxygen:     Vent Information  Ventilator ID: [id number]  Equipment Changed: Humidification  Vent Mode: AC/VC    Additional Respiratory Assessments  Heart Rate: (!) 115  Resp: 27  SpO2: 100 %  Position: Semi-Bach's  Humidification Source: Heated wire  Humidification Temp: 37  Circuit Condensation: Not drained  Airway Type: ET  Airway Size: 7.5  Cuff Pressure (cm H2O): 29 cm H2O  Skin Barrier Applied: Yes     Nasal cannula L/min     Face mask %     Reservoirs mask %       ABG     PH  7.46   PCO2  32   PO2  137   HCO3  23   Sat%  97   FIO2     DATES 12/30/22       Lines:  Site  Day  Date inserted     TLC   Rfemoral    12/17/22      PICC              Arterial line   Left brachial    12/20/22     Peripheral line              HD cath Left IJ    12/21/22     [REMOVED] Urinary Catheter 12/15/22 Peterson-Output (mL): 0 mL    Imaging Studies:    CT HEAD WO CONTRAST   Final Result   No acute intracranial abnormality. Persistent chronic right maxillary, ethmoid and frontal sinusitis. FLUORO FOR SURGICAL PROCEDURES   Final Result   Intraprocedural fluoroscopic spot images as above. See separate procedure   report for more information. XR CHEST PORTABLE   Final Result   1. Endotracheal tube is 3.5 cm above the roseann. 2. Stable opacities in right lung base. 3. No radiographic evidence of pneumothorax. 4. Similar position of bilateral chest tubes. XR CHEST PORTABLE   Final Result   Stable chest radiograph with bibasilar opacities related to atelectasis or   pneumonia notable on the right. XR CHEST PORTABLE   Final Result   Stable chest radiograph with pulmonary vascular congestion and bibasilar   opacities related to atelectasis or pneumonia. XR ABDOMEN (KUB) (SINGLE AP VIEW)   Final Result   NG tube again identified within the stomach, when compared to CT scan of the   abdomen pelvis performed 12/25/2022. There is oral contrast within the   stomach, however, there is oral contrast around the GE junction, suspicious   for focal perforation at the GE junction. Oral contrast is also noted   tracking along the inferior right-sided chest tube, indicating extravasation   of contrast into the pleural space. The findings were discussed with Evelyn Jack, the patient's bedside nurse, at   11:34 a.m.         XR CHEST PORTABLE   Final Result   No interval change         XR CHEST PORTABLE   Final Result   No significant interval change, when compared to the previous study performed   1 day earlier. CT HEAD WO CONTRAST   Final Result   1. No acute intracranial abnormality. 2.  Chronic right maxillary ethmoid and frontal sinusitis. Chronic sphenoid   sinusitis. Fluid opacification of both mastoid air cells. 3.  No CT evidence of large territory infarct. MCA vessels appear normal in   density. CT ABDOMEN PELVIS WO CONTRAST Additional Contrast? None   Final Result   1. No evidence of bowel obstruction or significant ileus. 2.  Gastrostomy tube is located in the stomach. Oral contrast material is   noted in the dependent portion of the gastric cardia. No abnormal bowel   dilatation. Oral contrast material noted in the right and left colon without   colonic distension. 3.  Mild abdominal ascites.       4.  Patchy asymmetric gland glass opacities in the lung bases with some areas   of confluent consolidation in the right lower lobe. Trace pleural effusions. Bilateral chest tubes. 5.  Very small left pneumothorax. XR CHEST PORTABLE   Final Result   1. Asymmetric bilateral airspace opacities, not significantly changed. 2.  No of visible pneumothorax. 3.  Support devices appear stable. XR CHEST PORTABLE   Final Result   Suggestion of some partial resolution of bilateral basilar infiltrates         XR CHEST PORTABLE   Final Result   No interval change         XR CHEST PORTABLE   Final Result   Stable appearance of the chest compared to 12/21/2022. XR CHEST PORTABLE   Final Result   Adequately positioned left internal jugular hemodialysis catheter. The   remainder of the exam including additional lines and tubes are stable. XR CHEST PORTABLE   Final Result   Interval left chest tube placement, no definite pneumothorax. Bilateral lung infiltrates and pleural effusions, not significantly changed. These infiltrates may be due to pulmonary edema, pneumonia and/or ARDS. Stable borderline enlargement of the cardiac silhouette. XR CHEST PORTABLE   Final Result   Extensive bilateral airspace disease with interval progression on the right. XR CHEST PORTABLE   Final Result   Postoperative changes with the diffuse bilateral infiltrates and effusions   with improvement in the right side likely CHF/edema. Pneumonia is less   likely. Tubes and catheters as noted. XR CHEST PORTABLE   Final Result   Low lying endotracheal tube which needs withdrawn approximately 2 cm. XR CHEST PORTABLE   Final Result   Enteric tube tip in the body of the stomach. The roseann is not well seen. ET tube tip most likely within 1.5 cm of the   roseann.          XR CHEST PORTABLE   Final Result   Pleural effusions with adjacent infiltrate and or atelectasis showing slightly improved aeration on the left and slightly poor aeration on the   right. XR CHEST PORTABLE   Final Result   Interval improvement in the opacification of the left hemithorax, with   aeration in the left upper lung field when compared to the previous study   performed 1 day earlier. No other significant interval change. XR CHEST PORTABLE   Final Result   New right internal jugular central venous catheter terminating near the   SVC-right atrium junction. No post-procedure pneumothorax. Worsening   opacification of the left hemithorax and stable findings on the right. CT CHEST WO CONTRAST   Final Result   Large right pleural effusion of mixed density to suggest complex effusion. There is a pigtail catheter seen in the pleural space posteriorly and   inferiorly. Small to moderate size left pleural effusion with consolidative   infiltrate throughout the left upper and lower lung field and aerated   portions revealing nodular ground-glass opacity to suggest an infectious   process. Nodular ground-glass opacity inferiorly within the aerated right   upper and lower lung field concerning for an infectious process as well. There is less consolidative infiltrate identified within the right lower lobe. XR CHEST PORTABLE   Final Result   1. No changes position of the right-sided chest tube. No right-sided   pneumothorax. The most of the right-sided pleural effusion has been drained,   if present to be discrete or small. 2.  Moderate left-sided pleural effusion. If quantification of left-sided   pleural effusion will be needed for clinical management bedside ultrasound or   left lateral decubitus views of the chest can be helpful. 3.  No pneumothorax on the right or on the left. 4.  Large size diaphragmatic across the midline. XR CHEST PORTABLE   Final Result   1.   Increased parenchymal density projecting over the left lower lung   concerning for atelectasis and/or consolidation      2. No signs of right effusion      3. Parenchymal density projecting over the right lower lung concerning for   atelectasis         IR GUIDED PERC PLEURAL DRAIN W CATH INSERT   Final Result   1. Successful ultrasound-guided right thoracentesis. 2.  Successful placement of an 8 Turkish pleural drain         XR CHEST PORTABLE   Final Result   1. Right-sided pigtail catheter in the right base. 2.  No conspicuous right-sided pleural effusions identified. No right-sided   pneumothorax. 3.  Cannot exclude a small left-sided pleural effusion. 4.  Large size diaphragmatic hernia across the midline causing compression   atelectasis in the medial aspect of both lungs. FL ESOPHAGRAM   Final Result   1. No evidence of esophageal perforation or leak. 2. Decreased peristalsis. No evidence of esophageal mass or obstructing   lesion. 3. Small sliding hiatus hernia. No evidence of GE reflux. The gastric   foreign disc does appear constricted as it passes through the esophageal   hiatus. US CHEST INCLUDING MEDIASTINUM   Final Result   Probable complex loculated right pleural effusion. Simple left pleural effusion. CTA PULMONARY W CONTRAST   Final Result   No evidence of pulmonary embolism. Bilateral lower lobe pulmonary consolidations and pleural effusions seen. Deviation of the mediastinum to the left is visualized. Mild soft tissue prominence visualized surrounding the esophagus with   narrowing at the gastroesophageal junction but appears to be due to external   compression. Cannot rule left soft tissue mass at this location. Wall thickening visualized in the gastric outlet with mild prominence of the   stomach seen, this could be artifactual.      Distended gallbladder is seen. Degenerative bone changes, postoperative changes and multilevel vertebral   augmentation seen.       No evidence of acute abdominal/pelvic pathology. CT ABDOMEN PELVIS W IV CONTRAST Additional Contrast? Oral   Final Result   No evidence of pulmonary embolism. Bilateral lower lobe pulmonary consolidations and pleural effusions seen. Deviation of the mediastinum to the left is visualized. Mild soft tissue prominence visualized surrounding the esophagus with   narrowing at the gastroesophageal junction but appears to be due to external   compression. Cannot rule left soft tissue mass at this location. Wall thickening visualized in the gastric outlet with mild prominence of the   stomach seen, this could be artifactual.      Distended gallbladder is seen. Degenerative bone changes, postoperative changes and multilevel vertebral   augmentation seen. No evidence of acute abdominal/pelvic pathology. XR CHEST PORTABLE   Final Result   1. Consolidation seen within the left lung base with blunting the left   costophrenic angle which could represent pneumonia or atelectasis   2. A trace left pleural effusion cannot be excluded   3. Large right diaphragmatic hernia/hiatal hernia which was noted on the   patient's previous CT of the chest of 07/22/2022. CT CHEST WO CONTRAST    (Results Pending)   CT ABDOMEN PELVIS WO CONTRAST Additional Contrast? None    (Results Pending)   XR CHEST PORTABLE    (Results Pending)        APRN- CNP Assessment and PLan   In summary 65 y.o. female with significant past medical history of hypothyroidism, breast cancer, TIA, hiatal hernia underwent hiatal hernia repair on 12/9/22 and developed acute respiratory distress and was transfer to [facility] [place]/14/22 for further management. Patient was seen by nephrology, pulmonology, general surgery. Patient noted to have worsening hypoxia requiring 15 L of oxygen, A. fib RVR therefore transferred to MICU and critical care was consulted.      Assessment:  Acute hypoxemic respiratory failure secondary to aspiration pneumonia/pleural effusion/bibasilar consolidation now on ventilation (12/19/22)   ARDS   Bilateral pleural effusion right greater than left status post right thoracentesis with pigtail chest tube placement on (12/16/22 IR)   Septic shock  2/2 PNA/loculated pleural effusion  Loculated pleural effusion s/p VATS and Mini-thoracotomy with open drainage of fluid collection 12/20   Esophageal Leak ==> EGD s/p esophogeal stent placement and peg tube insertion 12/30/22   Thrombocytopenia   AFIB RVR  PAWAN secondary to dehydration  [place] ptosis leukocytosis with left shift  Acute anemia  History of massive hiatal hernia  status post repair on 12/9/2022  History of TIA  History of breast cancer  History of hypothyroidism  History of lumbar compression fracture with history of PLIF T12-L2  History of anxiety     Plan:  -ventilator (day #13), titrate FiO2 keep SPO2 goal 92%  -Plan for SBT when more awake, if unable to wake up more, may need tracheostomy tube  -Off sedation, CT head, negative for intracranial abnormalities. [place] - off levophed, titrate to keep MAP >65mmHg   - loculated pleural effusion s/p VATS and mini thoracotomy with open drainage of fluid collection (12/20/22) s/p 3 chest tube ;R effusion on left side s/p Left chest tube (12/21/22)   -4 chest tube with minimal drainage, noted to have tube feeds through Right chest tube. General surgery resident administer methylene blue and gastrografin contrast with x-ray, positive for Esophageal Leak ==>  s/p EGD with esophageal stent n peg insertion on 12/30/2022  -Left chest tube now to waterseal  -Continue scheduled bronchodilators Brovana and Pulmicort in addition to the DuoNebs, mucomyst   -Echo showed EF of 55%, indeterminate diastolic function, moderate dilated right ventricle with reduced function, moderate tricuspid regurgitation. Echo in January 2021 showed EF of 60 to 65%,   -Procalcitonin 5.70==> 1.57 today; RVP/COVID-19 PCR negative, MRSA nares negative, blood culture (NGTD) respiratory culture (Enterobacter Colace complex, Candida albicans) urine antigen negative. Body fluid hematoma==> enterobacter, candida albicans; Respiratory culture (heavy growth Enterobacter)  - Consult to ID, input appreciated. - Ertapenem and anidulafungin   -General surgery surgery consulted, input appreciated. -Strict NPO with no medications, hold all oral medication and TUBE FEEDS   -PAWAN, nephrology following, input appreciated- low urine output, metabolic acidosis, will place temp HD catheter and may need CRRT with no net removal.   -Strict intake and output  -Labs and chest x-ray in a.m.  -F/E/N KVO/ replace lytes/ Npo ; TPN   - DVT/GI scds/lovenox/protonix  - Code status: DNR- limited No CPR, ok to everything.        KAR Noriega-CNP   Critical Care     Discussed case with Attending Physician: Kevin Negron

## 2022-12-31 NOTE — PROCEDURES
Pre-procedure Dx:       Procedures: CENTRAL LINE [BVL70 (Type: Custom)]        Procedure: Insertion of temporary hemodialysis catherter via Right  intrajugular vein    Indications: Temporary hemodialysis     Anesthesia: n/a     Consent: Informed written consent obtained from family- Patient is sedated, intubated. Sterile barriers: all five maximum sterile barriers used - cap, mask, sterile gown, sterile gloves, and large sterile sheet. Patient position: flat     Technique:    Procedure was done using strict aseptic technique. The patient's right intrajugular was prepped and draped in the usual sterile fashion. Right  intrajugular vein was seen through ultrasound guided left intrajugular. Using the Seldinger technique, a large-bore needle was placed into the left intrajugular vein with good backflow. A guidewire was placed. Then a small incision was made in the patient's skin. The large-bore needle was removed. Dilators were passed over the guidewire. Then, temporary hemodialysis was catheter was placed over the guidewire which was subsequently removed. Both ports were drawn and flushed with good flow. The catheter was sutured in place, and a sterile dressing was placed. Number of sticks: 1    Number of Kits used: 1    Complications: No immediate complication. Estimated blood loss: About 10ml. Comment: Patient tolerated the procedure well with no immediate complications.      CXR pending     Lebron Fothergill, APRN - MOJGAN

## 2022-12-31 NOTE — BRIEF OP NOTE
Brief Postoperative Note      Patient: Nikole Centeno  YOB: 1945  MRN: [id number]    Date of Procedure: 12/30/2022    Pre-Op Diagnosis: Esophageal fistula    Post-Op Diagnosis: Same       Procedure(s):  EGD ESOPHAGOGASTRODUODENOSCOPY, WITH ESOPHAGEAL STENT PLACEMENT  EGD ESOPHAGOGASTRODUODENOSCOPY WITH PEG TUBE INSERTION    Surgeon(s):  Genevieve Howard MD    Assistant:  * No surgical staff found *    Anesthesia: General    Estimated Blood Loss (mL): less than 50     Complications: None    Specimens:   * No specimens in log *    Implants:  Implant Name Type Inv. Item Serial No.  Lot No. LRB No. Used Action   STENT ES L12CM [id number] CATH 18. 5FR L120CM MTL FULL CVR SELF - [id number]  STENT ES L12CM [id number] CATH 18. 5FR L120CM MTL FULL CVR SELF  [employer] [id number] N/A 1 Implanted   TUBE JEJUSTMY 8.5FR JEJU FEED TTP BENT TAPR TEMP TIP - [id number]  TUBE JEJUSTMY 8.5FR JEJU FEED TTP BENT TAPR TEMP TIP  [employer] [id number] Left 1 Implanted         Drains:   Chest Tube Right Pleural 1 (Active)   Chest Tube Airleak No 12/30/22 2200   Status Continuous Suction 12/30/22 1947   Suction -20 cm H2O 12/30/22 1947   Y Connector Used Yes 12/30/22 2200   Drainage Description Serous 12/30/22 2200   Dressing Status Clean, dry & intact 12/30/22 2200   Chest Tube Dressing Petroleum Jelly 12/28/22 0000   Site Assessment Clean, dry & intact 12/30/22 2200   Surrounding Skin Clean, dry & intact 12/30/22 2200   Output (ml) 50 ml 12/30/22 0500       Chest Tube Right (Active)   Chest Tube Airleak No 12/30/22 2200   Status Continuous Suction 12/30/22 1947   Suction -20 cm H2O 12/30/22 1947   Y Connector Used Yes 12/30/22 2200   Drainage Description Other (Comment) 12/30/22 2200   Dressing Status Clean, dry & intact 12/30/22 2200   Chest Tube Dressing Petroleum Jelly 12/28/22 0000   Site Assessment Clean, dry & intact 12/30/22 2200   Surrounding Skin Clean, dry & intact 12/30/22 2200   Output (ml) 50 ml 12/30/22 1500       Chest Tube Left Midaxillary (Active)   Chest Tube Airleak No 12/30/22 2200   Status Continuous Suction 12/30/22 1947   Suction -20 cm H2O 12/30/22 1947   Y Connector Used No 12/30/22 2200   Drainage Description Serous 12/30/22 2200   Dressing Status Clean, dry & intact 12/30/22 2200   Chest Tube Dressing Dry 12/21/22 1400   Site Assessment Leaking 12/22/22 0800   Output (ml) 30 ml 12/30/22 0500       Closed/Suction Drain Right Other (Comment) Other (Comment) (Active)   Site Description Clean, dry & intact 12/30/22 2200   Dressing Status Clean, dry & intact 12/30/22 2200   Drainage Appearance Serosanguinous 12/30/22 2200   Drain Status Suction-low continuous 12/30/22 2200   Output (ml) 0 ml 12/29/22 0600       Closed/Suction Drain Right Other (Comment) Other (Comment) (Active)   Site Description Clean, dry & intact 12/30/22 2200   Dressing Status Clean, dry & intact 12/30/22 2200   Drainage Appearance Coffee Ground 12/30/22 2200   Drain Status Suction-low continuous 12/28/22 1400   Output (ml) 0 ml 12/29/22 0600       Closed/Suction Drain Right Other (Comment) Other (Comment) (Active)   Site Description Clean, dry & intact 12/30/22 2200   Dressing Status Clean, dry & intact 12/30/22 2200   Drainage Appearance Serosanguinous;Dark Red 12/30/22 2200   Drain Status Suction-low continuous 12/28/22 1400   Output (ml) 0 ml 12/29/22 0600       Gastrostomy/Enterostomy/Jejunostomy Tube Percutaneous Endoscopic Gastrostomy (PEG) LUQ 8.5 fr (Active)   $ Gastrostomy insertion $ Yes 12/30/22 1947   Surrounding Skin Clean, dry & intact 12/30/22 2200   Dressing Status Clean;Dry; Intact 12/30/22 2200   Dressing Type Dry dressing 12/30/22 2200       [REMOVED] NG/OG/NJ/NE Tube Orogastric Right mouth (Removed)   Surrounding Skin Clean, dry & intact 12/30/22 2200   Securement device Tape 12/30/22 2200   Status Suction-low intermittent 12/30/22 2200   Placement Verified X-Ray (Initial) 12/30/22 2200   NG/OG/NJ/NE External Measurement (cm) 57 cm 12/30/22 2200   Drainage Appearance Bile;Brown 12/21/22 1100   Tube Feeding Other Tube Feeding (must specify product in comment) 12/28/22 0600   Tube feeding/verify rate (mL/hr) 10 mL/hr 12/27/22 0400   Tube Feeding Intake (mL) 0 ml 12/27/22 0700   Free Water/Flush (mL) 0 mL 12/27/22 0700   Output (mL) 75 ml 12/24/22 0800   Residual Volume (ml) 300 ml 12/21/22 1400       [REMOVED] Urinary Catheter 12/15/22 Peterson (Removed)   Catheter Indications Urinary retention (acute or chronic), continuous bladder irrigation or bladder outlet obstruction 12/28/22 0200   Site Assessment No urethral drainage 12/28/22 0200   Urine Color Yellow 12/28/22 0200   Urine Appearance Sediment 12/28/22 0200   Output (mL) 0 mL 12/28/22 0200       Findings: 3-4cm perforation in esophagus 30cm from incisors  PEG J tube placed and anchored to D3  Fully covered metal stent deployed to cover the perforation    Electronically signed by Kennedi Mendes MD on 12/30/2022 at 10:40 PM

## 2022-12-31 NOTE — PLAN OF CARE
Problem: Chronic Conditions and Co-morbidities  Goal: Patient's chronic conditions and co-morbidity symptoms are monitored and maintained or improved  12/31/2022 0054 by Roshan Pacheco RN  Outcome: Progressing  12/30/2022 2003 by Roshan Pacheco RN  Outcome: Progressing     Problem: Discharge Planning  Goal: Discharge to home or other facility with appropriate resources  12/31/2022 0054 by Roshan Pacheco RN  Outcome: Progressing  12/30/2022 2003 by Roshan Pacheco RN  Outcome: Progressing     Problem: Safety - Adult  Goal: Free from fall injury  12/31/2022 0054 by Roshan Pacheco RN  Outcome: Progressing  12/30/2022 2003 by Roshan Pacheco RN  Outcome: Progressing     Problem: Pain  Goal: Verbalizes/displays adequate comfort level or baseline comfort level  12/31/2022 0054 by Roshan Pacheco RN  Outcome: Progressing  12/30/2022 2003 by Roshan Pacheco RN  Outcome: Progressing     Problem: Nutrition Deficit:  Goal: Optimize nutritional status  12/31/2022 0054 by Roshan Pacheco RN  Outcome: Progressing  12/30/2022 2003 by Roshan Pacheco RN  Outcome: Progressing  Flowsheets (Taken 12/30/2022 0949 by Fanny Rocha, RICARDO, LD)  Nutrient intake appropriate for improving, restoring, or maintaining nutritional needs: Recommend, monitor, and adjust tube feedings and TPN/PPN based on assessed needs     Problem: Skin/Tissue Integrity  Goal: Absence of new skin breakdown  Description: 1. Monitor for areas of redness and/or skin breakdown  2. Assess vascular access sites hourly  3. Every 4-6 hours minimum:  Change oxygen saturation probe site  4. Every 4-6 hours:  If on nasal continuous positive airway pressure, respiratory therapy assess nares and determine need for appliance change or resting period.   12/31/2022 0054 by Roshan Pacheco RN  Outcome: Progressing  12/30/2022 2003 by Roshan Pacheco RN  Outcome: Progressing     Problem: Respiratory - Adult  Goal: Achieves optimal ventilation and oxygenation  12/31/2022 0054 by Saji Allen Shannan Bolton RN  Outcome: Progressing  12/30/2022 2003 by Nikhil Ventura RN  Outcome: Progressing     Problem: Cardiovascular - Adult  Goal: Maintains optimal cardiac output and hemodynamic stability  12/31/2022 0054 by Nikhil Ventura RN  Outcome: Progressing  12/30/2022 2003 by Nikhil Ventura RN  Outcome: Progressing     Problem: Hematologic - Adult  Goal: Maintains hematologic stability  12/31/2022 0054 by Nikhil Ventura RN  Outcome: Progressing  12/30/2022 2003 by Nikhil Ventura RN  Outcome: Progressing

## 2022-12-31 NOTE — PROCEDURES
Central Line Insertion     Procedure: left internal jugular vein Triple Lumen Catheter placement. Indications: vascular access    Consent: The family members were counseled regarding the procedure, its indications, risks, potential complications and alternatives, and any questions were answered. Consent was obtained to proceed. Number of sticks: none     Number of Kits used: 1    Procedure: Time Out: Immediately prior to the procedure a \"timeout\" was called to verify the correct patient and procedure. The patient was place in the trendelenburg position and the skin over the right internal jugular vein was prepped with betadine and draped in a sterile fashion and unable to be prepped due to the emergent nature of the procedure. Temp HD cathere was cleaned as well. The guide wire was then inserted through blue port of temp HD catheter than, the catheter was removed,  A triple lumen catheter was then inserted into the vessel over the guide wire using the Seldinger technique. All ports showed good, free flowing blood return and were flushed with saline solution. The catheter was then securely fastened to the skin with sutures and with an adhesive dressing and covered with a bio patch and sterile dressing. A post procedure X-ray was ordered and is still pending at this time. Complications: bleeding , quick cloth drsg was placed    The patient tolerated the procedure well. Estimated blood loss: 12 ml.     KAR Pulido CNP   12/31/2022  5:00 PM

## 2022-12-31 NOTE — PROGRESS NOTES
Hospitalist Progress Note      PCP: Ashely Faria DO    Date of Admission: 12/14/2022        Hospital Course:  65 y.o. female presented with PNEUMONIA. STATES SHE HAD A HH REPAIR AT [facility] ON THE 8TH. SHE STATES SHE WAS NOT FEELING BAD, BUT WAS TOLD SHE NEEDED TO COME TO THE HOSPITAL BC SHE WAS SICK. SHE WAS FOUND TO BE SEPTIC, WITH A HAG, SHE WAS TACHY AND TACHYPNEIC . COFFEE GROUND EMESIS THIS MORNING, GASTROCULT  POS . SURGERY NOTIFIED ** HAD A THROACENTESIS ON YESTERDAY, 105 CC REMOVED. ** RRT  DUE TO RESPIRATORY DISTRESS ** TO TRANSFER TO  OR [facility]** INTUBATED HYPOXIA**  HAD CHEST TUBES INSERTED ON 12.20 ** DEVI FOUND ON CULTURES,  ** APPEARS TO HAVE A FISTULA, IN CHEST CAVITY. ?  IF TRANSFER TO [facility] VS **  HAD WITH DR Anh Lucero,  EGD ESOPHAGOGASTRODUODENOSCOPY, WITH ESOPHAGEAL STENT PLACEMENT EGD ESOPHAGOGASTRODUODENOSCOPY WITH PEG TUBE INSERTION                   Subjective:  SEDATED AND INTUBATED           Medications:  Reviewed    Infusion Medications    PN-Adult  3 IN 1 Central Line (Custom)      PN-Adult  3 IN 1 Central Line (Custom) 50 mL/hr at 12/30/22 2217    sodium chloride      sodium chloride 10 mL/hr at 12/30/22 2217    norepinephrine Stopped (12/31/22 0030)    prismaSATE BGK 4/0/1.2 2,000 mL/hr at 12/31/22 0813    sodium chloride 100 mL/hr at 12/31/22 5344    calcium chloride CRRT infusion 55 mL/hr at 12/31/22 0705    dextrose      sodium chloride       Scheduled Medications    phosphorus  500 mg Oral Once    [Held by provider] midodrine  15 mg Oral TID WC    insulin lispro  0-16 Units SubCUTAneous Q4H    lidocaine PF  5 mL IntraDERmal Once    sodium chloride flush  5-40 mL IntraVENous 2 times per day    ertapenem (INVanz) IVPB  1,000 mg IntraVENous Q24H    [Held by provider] Vitamin D  1,000 Units Oral Daily    heparin (porcine)  5,000 Units SubCUTAneous Q8H    artificial tears   Both Eyes Q4H    anidulafungin  100 mg IntraVENous Q24H    pantoprazole (PROTONIX) 40 mg injection  40 mg IntraVENous Q12H    sodium chloride flush  5-40 mL IntraVENous 2 times per day    Arformoterol Tartrate  15 mcg Nebulization BID    budesonide  0.5 mg Nebulization BID    albuterol  2.5 mg Nebulization Q4H WA    [Held by provider] metoprolol  5 mg IntraVENous Q6H    [Held by provider] rosuvastatin  10 mg Oral Nightly    [Held by provider] levothyroxine  50 mcg Oral Daily     PRN Meds: sodium chloride, sodium chloride flush, sodium chloride, potassium chloride, magnesium sulfate, calcium gluconate **OR** calcium gluconate **OR** calcium gluconate **OR** calcium gluconate, sodium phosphate IVPB **OR** sodium phosphate IVPB **OR** sodium phosphate IVPB **OR** sodium phosphate IVPB, glucose, dextrose bolus **OR** dextrose bolus, glucagon (rDNA), dextrose, sodium chloride flush, sodium chloride, ondansetron **OR** ondansetron, [DISCONTINUED] acetaminophen **OR** acetaminophen, ipratropium-albuterol      Intake/Output Summary (Last 24 hours) at 12/31/2022 0843  Last data filed at 12/31/2022 0600  Gross per 24 hour   Intake 1992.39 ml   Output 1875 ml   Net 117.39 ml       Exam:    BP (!) 124/48   Pulse (!) 103   Temp (!) 96.5 °F (35.8 °C) (Axillary)   Resp 28   Ht 5' 2\" (1.575 m)   Wt 125 lb (56.7 kg)   SpO2 100%   BMI 22.86 kg/m²     General appearance:  No apparent distress, appears stated age. HEENT:  Normal cephalic,   Neck: Supple, with full range of motion. Trachea midline. Respiratory:  Normal respiratory effort. DIMINISHED BREATH SOUNDS  NOTED BILATERALLY  Cardiovascular:  RRR  Abdomen: Soft, non-tender, non-distended with normal bowel sounds. Musculoskeletal:  No clubbing, cyanosis or edema bilaterally.     Skin: smooth and dry                        Labs:   Recent Labs     12/29/22  0539 12/30/22  0633 12/31/22  0534   WBC 15.0* 15.9* 18.1*   HGB 8.9* 8.2* 7.8*   HCT 25.2* 24.4* 23.7*   PLT 67* 66* 81*     Recent Labs     12/30/22  1845 12/31/22  0001 12/31/22  0534    133 136 K 4.6 4.3 4.4    103 106   CO2 21* 20* 22   BUN 42* 55* 45*   CREATININE 1.5* 1.9* 1.4*   CALCIUM 8.9 8.5* 8.3*   PHOS 3.5 3.0 2.5     Recent Labs     12/30/22  1845 12/31/22  0001 12/31/22  0534   * 135* 129*   ALT 60* 82* 87*   BILITOT 4.2* 4.6* 5.0*   ALKPHOS 158* 151* 157*     Recent Labs     12/28/22  1137   INR 1.3     No results for input(s): Cherre Gash in the last 72 hours. Recent Labs     12/30/22  1845 12/31/22  0001 12/31/22  0534   * 135* 129*   ALT 60* 82* 87*   BILITOT 4.2* 4.6* 5.0*   ALKPHOS 158* 151* 157*     No results for input(s): LACTA in the last 72 hours. No results found for: Nazanin Pardo  Lab Results   Component Value Date    AMMONIA 21.0 12/28/2022       Assessment:    Active Hospital Problems    Diagnosis Date Noted    Hypoxia [R09.02] 12/17/2022     Priority: Medium    Pneumonia of left lower lobe due to infectious organism [J18.9] 12/17/2022     Priority: Medium    Aspiration pneumonia of both lower lobes due to gastric secretions ([address] [J69.0] 12/17/2022     Priority: Medium    Postoperative pneumonia [J95.89, J18.9] 12/14/2022     Priority: Medium   FISTULA CHEST CAVITY  ACUTE RESP FAILURE WITH HYPOXIA  GASTROCULT POS  S/P HH REPAIR  PAWAN BASELINE CREATINE 1.0)  H.O BREAST CANCER   HLD   PLEURAL EFFUSIONS BILATERALLY   S/P CHEST TUBES  R loculated pleural effusion s/p IR chest tube 12/17/22 with minimal drainage, s/p R VATS turned partial open thoracotomy 12/20/22 with insertion of chest tubes x 3  EGD ESOPHAGOGASTRODUODENOSCOPY, WITH ESOPHAGEAL STENT PLACEMENT  EGD ESOPHAGOGASTRODUODENOSCOPY WITH PEG TUBE INSERTION    PLAN:  anidulafungin 200    FLUIDS  MONITOR BMP   FOR  CAMACHO         DVT Prophylaxis: SCD  Diet: Diet NPO Exceptions are: Ice Chips  PN-Adult  3 IN 1 Central Line (Custom)  Diet NPO Exceptions are: Sips of Water with Meds  PN-Adult  3 IN 1 Central Line (Custom)  Code Status: Full Code     PT/OT Eval Status:   WHEN STABLE     Dispo -  TO [facility] OR [facility]      Electronically signed by Oni Aranda DO on 12/31/2022 at 8:43 AM [facility]

## 2023-01-01 ENCOUNTER — APPOINTMENT (OUTPATIENT)
Dept: GENERAL RADIOLOGY | Age: 78
DRG: 853 | End: 2023-01-01
Payer: MEDICARE

## 2023-01-01 ENCOUNTER — APPOINTMENT (OUTPATIENT)
Dept: CT IMAGING | Age: 78
DRG: 853 | End: 2023-01-01
Payer: MEDICARE

## 2023-01-01 VITALS
BODY MASS INDEX: 23.92 KG/M2 | TEMPERATURE: 97.5 F | SYSTOLIC BLOOD PRESSURE: 121 MMHG | OXYGEN SATURATION: 67 % | HEIGHT: 62 IN | DIASTOLIC BLOOD PRESSURE: 50 MMHG | WEIGHT: 130 LBS

## 2023-01-01 LAB
AADO2: 115.5 MMHG
AADO2: 129.5 MMHG
AADO2: 141.9 MMHG
AADO2: 143.7 MMHG
AADO2: 148.2 MMHG
ABO/RH: NORMAL
AFB CULTURE (MYCOBACTERIA): NORMAL
AFB SMEAR: NORMAL
ALBUMIN SERPL-MCNC: 2 G/DL (ref 3.5–5.2)
ALBUMIN SERPL-MCNC: 2 G/DL (ref 3.5–5.2)
ALBUMIN SERPL-MCNC: 2.4 G/DL (ref 3.5–5.2)
ALBUMIN SERPL-MCNC: 2.5 G/DL (ref 3.5–5.2)
ALBUMIN SERPL-MCNC: 2.5 G/DL (ref 3.5–5.2)
ALBUMIN SERPL-MCNC: 2.6 G/DL (ref 3.5–5.2)
ALBUMIN SERPL-MCNC: 2.7 G/DL (ref 3.5–5.2)
ALBUMIN SERPL-MCNC: 3.2 G/DL (ref 3.5–5.2)
ALBUMIN SERPL-MCNC: 3.4 G/DL (ref 3.5–5.2)
ALP BLD-CCNC: 122 U/L (ref 35–104)
ALP BLD-CCNC: 125 U/L (ref 35–104)
ALP BLD-CCNC: 126 U/L (ref 35–104)
ALP BLD-CCNC: 128 U/L (ref 35–104)
ALP BLD-CCNC: 130 U/L (ref 35–104)
ALP BLD-CCNC: 130 U/L (ref 35–104)
ALP BLD-CCNC: 134 U/L (ref 35–104)
ALP BLD-CCNC: 157 U/L (ref 35–104)
ALP BLD-CCNC: 169 U/L (ref 35–104)
ALT SERPL-CCNC: 113 U/L (ref 0–32)
ALT SERPL-CCNC: 165 U/L (ref 0–32)
ALT SERPL-CCNC: 201 U/L (ref 0–32)
ALT SERPL-CCNC: 223 U/L (ref 0–32)
ALT SERPL-CCNC: 244 U/L (ref 0–32)
ALT SERPL-CCNC: 253 U/L (ref 0–32)
ALT SERPL-CCNC: 260 U/L (ref 0–32)
ALT SERPL-CCNC: 72 U/L (ref 0–32)
ALT SERPL-CCNC: 73 U/L (ref 0–32)
ANGLE (CLOT STRENGTH): 60.6 DEGREE (ref 59–74)
ANION GAP SERPL CALCULATED.3IONS-SCNC: 11 MMOL/L (ref 7–16)
ANION GAP SERPL CALCULATED.3IONS-SCNC: 12 MMOL/L (ref 7–16)
ANION GAP SERPL CALCULATED.3IONS-SCNC: 12 MMOL/L (ref 7–16)
ANION GAP SERPL CALCULATED.3IONS-SCNC: 13 MMOL/L (ref 7–16)
ANION GAP SERPL CALCULATED.3IONS-SCNC: 13 MMOL/L (ref 7–16)
ANION GAP SERPL CALCULATED.3IONS-SCNC: 15 MMOL/L (ref 7–16)
ANISOCYTOSIS: ABNORMAL
ANTIBODY SCREEN: NORMAL
AST SERPL-CCNC: 187 U/L (ref 0–31)
AST SERPL-CCNC: 203 U/L (ref 0–31)
AST SERPL-CCNC: 227 U/L (ref 0–31)
AST SERPL-CCNC: 326 U/L (ref 0–31)
AST SERPL-CCNC: 333 U/L (ref 0–31)
AST SERPL-CCNC: 430 U/L (ref 0–31)
AST SERPL-CCNC: 66 U/L (ref 0–31)
AST SERPL-CCNC: 679 U/L (ref 0–31)
AST SERPL-CCNC: 75 U/L (ref 0–31)
B.E.: -2 MMOL/L (ref -3–3)
B.E.: -2.7 MMOL/L (ref -3–3)
B.E.: -3.3 MMOL/L (ref -3–3)
B.E.: -3.4 MMOL/L (ref -3–3)
B.E.: -4.4 MMOL/L (ref -3–3)
BASOPHILIC STIPPLING: ABNORMAL
BASOPHILS ABSOLUTE: 0 E9/L (ref 0–0.2)
BASOPHILS RELATIVE PERCENT: 0.2 % (ref 0–2)
BASOPHILS RELATIVE PERCENT: 0.2 % (ref 0–2)
BASOPHILS RELATIVE PERCENT: 0.3 % (ref 0–2)
BASOPHILS RELATIVE PERCENT: 1.1 % (ref 0–2)
BILIRUB SERPL-MCNC: 10.5 MG/DL (ref 0–1.2)
BILIRUB SERPL-MCNC: 10.9 MG/DL (ref 0–1.2)
BILIRUB SERPL-MCNC: 3.5 MG/DL (ref 0–1.2)
BILIRUB SERPL-MCNC: 4.2 MG/DL (ref 0–1.2)
BILIRUB SERPL-MCNC: 4.3 MG/DL (ref 0–1.2)
BILIRUB SERPL-MCNC: 4.5 MG/DL (ref 0–1.2)
BILIRUB SERPL-MCNC: 6.8 MG/DL (ref 0–1.2)
BILIRUB SERPL-MCNC: 8 MG/DL (ref 0–1.2)
BILIRUB SERPL-MCNC: 9.4 MG/DL (ref 0–1.2)
BLOOD BANK DISPENSE STATUS: NORMAL
BLOOD BANK PRODUCT CODE: NORMAL
BPU ID: NORMAL
BUN BLDV-MCNC: 22 MG/DL (ref 6–23)
BUN BLDV-MCNC: 23 MG/DL (ref 6–23)
BUN BLDV-MCNC: 24 MG/DL (ref 6–23)
BUN BLDV-MCNC: 26 MG/DL (ref 6–23)
BUN BLDV-MCNC: 27 MG/DL (ref 6–23)
BUN BLDV-MCNC: 27 MG/DL (ref 6–23)
BUN BLDV-MCNC: 28 MG/DL (ref 6–23)
BUN BLDV-MCNC: 29 MG/DL (ref 6–23)
BUN BLDV-MCNC: 29 MG/DL (ref 6–23)
BUN BLDV-MCNC: 30 MG/DL (ref 6–23)
BUN BLDV-MCNC: 30 MG/DL (ref 6–23)
BURR CELLS: ABNORMAL
CALCIUM IONIZED: 1.09 MMOL/L (ref 1.15–1.33)
CALCIUM IONIZED: 1.17 MMOL/L (ref 1.15–1.33)
CALCIUM IONIZED: 1.17 MMOL/L (ref 1.15–1.33)
CALCIUM IONIZED: 1.18 MMOL/L (ref 1.15–1.33)
CALCIUM IONIZED: 1.19 MMOL/L (ref 1.15–1.33)
CALCIUM IONIZED: 1.2 MMOL/L (ref 1.15–1.33)
CALCIUM IONIZED: 1.2 MMOL/L (ref 1.15–1.33)
CALCIUM IONIZED: 1.22 MMOL/L (ref 1.15–1.33)
CALCIUM SERPL-MCNC: 7 MG/DL (ref 8.6–10.2)
CALCIUM SERPL-MCNC: 7.9 MG/DL (ref 8.6–10.2)
CALCIUM SERPL-MCNC: 7.9 MG/DL (ref 8.6–10.2)
CALCIUM SERPL-MCNC: 8 MG/DL (ref 8.6–10.2)
CALCIUM SERPL-MCNC: 8 MG/DL (ref 8.6–10.2)
CALCIUM SERPL-MCNC: 8.1 MG/DL (ref 8.6–10.2)
CALCIUM SERPL-MCNC: 8.1 MG/DL (ref 8.6–10.2)
CALCIUM SERPL-MCNC: 8.2 MG/DL (ref 8.6–10.2)
CALCIUM SERPL-MCNC: 8.2 MG/DL (ref 8.6–10.2)
CALCIUM SERPL-MCNC: 8.4 MG/DL (ref 8.6–10.2)
CALCIUM SERPL-MCNC: 8.5 MG/DL (ref 8.6–10.2)
CHLORIDE BLD-SCNC: 101 MMOL/L (ref 98–107)
CHLORIDE BLD-SCNC: 102 MMOL/L (ref 98–107)
CHLORIDE BLD-SCNC: 103 MMOL/L (ref 98–107)
CHLORIDE BLD-SCNC: 104 MMOL/L (ref 98–107)
CHLORIDE BLD-SCNC: 105 MMOL/L (ref 98–107)
CHLORIDE BLD-SCNC: 105 MMOL/L (ref 98–107)
CHLORIDE BLD-SCNC: 107 MMOL/L (ref 98–107)
CO2: 16 MMOL/L (ref 22–29)
CO2: 18 MMOL/L (ref 22–29)
CO2: 19 MMOL/L (ref 22–29)
CO2: 19 MMOL/L (ref 22–29)
CO2: 20 MMOL/L (ref 22–29)
CO2: 22 MMOL/L (ref 22–29)
COHB: 0.7 % (ref 0–1.5)
COHB: 0.7 % (ref 0–1.5)
COHB: 0.8 % (ref 0–1.5)
COHB: 0.8 % (ref 0–1.5)
COHB: 1.6 % (ref 0–1.5)
CORTISOL TOTAL: 51.21 MCG/DL (ref 2.68–18.4)
CREAT SERPL-MCNC: 0.6 MG/DL (ref 0.5–1)
CREAT SERPL-MCNC: 0.6 MG/DL (ref 0.5–1)
CREAT SERPL-MCNC: 0.7 MG/DL (ref 0.5–1)
CREAT SERPL-MCNC: 0.8 MG/DL (ref 0.5–1)
CREAT SERPL-MCNC: 0.9 MG/DL (ref 0.5–1)
CREAT SERPL-MCNC: 0.9 MG/DL (ref 0.5–1)
CRITICAL: ABNORMAL
DATE ANALYZED: ABNORMAL
DATE OF COLLECTION: ABNORMAL
DESCRIPTION BLOOD BANK: NORMAL
EKG ATRIAL RATE: 375 BPM
EKG Q-T INTERVAL: 308 MS
EKG QRS DURATION: 70 MS
EKG QTC CALCULATION (BAZETT): 435 MS
EKG R AXIS: 53 DEGREES
EKG T AXIS: 63 DEGREES
EKG VENTRICULAR RATE: 120 BPM
EOSINOPHILS ABSOLUTE: 0 E9/L (ref 0.05–0.5)
EOSINOPHILS ABSOLUTE: 0 E9/L (ref 0.05–0.5)
EOSINOPHILS ABSOLUTE: 0.1 E9/L (ref 0.05–0.5)
EOSINOPHILS ABSOLUTE: 0.12 E9/L (ref 0.05–0.5)
EOSINOPHILS RELATIVE PERCENT: 0.1 % (ref 0–6)
EOSINOPHILS RELATIVE PERCENT: 0.3 % (ref 0–6)
EOSINOPHILS RELATIVE PERCENT: 0.9 % (ref 0–6)
EOSINOPHILS RELATIVE PERCENT: 0.9 % (ref 0–6)
EPL-TEG: 0 % (ref 0–15)
FIBRINOGEN: 169 MG/DL (ref 200–400)
FIO2: 40 %
G-TEG: 6.9 K D/SC (ref 4.5–11)
GFR SERPL CREATININE-BSD FRML MDRD: >60 ML/MIN/1.73
GLUCOSE BLD-MCNC: 147 MG/DL (ref 74–99)
GLUCOSE BLD-MCNC: 158 MG/DL (ref 74–99)
GLUCOSE BLD-MCNC: 168 MG/DL (ref 74–99)
GLUCOSE BLD-MCNC: 188 MG/DL (ref 74–99)
GLUCOSE BLD-MCNC: 208 MG/DL (ref 74–99)
GLUCOSE BLD-MCNC: 215 MG/DL (ref 74–99)
GLUCOSE BLD-MCNC: 220 MG/DL (ref 74–99)
GLUCOSE BLD-MCNC: 238 MG/DL (ref 74–99)
GLUCOSE BLD-MCNC: 249 MG/DL (ref 74–99)
GLUCOSE BLD-MCNC: 259 MG/DL (ref 74–99)
GLUCOSE BLD-MCNC: 278 MG/DL (ref 74–99)
HCO3: 19.6 MMOL/L (ref 22–26)
HCO3: 19.7 MMOL/L (ref 22–26)
HCO3: 20.3 MMOL/L (ref 22–26)
HCO3: 20.4 MMOL/L (ref 22–26)
HCO3: 21.1 MMOL/L (ref 22–26)
HCT VFR BLD CALC: 18 % (ref 34–48)
HCT VFR BLD CALC: 21.5 % (ref 34–48)
HCT VFR BLD CALC: 30.4 % (ref 34–48)
HCT VFR BLD CALC: 32.3 % (ref 34–48)
HCT VFR BLD CALC: 32.4 % (ref 34–48)
HCT VFR BLD CALC: 32.6 % (ref 34–48)
HEMOGLOBIN: 10.4 G/DL (ref 11.5–15.5)
HEMOGLOBIN: 11 G/DL (ref 11.5–15.5)
HEMOGLOBIN: 11 G/DL (ref 11.5–15.5)
HEMOGLOBIN: 11.1 G/DL (ref 11.5–15.5)
HEMOGLOBIN: 5.9 G/DL (ref 11.5–15.5)
HEMOGLOBIN: 7.2 G/DL (ref 11.5–15.5)
HHB: 1.8 % (ref 0–5)
HHB: 2.5 % (ref 0–5)
HHB: 2.6 % (ref 0–5)
HHB: 2.9 % (ref 0–5)
HHB: 2.9 % (ref 0–5)
HYPOCHROMIA: ABNORMAL
INR BLD: 2.2
K (CLOTTING TIME): 2.2 MIN (ref 1–3)
LAB: ABNORMAL
LACTIC ACID: 1.6 MMOL/L (ref 0.5–2.2)
LACTIC ACID: 2.1 MMOL/L (ref 0.5–2.2)
LACTIC ACID: 2.3 MMOL/L (ref 0.5–2.2)
LACTIC ACID: 2.3 MMOL/L (ref 0.5–2.2)
LACTIC ACID: 2.4 MMOL/L (ref 0.5–2.2)
LACTIC ACID: 2.7 MMOL/L (ref 0.5–2.2)
LACTIC ACID: 3 MMOL/L (ref 0.5–2.2)
LACTIC ACID: 6.1 MMOL/L (ref 0.5–2.2)
LY30 (FIBRINOLYSIS): 0 % (ref 0–8)
LYMPHOCYTES ABSOLUTE: 0.27 E9/L (ref 1.5–4)
LYMPHOCYTES ABSOLUTE: 0.42 E9/L (ref 1.5–4)
LYMPHOCYTES ABSOLUTE: 0.72 E9/L (ref 1.5–4)
LYMPHOCYTES ABSOLUTE: 0.91 E9/L (ref 1.5–4)
LYMPHOCYTES RELATIVE PERCENT: 1.7 % (ref 20–42)
LYMPHOCYTES RELATIVE PERCENT: 2.6 % (ref 20–42)
LYMPHOCYTES RELATIVE PERCENT: 3.5 % (ref 20–42)
LYMPHOCYTES RELATIVE PERCENT: 4.4 % (ref 20–42)
Lab: ABNORMAL
MA (MAX AMPLITUDE): 57.9 MM (ref 50–70)
MAGNESIUM: 1.8 MG/DL (ref 1.6–2.6)
MAGNESIUM: 1.9 MG/DL (ref 1.6–2.6)
MAGNESIUM: 2 MG/DL (ref 1.6–2.6)
MCH RBC QN AUTO: 29.5 PG (ref 26–35)
MCH RBC QN AUTO: 30.1 PG (ref 26–35)
MCH RBC QN AUTO: 30.2 PG (ref 26–35)
MCH RBC QN AUTO: 30.4 PG (ref 26–35)
MCH RBC QN AUTO: 30.6 PG (ref 26–35)
MCHC RBC AUTO-ENTMCNC: 32.8 % (ref 32–34.5)
MCHC RBC AUTO-ENTMCNC: 33.5 % (ref 32–34.5)
MCHC RBC AUTO-ENTMCNC: 34.1 % (ref 32–34.5)
MCHC RBC AUTO-ENTMCNC: 34.2 % (ref 32–34.5)
MCHC RBC AUTO-ENTMCNC: 34.3 % (ref 32–34.5)
MCV RBC AUTO: 86.4 FL (ref 80–99.9)
MCV RBC AUTO: 88 FL (ref 80–99.9)
MCV RBC AUTO: 88.3 FL (ref 80–99.9)
MCV RBC AUTO: 90.7 FL (ref 80–99.9)
MCV RBC AUTO: 93.3 FL (ref 80–99.9)
METAMYELOCYTES RELATIVE PERCENT: 1.7 % (ref 0–1)
METAMYELOCYTES RELATIVE PERCENT: 1.8 % (ref 0–1)
METAMYELOCYTES RELATIVE PERCENT: 2.6 % (ref 0–1)
METER GLUCOSE: 158 MG/DL (ref 74–99)
METER GLUCOSE: 161 MG/DL (ref 74–99)
METER GLUCOSE: 162 MG/DL (ref 74–99)
METER GLUCOSE: 171 MG/DL (ref 74–99)
METER GLUCOSE: 180 MG/DL (ref 74–99)
METER GLUCOSE: 182 MG/DL (ref 74–99)
METER GLUCOSE: 191 MG/DL (ref 74–99)
METER GLUCOSE: 197 MG/DL (ref 74–99)
METER GLUCOSE: 200 MG/DL (ref 74–99)
METER GLUCOSE: 203 MG/DL (ref 74–99)
METER GLUCOSE: 208 MG/DL (ref 74–99)
METER GLUCOSE: 220 MG/DL (ref 74–99)
METER GLUCOSE: 221 MG/DL (ref 74–99)
METER GLUCOSE: 236 MG/DL (ref 74–99)
METER GLUCOSE: 263 MG/DL (ref 74–99)
METHB: 0.2 % (ref 0–1.5)
METHB: 0.3 % (ref 0–1.5)
METHB: 0.3 % (ref 0–1.5)
METHB: 0.4 % (ref 0–1.5)
METHB: 0.4 % (ref 0–1.5)
MODE: ABNORMAL
MODE: ABNORMAL
MODE: AC
MONOCYTES ABSOLUTE: 0.67 E9/L (ref 0.1–0.95)
MONOCYTES ABSOLUTE: 0.68 E9/L (ref 0.1–0.95)
MONOCYTES ABSOLUTE: 0.95 E9/L (ref 0.1–0.95)
MONOCYTES ABSOLUTE: 0.96 E9/L (ref 0.1–0.95)
MONOCYTES RELATIVE PERCENT: 2.6 % (ref 2–12)
MONOCYTES RELATIVE PERCENT: 4.3 % (ref 2–12)
MONOCYTES RELATIVE PERCENT: 5.3 % (ref 2–12)
MONOCYTES RELATIVE PERCENT: 8.8 % (ref 2–12)
MYELOCYTE PERCENT: 0.9 % (ref 0–0)
MYELOCYTE PERCENT: 1.7 % (ref 0–0)
MYELOCYTE PERCENT: 3.5 % (ref 0–0)
NEUTROPHILS ABSOLUTE: 12.33 E9/L (ref 1.8–7.3)
NEUTROPHILS ABSOLUTE: 21.43 E9/L (ref 1.8–7.3)
NEUTROPHILS ABSOLUTE: 21.99 E9/L (ref 1.8–7.3)
NEUTROPHILS ABSOLUTE: 9.12 E9/L (ref 1.8–7.3)
NEUTROPHILS RELATIVE PERCENT: 83.3 % (ref 43–80)
NEUTROPHILS RELATIVE PERCENT: 86.8 % (ref 43–80)
NEUTROPHILS RELATIVE PERCENT: 89.6 % (ref 43–80)
NEUTROPHILS RELATIVE PERCENT: 92.2 % (ref 43–80)
NUCLEATED RED BLOOD CELLS: 12.3 /100 WBC
NUCLEATED RED BLOOD CELLS: 18.4 /100 WBC
NUCLEATED RED BLOOD CELLS: 3.5 /100 WBC
NUCLEATED RED BLOOD CELLS: 6.1 /100 WBC
O2 SATURATION: 97.1 % (ref 92–98.5)
O2 SATURATION: 97.1 % (ref 92–98.5)
O2 SATURATION: 97.4 % (ref 92–98.5)
O2 SATURATION: 97.5 % (ref 92–98.5)
O2 SATURATION: 98.2 % (ref 92–98.5)
O2HB: 95.6 % (ref 94–97)
O2HB: 96 % (ref 94–97)
O2HB: 96.1 % (ref 94–97)
O2HB: 96.3 % (ref 94–97)
O2HB: 97.1 % (ref 94–97)
OPERATOR ID: 187
OPERATOR ID: 2577
OPERATOR ID: ABNORMAL
OVALOCYTES: ABNORMAL
PATIENT TEMP: 37 C
PCO2: 29.4 MMHG (ref 35–45)
PCO2: 29.7 MMHG (ref 35–45)
PCO2: 29.8 MMHG (ref 35–45)
PCO2: 31.2 MMHG (ref 35–45)
PCO2: 32.1 MMHG (ref 35–45)
PDW BLD-RTO: 16.9 FL (ref 11.5–15)
PDW BLD-RTO: 17.5 FL (ref 11.5–15)
PDW BLD-RTO: 19.3 FL (ref 11.5–15)
PDW BLD-RTO: 19.5 FL (ref 11.5–15)
PDW BLD-RTO: 20 FL (ref 11.5–15)
PEEP/CPAP: 8 CMH2O
PFO2: 2.32 MMHG/%
PFO2: 2.41 MMHG/%
PFO2: 2.44 MMHG/%
PFO2: 2.75 MMHG/%
PFO2: 3.14 MMHG/%
PH BLOOD GAS: 7.42 (ref 7.35–7.45)
PH BLOOD GAS: 7.42 (ref 7.35–7.45)
PH BLOOD GAS: 7.44 (ref 7.35–7.45)
PH BLOOD GAS: 7.45 (ref 7.35–7.45)
PH BLOOD GAS: 7.47 (ref 7.35–7.45)
PHOSPHORUS: 1.5 MG/DL (ref 2.5–4.5)
PHOSPHORUS: 2 MG/DL (ref 2.5–4.5)
PHOSPHORUS: 2 MG/DL (ref 2.5–4.5)
PHOSPHORUS: 2.1 MG/DL (ref 2.5–4.5)
PHOSPHORUS: 2.2 MG/DL (ref 2.5–4.5)
PHOSPHORUS: 2.4 MG/DL (ref 2.5–4.5)
PHOSPHORUS: 2.4 MG/DL (ref 2.5–4.5)
PHOSPHORUS: 3 MG/DL (ref 2.5–4.5)
PLATELET # BLD: 39 E9/L (ref 130–450)
PLATELET # BLD: 58 E9/L (ref 130–450)
PLATELET # BLD: 67 E9/L (ref 130–450)
PLATELET # BLD: 75 E9/L (ref 130–450)
PLATELET # BLD: 87 E9/L (ref 130–450)
PLATELET CONFIRMATION: NORMAL
PMV BLD AUTO: ABNORMAL FL (ref 7–12)
PO2: 109.8 MMHG (ref 75–100)
PO2: 125.5 MMHG (ref 75–100)
PO2: 92.7 MMHG (ref 75–100)
PO2: 96.4 MMHG (ref 75–100)
PO2: 97.7 MMHG (ref 75–100)
POIKILOCYTES: ABNORMAL
POLYCHROMASIA: ABNORMAL
POTASSIUM SERPL-SCNC: 3.6 MMOL/L (ref 3.5–5)
POTASSIUM SERPL-SCNC: 3.8 MMOL/L (ref 3.5–5)
POTASSIUM SERPL-SCNC: 3.9 MMOL/L (ref 3.5–5)
POTASSIUM SERPL-SCNC: 4 MMOL/L (ref 3.5–5)
POTASSIUM SERPL-SCNC: 4.3 MMOL/L (ref 3.5–5)
POTASSIUM SERPL-SCNC: 4.4 MMOL/L (ref 3.5–5)
POTASSIUM SERPL-SCNC: 4.5 MMOL/L (ref 3.5–5)
PROMYELOCYTES PERCENT: 0.9 % (ref 0–0)
PROTHROMBIN TIME: 23.7 SEC (ref 9.3–12.4)
PS: 12 CMH20
R (REACTION TIME): 5.4 MIN (ref 5–10)
RBC # BLD: 1.93 E12/L (ref 3.5–5.5)
RBC # BLD: 2.37 E12/L (ref 3.5–5.5)
RBC # BLD: 3.52 E12/L (ref 3.5–5.5)
RBC # BLD: 3.66 E12/L (ref 3.5–5.5)
RBC # BLD: 3.68 E12/L (ref 3.5–5.5)
RI(T): 0.92
RI(T): 1.18
RI(T): 1.47
RI(T): 1.47
RI(T): 1.6
RR MECHANICAL: 16 B/MIN
SCHISTOCYTES: ABNORMAL
SODIUM BLD-SCNC: 133 MMOL/L (ref 132–146)
SODIUM BLD-SCNC: 134 MMOL/L (ref 132–146)
SODIUM BLD-SCNC: 134 MMOL/L (ref 132–146)
SODIUM BLD-SCNC: 135 MMOL/L (ref 132–146)
SODIUM BLD-SCNC: 136 MMOL/L (ref 132–146)
SODIUM BLD-SCNC: 137 MMOL/L (ref 132–146)
SODIUM BLD-SCNC: 137 MMOL/L (ref 132–146)
SOURCE, BLOOD GAS: ABNORMAL
TARGET CELLS: ABNORMAL
THB: 11.7 G/DL (ref 11.5–16.5)
THB: 12.3 G/DL (ref 11.5–16.5)
THB: 12.5 G/DL (ref 11.5–16.5)
THB: 6.4 G/DL (ref 11.5–16.5)
THB: 8.2 G/DL (ref 11.5–16.5)
TIME ANALYZED: 1221
TIME ANALYZED: 140
TIME ANALYZED: 541
TIME ANALYZED: 555
TIME ANALYZED: 617
TOTAL PROTEIN: 4.8 G/DL (ref 6.4–8.3)
TOTAL PROTEIN: 4.9 G/DL (ref 6.4–8.3)
TOTAL PROTEIN: 4.9 G/DL (ref 6.4–8.3)
TOTAL PROTEIN: 5 G/DL (ref 6.4–8.3)
TOTAL PROTEIN: 5 G/DL (ref 6.4–8.3)
TOTAL PROTEIN: 5.1 G/DL (ref 6.4–8.3)
TOTAL PROTEIN: 5.2 G/DL (ref 6.4–8.3)
TOTAL PROTEIN: 5.7 G/DL (ref 6.4–8.3)
TOTAL PROTEIN: 5.7 G/DL (ref 6.4–8.3)
TRIGL SERPL-MCNC: 127 MG/DL (ref 0–149)
VT MECHANICAL: 300 ML
WBC # BLD: 10.6 E9/L (ref 4.5–11.5)
WBC # BLD: 13.4 E9/L (ref 4.5–11.5)
WBC # BLD: 13.7 E9/L (ref 4.5–11.5)
WBC # BLD: 22.8 E9/L (ref 4.5–11.5)
WBC # BLD: 23.9 E9/L (ref 4.5–11.5)

## 2023-01-01 PROCEDURE — 83605 ASSAY OF LACTIC ACID: CPT

## 2023-01-01 PROCEDURE — 36415 COLL VENOUS BLD VENIPUNCTURE: CPT

## 2023-01-01 PROCEDURE — 94003 VENT MGMT INPAT SUBQ DAY: CPT

## 2023-01-01 PROCEDURE — 71045 X-RAY EXAM CHEST 1 VIEW: CPT

## 2023-01-01 PROCEDURE — 86923 COMPATIBILITY TEST ELECTRIC: CPT

## 2023-01-01 PROCEDURE — 6360000002 HC RX W HCPCS: Performed by: STUDENT IN AN ORGANIZED HEALTH CARE EDUCATION/TRAINING PROGRAM

## 2023-01-01 PROCEDURE — 85018 HEMOGLOBIN: CPT

## 2023-01-01 PROCEDURE — 82330 ASSAY OF CALCIUM: CPT

## 2023-01-01 PROCEDURE — 82805 BLOOD GASES W/O2 SATURATION: CPT

## 2023-01-01 PROCEDURE — S5553 INSULIN LONG ACTING 5 U: HCPCS | Performed by: NURSE PRACTITIONER

## 2023-01-01 PROCEDURE — 2580000003 HC RX 258: Performed by: INTERNAL MEDICINE

## 2023-01-01 PROCEDURE — 6360000002 HC RX W HCPCS: Performed by: SURGERY

## 2023-01-01 PROCEDURE — 2000000000 HC ICU R&B

## 2023-01-01 PROCEDURE — 80048 BASIC METABOLIC PNL TOTAL CA: CPT

## 2023-01-01 PROCEDURE — 6360000002 HC RX W HCPCS: Performed by: INTERNAL MEDICINE

## 2023-01-01 PROCEDURE — 2580000003 HC RX 258

## 2023-01-01 PROCEDURE — 37799 UNLISTED PX VASCULAR SURGERY: CPT

## 2023-01-01 PROCEDURE — 85347 COAGULATION TIME ACTIVATED: CPT

## 2023-01-01 PROCEDURE — 99292 CRITICAL CARE ADDL 30 MIN: CPT | Performed by: INTERNAL MEDICINE

## 2023-01-01 PROCEDURE — 80053 COMPREHEN METABOLIC PANEL: CPT

## 2023-01-01 PROCEDURE — P9046 ALBUMIN (HUMAN), 25%, 20 ML: HCPCS | Performed by: STUDENT IN AN ORGANIZED HEALTH CARE EDUCATION/TRAINING PROGRAM

## 2023-01-01 PROCEDURE — 82962 GLUCOSE BLOOD TEST: CPT

## 2023-01-01 PROCEDURE — 2500000003 HC RX 250 WO HCPCS: Performed by: INTERNAL MEDICINE

## 2023-01-01 PROCEDURE — 85027 COMPLETE CBC AUTOMATED: CPT

## 2023-01-01 PROCEDURE — 84478 ASSAY OF TRIGLYCERIDES: CPT

## 2023-01-01 PROCEDURE — 36430 TRANSFUSION BLD/BLD COMPNT: CPT

## 2023-01-01 PROCEDURE — 99222 1ST HOSP IP/OBS MODERATE 55: CPT | Performed by: NURSE PRACTITIONER

## 2023-01-01 PROCEDURE — 99291 CRITICAL CARE FIRST HOUR: CPT | Performed by: SURGERY

## 2023-01-01 PROCEDURE — 84100 ASSAY OF PHOSPHORUS: CPT

## 2023-01-01 PROCEDURE — 85025 COMPLETE CBC W/AUTO DIFF WBC: CPT

## 2023-01-01 PROCEDURE — 94640 AIRWAY INHALATION TREATMENT: CPT

## 2023-01-01 PROCEDURE — 74018 RADEX ABDOMEN 1 VIEW: CPT

## 2023-01-01 PROCEDURE — 99291 CRITICAL CARE FIRST HOUR: CPT | Performed by: INTERNAL MEDICINE

## 2023-01-01 PROCEDURE — A4216 STERILE WATER/SALINE, 10 ML: HCPCS | Performed by: SURGERY

## 2023-01-01 PROCEDURE — C9113 INJ PANTOPRAZOLE SODIUM, VIA: HCPCS | Performed by: SURGERY

## 2023-01-01 PROCEDURE — 86901 BLOOD TYPING SEROLOGIC RH(D): CPT

## 2023-01-01 PROCEDURE — 82533 TOTAL CORTISOL: CPT

## 2023-01-01 PROCEDURE — 85576 BLOOD PLATELET AGGREGATION: CPT

## 2023-01-01 PROCEDURE — 87040 BLOOD CULTURE FOR BACTERIA: CPT

## 2023-01-01 PROCEDURE — 6370000000 HC RX 637 (ALT 250 FOR IP): Performed by: NURSE PRACTITIONER

## 2023-01-01 PROCEDURE — P9016 RBC LEUKOCYTES REDUCED: HCPCS

## 2023-01-01 PROCEDURE — 2500000003 HC RX 250 WO HCPCS: Performed by: NURSE PRACTITIONER

## 2023-01-01 PROCEDURE — 83735 ASSAY OF MAGNESIUM: CPT

## 2023-01-01 PROCEDURE — 6360000002 HC RX W HCPCS

## 2023-01-01 PROCEDURE — 86850 RBC ANTIBODY SCREEN: CPT

## 2023-01-01 PROCEDURE — 86920 COMPATIBILITY TEST SPIN: CPT

## 2023-01-01 PROCEDURE — 85610 PROTHROMBIN TIME: CPT

## 2023-01-01 PROCEDURE — 70450 CT HEAD/BRAIN W/O DYE: CPT

## 2023-01-01 PROCEDURE — 85384 FIBRINOGEN ACTIVITY: CPT

## 2023-01-01 PROCEDURE — 86900 BLOOD TYPING SEROLOGIC ABO: CPT

## 2023-01-01 PROCEDURE — 2580000003 HC RX 258: Performed by: SURGERY

## 2023-01-01 PROCEDURE — 2500000003 HC RX 250 WO HCPCS: Performed by: STUDENT IN AN ORGANIZED HEALTH CARE EDUCATION/TRAINING PROGRAM

## 2023-01-01 PROCEDURE — 85014 HEMATOCRIT: CPT

## 2023-01-01 PROCEDURE — P9046 ALBUMIN (HUMAN), 25%, 20 ML: HCPCS | Performed by: INTERNAL MEDICINE

## 2023-01-01 RX ORDER — SODIUM CHLORIDE 9 MG/ML
INJECTION, SOLUTION INTRAVENOUS PRN
Status: DISCONTINUED | OUTPATIENT
Start: 2023-01-01 | End: 2023-01-01

## 2023-01-01 RX ORDER — 0.9 % SODIUM CHLORIDE 0.9 %
1000 INTRAVENOUS SOLUTION INTRAVENOUS ONCE
Status: DISCONTINUED | OUTPATIENT
Start: 2023-01-01 | End: 2023-01-01

## 2023-01-01 RX ORDER — MORPHINE SULFATE 2 MG/ML
2 INJECTION, SOLUTION INTRAMUSCULAR; INTRAVENOUS
Status: DISCONTINUED | OUTPATIENT
Start: 2023-01-01 | End: 2023-01-01 | Stop reason: HOSPADM

## 2023-01-01 RX ORDER — FENTANYL CITRATE 50 UG/ML
100 INJECTION, SOLUTION INTRAMUSCULAR; INTRAVENOUS ONCE
Status: COMPLETED | OUTPATIENT
Start: 2023-01-01 | End: 2023-01-01

## 2023-01-01 RX ORDER — 0.9 % SODIUM CHLORIDE 0.9 %
1000 INTRAVENOUS SOLUTION INTRAVENOUS ONCE
Status: COMPLETED | OUTPATIENT
Start: 2023-01-01 | End: 2023-01-01

## 2023-01-01 RX ORDER — FENTANYL CITRATE 50 UG/ML
100 INJECTION, SOLUTION INTRAMUSCULAR; INTRAVENOUS
Status: DISCONTINUED | OUTPATIENT
Start: 2023-01-01 | End: 2023-01-01

## 2023-01-01 RX ORDER — MIDAZOLAM HYDROCHLORIDE 2 MG/2ML
2 INJECTION, SOLUTION INTRAMUSCULAR; INTRAVENOUS
Status: DISCONTINUED | OUTPATIENT
Start: 2023-01-01 | End: 2023-01-01

## 2023-01-01 RX ORDER — SODIUM CHLORIDE 9 MG/ML
INJECTION, SOLUTION INTRAVENOUS PRN
Status: DISCONTINUED | OUTPATIENT
Start: 2023-01-01 | End: 2023-01-01 | Stop reason: SDUPTHER

## 2023-01-01 RX ORDER — GLYCOPYRROLATE 0.2 MG/ML
0.2 INJECTION INTRAMUSCULAR; INTRAVENOUS EVERY 4 HOURS PRN
Status: DISCONTINUED | OUTPATIENT
Start: 2023-01-01 | End: 2023-01-01 | Stop reason: HOSPADM

## 2023-01-01 RX ORDER — SODIUM CHLORIDE, SODIUM LACTATE, POTASSIUM CHLORIDE, AND CALCIUM CHLORIDE .6; .31; .03; .02 G/100ML; G/100ML; G/100ML; G/100ML
500 INJECTION, SOLUTION INTRAVENOUS ONCE
Status: COMPLETED | OUTPATIENT
Start: 2023-01-01 | End: 2023-01-01

## 2023-01-01 RX ORDER — MIDAZOLAM HYDROCHLORIDE 2 MG/2ML
0.5 INJECTION, SOLUTION INTRAMUSCULAR; INTRAVENOUS
Status: DISCONTINUED | OUTPATIENT
Start: 2023-01-01 | End: 2023-01-01 | Stop reason: HOSPADM

## 2023-01-01 RX ADMIN — SODIUM PHOSPHATE, MONOBASIC, MONOHYDRATE AND SODIUM PHOSPHATE, DIBASIC, ANHYDROUS 6 MMOL: 142; 276 INJECTION, SOLUTION INTRAVENOUS at 08:43

## 2023-01-01 RX ADMIN — MINERAL OIL, WHITE PETROLATUM: .03; .94 OINTMENT OPHTHALMIC at 05:05

## 2023-01-01 RX ADMIN — SODIUM PHOSPHATE, MONOBASIC, MONOHYDRATE AND SODIUM PHOSPHATE, DIBASIC, ANHYDROUS 12 MMOL: 142; 276 INJECTION, SOLUTION INTRAVENOUS at 01:17

## 2023-01-01 RX ADMIN — ALBUTEROL SULFATE 2.5 MG: 2.5 SOLUTION RESPIRATORY (INHALATION) at 12:15

## 2023-01-01 RX ADMIN — SODIUM CHLORIDE, PRESERVATIVE FREE 40 MG: 5 INJECTION INTRAVENOUS at 19:07

## 2023-01-01 RX ADMIN — FENTANYL CITRATE 100 MCG: 50 INJECTION, SOLUTION INTRAMUSCULAR; INTRAVENOUS at 01:44

## 2023-01-01 RX ADMIN — FENTANYL CITRATE 100 MCG: 50 INJECTION, SOLUTION INTRAMUSCULAR; INTRAVENOUS at 08:07

## 2023-01-01 RX ADMIN — INSULIN GLARGINE-YFGN 5 UNITS: 100 INJECTION, SOLUTION SUBCUTANEOUS at 08:20

## 2023-01-01 RX ADMIN — HEPARIN SODIUM 5000 UNITS: 10000 INJECTION INTRAVENOUS; SUBCUTANEOUS at 08:05

## 2023-01-01 RX ADMIN — MINERAL OIL, WHITE PETROLATUM: .03; .94 OINTMENT OPHTHALMIC at 14:54

## 2023-01-01 RX ADMIN — HEPARIN SODIUM 5000 UNITS: 10000 INJECTION INTRAVENOUS; SUBCUTANEOUS at 00:30

## 2023-01-01 RX ADMIN — Medication 10 MCG/MIN: at 08:35

## 2023-01-01 RX ADMIN — CALCIUM GLUCONATE 1000 MG: 98 INJECTION, SOLUTION INTRAVENOUS at 13:08

## 2023-01-01 RX ADMIN — BUDESONIDE 500 MCG: 0.5 SUSPENSION RESPIRATORY (INHALATION) at 08:31

## 2023-01-01 RX ADMIN — Medication 4 MCG/MIN: at 01:20

## 2023-01-01 RX ADMIN — ARFORMOTEROL TARTRATE 15 MCG: 15 SOLUTION RESPIRATORY (INHALATION) at 08:35

## 2023-01-01 RX ADMIN — ALBUMIN HUMAN 50 G: 0.25 SOLUTION INTRAVENOUS at 10:18

## 2023-01-01 RX ADMIN — SODIUM ACETATE: 164 INJECTION, SOLUTION, CONCENTRATE INTRAVENOUS at 17:31

## 2023-01-01 RX ADMIN — ALBUTEROL SULFATE 2.5 MG: 2.5 SOLUTION RESPIRATORY (INHALATION) at 16:18

## 2023-01-01 RX ADMIN — FENTANYL CITRATE 100 MCG: 50 INJECTION, SOLUTION INTRAMUSCULAR; INTRAVENOUS at 22:41

## 2023-01-01 RX ADMIN — INSULIN LISPRO 4 UNITS: 100 INJECTION, SOLUTION INTRAVENOUS; SUBCUTANEOUS at 13:15

## 2023-01-01 RX ADMIN — Medication: at 21:22

## 2023-01-01 RX ADMIN — Medication: at 18:14

## 2023-01-01 RX ADMIN — CALCIUM CHLORIDE INJECTION 8000 MG: 100 INJECTION, SOLUTION INTRAVENOUS at 21:00

## 2023-01-01 RX ADMIN — PHYTONADIONE 10 MG: 10 INJECTION, EMULSION INTRAMUSCULAR; INTRAVENOUS; SUBCUTANEOUS at 16:12

## 2023-01-01 RX ADMIN — MIDAZOLAM 0.5 MG: 1 INJECTION INTRAMUSCULAR; INTRAVENOUS at 11:08

## 2023-01-01 RX ADMIN — FENTANYL CITRATE 100 MCG: 50 INJECTION, SOLUTION INTRAMUSCULAR; INTRAVENOUS at 10:28

## 2023-01-01 RX ADMIN — ALBUTEROL SULFATE 2.5 MG: 2.5 SOLUTION RESPIRATORY (INHALATION) at 12:34

## 2023-01-01 RX ADMIN — MINERAL OIL, WHITE PETROLATUM: .03; .94 OINTMENT OPHTHALMIC at 05:52

## 2023-01-01 RX ADMIN — MIDAZOLAM 2 MG: 1 INJECTION INTRAMUSCULAR; INTRAVENOUS at 04:06

## 2023-01-01 RX ADMIN — Medication: at 00:00

## 2023-01-01 RX ADMIN — Medication: at 02:30

## 2023-01-01 RX ADMIN — FENTANYL CITRATE 100 MCG: 50 INJECTION, SOLUTION INTRAMUSCULAR; INTRAVENOUS at 05:47

## 2023-01-01 RX ADMIN — MORPHINE SULFATE 2 MG: 2 INJECTION, SOLUTION INTRAMUSCULAR; INTRAVENOUS at 11:08

## 2023-01-01 RX ADMIN — ALBUTEROL SULFATE 2.5 MG: 2.5 SOLUTION RESPIRATORY (INHALATION) at 19:54

## 2023-01-01 RX ADMIN — SODIUM CHLORIDE, PRESERVATIVE FREE 40 MG: 5 INJECTION INTRAVENOUS at 08:13

## 2023-01-01 RX ADMIN — INSULIN LISPRO 4 UNITS: 100 INJECTION, SOLUTION INTRAVENOUS; SUBCUTANEOUS at 20:19

## 2023-01-01 RX ADMIN — INSULIN LISPRO 8 UNITS: 100 INJECTION, SOLUTION INTRAVENOUS; SUBCUTANEOUS at 22:28

## 2023-01-01 RX ADMIN — FENTANYL CITRATE 100 MCG: 50 INJECTION, SOLUTION INTRAMUSCULAR; INTRAVENOUS at 04:06

## 2023-01-01 RX ADMIN — MIDAZOLAM 0.5 MG: 1 INJECTION INTRAMUSCULAR; INTRAVENOUS at 11:58

## 2023-01-01 RX ADMIN — MORPHINE SULFATE 2 MG: 2 INJECTION, SOLUTION INTRAMUSCULAR; INTRAVENOUS at 11:35

## 2023-01-01 RX ADMIN — MINERAL OIL, WHITE PETROLATUM: .03; .94 OINTMENT OPHTHALMIC at 10:45

## 2023-01-01 RX ADMIN — ALBUTEROL SULFATE 2.5 MG: 2.5 SOLUTION RESPIRATORY (INHALATION) at 08:34

## 2023-01-01 RX ADMIN — Medication: at 13:17

## 2023-01-01 RX ADMIN — ERTAPENEM SODIUM 1000 MG: 1 INJECTION, POWDER, LYOPHILIZED, FOR SOLUTION INTRAMUSCULAR; INTRAVENOUS at 15:43

## 2023-01-01 RX ADMIN — BUDESONIDE 500 MCG: 0.5 SUSPENSION RESPIRATORY (INHALATION) at 08:46

## 2023-01-01 RX ADMIN — INSULIN GLARGINE-YFGN 5 UNITS: 100 INJECTION, SOLUTION SUBCUTANEOUS at 08:08

## 2023-01-01 RX ADMIN — SODIUM PHOSPHATE, MONOBASIC, MONOHYDRATE AND SODIUM PHOSPHATE, DIBASIC, ANHYDROUS 6 MMOL: 142; 276 INJECTION, SOLUTION INTRAVENOUS at 00:39

## 2023-01-01 RX ADMIN — MORPHINE SULFATE 2 MG: 2 INJECTION, SOLUTION INTRAMUSCULAR; INTRAVENOUS at 12:27

## 2023-01-01 RX ADMIN — HEPARIN SODIUM 5000 UNITS: 10000 INJECTION INTRAVENOUS; SUBCUTANEOUS at 01:43

## 2023-01-01 RX ADMIN — BUDESONIDE 500 MCG: 0.5 SUSPENSION RESPIRATORY (INHALATION) at 19:54

## 2023-01-01 RX ADMIN — MIDAZOLAM 0.5 MG: 1 INJECTION INTRAMUSCULAR; INTRAVENOUS at 12:27

## 2023-01-01 RX ADMIN — Medication: at 15:48

## 2023-01-01 RX ADMIN — FENTANYL CITRATE 100 MCG: 50 INJECTION, SOLUTION INTRAMUSCULAR; INTRAVENOUS at 08:13

## 2023-01-01 RX ADMIN — SODIUM PHOSPHATE, MONOBASIC, MONOHYDRATE AND SODIUM PHOSPHATE, DIBASIC, ANHYDROUS 6 MMOL: 142; 276 INJECTION, SOLUTION INTRAVENOUS at 15:14

## 2023-01-01 RX ADMIN — Medication: at 07:05

## 2023-01-01 RX ADMIN — MINERAL OIL, WHITE PETROLATUM: .03; .94 OINTMENT OPHTHALMIC at 20:20

## 2023-01-01 RX ADMIN — INSULIN GLARGINE-YFGN 5 UNITS: 100 INJECTION, SOLUTION SUBCUTANEOUS at 08:13

## 2023-01-01 RX ADMIN — Medication: at 05:07

## 2023-01-01 RX ADMIN — MINERAL OIL, WHITE PETROLATUM: .03; .94 OINTMENT OPHTHALMIC at 01:31

## 2023-01-01 RX ADMIN — SODIUM ACETATE: 164 INJECTION, SOLUTION, CONCENTRATE INTRAVENOUS at 17:29

## 2023-01-01 RX ADMIN — INSULIN LISPRO 4 UNITS: 100 INJECTION, SOLUTION INTRAVENOUS; SUBCUTANEOUS at 02:20

## 2023-01-01 RX ADMIN — MORPHINE SULFATE 2 MG: 2 INJECTION, SOLUTION INTRAMUSCULAR; INTRAVENOUS at 10:49

## 2023-01-01 RX ADMIN — CALCIUM CHLORIDE INJECTION 8000 MG: 100 INJECTION, SOLUTION INTRAVENOUS at 04:48

## 2023-01-01 RX ADMIN — Medication: at 21:30

## 2023-01-01 RX ADMIN — SODIUM CHLORIDE, PRESERVATIVE FREE 40 MG: 5 INJECTION INTRAVENOUS at 07:54

## 2023-01-01 RX ADMIN — MINERAL OIL, WHITE PETROLATUM: .03; .94 OINTMENT OPHTHALMIC at 14:25

## 2023-01-01 RX ADMIN — FENTANYL CITRATE 100 MCG: 50 INJECTION, SOLUTION INTRAMUSCULAR; INTRAVENOUS at 01:25

## 2023-01-01 RX ADMIN — ALBUTEROL SULFATE 2.5 MG: 2.5 SOLUTION RESPIRATORY (INHALATION) at 20:03

## 2023-01-01 RX ADMIN — SODIUM PHOSPHATE, MONOBASIC, MONOHYDRATE AND SODIUM PHOSPHATE, DIBASIC, ANHYDROUS 6 MMOL: 142; 276 INJECTION, SOLUTION INTRAVENOUS at 08:38

## 2023-01-01 RX ADMIN — MINERAL OIL, WHITE PETROLATUM: .03; .94 OINTMENT OPHTHALMIC at 01:52

## 2023-01-01 RX ADMIN — CALCIUM CHLORIDE INJECTION 8000 MG: 100 INJECTION, SOLUTION INTRAVENOUS at 02:13

## 2023-01-01 RX ADMIN — MINERAL OIL, WHITE PETROLATUM: .03; .94 OINTMENT OPHTHALMIC at 10:15

## 2023-01-01 RX ADMIN — MIDAZOLAM 0.5 MG: 1 INJECTION INTRAMUSCULAR; INTRAVENOUS at 10:49

## 2023-01-01 RX ADMIN — MINERAL OIL, WHITE PETROLATUM: .03; .94 OINTMENT OPHTHALMIC at 22:21

## 2023-01-01 RX ADMIN — ARFORMOTEROL TARTRATE 15 MCG: 15 SOLUTION RESPIRATORY (INHALATION) at 19:54

## 2023-01-01 RX ADMIN — SODIUM CHLORIDE 1000 ML: 9 INJECTION, SOLUTION INTRAVENOUS at 10:18

## 2023-01-01 RX ADMIN — SODIUM PHOSPHATE, MONOBASIC, MONOHYDRATE AND SODIUM PHOSPHATE, DIBASIC, ANHYDROUS 6 MMOL: 142; 276 INJECTION, SOLUTION INTRAVENOUS at 20:36

## 2023-01-01 RX ADMIN — ERTAPENEM SODIUM 1000 MG: 1 INJECTION, POWDER, LYOPHILIZED, FOR SOLUTION INTRAMUSCULAR; INTRAVENOUS at 16:23

## 2023-01-01 RX ADMIN — ARFORMOTEROL TARTRATE 15 MCG: 15 SOLUTION RESPIRATORY (INHALATION) at 08:31

## 2023-01-01 RX ADMIN — MIDAZOLAM 0.5 MG: 1 INJECTION INTRAMUSCULAR; INTRAVENOUS at 11:35

## 2023-01-01 RX ADMIN — BUDESONIDE 500 MCG: 0.5 SUSPENSION RESPIRATORY (INHALATION) at 20:03

## 2023-01-01 RX ADMIN — DEXTROSE MONOHYDRATE 100 MG: 50 INJECTION, SOLUTION INTRAVENOUS at 15:37

## 2023-01-01 RX ADMIN — MINERAL OIL, WHITE PETROLATUM: .03; .94 OINTMENT OPHTHALMIC at 02:55

## 2023-01-01 RX ADMIN — INSULIN LISPRO 4 UNITS: 100 INJECTION, SOLUTION INTRAVENOUS; SUBCUTANEOUS at 00:43

## 2023-01-01 RX ADMIN — ARFORMOTEROL TARTRATE 15 MCG: 15 SOLUTION RESPIRATORY (INHALATION) at 20:03

## 2023-01-01 RX ADMIN — SODIUM PHOSPHATE, MONOBASIC, MONOHYDRATE AND SODIUM PHOSPHATE, DIBASIC, ANHYDROUS 6 MMOL: 142; 276 INJECTION, SOLUTION INTRAVENOUS at 08:01

## 2023-01-01 RX ADMIN — SODIUM CHLORIDE, PRESERVATIVE FREE 40 MG: 5 INJECTION INTRAVENOUS at 08:14

## 2023-01-01 RX ADMIN — MORPHINE SULFATE 2 MG: 2 INJECTION, SOLUTION INTRAMUSCULAR; INTRAVENOUS at 11:59

## 2023-01-01 RX ADMIN — ALBUTEROL SULFATE 2.5 MG: 2.5 SOLUTION RESPIRATORY (INHALATION) at 08:46

## 2023-01-01 RX ADMIN — BUDESONIDE 500 MCG: 0.5 SUSPENSION RESPIRATORY (INHALATION) at 08:35

## 2023-01-01 RX ADMIN — GLYCOPYRROLATE 0.2 MG: 0.2 INJECTION, SOLUTION INTRAMUSCULAR; INTRAVENOUS at 10:19

## 2023-01-01 RX ADMIN — ALBUMIN HUMAN 25 G: 0.25 SOLUTION INTRAVENOUS at 01:37

## 2023-01-01 RX ADMIN — FENTANYL CITRATE 100 MCG: 50 INJECTION, SOLUTION INTRAMUSCULAR; INTRAVENOUS at 21:15

## 2023-01-01 RX ADMIN — FENTANYL CITRATE 100 MCG: 50 INJECTION, SOLUTION INTRAMUSCULAR; INTRAVENOUS at 23:22

## 2023-01-01 RX ADMIN — MINERAL OIL, WHITE PETROLATUM: .03; .94 OINTMENT OPHTHALMIC at 05:59

## 2023-01-01 RX ADMIN — SODIUM PHOSPHATE, MONOBASIC, MONOHYDRATE AND SODIUM PHOSPHATE, DIBASIC, ANHYDROUS 6 MMOL: 142; 276 INJECTION, SOLUTION INTRAVENOUS at 01:50

## 2023-01-01 RX ADMIN — MINERAL OIL, WHITE PETROLATUM: .03; .94 OINTMENT OPHTHALMIC at 17:49

## 2023-01-01 RX ADMIN — DEXTROSE MONOHYDRATE 100 MG: 50 INJECTION, SOLUTION INTRAVENOUS at 16:31

## 2023-01-01 RX ADMIN — ARFORMOTEROL TARTRATE 15 MCG: 15 SOLUTION RESPIRATORY (INHALATION) at 08:46

## 2023-01-01 RX ADMIN — Medication: at 07:35

## 2023-01-01 RX ADMIN — SODIUM CHLORIDE, POTASSIUM CHLORIDE, SODIUM LACTATE AND CALCIUM CHLORIDE 500 ML: 600; 310; 30; 20 INJECTION, SOLUTION INTRAVENOUS at 01:43

## 2023-01-01 RX ADMIN — INSULIN LISPRO 4 UNITS: 100 INJECTION, SOLUTION INTRAVENOUS; SUBCUTANEOUS at 12:22

## 2023-01-01 RX ADMIN — HEPARIN SODIUM 5000 UNITS: 10000 INJECTION INTRAVENOUS; SUBCUTANEOUS at 08:08

## 2023-01-01 RX ADMIN — INSULIN LISPRO 4 UNITS: 100 INJECTION, SOLUTION INTRAVENOUS; SUBCUTANEOUS at 06:11

## 2023-01-01 RX ADMIN — Medication: at 05:10

## 2023-01-01 RX ADMIN — ALBUTEROL SULFATE 2.5 MG: 2.5 SOLUTION RESPIRATORY (INHALATION) at 08:35

## 2023-01-01 RX ADMIN — MINERAL OIL, WHITE PETROLATUM: .03; .94 OINTMENT OPHTHALMIC at 17:29

## 2023-01-01 ASSESSMENT — PAIN SCALES - GENERAL
PAINLEVEL_OUTOF10: 0
PAINLEVEL_OUTOF10: 5
PAINLEVEL_OUTOF10: 0
PAINLEVEL_OUTOF10: 5
PAINLEVEL_OUTOF10: 3
PAINLEVEL_OUTOF10: 0
PAINLEVEL_OUTOF10: 0

## 2023-01-01 ASSESSMENT — PULMONARY FUNCTION TESTS
PIF_VALUE: 12
PIF_VALUE: 2
PIF_VALUE: 21
PIF_VALUE: 23
PIF_VALUE: 12
PIF_VALUE: 12
PIF_VALUE: 24
PIF_VALUE: 12
PIF_VALUE: 24
PIF_VALUE: 12
PIF_VALUE: 25
PIF_VALUE: 13
PIF_VALUE: 26
PIF_VALUE: 27
PIF_VALUE: 14
PIF_VALUE: 24
PIF_VALUE: 21
PIF_VALUE: 24
PIF_VALUE: 17
PIF_VALUE: 26
PIF_VALUE: 23
PIF_VALUE: 21
PIF_VALUE: 18
PIF_VALUE: 12
PIF_VALUE: 24
PIF_VALUE: 12
PIF_VALUE: 15
PIF_VALUE: 12
PIF_VALUE: 24
PIF_VALUE: 12
PIF_VALUE: 23
PIF_VALUE: 18
PIF_VALUE: 12
PIF_VALUE: 24
PIF_VALUE: 21
PIF_VALUE: 15
PIF_VALUE: 14
PIF_VALUE: 12
PIF_VALUE: 13
PIF_VALUE: 28
PIF_VALUE: 27
PIF_VALUE: 18
PIF_VALUE: 22
PIF_VALUE: 13
PIF_VALUE: 23
PIF_VALUE: 27
PIF_VALUE: 25
PIF_VALUE: 24
PIF_VALUE: 13
PIF_VALUE: 23
PIF_VALUE: 12
PIF_VALUE: 27
PIF_VALUE: 15
PIF_VALUE: 21
PIF_VALUE: 17
PIF_VALUE: 24
PIF_VALUE: 8
PIF_VALUE: 25
PIF_VALUE: 24
PIF_VALUE: 21
PIF_VALUE: 24
PIF_VALUE: 17
PIF_VALUE: 14
PIF_VALUE: 26
PIF_VALUE: 24
PIF_VALUE: 26
PIF_VALUE: 24
PIF_VALUE: 21
PIF_VALUE: 13

## 2023-01-01 NOTE — PROGRESS NOTES
Department of Internal Medicine  Nephrology Progress Note      Events reviewed. SUBJECTIVE: We are following Garrison Cutter for PAWAN. Presently intubated. Now on levophed    PHYSICAL EXAM:      Vitals:    VITALS:  BP (!) 99/47   Pulse (!) 113   Temp (!) 96.4 °F (35.8 °C) (Esophageal)   Resp (!) 33   Ht 5' 2\" (1.575 m)   Wt 122 lb (55.3 kg)   SpO2 100%   BMI 22.31 kg/m²   24HR INTAKE/OUTPUT:    Intake/Output Summary (Last 24 hours) at 1/1/2023 1112  Last data filed at 1/1/2023 1100  Gross per 24 hour   Intake 3087.1 ml   Output 3694 ml   Net -606.9 ml         Constitutional: Patient intubated, sedated  HEENT: Normocephalic, atraumatic, PERRLA, endotracheal tube in place  Respiratory: Diminished right lung base  Cardiovascular/Edema: RRR, normal S1, normal S2, no edema  Gastrointestinal: Soft, not distended, nontender  Neurologic: Patient is sedated  Skin: Pale, dry, no lesions, no rash, + edema     Scheduled Meds:   albumin human-kjda  50 g IntraVENous Once    insulin glargine  5 Units SubCUTAneous Daily    sodium chloride flush  5-40 mL IntraVENous 2 times per day    phosphorus  500 mg Oral Once    [Held by provider] midodrine  15 mg Oral TID WC    insulin lispro  0-16 Units SubCUTAneous Q4H    sodium chloride flush  5-40 mL IntraVENous 2 times per day    ertapenem (INVanz) IVPB  1,000 mg IntraVENous Q24H    [Held by provider] Vitamin D  1,000 Units Oral Daily    heparin (porcine)  5,000 Units SubCUTAneous Q8H    artificial tears   Both Eyes Q4H    anidulafungin  100 mg IntraVENous Q24H    pantoprazole (PROTONIX) 40 mg injection  40 mg IntraVENous Q12H    sodium chloride flush  5-40 mL IntraVENous 2 times per day    Arformoterol Tartrate  15 mcg Nebulization BID    budesonide  0.5 mg Nebulization BID    albuterol  2.5 mg Nebulization Q4H WA    [Held by provider] metoprolol  5 mg IntraVENous Q6H    [Held by provider] rosuvastatin  10 mg Oral Nightly    [Held by provider] levothyroxine  50 mcg Oral Daily Continuous Infusions:   PN-Adult  3 IN 1 Central Line (Custom)      PN-Adult  3 IN 1 Central Line (Custom) 50 mL/hr at 01/01/23 0600    sodium chloride 10 mL/hr at 01/01/23 0600    norepinephrine 2.027 mcg/min (01/01/23 0621)    prismaSATE BGK 4/0/1.2 2,000 mL/hr at 12/31/22 1355    sodium chloride 100 mL/hr at 12/31/22 1737    calcium chloride CRRT infusion 8,000 mg (01/01/23 0448)    dextrose       PRN Meds:.fentanNYL, sodium chloride flush, sodium chloride flush, sodium chloride, potassium chloride, magnesium sulfate, calcium gluconate **OR** calcium gluconate **OR** calcium gluconate **OR** calcium gluconate, sodium phosphate IVPB **OR** sodium phosphate IVPB **OR** sodium phosphate IVPB **OR** sodium phosphate IVPB, glucose, dextrose bolus **OR** dextrose bolus, glucagon (rDNA), dextrose, sodium chloride flush, ondansetron **OR** ondansetron, [DISCONTINUED] acetaminophen **OR** acetaminophen, ipratropium-albuterol    DATA:    CBC with Differential:    Lab Results   Component Value Date/Time    WBC 22.8 01/01/2023 05:33 AM    RBC 2.37 01/01/2023 05:33 AM    HGB 7.2 01/01/2023 05:33 AM    HCT 21.5 01/01/2023 05:33 AM    HCT 29.0 12/20/2022 01:31 PM    PLT 87 01/01/2023 05:33 AM    MCV 90.7 01/01/2023 05:33 AM    MCH 30.4 01/01/2023 05:33 AM    MCHC 33.5 01/01/2023 05:33 AM    RDW 19.5 01/01/2023 05:33 AM    NRBC 3.5 01/01/2023 05:33 AM    LYMPHOPCT 3.5 01/01/2023 05:33 AM    MONOPCT 2.6 01/01/2023 05:33 AM    MYELOPCT 1.7 01/01/2023 05:33 AM    BASOPCT 0.2 01/01/2023 05:33 AM    MONOSABS 0.68 01/01/2023 05:33 AM    LYMPHSABS 0.91 01/01/2023 05:33 AM    EOSABS 0.00 01/01/2023 05:33 AM    BASOSABS 0.00 01/01/2023 05:33 AM     CMP:    Lab Results   Component Value Date/Time     01/01/2023 05:33 AM    K 4.4 01/01/2023 05:33 AM    K 4.3 12/31/2022 12:01 AM     01/01/2023 05:33 AM    CO2 19 01/01/2023 05:33 AM    BUN 27 01/01/2023 05:33 AM    CREATININE 0.8 01/01/2023 05:33 AM    GFRAA >60 07/22/2022 01:42 PM    LABGLOM >60 01/01/2023 05:33 AM    GLUCOSE 208 01/01/2023 05:33 AM    PROT 5.1 01/01/2023 05:33 AM    LABALBU 2.0 01/01/2023 05:33 AM    LABALBU 4.3 01/24/2011 01:15 PM    CALCIUM 7.9 01/01/2023 05:33 AM    BILITOT 4.2 01/01/2023 05:33 AM    ALKPHOS 169 01/01/2023 05:33 AM    AST 75 01/01/2023 05:33 AM    ALT 73 01/01/2023 05:33 AM     Magnesium:    Lab Results   Component Value Date/Time    MG 2.0 01/01/2023 05:33 AM     Phosphorus:    Lab Results   Component Value Date/Time    PHOS 2.4 01/01/2023 05:33 AM     Radiology Review:      CT Chest and Abd/Pelvis 12/14/22      No evidence of pulmonary embolism. Bilateral lower lobe pulmonary consolidations and pleural effusions seen. Deviation of the mediastinum to the left is visualized. Mild soft tissue prominence visualized surrounding the esophagus with   narrowing at the gastroesophageal junction but appears to be due to external   compression. Cannot rule left soft tissue mass at this location. Wall thickening visualized in the gastric outlet with mild prominence of the   stomach seen, this could be artifactual.     Distended gallbladder is seen. Degenerative bone changes, postoperative changes and multilevel vertebral   augmentation seen. No evidence of acute abdominal/pelvic pathology. US Chest 12/15/22       Probable complex loculated right pleural effusion. Simple left pleural effusion. BRIEF SUMMARY OF INITIAL CONSULT:    Briefly, Garrison Villanueva is a 79-year-old female, past medical history significant for hypothyroidism, hiatal hernia and TIA, who presented to the ED on 12/14/2022 from [facility], patient recently underwent a hiatal hernia repair on 12/9/22, developed acute respiratory distress and was transferred to [facility] on 12/14/2022 for further management and treatment.   Initial ED work-up revealed creatinine level of 1.7, patient then underwent two seperate IV contrast studies CTA and creatinine subsequently increased to 2.5, on 12/15/2022 creatinine level increased to 3.5 mg/dL, reason for this consultation. Review of records from [facility] reveals that baseline creatinine appears to be between 0.9 to 1.0 mg/dL, creatinine was 1.0 on 12/13/2022, patient was started on lisinopril during hospitalization, was given multiple doses IV furosemide with poor oral intake and vomiting. Problems resolved:    HAGMA, 2/2 uremia and starvation ketoacidosis  Hemodynamic shock, septic, on antibiotics and vasopressors  Hypocalcemia, 2/2 calcium removal with CRRT, with replacement per protocol  Hypophosphatemia, 2/2 phosphorus removal with CRRT, with replacement per protocol    IMPRESSION/RECOMMENDATIONS:     PAWAN, stage II, nonoliguric, ATN (ischemic +/- contrast induced , intravascular volume depletion due to poor oral intake, loop diuretics and vomiting in the setting of ACE inhibition versus,). Started on renal replacement therapy on 12/21/2022. Tolerating CVVHD. Vitamin D deficiency, vitamin D 25 level 11, on cholecalciferol   Respiratory alkalosis (pH: 7.464, PCO2: 32.8, HCO3: 23) and metabolic alkalosis (CVVHD)    --------------------------------------------------------  Probably esophageal perforation, status post robotic hiatal hernia repair 12/9/20/2022, for esophageal stent placement today.   Respiratory failure, status post intubation  New onset AF with RVR  Complex right pleural effusion, s/p VATS    S/p hiatal hernia repair 12/9/2022  Hypothyroidism, on levothyroxine  Hospital-acquired pneumonia, + Enterobacter cloacae, on ertapenem  Possibly focal pneumonia, Candida albicans?,  On anidulafungin  Normocytic anemia, multifactorial  Severe hypoalbuminemia  Nutrition, on TPN @50 cc/hour per surgery      Plan:    Continue CVVHD 30 cc/kilo/hour, Will try 25 cc/hr fluid removal.  Continue TPN per surgery  Continue to monitor renal function for recovery         Electronically signed by Adrianna Andrew MD on 1/1/2023 at 11:12 AM

## 2023-01-01 NOTE — PROGRESS NOTES
[facility] PROGRESS NOTE      Chief complaint: Follow-up of pneumonia    This patient is a 78-year-old female with history of bilateral breast cancer, DM, hiatal hernia repair on 12/09, presented on 12/14 with shortness of breath for 2 days accompanied by productive cough and malaise prior to her hiatal hernia repair, thought to have large postoperative seroma after a type IV hiatal hernia repair. On admission, she was afebrile and hemodynamically stable with no leukocytosis. CT chest, abdomen and pelvis showed soft tissue density in the posterior medial aspect of the right lower lung field with deviation of the mediastinum to the left, bilateral lower lobe consolidation and pleural effusions, mild soft tissue prominence surrounding the esophagus with narrowing at the gastroesophageal junction appearing to be due to external compression, wall thickening in the gastric outlet with mild prominence of the stomach, distended gallbladder, no evidence of acute abdominal/pelvic pathology. Esophagram was negative for esophageal perforation. She underwent right-sided thoracentesis on 12/16 during which 105 mL of serous fluid was removed. Pleural fluid gram stain and culture showed rare polymorphonuclear leukocytes, no epithelial cells, no organisms, no growth. She was transferred to MICU on 12/17 for worsening hypoxemia. Repeat CT chest on 12/17 showed large right pleural effusion of mixed density suggestive of complex effusion with pigtail catheter in the pleural space posteriorly and inferiorly, small to moderate sized left pleural effusion with consolidative infiltrate throughout left upper and lower lung field and aerated portions revealing nodular groundglass opacity, nodular groundglass opacity inferiorly within the aerated right upper and lower lung field, less consolidative infiltrate within the right lower lobe.   Respiratory gram stain and culture showed no polymorphonuclear leukocytes, no epithelial cells, moderate yeast, moderate gram-positive diphtheroid-like rods, few gram-negative rods, moderate growth of Enterobacter cloacae, heavy growth of Candida albicans. Urine Streptococcus pneumoniae and Legionella antigens were negative. Respiratory pathogen PCR panel and MRSA nares culture were negative. Blood cultures showed no growth to date. She received a dose of ceftriaxone and doxycycline on admission. Ampicillin-sulbactam was started on admission and then switched to piperacillin-tazobactam on 12/17 then switched to meropenem on 12/19. Vancomycin was started on 12/17. She underwent right VATS, mini-thoracotomy with drainage and fenestration of mediastinal seroma/hematoma, chest tube and mediastinal drain placement on 12/20 during which 1100 mL serous fluid and dark blood (no succus or bile) extending into the mediastinum was drained via mini-thoracotomy. Hematoma/seroma fluid Gram stain and culture showed abundant polymorphonuclear leukocytes, no epithelial cells, no organisms, rare growth of Enterobacter cloacae (susceptible to fluoroquinolones and cotrimoxazole) and light growth of Candida albicans. Serum 1,3 beta-d-glucan on 12/18 was elevated. Endotracheal aspirate Gram stain and culture on 12/25 showed few polymorphonuclear leukocytes, few epithelial cells, no organisms, heavy growth of Enterobacter cloacae. She was noted to have increasing output from right sided chest tubes confirmed to have esophageal perforation with active methylene blue extravasation into both right sided tubes on bedside leak test on 12/27. She underwent EGD with stent placement and PEG-J tube insertion on 12/30 during which 3-4 cm perforation in esophagus was covered with metal stent.  CT chest, abdomen and pelvis on 12/31 showed mediastinal fluid collection decreased in size and now predominantly to the right of the esophagus, measuring 5 cm x 4 cm x 6 cm; small amount oral contrast to the right of the esophagus, outside the stent, concerning for leak; decreased bilateral pulmonary consolidations/ground-glass opacities. Subjective: Patient was seen and examined. She remains in critical condition, intubated and sedated, back on vasopressor support. Afebrile. Objective:  BP (!) 99/47   Pulse (!) 113   Temp (!) 96.4 °F (35.8 °C) (Esophageal)   Resp (!) 33   Ht 5' 2\" (1.575 m)   Wt 122 lb (55.3 kg)   SpO2 100%   BMI 22.31 kg/m²   Constitutional: Intubated, no MV dyssynchrony  Respiratory: Clear breath sounds, no crackles, no wheezes  Cardiovascular: Regular rate and rhythm, no murmurs  Gastrointestinal: Bowel sounds present, soft, nontender  Skin: Warm and dry, no active dermatoses  Musculoskeletal: No joint swelling, no joint erythema    Labs, imaging, and medical records/notes were personally reviewed. Assessment:  Acute hypoxic respiratory failure  Septic shock, resolved  Postoperative seroma/hematoma, right chest, associated with esophageal perforation in the setting of robotic hiatal hernia repair on 12/09, s/p right VATS, mini-thoracotomy with drainage and fenestration of mediastinal seroma/hematoma, chest tube and mediastinal drain placement on 12/20  Esophageal perforation, s/p  EGD with stent placement and PEG-J tube insertion on 12/30  Enterobacter cloacae HAP  Elevated serum 1,3 beta-d-glucan  Leukocytosis    Recommendations:  Continue anidulafungin 100 mg q24h. Continue ertapenem 1 g every 24 hours. Continue supportive care. Thank you for involving me in the care of Kwesi Herron. I will continue to follow. Please do not hesitate to call for any questions or concerns.       Electronically signed by Nasrin Mcgregor MD on 1/1/2023 at 9:14 AM

## 2023-01-01 NOTE — PROGRESS NOTES
TRAUMA/GENERAL SURGERY  DAILY PROGRESS NOTE  1/1/2023    CHIEF COMPLAINT:  Chief Complaint   Patient presents with    Shortness of Breath     Sent from [facility] for SOB patient went for hernia repair patient on 6L NC on arrival       SUBJECTIVE:  On Levophed and CVVHD this morning. OBJECTIVE:  BP (!) 99/47   Pulse (!) 113   Temp (!) 96.4 °F (35.8 °C) (Esophageal)   Resp (!) 33   Ht 5' 2\" (1.575 m)   Wt 122 lb (55.3 kg)   SpO2 100%   BMI 22.31 kg/m²     Right chest tubes-50 weight cc/24 hours   Left chest tube-0 cc / 24 hours    GENERAL:  Intubated, sedated  LUNGS:  Intubated and on ventilator. 40%/8, chest tubes as above fairly clear b/l   CARDIOVASCULAR: sinus tachycardia, normotensive  ABDOMEN:  Soft, non-distended, does not grimace to deep palpation, no rebound. PEG J tube in place CDI,   On CVVHD:  EXT NVI feet warm minimal edema     ASSESSMENT/PLAN:  65 y.o. female with respiratory failure and complex right-sided effusion s/p robotic hiatal hernia repair 12/9 without signs of esophageal perforation or leak however now +fungitell and growing candida in chest  S/p VATS & Mini-thoractomy & bronchoscopy with open drainage of fluid collection 12/20, left CT placement 12/21 with ARDS & renal failure s/p chemical paralysis, flolan, CVVHD 12/21. Who is currently clinically improving.    -We will remove left chest tube later today  - continue TPN   -Start trickle feeds    Electronically signed by Carmine Garcia MD on 1/1/2023 at 9:29 AM      Attending Attestation   Patient seen and examined, agree with resident note except for changes made by me, for remaining HP/Consult/progress note details please see resident HP/Consult/progress note. - move to SICU  - some hemodynamic support required today, HH low, IVF/PRBC today   - cont TPN, monitor LFTs. ..   - Remove Left chest tube    Nica Cota MD

## 2023-01-01 NOTE — PROGRESS NOTES
SURGICAL ENDOSCOPY  DAILY PROGRESS NOTE  1/1/2023    CHIEF COMPLAINT:  Chief Complaint   Patient presents with    Shortness of Breath     Sent from [facility] for SOB patient went for hernia repair patient on 6L NC on arrival       SUBJECTIVE:  Afebrile, WBC bumped a bit. On 2 levo this morning. Minimal output from one of the right sided chest tubes. PEGJ with 50 cc output. OBJECTIVE:  BP (!) 99/47   Pulse (!) 115   Temp (!) 96.4 °F (35.8 °C) (Esophageal)   Resp (!) 35   Ht 5' 2\" (1.575 m)   Wt 122 lb (55.3 kg)   SpO2 100%   BMI 22.31 kg/m²     GENERAL:  Intubated, sedated  LUNGS:  Intubated and on ventilator. Bilateral chest tubes in place. L chest tube serous w/o air leak. R chest tubes no air leak, no output overnight  CARDIOVASCULAR: sinus tachycardia, normotensive  ABDOMEN:  Soft, non-distended, does not grimace to deep palpation, no rebound. PEGJ in place, to gravity with 50 cc output. EXTREMITIES: CVVHD line in groin    ASSESSMENT/PLAN:  65 y.o. female with respiratory failure and complex right-sided effusion s/p robotic hiatal hernia repair 12/9 without signs of esophageal perforation or leak on initial studies then evidence of esophageal leak on delayed evaluation 12/27  VATS & Mini-thoractomy & bronchoscopy with open drainage of fluid collection 12/20, left CT placement 12/21 with ARDS & renal failure s/p chemical paralysis, flolan, CVVHD 12/21  Now s/p EGD with esophageal stent and PEG-J 12/30.     Minimal to no drain output now  Continue TPN   Start trickle tube feeds via PEG-J  Will defer removal of left chest tube to general surgery  Tracheostomy timing per general surgery     Nikolay Lopez MD  Surgery Resident PGY-3  1/1/2023  8:37 AM      As above  From esophageal stenting standpoint no further intervention is planned  Tube feeds and IV nutrition per primary surgical team  we will see as needed    Ever MD Aden, MS  Minimally Invasive and Bariatric Surgery  [phone] (p)  1/1/2023  12:07 PM

## 2023-01-01 NOTE — PROGRESS NOTES
Hospitalist Progress Note      PCP: Demetri Vasquez DO    Date of Admission: 12/14/2022        Hospital Course:  65 y.o. female presented with PNEUMONIA. STATES SHE HAD A HH REPAIR AT [facility] ON THE 8TH. SHE STATES SHE WAS NOT FEELING BAD, BUT WAS TOLD SHE NEEDED TO COME TO THE HOSPITAL BC SHE WAS SICK. SHE WAS FOUND TO BE SEPTIC, WITH A HAG, SHE WAS TACHY AND TACHYPNEIC . COFFEE GROUND EMESIS THIS MORNING, GASTROCULT  POS . SURGERY NOTIFIED ** HAD A THROACENTESIS ON YESTERDAY, 105 CC REMOVED. ** RRT  DUE TO RESPIRATORY DISTRESS ** TO TRANSFER TO  OR [facility]** INTUBATED HYPOXIA**  HAD CHEST TUBES INSERTED ON 12.20 ** DEVI FOUND ON CULTURES,  ** APPEARS TO HAVE A FISTULA, IN CHEST CAVITY. ?  IF TRANSFER TO [facility] VS **  HAD WITH DR Alessia Cordova,  EGD ESOPHAGOGASTRODUODENOSCOPY, WITH ESOPHAGEAL STENT PLACEMENT EGD ESOPHAGOGASTRODUODENOSCOPY WITH PEG TUBE INSERTION                  Subjective:  INTUBATED,             Medications:  Reviewed    Infusion Medications    PN-Adult  3 IN 1 Central Line (Custom) 50 mL/hr at 01/01/23 0600    sodium chloride 10 mL/hr at 01/01/23 0600    norepinephrine 2.027 mcg/min (01/01/23 0621)    prismaSATE BGK 4/0/1.2 2,000 mL/hr at 12/31/22 1355    sodium chloride 100 mL/hr at 12/31/22 1737    calcium chloride CRRT infusion 8,000 mg (01/01/23 0448)    dextrose       Scheduled Medications    insulin glargine  5 Units SubCUTAneous Daily    sodium chloride flush  5-40 mL IntraVENous 2 times per day    phosphorus  500 mg Oral Once    [Held by provider] midodrine  15 mg Oral TID WC    insulin lispro  0-16 Units SubCUTAneous Q4H    sodium chloride flush  5-40 mL IntraVENous 2 times per day    ertapenem (INVanz) IVPB  1,000 mg IntraVENous Q24H    [Held by provider] Vitamin D  1,000 Units Oral Daily    heparin (porcine)  5,000 Units SubCUTAneous Q8H    artificial tears   Both Eyes Q4H    anidulafungin  100 mg IntraVENous Q24H    pantoprazole (PROTONIX) 40 mg injection  40 mg IntraVENous Q12H    sodium chloride flush  5-40 mL IntraVENous 2 times per day    Arformoterol Tartrate  15 mcg Nebulization BID    budesonide  0.5 mg Nebulization BID    albuterol  2.5 mg Nebulization Q4H WA    [Held by provider] metoprolol  5 mg IntraVENous Q6H    [Held by provider] rosuvastatin  10 mg Oral Nightly    [Held by provider] levothyroxine  50 mcg Oral Daily     PRN Meds: fentanNYL, sodium chloride flush, sodium chloride flush, sodium chloride, potassium chloride, magnesium sulfate, calcium gluconate **OR** calcium gluconate **OR** calcium gluconate **OR** calcium gluconate, sodium phosphate IVPB **OR** sodium phosphate IVPB **OR** sodium phosphate IVPB **OR** sodium phosphate IVPB, glucose, dextrose bolus **OR** dextrose bolus, glucagon (rDNA), dextrose, sodium chloride flush, ondansetron **OR** ondansetron, [DISCONTINUED] acetaminophen **OR** acetaminophen, ipratropium-albuterol      Intake/Output Summary (Last 24 hours) at 1/1/2023 0820  Last data filed at 1/1/2023 0800  Gross per 24 hour   Intake 2964.1 ml   Output 3605 ml   Net -640.9 ml       Exam:    BP (!) 120/58   Pulse (!) 106   Temp (!) 96.4 °F (35.8 °C) (Esophageal)   Resp (!) 36   Ht 5' 2\" (1.575 m)   Wt 122 lb (55.3 kg)   SpO2 100%   BMI 22.31 kg/m²       General appearance:  No apparent distress, appears stated age. HEENT:  Normal cephalic,   Neck: Supple, with full range of motion. Trachea midline. Respiratory:  Normal respiratory effort. DIMINISHED BREATH SOUNDS  NOTED BILATERALLY  Cardiovascular:  RRR  Abdomen: Soft, non-tender, non-distended with normal bowel sounds. Musculoskeletal:  No clubbing, cyanosis or edema bilaterally.     Skin: smooth and dry                  Labs:   Recent Labs     12/30/22  0633 12/31/22  0534 01/01/23  0533   WBC 15.9* 18.1* 22.8*   HGB 8.2* 7.8* 7.2*   HCT 24.4* 23.7* 21.5*   PLT 66* 81* 87*     Recent Labs     12/31/22  1828 01/01/23  0033 01/01/23  0533    135 134   K 4.3 4.0 4.4   CL 106 104 104   CO2 19* 20* 19*   BUN 37* 30* 27*   CREATININE 1.1* 0.9 0.8   CALCIUM 8.4* 8.1* 7.9*   PHOS 2.6 2.0* 2.4*     Recent Labs     12/31/22 1828 01/01/23  0033 01/01/23  0533   AST 75* 66* 75*   ALT 71* 72* 73*   BILITOT 4.5* 4.5* 4.2*   ALKPHOS 157* 157* 169*     No results for input(s): INR in the last 72 hours. No results for input(s): Beverly Lianet in the last 72 hours. Recent Labs     12/31/22 1828 01/01/23  0033 01/01/23  0533   AST 75* 66* 75*   ALT 71* 72* 73*   BILITOT 4.5* 4.5* 4.2*   ALKPHOS 157* 157* 169*     No results for input(s): LACTA in the last 72 hours. No results found for: [facility]  Lab Results   Component Value Date    AMMONIA 21.0 12/28/2022       Assessment:    Active Hospital Problems    Diagnosis Date Noted    Hypoxia [R09.02] 12/17/2022     Priority: Medium    Pneumonia of left lower lobe due to infectious organism [J18.9] 12/17/2022     Priority: Medium    Aspiration pneumonia of both lower lobes due to gastric secretions ([address] [J69.0] 12/17/2022     Priority: Medium    Postoperative pneumonia [J95.89, J18.9] 12/14/2022     Priority: Medium   FISTULA CHEST CAVITY  ACUTE RESP FAILURE WITH HYPOXIA  GASTROCULT POS  S/P HH REPAIR  PAWAN BASELINE CREATINE 1.0)  H.O BREAST CANCER   HLD   PLEURAL EFFUSIONS BILATERALLY   S/P CHEST TUBES  R loculated pleural effusion s/p IR chest tube 12/17/22 with minimal drainage, s/p R VATS turned partial open thoracotomy 12/20/22 with insertion of chest tubes x 3  EGD ESOPHAGOGASTRODUODENOSCOPY, WITH ESOPHAGEAL STENT PLACEMENT  EGD ESOPHAGOGASTRODUODENOSCOPY WITH PEG TUBE INSERTION     PLAN:  anidulafungin 200    FLUIDS  MONITOR BMP   FOR  TRANFER         DVT Prophylaxis: SCD  Diet: Diet NPO Exceptions are: Ice Chips  PN-Adult  3 IN 1 Central Line (Custom)  Diet NPO Exceptions are: Sips of Water with Meds  PN-Adult  3 IN 1 Central Line (Custom)  Code Status: Full Code     PT/OT Eval Status:   WHEN STABLE     Dispo -  TO [facility] OR  Electronically signed by Anup Pennington DO on 1/1/2023 at 8:20 AM [facility]

## 2023-01-01 NOTE — PROGRESS NOTES
[address] [facility]   Department of Internal Medicine   MICU Progress Note    Patient:  Mabel Hargrove 65 y.o. female   MRN: [id number]       Date of Service: 2023    Allergy: Benadryl [diphenhydramine] and Ibuprofen  CC hypoxia   Subjective     Left-sided chest tube removed this morning. Patient later became acutely hypotensive, stat labs showed hemoglobin of 5.9, lactate of 6. Resuscitated with IV fluids, blood transfusion  Currently on Levophed and vasopressin. Surgery notified, pending transfer to surgical ICU. Objective     TEMPERATURE:  Current - Temp: (!) 96.4 °F (35.8 °C); Max - Temp  Av.9 °F (36.1 °C)  Min: 95 °F (35 °C)  Max: 98.2 °F (36.8 °C)    RESPIRATIONS RANGE: Resp  Av.3  Min: 20  Max: 36    PULSE RANGE: Pulse  Av.3  Min: 95  Max: 127    BLOOD PRESSURE RANGE:  Systolic (33OCM), VCN:632 , Min:99 , HFV:003   ; Diastolic (48ILM), ASK:79, Min:47, Max:65      PULSE OXIMETRY RANGE: SpO2  Av.9 %  Min: 98 %  Max: 100 %    I & O - 24hr:    Intake/Output Summary (Last 24 hours) at 2023 1013  Last data filed at 2023 0900  Gross per 24 hour   Intake 3202.1 ml   Output 3538 ml   Net -335.9 ml       I/O last 3 completed shifts: In: 3988.5 [I.V.:3312.2; IV Piggyback:381.5]  Out: 4885 [Emesis/NG output:50; Chest Tube:230] I/O this shift: In: 468 [I.V.:468]  Out: 358    Weight change: -3 lb (-1.361 kg)    Physical Exam:  General Appearance: intubated/no sedated on vent, does not look in distress. Anasarca noted open eyes, does not follow. HEENT:  Head: Normocephalic, no lesions, without obvious abnormality. Eye: Normal external eye, conjunctiva, lids cornea, FILEMON. Nose: Normal external nose, mucus membranes and septum. Neck / Thyroid: Supple, no masses, nodes, nodules or enlargement.   Neck: no adenopathy, no carotid bruit, no JVD, supple, symmetrical, trachea midline, and thyroid not enlarged, symmetric, no tenderness/mass/nodules  Lung: Lung sounds diminished, no wheezing noted. 4 chest tube    Heart: regular rate and rhythm, S1, S2 normal, no murmur, click, rub or gallop  Abdomen: soft, non-tender; bowel sounds normal; no masses,  no organomegaly incision noted, intact. Peg tube placement   Extremities:  extremities normal, atraumatic, no cyanosis or edema 3+ pitting edema bilaterally. Musculokeletal: No joint swelling, no muscle tenderness. ROM normal in all joints of extremities.    Neurologic: Mental status: intubated/no sedation on vent, limited neuro exam       Medications     Continuous Infusions:   PN-Adult  3 IN 1 Central Line (Custom)      PN-Adult  3 IN 1 Central Line (Custom) 50 mL/hr at 01/01/23 0600    sodium chloride 10 mL/hr at 01/01/23 0600    norepinephrine 2.027 mcg/min (01/01/23 0621)    prismaSATE BGK 4/0/1.2 2,000 mL/hr at 12/31/22 1355    sodium chloride 100 mL/hr at 12/31/22 1737    calcium chloride CRRT infusion 8,000 mg (01/01/23 0448)    dextrose       Scheduled Meds:   insulin glargine  5 Units SubCUTAneous Daily    sodium chloride flush  5-40 mL IntraVENous 2 times per day    phosphorus  500 mg Oral Once    [Held by provider] midodrine  15 mg Oral TID WC    insulin lispro  0-16 Units SubCUTAneous Q4H    sodium chloride flush  5-40 mL IntraVENous 2 times per day    ertapenem (INVanz) IVPB  1,000 mg IntraVENous Q24H    [Held by provider] Vitamin D  1,000 Units Oral Daily    heparin (porcine)  5,000 Units SubCUTAneous Q8H    artificial tears   Both Eyes Q4H    anidulafungin  100 mg IntraVENous Q24H    pantoprazole (PROTONIX) 40 mg injection  40 mg IntraVENous Q12H    sodium chloride flush  5-40 mL IntraVENous 2 times per day    Arformoterol Tartrate  15 mcg Nebulization BID    budesonide  0.5 mg Nebulization BID    albuterol  2.5 mg Nebulization Q4H WA    [Held by provider] metoprolol  5 mg IntraVENous Q6H    [Held by provider] rosuvastatin  10 mg Oral Nightly    [Held by provider] levothyroxine  50 mcg Oral Daily     PRN Meds: fentanNYL, sodium chloride flush, sodium chloride flush, sodium chloride, potassium chloride, magnesium sulfate, calcium gluconate **OR** calcium gluconate **OR** calcium gluconate **OR** calcium gluconate, sodium phosphate IVPB **OR** sodium phosphate IVPB **OR** sodium phosphate IVPB **OR** sodium phosphate IVPB, glucose, dextrose bolus **OR** dextrose bolus, glucagon (rDNA), dextrose, sodium chloride flush, ondansetron **OR** ondansetron, [DISCONTINUED] acetaminophen **OR** acetaminophen, ipratropium-albuterol  Nutrition:   TPN    Labs and Imaging Studies     CBC:   Recent Labs     12/30/22  0633 12/31/22  0534 01/01/23  0533   WBC 15.9* 18.1* 22.8*   RBC 2.67* 2.56* 2.37*   HGB 8.2* 7.8* 7.2*   HCT 24.4* 23.7* 21.5*   MCV 91.4 92.6 90.7   MCH 30.7 30.5 30.4   MCHC 33.6 32.9 33.5   RDW 16.6* 18.1* 19.5*   PLT 66* 81* 87*   MPV NOT CALC NOT CALC NOT CALC         BMP:    Recent Labs     12/31/22 1828 01/01/23 0033 01/01/23  0533    135 134   K 4.3 4.0 4.4    104 104   CO2 19* 20* 19*   BUN 37* 30* 27*   CREATININE 1.1* 0.9 0.8   GLUCOSE 224* 215* 208*   CALCIUM 8.4* 8.1* 7.9*   PROT 5.1* 5.2* 5.1*   LABALBU 2.0* 2.0* 2.0*   BILITOT 4.5* 4.5* 4.2*   ALKPHOS 157* 157* 169*   AST 75* 66* 75*   ALT 71* 72* 73*         LIVER PROFILE:   Recent Labs     12/31/22 1828 01/01/23 0033 01/01/23  0533   AST 75* 66* 75*   ALT 71* 72* 73*   BILITOT 4.5* 4.5* 4.2*   ALKPHOS 157* 157* 169*         PT/INR:   No results for input(s): PROTIME, INR in the last 72 hours. APTT:   No results for input(s): APTT in the last 72 hours.       Fasting Lipid Panel:    Lab Results   Component Value Date/Time    CHOL 45 12/19/2022 12:00 PM    TRIG 143 12/28/2022 04:50 AM    HDL 12 12/19/2022 12:00 PM       Cardiac Enzymes:    Lab Results   Component Value Date    CKTOTAL 59 01/13/2020    TROPONINI <0.01 01/13/2020       Notable Cultures:      Blood cultures   Blood Culture, Routine   Date Value Ref Range Status   12/17/2022 5 Days no growth Final     Respiratory cultures No results found for: RESPCULTURE   Gram Stain Result   Date Value Ref Range Status   12/20/2022 Refer to ordered Gram stain for results  Final     Urine   Urine Culture, Routine   Date Value Ref Range Status   07/22/2022 <10,000 CFU/mL  Mixed gram positive organisms    Final     Legionella No results found for: LABLEGI  C Diff PCR No results found for: CDIFPCR  Wound culture/abscess: No results for input(s): WNDABS in the last 72 hours. Tip culture:No results for input(s): CXCATHTIP in the last 72 hours. Antibiotic  Days  Day started   Ertapenem      Anidulafungin                       Oxygen:     Vent Information  Ventilator ID: [id number]  Equipment Changed: Humidification  Vent Mode: AC/VC    Additional Respiratory Assessments  Heart Rate: (!) 113  Resp: (!) 33  SpO2: 100 %  Position: Semi-Bach's  Humidification Source: Heated wire  Humidification Temp: 37  Circuit Condensation: Drained  Airway Type: ET  Airway Size: 7.5  Cuff Pressure (cm H2O): 29 cm H2O  Skin Barrier Applied: Yes     Nasal cannula L/min     Face mask %     Reservoirs mask %       ABG     PH  7.46   PCO2  32   PO2  137   HCO3  23   Sat%  97   FIO2     DATES 12/30/22       Lines:  Site  Day  Date inserted     TLC   Rfemoral    12/17/22      PICC              Arterial line   Left brachial    12/20/22     Peripheral line              HD cath Left IJ    12/21/22     [REMOVED] Urinary Catheter 12/15/22 Peterson-Output (mL): 0 mL    Imaging Studies:    XR CHEST PORTABLE   Final Result   1. Bilateral chest tubes with no pneumothorax. 2. Stable right basal consolidation and possible small effusion. 3. Placement of right internal jugular dialysis catheter that extends   approximately 3 cm in the right atrium and placement of a left internal   jugular venous catheter that extends to the cavoatrial junction with no   evident complication. XR CHEST PORTABLE   Final Result   1.  There is no interval change in the right lower lobe airspace disease when   compared to prior study of 1 day earlier   2. Stable position of the bilateral chest tubes. There is no right or left   pneumothorax. 3. Patchy bilateral ground-glass airspace disease seen within the right and   left upper lobes. CT HEAD WO CONTRAST   Final Result   No acute intracranial abnormality. Persistent chronic right maxillary, ethmoid and frontal sinusitis. CT CHEST WO CONTRAST   Final Result   CHEST:      Compared to CT chest 12/17/2022, there has been interval placement of   esophageal stent, mediastinal drains, and bilateral chest tubes. Mediastinal fluid collection has decreased in size and is now predominantly   to the right of the esophagus, measuring 5 cm x 4 cm x 6 cm. It contains a   small amount of air, as expected given placement of drains. Small amount   oral contrast to the right of the esophagus, outside the stent, concerning   for leak (series 304, image 65). Decreased and now trace right pleural effusion. Tiny left pneumothorax. Decreased bilateral pulmonary consolidations/ground-glass opacities. Small amount of debris/mucous in the central airways. ABDOMEN/PELVIS:      Interval placement PEG tube and gastroduodenal tube through the PEG tube. There is a 13 mm linear radiopaque structure in the 3rd portion of the   duodenum distal to the gastric due to tube; it is of unclear etiology. Fracture of the tube is not excluded. Small amount of free air, which is not necessarily abnormal given recent PEG   placement. Otherwise no acute abnormality. CT ABDOMEN PELVIS WO CONTRAST Additional Contrast? None   Final Result   CHEST:      Compared to CT chest 12/17/2022, there has been interval placement of   esophageal stent, mediastinal drains, and bilateral chest tubes.       Mediastinal fluid collection has decreased in size and is now predominantly   to the right of the esophagus, measuring 5 cm x 4 cm x 6 cm. It contains a   small amount of air, as expected given placement of drains. Small amount   oral contrast to the right of the esophagus, outside the stent, concerning   for leak (series 304, image 65). Decreased and now trace right pleural effusion. Tiny left pneumothorax. Decreased bilateral pulmonary consolidations/ground-glass opacities. Small amount of debris/mucous in the central airways. ABDOMEN/PELVIS:      Interval placement PEG tube and gastroduodenal tube through the PEG tube. There is a 13 mm linear radiopaque structure in the 3rd portion of the   duodenum distal to the gastric due to tube; it is of unclear etiology. Fracture of the tube is not excluded. Small amount of free air, which is not necessarily abnormal given recent PEG   placement. Otherwise no acute abnormality. FLUORO FOR SURGICAL PROCEDURES   Final Result   Intraprocedural fluoroscopic spot images as above. See separate procedure   report for more information. XR CHEST PORTABLE   Final Result   1. Endotracheal tube is 3.5 cm above the roseann. 2. Stable opacities in right lung base. 3. No radiographic evidence of pneumothorax. 4. Similar position of bilateral chest tubes. XR CHEST PORTABLE   Final Result   Stable chest radiograph with bibasilar opacities related to atelectasis or   pneumonia notable on the right. XR CHEST PORTABLE   Final Result   Stable chest radiograph with pulmonary vascular congestion and bibasilar   opacities related to atelectasis or pneumonia. XR ABDOMEN (KUB) (SINGLE AP VIEW)   Final Result   NG tube again identified within the stomach, when compared to CT scan of the   abdomen pelvis performed 12/25/2022. There is oral contrast within the   stomach, however, there is oral contrast around the GE junction, suspicious   for focal perforation at the GE junction.   Oral contrast is also noted   tracking along the inferior right-sided chest tube, indicating extravasation   of contrast into the pleural space. The findings were discussed with Nikunj Deleon, the patient's bedside nurse, at   11:34 a.m.         XR CHEST PORTABLE   Final Result   No interval change         XR CHEST PORTABLE   Final Result   No significant interval change, when compared to the previous study performed   1 day earlier. CT HEAD WO CONTRAST   Final Result   1. No acute intracranial abnormality. 2.  Chronic right maxillary ethmoid and frontal sinusitis. Chronic sphenoid   sinusitis. Fluid opacification of both mastoid air cells. 3.  No CT evidence of large territory infarct. MCA vessels appear normal in   density. CT ABDOMEN PELVIS WO CONTRAST Additional Contrast? None   Final Result   1. No evidence of bowel obstruction or significant ileus. 2.  Gastrostomy tube is located in the stomach. Oral contrast material is   noted in the dependent portion of the gastric cardia. No abnormal bowel   dilatation. Oral contrast material noted in the right and left colon without   colonic distension. 3.  Mild abdominal ascites. 4.  Patchy asymmetric gland glass opacities in the lung bases with some areas   of confluent consolidation in the right lower lobe. Trace pleural effusions. Bilateral chest tubes. 5.  Very small left pneumothorax. XR CHEST PORTABLE   Final Result   1. Asymmetric bilateral airspace opacities, not significantly changed. 2.  No of visible pneumothorax. 3.  Support devices appear stable. XR CHEST PORTABLE   Final Result   Suggestion of some partial resolution of bilateral basilar infiltrates         XR CHEST PORTABLE   Final Result   No interval change         XR CHEST PORTABLE   Final Result   Stable appearance of the chest compared to 12/21/2022. XR CHEST PORTABLE   Final Result   Adequately positioned left internal jugular hemodialysis catheter.   The remainder of the exam including additional lines and tubes are stable. XR CHEST PORTABLE   Final Result   Interval left chest tube placement, no definite pneumothorax. Bilateral lung infiltrates and pleural effusions, not significantly changed. These infiltrates may be due to pulmonary edema, pneumonia and/or ARDS. Stable borderline enlargement of the cardiac silhouette. XR CHEST PORTABLE   Final Result   Extensive bilateral airspace disease with interval progression on the right. XR CHEST PORTABLE   Final Result   Postoperative changes with the diffuse bilateral infiltrates and effusions   with improvement in the right side likely CHF/edema. Pneumonia is less   likely. Tubes and catheters as noted. XR CHEST PORTABLE   Final Result   Low lying endotracheal tube which needs withdrawn approximately 2 cm. XR CHEST PORTABLE   Final Result   Enteric tube tip in the body of the stomach. The roseann is not well seen. ET tube tip most likely within 1.5 cm of the   roseann. XR CHEST PORTABLE   Final Result   Pleural effusions with adjacent infiltrate and or atelectasis showing   slightly improved aeration on the left and slightly poor aeration on the   right. XR CHEST PORTABLE   Final Result   Interval improvement in the opacification of the left hemithorax, with   aeration in the left upper lung field when compared to the previous study   performed 1 day earlier. No other significant interval change. XR CHEST PORTABLE   Final Result   New right internal jugular central venous catheter terminating near the   SVC-right atrium junction. No post-procedure pneumothorax. Worsening   opacification of the left hemithorax and stable findings on the right. CT CHEST WO CONTRAST   Final Result   Large right pleural effusion of mixed density to suggest complex effusion.    There is a pigtail catheter seen in the pleural space posteriorly and   inferiorly. Small to moderate size left pleural effusion with consolidative   infiltrate throughout the left upper and lower lung field and aerated   portions revealing nodular ground-glass opacity to suggest an infectious   process. Nodular ground-glass opacity inferiorly within the aerated right   upper and lower lung field concerning for an infectious process as well. There is less consolidative infiltrate identified within the right lower lobe. XR CHEST PORTABLE   Final Result   1. No changes position of the right-sided chest tube. No right-sided   pneumothorax. The most of the right-sided pleural effusion has been drained,   if present to be discrete or small. 2.  Moderate left-sided pleural effusion. If quantification of left-sided   pleural effusion will be needed for clinical management bedside ultrasound or   left lateral decubitus views of the chest can be helpful. 3.  No pneumothorax on the right or on the left. 4.  Large size diaphragmatic across the midline. XR CHEST PORTABLE   Final Result   1. Increased parenchymal density projecting over the left lower lung   concerning for atelectasis and/or consolidation      2. No signs of right effusion      3. Parenchymal density projecting over the right lower lung concerning for   atelectasis         IR GUIDED PERC PLEURAL DRAIN W CATH INSERT   Final Result   1. Successful ultrasound-guided right thoracentesis. 2.  Successful placement of an 8 Frisian pleural drain         XR CHEST PORTABLE   Final Result   1. Right-sided pigtail catheter in the right base. 2.  No conspicuous right-sided pleural effusions identified. No right-sided   pneumothorax. 3.  Cannot exclude a small left-sided pleural effusion. 4.  Large size diaphragmatic hernia across the midline causing compression   atelectasis in the medial aspect of both lungs. FL ESOPHAGRAM   Final Result   1.  No evidence of esophageal perforation or leak. 2. Decreased peristalsis. No evidence of esophageal mass or obstructing   lesion. 3. Small sliding hiatus hernia. No evidence of GE reflux. The gastric   foreign disc does appear constricted as it passes through the esophageal   hiatus. US CHEST INCLUDING MEDIASTINUM   Final Result   Probable complex loculated right pleural effusion. Simple left pleural effusion. CTA PULMONARY W CONTRAST   Final Result   No evidence of pulmonary embolism. Bilateral lower lobe pulmonary consolidations and pleural effusions seen. Deviation of the mediastinum to the left is visualized. Mild soft tissue prominence visualized surrounding the esophagus with   narrowing at the gastroesophageal junction but appears to be due to external   compression. Cannot rule left soft tissue mass at this location. Wall thickening visualized in the gastric outlet with mild prominence of the   stomach seen, this could be artifactual.      Distended gallbladder is seen. Degenerative bone changes, postoperative changes and multilevel vertebral   augmentation seen. No evidence of acute abdominal/pelvic pathology. CT ABDOMEN PELVIS W IV CONTRAST Additional Contrast? Oral   Final Result   No evidence of pulmonary embolism. Bilateral lower lobe pulmonary consolidations and pleural effusions seen. Deviation of the mediastinum to the left is visualized. Mild soft tissue prominence visualized surrounding the esophagus with   narrowing at the gastroesophageal junction but appears to be due to external   compression. Cannot rule left soft tissue mass at this location. Wall thickening visualized in the gastric outlet with mild prominence of the   stomach seen, this could be artifactual.      Distended gallbladder is seen. Degenerative bone changes, postoperative changes and multilevel vertebral   augmentation seen.       No evidence of acute abdominal/pelvic pathology. XR CHEST PORTABLE   Final Result   1. Consolidation seen within the left lung base with blunting the left   costophrenic angle which could represent pneumonia or atelectasis   2. A trace left pleural effusion cannot be excluded   3. Large right diaphragmatic hernia/hiatal hernia which was noted on the   patient's previous CT of the chest of 07/22/2022. Assessment and PLan   In summary 65 y.o. female with significant past medical history of hypothyroidism, breast cancer, TIA, hiatal hernia underwent hiatal hernia repair on 12/9/22 and developed acute respiratory distress and was transfer to [facility] on 12/14/22 for further management. Patient was seen by nephrology, pulmonology, general surgery. Patient noted to have worsening hypoxia requiring 15 L of oxygen, A. fib RVR therefore transferred to MICU and critical care was consulted. Assessment:  Acute hypoxemic respiratory failure secondary to aspiration pneumonia/pleural effusion/bibasilar consolidation now on ventilation (12/19/22)   ARDS   Bilateral pleural effusion right greater than left status post right thoracentesis with pigtail chest tube placement on (12/16/22 IR)   Septic shock  2/2 PNA/loculated pleural effusion  Loculated pleural effusion s/p VATS and Mini-thoracotomy with open drainage of fluid collection 12/20   Esophageal Leak ==> EGD s/p esophogeal stent placement and peg tube insertion 12/30/22   Thrombocytopenia   AFIB RVR  PAWAN secondary to dehydration  [place] ptosis leukocytosis with left shift  Acute anemia  History of massive hiatal hernia  status post repair on 12/9/2022  History of TIA  History of breast cancer  History of hypothyroidism  History of lumbar compression fracture with history of PLIF T12-L2  History of anxiety     Plan:    -ventilator (day #14), titrate FiO2 keep SPO2 goal 92%  -Off sedation, CT head, negative for intracranial abnormalities.   Currently on Levophed and vasopressin to maintain MAP above 65  Receiving blood transfusion    - loculated pleural effusion s/p VATS and mini thoracotomy with open drainage of fluid collection (12/20/22) s/p 3 chest tube ;R effusion on left side s/p Left chest tube (12/21/22)   -4 chest tube with minimal drainage, noted to have tube feeds through Right chest tube. General surgery resident administer methylene blue and gastrografin contrast with x-ray, positive for Esophageal Leak ==>  s/p EGD with esophageal stent n peg insertion on 12/30/2022  -Left chest tube removed on 1/1/2023    -Continue scheduled bronchodilators Brovana and Pulmicort in addition to the DuoNebs, mucomyst   -Echo showed EF of 55%, indeterminate diastolic function, moderate dilated right ventricle with reduced function, moderate tricuspid regurgitation. Echo in January 2021 showed EF of 60 to 65%,   -Procalcitonin 5.70==> 1.57 today; RVP/COVID-19 PCR negative, MRSA nares negative, blood culture (NGTD) respiratory culture (Enterobacter Colace complex, Candida albicans) urine antigen negative. Body fluid hematoma==> enterobacter, candida albicans; Respiratory culture (heavy growth Enterobacter)  - Consult to ID, input appreciated. - Ertapenem and anidulafungin   -General surgery surgery consulted, input appreciated. -Strict NPO with no medications, hold all oral medication and tube feeds  -PAWAN, nephrology following, input appreciated- low urine output, metabolic acidosis, will place temp HD catheter and may need CRRT with no net removal.   -Strict intake and output  -Labs and chest x-ray in a.m.  -F/E/N KVO/ replace lytes/ Npo ; TPN   - DVT/GI scds/lovenox/protonix  - Code status: DNR- limited No CPR, ok to everything. This patient is unstable and critically ill with increased risk of clinical deterioration. There are life and organ supporting interventions that require frequent monitoring and personal assessment.  There is a high possibility of sudden, clinically significant or life-threatening deterioration in this patient's condition which may require prompt therapeutic interventions. I spent 77 independent minutes of critical care time excluding procedures.     Julia Kumar MD MS  Pulmonary & [address] [place] [address]

## 2023-01-02 PROBLEM — Z51.5 PALLIATIVE CARE ENCOUNTER: Status: ACTIVE | Noted: 2023-01-01

## 2023-01-02 PROBLEM — K22.89 ESOPHAGEAL FISTULA: Status: ACTIVE | Noted: 2023-01-01

## 2023-01-02 PROBLEM — Z71.89 GOALS OF CARE, COUNSELING/DISCUSSION: Status: ACTIVE | Noted: 2023-01-01

## 2023-01-02 NOTE — PROGRESS NOTES
Gen Surg    Events noted, moved to SICU  Still requiring small amount of pressor support  Right chest tubes still in place  Esophageal stent in place  On TPN  Cont care per ICU team

## 2023-01-02 NOTE — PROGRESS NOTES
Christiane Pastor M.D.,[facility]  aNvneet Mckeon D.O., F.A.C.O.I., Geoffrey Kirkpatrick M.D. Kristine Condon M.D. Genesis Beltran D.O. Daily Pulmonary Progress Note    Patient:  Raoul Landis 65 y.o. female MRN: [id number]     Date of Service: 1/2/2023      Synopsis     We are following patient for acute respiratory failure with hypoxia, pneumonia, bilateral pleural effusions    \"CC\" SOB    Code status: Limited-no compressions      Subjective      Patient was seen and examined in the ICU - moved to SICU. CVVHD currently running. Patient is still on TPN and Levophed. She grimaces to pain. AC/VC + 16, tidal volume 300, FiO2 40%, PEEP +8.       Review of Systems:  Unable to obtain-patient intubated     24-hour events:  Moved to SICU    Objective   Vitals: BP (!) 121/50   Pulse (!) 105   Temp 98.8 °F (37.1 °C) (Esophageal)   Resp 30   Ht 5' 2\" (1.575 m)   Wt 123 lb 6.4 oz (56 kg)   SpO2 98%   BMI 22.57 kg/m²     I/O:    Intake/Output Summary (Last 24 hours) at 1/2/2023 1346  Last data filed at 1/2/2023 1306  Gross per 24 hour   Intake 3921.75 ml   Output 3827 ml   Net 94.75 ml       Vent Information  Ventilator ID: [id number]  Equipment Changed: (S) Humidification  Vent Mode: AC/VC                CURRENT MEDS :  Scheduled Meds:   sodium chloride  1,000 mL IntraVENous Once    insulin glargine  5 Units SubCUTAneous Daily    sodium chloride flush  5-40 mL IntraVENous 2 times per day    phosphorus  500 mg Oral Once    [Held by provider] midodrine  15 mg Oral TID WC    insulin lispro  0-16 Units SubCUTAneous Q4H    sodium chloride flush  5-40 mL IntraVENous 2 times per day    ertapenem (INVanz) IVPB  1,000 mg IntraVENous Q24H    [Held by provider] Vitamin D  1,000 Units Oral Daily    [Held by provider] heparin (porcine)  5,000 Units SubCUTAneous Q8H    artificial tears   Both Eyes Q4H    anidulafungin  100 mg IntraVENous Q24H    pantoprazole (PROTONIX) 40 mg injection  40 mg IntraVENous Q12H    sodium chloride flush  5-40 mL IntraVENous 2 times per day    Arformoterol Tartrate  15 mcg Nebulization BID    budesonide  0.5 mg Nebulization BID    albuterol  2.5 mg Nebulization Q4H WA    [Held by provider] metoprolol  5 mg IntraVENous Q6H    [Held by provider] rosuvastatin  10 mg Oral Nightly    [Held by provider] levothyroxine  50 mcg Oral Daily       Physical Exam:  General Appearance: Patient is intubated, appears ill  HEENT: ETT in place, OG tube  Neck: Lips, mucosa, and tongue normal.  Supple, symmetrical, trachea midline, no adenopathy;thyroid:  no enlargement/tenderness/nodules or JVD. Lung: R chest tube remains, lungs clear to auscultation  Heart: Tachycardic  Abdomen: Soft, NT, ND. BS present x 4 quadrants. No bruit or organomegaly. Incisions intat  Extremities: no edema   Musculokeletal: No joint swelling, no muscle tenderness. ROM normal in all joints of extremities. Neurologic: Mental status: Intubated, grimaces to pain    Pertinent/ New Labs and Imaging Studies     Imaging Personally Reviewed:  CXR  FINDINGS:   Right basilar infiltrate, effusion and chest tubes are not significantly   changed. Left lung is clear. Support lines are appropriate. There is an   esophageal stent. Impression   No interval change         Noncontrast CT chest 12/17/2022:  Impression   Large right pleural effusion of mixed density to suggest complex effusion. There is a pigtail catheter seen in the pleural space posteriorly and   inferiorly. Small to moderate size left pleural effusion with consolidative   infiltrate throughout the left upper and lower lung field and aerated   portions revealing nodular ground-glass opacity to suggest an infectious   process. Nodular ground-glass opacity inferiorly within the aerated right   upper and lower lung field concerning for an infectious process as well. There is less consolidative infiltrate identified within the right lower lobe.                  ECHO 12/19/2022: Summary   Technically difficult study - limited visualization. Micro-bubble contrast injected to enhance left ventricular visualization. Normal left ventricular size and systolic function. Ejection fraction is visually estimated at 55%. Indeterminate diastolic function. No regional wall motion abnormalities seen. Mild left ventricular concentric hypertrophy noted. Moderately dilated right ventricle with reduced function. Moderate tricuspid regurgitation.       Labs:  Lab Results   Component Value Date/Time    WBC 13.4 01/02/2023 06:00 AM    HGB 11.1 01/02/2023 06:00 AM    HCT 32.4 01/02/2023 06:00 AM    HCT 29.0 12/20/2022 01:31 PM    MCV 88.0 01/02/2023 06:00 AM    MCH 30.2 01/02/2023 06:00 AM    MCHC 34.3 01/02/2023 06:00 AM    RDW 17.5 01/02/2023 06:00 AM    PLT 58 01/02/2023 06:00 AM    MPV NOT CALC 01/02/2023 06:00 AM     Lab Results   Component Value Date/Time     01/02/2023 12:17 PM    K 3.8 01/02/2023 12:17 PM    K 4.3 12/31/2022 12:01 AM     01/02/2023 12:17 PM    CO2 20 01/02/2023 12:17 PM    BUN 26 01/02/2023 12:17 PM    CREATININE 0.7 01/02/2023 12:17 PM    LABALBU 2.5 01/02/2023 12:17 PM    LABALBU 4.3 01/24/2011 01:15 PM    CALCIUM 8.1 01/02/2023 12:17 PM    GFRAA >60 07/22/2022 01:42 PM    LABGLOM >60 01/02/2023 12:17 PM       Respiratory cultures    Oral Pharyngeal Hilda absent Abnormal     Smear, Respiratory Polymorphonuclear leukocytes not seen   Epithelial cells not seen   Moderate yeast   Moderate gram positive rods Diphtheroid like   Few Gram negative rods    Organism Enterobacter cloacae complex Abnormal     CULTURE, RESPIRATORY Moderate growth    Organism Candida albicans Abnormal     CULTURE, RESPIRATORY Heavy growth         Latest Reference Range & Units Most Recent   (1,3)-Beta-D-Glucan (Fungitell) Interpretation Negative  Positive !  12/19/22 04:02   !: Data is abnormal      ABGs 12/29/2022:     Latest Reference Range & Units 12/29/22 04:36   Source:  Blood Arterial   pH, Blood Gas 7.350 - 7.450  7.453 (H)   PCO2 35.0 - 45.0 mmHg 34.1 (L)   pO2 75.0 - 100.0 mmHg 127.2 (H)   HCO3 22.0 - 26.0 mmol/L 23.3   Base Excess -3.0 - 3.0 mmol/L -0.3   O2 Sat 92.0 - 98.5 % 98.2   THB,THB 11.5 - 16.5 g/dL 9.8 (L)   O2Hb 94.0 - 97.0 % 97.6 (H)   Carboxy-Hgb 0.0 - 1.5 % 0.4   METHB,METHB 0.0 - 1.5 % 0.2   HHb 0.0 - 5.0 % 1.8   AaDO2 mmHg 108.8   RI(T)  0.86   FIO2 % 40.0   PO2/FIO2 mmHg/% 3.18   (H): Data is abnormally high  (L): Data is abnormally low     Assessment:    Acute hypoxic respiratory failure - requiring intubation 12/19/22  Acute Respiratory Distress Syndrome  R loculated pleural effusion s/p IR chest tube 12/17/22 with minimal drainage, s/p R VATS turned partial open thoracotomy 12/20/22 with insertion of chest tubes x 3  S/p bronchoscopy-friable mucosa 12/20/22  Pneumonia, Hospital acquired-Enterobacter cloacae-Invanz started 12/19/22  Massive hiatal hernia s/p repair 12/9 at [facility]  Esophageal perforation secondary to hiatal hernia repair s/p EGD with stent  Acute kidney injury requiring CVVHD  L chest tube placement 12/21/22  Positive fungitell-eraxis started 12/21/22      Plan:   Continue mechanical ventilation, wean FiO2 as able - will need tracheostomy pending goals  Aggressive bronchopulmonary hygiene  Fluid removal with CVVHD initiated per Nephrology  TPN- management per surgery  Chest tube managed by surgical team  S/p EGD with esophageal stent   Antimicrobials per ID-Invanz started 12/19/22, Eraxis started 12/21/22  Rest per ICU team  Prognosis guarded    Taya Jacobsen MD

## 2023-01-02 NOTE — PROGRESS NOTES
[facility] PROGRESS NOTE      Chief complaint: Follow-up of pneumonia    This patient is a 62-year-old female with history of bilateral breast cancer, DM, hiatal hernia repair on 12/09, presented on 12/14 with shortness of breath for 2 days accompanied by productive cough and malaise prior to her hiatal hernia repair, thought to have large postoperative seroma after a type IV hiatal hernia repair. On admission, she was afebrile and hemodynamically stable with no leukocytosis. CT chest, abdomen and pelvis showed soft tissue density in the posterior medial aspect of the right lower lung field with deviation of the mediastinum to the left, bilateral lower lobe consolidation and pleural effusions, mild soft tissue prominence surrounding the esophagus with narrowing at the gastroesophageal junction appearing to be due to external compression, wall thickening in the gastric outlet with mild prominence of the stomach, distended gallbladder, no evidence of acute abdominal/pelvic pathology. Esophagram was negative for esophageal perforation. She underwent right-sided thoracentesis on 12/16 during which 105 mL of serous fluid was removed. Pleural fluid gram stain and culture showed rare polymorphonuclear leukocytes, no epithelial cells, no organisms, no growth. She was transferred to MICU on 12/17 for worsening hypoxemia. Repeat CT chest on 12/17 showed large right pleural effusion of mixed density suggestive of complex effusion with pigtail catheter in the pleural space posteriorly and inferiorly, small to moderate sized left pleural effusion with consolidative infiltrate throughout left upper and lower lung field and aerated portions revealing nodular groundglass opacity, nodular groundglass opacity inferiorly within the aerated right upper and lower lung field, less consolidative infiltrate within the right lower lobe.   Respiratory gram stain and culture showed no polymorphonuclear leukocytes, no epithelial cells, moderate yeast, moderate gram-positive diphtheroid-like rods, few gram-negative rods, moderate growth of Enterobacter cloacae, heavy growth of Candida albicans. Urine Streptococcus pneumoniae and Legionella antigens were negative. Respiratory pathogen PCR panel and MRSA nares culture were negative. Blood cultures showed no growth to date. She received a dose of ceftriaxone and doxycycline on admission. Ampicillin-sulbactam was started on admission and then switched to piperacillin-tazobactam on 12/17 then switched to meropenem on 12/19. Vancomycin was started on 12/17. She underwent right VATS, mini-thoracotomy with drainage and fenestration of mediastinal seroma/hematoma, chest tube and mediastinal drain placement on 12/20 during which 1100 mL serous fluid and dark blood (no succus or bile) extending into the mediastinum was drained via mini-thoracotomy. Hematoma/seroma fluid Gram stain and culture showed abundant polymorphonuclear leukocytes, no epithelial cells, no organisms, rare growth of Enterobacter cloacae (susceptible to fluoroquinolones and cotrimoxazole) and light growth of Candida albicans. Serum 1,3 beta-d-glucan on 12/18 was elevated. Endotracheal aspirate Gram stain and culture on 12/25 showed few polymorphonuclear leukocytes, few epithelial cells, no organisms, heavy growth of Enterobacter cloacae. She was noted to have increasing output from right sided chest tubes confirmed to have esophageal perforation with active methylene blue extravasation into both right sided tubes on bedside leak test on 12/27. She underwent EGD with stent placement and PEG-J tube insertion on 12/30 during which 3-4 cm perforation in esophagus was covered with metal stent.  CT chest, abdomen and pelvis on 12/31 showed mediastinal fluid collection decreased in size and now predominantly to the right of the esophagus, measuring 5 cm x 4 cm x 6 cm; small amount oral contrast to the right of the esophagus, outside the stent, concerning for leak; decreased bilateral pulmonary consolidations/ground-glass opacities. Subjective: Patient was seen and examined. She remains in critical condition, intubated and sedated, on minimal vasopressor support. Had low-grade fever of 100.6 F yesterday. Objective:  BP (!) 121/50   Pulse (!) 113   Temp 97.5 °F (36.4 °C) (Esophageal)   Resp 25   Ht 5' 2\" (1.575 m)   Wt 123 lb 6.4 oz (56 kg)   SpO2 100%   BMI 22.57 kg/m²   Constitutional: Intubated, no MV dyssynchrony  Respiratory: Clear breath sounds, no crackles, no wheezes  Cardiovascular: Regular rate and rhythm, no murmurs  Gastrointestinal: Bowel sounds present, soft, nontender  Skin: Warm and dry, no active dermatoses  Musculoskeletal: No joint swelling, no joint erythema    Labs, imaging, and medical records/notes were personally reviewed. Assessment:  Acute hypoxic respiratory failure  Shock, hemorrhagic +/- septic  Postoperative seroma/hematoma, right chest, associated with esophageal perforation in the setting of robotic hiatal hernia repair on 12/09, s/p right VATS, mini-thoracotomy with drainage and fenestration of mediastinal seroma/hematoma, chest tube and mediastinal drain placement on 12/20  Esophageal perforation, s/p  EGD with stent placement and PEG-J tube insertion on 12/30  Enterobacter cloacae HAP  Elevated serum 1,3 beta-d-glucan  Leukocytosis    Recommendations:  Continue anidulafungin 100 mg q24h. Continue ertapenem 1 g every 24 hours. Follow up blood cultures. Continue supportive care. Thank you for involving me in the care of [employer]. I will continue to follow. Please do not hesitate to call for any questions or concerns.       Electronically signed by Teri De La Garza MD on 1/2/2023 at 8:29 AM

## 2023-01-02 NOTE — PROGRESS NOTES
GENERAL SURGICAL   DAILY PROGRESS NOTE  1/2/2023    CHIEF COMPLAINT:  Chief Complaint   Patient presents with    Shortness of Breath     Sent from [facility] for SOB patient went for hernia repair patient on 6L NC on arrival       SUBJECTIVE:  Left chest tube removed yesterday morning. Later in PM, patient became acutely hypotensive requiring multiple pressors. Lactate 6, Hgb 5.9. Transferred to SICU, tube feeds held. This morning, on 8 levo, wbc 13.7. OBJECTIVE:  BP (!) 121/50   Pulse (!) 113   Temp 97.5 °F (36.4 °C) (Esophageal)   Resp 25   Ht 5' 2\" (1.575 m)   Wt 123 lb 6.4 oz (56 kg)   SpO2 100%   BMI 22.57 kg/m²     GENERAL:  Intubated, sedated  LUNGS:  Intubated and on ventilator. Bilateral chest tubes in place. L chest tube serous w/o air leak. R chest tubes no air leak, no output overnight  CARDIOVASCULAR: sinus tachycardia, normotensive  ABDOMEN:  Soft, non-distended, does not grimace to deep palpation, no rebound. PEGJ in place, to gravity with 169 cc output. EXTREMITIES: CVVHD line in groin    ASSESSMENT/PLAN:  65 y.o. female with respiratory failure and complex right-sided effusion s/p robotic hiatal hernia repair 12/9 without signs of esophageal perforation or leak on initial studies then evidence of esophageal leak on delayed evaluation 12/27  VATS & Mini-thoractomy & bronchoscopy with open drainage of fluid collection 12/20, left CT placement 12/21 with ARDS & renal failure s/p chemical paralysis, flolan, CVVHD 12/21  Now s/p EGD with esophageal stent and PEG-J 12/30.     Minimal to no drain output now  Continue TPN   Start trickle TF  Wean pressors as able  Appreciate SICU management    Lauri Forde MD  Surgery Resident PGY-3  1/2/2023  8:28 AM    Agree  Cont supportive care ICU      Milburn Lanes, MD

## 2023-01-02 NOTE — PROGRESS NOTES
Department of Internal Medicine  Nephrology Progress Note      Events reviewed. SUBJECTIVE: We are following Ivett Butt for PAWAN.       PHYSICAL EXAM:      Vitals:    VITALS:  BP (!) 121/50   Pulse (!) 106   Temp 97.7 °F (36.5 °C) (Esophageal)   Resp 29   Ht 5' 2\" (1.575 m)   Wt 123 lb 6.4 oz (56 kg)   SpO2 100%   BMI 22.57 kg/m²   24HR INTAKE/OUTPUT:    Intake/Output Summary (Last 24 hours) at 1/2/2023 6999  Last data filed at 1/2/2023 0900  Gross per 24 hour   Intake 3809.75 ml   Output 3745 ml   Net 64.75 ml         Constitutional: Patient intubated, sedated  HEENT: Normocephalic, atraumatic, PERRLA, endotracheal tube in place  Respiratory: Diminished right lung base  Cardiovascular/Edema: RRR, normal S1, normal S2, no edema  Gastrointestinal: Soft, not distended, nontender  Neurologic: Patient is sedated  Skin: Pale, dry, no lesions, no rash, + edema     Scheduled Meds:   sodium chloride  1,000 mL IntraVENous Once    insulin glargine  5 Units SubCUTAneous Daily    sodium chloride flush  5-40 mL IntraVENous 2 times per day    phosphorus  500 mg Oral Once    [Held by provider] midodrine  15 mg Oral TID WC    insulin lispro  0-16 Units SubCUTAneous Q4H    sodium chloride flush  5-40 mL IntraVENous 2 times per day    ertapenem (INVanz) IVPB  1,000 mg IntraVENous Q24H    [Held by provider] Vitamin D  1,000 Units Oral Daily    heparin (porcine)  5,000 Units SubCUTAneous Q8H    artificial tears   Both Eyes Q4H    anidulafungin  100 mg IntraVENous Q24H    pantoprazole (PROTONIX) 40 mg injection  40 mg IntraVENous Q12H    sodium chloride flush  5-40 mL IntraVENous 2 times per day    Arformoterol Tartrate  15 mcg Nebulization BID    budesonide  0.5 mg Nebulization BID    albuterol  2.5 mg Nebulization Q4H WA    [Held by provider] metoprolol  5 mg IntraVENous Q6H    [Held by provider] rosuvastatin  10 mg Oral Nightly    [Held by provider] levothyroxine  50 mcg Oral Daily     Continuous Infusions:   PN-Adult 3 IN 1 Central Line (Custom)      PN-Adult  3 IN 1 Central Line (Custom) 50 mL/hr at 01/01/23 1729    vasopressin (Septic Shock) infusion Stopped (01/01/23 1432)    sodium chloride      sodium chloride 10 mL/hr at 01/01/23 0600    norepinephrine 2 mcg/min (01/02/23 0745)    prismaSATE BGK 4/0/1.2 2,000 mL/hr at 01/02/23 0735    calcium chloride CRRT infusion 8,000 mg (01/02/23 0213)    dextrose       PRN Meds:.fentanNYL, sodium chloride, sodium chloride flush, sodium chloride flush, sodium chloride, potassium chloride, magnesium sulfate, calcium gluconate **OR** calcium gluconate **OR** calcium gluconate **OR** calcium gluconate, sodium phosphate IVPB **OR** sodium phosphate IVPB **OR** sodium phosphate IVPB **OR** sodium phosphate IVPB, glucose, dextrose bolus **OR** dextrose bolus, glucagon (rDNA), dextrose, sodium chloride flush, ondansetron **OR** ondansetron, [DISCONTINUED] acetaminophen **OR** acetaminophen, ipratropium-albuterol    DATA:    CBC with Differential:    Lab Results   Component Value Date/Time    WBC 13.4 01/02/2023 06:00 AM    RBC 3.68 01/02/2023 06:00 AM    HGB 11.1 01/02/2023 06:00 AM    HCT 32.4 01/02/2023 06:00 AM    HCT 29.0 12/20/2022 01:31 PM    PLT 58 01/02/2023 06:00 AM    MCV 88.0 01/02/2023 06:00 AM    MCH 30.2 01/02/2023 06:00 AM    MCHC 34.3 01/02/2023 06:00 AM    RDW 17.5 01/02/2023 06:00 AM    NRBC 18.4 01/02/2023 06:00 AM    METASPCT 1.8 01/02/2023 06:00 AM    LYMPHOPCT 1.7 01/02/2023 06:00 AM    PROMYELOPCT 0.9 01/01/2023 10:35 AM    MONOPCT 5.3 01/02/2023 06:00 AM    MYELOPCT 3.5 01/02/2023 06:00 AM    BASOPCT 0.3 01/02/2023 06:00 AM    MONOSABS 0.67 01/02/2023 06:00 AM    LYMPHSABS 0.27 01/02/2023 06:00 AM    EOSABS 0.12 01/02/2023 06:00 AM    BASOSABS 0.00 01/02/2023 06:00 AM     CMP:    Lab Results   Component Value Date/Time     01/02/2023 06:00 AM    K 3.9 01/02/2023 06:00 AM    K 4.3 12/31/2022 12:01 AM     01/02/2023 06:00 AM    CO2 22 01/02/2023 06:00 AM BUN 27 01/02/2023 06:00 AM    CREATININE 0.7 01/02/2023 06:00 AM    GFRAA >60 07/22/2022 01:42 PM    LABGLOM >60 01/02/2023 06:00 AM    GLUCOSE 188 01/02/2023 06:00 AM    PROT 4.9 01/02/2023 06:00 AM    LABALBU 2.6 01/02/2023 06:00 AM    LABALBU 4.3 01/24/2011 01:15 PM    CALCIUM 8.0 01/02/2023 06:00 AM    BILITOT 9.4 01/02/2023 06:00 AM    ALKPHOS 125 01/02/2023 06:00 AM     01/02/2023 06:00 AM     01/02/2023 06:00 AM     Magnesium:    Lab Results   Component Value Date/Time    MG 1.8 01/02/2023 06:00 AM     Phosphorus:    Lab Results   Component Value Date/Time    PHOS 2.4 01/02/2023 06:00 AM     Radiology Review:      CT Chest and Abd/Pelvis 12/14/22      No evidence of pulmonary embolism. Bilateral lower lobe pulmonary consolidations and pleural effusions seen. Deviation of the mediastinum to the left is visualized. Mild soft tissue prominence visualized surrounding the esophagus with   narrowing at the gastroesophageal junction but appears to be due to external   compression. Cannot rule left soft tissue mass at this location. Wall thickening visualized in the gastric outlet with mild prominence of the   stomach seen, this could be artifactual.     Distended gallbladder is seen. Degenerative bone changes, postoperative changes and multilevel vertebral   augmentation seen. No evidence of acute abdominal/pelvic pathology. US Chest 12/15/22       Probable complex loculated right pleural effusion. Simple left pleural effusion. BRIEF SUMMARY OF INITIAL CONSULT:    Briefly, Ivett Butt is a 79-year-old female, past medical history significant for hypothyroidism, hiatal hernia and TIA, who presented to the ED on 12/14/2022 from [facility], patient recently underwent a hiatal hernia repair on 12/9/22, developed acute respiratory distress and was transferred to [facility] on 12/14/2022 for further management and treatment.   Initial ED work-up revealed creatinine level of 1.7, patient then underwent two seperate IV contrast studies CTA and creatinine subsequently increased to 2.5, on 12/15/2022 creatinine level increased to 3.5 mg/dL, reason for this consultation. Review of records from [facility] reveals that baseline creatinine appears to be between 0.9 to 1.0 mg/dL, creatinine was 1.0 on 12/13/2022, patient was started on lisinopril during hospitalization, was given multiple doses IV furosemide with poor oral intake and vomiting. Problems resolved:    HAGMA, 2/2 uremia and starvation ketoacidosis  Hemodynamic shock, septic, on antibiotics and vasopressors  Hypocalcemia, 2/2 calcium removal with CRRT, with replacement per protocol  Hypophosphatemia, 2/2 phosphorus removal with CRRT, with replacement per protocol    IMPRESSION/RECOMMENDATIONS:     PAWAN, stage II, nonoliguric, ATN (ischemic +/- contrast induced , intravascular volume depletion due to poor oral intake, loop diuretics and vomiting in the setting of ACE inhibition.) Started on renal replacement therapy on 12/21/2022. Tolerating CVVHD. Vitamin D deficiency, vitamin D 25 level 11, on cholecalciferol   Respiratory alkalosis (pH: 7.464, PCO2: 32.8, HCO3: 23) and metabolic alkalosis (CVVHD)    --------------------------------------------------------  Probably esophageal perforation, status post robotic hiatal hernia repair 12/9/20/2022, for esophageal stent placement today.   Respiratory failure, status post intubation  New onset AF with RVR  Complex right pleural effusion, s/p VATS    S/p hiatal hernia repair 12/9/2022  Hypothyroidism, on levothyroxine  Hospital-acquired pneumonia, + Enterobacter cloacae, on ertapenem  Possibly focal pneumonia, Candida albicans?,  On anidulafungin  Normocytic anemia, multifactorial  Severe hypoalbuminemia  Nutrition, on TPN @50 cc/hour per surgery      Plan:    Continue CVVHD 30 cc/kilo/hour, continue to run even  Continue TPN per surgery  Continue to monitor renal function for recovery   Start SPA 25 gm iv q8 hours         Electronically signed by KAR Payan CNP on 1/2/2023 at 9:24 AM

## 2023-01-02 NOTE — PLAN OF CARE
Problem: Respiratory - Adult  Goal: Achieves optimal ventilation and oxygenation  1/2/2023 0109 by Ilan Emery RCP  Outcome: Progressing

## 2023-01-02 NOTE — CONSULTS
Palliative Care Department  [phone]  Palliative Care Initial Consult  Provider KAR Sharp 118  [phone]  Hospital Day: 20  Date of Initial Consult: 1/2/2023  Referring Provider: Omi Arriaga MD  Palliative Medicine was consulted for assistance with: Goals of care, code status discussion and family support    HPI:   Julian Ramon is a 65 y.o. with a medical history of hiatal hernia s/p repair 12/9, breast cancer s/p mastectomy, TIA, lumbar fusion T12-L2 and anxiety who was admitted on 12/14/2022 from [facility] with a CHIEF COMPLAINT of SOB. Patient went to [facility] for a hernia repair. Patient became short of breath and was transferred to [facility] ED. CTA was done showing bilateral pleural effusions and atelectasis/consolidation. On 12/19 patient was intubated. 12/20 as s/p VATS with minithoracotomy and bronchoscopy for drainage of fluid collection. 12/21 left chest tube was placed and CVVHD was started. 12/30 esophageal stent was placed and EGD with PEG placement. Palliative medicine was consulted for goals of care,'s CODE STATUS discussion and family support. ASSESSMENT/PLAN:     Pertinent Hospital Diagnoses     Hiatal hernia s/p repair  Hx breast cancer s/p mastectomy    Palliative Care Encounter / Counseling Regarding Goals of Care  Please see detailed goals of care discussion as below  At this time, Julian Ramon, Does Not have capacity for medical decision-making.   Capacity is time limited and situation/question specific  During encounter Hansel Dupont was surrogate medical decision-maker  Outcome of goals of care meeting:   Daughter would like to meet at bedside tomorrow to discuss goals of care and CODE STATUS options  Continue current medical management  Code status Limited no chest compressions  Advanced Directives: no POA or living will in [facility]  Surrogate/Legal NOK:  Brea Guevara (child) [phone]    Spiritual assessment: no spiritual distress identified  Bereavement and grief: to be determined  Referrals to: none today  SUBJECTIVE:     Current medical issues leading to Palliative Medicine involvement include   Active Hospital Problems    Diagnosis Date Noted    Hypoxia [R09.02] 12/17/2022     Priority: Medium    Pneumonia of left lower lobe due to infectious organism [J18.9] 12/17/2022     Priority: Medium    Aspiration pneumonia of both lower lobes due to gastric secretions ([address] [J69.0] 12/17/2022     Priority: Medium    Postoperative pneumonia [J95.89, J18.9] 12/14/2022     Priority: Medium       Details of Conversation:    Chart reviewed. Patient seen resting in bed, intubated. Off all sedation and unresponsive. CVVHD running. No family present at bedside. Spoke with daughter, Earnest Rust, on the phone. Role of palliative medicine introduced. Daughter states her mother has no living will or healthcare power of . Earnest Rust states she does have 1 brother who lives in [place]. In-depth discussion regarding current condition to include guarded prognosis and patient not following commands and off all sedation. In-depth discussion regarding goals of care and CODE STATUS options. Earnest Rust states she understands the severity of the situation and would like to meet at bedside tomorrow for further discussion of goals of care and CODE STATUS options. At this time plan is to remain a limited code-no chest compressions and continue all current medical management. Emotional support given and all questions addressed. We will continue to follow for ongoing goals of care discussion as well as support for the patient and family.     OBJECTIVE:   Prognosis: Guarded    Physical Exam:  BP (!) 121/50   Pulse (!) 106   Temp 97.7 °F (36.5 °C) (Esophageal)   Resp 29   Ht 5' 2\" (1.575 m)   Wt 123 lb 6.4 oz (56 kg)   SpO2 100%   BMI 22.57 kg/m²   Constitutional: Intubated, frail  Lungs:  CTA bilaterally, no audible rhonchi or wheezes noted, respirations unlabored, no retractions  Heart:  RRR, distant heart tones, no murmur, rub, or gallop noted during exam  Abd:  Soft, non tender, non distended, bowel sounds present  Neuro: Unresponsive    Objective data reviewed: labs, images, records, medication use, vitals, and chart    Discussed patient and the plan of care with the other IDT members: Palliative Medicine IDT Team, Primary Team, and Family    Time/Communication  Greater than 50% of time spent, total 50 minutes in counseling and coordination of care at the bedside regarding goals of care and diagnosis and prognosis. Thank you for allowing Palliative Medicine to participate in the care of [place] 6.

## 2023-01-02 NOTE — PROGRESS NOTES
Hospitalist Progress Note      PCP: Vick Rodriguez DO    Date of Admission: 12/14/2022        Hospital Course:  65 y.o. female presented with PNEUMONIA. STATES SHE HAD A HH REPAIR AT [facility] ON THE 8TH. SHE STATES SHE WAS NOT FEELING BAD, BUT WAS TOLD SHE NEEDED TO COME TO THE HOSPITAL BC SHE WAS SICK. SHE WAS FOUND TO BE SEPTIC, WITH A HAG, SHE WAS TACHY AND TACHYPNEIC . COFFEE GROUND EMESIS THIS MORNING, GASTROCULT  POS . SURGERY NOTIFIED ** HAD A THROACENTESIS ON YESTERDAY, 105 CC REMOVED. ** RRT  DUE TO RESPIRATORY DISTRESS ** TO TRANSFER TO  OR [facility]** INTUBATED HYPOXIA**  HAD CHEST TUBES INSERTED ON 12.20 ** DEVI FOUND ON CULTURES,  ** APPEARS TO HAVE A FISTULA, IN CHEST CAVITY. ? IF TRANSFER TO [facility] VS **  HAD WITH DR SLAUGHTER,  EGD ESOPHAGOGASTRODUODENOSCOPY, WITH ESOPHAGEAL STENT PLACEMENT EGD ESOPHAGOGASTRODUODENOSCOPY WITH PEG TUBE INSERTION    **  TRANSFERRED TO SICU.   LEVOPHED BEING DECREASED, FOR CT OF HEAD                  Subjective:     INTUBATED         Medications:  Reviewed    Infusion Medications    PN-Adult  3 IN 1 Central Line (Custom)      PN-Adult  3 IN 1 Central Line (Custom) 50 mL/hr at 01/01/23 1729    vasopressin (Septic Shock) infusion Stopped (01/01/23 1432)    sodium chloride      sodium chloride 10 mL/hr at 01/01/23 0600    norepinephrine 2 mcg/min (01/02/23 0745)    prismaSATE BGK 4/0/1.2 2,000 mL/hr at 01/02/23 0735    calcium chloride CRRT infusion 8,000 mg (01/02/23 0213)    dextrose       Scheduled Medications    sodium chloride  1,000 mL IntraVENous Once    insulin glargine  5 Units SubCUTAneous Daily    sodium chloride flush  5-40 mL IntraVENous 2 times per day    phosphorus  500 mg Oral Once    [Held by provider] midodrine  15 mg Oral TID WC    insulin lispro  0-16 Units SubCUTAneous Q4H    sodium chloride flush  5-40 mL IntraVENous 2 times per day    ertapenem Oneta Batters) IVPB  1,000 mg IntraVENous Q24H    [Held by provider] Vitamin D  1,000 Units Oral Daily    [Held by provider] heparin (porcine)  5,000 Units SubCUTAneous Q8H    artificial tears   Both Eyes Q4H    anidulafungin  100 mg IntraVENous Q24H    pantoprazole (PROTONIX) 40 mg injection  40 mg IntraVENous Q12H    sodium chloride flush  5-40 mL IntraVENous 2 times per day    Arformoterol Tartrate  15 mcg Nebulization BID    budesonide  0.5 mg Nebulization BID    albuterol  2.5 mg Nebulization Q4H WA    [Held by provider] metoprolol  5 mg IntraVENous Q6H    [Held by provider] rosuvastatin  10 mg Oral Nightly    [Held by provider] levothyroxine  50 mcg Oral Daily     PRN Meds: fentanNYL, sodium chloride, sodium chloride flush, sodium chloride flush, sodium chloride, potassium chloride, magnesium sulfate, calcium gluconate **OR** calcium gluconate **OR** calcium gluconate **OR** calcium gluconate, sodium phosphate IVPB **OR** sodium phosphate IVPB **OR** sodium phosphate IVPB **OR** sodium phosphate IVPB, glucose, dextrose bolus **OR** dextrose bolus, glucagon (rDNA), dextrose, sodium chloride flush, ondansetron **OR** ondansetron, [DISCONTINUED] acetaminophen **OR** acetaminophen, ipratropium-albuterol      Intake/Output Summary (Last 24 hours) at 1/2/2023 1312  Last data filed at 1/2/2023 1306  Gross per 24 hour   Intake 3921.75 ml   Output 3827 ml   Net 94.75 ml       Exam:    BP (!) 121/50   Pulse (!) 105   Temp 98.8 °F (37.1 °C) (Esophageal)   Resp 30   Ht 5' 2\" (1.575 m)   Wt 123 lb 6.4 oz (56 kg)   SpO2 98%   BMI 22.57 kg/m²          General appearance:  No apparent distress,   HEENT:  Normal cephalic,   Neck: Supple, with full range of motion. Trachea midline. Respiratory:  Normal respiratory effort. DIMINISHED BREATH SOUNDS  NOTED BILATERALLY  Cardiovascular:  RRR  Abdomen: Soft, non-tender, non-distended with normal bowel sounds. Musculoskeletal:  No clubbing, cyanosis or edema bilaterally.     Skin: smooth and dry              Labs:   Recent Labs     01/01/23  1035 01/01/23  1701 01/02/23  0112 01/02/23  0600   WBC 23.9*  --  13.7* 13.4*   HGB 5.9* 11.0* 11.0* 11.1*   HCT 18.0* 32.6* 32.3* 32.4*   PLT 75*  --  67* 58*     Recent Labs     01/01/23  1701 01/02/23  0005 01/02/23  0600    136 136   K 4.0 3.8 3.9    105 103   CO2 20* 19* 22   BUN 23 29* 27*   CREATININE 0.7 0.9 0.7   CALCIUM 8.5* 8.2* 8.0*   PHOS 2.0* 1.5* 2.4*     Recent Labs     01/01/23  1701 01/02/23  0005 01/02/23  0600   * 430* 333*   * 253* 244*   BILITOT 6.8* 8.0* 9.4*   ALKPHOS 134* 126* 125*     Recent Labs     01/01/23  1145   INR 2.2     No results for input(s): CKTOTAL, TROPONINI in the last 72 hours.   Recent Labs     01/01/23  1701 01/02/23  0005 01/02/23  0600   * 430* 333*   * 253* 244*   BILITOT 6.8* 8.0* 9.4*   ALKPHOS 134* 126* 125*     Recent Labs     01/01/23  1701 01/02/23  0005 01/02/23  0600   LACTA 2.4* 3.0* 1.6     No results found for: Nazanin Char  Lab Results   Component Value Date    AMMONIA 21.0 12/28/2022       Assessment:    Active Hospital Problems    Diagnosis Date Noted    Esophageal fistula [K22.89] 01/02/2023     Priority: Medium    Palliative care encounter [Z51.5] 01/02/2023     Priority: Medium    Goals of care, counseling/discussion [Z71.89] 01/02/2023     Priority: Medium    Hypoxia [R09.02] 12/17/2022     Priority: Medium    Pneumonia of left lower lobe due to infectious organism [J18.9] 12/17/2022     Priority: Medium    Aspiration pneumonia of both lower lobes due to gastric secretions ([address] [J69.0] 12/17/2022     Priority: Medium    Postoperative pneumonia [J95.89, J18.9] 12/14/2022     Priority: Medium   FISTULA CHEST CAVITY  ACUTE RESP FAILURE WITH HYPOXIA  GASTROCULT POS  S/P HH REPAIR  PAWAN BASELINE CREATINE 1.0)  H.O BREAST CANCER   HLD   PLEURAL EFFUSIONS BILATERALLY   S/P CHEST TUBES  R loculated pleural effusion s/p IR chest tube 12/17/22 with minimal drainage, s/p R VATS turned partial open thoracotomy 12/20/22 with insertion of chest tubes x 3  EGD ESOPHAGOGASTRODUODENOSCOPY, WITH ESOPHAGEAL STENT PLACEMENT  EGD ESOPHAGOGASTRODUODENOSCOPY WITH PEG TUBE INSERTION     PLAN:  anidulafungin 200    FLUIDS  MONITOR BMP            DVT Prophylaxis: SCD  Diet: Diet NPO Exceptions are: Ice Chips  PN-Adult  3 IN 1 Central Line (Custom)  Diet NPO Exceptions are: Sips of Water with Meds  PN-Adult  3 IN 1 Central Line (Custom)  Code Status: Full Code     PT/OT Eval Status:   WHEN STABLE     Dispo -  TO [facility] OR       Electronically signed by Ruthann Velasquez DO on 1/2/2023 at 1:12 PM [facility]

## 2023-01-02 NOTE — PROGRESS NOTES
[facility]  SURGICAL INTENSIVE CARE UNIT    CRITICAL CARE ATTENDING PROGRESS NOTE    I have examined the patient, reviewed the record, and discussed the case with the APN/  Resident. I have reviewed all relevant labs and imaging data. Please refer to the  APN/ resident's note. I agree with the  assessment and plan with the following corrections/ additions. The following summarizes my clinical findings and independent assessment. CC: Transfer from medical ICU on January 1 to surgical ICU    HOSPITAL COURSE:  12/9 robotic hiatal hernia hernia at [facility]  12/14 admitted for pneumonia  12/19 intubated  12/20 status post VATS and minithoracotomy and bronchoscopy for drainage of fluid collection  12/21 left chest tube placement  12/21 CVVHD  12/30 esophageal stent placement and EGD with PEJ tube    EXAM:  Does not follow commands, appears weak and frail  Intubated  Of all sedation  Lungs clear  Heart regular rate rhythm  Abdomen soft nontender nondistended, PEG tube site clean dry intact  Bilateral right chest tubes present-to waterseal      ASSESSMENT:  Principal Problem:    Postoperative pneumonia  Active Problems:    Hypoxia    Pneumonia of left lower lobe due to infectious organism    Aspiration pneumonia of both lower lobes due to gastric secretions ([address]    Esophageal fistula  Resolved Problems:    * No resolved hospital problems. *       PLAN:  Sedation/ Pain: Off all sedation  Metabolic encephalopathy-patient does not follow commands. She has multiple causes for altered mental status. Will check CT scan of the head    CV: Remains in distributive shock-currently on low-dose Levophed at 2 mics per minute. We will wean Levophed as able. Check cortisol level    Pulmonary: Acute respiratory failure-start pressure support wean today    GI: Esophageal fistula-status post esophageal stent on December 30 by Dr. Daphney Valente.   Start trickle tube feeds through J-tube-some elemental  Status post right VATS  Continue chest tubes to waterseal    FEN: Acute renal failure-continue CVVHD    ID: Continue andiulafungin and ertapenem for Enterobacter cloaca HAP as well as fungemia the chest    Heme:  -continue monitor CBC  Hold heparin secondary thrombocytopenia-thrombocytopenia likely secondary to bleeding versus sepsis    Endo: Monitor Blood Sugars. Target blood glucose less than 180 in the ICU. DVT prophylaxis--SCDS, heparin on hold  GI Prophylaxis--Protonix  Lines--left IJ triple-lumen catheter December 31, ET tube December 19, right IJ hemodialysis line December 31  CODE: FULL  We will consult palliative medicine     DISPOSITION-Continue ICU Care    Critical care time exclusive of teaching and procedures = 40 min     I provided critical care to a patient with multisystem organ failure-pneumonia, renal failure, esophageal fistula requiring frequent and emergent imaging, lab studies, intensive monitoring, data review, and adjusting the clinical plan as well as urgent coordination with multiple specialists. Pt needs continuous ICU monitoring because the patient is at risk for deterioration from a hemodynamic standpoint. I have discussed the case with Dr. Argentina Ortez operating surgeon of the case    Tammy Pretty MD, [facility]  1/2/2023  9:23 AM    NOTE: This report was transcribed using voice recognition software. Every effort was made to ensure accuracy; however, inadvertent computerized transcription errors may be present.

## 2023-01-03 NOTE — PROGRESS NOTES
All consults notified pt  and TOD. Released by Jozef Wright. [id number]  home notified and will  body.

## 2023-01-03 NOTE — DEATH NOTES
Death Pronouncement Note  Patient's Name: Iwona Bourgeois   Patient's YOB: 1945  MRN Number: [id number]    Admitting Provider: Geo Kearns DO  Attending Provider: Geo Kearns DO    Patient was examined and the following were absent: Pulses, Blood Pressure, and Respiratory effort    I declared the patient dead on 1/3/2023 at 12:45 PM    Preliminary Cause of Death: Esophageal perforation     Electronically signed by Ann Quigley DO on 1/3/23 at 1:05 PM EST

## 2023-01-03 NOTE — PROGRESS NOTES
Pat Tang M.D.,[facility]  Maria Eugenia Marie D.O., F.A.C.O.I., Olinda Ribeiro M.D. Laurita Hill M.D. Tiny Farmer D.O. Daily Pulmonary Progress Note    Patient:  Martha Hunter 65 y.o. female MRN: [id number]     Date of Service: 1/3/2023      Synopsis     We are following patient for acute respiratory failure with hypoxia, pneumonia, bilateral pleural effusions    \"CC\" SOB    Code status: Limited-no compressions      Subjective      Patient was seen in [address] [place] [address]. Family members present. Bedside nurse was in the process of removing CVVHD machine. Family has decided to do terminal extubation. Review of Systems:  Unable to obtain-patient intubated     24-hour events:  Pressors increased overnight    Objective   Vitals: BP (!) 121/50   Pulse 99   Temp 97.7 °F (36.5 °C) (Esophageal)   Resp (!) 32   Ht 5' 2\" (1.575 m)   Wt 130 lb (59 kg)   SpO2 100%   BMI 23.78 kg/m²     I/O:    Intake/Output Summary (Last 24 hours) at 1/3/2023 1053  Last data filed at 1/3/2023 0900  Gross per 24 hour   Intake 4773 ml   Output 4937 ml   Net -164 ml         Vent Information  Ventilator ID: [id number]  Equipment Changed: (S) Humidification  Vent Mode: AC/VC                CURRENT MEDS :  Scheduled Meds:        Physical Exam:  General Appearance: Patient is intubated, appears ill  HEENT: ETT in place, OG tube  Neck: Lips, mucosa, and tongue normal.  Supple, symmetrical, trachea midline, no adenopathy;thyroid:  no enlargement/tenderness/nodules or JVD. Lung: R chest tube remains, lungs clear to auscultation  Heart: Tachycardic  Abdomen: Soft, NT, ND. BS present x 4 quadrants. No bruit or organomegaly. Incisions intat  Extremities: no edema   Musculokeletal: No joint swelling, no muscle tenderness. ROM normal in all joints of extremities.    Neurologic: Mental status: Intubated, grimaces to pain    Pertinent/ New Labs and Imaging Studies     Imaging Personally Reviewed:  CXR  FINDINGS:   Right basilar infiltrate, effusion and chest tubes are not significantly   changed. Left lung is clear. Support lines are appropriate. There is an   esophageal stent. Impression   No interval change         Noncontrast CT chest 12/17/2022:  Impression   Large right pleural effusion of mixed density to suggest complex effusion. There is a pigtail catheter seen in the pleural space posteriorly and   inferiorly. Small to moderate size left pleural effusion with consolidative   infiltrate throughout the left upper and lower lung field and aerated   portions revealing nodular ground-glass opacity to suggest an infectious   process. Nodular ground-glass opacity inferiorly within the aerated right   upper and lower lung field concerning for an infectious process as well. There is less consolidative infiltrate identified within the right lower lobe. ECHO 12/19/2022:   Summary   Technically difficult study - limited visualization. Micro-bubble contrast injected to enhance left ventricular visualization. Normal left ventricular size and systolic function. Ejection fraction is visually estimated at 55%. Indeterminate diastolic function. No regional wall motion abnormalities seen. Mild left ventricular concentric hypertrophy noted. Moderately dilated right ventricle with reduced function. Moderate tricuspid regurgitation.       Labs:  Lab Results   Component Value Date/Time    WBC 10.6 01/03/2023 06:00 AM    HGB 10.4 01/03/2023 06:00 AM    HCT 30.4 01/03/2023 06:00 AM    HCT 29.0 12/20/2022 01:31 PM    MCV 86.4 01/03/2023 06:00 AM    MCH 29.5 01/03/2023 06:00 AM    MCHC 34.2 01/03/2023 06:00 AM    RDW 19.3 01/03/2023 06:00 AM    PLT 39 01/03/2023 06:00 AM    MPV NOT CALC 01/03/2023 06:00 AM     Lab Results   Component Value Date/Time     01/03/2023 06:00 AM    K 4.0 01/03/2023 06:00 AM    K 4.3 12/31/2022 12:01 AM     01/03/2023 06:00 AM    CO2 22 01/03/2023 06:00 AM    BUN 29 01/03/2023 06:00 AM    CREATININE 0.6 01/03/2023 06:00 AM    LABALBU 2.4 01/02/2023 05:21 PM    LABALBU 4.3 01/24/2011 01:15 PM    CALCIUM 8.4 01/03/2023 06:00 AM    GFRAA >60 07/22/2022 01:42 PM    LABGLOM >60 01/03/2023 06:00 AM       Respiratory cultures    Oral Pharyngeal Hilda absent Abnormal     Smear, Respiratory Polymorphonuclear leukocytes not seen   Epithelial cells not seen   Moderate yeast   Moderate gram positive rods Diphtheroid like   Few Gram negative rods    Organism Enterobacter cloacae complex Abnormal     CULTURE, RESPIRATORY Moderate growth    Organism Candida albicans Abnormal     CULTURE, RESPIRATORY Heavy growth         Latest Reference Range & Units Most Recent   (1,3)-Beta-D-Glucan (Fungitell) Interpretation Negative  Positive !  12/19/22 04:02   !: Data is abnormal      ABGs 12/29/2022:     Latest Reference Range & Units 12/29/22 04:36   Source:  Blood Arterial   pH, Blood Gas 7.350 - 7.450  7.453 (H)   PCO2 35.0 - 45.0 mmHg 34.1 (L)   pO2 75.0 - 100.0 mmHg 127.2 (H)   HCO3 22.0 - 26.0 mmol/L 23.3   Base Excess -3.0 - 3.0 mmol/L -0.3   O2 Sat 92.0 - 98.5 % 98.2   THB,THB 11.5 - 16.5 g/dL 9.8 (L)   O2Hb 94.0 - 97.0 % 97.6 (H)   Carboxy-Hgb 0.0 - 1.5 % 0.4   METHB,METHB 0.0 - 1.5 % 0.2   HHb 0.0 - 5.0 % 1.8   AaDO2 mmHg 108.8   RI(T)  0.86   FIO2 % 40.0   PO2/FIO2 mmHg/% 3.18   (H): Data is abnormally high  (L): Data is abnormally low     Assessment:    Acute hypoxic respiratory failure - requiring intubation 12/19/22  Acute Respiratory Distress Syndrome  R loculated pleural effusion s/p IR chest tube 12/17/22 with minimal drainage, s/p R VATS turned partial open thoracotomy 12/20/22 with insertion of chest tubes x 3  S/p bronchoscopy-friable mucosa 12/20/22  Pneumonia, Hospital acquired-Enterobacter cloacae-Invanz started 12/19/22  Massive hiatal hernia s/p repair 12/9 at [facility]  Esophageal perforation secondary to hiatal hernia repair s/p EGD with stent  Acute kidney injury requiring CVVHD  L chest tube placement 12/21/22  Positive fungitell-eraxis started 12/21/22      Plan:   Family present at the bedside stating they are moving forward with terminal extubation. Pulmonary will sign off, please call if needed. Electronically signed by KAR Rios CNP on 1/3/2023 at 10:56 PM  KAR Rios CNP     I personally saw, examined, and cared for the patient. Labs, medications, radiographs reviewed.  I agree with history exam and plans detailed in NP note with the following additions:    Family has decided on withdrawal of life support measures    Bere Hull MD

## 2023-01-03 NOTE — PROGRESS NOTES
Nutrition Note    Pt's family has decided on compassionate extubation. Comfort measures. Dietitian available per Consult.       Electronically signed by Marian Mallory RD, CNSC, LD on 1/3/23 at 11:06 AM EST    Contact:

## 2023-01-03 NOTE — PLAN OF CARE
Problem: Respiratory - Adult  Goal: Achieves optimal ventilation and oxygenation  1/3/2023 0041 by Marely Lofton RCP  Outcome: Progressing

## 2023-01-03 NOTE — PLAN OF CARE
Problem: Discharge Planning  Goal: Discharge to home or other facility with appropriate resources  Outcome: Not Progressing     Problem: Respiratory - Adult  Goal: Achieves optimal ventilation and oxygenation  1/3/2023 0801 by Molly Mcneal RN  Outcome: Not Progressing     Problem: Cardiovascular - Adult  Goal: Maintains optimal cardiac output and hemodynamic stability  1/3/2023 0801 by Molly Mcneal RN  Outcome: Not Progressing    Problem: Hematologic - Adult  Goal: Maintains hematologic stability  Outcome: Progressing     Problem: Musculoskeletal - Adult  Goal: Return mobility to safest level of function  Outcome: Not Progressing

## 2023-01-03 NOTE — PROGRESS NOTES
[facility]  SURGICAL INTENSIVE CARE UNIT    CRITICAL CARE ATTENDING PROGRESS NOTE    I have examined the patient, reviewed the record, and discussed the case with the APN/  Resident. I have reviewed all relevant labs and imaging data. Please refer to the  APN/ resident's note. I agree with the  assessment and plan with the following corrections/ additions. The following summarizes my clinical findings and independent assessment. CC: Transfer from medical ICU on January 1 to surgical ICU    HOSPITAL COURSE:  12/9 robotic hiatal hernia hernia at [address] [place] [address] Way  12/14 admitted for pneumonia  12/19 intubated  12/20 status post VATS and minithoracotomy and bronchoscopy for drainage of fluid collection  12/21 left chest tube placement  12/21 CVVHD  12/30 esophageal stent placement and EGD with PEJ tube  1/3 patient's sister, daughter and son-in-law present at bedside. Overnight pressor requirements increased  EXAM:  Does not follow commands, appears weak and frail  Intubated  Of all sedation  Lungs clear  Heart regular rate rhythm  Abdomen soft nontender nondistended, PEG tube site clean dry intact  Bilateral right chest tubes present-to waterseal      ASSESSMENT:  Principal Problem:    Postoperative pneumonia  Active Problems:    Hypoxia    Pneumonia of left lower lobe due to infectious organism    Aspiration pneumonia of both lower lobes due to gastric secretions [facility])    Esophageal fistula    Palliative care encounter    Goals of care, counseling/discussion  Resolved Problems:    * No resolved hospital problems. *       PLAN:  Sedation/ Pain: Off all sedation  Metabolic encephalopathy-patient does not follow commands. She has multiple causes for altered mental status.   CT head negative for bleed    CV: Remains in distributive shock-currently on Levophed drip    Pulmonary: Acute respiratory failure-continue full vent support    GI: Esophageal fistula-status post esophageal stent on December 30 by Dr. Andre Dsouza. Start trickle tube feeds through J-tube-some elemental  Status post right VATS  Continue chest tubes to waterseal    FEN: Acute renal failure-currently on CVVHD    ID: Continue andiulafungin and ertapenem for Enterobacter cloaca HAP as well as fungemia the chest    Heme:  -continue monitor CBC  Hold heparin secondary thrombocytopenia-thrombocytopenia likely secondary to bleeding versus sepsis    Endo: Monitor Blood Sugars. Target blood glucose less than 180 in the ICU. DVT prophylaxis--SCDS, heparin on hold  GI Prophylaxis--Protonix  Lines--left IJ triple-lumen catheter December 31, ET tube December 19, right IJ hemodialysis line December 31  CODE: Limited code-appreciate palliative medicine input     DISPOSITION-Continue ICU Care    Critical care time exclusive of teaching and procedures = 35min     I provided critical care to a patient with multisystem organ failure-pneumonia, renal failure, esophageal fistula requiring frequent and emergent imaging, lab studies, intensive monitoring, data review, and adjusting the clinical plan as well as urgent coordination with multiple specialists. I had a discussion with the patient's sister, daughter and son-in-law at bedside. We discussed CODE STATUS. We discussed goals of care. Patient's family states that the patient would never want to be on a ventilator, have a tracheostomy, be on dialysis. They were hoping she would recover. At this point they would like everything to be stopped. The family stated that the patient would never want all of these tubes and lines. They would not want her to be sent to a facility we will proceed with a  coming up to the room. Afterwards we will proceed with compassionate extubation. CODE STATUS changed to DNR CC        Smaeer Jones MD, FACS  1/3/2023  9:58 AM    NOTE: This report was transcribed using voice recognition software.  Every effort was made to ensure accuracy; however, inadvertent computerized transcription errors may be present.

## 2023-01-03 NOTE — PLAN OF CARE
Problem: Respiratory - Adult  Goal: Achieves optimal ventilation and oxygenation  1/2/2023 2034 by Blaise Dutton RCP  Outcome: Progressing

## 2023-01-03 NOTE — PROGRESS NOTES
GENERAL SURGERY  DAILY PROGRESS NOTE  1/3/2023    CHIEF COMPLAINT:  Chief Complaint   Patient presents with    Shortness of Breath     Sent from [facility] for SOB patient went for hernia repair patient on 6L NC on arrival       SUBJECTIVE:  Remains intubated and sedated    OBJECTIVE:  BP (!) 121/50   Pulse 96   Temp 98.4 °F (36.9 °C) (Esophageal)   Resp 26   Ht 5' 2\" (1.575 m)   Wt 130 lb 4.8 oz (59.1 kg)   SpO2 100%   BMI 23.83 kg/m²     GENERAL:  Critically ill. HEENT: remains intubated on vent,   LUNGS:  mechanically ventillated. Chest tubes in place, mediastinal drain with brown output this am.   CARDIOVASCULAR: HDS, RR  SKIN: warm and dry  ABDOMEN:  Soft, non-distended, non tender. No guarding, rigidity, rebound. PEGJ in place. EXT: ROM intact all 4 extremities, no obvious deformities    ASSESSMENT/PLAN:  65 y.o. female with respiratory failure and complex right-sided effusion s/p robotic hiatal hernia repair 12/9 without signs of esophageal perforation or leak on initial studies then evidence of esophageal leak on delayed evaluation 12/27  VATS & Mini-thoractomy & bronchoscopy with open drainage of fluid collection 12/20, left CT placement 12/21 with ARDS & renal failure s/p chemical paralysis, flolan, CVVHD 12/21  Now s/p EGD with esophageal stent and PEG-J 12/30.     Plan  -continue TPN  -continue CVVHD per nephro   -GOC and code discussion today with palliative appreciate input  -ID consulted appreciate input  -appreciate critical care     Will DW Dr. Vinay Zelaya MD  Surgery Resident PGY-2  1/3/2023  4:31 AM

## 2023-01-03 NOTE — PROGRESS NOTES
Department of Internal Medicine  Nephrology Progress Note      Events reviewed. SUBJECTIVE: We are following Fei Boys for PAWAN.       PHYSICAL EXAM:      Vitals:    VITALS:  BP (!) 121/50   Pulse 99   Temp 97.7 °F (36.5 °C) (Esophageal)   Resp (!) 32   Ht 5' 2\" (1.575 m)   Wt 130 lb (59 kg)   SpO2 100%   BMI 23.78 kg/m²   24HR INTAKE/OUTPUT:    Intake/Output Summary (Last 24 hours) at 1/3/2023 1009  Last data filed at 1/3/2023 0900  Gross per 24 hour   Intake 4773 ml   Output 4937 ml   Net -164 ml         Constitutional: Patient intubated, sedated  HEENT: Normocephalic, atraumatic, PERRLA, endotracheal tube in place  Respiratory: Diminished right lung base  Cardiovascular/Edema: RRR, normal S1, normal S2, no edema  Gastrointestinal: Soft, not distended, nontender  Neurologic: Patient is sedated  Skin: Pale, dry, no lesions, no rash, + edema     Scheduled Meds:      Continuous Infusions:      PRN Meds:.morphine, midazolam, glycopyrrolate    DATA:    CBC with Differential:    Lab Results   Component Value Date/Time    WBC 10.6 01/03/2023 06:00 AM    RBC 3.52 01/03/2023 06:00 AM    HGB 10.4 01/03/2023 06:00 AM    HCT 30.4 01/03/2023 06:00 AM    HCT 29.0 12/20/2022 01:31 PM    PLT 39 01/03/2023 06:00 AM    MCV 86.4 01/03/2023 06:00 AM    MCH 29.5 01/03/2023 06:00 AM    MCHC 34.2 01/03/2023 06:00 AM    RDW 19.3 01/03/2023 06:00 AM    NRBC 12.3 01/03/2023 06:00 AM    METASPCT 2.6 01/03/2023 06:00 AM    LYMPHOPCT 4.4 01/03/2023 06:00 AM    PROMYELOPCT 0.9 01/01/2023 10:35 AM    MONOPCT 8.8 01/03/2023 06:00 AM    MYELOPCT 3.5 01/02/2023 06:00 AM    BASOPCT 1.1 01/03/2023 06:00 AM    MONOSABS 0.95 01/03/2023 06:00 AM    LYMPHSABS 0.42 01/03/2023 06:00 AM    EOSABS 0.10 01/03/2023 06:00 AM    BASOSABS 0.00 01/03/2023 06:00 AM     CMP:    Lab Results   Component Value Date/Time     01/03/2023 06:00 AM    K 4.0 01/03/2023 06:00 AM    K 4.3 12/31/2022 12:01 AM     01/03/2023 06:00 AM    CO2 22 01/03/2023 06:00 AM    BUN 29 01/03/2023 06:00 AM    CREATININE 0.6 01/03/2023 06:00 AM    GFRAA >60 07/22/2022 01:42 PM    LABGLOM >60 01/03/2023 06:00 AM    GLUCOSE 238 01/03/2023 06:00 AM    PROT 4.9 01/02/2023 05:21 PM    LABALBU 2.4 01/02/2023 05:21 PM    LABALBU 4.3 01/24/2011 01:15 PM    CALCIUM 8.4 01/03/2023 06:00 AM    BILITOT 10.9 01/02/2023 05:21 PM    ALKPHOS 130 01/02/2023 05:21 PM     01/02/2023 05:21 PM     01/02/2023 05:21 PM     Magnesium:    Lab Results   Component Value Date/Time    MG 2.0 01/03/2023 06:00 AM     Phosphorus:    Lab Results   Component Value Date/Time    PHOS 2.1 01/03/2023 06:00 AM     Radiology Review:      CT Chest and Abd/Pelvis 12/14/22      No evidence of pulmonary embolism. Bilateral lower lobe pulmonary consolidations and pleural effusions seen. Deviation of the mediastinum to the left is visualized. Mild soft tissue prominence visualized surrounding the esophagus with   narrowing at the gastroesophageal junction but appears to be due to external   compression. Cannot rule left soft tissue mass at this location. Wall thickening visualized in the gastric outlet with mild prominence of the   stomach seen, this could be artifactual.     Distended gallbladder is seen. Degenerative bone changes, postoperative changes and multilevel vertebral   augmentation seen. No evidence of acute abdominal/pelvic pathology. US Chest 12/15/22       Probable complex loculated right pleural effusion. Simple left pleural effusion. BRIEF SUMMARY OF INITIAL CONSULT:    Briefly, Esteban Reynolds is a 69-year-old female, past medical history significant for hypothyroidism, hiatal hernia and TIA, who presented to the ED on 12/14/2022 from [facility], patient recently underwent a hiatal hernia repair on 12/9/22, developed acute respiratory distress and was transferred to [facility] on 12/14/2022 for further management and treatment.   Initial ED work-up revealed creatinine level of 1.7, patient then underwent two seperate IV contrast studies CTA and creatinine subsequently increased to 2.5, on 12/15/2022 creatinine level increased to 3.5 mg/dL, reason for this consultation. Review of records from [facility] reveals that baseline creatinine appears to be between 0.9 to 1.0 mg/dL, creatinine was 1.0 on 12/13/2022, patient was started on lisinopril during hospitalization, was given multiple doses IV furosemide with poor oral intake and vomiting. Problems resolved:    HAGMA, 2/2 uremia and starvation ketoacidosis  Hemodynamic shock, septic, on antibiotics and vasopressors  Hypocalcemia, 2/2 calcium removal with CRRT, with replacement per protocol  Hypophosphatemia, 2/2 phosphorus removal with CRRT, with replacement per protocol    IMPRESSION/RECOMMENDATIONS:     PAWAN, stage II, nonoliguric, ATN (ischemic +/- contrast induced , intravascular volume depletion due to poor oral intake, loop diuretics and vomiting in the setting of ACE inhibition.) Started on renal replacement therapy on 12/21/2022. Tolerating CVVHD. Vitamin D deficiency, vitamin D 25 level 11, on cholecalciferol   Respiratory alkalosis (pH: 7.464, PCO2: 32.8, HCO3: 23) and metabolic alkalosis (CVVHD)    --------------------------------------------------------  Probably esophageal perforation, status post robotic hiatal hernia repair 12/9/20/2022, for esophageal stent placement today.   Respiratory failure, status post intubation  New onset AF with RVR  Complex right pleural effusion, s/p VATS    S/p hiatal hernia repair 12/9/2022  Hypothyroidism, on levothyroxine  Hospital-acquired pneumonia, + Enterobacter cloacae, on ertapenem  Possibly focal pneumonia, Candida albicans?,  On anidulafungin  Normocytic anemia, multifactorial  Severe hypoalbuminemia  Nutrition, on TPN @50 cc/hour per surgery      Plan:    Continue CVVHD 30 cc/kilo/hour, continue to run even  Continue TPN per surgery  Continue to monitor renal function for recovery   Continue SPA 25 gm iv q8 hours     Discussed with family    Electronically signed by KAR Johnson CNP on 1/3/2023 at 10:09 AM

## 2023-01-03 NOTE — PLAN OF CARE
Problem: Safety - Adult  Goal: Free from fall injury  Outcome: Progressing     Problem: Pain  Goal: Verbalizes/displays adequate comfort level or baseline comfort level  Outcome: Progressing     Problem: Pain  Goal: Verbalizes/displays adequate comfort level or baseline comfort level  Outcome: Progressing     Problem: Nutrition Deficit:  Goal: Optimize nutritional status  Outcome: Progressing     Problem: Skin/Tissue Integrity  Goal: Absence of new skin breakdown  Description: 1. Monitor for areas of redness and/or skin breakdown  2. Assess vascular access sites hourly  3. Every 4-6 hours minimum:  Change oxygen saturation probe site  4. Every 4-6 hours:  If on nasal continuous positive airway pressure, respiratory therapy assess nares and determine need for appliance change or resting period.   Outcome: Progressing

## 2023-01-03 NOTE — PROGRESS NOTES
Hospitalist Progress Note      PCP: Yamini Ware DO    Date of Admission: 12/14/2022        Hospital Course:  65 y.o. female presented with PNEUMONIA. STATES SHE HAD A HH REPAIR AT [facility] ON THE 8TH. SHE STATES SHE WAS NOT FEELING BAD, BUT WAS TOLD SHE NEEDED TO COME TO THE HOSPITAL BC SHE WAS SICK. SHE WAS FOUND TO BE SEPTIC, WITH A HAG, SHE WAS TACHY AND TACHYPNEIC . COFFEE GROUND EMESIS THIS MORNING, GASTROCULT  POS . SURGERY NOTIFIED ** HAD A THROACENTESIS ON YESTERDAY, 105 CC REMOVED. ** RRT  DUE TO RESPIRATORY DISTRESS ** TO TRANSFER TO  OR [facility]** INTUBATED HYPOXIA**  HAD CHEST TUBES INSERTED ON 12.20 ** DEVI FOUND ON CULTURES,  ** APPEARS TO HAVE A FISTULA, IN CHEST CAVITY. ? IF TRANSFER TO [facility] VS **  HAD WITH DR SLAUGHTER,  EGD ESOPHAGOGASTRODUODENOSCOPY, WITH ESOPHAGEAL STENT PLACEMENT EGD ESOPHAGOGASTRODUODENOSCOPY WITH PEG TUBE INSERTION    **  TRANSFERRED TO SICU.   FAMILY DECIDED TO TERMINALLY EXTUBATED         Subjective:  FAMILY AT BEDSIDE           Medications:  Reviewed    Infusion Medications   Scheduled Medications   PRN Meds: morphine, midazolam, glycopyrrolate      Intake/Output Summary (Last 24 hours) at 1/3/2023 1533  Last data filed at 1/3/2023 0900  Gross per 24 hour   Intake 3902 ml   Output 3894 ml   Net 8 ml       Exam:    BP (!) 121/50   Pulse (!) 0   Temp 97.5 °F (36.4 °C) (Esophageal)   Resp (!) 0   Ht 5' 2\" (1.575 m)   Wt 130 lb (59 kg)   SpO2 (!) 67%   BMI 23.78 kg/m²                   Labs:   Recent Labs     01/02/23  0112 01/02/23  0600 01/03/23  0600   WBC 13.7* 13.4* 10.6   HGB 11.0* 11.1* 10.4*   HCT 32.3* 32.4* 30.4*   PLT 67* 58* 39*     Recent Labs     01/02/23  1721 01/02/23  2330 01/03/23  0600    133 137   K 3.8 3.6 4.0    102 103   CO2 22 20* 22   BUN 30* 28* 29*   CREATININE 0.7 0.6 0.6   CALCIUM 8.0* 8.2* 8.4*   PHOS 2.2* 2.2* 2.1*     Recent Labs     01/02/23  0600 01/02/23  1217 01/02/23  1721   * 227* 187*   ALT 244* 223* 201*   BILITOT 9.4* 10.5* 10.9*   ALKPHOS 125* 128* 130*     Recent Labs     01/01/23  1145   INR 2.2     No results for input(s): Castro Huizar in the last 72 hours.   Recent Labs     01/02/23  0600 01/02/23  1217 01/02/23  1721   * 227* 187*   * 223* 201*   BILITOT 9.4* 10.5* 10.9*   ALKPHOS 125* 128* 130*     Recent Labs     01/02/23  1721 01/02/23  2330 01/03/23  0600   LACTA 2.1 2.3* 2.7*     No results found for: Harley Olga Lidias  Lab Results   Component Value Date    AMMONIA 21.0 12/28/2022       Assessment:    Active Hospital Problems    Diagnosis Date Noted    Esophageal fistula [K22.89] 01/02/2023     Priority: Medium    Palliative care encounter [Z51.5] 01/02/2023     Priority: Medium    Goals of care, counseling/discussion [Z71.89] 01/02/2023     Priority: Medium    Hypoxia [R09.02] 12/17/2022     Priority: Medium    Pneumonia of left lower lobe due to infectious organism [J18.9] 12/17/2022     Priority: Medium    Aspiration pneumonia of both lower lobes due to gastric secretions ([address] [J69.0] 12/17/2022     Priority: Medium    Postoperative pneumonia [J95.89, J18.9] 12/14/2022     Priority: Medium   FISTULA CHEST CAVITY  ACUTE RESP FAILURE WITH HYPOXIA  GASTROCULT POS  S/P HH REPAIR  PAWAN BASELINE CREATINE 1.0)  H.O BREAST CANCER   HLD   PLEURAL EFFUSIONS BILATERALLY   S/P CHEST TUBES  R loculated pleural effusion s/p IR chest tube 12/17/22 with minimal drainage, s/p R VATS turned partial open thoracotomy 12/20/22 with insertion of chest tubes x 3  EGD ESOPHAGOGASTRODUODENOSCOPY, WITH ESOPHAGEAL STENT PLACEMENT  EGD ESOPHAGOGASTRODUODENOSCOPY WITH PEG TUBE INSERTION         Plan:  TERMINALLY EXTUBATED    Electronically signed by Anup Pennington DO on 1/3/2023 at 3:33 PM Carolin Broussard

## 2023-01-05 LAB
BLOOD BANK DISPENSE STATUS: NORMAL
BLOOD BANK DISPENSE STATUS: NORMAL
BLOOD BANK PRODUCT CODE: NORMAL
BLOOD BANK PRODUCT CODE: NORMAL
BPU ID: NORMAL
BPU ID: NORMAL
DESCRIPTION BLOOD BANK: NORMAL
DESCRIPTION BLOOD BANK: NORMAL
FUNGUS (MYCOLOGY) CULTURE: ABNORMAL
FUNGUS STAIN: ABNORMAL
ORGANISM: ABNORMAL

## 2023-01-05 NOTE — DISCHARGE SUMMARY
Hospitalist Discharge Summary    Patient ID: Tea Kirkpatrick   Patient : 1945  Patient's PCP: Eladio Garrison DO    Admit Date: 2022   Admitting Physician: Mardy Angelucci, DO    Discharge Date:  2023   Discharge Physician: Mardy Angelucci, DO   Discharge Condition: exppired  Discharge Disposition:       Discharge Diagnoses: Active Hospital Problems    Diagnosis Date Noted    Esophageal fistula [K22.89] 2023     Priority: Medium    Palliative care encounter [Z51.5] 2023     Priority: Medium    Goals of care, counseling/discussion [Z71.89] 2023     Priority: Medium    Hypoxia [R09.02] 2022     Priority: Medium    Pneumonia of left lower lobe due to infectious organism [J18.9] 2022     Priority: Medium    Aspiration pneumonia of both lower lobes due to gastric secretions ([address] [J69.0] 2022     Priority: Medium    Postoperative pneumonia [J95.89, J18.9] 2022     Priority: Medium   FISTULA CHEST CAVITY  ACUTE RESP FAILURE WITH HYPOXIA  GASTROCULT POS  S/P HH REPAIR  PAWAN BASELINE CREATINE 1.0)  H.O BREAST CANCER   HLD   PLEURAL EFFUSIONS BILATERALLY   S/P CHEST TUBES  R loculated pleural effusion s/p IR chest tube 22 with minimal drainage, s/p R VATS turned partial open thoracotomy 22 with insertion of chest tubes x 3  EGD ESOPHAGOGASTRODUODENOSCOPY, WITH ESOPHAGEAL STENT PLACEMENT  EGD ESOPHAGOGASTRODUODENOSCOPY WITH PEG [facility] course in brief:  65 y.o. female presented with PNEUMONIA. STATES SHE HAD A HH REPAIR AT [facility] ON THE . SHE STATES SHE WAS NOT FEELING BAD, BUT WAS TOLD SHE NEEDED TO COME TO THE HOSPITAL BC SHE WAS SICK. SHE WAS FOUND TO BE SEPTIC, WITH A HAG, SHE WAS TACHY AND TACHYPNEIC . COFFEE GROUND EMESIS THIS MORNING, GASTROCULT  POS . SURGERY NOTIFIED ** HAD A THROACENTESIS ON YESTERDAY, 105 CC REMOVED.     ** RRT  DUE TO RESPIRATORY DISTRESS **  she was TO TRANSFER TO [facility] OR [facility]**  she remained INTUBATED  due to HYPOXIA**  HAD CHEST TUBES INSERTED ON  ** DEVI FOUND ON CULTURES,  ** APPEARS TO HAVE A FISTULA, IN CHEST CAVITY. HAD a EGD WITH DR SLAUGHTER,  ESOPHAGOGASTRODUODENOSCOPY, WITH ESOPHAGEAL STENT PLACEMENT EGD ESOPHAGOGASTRODUODENOSCOPY WITH PEG TUBE INSERTION    **  TRANSFERRED TO SICU. FAMILY DECIDED TO TERMINALLY EXTUBATED  patient  on 1/3/2023 at 12:45 PM            PHYSICAL EXAM:    BP (!) 121/50   Pulse (!) 0   Temp 97.5 °F (36.4 °C) (Esophageal)   Resp (!) 0   Ht 5' 2\" (1.575 m)   Wt 130 lb (59 kg)   SpO2 (!) 67%   BMI 23.78 kg/m²         Prior to Admission medications    Medication Sig Start Date End Date Taking? Authorizing Provider   Cholecalciferol (VITAMIN D3) 125 MCG (5000 UT) TABS Take by mouth    Historical Provider, MD   levothyroxine (SYNTHROID) 50 MCG tablet Take 50 mcg by mouth daily    Historical Provider, MD   rosuvastatin (CRESTOR) 20 MG tablet Take 1 tablet by mouth nightly 21   Marya Kirkpatrick MD   aspirin 325 MG EC tablet Take 1 tablet by mouth daily 21  Marya Kirkpatrick MD   Handicap [facility] MISC by Does not apply route Duration: Lifetime 18   Angela Borges DO       Consults:   IP CONSULT TO INTERNAL MEDICINE  IP CONSULT TO NEPHROLOGY  IP CONSULT TO SOCIAL WORK  IP CONSULT TO DIETITIAN  IP CONSULT TO DIETITIAN  IP CONSULT TO INFECTIOUS DISEASES  IP CONSULT TO GENERAL SURGERY  IP CONSULT TO INFECTIOUS DISEASES  IP CONSULT TO DIETITIAN  IP CONSULT TO GI  IP CONSULT TO DIETITIAN  IP CONSULT TO DIETITIAN  IP CONSULT TO PALLIATIVE CARE            Discharge Instructions / Follow up:    No future appointments.     Continued appropriate risk factor modification of blood pressure, diabetes and serum lipids will remain essential to reducing risk of future atherosclerotic development    Activity: activity as tolerated    Significant labs:  CBC:   Recent Labs     23  0600   WBC 10.6   RBC 3.52   HGB 10.4*   HCT 30.4* MCV 86.4   RDW 19.3*   PLT 39*     BMP:   Recent Labs     01/02/23  1721 01/02/23  2330 01/03/23  0600    133 137   K 3.8 3.6 4.0    102 103   CO2 22 20* 22   BUN 30* 28* 29*   CREATININE 0.7 0.6 0.6   MG 1.9 2.0 2.0   PHOS 2.2* 2.2* 2.1*     LFT:  Recent Labs     01/02/23  1217 01/02/23  1721   PROT 5.0* 4.9*   ALKPHOS 128* 130*   * 201*   * 187*   BILITOT 10.5* 10.9*     PT/INR: No results for input(s): INR, APTT in the last 72 hours. BNP: No results for input(s): BNP in the last 72 hours. Hgb A1C:   Lab Results   Component Value Date    LABA1C 6.1 (H) 01/07/2021     Folate and B12: No results found for: [id number], No results found for: FOLATE  Thyroid Studies:   Lab Results   Component Value Date    TSH 6.190 (H) 06/22/2022       Urinalysis:    Lab Results   Component Value Date/Time    NITRU Negative 12/15/2022 04:55 PM    WBCUA 0-1 12/15/2022 04:55 PM    BACTERIA RARE 12/15/2022 04:55 PM    RBCUA 5-10 12/15/2022 04:55 PM    BLOODU MODERATE 12/15/2022 04:55 PM    SPECGRAV 1.020 12/15/2022 04:55 PM    GLUCOSEU Negative 12/15/2022 04:55 PM       Imaging:  CT ABDOMEN PELVIS WO CONTRAST Additional Contrast? None    Result Date: 12/31/2022  EXAMINATION: CT OF THE CHEST WITHOUT CONTRAST; CT OF THE ABDOMEN AND PELVIS WITHOUT CONTRAST 12/30/2022 8:39 pm TECHNIQUE: CT of the chest was performed without the administration of intravenous contrast. Multiplanar reformatted images are provided for review. Automated exposure control, iterative reconstruction, and/or weight based adjustment of the mA/kV was utilized to reduce the radiation dose to as low as reasonably achievable.; CT of the abdomen and pelvis was performed without the administration of intravenous contrast. Multiplanar reformatted images are provided for review.  Automated exposure control, iterative reconstruction, and/or weight based adjustment of the mA/kV was utilized to reduce the radiation dose to as low as reasonably achievable. COMPARISON: CT chest 12/17/2022, CT abdomen pelvis 12/25/2022 HISTORY: ORDERING SYSTEM PROVIDED HISTORY: PNA TECHNOLOGIST PROVIDED HISTORY: Reason for exam:->PNA What reading provider will be dictating this exam?->CRC FINDINGS: CHEST: Right chest tube terminates at the right thoracic apex. Left chest tube terminates at the left thoracic apex. Two  right-sided mediastinal drains are present, one terminating between the esophagus and the descending aorta and the other terminating to the right of the esophagus. ET tube terminates in the supra carinal trachea. Left IJ vascular catheter terminates in the lower SVC. Atherosclerotic disease. No cardiomegaly or pericardial effusion. Nondilated pulmonary trunk. Unremarkable thyroid. Esophageal stent is opacified essentially throughout its course; there is contrast within the distal part of the stent and a small amount to the right of the stent (series 304, image 65). Decreased size of posterior mediastinal collection, which now contains fluid. It is predominantly located to the right of the esophagus and now measures roughly 5 cm x 4 cm x 6 cm (CT chest series 304, image 72 and CT abdomen series 604, image 62). There is trace fluid to the left of the esophagus and posterior to the esophagus. Low lung volumes. Trace right pleural effusion. Trace left apical pneumothorax, decreased from comparison CT abdomen. There is debris/mucous in the trachea and bronchus intermedius/right lower lobe bronchi. Patchy bilateral pulmonary consolidation and ground-glass opacities have improved, though not resolved. Stable spinal fusion hardware and kyphoplasty changes. Stable vertebral height loss. Degenerative changes of the spine. Bilateral breast implants. ABDOMEN/PELVIS: There are a couple tiny foci of free air in the anterior abdomen, in this patient with interval placement of PEG tube. There is a percutaneous gastric duodenal tube, through the PEG tube.   It terminates in the 3rd portion of the duodenum, and there is a separate roughly 13 mm presumed segment of tube in the 3/4 portion (series 604, image 55). Right femoral venous catheter terminates in the right common iliac vein. Cholelithiasis. Roughly 1 cm hepatic cyst.  Otherwise the solid organs are unremarkable. No significant free fluid. No bowel obstruction. Colonic diverticulosis. Oral contrast has reached the distal transverse colon. Noninflamed appendix. Unremarkable bladder and reproductive organs. No abdominal aortic aneurysm. Atherosclerotic disease. Stable vertebral height loss. Stable spinal kyphoplasty changes. Degenerative changes of the spine and hips. Mild anasarca. CHEST: Compared to CT chest 12/17/2022, there has been interval placement of esophageal stent, mediastinal drains, and bilateral chest tubes. Mediastinal fluid collection has decreased in size and is now predominantly to the right of the esophagus, measuring 5 cm x 4 cm x 6 cm. It contains a small amount of air, as expected given placement of drains. Small amount oral contrast to the right of the esophagus, outside the stent, concerning for leak (series 304, image 65). Decreased and now trace right pleural effusion. Tiny left pneumothorax. Decreased bilateral pulmonary consolidations/ground-glass opacities. Small amount of debris/mucous in the central airways. ABDOMEN/PELVIS: Interval placement PEG tube and gastroduodenal tube through the PEG tube. There is a 13 mm linear radiopaque structure in the 3rd portion of the duodenum distal to the gastric due to tube; it is of unclear etiology. Fracture of the tube is not excluded. Small amount of free air, which is not necessarily abnormal given recent PEG placement. Otherwise no acute abnormality.      CT ABDOMEN PELVIS WO CONTRAST Additional Contrast? None    Result Date: 12/25/2022  EXAMINATION: CT OF THE ABDOMEN AND PELVIS WITHOUT CONTRAST 12/25/2022 1:30 pm TECHNIQUE: CT of the abdomen and pelvis was performed without the administration of intravenous contrast. Multiplanar reformatted images are provided for review. Automated exposure control, iterative reconstruction, and/or weight based adjustment of the mA/kV was utilized to reduce the radiation dose to as low as reasonably achievable. COMPARISON: 12/14/2022 HISTORY: ORDERING SYSTEM PROVIDED HISTORY: ileus TECHNOLOGIST PROVIDED HISTORY: Reason for exam:->ileus Additional Contrast?->None What reading provider will be dictating this exam?->CRC FINDINGS: Lower Chest: Areas of alveolar consolidation and ground-glass opacity a noted in the lower lobes. Trace left pneumothorax. Bilateral pleural catheters are present. Heart appears normal in size with a prominent left ventricle. There is a gastric catheter passing into the stomach. Bilateral silicone breast implants are present. Organs: Mild perihepatic ascites. Liver and spleen are normal in size without new focal lesion. Stable 12 mm lesion in the anterior right hepatic lobe. Gallbladder is mildly distended in there is cholelithiasis. Common bile duct is normal.  Pancreas appears normal.  Kidneys are somewhat small. No renal obstruction detected. GI/Bowel: No evidence of bowel obstruction or abnormal dilatation. No CT findings of significant ileus. Pelvis: Uterus appears normal.  Urinary bladder is contracted and contains a Peterson catheter. There is mild pelvic ascites. Peritoneum/Retroperitoneum: No evidence of retroperitoneal mass or adenopathy. Aorta is nonaneurysmal. Bones/Soft Tissues: Osteoporosis with multiple vertebroplasties. Posterior metallic fusion from E80-F9. Mild subcutaneous edema along the right left body wall and in the lower abdomen anteriorly suggest volume overload/anasarca. 1.  No evidence of bowel obstruction or significant ileus. 2.  Gastrostomy tube is located in the stomach.   Oral contrast material is noted in the dependent portion of the gastric cardia. No abnormal bowel dilatation. Oral contrast material noted in the right and left colon without colonic distension. 3.  Mild abdominal ascites. 4.  Patchy asymmetric gland glass opacities in the lung bases with some areas of confluent consolidation in the right lower lobe. Trace pleural effusions. Bilateral chest tubes. 5.  Very small left pneumothorax. XR ABDOMEN (KUB) (SINGLE AP VIEW)    Result Date: 1/3/2023  EXAMINATION: ONE SUPINE XRAY VIEW(S) OF THE ABDOMEN 1/3/2023 5:49 am COMPARISON: 27 December 2022 HISTORY: ORDERING SYSTEM PROVIDED HISTORY: abdominal distention TECHNOLOGIST PROVIDED HISTORY: Reason for exam:->abdominal distention What reading provider will be dictating this exam?->CRC FINDINGS: NG tube has been removed and replaced with a gastroduodenostomy. The tip terminates at the duodenal 2nd portion. There is a new esophageal stent present. No free air, bowel wall pneumatosis or dilated bowel. There are may in some modest amount of contrast in the GI tract. New gastroduodenostomy and esophageal stent. No new abnormal findings. XR ABDOMEN (KUB) (SINGLE AP VIEW)    Result Date: 12/27/2022  EXAMINATION: ONE SUPINE XRAY VIEW(S) OF THE ABDOMEN 12/27/2022 10:55 am COMPARISON: Correlation is made with CT scan of the abdomen and pelvis performed 12/25/2022. HISTORY: ORDERING SYSTEM PROVIDED HISTORY: for bedside evaluation TECHNOLOGIST PROVIDED HISTORY: Reason for exam:->for bedside evaluation What reading provider will be dictating this exam?->CRC FINDINGS: An NG tube is identified, with the tip in the stomach. Oral contrast is identified within the stomach. The oral contrast within the stomach and originates by the side port of the NG tube. There is also oral contrast identified around the GE junction. The presence of focal perforation at the GE junction is suspected.   There is also contrast tracking along the inferior right-sided chest tube, indicating extravasation of contrast into the pleural space. There is another tube noted adjacent to the NG tube, with the tip just beyond the GE junction. This is reported to be an esophageal temperature probe. The remainder of the visualized abdomen is without significant interval change. The lungs, heart, and mediastinum are also without significant interval change. Bilateral chest tubes are again noted in place. An endotracheal tube is again identified, with the tip 3.2 cm above the roseann. A left-sided IJ line is again present, with tip in the SVC. NG tube again identified within the stomach, when compared to CT scan of the abdomen pelvis performed 12/25/2022. There is oral contrast within the stomach, however, there is oral contrast around the GE junction, suspicious for focal perforation at the GE junction. Oral contrast is also noted tracking along the inferior right-sided chest tube, indicating extravasation of contrast into the pleural space. The findings were discussed with Betzaida Ramirez, the patient's bedside nurse, at 11:34 a.m.     CT HEAD WO CONTRAST    Result Date: 1/2/2023  EXAMINATION: CT OF THE HEAD WITHOUT CONTRAST  1/2/2023 12:49 pm TECHNIQUE: CT of the head was performed without the administration of intravenous contrast. Automated exposure control, iterative reconstruction, and/or weight based adjustment of the mA/kV was utilized to reduce the radiation dose to as low as reasonably achievable. COMPARISON: December 25 and December 30, 2022 HISTORY: ORDERING SYSTEM PROVIDED HISTORY: altered mental status TECHNOLOGIST PROVIDED HISTORY: Has a \"code stroke\" or \"stroke alert\" been called? ->No Reason for exam:->altered mental status What reading provider will be dictating this exam?->CRC FINDINGS: There are streaking artifacts in the posterior fossa and around the tentorium of the cerebellum plane. Peripheral CSF space ventricular system correlates with the age group. There is no focal mass effect or midline shift.  There is no indication for acute intra cranial hemorrhagic event. There is no evidence for a sizable area of a new acute recent insult in progression to the brain parenchyma. Discrete areas of hypodensity in the periventricular white matter particular in frontal regions were seen previously and correlate with small-vessel changes also seen in the basal ganglia area bilaterally. Obliteration of the air density of the right maxillary sinus and the right frontal sinus are longstanding chronic findings, they have hyperdensity which can indicate a fungus infection component to the process. These are manifestation more likely of chronic sinusitis. There also a retained debris is in the pendant portion of the right left sphenoidal sinus which were seen on recent studies as. Patient has oral tracheal tube and the orogastric tube. 1.  No acute intracranial abnormality. 2.  Chronic left maxillary sinus and left frontal sinus sinusitis. 3.  No significant interval changes since the studies December 25 and December 3, 2022. CT HEAD WO CONTRAST    Result Date: 12/31/2022  EXAMINATION: CT OF THE HEAD WITHOUT CONTRAST  12/30/2022 11:39 pm TECHNIQUE: CT of the head was performed without the administration of intravenous contrast. Automated exposure control, iterative reconstruction, and/or weight based adjustment of the mA/kV was utilized to reduce the radiation dose to as low as reasonably achievable. COMPARISON: None. 12/25/2022 HISTORY: ORDERING SYSTEM PROVIDED HISTORY: AMS TECHNOLOGIST PROVIDED HISTORY: Reason for exam:->AMS Has a \"code stroke\" or \"stroke alert\" been called? ->No What reading provider will be dictating this exam?->CRC FINDINGS: BRAIN/VENTRICLES: There is no acute intracranial hemorrhage, mass effect or midline shift. No abnormal extra-axial fluid collection. The gray-white differentiation is maintained without evidence of an acute infarct.  There is prominence of the ventricles and sulci due to global parenchymal volume loss. There are nonspecific areas of hypoattenuation within the periventricular and subcortical white matter, which likely represent chronic microvascular ischemic change. ORBITS: The visualized portion of the orbits demonstrate no acute abnormality. SINUSES: The visualized paranasal sinuses and mastoid air cells demonstrate no acute abnormality. There is again the near complete opacification the right maxillary sinus and the right frontal sinus consistent with chronic sinusitis. SOFT TISSUES/SKULL: No acute abnormality of the visualized skull or soft tissues. No acute intracranial abnormality. Persistent chronic right maxillary, ethmoid and frontal sinusitis. CT HEAD WO CONTRAST    Result Date: 12/25/2022  EXAMINATION: CT OF THE HEAD WITHOUT CONTRAST  12/25/2022 1:30 pm TECHNIQUE: CT of the head was performed without the administration of intravenous contrast. Automated exposure control, iterative reconstruction, and/or weight based adjustment of the mA/kV was utilized to reduce the radiation dose to as low as reasonably achievable. COMPARISON: 01/06/2021 HISTORY: ORDERING SYSTEM PROVIDED HISTORY: r/o stroke TECHNOLOGIST PROVIDED HISTORY: Reason for exam:->r/o stroke Has a \"code stroke\" or \"stroke alert\" been called? ->No What reading provider will be dictating this exam?->CRC FINDINGS: BRAIN/VENTRICLES: There is no acute intracranial hemorrhage, mass effect or midline shift. No abnormal extra-axial fluid collection. The gray-white differentiation is maintained without evidence of an acute infarct. There is no evidence of hydrocephalus. Incidental cavum septum pellucidum. Mild atrophy. ORBITS: The visualized portion of the orbits demonstrate no acute abnormality. SINUSES: Soft tissue density in the superior nasal cavity anteriorly similar to the prior study.   There is opacification of the right maxillary sinus, anterior ethmoid air cells and right frontal sinus similar to the prior study. Fluid density is noted in both mastoid air cells. Lobular mucosal thickening in the sphenoid sinuses again noted. There is some fluid in the posterior nasopharynx. There is an orogastric and endotracheal tube in place within the oropharynx. SOFT TISSUES/SKULL:  No acute abnormality of the visualized skull or soft tissues. 1.  No acute intracranial abnormality. 2.  Chronic right maxillary ethmoid and frontal sinusitis. Chronic sphenoid sinusitis. Fluid opacification of both mastoid air cells. 3.  No CT evidence of large territory infarct. MCA vessels appear normal in density. CT CHEST WO CONTRAST    Result Date: 12/31/2022  EXAMINATION: CT OF THE CHEST WITHOUT CONTRAST; CT OF THE ABDOMEN AND PELVIS WITHOUT CONTRAST 12/30/2022 8:39 pm TECHNIQUE: CT of the chest was performed without the administration of intravenous contrast. Multiplanar reformatted images are provided for review. Automated exposure control, iterative reconstruction, and/or weight based adjustment of the mA/kV was utilized to reduce the radiation dose to as low as reasonably achievable.; CT of the abdomen and pelvis was performed without the administration of intravenous contrast. Multiplanar reformatted images are provided for review. Automated exposure control, iterative reconstruction, and/or weight based adjustment of the mA/kV was utilized to reduce the radiation dose to as low as reasonably achievable. COMPARISON: CT chest 12/17/2022, CT abdomen pelvis 12/25/2022 HISTORY: ORDERING SYSTEM PROVIDED HISTORY: PNA TECHNOLOGIST PROVIDED HISTORY: Reason for exam:->PNA What reading provider will be dictating this exam?->CRC FINDINGS: CHEST: Right chest tube terminates at the right thoracic apex. Left chest tube terminates at the left thoracic apex. Two  right-sided mediastinal drains are present, one terminating between the esophagus and the descending aorta and the other terminating to the right of the esophagus.   ET tube terminates in the supra carinal trachea. Left IJ vascular catheter terminates in the lower SVC. Atherosclerotic disease. No cardiomegaly or pericardial effusion. Nondilated pulmonary trunk. Unremarkable thyroid. Esophageal stent is opacified essentially throughout its course; there is contrast within the distal part of the stent and a small amount to the right of the stent (series 304, image 65). Decreased size of posterior mediastinal collection, which now contains fluid. It is predominantly located to the right of the esophagus and now measures roughly 5 cm x 4 cm x 6 cm (CT chest series 304, image 72 and CT abdomen series 604, image 62). There is trace fluid to the left of the esophagus and posterior to the esophagus. Low lung volumes. Trace right pleural effusion. Trace left apical pneumothorax, decreased from comparison CT abdomen. There is debris/mucous in the trachea and bronchus intermedius/right lower lobe bronchi. Patchy bilateral pulmonary consolidation and ground-glass opacities have improved, though not resolved. Stable spinal fusion hardware and kyphoplasty changes. Stable vertebral height loss. Degenerative changes of the spine. Bilateral breast implants. ABDOMEN/PELVIS: There are a couple tiny foci of free air in the anterior abdomen, in this patient with interval placement of PEG tube. There is a percutaneous gastric duodenal tube, through the PEG tube. It terminates in the 3rd portion of the duodenum, and there is a separate roughly 13 mm presumed segment of tube in the 3/4 portion (series 604, image 55). Right femoral venous catheter terminates in the right common iliac vein. Cholelithiasis. Roughly 1 cm hepatic cyst.  Otherwise the solid organs are unremarkable. No significant free fluid. No bowel obstruction. Colonic diverticulosis. Oral contrast has reached the distal transverse colon. Noninflamed appendix. Unremarkable bladder and reproductive organs.  No abdominal aortic aneurysm. Atherosclerotic disease. Stable vertebral height loss. Stable spinal kyphoplasty changes. Degenerative changes of the spine and hips. Mild anasarca. CHEST: Compared to CT chest 12/17/2022, there has been interval placement of esophageal stent, mediastinal drains, and bilateral chest tubes. Mediastinal fluid collection has decreased in size and is now predominantly to the right of the esophagus, measuring 5 cm x 4 cm x 6 cm. It contains a small amount of air, as expected given placement of drains. Small amount oral contrast to the right of the esophagus, outside the stent, concerning for leak (series 304, image 65). Decreased and now trace right pleural effusion. Tiny left pneumothorax. Decreased bilateral pulmonary consolidations/ground-glass opacities. Small amount of debris/mucous in the central airways. ABDOMEN/PELVIS: Interval placement PEG tube and gastroduodenal tube through the PEG tube. There is a 13 mm linear radiopaque structure in the 3rd portion of the duodenum distal to the gastric due to tube; it is of unclear etiology. Fracture of the tube is not excluded. Small amount of free air, which is not necessarily abnormal given recent PEG placement. Otherwise no acute abnormality. CT CHEST WO CONTRAST    Result Date: 12/17/2022  EXAMINATION: CT OF THE CHEST WITHOUT CONTRAST 12/17/2022 1:47 pm TECHNIQUE: CT of the chest was performed without the administration of intravenous contrast. Multiplanar reformatted images are provided for review. Automated exposure control, iterative reconstruction, and/or weight based adjustment of the mA/kV was utilized to reduce the radiation dose to as low as reasonably achievable. COMPARISON: None. HISTORY: ORDERING SYSTEM PROVIDED HISTORY: worsening hypoxia TECHNOLOGIST PROVIDED HISTORY: Reason for exam:->worsening hypoxia What reading provider will be dictating this exam?->CRC FINDINGS: Mediastinum: Thyroid is homogeneous in appearance.   There is distension identified of the esophagus with fluid filling the mid and distal esophagus to suggest reflux. Achalasia cannot be excluded. There is mild dilatation of the cardiac chambers. No pericardial effusion. Coronary artery calcification as well as vascular calcifications seen within the thoracic aorta and great vessels. Lungs/pleura: Dense consolidative infiltrate identified within the left upper and lower lung field. There is a moderate size effusion identified on the left. There is consolidative infiltrate and nodular density seen in the aerated portions of the left upper lobe. There is ground-glass nodular density seen in the periphery and inferior aspect of the right upper and lower lung field with can not dense consolidative infiltrate in the right lung base suggesting pneumonia. There is a pigtail catheter identified inferiorly within the right pleural space. Upper Abdomen: Visualized upper abdomen reveal cysts within the liver. Mild gastric distension of the stomach. There is stones in the gallbladder. Soft Tissues/Bones: Degenerative changes seen diffusely throughout the spine with kyphoplasty identified within the spine with hardware fusing the lumbar spine. No acute chest wall abnormality. Large right pleural effusion of mixed density to suggest complex effusion. There is a pigtail catheter seen in the pleural space posteriorly and inferiorly. Small to moderate size left pleural effusion with consolidative infiltrate throughout the left upper and lower lung field and aerated portions revealing nodular ground-glass opacity to suggest an infectious process. Nodular ground-glass opacity inferiorly within the aerated right upper and lower lung field concerning for an infectious process as well. There is less consolidative infiltrate identified within the right lower lobe.      CT ABDOMEN PELVIS W IV CONTRAST Additional Contrast? Oral    Result Date: 12/14/2022  EXAMINATION: CTA OF THE CHEST; CT OF THE ABDOMEN AND PELVIS WITH CONTRAST 12/14/2022 12:21 pm TECHNIQUE: CTA of the chest was performed after the administration of intravenous contrast.  Multiplanar reformatted images are provided for review. MIP images are provided for review. Automated exposure control, iterative reconstruction, and/or weight based adjustment of the mA/kV was utilized to reduce the radiation dose to as low as reasonably achievable.; CT of the abdomen and pelvis was performed with the administration of intravenous contrast. Multiplanar reformatted images are provided for review. Automated exposure control, iterative reconstruction, and/or weight based adjustment of the mA/kV was utilized to reduce the radiation dose to as low as reasonably achievable. COMPARISON: 07/22/2022 HISTORY: ORDERING SYSTEM PROVIDED HISTORY: SOB, hypoxia, cough TECHNOLOGIST PROVIDED HISTORY: Reason for exam:->SOB, hypoxia, cough Decision Support Exception - unselect if not a suspected or confirmed emergency medical condition->Emergency Medical Condition (MA) What reading provider will be dictating this exam?->CRC; FINDINGS: CHEST CT SCAN: Unremarkable opacification of the pulmonary vessels, no evidence of pulmonary embolism is seen. Scattered calcifications visualized in the aortic arch, no evidence for aneurysmal dilatation or arterial dissection is seen. The heart is prominent in size, no evidence of pericardial effusion is seen. Atherosclerotic calcifications visualized in the coronary vessels. Subtle hilar and mediastinal lymphadenopathy is seen. Extensive thickening visualized in the wall of the esophagus, dilated esophagus is seen axial series 302, image 68 with wall measuring approximately 0.7 cm.  Narrowing visualized at the gastroesophageal junction visualized on axial series 302, image 130 Soft tissue density visualized in the posterior medial aspect of the right lower lung field with deviation of the mediastinum to the left, elevation of the right hemidiaphragm is seen, suboptimal opacification is visualized cannot ascertain if this represents pleural fluid, bowel or a soft tissue density however on the prior study obtained on 07/22/2022 there was no soft tissue mass at this location. Mild bronchial wall thickening is visualized. Bilateral lower lobe consolidation and pleural effusions visualized. No evidence of endobronchial or endoluminal lesions are seen. Bilateral breast implants. No evidence of chest wall mass Degenerative bone changes are seen. ABDOMEN AND PELVIS CT SCAN: The liver demonstrates increased attenuation with mild nodularity of its surface. , a 1.2 cm simple cyst is visualized in the right lobe of the liver, no evidence of masses no evidence of intrahepatic biliary dilatation is seen. The gallbladder is distended, some small layering stones visualized within the neck of the gallbladder measuring approximately 0.6 cm,, no evidence of gallbladder wall thickening or pericholecystic  stranding. The common bile duct is unremarkable. The pancreas and spleen demonstrate no evidence of masses. The pancreas is atrophic, the spleen is small in size. The adrenal glands demonstrate unremarkable contours with no evidence of masses. The kidneys demonstrate no evidence of stones no evidence of renal masses. No evidence of hydronephrosis or hydroureter is seen. GI/Bowel: Narrowing of the gastroesophageal junction visualized on coronal series 605, image 64. The stomach is slightly prominent, thickening of the wall of the distal stomach/pyloric outlet is seen on coronal series 605, image 52, no evidence of masses. No significant distention of the small and large bowel loops is visualized. The appendix is visualized and is unremarkable. Abundance of stool is visualized in the large bowel. Scattered diverticular disease visualized but no evidence of acute diverticulitis. Pelvis: The urinary bladder is not optimally distended.  The prostate gland is unremarkable. No evidence of free fluid in the pelvis. No evidence of pelvic mass is seen. Peritoneum/Retroperitoneum: No evidence of free fluid or air within the peritoneal cavity. Bones/Soft Tissues Abdominal wall soft tissues are unremarkable. Internal fixation of the thoracolumbar junction is seen. Vertebral augmentation of the T10, T11, L3 and L4 vertebral bodies is seen. Degenerative changes of the lumbar spine visualized. No evidence of pulmonary embolism. Bilateral lower lobe pulmonary consolidations and pleural effusions seen. Deviation of the mediastinum to the left is visualized. Mild soft tissue prominence visualized surrounding the esophagus with narrowing at the gastroesophageal junction but appears to be due to external compression. Cannot rule left soft tissue mass at this location. Wall thickening visualized in the gastric outlet with mild prominence of the stomach seen, this could be artifactual. Distended gallbladder is seen. Degenerative bone changes, postoperative changes and multilevel vertebral augmentation seen. No evidence of acute abdominal/pelvic pathology. FL ESOPHAGRAM    Result Date: 12/15/2022  EXAMINATION: SINGLE CONTRAST ESOPHAGRAM 12/15/2022 HISTORY: ORDERING SYSTEM PROVIDED HISTORY: evaluate for esophageal perforation TECHNOLOGIST PROVIDED HISTORY: Reason for exam:->evaluate for esophageal perforation Should a barium tablet be used, if available? ->No What reading provider will be dictating this exam?->CRC Cough, chest pain COMPARISON: CTA chest dated 12/14/2022 TECHNIQUE: Multiple single contrast images of the esophagus and gastroesophageal junction were obtained following the oral administration of water soluble contrast and thin barium FLUOROSCOPY DOSE AND TYPE OR TIME AND EXPOSURES: Fluoroscopy time 1.2 minutes. Air kerma 114 mGy FINDINGS: The study was somewhat compromised due to patient's clinical condition and limited mobility.  On the single contrast images, no evidence of stricture or obstruction. The esophagus demonstrates decreased peristalsis under fluoroscopy. No tertiary contractions seen. No evidence of leak. There is a small sliding hiatus hernia. The gastric fundus is constricted as it passes through the esophageal hiatus. Otherwise, no abnormality seen at the GE junction. No gastroesophageal reflux was elicited during examination. 1. No evidence of esophageal perforation or leak. 2. Decreased peristalsis. No evidence of esophageal mass or obstructing lesion. 3. Small sliding hiatus hernia. No evidence of GE reflux. The gastric foreign disc does appear constricted as it passes through the esophageal hiatus. XR CHEST PORTABLE    Result Date: 1/3/2023  EXAMINATION: ONE XRAY VIEW OF THE CHEST 1/3/2023 6:49 am COMPARISON: 01/02/2023 HISTORY: ORDERING SYSTEM PROVIDED HISTORY: chest tubes, intubated TECHNOLOGIST PROVIDED HISTORY: Reason for exam:->chest tubes, intubated What reading provider will be dictating this exam?->CRC FINDINGS: There is satisfactory position of the endotracheal tube. Note is made of a pH probe within the esophagus. There is a left internal jugular central venous catheter There is a right sided dialysis catheter There is persistent right lung base airspace disease unchanged compared to prior study. The left lung is clear. 1. Persistent right lung base airspace disease. 2. The left lung is clear 3. Stable position of the support lines and tubes. XR CHEST PORTABLE    Result Date: 1/2/2023  EXAMINATION: ONE XRAY VIEW OF THE CHEST 1/2/2023 10:18 am COMPARISON: None. HISTORY: ORDERING SYSTEM PROVIDED HISTORY: chest tubes, intubated TECHNOLOGIST PROVIDED HISTORY: Reason for exam:->chest tubes, intubated What reading provider will be dictating this exam?->CRC FINDINGS: Right basilar infiltrate, effusion and chest tubes are not significantly changed. Left lung is clear. Support lines are appropriate.   There is an esophageal stent. No interval change     XR CHEST PORTABLE    Result Date: 1/1/2023  EXAMINATION: ONE XRAY VIEW OF THE CHEST 1/1/2023 3:11 pm COMPARISON: 31 December 2022 HISTORY: ORDERING SYSTEM PROVIDED HISTORY: incr RR TECHNOLOGIST PROVIDED HISTORY: Reason for exam:->incr RR What reading provider will be dictating this exam?->CRC FINDINGS: Single AP upright portable chest demonstrates the left-sided chest tube is been removed. There is no evidence of a pneumothorax with right-sided apical chest tube remaining in place. The endotracheal tube is in good position. The stent graft remains in position consistent with the esophagus. Status post removal of left-sided chest tube with no evidence of pneumothorax. XR CHEST PORTABLE    Result Date: 12/31/2022  EXAMINATION: ONE XRAY VIEW OF THE CHEST 12/31/2022 4:15 pm COMPARISON: 12/31/2022. HISTORY: ORDERING SYSTEM PROVIDED HISTORY: s/p HD line to RIJ and TLC to left IJ TECHNOLOGIST PROVIDED HISTORY: Reason for exam:->s/p HD line to RIJ and TLC to left IJ What reading provider will be dictating this exam?->CRC FINDINGS: Bilateral chest tubes appear unchanged. No pneumothorax identified. Right basal consolidation and possible pleural effusion appear unchanged. A stent in the esophagus is again noted. Endotracheal tube is unchanged. The cardiomediastinal silhouette and pulmonary vessels appear normal.  A right internal jugular dialysis catheter is been placed and extends approximately 3 cm in the right atrium. A new left internal jugular catheter extends to the cavoatrial junction. 1. Bilateral chest tubes with no pneumothorax. 2. Stable right basal consolidation and possible small effusion. 3. Placement of right internal jugular dialysis catheter that extends approximately 3 cm in the right atrium and placement of a left internal jugular venous catheter that extends to the cavoatrial junction with no evident complication.      XR CHEST PORTABLE    Result Date: 12/31/2022  EXAMINATION: ONE XRAY VIEW OF THE CHEST 12/31/2022 8:00 am COMPARISON: 12/30/2022 HISTORY: ORDERING SYSTEM PROVIDED HISTORY: PNA TECHNOLOGIST PROVIDED HISTORY: Reason for exam:->PNA What reading provider will be dictating this exam?->CRC FINDINGS: The endotracheal tube is in satisfactory position. There is been placement of a new stent graft overlying the thoracic spine likely within the esophagus. There is a left tunneled dialysis catheter which is in satisfactory position. Bilateral chest tubes are noted. Right lower lobe airspace disease is noted and there is a trace right pleural effusion. Vague patchy ground-glass airspace disease is seen within the right upper lobe and left upper lobe. 1. There is no interval change in the right lower lobe airspace disease when compared to prior study of 1 day earlier 2. Stable position of the bilateral chest tubes. There is no right or left pneumothorax. 3. Patchy bilateral ground-glass airspace disease seen within the right and left upper lobes. XR CHEST PORTABLE    Result Date: 12/30/2022  EXAMINATION: ONE XRAY VIEW OF THE CHEST 12/30/2022 7:43 am COMPARISON: December 29, 2022 HISTORY: ORDERING SYSTEM PROVIDED HISTORY: PNA TECHNOLOGIST PROVIDED HISTORY: Reason for exam:->PNA What reading provider will be dictating this exam?->CRC FINDINGS: Endotracheal tube is present with distal tip appearing 3.5 cm above the roseann. NG tube is in satisfactory position. Redemonstration of bilateral chest tubes. Stable opacities in right lung base silhouetting right hemidiaphragm. No obvious pneumothorax. Left IJ central venous catheter is present with distal tip at level of SVC. 1. Endotracheal tube is 3.5 cm above the roseann. 2. Stable opacities in right lung base. 3. No radiographic evidence of pneumothorax. 4. Similar position of bilateral chest tubes.      XR CHEST PORTABLE    Result Date: 12/29/2022  EXAMINATION: ONE XRAY VIEW OF THE CHEST 12/29/2022 7:34 am COMPARISON: December 28, 2022 HISTORY: ORDERING SYSTEM PROVIDED HISTORY: PNA TECHNOLOGIST PROVIDED HISTORY: Reason for exam:->PNA What reading provider will be dictating this exam?->CRC FINDINGS: Endotracheal tube is 3 cm above the roseann. Redemonstration of bilateral chest tubes. No obvious pneumothorax. Left-sided central venous catheter present with distal tip at location of SVC or right atrial/caval junction. NG tube courses below the diaphragm. Multiple telemetry leads overlie the chest.  Redemonstration of bibasilar opacities silhouetting hemidiaphragms. Stable chest radiograph with bibasilar opacities related to atelectasis or pneumonia notable on the right. XR CHEST PORTABLE    Result Date: 12/28/2022  EXAMINATION: ONE XRAY VIEW OF THE CHEST 12/28/2022 8:02 am COMPARISON: December 27, 2022 HISTORY: ORDERING SYSTEM PROVIDED HISTORY: PNA TECHNOLOGIST PROVIDED HISTORY: Reason for exam:->PNA What reading provider will be dictating this exam?->CRC FINDINGS: Endotracheal tube is 4 cm above the roseann. NG tube courses below the level of the diaphragm in expected location of the stomach. Left IJ central venous catheter is present with distal tip of location of SVC. There are bibasilar opacities silhouetting the hemidiaphragms. No pneumothorax. Redemonstration of right and left-sided chest tubes. Stable chest radiograph with pulmonary vascular congestion and bibasilar opacities related to atelectasis or pneumonia. XR CHEST PORTABLE    Result Date: 12/27/2022  EXAMINATION: ONE XRAY VIEW OF THE CHEST 12/27/2022 7:05 am COMPARISON: 12/26/2022 HISTORY: ORDERING SYSTEM PROVIDED HISTORY: PNA TECHNOLOGIST PROVIDED HISTORY: Reason for exam:->PNA What reading provider will be dictating this exam?->CRC FINDINGS: Bibasilar atelectasis/infiltrate are unchanged. There are bilateral chest tubes. There is no definite pneumothorax. Support lines are appropriate.  There is a small right effusion and PleurX catheter. No interval change     XR CHEST PORTABLE    Result Date: 12/26/2022  EXAMINATION: ONE XRAY VIEW OF THE CHEST 12/26/2022 8:00 am COMPARISON: The previous study performed 12/25/2022. HISTORY: ORDERING SYSTEM PROVIDED HISTORY: PNA TECHNOLOGIST PROVIDED HISTORY: Reason for exam:->PNA What reading provider will be dictating this exam?->CRC FINDINGS: Given some differences in film technique, there has been no significant interval change in the bilateral lung opacities. A small right pleural effusion is again noted. The cardiac silhouette and mediastinal structures are without significant interval change. Bilateral chest tubes are again seen in place. No pneumothorax is appreciated. An endotracheal tube is again noted, with the tip 3.1 cm above the roseann. An NG tube is again noted, with tip in the stomach. Bilateral IJ lines are again noted, without change in position. There is still oral contrast seen within the stomach. Patient is again status post lumbar spine surgery and multilevel vertebroplasty. No significant interval change, when compared to the previous study performed 1 day earlier. XR CHEST PORTABLE    Result Date: 12/25/2022  EXAMINATION: ONE XRAY VIEW OF THE CHEST 12/25/2022 7:42 am COMPARISON: 12/24/2022 HISTORY: ORDERING SYSTEM PROVIDED HISTORY: PNA TECHNOLOGIST PROVIDED HISTORY: Reason for exam:->PNA What reading provider will be dictating this exam?->CRC FINDINGS: Moderate bilateral airspace opacities have not significantly changed. Heart appears normal in size for portable study. Right and left internal jugular central venous catheters are stable. Endotracheal tube tip is 3 cm above the roseann. Gastric catheter passes into the stomach. There is a right chest tube catheter. No visible pneumothorax. Osseous structures are stable. Postop changes in the thoracolumbar spine. 1.  Asymmetric bilateral airspace opacities, not significantly changed.  2.  No of visible pneumothorax. 3.  Support devices appear stable. XR CHEST PORTABLE    Result Date: 12/24/2022  EXAMINATION: ONE XRAY VIEW OF THE CHEST 12/24/2022 8:12 am COMPARISON: 12/23/2022 HISTORY: ORDERING SYSTEM PROVIDED HISTORY: PNA TECHNOLOGIST PROVIDED HISTORY: Reason for exam:->PNA What reading provider will be dictating this exam?->CRC FINDINGS: Study is somewhat limited by technique. There are bilateral infiltrates with some suggestion of subtle partial resolution since the prior study. There are bilateral chest tubes without obvious pneumothorax. Support lines are appropriate. Suggestion of some partial resolution of bilateral basilar infiltrates     XR CHEST PORTABLE    Result Date: 12/23/2022  EXAMINATION: ONE XRAY VIEW OF THE CHEST 12/23/2022 12:30 pm COMPARISON: 12/22/2022 HISTORY: ORDERING SYSTEM PROVIDED HISTORY: respiratory failure TECHNOLOGIST PROVIDED HISTORY: Reason for exam:->respiratory failure What reading provider will be dictating this exam?->CRC FINDINGS: Extensive bilateral infiltrates worse in the right hemithorax are unchanged. Apical chest tubes are unchanged without obvious pneumothorax. Heart size is normal.     No interval change     XR CHEST PORTABLE    Result Date: 12/22/2022  EXAMINATION: ONE XRAY VIEW OF THE CHEST 12/22/2022 7:54 am COMPARISON: 12/21/2022 HISTORY: ORDERING SYSTEM PROVIDED HISTORY: respiratory failure TECHNOLOGIST PROVIDED HISTORY: Reason for exam:->respiratory failure What reading provider will be dictating this exam?->CRC FINDINGS: Stable bilateral pulmonary opacities and stable positioning of lines and tubes. There is no effusion or pneumothorax. The cardiomediastinal silhouette is without acute process. The osseous structures are without acute process. Stable appearance of the chest compared to 12/21/2022.      XR CHEST PORTABLE    Result Date: 12/21/2022  EXAMINATION: ONE XRAY VIEW OF THE CHEST 12/21/2022 11:43 am COMPARISON: 12/21/2022 at 12:34 HISTORY: ORDERING SYSTEM PROVIDED HISTORY: left side HD catheter placement TECHNOLOGIST PROVIDED HISTORY: Reason for exam:->left side HD catheter placement What reading provider will be dictating this exam?->CRC FINDINGS: A new left internal jugular hemodialysis catheter is identified with the tip at the SVC/RA junction. Right internal jugular central venous line is stable. Endotracheal tube and nasogastric tube are stable good position. Bilateral chest tubes are unchanged. No pneumothorax is seen. The appearance of the lungs is not significantly changed with diffuse airspace disease again noted. Heart size is obscured. Adequately positioned left internal jugular hemodialysis catheter. The remainder of the exam including additional lines and tubes are stable. XR CHEST PORTABLE    Result Date: 12/21/2022  EXAMINATION: ONE XRAY VIEW OF THE CHEST 12/21/2022 1:07 pm COMPARISON: 12/21/2022 7:01 a.m. chest radiograph. HISTORY: ORDERING SYSTEM PROVIDED HISTORY: left chest tube TECHNOLOGIST PROVIDED HISTORY: Reason for exam:->left chest tube What reading provider will be dictating this exam?->CRC FINDINGS: The lungs are hypoinflated. There are airspace opacities throughout the lungs with greatest consolidation at the right lung base. There is blunting of the costophrenic sulci. Cardiac silhouette size is mildly enlarged. There is atherosclerotic calcification of the thoracic aorta. An endotracheal tube is positioned with the tip at the level of the aortic arch. An enteric tube is visualized with the tip below the left hemidiaphragm but largely obscured by overlying radiopaque density. There is a right IJ central venous catheter with the tip in the distal superior vena cava. A right-sided chest tube with the tip at the right lung apex is unchanged. There has been interval placement of a left-sided chest tube with the tip at the left lung apex. There is a 3rd tubular density at the right lung base.  This may represent a chest tube as well. The patient is status post vertebroplasty procedures at T10 and T11. There are pedicle screws and rods T12, L1 and L2. No definite pneumothorax. Interval left chest tube placement, no definite pneumothorax. Bilateral lung infiltrates and pleural effusions, not significantly changed. These infiltrates may be due to pulmonary edema, pneumonia and/or ARDS. Stable borderline enlargement of the cardiac silhouette. XR CHEST PORTABLE    Result Date: 12/21/2022  EXAMINATION: ONE XRAY VIEW OF THE CHEST 12/21/2022 6:41 am COMPARISON: 20 December 2022 HISTORY: ORDERING SYSTEM PROVIDED HISTORY: S/P thoracotomy TECHNOLOGIST PROVIDED HISTORY: Reason for exam:->S/P thoracotomy What reading provider will be dictating this exam?->CRC FINDINGS: Unchanged support lines. Very extensive bilateral airspace disease is likely progressive on the right and not greatly changed on the left. Extensive bilateral airspace disease with interval progression on the right. XR CHEST PORTABLE    Result Date: 12/20/2022  EXAMINATION: ONE XRAY VIEW OF THE CHEST 12/20/2022 4:26 pm COMPARISON: December 20, 2022 HISTORY: ORDERING SYSTEM PROVIDED HISTORY: S/P thoracotomy TECHNOLOGIST PROVIDED HISTORY: Reason for exam:->S/P thoracotomy What reading provider will be dictating this exam?->CRC FINDINGS: There is cardiomegaly. The endotracheal tube is 3.6 cm above the roseann. Nasogastric tube is noted with tip in the stomach. There is diffuse patchy infiltrates and pleural effusions more on the left side with improvement on the right side. There is low lung volumes. 2 right chest tubes are noted. There is no pneumothorax. Postoperative changes with the diffuse bilateral infiltrates and effusions with improvement in the right side likely CHF/edema. Pneumonia is less likely. Tubes and catheters as noted.      XR CHEST PORTABLE    Result Date: 12/20/2022  EXAMINATION: ONE XRAY VIEW OF THE CHEST 12/20/2022 8:06 am COMPARISON: 12/19/2022 HISTORY: ORDERING SYSTEM PROVIDED HISTORY: respiratory failure TECHNOLOGIST PROVIDED HISTORY: Reason for exam:->respiratory failure What reading provider will be dictating this exam?->CRC FINDINGS: Single AP semi upright portable chest demonstrates low lying endotracheal tube tip which is approximately 1.5 cm above the roseann. I recommend withdrawing approximately a a 2 cm. Orogastric tube remains in good position. There is increasing bilateral airspace consolidative infiltrates extending into the lung bases. There is a right-sided pigtail catheter in place. Low lying endotracheal tube which needs withdrawn approximately 2 cm. XR CHEST PORTABLE    Result Date: 12/19/2022  EXAMINATION: ONE XRAY VIEW OF THE CHEST 12/19/2022 10:22 am COMPARISON: 12/19/2022 HISTORY: ORDERING SYSTEM PROVIDED HISTORY: Intubation, OG placement TECHNOLOGIST PROVIDED HISTORY: Reason for exam:->Intubation, OG placement What reading provider will be dictating this exam?->CRC FINDINGS: Bilateral pleural effusions of the bilateral airspace opacities. No pneumothorax. The cardiomediastinal silhouette is without acute process. The osseous structures are without acute process. The roseann is not well visualized. ET tube tip most likely within 1.5 cm of the roseann. Enteric tube tip in the body of the stomach. Enteric tube tip in the body of the stomach. The roseann is not well seen. ET tube tip most likely within 1.5 cm of the roseann. XR CHEST PORTABLE    Result Date: 12/19/2022  EXAMINATION: ONE XRAY VIEW OF THE CHEST 12/19/2022 7:28 am COMPARISON: 18 December 2022 HISTORY: ORDERING SYSTEM PROVIDED HISTORY: respiratory failure TECHNOLOGIST PROVIDED HISTORY: Reason for exam:->respiratory failure What reading provider will be dictating this exam?->CRC FINDINGS: Unchanged central venous catheter.   There are bilateral chest opacities likely represent a combination of layering pleural effusions with adjacent infiltrate and or atelectasis. Aeration is slightly improved on the left and appears slightly worse on the right compared to the prior study. Pleural effusions with adjacent infiltrate and or atelectasis showing slightly improved aeration on the left and slightly poor aeration on the right. XR CHEST PORTABLE    Result Date: 12/18/2022  EXAMINATION: ONE XRAY VIEW OF THE CHEST 12/18/2022 8:42 am COMPARISON: The previous study performed 12/17/2022. HISTORY: ORDERING SYSTEM PROVIDED HISTORY: respiratory failure TECHNOLOGIST PROVIDED HISTORY: Reason for exam:->respiratory failure What reading provider will be dictating this exam?->CRC FINDINGS: There has been interval improvement in the opacification of the left hemithorax. There is now aeration involving the left upper lung field. There has been no significant interval change in the parenchymal and pleural disease involving the right hemithorax. A right-sided pleural catheter is again identified in place. Evaluation of the cardiac silhouette is again difficult due to silhouetting by the bilateral lung opacities. The mediastinum is without significant interval change. A right-sided IJ line is again noted, with the tip by the cavoatrial junction. Interval improvement in the opacification of the left hemithorax, with aeration in the left upper lung field when compared to the previous study performed 1 day earlier. No other significant interval change. XR CHEST PORTABLE    Result Date: 12/17/2022  EXAMINATION: ONE XRAY VIEW OF THE CHEST 12/17/2022 3:12 pm COMPARISON: 12/17/2022. HISTORY: ORDERING SYSTEM PROVIDED HISTORY: s/p TLC cathete placement TECHNOLOGIST PROVIDED HISTORY: Reason for exam:->s/p TLC cathete placement What reading provider will be dictating this exam?->CRC FINDINGS: There is a new right internal jugular central venous catheter terminating near the SVC-right atrium junction. No pneumothorax is seen.   There is worsening opacification of the left hemithorax. The mediastinum is deviated to the left. Pleural and parenchymal disease in the base of the right hemithorax does not appear significantly changed. New right internal jugular central venous catheter terminating near the SVC-right atrium junction. No post-procedure pneumothorax. Worsening opacification of the left hemithorax and stable findings on the right. XR CHEST PORTABLE    Result Date: 12/17/2022  EXAMINATION: ONE XRAY VIEW OF THE CHEST 12/17/2022 7:48 am COMPARISON: Comparison studies of a July 22nd through December 16 HISTORY: ORDERING SYSTEM PROVIDED HISTORY: resp distress TECHNOLOGIST PROVIDED HISTORY: Reason for exam:->resp distress What reading provider will be dictating this exam?->CRC FINDINGS: There is a large size Bochdalek type of diaphragmatic hernia across the midline which causes compression atelectasis in the medial inferior aspect of both lungs. Right-sided pigtail chest catheter has tip in the right base. If pleural effusion is present in the right base will be discrete or minimal. No right-sided pneumothorax seen. There are increased density in mid lower aspect of the left lung in addition to the diaphragmatic hernia which relates most likely with a moderate left-sided pleural effusion which is has increase since recent examinations. There is no left-sided pneumothorax. Patient had previous fusion of the thoracolumbar spine with levels of vertebroplasty in the lower thoracic spine. 1.  No changes position of the right-sided chest tube. No right-sided pneumothorax. The most of the right-sided pleural effusion has been drained, if present to be discrete or small. 2.  Moderate left-sided pleural effusion. If quantification of left-sided pleural effusion will be needed for clinical management bedside ultrasound or left lateral decubitus views of the chest can be helpful. 3.  No pneumothorax on the right or on the left.  4.  Large size diaphragmatic across the midline. XR CHEST PORTABLE    Result Date: 12/16/2022  EXAMINATION: ONE XRAY VIEW OF THE CHEST 12/16/2022 7:12 pm COMPARISON: 12/16/2022 HISTORY: ORDERING SYSTEM PROVIDED HISTORY: Tachyonic, decreased oxygen saturation TECHNOLOGIST PROVIDED HISTORY: Reason for exam:->tachyonic, decreased oxygen saturation What reading provider will be dictating this exam?->CRC FINDINGS: There is a right chest tube present at the lung bases. No right effusions are observed. There is compressive atelectasis involving the right mid lower lung. On the left, there appears to be slightly increased parenchymal density suggest atelectasis. This process silhouettes the left heart border and diaphragm. There are no signs of associated pneumothorax. There are stable postoperative changes of the lumbar spine there signs of previous cement augmentation of vertebral bodies. 1.  Increased parenchymal density projecting over the left lower lung concerning for atelectasis and/or consolidation 2. No signs of right effusion 3. Parenchymal density projecting over the right lower lung concerning for atelectasis     XR CHEST PORTABLE    Result Date: 12/16/2022  EXAMINATION: ONE XRAY VIEW OF THE CHEST 12/16/2022 2:39 pm COMPARISON: Comparison studies of January 6, 2021 through December 14 1022. Reviewed the previous CT chest of December 14 and July 22nd 2022. Charlotte Mackey HISTORY: ORDERING SYSTEM PROVIDED HISTORY: Post R Pigtail placement TECHNOLOGIST PROVIDED HISTORY: Reason for exam:->Post R Pigtail placement What reading provider will be dictating this exam?->CRC FINDINGS: Patient has a large Bochdalek type of a diaphragmatic hernia across the midline which causes compression atelectasis in the medial inferior aspect of both lungs. There is a right-sided chest tube pigtail extremity is in the right base. No conspicuous right-sided pneumothorax seen. There is no left-sided pneumothorax.  No conspicuous right-sided pleural effusions seen. There appears to be a small left-sided pleural effusion present. Above the level of the hernia there is no indication for infiltrates lung parenchyma. There is no perihilar vascular congestion. The heart is difficult identified due the distension of the diaphragmatic hernia. 1.  Right-sided pigtail catheter in the right base. 2.  No conspicuous right-sided pleural effusions identified. No right-sided pneumothorax. 3.  Cannot exclude a small left-sided pleural effusion. 4.  Large size diaphragmatic hernia across the midline causing compression atelectasis in the medial aspect of both lungs. XR CHEST PORTABLE    Result Date: 12/14/2022  EXAMINATION: ONE XRAY VIEW OF THE CHEST 12/14/2022 10:39 am COMPARISON: CT of the chest of 07/22/2022 HISTORY: ORDERING SYSTEM PROVIDED HISTORY: sob TECHNOLOGIST PROVIDED HISTORY: Reason for exam:->sob What reading provider will be dictating this exam?->CRC FINDINGS: The heart is enlarged. There is a chronic diaphragmatic hernia/large hiatal hernia. The hernia accounts for blunting of the right costophrenic angle. There is consolidation seen in the left lung base and there is blunting the left costophrenic angle. The upper lobes are clear. 1. Consolidation seen within the left lung base with blunting the left costophrenic angle which could represent pneumonia or atelectasis 2. A trace left pleural effusion cannot be excluded 3. Large right diaphragmatic hernia/hiatal hernia which was noted on the patient's previous CT of the chest of 07/22/2022. CTA PULMONARY W CONTRAST    Result Date: 12/14/2022  EXAMINATION: CTA OF THE CHEST; CT OF THE ABDOMEN AND PELVIS WITH CONTRAST 12/14/2022 12:21 pm TECHNIQUE: CTA of the chest was performed after the administration of intravenous contrast.  Multiplanar reformatted images are provided for review. MIP images are provided for review.  Automated exposure control, iterative reconstruction, and/or weight based adjustment of the mA/kV was utilized to reduce the radiation dose to as low as reasonably achievable.; CT of the abdomen and pelvis was performed with the administration of intravenous contrast. Multiplanar reformatted images are provided for review. Automated exposure control, iterative reconstruction, and/or weight based adjustment of the mA/kV was utilized to reduce the radiation dose to as low as reasonably achievable. COMPARISON: 07/22/2022 HISTORY: ORDERING SYSTEM PROVIDED HISTORY: SOB, hypoxia, cough TECHNOLOGIST PROVIDED HISTORY: Reason for exam:->SOB, hypoxia, cough Decision Support Exception - unselect if not a suspected or confirmed emergency medical condition->Emergency Medical Condition (MA) What reading provider will be dictating this exam?->CRC; FINDINGS: CHEST CT SCAN: Unremarkable opacification of the pulmonary vessels, no evidence of pulmonary embolism is seen. Scattered calcifications visualized in the aortic arch, no evidence for aneurysmal dilatation or arterial dissection is seen. The heart is prominent in size, no evidence of pericardial effusion is seen. Atherosclerotic calcifications visualized in the coronary vessels. Subtle hilar and mediastinal lymphadenopathy is seen. Extensive thickening visualized in the wall of the esophagus, dilated esophagus is seen axial series 302, image 68 with wall measuring approximately 0.7 cm. Narrowing visualized at the gastroesophageal junction visualized on axial series 302, image 130 Soft tissue density visualized in the posterior medial aspect of the right lower lung field with deviation of the mediastinum to the left, elevation of the right hemidiaphragm is seen, suboptimal opacification is visualized cannot ascertain if this represents pleural fluid, bowel or a soft tissue density however on the prior study obtained on 07/22/2022 there was no soft tissue mass at this location. Mild bronchial wall thickening is visualized.  Bilateral lower lobe consolidation and pleural effusions visualized. No evidence of endobronchial or endoluminal lesions are seen. Bilateral breast implants. No evidence of chest wall mass Degenerative bone changes are seen. ABDOMEN AND PELVIS CT SCAN: The liver demonstrates increased attenuation with mild nodularity of its surface. , a 1.2 cm simple cyst is visualized in the right lobe of the liver, no evidence of masses no evidence of intrahepatic biliary dilatation is seen. The gallbladder is distended, some small layering stones visualized within the neck of the gallbladder measuring approximately 0.6 cm,, no evidence of gallbladder wall thickening or pericholecystic  stranding. The common bile duct is unremarkable. The pancreas and spleen demonstrate no evidence of masses. The pancreas is atrophic, the spleen is small in size. The adrenal glands demonstrate unremarkable contours with no evidence of masses. The kidneys demonstrate no evidence of stones no evidence of renal masses. No evidence of hydronephrosis or hydroureter is seen. GI/Bowel: Narrowing of the gastroesophageal junction visualized on coronal series 605, image 64. The stomach is slightly prominent, thickening of the wall of the distal stomach/pyloric outlet is seen on coronal series 605, image 52, no evidence of masses. No significant distention of the small and large bowel loops is visualized. The appendix is visualized and is unremarkable. Abundance of stool is visualized in the large bowel. Scattered diverticular disease visualized but no evidence of acute diverticulitis. Pelvis: The urinary bladder is not optimally distended. The prostate gland is unremarkable. No evidence of free fluid in the pelvis. No evidence of pelvic mass is seen. Peritoneum/Retroperitoneum: No evidence of free fluid or air within the peritoneal cavity. Bones/Soft Tissues Abdominal wall soft tissues are unremarkable. Internal fixation of the thoracolumbar junction is seen.   Vertebral augmentation of the T10, T11, L3 and L4 vertebral bodies is seen. Degenerative changes of the lumbar spine visualized. No evidence of pulmonary embolism. Bilateral lower lobe pulmonary consolidations and pleural effusions seen. Deviation of the mediastinum to the left is visualized. Mild soft tissue prominence visualized surrounding the esophagus with narrowing at the gastroesophageal junction but appears to be due to external compression. Cannot rule left soft tissue mass at this location. Wall thickening visualized in the gastric outlet with mild prominence of the stomach seen, this could be artifactual. Distended gallbladder is seen. Degenerative bone changes, postoperative changes and multilevel vertebral augmentation seen. No evidence of acute abdominal/pelvic pathology. FLUORO FOR SURGICAL PROCEDURES    Result Date: 12/30/2022  EXAMINATION: SPOT FLUOROSCOPIC IMAGES 12/30/2022 6:18 pm TECHNIQUE: Fluoroscopy was provided by the radiology department for procedure. Radiologist was not present during examination. FLUOROSCOPY DOSE AND TYPE OR TIME AND EXPOSURES: 11 images FLUOROSCOPY TIME: 66.5 seconds COMPARISON: None used HISTORY: ORDERING SYSTEM PROVIDED HISTORY: Esophageal Stent and PEG Tube Placement TECHNOLOGIST PROVIDED HISTORY: Reason for exam:->Esophageal Stent and PEG Tube Placement What reading provider will be dictating this exam?->CRC Intraprocedural imaging. FINDINGS: 11 spot images of the lower chest and upper abdomen were obtained. Fluoroscopic support provided to subspecialty service for placement of esophageal stent and PEG tube. No obvious complication on the images provided. Please see subspecialty report for full details and interpretation of real time imaging. Intraprocedural fluoroscopic spot images as above. See separate procedure report for more information.      US CHEST INCLUDING MEDIASTINUM    Result Date: 12/15/2022  EXAMINATION: ULTRASOUND OF THE CHEST 12/15/2022 10:12 am COMPARISON: None. HISTORY: ORDERING SYSTEM PROVIDED HISTORY: pleural effusion TECHNOLOGIST PROVIDED HISTORY: Reason for exam:->pleural effusion What reading provider will be dictating this exam?->CRC FINDINGS: Probable complex loculated right pleural effusion. Simple left pleural effusion is present. Probable complex loculated right pleural effusion. Simple left pleural effusion. IR GUIDED PERC PLEURAL DRAIN W CATH INSERT    Result Date: 12/16/2022  PROCEDURE: IR PERC CATH PLEURAL DRAIN W/IMAG MODERATE CONSCIOUS SEDATION 12/16/2022 HISTORY: ORDERING SYSTEM PROVIDED HISTORY: righ chest tube pigtail drain for loculated pleural effusion TECHNOLOGIST PROVIDED HISTORY: Place right chest tube for loculated effusion. Send fluid for: gram stain, culture, fungal culture, LDH, Protein, pH, glucose, cell count with diff, triglycerides, cytology Reason for exam:->righ chest tube pigtail drain for loculated pleural effusion Which side should the procedure be performed?->Right What reading provider will be dictating this exam?->CRC TECHNIQUE: Ultrasound CONTRAST: None SEDATION: Moderate sedation was ordered and supervised by the attending with physician face-to-face monitoring. Medications were provided and recorded by Radiology nurses. Moderate sedation was provided for 23 minutes FLUOROSCOPY DOSE AND TYPE OR TIME AND EXPOSURES: None Number of images: 6 DESCRIPTION OF PROCEDURE: Informed consent was obtained after a detailed explanation of the procedure including risks, benefits, and alternatives. Universal protocol was observed. Sterile gowns, masks, hats and gloves utilized for maximal sterile barrier. The right chest wall was prepped and draped using sterile technique. 1% lidocaine was used for local anesthesia. Using a scalpel an incision was made. Under ultrasound guidance using a one-step needle access into the right pleural fluid collection was achieved and serous fluid was aspirated.   An Amplatz guidewire was placed. The tract was dilated to 7 [place] N[place] and an 8 [place] pigtail catheter was guided into the pleural space. A right thoracentesis was performed. The catheter was sutured with 2-0 Ethilon and set to a Pleur-Evac device. FINDINGS: There is a fluid collection projecting over the right lower lung. The images reveal signs of atelectasis. Approximately 105 cc of fluid was removed. The patient tolerated the procedure well and there were no immediate complications. 1.  Successful ultrasound-guided right thoracentesis. 2.  Successful placement of an 8 Iranian pleural drain       Discharge Medications:      Medication List        CONTINUE taking these medications      Handicap Placard Misc  by Does not apply route Duration: Lifetime            ASK your doctor about these medications      aspirin 325 MG EC tablet  Take 1 tablet by mouth daily     levothyroxine 50 MCG tablet  Commonly known as: SYNTHROID     rosuvastatin 20 MG tablet  Commonly known as: Crestor  Take 1 tablet by mouth nightly     Vitamin D3 125 MCG (5000 UT) Tabs              +++++++++++++++++++++++++++++++++++++++++++++++++  Karsten Santana, DO  [address], [place]  +++++++++++++++++++++++++++++++++++++++++++++++++  NOTE: This report was transcribed using voice recognition software. Every effort was made to ensure accuracy; however, inadvertent computerized transcription errors may be present.

## 2023-01-07 LAB
BLOOD CULTURE, ROUTINE: NORMAL
CULTURE, BLOOD 2: NORMAL

## 2023-07-04 NOTE — PROGRESS NOTES
Department of Internal Medicine  Nephrology Progress Note      Events reviewed. SUBJECTIVE: We are following [place] Form for PAWAN.   Presently intubated    PHYSICAL EXAM:      Vitals:    VITALS:  BP (!) 109/48   Pulse 79   Temp 97 °F (36.1 °C) (Esophageal)   Resp 30   Ht 5' 2\" (1.575 m)   Wt 151 lb 3.2 oz (68.6 kg)   SpO2 99%   BMI 27.65 kg/m²   24HR INTAKE/OUTPUT:    Intake/Output Summary (Last 24 hours) at 12/23/2022 0930  Last data filed at 12/23/2022 0915  Gross per 24 hour   Intake 4588.43 ml   Output 5534 ml   Net -945.57 ml         Constitutional: Patient intubated, sedated  HEENT: Normocephalic, atraumatic, PERRLA, endotracheal tube in place  Respiratory: Diminished right lung base  Cardiovascular/Edema: RRR, normal S1, normal S2, no edema  Gastrointestinal: Soft, not distended, nontender  Neurologic: Patient is sedated  Skin: Pale, dry, no lesions, no rash, + edema     Scheduled Meds:   ertapenem (INVanz) IVPB  1,000 mg IntraVENous Q24H    Vitamin D  1,000 Units Oral Daily    heparin (porcine)  5,000 Units SubCUTAneous Q8H    acetylcysteine  4 mL Inhalation Q4H    artificial tears   Both Eyes Q4H    hydrocortisone sodium succinate PF  100 mg IntraVENous Q8H    insulin lispro  0-8 Units SubCUTAneous Q4H    anidulafungin  100 mg IntraVENous Q24H    pantoprazole (PROTONIX) 40 mg injection  40 mg IntraVENous Q12H    sodium chloride flush  5-40 mL IntraVENous 2 times per day    heparin flush  3 mL IntraVENous 2 times per day    metoclopramide  10 mg IntraVENous Q6H    Arformoterol Tartrate  15 mcg Nebulization BID    budesonide  0.5 mg Nebulization BID    albuterol  2.5 mg Nebulization Q4H WA    [Held by provider] metoprolol  5 mg IntraVENous Q6H    rosuvastatin  10 mg Oral Nightly    levothyroxine  50 mcg Oral Daily     Continuous Infusions:   PN-Adult  3 IN 1 Central Line (Custom)      PN-Adult  3 IN 1 Central Line (Custom) 50 mL/hr at 12/23/22 0915    cisatracurium (NIMBEX) infusion Stopped (12/23/22 0900)    epoprostenol (VELETRI) nebulization solution 50 ng/kg/min (12/23/22 0203)    prismaSATE BGK 4/0/1.2 2,000 mL/hr at 12/23/22 0655    sodium chloride 100 mL/hr at 12/22/22 2300    calcium chloride CRRT infusion 8,000 mg (12/22/22 1259)    dextrose      midazolam 2 mg/hr (12/23/22 0915)    norepinephrine 5 mcg/min (12/23/22 0915)    vasopressin (Septic Shock) infusion Stopped (12/21/22 1808)    fentaNYL 100 mcg/hr (12/23/22 0915)    sodium chloride       PRN Meds:.potassium chloride, magnesium sulfate, calcium gluconate **OR** calcium gluconate **OR** calcium gluconate **OR** calcium gluconate, sodium phosphate IVPB **OR** sodium phosphate IVPB **OR** sodium phosphate IVPB **OR** sodium phosphate IVPB, glucose, dextrose bolus **OR** dextrose bolus, glucagon (rDNA), dextrose, sodium chloride flush, sodium chloride, heparin flush, ondansetron **OR** ondansetron, magnesium hydroxide, acetaminophen **OR** acetaminophen, ipratropium-albuterol    DATA:    CBC with Differential:    Lab Results   Component Value Date/Time    WBC 18.2 12/23/2022 06:05 AM    RBC 2.94 12/23/2022 06:05 AM    HGB 8.7 12/23/2022 06:05 AM    HCT 25.8 12/23/2022 06:05 AM    HCT 29.0 12/20/2022 01:31 PM     12/23/2022 06:05 AM    MCV 87.8 12/23/2022 06:05 AM    MCH 29.6 12/23/2022 06:05 AM    MCHC 33.7 12/23/2022 06:05 AM    RDW 15.5 12/23/2022 06:05 AM    NRBC 0.9 12/21/2022 04:12 AM    LYMPHOPCT 0.9 12/23/2022 06:05 AM    MONOPCT 1.7 12/23/2022 06:05 AM    MYELOPCT 0.9 12/23/2022 06:05 AM    BASOPCT 0.3 12/23/2022 06:05 AM    MONOSABS 0.36 12/23/2022 06:05 AM    LYMPHSABS 0.18 12/23/2022 06:05 AM    EOSABS 0.00 12/23/2022 06:05 AM    BASOSABS 0.00 12/23/2022 06:05 AM     CMP:    Lab Results   Component Value Date/Time     12/23/2022 06:05 AM    K 4.1 12/23/2022 06:05 AM    K 4.7 12/21/2022 09:23 AM     12/23/2022 06:05 AM    CO2 24 12/23/2022 06:05 AM    BUN 23 12/23/2022 06:05 AM    CREATININE 1.2 12/23/2022 06:05 AM    GFRAA >60 07/22/2022 01:42 PM    LABGLOM 46 12/23/2022 06:05 AM    GLUCOSE 168 12/23/2022 06:05 AM    PROT 4.8 12/23/2022 06:05 AM    LABALBU 2.4 12/23/2022 06:05 AM    LABALBU 4.3 01/24/2011 01:15 PM    CALCIUM 7.9 12/23/2022 06:05 AM    BILITOT 2.3 12/23/2022 06:05 AM    ALKPHOS 139 12/23/2022 06:05 AM    AST 51 12/23/2022 06:05 AM    ALT 16 12/23/2022 06:05 AM     Magnesium:    Lab Results   Component Value Date/Time    MG 2.0 12/23/2022 06:05 AM     Phosphorus:    Lab Results   Component Value Date/Time    PHOS 2.3 12/23/2022 06:05 AM     Radiology Review:      CT Chest and Abd/Pelvis 12/14/22      No evidence of pulmonary embolism. Bilateral lower lobe pulmonary consolidations and pleural effusions seen. Deviation of the mediastinum to the left is visualized. Mild soft tissue prominence visualized surrounding the esophagus with   narrowing at the gastroesophageal junction but appears to be due to external   compression. Cannot rule left soft tissue mass at this location. Wall thickening visualized in the gastric outlet with mild prominence of the   stomach seen, this could be artifactual.     Distended gallbladder is seen. Degenerative bone changes, postoperative changes and multilevel vertebral   augmentation seen. No evidence of acute abdominal/pelvic pathology. US Chest 12/15/22       Probable complex loculated right pleural effusion. Simple left pleural effusion. BRIEF SUMMARY OF INITIAL CONSULT:    Briefly, Kana Wang is a 78-year-old female, past medical history significant for hypothyroidism, hiatal hernia and TIA, who presented to the ED on 12/14/2022 from [facility], patient recently underwent a hiatal hernia repair on 12/9/22, developed acute respiratory distress and was transferred to [facility] on 12/14/2022 for further management and treatment.   Initial ED work-up revealed creatinine level of 1.7, patient then underwent two seperate IV contrast studies CTA and creatinine subsequently increased to 2.5, on 12/15/2022 creatinine level increased to 3.5 mg/dL, reason for this consultation. Review of records from [facility] reveals that baseline creatinine appears to be between 0.9 to 1.0 mg/dL, creatinine was 1.0 on 12/13/2022, patient was started on lisinopril during hospitalization, was given multiple doses IV furosemide with poor oral intake and vomiting. Problems resolved:  HAGMA, 2/2 uremia and starvation ketoacidosis  Hypophosphatemia, poor intake/refeeding syndrome, rule out vitamin D deficiency, levels improved    IMPRESSION/RECOMMENDATIONS:     PAWAN, stage II, nonoliguric, ATN (ischemic +/- contrast induced , intravascular volume depletion due to poor oral intake, loop diuretics and vomiting in the setting of ACE inhibition versus,) FEUrea 28.3%, FENa 1.1%. Started on renal replacement therapy on 12/21/2022. Tolerating CVVHD with fluid removal.    Hemodynamic shock, septic, on vasopressors and antibiotics, requiring less vasopressors.   Vitamin D deficiency, vitamin D 25 level 11, on cholecalciferol     ------------------------------------------------------  Respiratory failure, status post intubation  New onset AF with RVR  Complex right pleural effusion, s/p VATS    S/p hiatal hernia repair 12/9/2022  Hypothyroidism, on levothyroxine  Persistent vomiting, PRN ondansetron, esophagram negative for perforation  Hospital-acquired pneumonia, + Enterobacter cloacae, on ertapenem  Possibly focal pneumonia, Candida albicans?,  On anidulafungin  Normocytic anemia, multifactorial  Severe hypoalbuminemia  Nutrition, on TPN per surgery      Plan:    Continue CVVHD 30 cc/kilo/hour, increase UF to 75 cc/hour   Continue TPN per surgery  Continue to monitor renal function for recovery       Electronically signed by Jimbo Starks MD on 12/23/2022 at 9:30 AM [id number]

## (undated) DEVICE — ADHESIVE SKIN CLSR 0.7ML TOP DERMBND ADV

## (undated) DEVICE — Device

## (undated) DEVICE — GOWN,SIRUS,FABRNF,L,20/CS: Brand: MEDLINE

## (undated) DEVICE — WORKING LENGTH 155CM, WORKING CHANNEL 2.8MM: Brand: RESOLUTION 360 CLIP

## (undated) DEVICE — NEEDLE HYPO 21GA L1.5IN INTRAMUSCULAR S STL LATCH BVL UP

## (undated) DEVICE — FLEXIBLE ENDOSCOPE: Brand: ASCOPE™ 4 BRONCHO LARGE 5.8/2.8

## (undated) DEVICE — WARMER SCP LAP

## (undated) DEVICE — JP CHANNEL DRAIN, 24FR HUBLESS: Brand: CARDINAL HEALTH

## (undated) DEVICE — DRAIN SURG SGL COLL PT TB FOR ATS BG OASIS

## (undated) DEVICE — DRAIN CHN 19FR L0.25MM DIA6.3MM SIL RND HUBLESS FULL FLUT

## (undated) DEVICE — COVER,TABLE,60X90,STERILE: Brand: MEDLINE

## (undated) DEVICE — STERILE POLYISOPRENE POWDER-FREE SURGICAL GLOVES: Brand: PROTEXIS

## (undated) DEVICE — SOLUTION IRRIG 3000ML 0.9% SOD CHL USP UROMATIC PLAS CONT

## (undated) DEVICE — DRAIN SURG L3/8-1/2IN DIA3/16IN SIL CARD CONN 1:1 BLAK

## (undated) DEVICE — SPONGE GZ W4XL4IN RAYON POLY FILL CVR W/ NONWOVEN FAB

## (undated) DEVICE — PUMP SUC IRR TBNG L10FT W/ HNDPC ASSEMB STRYKEFLOW 2

## (undated) DEVICE — TROCAR: Brand: KII FIOS FIRST ENTRY

## (undated) DEVICE — MAGNETIC INSTR DRAPE 20X16: Brand: MEDLINE INDUSTRIES, INC.

## (undated) DEVICE — DEFENDO AIR WATER SUCTION AND BIOPSY VALVE KIT FOR  OLYMPUS: Brand: DEFENDO AIR/WATER/SUCTION AND BIOPSY VALVE

## (undated) DEVICE — INSUFFLATION TUBING SET WITH FILTER, FUNNEL CONNECTOR AND LUER LOCK: Brand: JOSNOE MEDICAL INC

## (undated) DEVICE — INTENDED FOR TISSUE SEPARATION, AND OTHER PROCEDURES THAT REQUIRE A SHARP SURGICAL BLADE TO PUNCTURE OR CUT.: Brand: BARD-PARKER ® STAINLESS STEEL BLADES

## (undated) DEVICE — ELECTRODE BLDE L6.5IN CAUT EXT DISP

## (undated) DEVICE — Z DISCONTINUED NO SUB IDED TUBING ETCO2 AD L6.5FT NSL ORAL CVD PRNG NONFLARED TIP OVR

## (undated) DEVICE — BITEBLOCK 54FR W/ DENT RIM BLOX

## (undated) DEVICE — THORACIC: Brand: MEDLINE INDUSTRIES, INC.

## (undated) DEVICE — 4-PORT MANIFOLD: Brand: NEPTUNE 2